# Patient Record
Sex: FEMALE | Race: WHITE | NOT HISPANIC OR LATINO | Employment: OTHER | URBAN - METROPOLITAN AREA
[De-identification: names, ages, dates, MRNs, and addresses within clinical notes are randomized per-mention and may not be internally consistent; named-entity substitution may affect disease eponyms.]

---

## 2018-09-28 ENCOUNTER — HOSPITAL ENCOUNTER (OUTPATIENT)
Dept: MAMMOGRAPHY | Facility: HOSPITAL | Age: 63
Discharge: HOME | End: 2018-09-28
Attending: NURSE PRACTITIONER | Admitting: NURSE PRACTITIONER

## 2019-06-07 ENCOUNTER — ON CAMPUS - OUTPATIENT (AMBULATORY)
Dept: URBAN - METROPOLITAN AREA HOSPITAL 2 | Facility: HOSPITAL | Age: 64
End: 2019-06-07

## 2019-06-07 ENCOUNTER — OFFICE (AMBULATORY)
Dept: URBAN - METROPOLITAN AREA PATHOLOGY 4 | Facility: PATHOLOGY | Age: 64
End: 2019-06-07

## 2019-06-07 VITALS
TEMPERATURE: 98.1 F | HEART RATE: 78 BPM | DIASTOLIC BLOOD PRESSURE: 92 MMHG | DIASTOLIC BLOOD PRESSURE: 66 MMHG | WEIGHT: 169 LBS | DIASTOLIC BLOOD PRESSURE: 89 MMHG | RESPIRATION RATE: 19 BRPM | OXYGEN SATURATION: 95 % | HEART RATE: 75 BPM | HEART RATE: 76 BPM | DIASTOLIC BLOOD PRESSURE: 56 MMHG | SYSTOLIC BLOOD PRESSURE: 115 MMHG | SYSTOLIC BLOOD PRESSURE: 113 MMHG | RESPIRATION RATE: 18 BRPM | HEIGHT: 63 IN | OXYGEN SATURATION: 92 % | SYSTOLIC BLOOD PRESSURE: 155 MMHG | OXYGEN SATURATION: 90 % | DIASTOLIC BLOOD PRESSURE: 85 MMHG | OXYGEN SATURATION: 91 % | OXYGEN SATURATION: 96 % | DIASTOLIC BLOOD PRESSURE: 76 MMHG | SYSTOLIC BLOOD PRESSURE: 126 MMHG | RESPIRATION RATE: 16 BRPM | SYSTOLIC BLOOD PRESSURE: 146 MMHG | OXYGEN SATURATION: 100 % | HEART RATE: 86 BPM | SYSTOLIC BLOOD PRESSURE: 144 MMHG | SYSTOLIC BLOOD PRESSURE: 139 MMHG | DIASTOLIC BLOOD PRESSURE: 69 MMHG

## 2019-06-07 DIAGNOSIS — R13.10 DYSPHAGIA, UNSPECIFIED: ICD-10-CM

## 2019-06-07 DIAGNOSIS — K22.2 ESOPHAGEAL OBSTRUCTION: ICD-10-CM

## 2019-06-07 DIAGNOSIS — K22.70 BARRETT'S ESOPHAGUS WITHOUT DYSPLASIA: ICD-10-CM

## 2019-06-07 DIAGNOSIS — K44.9 DIAPHRAGMATIC HERNIA WITHOUT OBSTRUCTION OR GANGRENE: ICD-10-CM

## 2019-06-07 LAB
GI HISTOLOGY: A. UNSPECIFIED: (no result)
GI HISTOLOGY: PDF REPORT: (no result)

## 2019-06-07 PROCEDURE — 43450 DILATE ESOPHAGUS 1/MULT PASS: CPT | Performed by: INTERNAL MEDICINE

## 2019-06-07 PROCEDURE — 88305 TISSUE EXAM BY PATHOLOGIST: CPT | Performed by: INTERNAL MEDICINE

## 2019-06-07 PROCEDURE — 43239 EGD BIOPSY SINGLE/MULTIPLE: CPT | Performed by: INTERNAL MEDICINE

## 2019-06-07 RX ORDER — RANITIDINE 300 MG/1
300 TABLET ORAL
Qty: 90 | Refills: 3 | Status: COMPLETED
End: 2019-11-18

## 2019-06-07 RX ORDER — PANTOPRAZOLE SODIUM 40 MG/1
40 TABLET, DELAYED RELEASE ORAL
Qty: 90 | Refills: 3 | Status: COMPLETED
End: 2019-11-18

## 2019-06-07 RX ADMIN — ALBUTEROL SULFATE: 2.5 SOLUTION INTRABRONCHIAL at 15:09

## 2019-06-07 RX ADMIN — IPRATROPIUM BROMIDE AND ALBUTEROL SULFATE: .5; 3 SOLUTION RESPIRATORY (INHALATION) at 15:09

## 2019-09-05 ENCOUNTER — HOSPITAL ENCOUNTER (EMERGENCY)
Facility: HOSPITAL | Age: 64
Discharge: HOME | End: 2019-09-05
Attending: EMERGENCY MEDICINE | Admitting: EMERGENCY MEDICINE

## 2019-09-05 ENCOUNTER — HOSPITAL ENCOUNTER (OUTPATIENT)
Dept: CARDIOLOGY | Facility: HOSPITAL | Age: 64
Discharge: HOME | End: 2019-09-05

## 2019-09-05 VITALS
HEIGHT: 63 IN | RESPIRATION RATE: 16 BRPM | TEMPERATURE: 97.7 F | OXYGEN SATURATION: 94 % | WEIGHT: 174.16 LBS | HEART RATE: 74 BPM | BODY MASS INDEX: 30.86 KG/M2 | SYSTOLIC BLOOD PRESSURE: 184 MMHG | DIASTOLIC BLOOD PRESSURE: 95 MMHG

## 2019-09-05 DIAGNOSIS — I82.591 CHRONIC DEEP VEIN THROMBOSIS (DVT) OF OTHER VEIN OF RIGHT LOWER EXTREMITY (HCC): ICD-10-CM

## 2019-09-05 DIAGNOSIS — I82.4Z1 ACUTE DEEP VEIN THROMBOSIS OF CALF, RIGHT (HCC): Primary | ICD-10-CM

## 2019-09-05 DIAGNOSIS — I82.A22 CHRONIC DEEP VEIN THROMBOSIS (DVT) OF AXILLARY VEIN OF LEFT UPPER EXTREMITY (HCC): ICD-10-CM

## 2019-09-05 LAB
ALBUMIN SERPL-MCNC: 3.7 G/DL (ref 3.5–4.8)
ALBUMIN/GLOB SERPL: 1.3 G/DL (ref 1–1.7)
ALP SERPL-CCNC: 66 U/L (ref 32–91)
ALT SERPL W P-5'-P-CCNC: 12 U/L (ref 14–54)
ANION GAP SERPL CALCULATED.3IONS-SCNC: 15.6 MMOL/L (ref 5–15)
AST SERPL-CCNC: 18 U/L (ref 15–41)
BASOPHILS # BLD AUTO: 0 10*3/MM3 (ref 0–0.2)
BASOPHILS NFR BLD AUTO: 0.2 % (ref 0–1.5)
BH CV LOW VAS RIGHT DISTAL FEMORAL SPONT: 1
BH CV LOW VAS RIGHT MID FEMORAL SPONT: 1
BH CV LOW VAS RIGHT PERONEAL VESSEL: 1
BH CV LOW VAS RIGHT POPLITEAL SPONT: 1
BH CV LOW VAS RIGHT PROXIMAL FEMORAL SPONT: 1
BH CV LOWER VASCULAR LEFT COMMON FEMORAL AUGMENT: NORMAL
BH CV LOWER VASCULAR LEFT COMMON FEMORAL COMPETENT: NORMAL
BH CV LOWER VASCULAR LEFT COMMON FEMORAL COMPRESS: NORMAL
BH CV LOWER VASCULAR LEFT COMMON FEMORAL PHASIC: NORMAL
BH CV LOWER VASCULAR LEFT COMMON FEMORAL SPONT: NORMAL
BH CV LOWER VASCULAR LEFT DISTAL FEMORAL COMPRESS: NORMAL
BH CV LOWER VASCULAR LEFT GASTRONEMIUS COMPRESS: NORMAL
BH CV LOWER VASCULAR LEFT GREATER SAPH AK COMPRESS: NORMAL
BH CV LOWER VASCULAR LEFT GREATER SAPH BK COMPRESS: NORMAL
BH CV LOWER VASCULAR LEFT LESSER SAPH COMPRESS: NORMAL
BH CV LOWER VASCULAR LEFT MID FEMORAL AUGMENT: NORMAL
BH CV LOWER VASCULAR LEFT MID FEMORAL COMPETENT: NORMAL
BH CV LOWER VASCULAR LEFT MID FEMORAL COMPRESS: NORMAL
BH CV LOWER VASCULAR LEFT MID FEMORAL PHASIC: NORMAL
BH CV LOWER VASCULAR LEFT MID FEMORAL SPONT: NORMAL
BH CV LOWER VASCULAR LEFT PERONEAL COMPRESS: NORMAL
BH CV LOWER VASCULAR LEFT POPLITEAL AUGMENT: NORMAL
BH CV LOWER VASCULAR LEFT POPLITEAL COMPETENT: NORMAL
BH CV LOWER VASCULAR LEFT POPLITEAL COMPRESS: NORMAL
BH CV LOWER VASCULAR LEFT POPLITEAL PHASIC: NORMAL
BH CV LOWER VASCULAR LEFT POPLITEAL SPONT: NORMAL
BH CV LOWER VASCULAR LEFT POSTERIOR TIBIAL COMPRESS: NORMAL
BH CV LOWER VASCULAR LEFT PROXIMAL FEMORAL COMPRESS: NORMAL
BH CV LOWER VASCULAR LEFT SAPHENOFEMORAL JUNCTION COMPRESS: NORMAL
BH CV LOWER VASCULAR RIGHT COMMON FEMORAL AUGMENT: NORMAL
BH CV LOWER VASCULAR RIGHT COMMON FEMORAL COMPETENT: NORMAL
BH CV LOWER VASCULAR RIGHT COMMON FEMORAL COMPRESS: NORMAL
BH CV LOWER VASCULAR RIGHT COMMON FEMORAL PHASIC: NORMAL
BH CV LOWER VASCULAR RIGHT COMMON FEMORAL SPONT: NORMAL
BH CV LOWER VASCULAR RIGHT DISTAL FEMORAL COMPRESS: NORMAL
BH CV LOWER VASCULAR RIGHT DISTAL FEMORAL THROMBUS: NORMAL
BH CV LOWER VASCULAR RIGHT GASTRONEMIUS COMPRESS: NORMAL
BH CV LOWER VASCULAR RIGHT GREATER SAPH AK COMPRESS: NORMAL
BH CV LOWER VASCULAR RIGHT GREATER SAPH BK COMPRESS: NORMAL
BH CV LOWER VASCULAR RIGHT LESSER SAPH COMPRESS: NORMAL
BH CV LOWER VASCULAR RIGHT MID FEMORAL AUGMENT: NORMAL
BH CV LOWER VASCULAR RIGHT MID FEMORAL COMPETENT: NORMAL
BH CV LOWER VASCULAR RIGHT MID FEMORAL COMPRESS: NORMAL
BH CV LOWER VASCULAR RIGHT MID FEMORAL PHASIC: NORMAL
BH CV LOWER VASCULAR RIGHT MID FEMORAL SPONT: NORMAL
BH CV LOWER VASCULAR RIGHT MID FEMORAL THROMBUS: NORMAL
BH CV LOWER VASCULAR RIGHT PERONEAL COMPRESS: NORMAL
BH CV LOWER VASCULAR RIGHT PERONEAL THROMBUS: NORMAL
BH CV LOWER VASCULAR RIGHT POPLITEAL AUGMENT: NORMAL
BH CV LOWER VASCULAR RIGHT POPLITEAL COMPETENT: NORMAL
BH CV LOWER VASCULAR RIGHT POPLITEAL COMPRESS: NORMAL
BH CV LOWER VASCULAR RIGHT POPLITEAL PHASIC: NORMAL
BH CV LOWER VASCULAR RIGHT POPLITEAL SPONT: NORMAL
BH CV LOWER VASCULAR RIGHT POPLITEAL THROMBUS: NORMAL
BH CV LOWER VASCULAR RIGHT POSTERIOR TIBIAL COMPRESS: NORMAL
BH CV LOWER VASCULAR RIGHT PROXIMAL FEMORAL COMPRESS: NORMAL
BH CV LOWER VASCULAR RIGHT PROXIMAL FEMORAL THROMBUS: NORMAL
BH CV LOWER VASCULAR RIGHT SAPHENOFEMORAL JUNCTION AUGMENT: NORMAL
BH CV LOWER VASCULAR RIGHT SAPHENOFEMORAL JUNCTION COMPETENT: NORMAL
BH CV LOWER VASCULAR RIGHT SAPHENOFEMORAL JUNCTION COMPRESS: NORMAL
BH CV LOWER VASCULAR RIGHT SAPHENOFEMORAL JUNCTION PHASIC: NORMAL
BH CV LOWER VASCULAR RIGHT SAPHENOFEMORAL JUNCTION SPONT: NORMAL
BILIRUB SERPL-MCNC: 0.6 MG/DL (ref 0.3–1.2)
BNP SERPL-MCNC: 24 PG/ML
BUN BLD-MCNC: 8 MG/DL (ref 8–20)
BUN/CREAT SERPL: 8.9 (ref 5.4–26.2)
CALCIUM SPEC-SCNC: 8.9 MG/DL (ref 8.9–10.3)
CHLORIDE SERPL-SCNC: 103 MMOL/L (ref 101–111)
CO2 SERPL-SCNC: 24 MMOL/L (ref 22–32)
CREAT BLD-MCNC: 0.9 MG/DL (ref 0.4–1)
DEPRECATED RDW RBC AUTO: 43.3 FL (ref 37–54)
EOSINOPHIL # BLD AUTO: 0.2 10*3/MM3 (ref 0–0.4)
EOSINOPHIL NFR BLD AUTO: 2.5 % (ref 0.3–6.2)
ERYTHROCYTE [DISTWIDTH] IN BLOOD BY AUTOMATED COUNT: 14.2 % (ref 12.3–15.4)
GFR SERPL CREATININE-BSD FRML MDRD: 63 ML/MIN/1.73
GLOBULIN UR ELPH-MCNC: 2.8 GM/DL (ref 2.5–3.8)
GLUCOSE BLD-MCNC: 96 MG/DL (ref 65–99)
HCT VFR BLD AUTO: 43.3 % (ref 34–46.6)
HGB BLD-MCNC: 14.2 G/DL (ref 12–15.9)
INR PPP: 2.06 (ref 2–3)
LYMPHOCYTES # BLD AUTO: 1.8 10*3/MM3 (ref 0.7–3.1)
LYMPHOCYTES NFR BLD AUTO: 27.9 % (ref 19.6–45.3)
MCH RBC QN AUTO: 28.7 PG (ref 26.6–33)
MCHC RBC AUTO-ENTMCNC: 32.9 G/DL (ref 31.5–35.7)
MCV RBC AUTO: 87.2 FL (ref 79–97)
MONOCYTES # BLD AUTO: 0.5 10*3/MM3 (ref 0.1–0.9)
MONOCYTES NFR BLD AUTO: 7.6 % (ref 5–12)
NEUTROPHILS # BLD AUTO: 4 10*3/MM3 (ref 1.7–7)
NEUTROPHILS NFR BLD AUTO: 61.8 % (ref 42.7–76)
NRBC BLD AUTO-RTO: 0 /100 WBC (ref 0–0.2)
PLATELET # BLD AUTO: 216 10*3/MM3 (ref 140–450)
PMV BLD AUTO: 8.6 FL (ref 6–12)
POTASSIUM BLD-SCNC: 3.6 MMOL/L (ref 3.6–5.1)
PROT SERPL-MCNC: 6.5 G/DL (ref 6.1–7.9)
PROTHROMBIN TIME: 19.9 SECONDS (ref 19.4–28.5)
RBC # BLD AUTO: 4.96 10*6/MM3 (ref 3.77–5.28)
SODIUM BLD-SCNC: 139 MMOL/L (ref 136–144)
WBC NRBC COR # BLD: 6.6 10*3/MM3 (ref 3.4–10.8)

## 2019-09-05 PROCEDURE — 83880 ASSAY OF NATRIURETIC PEPTIDE: CPT | Performed by: EMERGENCY MEDICINE

## 2019-09-05 PROCEDURE — 99283 EMERGENCY DEPT VISIT LOW MDM: CPT

## 2019-09-05 PROCEDURE — 85610 PROTHROMBIN TIME: CPT | Performed by: EMERGENCY MEDICINE

## 2019-09-05 PROCEDURE — 80053 COMPREHEN METABOLIC PANEL: CPT | Performed by: EMERGENCY MEDICINE

## 2019-09-05 PROCEDURE — 93970 EXTREMITY STUDY: CPT

## 2019-09-05 PROCEDURE — 85025 COMPLETE CBC W/AUTO DIFF WBC: CPT | Performed by: EMERGENCY MEDICINE

## 2019-09-05 RX ORDER — OXYCODONE HYDROCHLORIDE 5 MG/1
5 TABLET ORAL ONCE
Status: COMPLETED | OUTPATIENT
Start: 2019-09-05 | End: 2019-09-05

## 2019-09-05 RX ORDER — OXYCODONE HCL 10 MG/1
10 TABLET, FILM COATED, EXTENDED RELEASE ORAL ONCE
Status: DISCONTINUED | OUTPATIENT
Start: 2019-09-05 | End: 2019-09-05

## 2019-09-05 RX ORDER — WARFARIN SODIUM 1 MG/1
1 TABLET ORAL
Qty: 15 TABLET | Refills: 1 | Status: SHIPPED | OUTPATIENT
Start: 2019-09-05 | End: 2020-07-16

## 2019-09-05 RX ADMIN — OXYCODONE HYDROCHLORIDE 5 MG: 5 TABLET ORAL at 13:17

## 2019-09-05 NOTE — ED NOTES
Blood pressure elevated to 184/95. Patient states she take lisinopril at home. MD made aware. MD stated she is okay to discharge. Advised patient to take lisinopril when she gets home. Patient agreeable.      Carmen Lam RN  09/05/19 [id number]

## 2019-09-05 NOTE — ED PROVIDER NOTES
Subjective   63-year-old female reporting a 6-month history of increasing intermittent edema to her legs.  She states been taking the same dose of Lasix throughout.  She states it does not help.  She states that she has had some increased discomfort mostly in the left leg but also in the right.  He states that she has had difficulty achieving a therapeutic Coumadin level.  She states was checked within the week and found to be 2.1.  She reports that she has a history of previous DVT and also a history of previous pulmonary embolus.  She reports no recent fever chills.  She denies history of direct impact trauma to the area.  She reports no overuse risk factors.  She denies shortness of breath night sweats or hemoptysis            Review of Systems   Constitutional: Positive for activity change and fatigue. Negative for chills, diaphoresis and fever.   Respiratory: Negative for choking, chest tightness and shortness of breath.    Hematological: Does not bruise/bleed easily.   All other systems reviewed and are negative.      No past medical history on file.    No Known Allergies    No past surgical history on file.    No family history on file.    Social History     Socioeconomic History   • Marital status:      Spouse name: Not on file   • Number of children: Not on file   • Years of education: Not on file   • Highest education level: Not on file           Objective   Physical Exam  Alert [place] Coma Scale 15   HEENT: Pupils equal and reactive to light. Conjunctivae are not injected. normal tympanic membranes. Oropharynx and nares are normal.   Neck: Supple. Midline trachea. No JVD. No goiter.   Chest: Clear and equal breath sounds bilaterally regular rate and rhythm without murmur or rub.   Abdomen: Positive bowel sounds nontender nondistended. No rebound or peritoneal signs. No CVA tenderness.   Extremities no clubbing cyanosis or edema motor sensory exam is normal the full range of motion is intact.  The  patient has bilateral positive Homans sign there is a puffiness noted to both feet.  The patient also has pain to direct calf compression on the right   skin: Warm and dry, no rashes or petechia.   Lymphatic: No regional lymphadenopathy. NProcedures           ED Course        Labs Reviewed   COMPREHENSIVE METABOLIC PANEL - Abnormal; Notable for the following components:       Result Value    ALT (SGPT) 12 (*)     Anion Gap 15.6 (*)     All other components within normal limits   PROTIME-INR - Normal   BNP (IN-HOUSE) - Normal   CBC WITH AUTO DIFFERENTIAL - Normal   CBC AND DIFFERENTIAL    Narrative:     The following orders were created for panel order CBC & Differential.  Procedure                               Abnormality         Status                     ---------                               -----------         ------                     CBC Auto Differential[[id number]]        Normal              Final result                 Please view results for these tests on the individual orders.     Medications   oxyCODONE (ROXICODONE) immediate release tablet 5 mg (5 mg Oral Given 9/5/19 1317)     No radiology results for the last day            MDM  Number of Diagnoses or Management Options     Amount and/or Complexity of Data Reviewed  Clinical lab tests: reviewed  Tests in the radiology section of CPT®: reviewed  Review and summarize past medical records: yes  Discuss the patient with other providers: yes  Independent visualization of images, tracings, or specimens: yes    Risk of Complications, Morbidity, and/or Mortality  Presenting problems: high  Diagnostic procedures: high  Management options: high  General comments: The patient was advised to continue her hydrocodone for pain.  The patient be given a as needed prescription for maxzide.  The patient will have her Coumadin dose increased by 1 mg every other day.  She will take a baby aspirin daily for the next 2 weeks.  The patient will follow close with her  primary care provider she was stable at discharge and vocalized understanding of discharge instructions and warning    Patient Progress  Patient progress: improved      Final diagnoses:   Acute deep vein thrombosis of calf, right (CMS/HCC)   Chronic deep vein thrombosis (DVT) of other vein of right lower extremity (CMS/HCC)   Chronic deep vein thrombosis (DVT) of axillary vein of left upper extremity (CMS/[facility])              Jun Caraballo MD  09/05/19 1520

## 2019-09-05 NOTE — DISCHARGE INSTRUCTIONS
Recheck PT/INR in 3 days  Take a daily baby aspirin for the next 2 weeks  Continue your pain medicine as directed

## 2019-11-18 ENCOUNTER — OFFICE (AMBULATORY)
Dept: URBAN - METROPOLITAN AREA CLINIC 64 | Facility: CLINIC | Age: 64
End: 2019-11-18

## 2019-11-18 VITALS
SYSTOLIC BLOOD PRESSURE: 163 MMHG | HEART RATE: 71 BPM | HEIGHT: 63 IN | DIASTOLIC BLOOD PRESSURE: 93 MMHG | WEIGHT: 180 LBS

## 2019-11-18 DIAGNOSIS — I10 ESSENTIAL (PRIMARY) HYPERTENSION: ICD-10-CM

## 2019-11-18 DIAGNOSIS — Z79.01 LONG TERM (CURRENT) USE OF ANTICOAGULANTS: ICD-10-CM

## 2019-11-18 DIAGNOSIS — K21.9 GASTRO-ESOPHAGEAL REFLUX DISEASE WITHOUT ESOPHAGITIS: ICD-10-CM

## 2019-11-18 DIAGNOSIS — Z86.010 PERSONAL HISTORY OF COLONIC POLYPS: ICD-10-CM

## 2019-11-18 DIAGNOSIS — F41.9 ANXIETY DISORDER, UNSPECIFIED: ICD-10-CM

## 2019-11-18 DIAGNOSIS — R13.10 DYSPHAGIA, UNSPECIFIED: ICD-10-CM

## 2019-11-18 PROCEDURE — 99214 OFFICE O/P EST MOD 30 MIN: CPT | Performed by: NURSE PRACTITIONER

## 2019-11-18 RX ORDER — RANITIDINE 300 MG/1
300 TABLET ORAL
Qty: 90 | Refills: 3 | Status: COMPLETED
End: 2019-11-18

## 2019-11-18 RX ORDER — DEXLANSOPRAZOLE 60 MG/1
60 CAPSULE, DELAYED RELEASE ORAL
Qty: 90 | Refills: 3 | Status: COMPLETED
Start: 2019-11-18 | End: 2020-01-20

## 2019-11-18 RX ORDER — PANTOPRAZOLE SODIUM 40 MG/1
40 TABLET, DELAYED RELEASE ORAL
Qty: 90 | Refills: 3 | Status: COMPLETED
End: 2019-11-18

## 2020-01-20 ENCOUNTER — OFFICE (AMBULATORY)
Dept: URBAN - METROPOLITAN AREA CLINIC 64 | Facility: CLINIC | Age: 65
End: 2020-01-20

## 2020-01-20 VITALS
WEIGHT: 176 LBS | HEIGHT: 63 IN | SYSTOLIC BLOOD PRESSURE: 158 MMHG | DIASTOLIC BLOOD PRESSURE: 98 MMHG | HEART RATE: 66 BPM

## 2020-01-20 DIAGNOSIS — R10.13 EPIGASTRIC PAIN: ICD-10-CM

## 2020-01-20 DIAGNOSIS — Z79.01 LONG TERM (CURRENT) USE OF ANTICOAGULANTS: ICD-10-CM

## 2020-01-20 DIAGNOSIS — R19.5 OTHER FECAL ABNORMALITIES: ICD-10-CM

## 2020-01-20 DIAGNOSIS — K21.9 GASTRO-ESOPHAGEAL REFLUX DISEASE WITHOUT ESOPHAGITIS: ICD-10-CM

## 2020-01-20 DIAGNOSIS — K22.70 BARRETT'S ESOPHAGUS WITHOUT DYSPLASIA: ICD-10-CM

## 2020-01-20 PROBLEM — R13.10 DYSPHAGIA: Status: ACTIVE | Noted: 2019-06-07

## 2020-01-20 PROCEDURE — 99214 OFFICE O/P EST MOD 30 MIN: CPT | Performed by: NURSE PRACTITIONER

## 2020-02-10 ENCOUNTER — ON CAMPUS - OUTPATIENT (AMBULATORY)
Dept: URBAN - METROPOLITAN AREA HOSPITAL 2 | Facility: HOSPITAL | Age: 65
End: 2020-02-10

## 2020-02-10 VITALS
HEART RATE: 67 BPM | DIASTOLIC BLOOD PRESSURE: 78 MMHG | OXYGEN SATURATION: 90 % | OXYGEN SATURATION: 98 % | HEART RATE: 73 BPM | DIASTOLIC BLOOD PRESSURE: 77 MMHG | DIASTOLIC BLOOD PRESSURE: 91 MMHG | TEMPERATURE: 97.8 F | SYSTOLIC BLOOD PRESSURE: 134 MMHG | OXYGEN SATURATION: 95 % | HEART RATE: 65 BPM | DIASTOLIC BLOOD PRESSURE: 79 MMHG | RESPIRATION RATE: 15 BRPM | SYSTOLIC BLOOD PRESSURE: 130 MMHG | HEIGHT: 63 IN | DIASTOLIC BLOOD PRESSURE: 86 MMHG | HEART RATE: 75 BPM | HEART RATE: 64 BPM | RESPIRATION RATE: 16 BRPM | OXYGEN SATURATION: 92 % | OXYGEN SATURATION: 97 % | WEIGHT: 179 LBS | RESPIRATION RATE: 18 BRPM | SYSTOLIC BLOOD PRESSURE: 151 MMHG | SYSTOLIC BLOOD PRESSURE: 179 MMHG

## 2020-02-10 DIAGNOSIS — K22.2 ESOPHAGEAL OBSTRUCTION: ICD-10-CM

## 2020-02-10 DIAGNOSIS — K44.9 DIAPHRAGMATIC HERNIA WITHOUT OBSTRUCTION OR GANGRENE: ICD-10-CM

## 2020-02-10 DIAGNOSIS — R13.10 DYSPHAGIA, UNSPECIFIED: ICD-10-CM

## 2020-02-10 PROCEDURE — 43450 DILATE ESOPHAGUS 1/MULT PASS: CPT | Performed by: INTERNAL MEDICINE

## 2020-02-10 PROCEDURE — 43235 EGD DIAGNOSTIC BRUSH WASH: CPT | Performed by: INTERNAL MEDICINE

## 2020-02-10 RX ORDER — PANTOPRAZOLE SODIUM 40 MG/1
40 TABLET, DELAYED RELEASE ORAL
Qty: 90 | Refills: 3 | Status: ACTIVE
Start: 2020-02-10

## 2020-07-16 ENCOUNTER — HOSPITAL ENCOUNTER (INPATIENT)
Facility: HOSPITAL | Age: 65
LOS: 6 days | Discharge: HOME HEALTH | End: 2020-07-22
Attending: EMERGENCY MEDICINE | Admitting: HOSPITALIST

## 2020-07-16 ENCOUNTER — APPOINTMENT (OUTPATIENT)
Dept: CT IMAGING | Facility: HOSPITAL | Age: 65
End: 2020-07-16

## 2020-07-16 ENCOUNTER — ANESTHESIA (OUTPATIENT)
Dept: PERIOP | Facility: HOSPITAL | Age: 65
End: 2020-07-16

## 2020-07-16 ENCOUNTER — ANESTHESIA EVENT (OUTPATIENT)
Dept: PERIOP | Facility: HOSPITAL | Age: 65
End: 2020-07-16

## 2020-07-16 ENCOUNTER — APPOINTMENT (OUTPATIENT)
Dept: GENERAL RADIOLOGY | Facility: HOSPITAL | Age: 65
End: 2020-07-16

## 2020-07-16 DIAGNOSIS — K66.8 FREE INTRAPERITONEAL AIR: ICD-10-CM

## 2020-07-16 DIAGNOSIS — S36.503A: Primary | ICD-10-CM

## 2020-07-16 DIAGNOSIS — K57.20 PERFORATION OF SIGMOID COLON DUE TO DIVERTICULITIS: ICD-10-CM

## 2020-07-16 DIAGNOSIS — K57.92 DIVERTICULITIS: ICD-10-CM

## 2020-07-16 PROBLEM — G47.00 INSOMNIA: Chronic | Status: ACTIVE | Noted: 2020-07-16

## 2020-07-16 PROBLEM — E66.9 OBESITY: Chronic | Status: ACTIVE | Noted: 2020-07-16

## 2020-07-16 PROBLEM — I10 HYPERTENSIVE DISORDER: Status: ACTIVE | Noted: 2020-07-16

## 2020-07-16 PROBLEM — K21.9 GASTROESOPHAGEAL REFLUX DISEASE: Status: ACTIVE | Noted: 2020-07-16

## 2020-07-16 PROBLEM — K57.32 DIVERTICULITIS OF COLON: Status: ACTIVE | Noted: 2020-07-16

## 2020-07-16 PROBLEM — G89.29 CHRONIC BACK PAIN: Status: ACTIVE | Noted: 2020-07-16

## 2020-07-16 PROBLEM — M54.9 CHRONIC BACK PAIN: Status: ACTIVE | Noted: 2020-07-16

## 2020-07-16 PROBLEM — I10 ESSENTIAL HYPERTENSION: Chronic | Status: ACTIVE | Noted: 2020-07-16

## 2020-07-16 PROBLEM — R65.20 SEVERE SEPSIS: Status: ACTIVE | Noted: 2020-07-16

## 2020-07-16 PROBLEM — F32.A DEPRESSIVE DISORDER: Status: ACTIVE | Noted: 2020-07-16

## 2020-07-16 PROBLEM — M54.9 CHRONIC BACK PAIN: Chronic | Status: ACTIVE | Noted: 2020-07-16

## 2020-07-16 PROBLEM — E87.1 ACUTE HYPONATREMIA: Status: ACTIVE | Noted: 2020-07-16

## 2020-07-16 PROBLEM — J44.9 CHRONIC OBSTRUCTIVE LUNG DISEASE: Status: ACTIVE | Noted: 2020-07-16

## 2020-07-16 PROBLEM — F32.A DEPRESSIVE DISORDER: Chronic | Status: ACTIVE | Noted: 2020-07-16

## 2020-07-16 PROBLEM — I82.409 DEEP VENOUS THROMBOSIS (HCC): Status: ACTIVE | Noted: 2020-07-16

## 2020-07-16 PROBLEM — E87.6 ACUTE HYPOKALEMIA: Status: ACTIVE | Noted: 2020-07-16

## 2020-07-16 PROBLEM — N17.9 ACUTE KIDNEY INJURY: Status: ACTIVE | Noted: 2020-07-16

## 2020-07-16 PROBLEM — Z79.01 CHRONIC ANTICOAGULATION: Chronic | Status: ACTIVE | Noted: 2020-07-16

## 2020-07-16 PROBLEM — J44.9 CHRONIC OBSTRUCTIVE LUNG DISEASE: Chronic | Status: ACTIVE | Noted: 2020-07-16

## 2020-07-16 PROBLEM — S52.124A CLOSED NONDISPLACED FRACTURE OF HEAD OF RIGHT RADIUS: Status: ACTIVE | Noted: 2017-03-31

## 2020-07-16 PROBLEM — A41.9 SEVERE SEPSIS (HCC): Status: ACTIVE | Noted: 2020-07-16

## 2020-07-16 PROBLEM — K21.9 GASTROESOPHAGEAL REFLUX DISEASE: Chronic | Status: ACTIVE | Noted: 2020-07-16

## 2020-07-16 PROBLEM — G89.29 CHRONIC BACK PAIN: Chronic | Status: ACTIVE | Noted: 2020-07-16

## 2020-07-16 LAB
ABO GROUP BLD: NORMAL
ALBUMIN SERPL-MCNC: 3.5 G/DL (ref 3.5–5.2)
ALBUMIN/GLOB SERPL: 1.1 G/DL
ALP SERPL-CCNC: 108 U/L (ref 39–117)
ALT SERPL W P-5'-P-CCNC: 11 U/L (ref 1–33)
ANION GAP SERPL CALCULATED.3IONS-SCNC: 14 MMOL/L (ref 5–15)
ANION GAP SERPL CALCULATED.3IONS-SCNC: 17 MMOL/L (ref 5–15)
AST SERPL-CCNC: 19 U/L (ref 1–32)
BACTERIA UR QL AUTO: ABNORMAL /HPF
BASE DEFICIT: ABNORMAL
BASE EXCESS BLDA CALC-SCNC: 6 MMOL/L (ref 0–3)
BILIRUB SERPL-MCNC: 0.6 MG/DL (ref 0–1.2)
BILIRUB UR QL STRIP: ABNORMAL
BLD GP AB SCN SERPL QL: NEGATIVE
BUN SERPL-MCNC: 22 MG/DL (ref 8–23)
BUN SERPL-MCNC: ABNORMAL MG/DL
BUN SERPL-MCNC: ABNORMAL MG/DL
BUN/CREAT SERPL: ABNORMAL
BUN/CREAT SERPL: ABNORMAL
CA-I BLDA-SCNC: 1.06 MMOL/L (ref 1.12–1.32)
CALCIUM SPEC-SCNC: 8 MG/DL (ref 8.6–10.5)
CALCIUM SPEC-SCNC: 9 MG/DL (ref 8.6–10.5)
CHLORIDE SERPL-SCNC: 82 MMOL/L (ref 98–107)
CHLORIDE SERPL-SCNC: 95 MMOL/L (ref 98–107)
CLARITY UR: ABNORMAL
CLUMPED PLATELETS: PRESENT
CO2 BLDA-SCNC: 32 MMOL/L (ref 23–27)
CO2 SERPL-SCNC: 24 MMOL/L (ref 22–29)
CO2 SERPL-SCNC: 24 MMOL/L (ref 22–29)
COLOR UR: ABNORMAL
CREAT SERPL-MCNC: 0.92 MG/DL (ref 0.57–1)
CREAT SERPL-MCNC: 1.2 MG/DL (ref 0.57–1)
D-LACTATE SERPL-SCNC: 1.2 MMOL/L (ref 0.5–2)
DEPRECATED RDW RBC AUTO: 41.1 FL (ref 37–54)
ERYTHROCYTE [DISTWIDTH] IN BLOOD BY AUTOMATED COUNT: 14.1 % (ref 12.3–15.4)
GFR SERPL CREATININE-BSD FRML MDRD: 45 ML/MIN/1.73
GFR SERPL CREATININE-BSD FRML MDRD: 62 ML/MIN/1.73
GLOBULIN UR ELPH-MCNC: 3.3 GM/DL
GLUCOSE BLDC GLUCOMTR-MCNC: 131 MG/DL (ref 70–105)
GLUCOSE BLDC GLUCOMTR-MCNC: 173 MG/DL (ref 70–105)
GLUCOSE SERPL-MCNC: 141 MG/DL (ref 65–99)
GLUCOSE SERPL-MCNC: 171 MG/DL (ref 65–99)
GLUCOSE UR STRIP-MCNC: NEGATIVE MG/DL
HCO3 BLDA-SCNC: 30.7 MMOL/L (ref 22–26)
HCT VFR BLD AUTO: 41.1 % (ref 34–46.6)
HCT VFR BLDA CALC: 34 % (ref 38–51)
HGB BLD-MCNC: 13.6 G/DL (ref 12–15.9)
HGB BLDA-MCNC: 11.6 G/DL (ref 12–17)
HGB UR QL STRIP.AUTO: NEGATIVE
HOLD SPECIMEN: NORMAL
HOLD SPECIMEN: NORMAL
HYALINE CASTS UR QL AUTO: ABNORMAL /LPF
INR PPP: 3.19 (ref 0.9–1.1)
KETONES UR QL STRIP: ABNORMAL
LEUKOCYTE ESTERASE UR QL STRIP.AUTO: ABNORMAL
LIPASE SERPL-CCNC: 20 U/L (ref 13–60)
LYMPHOCYTES # BLD MANUAL: 1.09 10*3/MM3 (ref 0.7–3.1)
LYMPHOCYTES NFR BLD MANUAL: 1 % (ref 5–12)
LYMPHOCYTES NFR BLD MANUAL: 4 % (ref 19.6–45.3)
MAGNESIUM SERPL-MCNC: 2.4 MG/DL (ref 1.6–2.4)
MCH RBC QN AUTO: 27.8 PG (ref 26.6–33)
MCHC RBC AUTO-ENTMCNC: 33.2 G/DL (ref 31.5–35.7)
MCV RBC AUTO: 83.8 FL (ref 79–97)
METAMYELOCYTES NFR BLD MANUAL: 1 % (ref 0–0)
MONOCYTES # BLD AUTO: 0.27 10*3/MM3 (ref 0.1–0.9)
NEUTROPHILS # BLD AUTO: 25.57 10*3/MM3 (ref 1.7–7)
NEUTROPHILS NFR BLD MANUAL: 87 % (ref 42.7–76)
NEUTS BAND NFR BLD MANUAL: 7 % (ref 0–5)
NITRITE UR QL STRIP: NEGATIVE
PCO2 BLDA: 48 MM HG (ref 35–45)
PH BLDA: 7.41 PH UNITS (ref 7.35–7.45)
PH UR STRIP.AUTO: 5.5 [PH] (ref 5–8)
PLATELET # BLD AUTO: 209 10*3/MM3 (ref 140–450)
PMV BLD AUTO: 8.4 FL (ref 6–12)
PO2 BLDA: 477 MM HG (ref 80–105)
POTASSIUM BLDA-SCNC: 3.5 MMOL/L (ref 3.5–4.9)
POTASSIUM SERPL-SCNC: 2.9 MMOL/L (ref 3.5–5.2)
POTASSIUM SERPL-SCNC: 3.4 MMOL/L (ref 3.5–5.2)
PROT SERPL-MCNC: 6.8 G/DL (ref 6–8.5)
PROT UR QL STRIP: ABNORMAL
PROTHROMBIN TIME: 30 SECONDS (ref 9.6–11.7)
RBC # BLD AUTO: 4.91 10*6/MM3 (ref 3.77–5.28)
RBC # UR: ABNORMAL /HPF
RBC MORPH BLD: NORMAL
REF LAB TEST METHOD: ABNORMAL
RH BLD: POSITIVE
SAO2 % BLDCOA: 100 % (ref 95–98)
SARS-COV-2 RNA PNL SPEC NAA+PROBE: NOT DETECTED
SCAN SLIDE: NORMAL
SMALL PLATELETS BLD QL SMEAR: ADEQUATE
SODIUM BLD-SCNC: 130 MMOL/L (ref 138–146)
SODIUM SERPL-SCNC: 123 MMOL/L (ref 136–145)
SODIUM SERPL-SCNC: 133 MMOL/L (ref 136–145)
SP GR UR STRIP: 1.02 (ref 1–1.03)
SQUAMOUS #/AREA URNS HPF: ABNORMAL /HPF
T&S EXPIRATION DATE: NORMAL
TROPONIN T SERPL-MCNC: <0.01 NG/ML (ref 0–0.03)
UROBILINOGEN UR QL STRIP: ABNORMAL
WBC # BLD AUTO: 27.2 10*3/MM3 (ref 3.4–10.8)
WBC MORPH BLD: NORMAL
WBC UR QL AUTO: ABNORMAL /HPF
WHOLE BLOOD HOLD SPECIMEN: NORMAL
WHOLE BLOOD HOLD SPECIMEN: NORMAL
YEAST URNS QL MICRO: ABNORMAL /HPF

## 2020-07-16 PROCEDURE — C9132 KCENTRA, PER I.U.: HCPCS | Performed by: EMERGENCY MEDICINE

## 2020-07-16 PROCEDURE — 86900 BLOOD TYPING SEROLOGIC ABO: CPT

## 2020-07-16 PROCEDURE — 99223 1ST HOSP IP/OBS HIGH 75: CPT | Performed by: INTERNAL MEDICINE

## 2020-07-16 PROCEDURE — 25010000002 PIPERACILLIN SOD-TAZOBACTAM PER 1 G: Performed by: SURGERY

## 2020-07-16 PROCEDURE — 63710000001 INSULIN LISPRO (HUMAN) PER 5 UNITS: Performed by: SURGERY

## 2020-07-16 PROCEDURE — 44143 PARTIAL REMOVAL OF COLON: CPT | Performed by: SURGERY

## 2020-07-16 PROCEDURE — 74177 CT ABD & PELVIS W/CONTRAST: CPT

## 2020-07-16 PROCEDURE — 83036 HEMOGLOBIN GLYCOSYLATED A1C: CPT | Performed by: NURSE PRACTITIONER

## 2020-07-16 PROCEDURE — 84295 ASSAY OF SERUM SODIUM: CPT

## 2020-07-16 PROCEDURE — 25010000002 HEPARIN (PORCINE) 1000-0.9 UT/500ML-% SOLUTION: Performed by: ANESTHESIOLOGY

## 2020-07-16 PROCEDURE — 0D1N0Z4 BYPASS SIGMOID COLON TO CUTANEOUS, OPEN APPROACH: ICD-10-PCS | Performed by: SURGERY

## 2020-07-16 PROCEDURE — 71045 X-RAY EXAM CHEST 1 VIEW: CPT

## 2020-07-16 PROCEDURE — 84520 ASSAY OF UREA NITROGEN: CPT | Performed by: SURGERY

## 2020-07-16 PROCEDURE — 0 IOPAMIDOL PER 1 ML: Performed by: EMERGENCY MEDICINE

## 2020-07-16 PROCEDURE — 85025 COMPLETE CBC W/AUTO DIFF WBC: CPT | Performed by: EMERGENCY MEDICINE

## 2020-07-16 PROCEDURE — 93005 ELECTROCARDIOGRAM TRACING: CPT | Performed by: EMERGENCY MEDICINE

## 2020-07-16 PROCEDURE — 25010000003 PHYTONADIONE 10 MG/ML SOLUTION 1 ML AMPULE: Performed by: EMERGENCY MEDICINE

## 2020-07-16 PROCEDURE — 87635 SARS-COV-2 COVID-19 AMP PRB: CPT | Performed by: EMERGENCY MEDICINE

## 2020-07-16 PROCEDURE — 86901 BLOOD TYPING SEROLOGIC RH(D): CPT

## 2020-07-16 PROCEDURE — 0DTN0ZZ RESECTION OF SIGMOID COLON, OPEN APPROACH: ICD-10-PCS | Performed by: SURGERY

## 2020-07-16 PROCEDURE — 88307 TISSUE EXAM BY PATHOLOGIST: CPT | Performed by: SURGERY

## 2020-07-16 PROCEDURE — 0W9G00Z DRAINAGE OF PERITONEAL CAVITY WITH DRAINAGE DEVICE, OPEN APPROACH: ICD-10-PCS | Performed by: SURGERY

## 2020-07-16 PROCEDURE — 83605 ASSAY OF LACTIC ACID: CPT

## 2020-07-16 PROCEDURE — 44143 PARTIAL REMOVAL OF COLON: CPT | Performed by: NURSE PRACTITIONER

## 2020-07-16 PROCEDURE — 86901 BLOOD TYPING SEROLOGIC RH(D): CPT | Performed by: EMERGENCY MEDICINE

## 2020-07-16 PROCEDURE — 81001 URINALYSIS AUTO W/SCOPE: CPT | Performed by: EMERGENCY MEDICINE

## 2020-07-16 PROCEDURE — 85007 BL SMEAR W/DIFF WBC COUNT: CPT | Performed by: EMERGENCY MEDICINE

## 2020-07-16 PROCEDURE — 82803 BLOOD GASES ANY COMBINATION: CPT

## 2020-07-16 PROCEDURE — 87040 BLOOD CULTURE FOR BACTERIA: CPT | Performed by: EMERGENCY MEDICINE

## 2020-07-16 PROCEDURE — 82330 ASSAY OF CALCIUM: CPT

## 2020-07-16 PROCEDURE — 94640 AIRWAY INHALATION TREATMENT: CPT

## 2020-07-16 PROCEDURE — 82962 GLUCOSE BLOOD TEST: CPT

## 2020-07-16 PROCEDURE — 86900 BLOOD TYPING SEROLOGIC ABO: CPT | Performed by: EMERGENCY MEDICINE

## 2020-07-16 PROCEDURE — 25010000002 PIPERACILLIN SOD-TAZOBACTAM PER 1 G: Performed by: EMERGENCY MEDICINE

## 2020-07-16 PROCEDURE — 83690 ASSAY OF LIPASE: CPT | Performed by: EMERGENCY MEDICINE

## 2020-07-16 PROCEDURE — 85014 HEMATOCRIT: CPT

## 2020-07-16 PROCEDURE — 84484 ASSAY OF TROPONIN QUANT: CPT | Performed by: EMERGENCY MEDICINE

## 2020-07-16 PROCEDURE — 83735 ASSAY OF MAGNESIUM: CPT | Performed by: NURSE PRACTITIONER

## 2020-07-16 PROCEDURE — 85610 PROTHROMBIN TIME: CPT | Performed by: EMERGENCY MEDICINE

## 2020-07-16 PROCEDURE — 25010000002 PROTHROMBIN COMPLEX CONC HUMAN 1000 UNITS KIT: Performed by: EMERGENCY MEDICINE

## 2020-07-16 PROCEDURE — 25010000002 FENTANYL CITRATE (PF) 100 MCG/2ML SOLUTION: Performed by: NURSE ANESTHETIST, CERTIFIED REGISTERED

## 2020-07-16 PROCEDURE — 99222 1ST HOSP IP/OBS MODERATE 55: CPT | Performed by: SURGERY

## 2020-07-16 PROCEDURE — 25010000002 DIPHENHYDRAMINE PER 50 MG: Performed by: NURSE ANESTHETIST, CERTIFIED REGISTERED

## 2020-07-16 PROCEDURE — 25010000002 ONDANSETRON PER 1 MG: Performed by: NURSE PRACTITIONER

## 2020-07-16 PROCEDURE — 25010000002 HYDROMORPHONE PER 4 MG: Performed by: NURSE ANESTHETIST, CERTIFIED REGISTERED

## 2020-07-16 PROCEDURE — 84132 ASSAY OF SERUM POTASSIUM: CPT

## 2020-07-16 PROCEDURE — 94799 UNLISTED PULMONARY SVC/PX: CPT

## 2020-07-16 PROCEDURE — 99284 EMERGENCY DEPT VISIT MOD MDM: CPT

## 2020-07-16 PROCEDURE — 25010000002 DEXAMETHASONE PER 1 MG: Performed by: NURSE ANESTHETIST, CERTIFIED REGISTERED

## 2020-07-16 PROCEDURE — 80053 COMPREHEN METABOLIC PANEL: CPT | Performed by: EMERGENCY MEDICINE

## 2020-07-16 PROCEDURE — 25010000003 POTASSIUM CHLORIDE 10 MEQ/100ML SOLUTION: Performed by: NURSE PRACTITIONER

## 2020-07-16 PROCEDURE — 86850 RBC ANTIBODY SCREEN: CPT | Performed by: EMERGENCY MEDICINE

## 2020-07-16 PROCEDURE — C1751 CATH, INF, PER/CENT/MIDLINE: HCPCS | Performed by: ANESTHESIOLOGY

## 2020-07-16 PROCEDURE — 82947 ASSAY GLUCOSE BLOOD QUANT: CPT

## 2020-07-16 PROCEDURE — 25010000002 MIDAZOLAM PER 1 MG: Performed by: NURSE ANESTHETIST, CERTIFIED REGISTERED

## 2020-07-16 DEVICE — PROXIMATE LINEAR CUTTER RELOAD, BLUE, 75MM
Type: IMPLANTABLE DEVICE | Site: SIGMOID COLON | Status: FUNCTIONAL
Brand: PROXIMATE

## 2020-07-16 DEVICE — PROXIMATE RELOADABLE LINEAR CUTTER WITH SAFETY LOCK-OUT, 75MM
Type: IMPLANTABLE DEVICE | Site: SIGMOID COLON | Status: FUNCTIONAL
Brand: PROXIMATE

## 2020-07-16 RX ORDER — FENTANYL CITRATE 50 UG/ML
INJECTION, SOLUTION INTRAMUSCULAR; INTRAVENOUS AS NEEDED
Status: DISCONTINUED | OUTPATIENT
Start: 2020-07-16 | End: 2020-07-16 | Stop reason: SURG

## 2020-07-16 RX ORDER — ROCURONIUM BROMIDE 10 MG/ML
INJECTION, SOLUTION INTRAVENOUS AS NEEDED
Status: DISCONTINUED | OUTPATIENT
Start: 2020-07-16 | End: 2020-07-16 | Stop reason: SURG

## 2020-07-16 RX ORDER — POTASSIUM CHLORIDE 20 MEQ/1
40 TABLET, EXTENDED RELEASE ORAL AS NEEDED
Status: DISCONTINUED | OUTPATIENT
Start: 2020-07-16 | End: 2020-07-22 | Stop reason: HOSPADM

## 2020-07-16 RX ORDER — POTASSIUM CHLORIDE 7.45 MG/ML
10 INJECTION INTRAVENOUS
Status: DISCONTINUED | OUTPATIENT
Start: 2020-07-16 | End: 2020-07-22 | Stop reason: HOSPADM

## 2020-07-16 RX ORDER — SODIUM CHLORIDE 0.9 % (FLUSH) 0.9 %
10 SYRINGE (ML) INJECTION AS NEEDED
Status: DISCONTINUED | OUTPATIENT
Start: 2020-07-16 | End: 2020-07-22 | Stop reason: HOSPADM

## 2020-07-16 RX ORDER — GLYCOPYRROLATE 1 MG/5 ML
SYRINGE (ML) INTRAVENOUS AS NEEDED
Status: DISCONTINUED | OUTPATIENT
Start: 2020-07-16 | End: 2020-07-16 | Stop reason: SURG

## 2020-07-16 RX ORDER — HYDROMORPHONE HCL 110MG/55ML
0.5 PATIENT CONTROLLED ANALGESIA SYRINGE INTRAVENOUS
Status: DISCONTINUED | OUTPATIENT
Start: 2020-07-16 | End: 2020-07-22 | Stop reason: HOSPADM

## 2020-07-16 RX ORDER — FAMOTIDINE 40 MG/1
40 TABLET, FILM COATED ORAL DAILY
COMMUNITY
End: 2020-07-22 | Stop reason: HOSPADM

## 2020-07-16 RX ORDER — ACETAMINOPHEN 325 MG/1
650 TABLET ORAL ONCE AS NEEDED
Status: DISCONTINUED | OUTPATIENT
Start: 2020-07-16 | End: 2020-07-16 | Stop reason: HOSPADM

## 2020-07-16 RX ORDER — LIDOCAINE HYDROCHLORIDE 20 MG/ML
INJECTION, SOLUTION EPIDURAL; INFILTRATION; INTRACAUDAL; PERINEURAL AS NEEDED
Status: DISCONTINUED | OUTPATIENT
Start: 2020-07-16 | End: 2020-07-16 | Stop reason: SURG

## 2020-07-16 RX ORDER — HYDRALAZINE HYDROCHLORIDE 20 MG/ML
5 INJECTION INTRAMUSCULAR; INTRAVENOUS
Status: DISCONTINUED | OUTPATIENT
Start: 2020-07-16 | End: 2020-07-16 | Stop reason: HOSPADM

## 2020-07-16 RX ORDER — HYDROCODONE BITARTRATE AND ACETAMINOPHEN 10; 325 MG/1; MG/1
1 TABLET ORAL EVERY 6 HOURS PRN
COMMUNITY
End: 2020-07-22 | Stop reason: HOSPADM

## 2020-07-16 RX ORDER — ALBUTEROL SULFATE 90 UG/1
2 AEROSOL, METERED RESPIRATORY (INHALATION) EVERY 6 HOURS PRN
COMMUNITY
End: 2022-05-15 | Stop reason: SDDI

## 2020-07-16 RX ORDER — VALSARTAN AND HYDROCHLOROTHIAZIDE 320; 25 MG/1; MG/1
1 TABLET, FILM COATED ORAL DAILY
COMMUNITY
End: 2020-07-22 | Stop reason: HOSPADM

## 2020-07-16 RX ORDER — HYDRALAZINE HYDROCHLORIDE 25 MG/1
25 TABLET, FILM COATED ORAL 2 TIMES DAILY PRN
COMMUNITY
End: 2022-05-15

## 2020-07-16 RX ORDER — PROMETHAZINE HYDROCHLORIDE 25 MG/1
25 TABLET ORAL ONCE AS NEEDED
Status: DISCONTINUED | OUTPATIENT
Start: 2020-07-16 | End: 2020-07-22 | Stop reason: HOSPADM

## 2020-07-16 RX ORDER — OXYCODONE HYDROCHLORIDE 5 MG/1
10 TABLET ORAL EVERY 4 HOURS PRN
Status: DISCONTINUED | OUTPATIENT
Start: 2020-07-16 | End: 2020-07-22 | Stop reason: HOSPADM

## 2020-07-16 RX ORDER — IPRATROPIUM BROMIDE AND ALBUTEROL SULFATE 2.5; .5 MG/3ML; MG/3ML
3 SOLUTION RESPIRATORY (INHALATION) ONCE AS NEEDED
Status: DISCONTINUED | OUTPATIENT
Start: 2020-07-16 | End: 2020-07-16 | Stop reason: HOSPADM

## 2020-07-16 RX ORDER — MAGNESIUM SULFATE 1 G/100ML
1 INJECTION INTRAVENOUS AS NEEDED
Status: DISCONTINUED | OUTPATIENT
Start: 2020-07-16 | End: 2020-07-22 | Stop reason: HOSPADM

## 2020-07-16 RX ORDER — ETOMIDATE 2 MG/ML
INJECTION INTRAVENOUS AS NEEDED
Status: DISCONTINUED | OUTPATIENT
Start: 2020-07-16 | End: 2020-07-16 | Stop reason: SURG

## 2020-07-16 RX ORDER — SODIUM CHLORIDE 0.9 % (FLUSH) 0.9 %
10 SYRINGE (ML) INJECTION EVERY 12 HOURS SCHEDULED
Status: DISCONTINUED | OUTPATIENT
Start: 2020-07-16 | End: 2020-07-22 | Stop reason: HOSPADM

## 2020-07-16 RX ORDER — SODIUM CHLORIDE 9 MG/ML
125 INJECTION, SOLUTION INTRAVENOUS CONTINUOUS
Status: DISCONTINUED | OUTPATIENT
Start: 2020-07-16 | End: 2020-07-22 | Stop reason: HOSPADM

## 2020-07-16 RX ORDER — NITROGLYCERIN 0.4 MG/1
0.4 TABLET SUBLINGUAL
Status: DISCONTINUED | OUTPATIENT
Start: 2020-07-16 | End: 2020-07-22 | Stop reason: HOSPADM

## 2020-07-16 RX ORDER — ACETAMINOPHEN 160 MG/5ML
650 SOLUTION ORAL EVERY 4 HOURS PRN
Status: DISCONTINUED | OUTPATIENT
Start: 2020-07-16 | End: 2020-07-22 | Stop reason: HOSPADM

## 2020-07-16 RX ORDER — NICOTINE POLACRILEX 4 MG
15 LOZENGE BUCCAL
Status: DISCONTINUED | OUTPATIENT
Start: 2020-07-16 | End: 2020-07-19

## 2020-07-16 RX ORDER — HYDRALAZINE HYDROCHLORIDE 20 MG/ML
10 INJECTION INTRAMUSCULAR; INTRAVENOUS EVERY 6 HOURS PRN
Status: DISCONTINUED | OUTPATIENT
Start: 2020-07-16 | End: 2020-07-22 | Stop reason: HOSPADM

## 2020-07-16 RX ORDER — ALBUTEROL SULFATE 90 UG/1
2 AEROSOL, METERED RESPIRATORY (INHALATION) EVERY 4 HOURS PRN
Status: DISCONTINUED | OUTPATIENT
Start: 2020-07-16 | End: 2020-07-18 | Stop reason: ALTCHOICE

## 2020-07-16 RX ORDER — CHOLECALCIFEROL (VITAMIN D3) 125 MCG
5 CAPSULE ORAL NIGHTLY PRN
Status: DISCONTINUED | OUTPATIENT
Start: 2020-07-16 | End: 2020-07-22 | Stop reason: HOSPADM

## 2020-07-16 RX ORDER — MORPHINE SULFATE 4 MG/ML
2 INJECTION, SOLUTION INTRAMUSCULAR; INTRAVENOUS
Status: DISCONTINUED | OUTPATIENT
Start: 2020-07-16 | End: 2020-07-16 | Stop reason: HOSPADM

## 2020-07-16 RX ORDER — OXYCODONE HYDROCHLORIDE 5 MG/1
5 TABLET ORAL EVERY 4 HOURS PRN
Status: DISCONTINUED | OUTPATIENT
Start: 2020-07-16 | End: 2020-07-22 | Stop reason: HOSPADM

## 2020-07-16 RX ORDER — ONDANSETRON 2 MG/ML
4 INJECTION INTRAMUSCULAR; INTRAVENOUS ONCE AS NEEDED
Status: DISCONTINUED | OUTPATIENT
Start: 2020-07-16 | End: 2020-07-16 | Stop reason: HOSPADM

## 2020-07-16 RX ORDER — HEPARIN SODIUM 200 [USP'U]/100ML
INJECTION, SOLUTION INTRAVENOUS
Status: COMPLETED | OUTPATIENT
Start: 2020-07-16 | End: 2020-07-16

## 2020-07-16 RX ORDER — PHYTONADIONE 10 MG/ML
10 INJECTION, EMULSION INTRAMUSCULAR; INTRAVENOUS; SUBCUTANEOUS ONCE
Status: DISCONTINUED | OUTPATIENT
Start: 2020-07-16 | End: 2020-07-16

## 2020-07-16 RX ORDER — NEOSTIGMINE METHYLSULFATE 5 MG/5 ML
SYRINGE (ML) INTRAVENOUS AS NEEDED
Status: DISCONTINUED | OUTPATIENT
Start: 2020-07-16 | End: 2020-07-16 | Stop reason: SURG

## 2020-07-16 RX ORDER — ONDANSETRON 2 MG/ML
4 INJECTION INTRAMUSCULAR; INTRAVENOUS EVERY 6 HOURS PRN
Status: DISCONTINUED | OUTPATIENT
Start: 2020-07-16 | End: 2020-07-22 | Stop reason: HOSPADM

## 2020-07-16 RX ORDER — ALUMINA, MAGNESIA, AND SIMETHICONE 2400; 2400; 240 MG/30ML; MG/30ML; MG/30ML
15 SUSPENSION ORAL EVERY 6 HOURS PRN
Status: DISCONTINUED | OUTPATIENT
Start: 2020-07-16 | End: 2020-07-22 | Stop reason: HOSPADM

## 2020-07-16 RX ORDER — BISACODYL 10 MG
10 SUPPOSITORY, RECTAL RECTAL DAILY PRN
Status: DISCONTINUED | OUTPATIENT
Start: 2020-07-16 | End: 2020-07-22 | Stop reason: HOSPADM

## 2020-07-16 RX ORDER — SODIUM CHLORIDE 0.9 % (FLUSH) 0.9 %
10 SYRINGE (ML) INJECTION AS NEEDED
Status: DISCONTINUED | OUTPATIENT
Start: 2020-07-16 | End: 2020-07-16 | Stop reason: SDUPTHER

## 2020-07-16 RX ORDER — ACETAMINOPHEN 325 MG/1
650 TABLET ORAL EVERY 4 HOURS PRN
Status: DISCONTINUED | OUTPATIENT
Start: 2020-07-16 | End: 2020-07-22 | Stop reason: HOSPADM

## 2020-07-16 RX ORDER — ACETAMINOPHEN 650 MG/1
650 SUPPOSITORY RECTAL ONCE AS NEEDED
Status: DISCONTINUED | OUTPATIENT
Start: 2020-07-16 | End: 2020-07-16 | Stop reason: HOSPADM

## 2020-07-16 RX ORDER — HYDROMORPHONE HCL 110MG/55ML
0.5 PATIENT CONTROLLED ANALGESIA SYRINGE INTRAVENOUS
Status: DISCONTINUED | OUTPATIENT
Start: 2020-07-16 | End: 2020-07-16 | Stop reason: HOSPADM

## 2020-07-16 RX ORDER — ACETAMINOPHEN 650 MG/1
650 SUPPOSITORY RECTAL EVERY 4 HOURS PRN
Status: DISCONTINUED | OUTPATIENT
Start: 2020-07-16 | End: 2020-07-22 | Stop reason: HOSPADM

## 2020-07-16 RX ORDER — ONDANSETRON 4 MG/1
4 TABLET, FILM COATED ORAL EVERY 6 HOURS PRN
Status: DISCONTINUED | OUTPATIENT
Start: 2020-07-16 | End: 2020-07-22 | Stop reason: HOSPADM

## 2020-07-16 RX ORDER — TRAZODONE HYDROCHLORIDE 100 MG/1
100 TABLET ORAL NIGHTLY PRN
COMMUNITY
End: 2022-05-15

## 2020-07-16 RX ORDER — EPHEDRINE SULFATE 50 MG/ML
5 INJECTION, SOLUTION INTRAVENOUS ONCE AS NEEDED
Status: DISCONTINUED | OUTPATIENT
Start: 2020-07-16 | End: 2020-07-16 | Stop reason: HOSPADM

## 2020-07-16 RX ORDER — ALBUTEROL SULFATE 0.63 MG/3ML
1 SOLUTION RESPIRATORY (INHALATION) EVERY 6 HOURS PRN
COMMUNITY
End: 2023-03-22

## 2020-07-16 RX ORDER — PANTOPRAZOLE SODIUM 40 MG/1
40 TABLET, DELAYED RELEASE ORAL DAILY
COMMUNITY
End: 2021-03-15

## 2020-07-16 RX ORDER — PROMETHAZINE HYDROCHLORIDE 25 MG/ML
6.25 INJECTION, SOLUTION INTRAMUSCULAR; INTRAVENOUS ONCE AS NEEDED
Status: DISCONTINUED | OUTPATIENT
Start: 2020-07-16 | End: 2020-07-22 | Stop reason: HOSPADM

## 2020-07-16 RX ORDER — BUDESONIDE AND FORMOTEROL FUMARATE DIHYDRATE 160; 4.5 UG/1; UG/1
2 AEROSOL RESPIRATORY (INHALATION)
Status: DISCONTINUED | OUTPATIENT
Start: 2020-07-16 | End: 2020-07-22 | Stop reason: HOSPADM

## 2020-07-16 RX ORDER — MORPHINE SULFATE 4 MG/ML
4 INJECTION, SOLUTION INTRAMUSCULAR; INTRAVENOUS EVERY 4 HOURS PRN
Status: DISCONTINUED | OUTPATIENT
Start: 2020-07-16 | End: 2020-07-17

## 2020-07-16 RX ORDER — MAGNESIUM SULFATE HEPTAHYDRATE 40 MG/ML
2 INJECTION, SOLUTION INTRAVENOUS AS NEEDED
Status: DISCONTINUED | OUTPATIENT
Start: 2020-07-16 | End: 2020-07-22 | Stop reason: HOSPADM

## 2020-07-16 RX ORDER — PHENYLEPHRINE HCL IN 0.9% NACL 0.5 MG/5ML
.5-3 SYRINGE (ML) INTRAVENOUS
Status: DISCONTINUED | OUTPATIENT
Start: 2020-07-16 | End: 2020-07-17

## 2020-07-16 RX ORDER — FLUOXETINE 10 MG/1
10 CAPSULE ORAL DAILY
COMMUNITY
End: 2021-03-02 | Stop reason: HOSPADM

## 2020-07-16 RX ORDER — DEXTROSE MONOHYDRATE 25 G/50ML
25 INJECTION, SOLUTION INTRAVENOUS
Status: DISCONTINUED | OUTPATIENT
Start: 2020-07-16 | End: 2020-07-19

## 2020-07-16 RX ORDER — MEPERIDINE HYDROCHLORIDE 25 MG/ML
12.5 INJECTION INTRAMUSCULAR; INTRAVENOUS; SUBCUTANEOUS
Status: DISCONTINUED | OUTPATIENT
Start: 2020-07-16 | End: 2020-07-16 | Stop reason: HOSPADM

## 2020-07-16 RX ORDER — PROMETHAZINE HYDROCHLORIDE 25 MG/1
25 SUPPOSITORY RECTAL ONCE AS NEEDED
Status: DISCONTINUED | OUTPATIENT
Start: 2020-07-16 | End: 2020-07-22 | Stop reason: HOSPADM

## 2020-07-16 RX ORDER — DEXAMETHASONE SODIUM PHOSPHATE 4 MG/ML
INJECTION, SOLUTION INTRA-ARTICULAR; INTRALESIONAL; INTRAMUSCULAR; INTRAVENOUS; SOFT TISSUE AS NEEDED
Status: DISCONTINUED | OUTPATIENT
Start: 2020-07-16 | End: 2020-07-16 | Stop reason: SURG

## 2020-07-16 RX ORDER — IPRATROPIUM BROMIDE AND ALBUTEROL SULFATE 2.5; .5 MG/3ML; MG/3ML
3 SOLUTION RESPIRATORY (INHALATION) EVERY 4 HOURS PRN
Status: DISCONTINUED | OUTPATIENT
Start: 2020-07-16 | End: 2020-07-16 | Stop reason: ALTCHOICE

## 2020-07-16 RX ORDER — HYDROMORPHONE HCL 110MG/55ML
PATIENT CONTROLLED ANALGESIA SYRINGE INTRAVENOUS AS NEEDED
Status: DISCONTINUED | OUTPATIENT
Start: 2020-07-16 | End: 2020-07-16 | Stop reason: SURG

## 2020-07-16 RX ORDER — WARFARIN SODIUM 2 MG/1
2 TABLET ORAL NIGHTLY
COMMUNITY
End: 2021-11-02 | Stop reason: HOSPADM

## 2020-07-16 RX ORDER — FAMOTIDINE 10 MG/ML
20 INJECTION, SOLUTION INTRAVENOUS DAILY
Status: DISCONTINUED | OUTPATIENT
Start: 2020-07-16 | End: 2020-07-19

## 2020-07-16 RX ORDER — DEXLANSOPRAZOLE 60 MG/1
60 CAPSULE, DELAYED RELEASE ORAL DAILY
COMMUNITY
End: 2020-07-22 | Stop reason: HOSPADM

## 2020-07-16 RX ORDER — LABETALOL HYDROCHLORIDE 5 MG/ML
5 INJECTION, SOLUTION INTRAVENOUS
Status: DISCONTINUED | OUTPATIENT
Start: 2020-07-16 | End: 2020-07-16 | Stop reason: HOSPADM

## 2020-07-16 RX ORDER — DIPHENHYDRAMINE HYDROCHLORIDE 50 MG/ML
INJECTION INTRAMUSCULAR; INTRAVENOUS AS NEEDED
Status: DISCONTINUED | OUTPATIENT
Start: 2020-07-16 | End: 2020-07-16 | Stop reason: SURG

## 2020-07-16 RX ORDER — MIDAZOLAM HYDROCHLORIDE 1 MG/ML
INJECTION INTRAMUSCULAR; INTRAVENOUS AS NEEDED
Status: DISCONTINUED | OUTPATIENT
Start: 2020-07-16 | End: 2020-07-16 | Stop reason: SURG

## 2020-07-16 RX ORDER — POTASSIUM CHLORIDE 1.5 G/1.77G
40 POWDER, FOR SOLUTION ORAL AS NEEDED
Status: DISCONTINUED | OUTPATIENT
Start: 2020-07-16 | End: 2020-07-22 | Stop reason: HOSPADM

## 2020-07-16 RX ADMIN — PHYTONADIONE 10 MG: 10 INJECTION, EMULSION INTRAMUSCULAR; INTRAVENOUS; SUBCUTANEOUS at 12:02

## 2020-07-16 RX ADMIN — FENTANYL CITRATE 100 MCG: 50 INJECTION, SOLUTION INTRAMUSCULAR; INTRAVENOUS at 12:46

## 2020-07-16 RX ADMIN — Medication 4 MG: at 14:33

## 2020-07-16 RX ADMIN — SODIUM CHLORIDE 125 ML/HR: 900 INJECTION, SOLUTION INTRAVENOUS at 09:22

## 2020-07-16 RX ADMIN — Medication 0.6 MG: at 14:33

## 2020-07-16 RX ADMIN — MIDAZOLAM 2 MG: 1 INJECTION INTRAMUSCULAR; INTRAVENOUS at 12:46

## 2020-07-16 RX ADMIN — ONDANSETRON 4 MG: 2 INJECTION INTRAMUSCULAR; INTRAVENOUS at 14:32

## 2020-07-16 RX ADMIN — Medication 2206 UNITS: at 11:26

## 2020-07-16 RX ADMIN — BUDESONIDE AND FORMOTEROL FUMARATE DIHYDRATE 2 PUFF: 160; 4.5 AEROSOL RESPIRATORY (INHALATION) at 12:20

## 2020-07-16 RX ADMIN — HYDROMORPHONE HYDROCHLORIDE 1 MG: 2 INJECTION INTRAMUSCULAR; INTRAVENOUS; SUBCUTANEOUS at 14:46

## 2020-07-16 RX ADMIN — ROCURONIUM BROMIDE 50 MG: 10 INJECTION, SOLUTION INTRAVENOUS at 12:47

## 2020-07-16 RX ADMIN — SODIUM CHLORIDE 125 ML/HR: 900 INJECTION, SOLUTION INTRAVENOUS at 18:32

## 2020-07-16 RX ADMIN — SODIUM CHLORIDE 572 ML: 0.9 INJECTION, SOLUTION INTRAVENOUS at 10:28

## 2020-07-16 RX ADMIN — POTASSIUM CHLORIDE 10 MEQ: 7.46 INJECTION, SOLUTION INTRAVENOUS at 12:03

## 2020-07-16 RX ADMIN — Medication 10 ML: at 22:31

## 2020-07-16 RX ADMIN — IOPAMIDOL 100 ML: 755 INJECTION, SOLUTION INTRAVENOUS at 10:11

## 2020-07-16 RX ADMIN — DEXAMETHASONE SODIUM PHOSPHATE 4 MG: 4 INJECTION, SOLUTION INTRAMUSCULAR; INTRAVENOUS at 13:18

## 2020-07-16 RX ADMIN — LIDOCAINE HYDROCHLORIDE 100 MG: 20 INJECTION, SOLUTION EPIDURAL; INFILTRATION; INTRACAUDAL; PERINEURAL at 12:47

## 2020-07-16 RX ADMIN — BUDESONIDE AND FORMOTEROL FUMARATE DIHYDRATE 2 PUFF: 160; 4.5 AEROSOL RESPIRATORY (INHALATION) at 19:10

## 2020-07-16 RX ADMIN — DIPHENHYDRAMINE HYDROCHLORIDE 25 MG: 50 INJECTION, SOLUTION INTRAMUSCULAR; INTRAVENOUS at 12:53

## 2020-07-16 RX ADMIN — SODIUM CHLORIDE 1000 ML: 0.9 INJECTION, SOLUTION INTRAVENOUS at 08:28

## 2020-07-16 RX ADMIN — HEPARIN SODIUM IN SODIUM CHLORIDE 1000 UNITS: 200 INJECTION INTRAVENOUS at 13:44

## 2020-07-16 RX ADMIN — SODIUM CHLORIDE: 900 INJECTION, SOLUTION INTRAVENOUS at 13:31

## 2020-07-16 RX ADMIN — INSULIN LISPRO 4 UNITS: 100 INJECTION, SOLUTION INTRAVENOUS; SUBCUTANEOUS at 20:07

## 2020-07-16 RX ADMIN — HYDROMORPHONE HYDROCHLORIDE 1 MG: 2 INJECTION INTRAMUSCULAR; INTRAVENOUS; SUBCUTANEOUS at 13:59

## 2020-07-16 RX ADMIN — PIPERACILLIN AND TAZOBACTAM 3.38 G: 3; .375 INJECTION, POWDER, FOR SOLUTION INTRAVENOUS at 22:31

## 2020-07-16 RX ADMIN — ETOMIDATE 20 MG: 2 INJECTION, SOLUTION INTRAVENOUS at 12:47

## 2020-07-16 RX ADMIN — PIPERACILLIN AND TAZOBACTAM 3.38 G: 3; .375 INJECTION, POWDER, FOR SOLUTION INTRAVENOUS at 09:09

## 2020-07-16 NOTE — ANESTHESIA POSTPROCEDURE EVALUATION
Patient: Renuka Pelayo    Procedure Summary     Date:  07/16/20 Room / Location:  [facility] OR  / [facility] MAIN OR    Anesthesia Start:  1241 Anesthesia Stop:  1449    Procedure:  LAPAROTOMY EXPLORATORY HARTMANS (N/A Abdomen) Diagnosis:      Surgeon:  Chi Farmer MD Provider:  Jun Barnett MD    Anesthesia Type:  general ASA Status:  4 - Emergent          Anesthesia Type: general    Vitals  Vitals Value Taken Time   /73 7/16/2020  3:51 PM   Temp 98.1 °F (36.7 °C) 7/16/2020  2:49 PM   Pulse 85 7/16/2020  3:52 PM   Resp 13 7/16/2020  3:34 PM   SpO2 96 % 7/16/2020  3:52 PM   Vitals shown include unvalidated device data.        Post Anesthesia Care and Evaluation    Patient location during evaluation: PACU  Patient participation: complete - patient participated  Level of consciousness: awake  Pain scale: See nurse's notes for pain score.  Pain management: adequate  Airway patency: patent  Anesthetic complications: No anesthetic complications  PONV Status: none  Cardiovascular status: acceptable  Respiratory status: acceptable  Hydration status: acceptable    Comments: Patient seen and examined postoperatively; vital signs stable; SpO2 greater than or equal to 90%; cardiopulmonary status stable; nausea/vomiting adequately controlled; pain adequately controlled; no apparent anesthesia complications; patient discharged from anesthesia care when discharge criteria were met

## 2020-07-16 NOTE — ANESTHESIA PROCEDURE NOTES
Airway  Urgency: elective    Date/Time: 7/16/2020 12:49 PM  Airway not difficult    General Information and Staff    Patient location during procedure: OR    Indications and Patient Condition  Indications for airway management: airway protection    Preoxygenated: yes  MILS maintained throughout  Mask difficulty assessment: 1 - vent by mask    Final Airway Details  Final airway type: endotracheal airway      Successful airway: ETT    Successful intubation technique: direct laryngoscopy  Endotracheal tube insertion site: oral  Blade: Marija  Blade size: 3  ETT size (mm): 7.5  Cormack-Lehane Classification: grade I - full view of glottis  Placement verified by: chest auscultation and capnometry   Measured from: lips  ETT/EBT  to lips (cm): 21  Number of attempts at approach: 1  Assessment: lips, teeth, and gum same as pre-op and atraumatic intubation

## 2020-07-16 NOTE — ANESTHESIA PROCEDURE NOTES
Arterial Line      Patient reassessed immediately prior to procedure    Patient location during procedure: OR   Line placed for hemodynamic monitoring and ABGs/Labs/ISTAT.  Performed By   Anesthesiologist: Jun Barnett MD  Preanesthetic Checklist  Completed: patient identified, site marked, surgical consent, pre-op evaluation, timeout performed, IV checked, risks and benefits discussed and monitors and equipment checked  Arterial Line Prep   Sterile Tech: cap, gloves and mask  Prep: ChloraPrep  Patient monitoring: blood pressure monitoring, continuous pulse oximetry and EKG  Arterial Line Procedure   Laterality:right  Location:  radial artery  Catheter size: 20 G   Guidance: ultrasound guided, landmark technique and palpation technique  Successful placement: yes  Heparin (Porcine) in NaCl 1000-0.9 UT/500ML-% for arterial line pressure bag, 1,000 Units  Post Assessment   Dressing Type: occlusive dressing applied, secured with tape and wrist guard applied.   Complications no  Circ/Move/Sens Assessment: unchanged.   Patient Tolerance: patient tolerated the procedure well with no apparent complications  Additional Notes  Pre-procedure:  Patient identified; pre-procedure vital signs, all relevant labs/studies, complete medical/surgical/anesthetic history, full medication list, full allergy list, and NPO status obtained/reviewed; physical assessment performed; anesthetic options, side effects, potential complications, risks, and benefits discussed; questions answered; written anesthesia procedure consent obtained; patient cleared for procedure; IV access in situ    Procedure:  Arterial line placed after induction of general anesthesia; ASA monitor placed; patient positioned; hand hygiene performed; sterile technique maintained throughout the procedure; sterile prep and drape applied; insertion site determined by anatomical landmarks, palpation, and ultrasound imaging; live ultrasound guidance throughout the  procedure; target artery identified on live ultrasound; target artery is patent with flow; needle placed into the skin and advanced into the target artery with correct placement confirmed by return of arterial blood; realtime needle entry into the target artery witnessed on live ultrasound; wire slide into the target artery; arterial catheter threaded into the target artery over the needle/wire; needle/wire removed; correct catheter placement confirmed by transducing systemic arterial blood pressure; catheter secured with occlusive dressing and tape; ultrasound image printed and placed in the patient's permanent medical record    Post-procedure:  Arterial catheter placed successfully; no apparent complications; vital signs stable throughout; minimal estimated blood loss

## 2020-07-16 NOTE — OP NOTE
LAPAROTOMY EXPLORATORY  Operative Note    Patient Name:  Renuka Pelayo  YOB: 1955    Date of Surgery:  7/16/2020     Indications: Patient is a 64-year-old lady with history of diverticulitis in the past who presented to the emergency room hypotensive with an elevated leukocytosis and peritonitis.  CT scan findings confirmed perforated diverticulitis with free air.  I discussed with the patient the risk, benefits, and alternatives to surgery and the patient agreed to proceed to the operating room for exploratory laparotomy with likely end colostomy.    Pre-op Diagnosis:   Perforated diverticulitis    Post-op Diagnosis:  Post-Op Diagnosis Codes:  Same    Procedure/CPT® Codes:      Procedure(s):  LAPAROTOMY EXPLORATORY - FROST'S PROCEDURE    Staff:  Surgeon(s):  Chi Farmer MD    Assistant: Helene Hernandez APRN    Anesthesia: General    Estimated Blood Loss: 100 mL    Implants:    Implant Name Type Inv. Item Serial No.  Lot No. LRB No. Used   STPLR LNR CUT PROX 75MM COLLIN [id number] - [id number] Implant STPLR LNR CUT PROX 75MM COLLIN [id number]  [employer]  DIV [employer] J [employer] J [id number] N/A 1   RELOAD STPLR LNR CUT PROX 75MM COLLIN [id number] - [id number] Implant RELOAD STPLR LNR CUT PROX 75MM COLLIN [id number]  [employer]  DIV [employer] J [employer] J [id number] N/A 1       Specimen:          Specimens     ID Source Type Tests Collected By Collected At Frozen?      A Large Intestine, Sigmoid Colon Tissue · TISSUE PATHOLOGY EXAM   Chi Farmer MD 7/16/20 1333 No     Description: SIGMOID COLON     This specimen was not marked as sent.          Findings: Perforated diverticulitis with stool within the pelvis    Complications: None, immediately    Description of Procedure: After obtaining informed consent the preop holding area the patient was brought to the operating room placed in the supine position.  SCDs were applied, and preoperative antibiotics had been administered.  The patient then underwent  uncomplicated induction of general endotracheal anesthesia.  A central line and arterial line were placed as well as a Stevenson catheter.  The patient's abdomen was then prepped and draped in the usual sterile fashion.  I began by making a vertical midline incision and carrying this down to the level of the fascia which was incised along the midline.  I then sharply entered the abdomen opening the peritoneum along the incision.  Initially, did not see much in the way of gross perforation as there was no obvious purulence in the abdomen.  I began by running the small bowel beginning at the ligament of Treitz to the colon.  The small bowel appeared normal.  There were some adhesions in the right upper quadrant from the patient's prior open cholecystectomy which were lysed, however the hepatic flexure was not entirely visualized.  The descending colon appeared normal until we reached the pelvic inlet at which point it had dense adhesions to the adnexa and pelvic sidewall.  These adhesions were lysed using electrocautery and blunt dissection.  In dissecting the colon free from the adnexa, I then encountered a pocket which was full of foul-smelling stool within the patient's pelvis.  This area was irrigated out with copious amounts of warm normal saline until clear.  It was obvious within the sigmoid colon that there was a firm area of indurated colon which had a obvious perforation.  I divided proximal to this using a 75 mm ELYSE stapler and distal to this using a 75 ELYSE stapler as well.  I then used an Enseal device to divide the mesocolon.  Once the specimen was removed, was passed off the field as specimen.  I then tagged the rectal stump using a single 2-0 Prolene stitch so that I could find this on upon return to the operating room.  I then mobilized the descending colon along the white line of Toldt creating enough length so as to be able to create a colostomy.  A 19 Sinhala PRANAY drain was then placed transabdominally  within the pelvis.  This was secured at the level of the skin using 2-0 silk suture.  I then selected a appropriate site for the patient's and colostomy just beneath the level of the umbilicus within the rectus sheath.  I made a cylindrical incision down to the level of the fascia to create my ostomy aperture.  Once at the level of the fascia I incised the anterior fascial leaflet longitudinally and  the rectus sheath muscles before incising the posterior fascial leaflet longitudinally as well.  This created an aperture which could fit 2-1/2 of my fingers easily.  The proximal colon was then brought through the aperture with plenty of length so as to not be under tension.  I secured the colon to the underneath side of the fascia using interrupted 2-0 Vicryl sutures.  We irrigated the abdomen and pelvis with Irrisept and warm normal saline.  We then turned our attention towards our clean closure.  We changed our gowns and gloves and reprepped and draped the abdomen.  The midline fascia was then closed using running #1 looped PDS suture.  The skin was closed using skin staples.  We then draped the incision and turned our attention towards maturation of the colostomy.  I incised the staple line using electrocautery.  I then circumferentially secured the ostomy in a full-thickness fashion to the dermis of the colostomy aperture using interrupted 2-0 Vicryl suture.  The wounds were dressed with sterile island dressings and an ostomy appliance.  The patient tolerated the procedure well was extubated, and taken to PACU in satisfactory condition.    Chi Farmer MD     Date: 7/16/2020  Time: 14:40

## 2020-07-16 NOTE — ATTESTATION SEPSIS FOCUSED EXAM
SEPTIC SHOCK FOCUSED EXAM ATTESTATION    I attest that I have reassessed tissue perfusion after the fluid bolus given.    Clemente Guerrero DO  07/16/20  19:49

## 2020-07-16 NOTE — CONSULTS
Surgery (on-call MD unless specified)  Consult performed by: Chi Farmer MD  Consult ordered by: Clement Griffith DO  Reason for consult: perforated diverticulitis        GENERAL SURGERY CONSULTATION NOTE    Patient Care Team:  Michael Gerardo MD as PCP - General    Subjective     Patient is a 64 y.o. female presents with abdominal pain, diarrhea, and fatigue which is been present since Monday evening patient states that she first developed abdominal pain consistent with her prior bouts of diverticulitis beginning Monday evening.  She reports that at first the pain was mild but gradually worsened until Tuesday evening.  It was a dull, diffuse pain.  The patient did not bring this to the attention of her primary care physician, but rather began drinking large quantities of water in an attempt to alleviate her abdominal pain.  On Wednesday she reports that the abdominal pain subsided slightly, however persisted until today.  The patient presented to the emergency room as her pain was not getting better and she was concerned that she might have diverticulitis.  In the emergency room the patient was found to be hypotensive with a distended firm abdomen which was tender to palpation.  She reports no fevers.  Laboratory values in the emergency room demonstrated an elevated white blood cell count of 27,000.  Patient had an INR of 3.2.  She does take Coumadin for a DVT which was provoked by surgery in the past.  CT scan of the abdomen pelvis demonstrated perforated diverticulitis with free air.  General surgery was consulted.    Review of Systems   Constitutional: Positive for fatigue. Negative for appetite change, chills and fever.   HENT: Negative for congestion and sore throat.    Respiratory: Positive for shortness of breath. Negative for cough.    Cardiovascular: Negative for chest pain and palpitations.   Gastrointestinal: Positive for abdominal pain and diarrhea. Negative for constipation,  nausea, vomiting and GERD.   Genitourinary: Negative for difficulty urinating, dysuria and frequency.   Musculoskeletal: Negative for arthralgias and back pain.   Skin: Negative for rash and skin lesions.   Neurological: Negative for dizziness, seizures and memory problem.   Hematological: Negative for adenopathy. Does not bruise/bleed easily.   Psychiatric/Behavioral: Negative for sleep disturbance and depressed mood.        History  Past Medical History:   Diagnosis Date   • Chronic back pain    • Closed nondisplaced fracture of head of right radius 2017   • Congenital deformity of right hip joint 1956   • COPD (chronic obstructive pulmonary disease) (CMS/HCC)     former smoker   • Deep venous thrombosis (CMS/HCC)     unprovoked   • Depressive disorder    • Diverticulitis of colon    • Essential hypertension    • Gastroesophageal reflux disease    • Insomnia    • Obesity    • Pulmonary embolism (CMS/HCC)     provoked by surgery     Past Surgical History:   Procedure Laterality Date   • CHOLECYSTECTOMY     • HYSTERECTOMY     • TOTAL HIP ARTHROPLASTY Right    • TOTAL HIP ARTHROPLASTY REVISION Right     x3     Family History   Problem Relation Age of Onset   • No Known Problems Mother    • No Known Problems Father      Social History     Tobacco Use   • Smoking status: Former Smoker     Packs/day: 2.00     Years: 40.00     Pack years: 80.00     Types: Cigarettes     Last attempt to quit: 2019     Years since quittin.5   • Smokeless tobacco: Never Used   Substance Use Topics   • Alcohol use: Never     Frequency: Never   • Drug use: Never       (Not in a hospital admission)  Allergies:  Patient has no known allergies.    Objective     Vital Signs  Temp:  [98.1 °F (36.7 °C)] 98.1 °F (36.7 °C)  Heart Rate:  [59-84] 62  Resp:  [16] 16  BP: ()/(53-70) 102/60    Physical Exam   Constitutional: She is oriented to person, place, and time. She appears well-developed and well-nourished.   HENT:   Head:  Normocephalic and atraumatic.   Eyes: Pupils are equal, round, and reactive to light.   Neck: Normal range of motion.   Cardiovascular: Normal rate and regular rhythm.   Pulmonary/Chest: Effort normal and breath sounds normal.   Abdominal: She exhibits distension. There is generalized tenderness. There is rigidity and guarding. There is no rebound. No hernia.   Abdomen is firm. Well-healed right upper quadrant subcostal incision without evidence of hernia.   Musculoskeletal: Normal range of motion. She exhibits no edema.   Lymphadenopathy:     She has no cervical adenopathy.     She has no axillary adenopathy.   Neurological: She is alert and oriented to person, place, and time.   Skin: Skin is warm and dry. No rash noted.   Psychiatric: She has a normal mood and affect.   Vitals reviewed.        Results Review:   Lab Results (last 24 hours)     Procedure Component Value Units Date/Time    COVID-19 [facility] IN-HOUSE, Nasal Swab No Transport Media - Swab, Nasal Cavity [[id number]]  (Normal) Collected:  07/16/20 1102    Specimen:  Swab from Nasal Cavity Updated:  07/16/20 1147     COVID19 Not Detected    Narrative:       Fact sheet for providers: https://www.fda.gov/media/023453/download     Fact sheet for patients: https://www.fda.gov/media/693151/download    Magnesium [[id number]]  (Normal) Collected:  07/16/20 0824    Specimen:  Blood Updated:  07/16/20 1144     Magnesium 2.4 mg/dL     BUN [[id number]]  (Normal) Collected:  07/16/20 0824    Specimen:  Blood Updated:  07/16/20 0953     BUN 22 mg/dL     Comprehensive Metabolic Panel [[id number]]  (Abnormal) Collected:  07/16/20 0824    Specimen:  Blood Updated:  07/16/20 0949     Glucose 141 mg/dL      BUN --     Comment: Testing performed by alternate method        Creatinine 1.20 mg/dL      Sodium 123 mmol/L      Potassium 2.9 mmol/L      Chloride 82 mmol/L      CO2 24.0 mmol/L      Calcium 9.0 mg/dL      Total Protein 6.8 g/dL      Albumin 3.50 g/dL      ALT (SGPT) 11  U/L      AST (SGOT) 19 U/L      Alkaline Phosphatase 108 U/L      Total Bilirubin 0.6 mg/dL      eGFR Non African Amer 45 mL/min/1.73      Globulin 3.3 gm/dL      A/G Ratio 1.1 g/dL      BUN/Creatinine Ratio --     Comment: Testing not performed        Anion Gap 17.0 mmol/L     Narrative:       GFR Normal >60  Chronic Kidney Disease <60  Kidney Failure <15      Lipase [[id number]]  (Normal) Collected:  07/16/20 0824    Specimen:  Blood Updated:  07/16/20 0949     Lipase 20 U/L     Troponin [[id number]]  (Normal) Collected:  07/16/20 0824    Specimen:  Blood Updated:  07/16/20 0949     Troponin T <0.010 ng/mL     Narrative:       Troponin T Reference Range:  <= 0.03 ng/mL-   Negative for AMI  >0.03 ng/mL-     Abnormal for myocardial necrosis.  Clinicians would have to utilize clinical acumen, EKG, Troponin and serial changes to determine if it is an Acute Myocardial Infarction or myocardial injury due to an underlying chronic condition.       Results may be falsely decreased if patient taking Biotin.      Urinalysis, Microscopic Only - Urine, Catheter [[id number]]  (Abnormal) Collected:  07/16/20 0917    Specimen:  Urine, Catheter Updated:  07/16/20 0933     RBC, UA 0-2 /HPF      WBC, UA 0-2 /HPF      Bacteria, UA None Seen /HPF      Squamous Epithelial Cells, UA 3-6 /HPF      Yeast, UA Small/1+ Yeast /HPF      Hyaline Casts, UA 0-2 /LPF      Methodology Manual Light Microscopy    [facility] Draw [[id number]] Collected:  07/16/20 0824    Specimen:  Blood Updated:  07/16/20 0931    Narrative:       The following orders were created for panel order [facility] Draw.  Procedure                               Abnormality         Status                     ---------                               -----------         ------                     Light Blue Top[[id number]]                                   Final result               Green Top (Gel)[[id number]]                                  Final result               Lavender  Top[[id number]]                                     Final result               Gold Top - SST[[id number]]                                   Final result                 Please view results for these tests on the individual orders.    Light Blue Top [[id number]] Collected:  07/16/20 0824    Specimen:  Blood Updated:  07/16/20 0931     Extra Tube hold for add-on     Comment: Auto resulted       Green Top (Gel) [[id number]] Collected:  07/16/20 0824    Specimen:  Blood Updated:  07/16/20 0931     Extra Tube Hold for add-ons.     Comment: Auto resulted.       Gold Top - SST [[id number]] Collected:  07/16/20 0824    Specimen:  Blood Updated:  07/16/20 0931     Extra Tube Hold for add-ons.     Comment: Auto resulted.       Urinalysis With Microscopic If Indicated (No Culture) - Urine, Catheter [[id number]]  (Abnormal) Collected:  07/16/20 0917    Specimen:  Urine, Catheter Updated:  07/16/20 0930     Color, UA Dark Yellow     Appearance, UA Slightly Cloudy     Comment: Result checked         pH, UA 5.5     Specific Gravity, UA 1.016     Glucose, UA Negative     Ketones, UA Trace     Bilirubin, UA Small (1+)     Comment: Confirmation testing is unavailable.  A serum bilirubin is recommended for further assessment.        Blood, UA Negative     Protein, UA Trace     Leuk Esterase, UA Moderate (2+)     Nitrite, UA Negative     Urobilinogen, UA 0.2 E.U./dL    Blood Culture - Blood, Arm, Left [[id number]] Collected:  07/16/20 0857    Specimen:  Blood from Arm, Left Updated:  07/16/20 0907    Blood Culture - Blood, Arm, Right [[id number]] Collected:  07/16/20 0858    Specimen:  Blood from Arm, Right Updated:  07/16/20 0907    Lavender Top [[id number]] Collected:  07/16/20 0824    Specimen:  Blood Updated:  07/16/20 0859     Extra Tube --    CBC & Differential [[id number]] Collected:  07/16/20 0824    Specimen:  Blood Updated:  07/16/20 0859    Narrative:       The following orders were created for panel order CBC &  Differential.  Procedure                               Abnormality         Status                     ---------                               -----------         ------                     CBC Auto Differential[[id number]]        Abnormal            Final result                 Please view results for these tests on the individual orders.    CBC Auto Differential [[id number]]  (Abnormal) Collected:  07/16/20 0824    Specimen:  Blood Updated:  07/16/20 0859     WBC 27.20 10*3/mm3      RBC 4.91 10*6/mm3      Hemoglobin 13.6 g/dL      Hematocrit 41.1 %      MCV 83.8 fL      MCH 27.8 pg      MCHC 33.2 g/dL      RDW 14.1 %      RDW-SD 41.1 fl      MPV 8.4 fL      Platelets 209 10*3/mm3     Scan Slide [[id number]] Collected:  07/16/20 0824    Specimen:  Blood Updated:  07/16/20 0859     Scan Slide --     Comment: See Manual Differential Results       Manual Differential [[id number]]  (Abnormal) Collected:  07/16/20 0824    Specimen:  Blood Updated:  07/16/20 0859     Neutrophil % 87.0 %      Lymphocyte % 4.0 %      Monocyte % 1.0 %      Bands %  7.0 %      Metamyelocyte % 1.0 %      Neutrophils Absolute 25.57 10*3/mm3      Lymphocytes Absolute 1.09 10*3/mm3      Monocytes Absolute 0.27 10*3/mm3      RBC Morphology Normal     WBC Morphology Normal     Platelet Estimate Adequate     Clumped Platelets Present    Narrative:       NO CORRECTION TO PLT, PERFORMED MANUAL AND RESULTS ARE ACCURATE     Protime-INR [[id number]]  (Abnormal) Collected:  07/16/20 0824    Specimen:  Blood Updated:  07/16/20 0846     Protime 30.0 Seconds      INR 3.19    POC Lactate [[id number]]  (Normal) Collected:  07/16/20 0832    Specimen:  Blood Updated:  07/16/20 0833    POC Lactate [[id number]]  (Normal) Collected:  07/16/20 0829    Specimen:  Blood Updated:  07/16/20 0832     Lactate 1.2 mmol/L      Comment: Serial Number: [id number]:  [id number]           Ct Abdomen Pelvis With Contrast    Result Date: 7/16/2020   1. 1. FINDINGS consistent with  perforated acute sigmoid diverticulitis. Free air is seen in the abdomen and pelvis. Small amount of pelvic free fluid is present without well-defined abscess collection. I notified Dr. Griffith in the emergency room at the time of this dictation. 2. Additional chronic findings as described above.    Electronically Signed By-Dr. Daly Rich MD On:7/16/2020 10:18 AM This report was finalized on [id number] by Dr. Daly Rich MD.        I reviewed the patient's new imaging results and agree with the interpretation.  I reviewed the patient's other test results and agree with the interpretation    Assessment/Plan     Principal Problem:    Perforation of sigmoid colon due to diverticulitis  Active Problems:    Free intraperitoneal air    Acute hypokalemia    Severe sepsis (CMS/HCC)    Acute kidney injury (CMS/HCC)    Acute hyponatremia    COPD (chronic obstructive pulmonary disease) (CMS/HCC)    Chronic back pain    Depressive disorder    Gastroesophageal reflux disease    Essential hypertension    Insomnia    Chronic anticoagulation    Obesity    I reviewed the patient's laboratory values and imaging.  I then evaluated the patient's history and physical examination.  Based on my findings, the patient appears to have sepsis secondary to perforated diverticulitis with free air.  Discussed with the patient that given her hypotension, elevated white blood cell count, peritoneal acute abdominal exam, and evidence for diverticulitis on imaging my recommendation would be to proceed to the operating room for exploratory laparotomy with likely a end colostomy.  We discussed that nonoperative management of this would likely result in a high failure rate, and continued morbidity as she would continue to be septic without source control.  The patient was not keen on the idea of having a colostomy, but after I explained that anastomosing her in the setting of gross perforation and infection would likely result in breakdown  of our anastomosis and continued intra-abdominal sepsis, she began to understand.  Risks of surgery were discussed with the patient which include but are not limited to bleeding, infection, damage to surrounding structures including bowel, bladder, ureters.  Continue Zosyn  Patient receiving vitamin K and Kcentra now.  Will recheck INR  Plan for OR today for exploratory laparotomy with likely colostomy    Chi Farmer MD  07/16/20  11:59

## 2020-07-16 NOTE — ANESTHESIA PROCEDURE NOTES
Central Line      Patient reassessed immediately prior to procedure    Patient location during procedure: OR  Indications: vascular access  Staff  Anesthesiologist: Jun Barnett MD  Preanesthetic Checklist  Completed: patient identified, site marked, surgical consent, pre-op evaluation, timeout performed, IV checked, risks and benefits discussed and monitors and equipment checked  Central Line Prep  Sterile Tech:cap, gloves, gown, mask and sterile barriers  Prep: chloraprep  Patient monitoring: blood pressure monitoring, continuous pulse oximetry and EKG  Central Line Procedure  Laterality:right  Location:internal jugular  Catheter Type:Cordis and MAC  Catheter Size:9 Fr  Guidance:ultrasound guided and landmark technique  PROCEDURE NOTE/ULTRASOUND INTERPRETATION.  Using ultrasound guidance the potential vascular sites for insertion of the catheter were visualized to determine the patency of the vessel to be used for vascular access.  After selecting the appropriate site for insertion, the needle was visualized under ultrasound being inserted into the internal jugular vein, followed by ultrasound confirmation of wire and catheter placement. There were no abnormalities seen on ultrasound; an image was taken; and the patient tolerated the procedure with no complications. Images: still images obtained, printed/placed on chart  Assessment  Post procedure:biopatch applied, line sutured and occlusive dressing applied  Assessement:Lavelle Test, chest x-ray ordered, free fluid flow and blood return through all ports  Complications:no  Patient Tolerance:patient tolerated the procedure well with no apparent complications  Additional Notes  Pre-procedure:  Patient identified; pre-procedure vital signs, all relevant labs/studies, complete medical/surgical/anesthetic history, full medication list, full allergy list, and NPO status obtained/reviewed; physical assessment performed; anesthetic options, side effects,  potential complications, risks, and benefits discussed; questions answered; written anesthesia procedure consent obtained; patient cleared for procedure; IV access in situ    Procedure:  Central venous line placed after induction of general anesthesia; ASA monitor placed; patient positioned; hand hygiene performed; patient supine with Trendelenberg; sterile technique maintained throughout the procedure; sterile prep; large sterile drape applied; sterile ultrasound probe cover placed; insertion site determined by anatomical landmarks, palpation, and ultrasound imaging;  live ultrasound guidance throughout the procedure; target vein identified on live ultrasound; target vein is patent with flow; introducer needle with access catheter placed into the skin and advanced into the target vein with correct placement confirmed by aspiration of venous blood; realtime needle entry into the target artery witnessed on live ultrasound; access catheter threaded over the needle and into the target vein; needle removed; central venous pressure transduced with tubing to confirm correct venous placement of the access catheter; wire placed into the target vein via the access catheter; access catheter removed; wire visualized in the target vein on ultrasound; skin nick was made with a scalpel at the insertion site; dilator and flushed central venous catheter inserted together into the target vein over the wire; dilator/wire removed together through the catheter; correct catheter placement confirmed by aspiration of venous blood and free-flowing IV fluids; ports flushed; antimicrobial protective disk placed; catheter secured with sutures and occlusive dressing; ultrasound image printed and placed in the patient's permanent medical record    Post-procedure:  Central venous catheter placed successfully; no arterial puncture; no apparent complications; minimal estimated blood loss; vital signs stable throughout; chest x-ray  pending

## 2020-07-16 NOTE — ED PROVIDER NOTES
Subjective   Chief complaint: Patient is a pleasant 64-year-old female.  Since Monday she has had abdominal cramping and discomfort that is gradually worsened.  Is pretty much constant pain.  It does wax and wane somewhat.  She started out really constipated.  She took multiple laxatives and now has diarrhea.  She is on warfarin.  There is no blood in the diarrhea.  She feels weak all over.  She has not passed out.  There is no chest pain.  There is no shortness of breath.  She has had no fever.  She has had some nausea.    Context: This started at home.  She has never had any discomfort like this before.    Duration: 4 days    Timing: Progressive worsening waxing and waning.    Severity: Severe    Associated Symptoms: Negative except as noted above.  Appropriate PPE was used.        PCP:  LMP:          Review of Systems   Constitutional: Negative.    HENT: Negative.    Respiratory: Negative.    Cardiovascular: Negative.    Gastrointestinal: Positive for abdominal distention, abdominal pain, constipation, diarrhea and nausea. Negative for blood in stool.   Genitourinary: Negative.    Musculoskeletal: Negative.    Skin: Negative.    Neurological: Negative.    Hematological: Bruises/bleeds easily.   Psychiatric/Behavioral: Negative.        History reviewed. No pertinent past medical history.    No Known Allergies    History reviewed. No pertinent surgical history.    No family history on file.    Social History     Socioeconomic History   • Marital status:      Spouse name: Not on file   • Number of children: Not on file   • Years of education: Not on file   • Highest education level: Not on file           Objective   Physical Exam   Constitutional: She is oriented to person, place, and time. She appears well-developed and well-nourished.   HENT:   Head: Normocephalic.   Eyes: Pupils are equal, round, and reactive to light.   Cardiovascular:   Mildly tachycardic   Pulmonary/Chest: Effort normal.   Good breath  sounds however occasional wheezing.  Patient does have COPD.  Does not feel dyspneic or short of breath.   Abdominal: She exhibits distension. Bowel sounds are decreased. There is generalized tenderness. There is guarding. There is no rigidity and no rebound.   Patient has a distended firm abdomen.  However there is no rebound when you let go.  This is diffuse.   Neurological: She is alert and oriented to person, place, and time.   Skin: Skin is warm and dry. Capillary refill takes less than 2 seconds.   Psychiatric: She has a normal mood and affect. Her behavior is normal.   Nursing note and vitals reviewed.      Procedures           ED Course  ED Course as of Jul 16 1100   Thu Jul 16, 2020   1059 I spoke with Dr. Farmer.  He is in the OR currently.  He plans to take her stat to the OR.  We will do emergent reversal of her INR.  I spoke with pharmacy.  We will do vitamin K and Kcentra at this point time.    [LH]      ED Course User Index  [LH] Clement Griffith DO      Results for orders placed or performed during the hospital encounter of 07/16/20   Comprehensive Metabolic Panel   Result Value Ref Range    Glucose 141 (H) 65 - 99 mg/dL    BUN      Creatinine 1.20 (H) 0.57 - 1.00 mg/dL    Sodium 123 (L) 136 - 145 mmol/L    Potassium 2.9 (L) 3.5 - 5.2 mmol/L    Chloride 82 (L) 98 - 107 mmol/L    CO2 24.0 22.0 - 29.0 mmol/L    Calcium 9.0 8.6 - 10.5 mg/dL    Total Protein 6.8 6.0 - 8.5 g/dL    Albumin 3.50 3.50 - 5.20 g/dL    ALT (SGPT) 11 1 - 33 U/L    AST (SGOT) 19 1 - 32 U/L    Alkaline Phosphatase 108 39 - 117 U/L    Total Bilirubin 0.6 0.0 - 1.2 mg/dL    eGFR Non African Amer 45 (L) >60 mL/min/1.73    Globulin 3.3 gm/dL    A/G Ratio 1.1 g/dL    BUN/Creatinine Ratio      Anion Gap 17.0 (H) 5.0 - 15.0 mmol/L   Lipase   Result Value Ref Range    Lipase 20 13 - 60 U/L   Urinalysis With Microscopic If Indicated (No Culture) - Urine, Catheter   Result Value Ref Range    Color, UA Dark Yellow (A) Yellow, Straw     Appearance, UA Slightly Cloudy (A) Clear    pH, UA 5.5 5.0 - 8.0    Specific Gravity, UA 1.016 1.005 - 1.030    Glucose, UA Negative Negative    Ketones, UA Trace (A) Negative    Bilirubin, UA Small (1+) (A) Negative    Blood, UA Negative Negative    Protein, UA Trace (A) Negative    Leuk Esterase, UA Moderate (2+) (A) Negative    Nitrite, UA Negative Negative    Urobilinogen, UA 0.2 E.U./dL 0.2 - 1.0 E.U./dL   Protime-INR   Result Value Ref Range    Protime 30.0 (H) 9.6 - 11.7 Seconds    INR 3.19 (C) 0.90 - 1.10   CBC Auto Differential   Result Value Ref Range    WBC 27.20 (H) 3.40 - 10.80 10*3/mm3    RBC 4.91 3.77 - 5.28 10*6/mm3    Hemoglobin 13.6 12.0 - 15.9 g/dL    Hematocrit 41.1 34.0 - 46.6 %    MCV 83.8 79.0 - 97.0 fL    MCH 27.8 26.6 - 33.0 pg    MCHC 33.2 31.5 - 35.7 g/dL    RDW 14.1 12.3 - 15.4 %    RDW-SD 41.1 37.0 - 54.0 fl    MPV 8.4 6.0 - 12.0 fL    Platelets 209 140 - 450 10*3/mm3   Troponin   Result Value Ref Range    Troponin T <0.010 0.000 - 0.030 ng/mL   Scan Slide   Result Value Ref Range    Scan Slide     Manual Differential   Result Value Ref Range    Neutrophil % 87.0 (H) 42.7 - 76.0 %    Lymphocyte % 4.0 (L) 19.6 - 45.3 %    Monocyte % 1.0 (L) 5.0 - 12.0 %    Bands %  7.0 (H) 0.0 - 5.0 %    Metamyelocyte % 1.0 (H) 0.0 - 0.0 %    Neutrophils Absolute 25.57 (H) 1.70 - 7.00 10*3/mm3    Lymphocytes Absolute 1.09 0.70 - 3.10 10*3/mm3    Monocytes Absolute 0.27 0.10 - 0.90 10*3/mm3    RBC Morphology Normal Normal    WBC Morphology Normal Normal    Platelet Estimate Adequate Normal    Clumped Platelets Present None Seen   BUN   Result Value Ref Range    BUN 22 8 - 23 mg/dL   Urinalysis, Microscopic Only - Urine, Catheter   Result Value Ref Range    RBC, UA 0-2 (A) None Seen /HPF    WBC, UA 0-2 (A) None Seen /HPF    Bacteria, UA None Seen None Seen /HPF    Squamous Epithelial Cells, UA 3-6 (A) None Seen, 0-2 /HPF    Yeast, UA Small/1+ Yeast None Seen /HPF    Hyaline Casts, UA 0-2 None Seen /LPF     Methodology Manual Light Microscopy    POC Lactate   Result Value Ref Range    Lactate 1.2 0.5 - 2.0 mmol/L   Type & Screen   Result Value Ref Range    ABO Type O     RH type Positive     Antibody Screen Negative     T&S Expiration Date 7/19/2020 11:59:59 PM    Light Blue Top   Result Value Ref Range    Extra Tube hold for add-on    Green Top (Gel)   Result Value Ref Range    Extra Tube Hold for add-ons.    Lavender Top   Result Value Ref Range    Extra Tube     Gold Top - SST   Result Value Ref Range    Extra Tube Hold for add-ons.               Ct Abdomen Pelvis With Contrast    Result Date: 7/16/2020   1. 1. FINDINGS consistent with perforated acute sigmoid diverticulitis. Free air is seen in the abdomen and pelvis. Small amount of pelvic free fluid is present without well-defined abscess collection. I notified Dr. Griffith in the emergency room at the time of this dictation. 2. Additional chronic findings as described above.    Electronically Signed By-Dr. Daly Rich MD On:7/16/2020 10:18 AM This report was finalized on [id number] by Dr. Daly Rich MD.    EKG interpreted by myself shows sinus rhythm with nonspecific T wave abnormalities in the anterior lateral leads.                             MDM  Number of Diagnoses or Management Options  Free intraperitoneal air:   Diagnosis management comments: Patient came in had fluid resuscitation initiated.  Blood pressure improved.  Patient had a white count 27,000 with her exam I did go ahead and start Zosyn.  Patient improved with her blood pressure and vital signs here in the emergency department.  CT scan shows free air.  I did put a page out to's surgery once I got this information from radiologist.  So this point plan will be to discuss stat surgery with general surgery.  If so we will work on reversing the patient's warfarin level with vitamin K and Kcentra.       Amount and/or Complexity of Data Reviewed  Clinical lab tests: reviewed  Tests in the  radiology section of CPT®: reviewed  Tests in the medicine section of CPT®: reviewed  Discuss the patient with other providers: yes  Independent visualization of images, tracings, or specimens: yes    Patient Progress  Patient progress: other (comment)      Final diagnoses:   None     Perforated diverticulitis with intraperitoneal free air       Clement Griffith,   07/16/20 1039       Clement Griffith,   07/16/20 1040       Clement Griffith, DO  07/16/20 1100

## 2020-07-16 NOTE — H&P
"      [facility] Medicine Services      Patient Name: Renuka Pelayo  : 1955  MRN: [id number]  Primary Care Physician: Michael Gerardo MD  Date of admission: 2020    Patient Care Team:  Michael Gerardo MD as PCP - General          Subjective   History Present Illness     Chief Complaint:   Chief Complaint   Patient presents with   • Abdominal Pain       Ms. Pelayo is a 64 y.o. female with a history of COPD, HTN, GERD, diverticulitis, chronic back pain, DVT/PE on chronic anticoagulation with warfarin, depression, and insomnia who presents to [facility] ED on 2020 complaining of abdominal pain. The patient states her pain started 3 days ago. She describes it as a diffuse cramping that became gradually worse. She denies nausea, but reports 1 episode of vomiting. She states she was initially constipated so she took a laxative, then developed diarrhea. She states her abdomen is now bloated and firm. She denies fever or chills. She denies any exacerbating or alleviating factors. She states she feels weak. She denies any other complaints.     The patient states she quit smoking 1 year ago. She denies alcohol or illicit drug use. She denies any significant family medical history.     Upon arrival to the ER the patient triggered sepsis protocol with BP 89/53, WBC 27.2, and glucose 141. She was afebrile. She was given 30 cc/kg IBW IV fluid bolus with improvement in her blood pressure to 110s/60s. Blood cultures were obtained. Her lactate was normal. She was given Zosyn. Other labs were significant for Na 123, K 2.9, Cr 1.20, and INR 3.19. Her CT abdomen/pelvis showed \"perforated acute sigmoid diverticulitis. Free air is seen in the abdomen and pelvis. Small amount of pelvic free fluid is present without well-defined abscess collection.\" General surgery was consulted. She was admitted for close monitoring and further treatment.          Review of Systems   Constitution: " Negative for chills and fever.   Gastrointestinal: Positive for bloating, abdominal pain, constipation, diarrhea and vomiting. Negative for nausea.   Neurological: Positive for weakness.   All other systems reviewed and are negative.        Personal History     Past Medical History:   Past Medical History:   Diagnosis Date   • Chronic back pain    • Closed nondisplaced fracture of head of right radius 03/2017   • Congenital deformity of right hip joint 1955   • COPD (chronic obstructive pulmonary disease) (CMS/HCC)     former smoker   • Deep venous thrombosis (CMS/HCC)     unprovoked   • Depressive disorder    • Diverticulitis of colon    • Essential hypertension    • Gastroesophageal reflux disease    • Insomnia    • Obesity    • Pulmonary embolism (CMS/HCC)     provoked by surgery       Surgical History:      Past Surgical History:   Procedure Laterality Date   • CHOLECYSTECTOMY     • HYSTERECTOMY     • TOTAL HIP ARTHROPLASTY Right    • TOTAL HIP ARTHROPLASTY REVISION Right     x3       Family History: family history includes No Known Problems in her father and mother. Otherwise pertinent FHx was reviewed and unremarkable.     Social History:  reports that she quit smoking about 18 months ago. Her smoking use included cigarettes. She has a 80.00 pack-year smoking history. She has never used smokeless tobacco. She reports that she does not drink alcohol or use drugs.      Medications:  Prior to Admission medications    Medication Sig Start Date End Date Taking? Authorizing Provider   albuterol (ACCUNEB) 0.63 MG/3ML nebulizer solution Take 1 ampule by nebulization Every 6 (Six) Hours As Needed for Wheezing.   Yes Grayson Pope MD   albuterol sulfate  (90 Base) MCG/ACT inhaler Inhale 2 puffs Every 6 (Six) Hours As Needed for Wheezing.   Yes Grayson Pope MD   dexlansoprazole (Dexilant) 60 MG capsule Take 60 mg by mouth Daily.   Yes Grayson Pope MD   famotidine (PEPCID) 40 MG  tablet Take 40 mg by mouth Daily.   Yes Grayson Pope MD   FLUoxetine (PROzac) 10 MG capsule Take 10 mg by mouth Daily.   Yes Grayson Pope MD   Fluticasone-Umeclidin-Vilant (Trelegy Ellipta) 100-62.5-25 MCG/INH aerosol powder  Inhale 1 puff 2 (two) times a day.   Yes Grayson Pope MD   hydrALAZINE (APRESOLINE) 25 MG tablet Take 25 mg by mouth 2 (Two) Times a Day As Needed. For SBP >160   Yes Grayson Pope MD   HYDROcodone-acetaminophen (NORCO)  MG per tablet Take 1 tablet by mouth Every 6 (Six) Hours As Needed for Moderate Pain .   Yes Grayson Pope MD   pantoprazole (PROTONIX) 40 MG EC tablet Take 40 mg by mouth Daily.   Yes Grayson Pope MD   traZODone (DESYREL) 100 MG tablet Take 100 mg by mouth At Night As Needed for Sleep.   Yes Grayson Pope MD   valsartan-hydrochlorothiazide (DIOVAN-HCT) 320-25 MG per tablet Take 1 tablet by mouth Daily.   Yes Grayson Pope MD   warfarin (COUMADIN) 2 MG tablet Take 2 mg by mouth Every Night.   Yes Grayson Pope MD   warfarin (COUMADIN) 1 MG tablet Take 1 tablet by mouth Every Other Day. 9/5/19 7/16/20  Jun Caraballo MD       Allergies:  No Known Allergies      Objective   Objective     Vital Signs  Temp:  [98.1 °F (36.7 °C)] 98.1 °F (36.7 °C)  Heart Rate:  [59-84] 67  Resp:  [16] 16  BP: ()/(53-70) 107/65  SpO2:  [91 %-100 %] 91 %  on  Flow (L/min):  [2] 2;   Device (Oxygen Therapy): nasal cannula  Body mass index is 31.01 kg/m².    Physical Exam   Constitutional: She is oriented to person, place, and time. She appears well-developed.   obese   HENT:   Head: Normocephalic and atraumatic.   Mouth/Throat: Oropharynx is clear and moist.   Eyes: Pupils are equal, round, and reactive to light. Conjunctivae and EOM are normal.   Neck: Normal range of motion. Neck supple.   Cardiovascular: Normal rate, regular rhythm, normal heart sounds and intact distal pulses.   Pulmonary/Chest: Effort normal  and breath sounds normal.   Abdominal: She exhibits distension. Bowel sounds are decreased. There is tenderness. There is no rigidity, no rebound and no guarding.   Musculoskeletal: Normal range of motion. She exhibits no edema.   Neurological: She is alert and oriented to person, place, and time.   Skin: Skin is warm and dry. Capillary refill takes less than 2 seconds.   Psychiatric: She has a normal mood and affect. Her behavior is normal.   Vitals reviewed.        Results Review:  I have personally reviewed most recent cardiac tracings, lab results, microbiology results and radiology images and interpretations and agree with findings.    Results from last 7 days   Lab Units 07/16/20 0824   WBC 10*3/mm3 27.20*   HEMOGLOBIN g/dL 13.6   HEMATOCRIT % 41.1   PLATELETS 10*3/mm3 209   INR  3.19*     Results from last 7 days   Lab Units 07/16/20  0829 07/16/20 0824   SODIUM mmol/L  --  123*   POTASSIUM mmol/L  --  2.9*   CHLORIDE mmol/L  --  82*   CO2 mmol/L  --  24.0   BUN   --  22   CREATININE mg/dL  --  1.20*   GLUCOSE mg/dL  --  141*   CALCIUM mg/dL  --  9.0   ALT (SGPT) U/L  --  11   AST (SGOT) U/L  --  19   TROPONIN T ng/mL  --  <0.010   LACTATE mmol/L 1.2  --      Estimated Creatinine Clearance: 47.9 mL/min (A) (by C-G formula based on SCr of 1.2 mg/dL (H)).  Brief Urine Lab Results  (Last result in the past 365 days)      Color   Clarity   Blood   Leuk Est   Nitrite   Protein   CREAT   Urine HCG        07/16/20 0917 Dark Yellow Slightly Cloudy  Comment:  Result checked  Negative Moderate (2+) Negative Trace               Microbiology Results (last 10 days)     ** No results found for the last 240 hours. **          ECG/EMG Results (most recent)     Procedure Component Value Units Date/Time    ECG 12 Lead [[id number]] Collected:  07/16/20 0827     Updated:  07/16/20 0830    Narrative:       HEART RATE= 87  bpm  RR Interval= 692  ms  OH Interval= 169  ms  P Horizontal Axis= -6  deg  P Front Axis= 46  deg  QRSD  Interval= 110  ms  QT Interval= 397  ms  QRS Axis= 61  deg  T Wave Axis= 67  deg  - ABNORMAL ECG -  Sinus rhythm  Nonspecific T abnrm, anterolateral leads  Electronically Signed By:   Date and Time of Study: 2020-07-16 08:27:30          Results for orders placed during the hospital encounter of 09/05/19   Duplex Venous Lower Extremity - Bilateral    Narrative · Chronic right lower extremity deep vein thrombosis noted in the proximal   femoral, mid femoral, distal femoral and popliteal.  · Acute right lower extremity deep vein thrombosis noted in the peroneal.  · All other veins appeared normal bilaterally.               Ct Abdomen Pelvis With Contrast    Result Date: 7/16/2020   1. 1. FINDINGS consistent with perforated acute sigmoid diverticulitis. Free air is seen in the abdomen and pelvis. Small amount of pelvic free fluid is present without well-defined abscess collection. I notified Dr. Griffith in the emergency room at the time of this dictation. 2. Additional chronic findings as described above.    Electronically Signed By-Dr. Daly Rich MD On:7/16/2020 10:18 AM This report was finalized on [id number] by Dr. Daly Rich MD.      Estimated Creatinine Clearance: 47.9 mL/min (A) (by C-G formula based on SCr of 1.2 mg/dL (H)).       Assessment/Plan   Assessment/Plan       Active Hospital Problems    Diagnosis  POA   • **Perforation of sigmoid colon due to diverticulitis [K57.20]  Yes     Priority: High   • Free intraperitoneal air [K66.8]  Yes     Priority: High   • Severe sepsis (CMS/HCC) [A41.9, R65.20]  Yes     Priority: High   • Acute kidney injury (CMS/HCC) [N17.9]  Yes     Priority: Medium   • Acute hypokalemia [E87.6]  Yes     Priority: Low   • Acute hyponatremia [E87.1]  Yes     Priority: Low   • COPD (chronic obstructive pulmonary disease) (CMS/HCC) [J44.9]  Yes   • Chronic back pain [M54.9, G89.29]  Yes   • Depressive disorder [F32.9]  Yes   • Essential hypertension [I10]  Yes   •  Gastroesophageal reflux disease [K21.9]  Yes   • Insomnia [G47.00]  Yes   • Chronic anticoagulation [Z79.01]  Not Applicable   • Obesity [E66.9]  Yes      Resolved Hospital Problems   No resolved problems to display.       Perforation of sigmoid colon due to diverticulitis with Free intraperitoneal air  -h/o diverticulitis  -general surgery consulted and planning ex lap today  -NPO, IVF  -given Zosyn in ER; continue  -INR 3.19 on warfarin----hold warfarin, KCentra and Vitamin K ordered in ER  -PRN analgesia and antiemetics    Severe sepsis, possible peritonitis secondary to perforated diverticulum  -WBC 27.2, lactate 1.2, glucose 141, BP 89/53, afebrile in ER  -given 30 cc/kg IBW IVF bolus in ER with improvement in BP  -blood cultures pending  -on Zosyn  -accu checks q6h with SSI for tight glycemic control  -continuous cardiac monitoring   -monitor and trend WBC    Acute kidney injury   -mild, Cr 1.20  -likely ATN d/t hypotension/sepsis  -IVF  -avoid nephrotoxoic meds and hypotension  -monitor BMP    Acute hypokalemia  -likely secondary to vomiting and diarrhea  -replace per protocol and monitor     Acute hyponatremia  -likely secondary to dehydration d/t V/D  -IVF  -monitor and trend     COPD (chronic obstructive pulmonary disease), former smoker   -stable without exacerbation  -PRN DuoNebs  -Symbicort substituted for home Trelegy Ellipta    Chronic back pain  -hold Norco (INSPECT reviewed) while NPO  -PRN IV morphine    Depressive disorder  -hold Prozac while NPO    Gastroesophageal reflux disease  -?patient on 2 PPIs and Pepcid at home?  -hold PO meds while NPO  -IV Pepcid daily    Essential hypertension, chronic  -BP initially hypotensive in ER; improved with IVF  -hold oral antihypertensives for now  -PRN IV hydralazine for SBP>160  -monitor BP    Insomnia  -hold trazodone while NPO    History of DVT/PE, on Chronic anticoagulation  -hold warfarin for surgery; restart when OK with surgery     Obesity  -BMI  31.01  -encourage lifestyle modifications         VTE Prophylaxis -   Mechanical Order History:      Ordered        07/16/20 1128  Place Sequential Compression Device  Once         07/16/20 1128  Maintain Sequential Compression Device  Continuous                 Pharmalogical Order History:     None          CODE STATUS:    Code Status and Medical Interventions:   Ordered at: 07/16/20 1128     Code Status:    CPR     Medical Interventions (Level of Support Prior to Arrest):    Full         I discussed the patient's findings and my recommendations with patient.        Electronically signed by SHILA Laboy, 07/16/20, 11:40 AM.  [facility] Hospitalist Team

## 2020-07-16 NOTE — ED NOTES
RN called lab to see why CMP was taking so long. Pt needs CT asap. Lab states they are running it now. It has been 1.5 hours.      Fallon Weaver RN  07/16/20 8899

## 2020-07-16 NOTE — ANESTHESIA PREPROCEDURE EVALUATION
Anesthesia Evaluation     Patient summary reviewed and Nursing notes reviewed   no history of anesthetic complications:  NPO Solid Status: > 8 hours  NPO Liquid Status: > 8 hours           Airway   Mallampati: II  TM distance: >3 FB  Neck ROM: full  No difficulty expected  Dental    (+) edentulous    Pulmonary    (+) pulmonary embolism, COPD,   Cardiovascular     ECG reviewed  PT is on anticoagulation therapy    (+) hypertension, DVT, hyperlipidemia,       Neuro/Psych  (+) psychiatric history Depression,     GI/Hepatic/Renal/Endo    (+) obesity,  GERD,  renal disease ARF,     Musculoskeletal     (+) back pain, chronic pain,   Abdominal    Substance History      OB/GYN          Other        ROS/Med Hx Other: Diverticulitis, ruptured diverticulum with free intraperitoneal air, coagulopathy, hyponatremia, hypokalemia, insomnia                Anesthesia Plan    ASA 4 - emergent     general   (Patient identified; pre-operative vital signs, all relevant labs/studies, complete medical/surgical/anesthetic history, full medication list, full allergy list, and NPO status obtained/reviewed; physical assessment performed; anesthetic options, side effects, potential complications, risks, and benefits discussed; questions answered; written anesthesia consent obtained; patient cleared for procedure; anesthesia machine and equipment checked and functioning)  intravenous induction     Anesthetic plan, all risks, benefits, and alternatives have been provided, discussed and informed consent has been obtained with: patient.    Plan discussed with CRNA.

## 2020-07-17 LAB
ANION GAP SERPL CALCULATED.3IONS-SCNC: 16 MMOL/L (ref 5–15)
BASOPHILS # BLD AUTO: 0.1 10*3/MM3 (ref 0–0.2)
BASOPHILS NFR BLD AUTO: 0.2 % (ref 0–1.5)
BUN SERPL-MCNC: 15 MG/DL (ref 8–23)
BUN SERPL-MCNC: 18 MG/DL (ref 8–23)
BUN SERPL-MCNC: ABNORMAL MG/DL
BUN/CREAT SERPL: ABNORMAL
CALCIUM SPEC-SCNC: 8.4 MG/DL (ref 8.6–10.5)
CHLORIDE SERPL-SCNC: 97 MMOL/L (ref 98–107)
CO2 SERPL-SCNC: 23 MMOL/L (ref 22–29)
CREAT SERPL-MCNC: 0.79 MG/DL (ref 0.57–1)
DEPRECATED RDW RBC AUTO: 41.6 FL (ref 37–54)
EOSINOPHIL # BLD AUTO: 0 10*3/MM3 (ref 0–0.4)
EOSINOPHIL NFR BLD AUTO: 0.1 % (ref 0.3–6.2)
ERYTHROCYTE [DISTWIDTH] IN BLOOD BY AUTOMATED COUNT: 14.1 % (ref 12.3–15.4)
GFR SERPL CREATININE-BSD FRML MDRD: 74 ML/MIN/1.73
GLUCOSE BLDC GLUCOMTR-MCNC: 141 MG/DL (ref 70–105)
GLUCOSE BLDC GLUCOMTR-MCNC: 145 MG/DL (ref 70–105)
GLUCOSE BLDC GLUCOMTR-MCNC: 146 MG/DL (ref 70–105)
GLUCOSE BLDC GLUCOMTR-MCNC: 150 MG/DL (ref 70–105)
GLUCOSE BLDC GLUCOMTR-MCNC: 152 MG/DL (ref 70–105)
GLUCOSE SERPL-MCNC: 145 MG/DL (ref 65–99)
HBA1C MFR BLD: 5.7 % (ref 3.5–5.6)
HCT VFR BLD AUTO: 39.1 % (ref 34–46.6)
HGB BLD-MCNC: 13 G/DL (ref 12–15.9)
INR PPP: 0.97 (ref 2–3)
LYMPHOCYTES # BLD AUTO: 0.5 10*3/MM3 (ref 0.7–3.1)
LYMPHOCYTES NFR BLD AUTO: 2 % (ref 19.6–45.3)
MAGNESIUM SERPL-MCNC: 2.2 MG/DL (ref 1.6–2.4)
MCH RBC QN AUTO: 28.2 PG (ref 26.6–33)
MCHC RBC AUTO-ENTMCNC: 33.2 G/DL (ref 31.5–35.7)
MCV RBC AUTO: 84.9 FL (ref 79–97)
MONOCYTES # BLD AUTO: 0.8 10*3/MM3 (ref 0.1–0.9)
MONOCYTES NFR BLD AUTO: 3.5 % (ref 5–12)
NEUTROPHILS NFR BLD AUTO: 21.4 10*3/MM3 (ref 1.7–7)
NEUTROPHILS NFR BLD AUTO: 94.2 % (ref 42.7–76)
NRBC BLD AUTO-RTO: 0 /100 WBC (ref 0–0.2)
PLATELET # BLD AUTO: 287 10*3/MM3 (ref 140–450)
PMV BLD AUTO: 7.8 FL (ref 6–12)
POTASSIUM SERPL-SCNC: 3.3 MMOL/L (ref 3.5–5.2)
PROTHROMBIN TIME: 10.3 SECONDS (ref 19.4–28.5)
RBC # BLD AUTO: 4.61 10*6/MM3 (ref 3.77–5.28)
SODIUM SERPL-SCNC: 136 MMOL/L (ref 136–145)
WBC # BLD AUTO: 22.8 10*3/MM3 (ref 3.4–10.8)

## 2020-07-17 PROCEDURE — 99232 SBSQ HOSP IP/OBS MODERATE 35: CPT | Performed by: INTERNAL MEDICINE

## 2020-07-17 PROCEDURE — 94799 UNLISTED PULMONARY SVC/PX: CPT

## 2020-07-17 PROCEDURE — 97165 OT EVAL LOW COMPLEX 30 MIN: CPT

## 2020-07-17 PROCEDURE — 85025 COMPLETE CBC W/AUTO DIFF WBC: CPT | Performed by: SURGERY

## 2020-07-17 PROCEDURE — 25010000002 HYDROMORPHONE PER 4 MG: Performed by: SURGERY

## 2020-07-17 PROCEDURE — 97530 THERAPEUTIC ACTIVITIES: CPT

## 2020-07-17 PROCEDURE — 85610 PROTHROMBIN TIME: CPT | Performed by: SURGERY

## 2020-07-17 PROCEDURE — 82962 GLUCOSE BLOOD TEST: CPT

## 2020-07-17 PROCEDURE — 99024 POSTOP FOLLOW-UP VISIT: CPT | Performed by: SURGERY

## 2020-07-17 PROCEDURE — 80048 BASIC METABOLIC PNL TOTAL CA: CPT | Performed by: SURGERY

## 2020-07-17 PROCEDURE — 25010000002 ENOXAPARIN PER 10 MG: Performed by: SURGERY

## 2020-07-17 PROCEDURE — 97116 GAIT TRAINING THERAPY: CPT

## 2020-07-17 PROCEDURE — 25010000002 PIPERACILLIN SOD-TAZOBACTAM PER 1 G: Performed by: SURGERY

## 2020-07-17 PROCEDURE — 97162 PT EVAL MOD COMPLEX 30 MIN: CPT

## 2020-07-17 PROCEDURE — 83735 ASSAY OF MAGNESIUM: CPT | Performed by: SURGERY

## 2020-07-17 RX ORDER — FLUOXETINE 10 MG/1
10 CAPSULE ORAL DAILY
Status: DISCONTINUED | OUTPATIENT
Start: 2020-07-18 | End: 2020-07-22 | Stop reason: HOSPADM

## 2020-07-17 RX ORDER — TRAZODONE HYDROCHLORIDE 100 MG/1
100 TABLET ORAL NIGHTLY PRN
Status: DISCONTINUED | OUTPATIENT
Start: 2020-07-17 | End: 2020-07-22 | Stop reason: HOSPADM

## 2020-07-17 RX ADMIN — FAMOTIDINE 20 MG: 10 INJECTION INTRAVENOUS at 09:45

## 2020-07-17 RX ADMIN — PIPERACILLIN AND TAZOBACTAM 3.38 G: 3; .375 INJECTION, POWDER, FOR SOLUTION INTRAVENOUS at 06:11

## 2020-07-17 RX ADMIN — OXYCODONE HYDROCHLORIDE 10 MG: 5 TABLET ORAL at 14:58

## 2020-07-17 RX ADMIN — PIPERACILLIN AND TAZOBACTAM 3.38 G: 3; .375 INJECTION, POWDER, FOR SOLUTION INTRAVENOUS at 23:55

## 2020-07-17 RX ADMIN — OXYCODONE HYDROCHLORIDE 10 MG: 5 TABLET ORAL at 09:44

## 2020-07-17 RX ADMIN — Medication 10 ML: at 22:00

## 2020-07-17 RX ADMIN — ENOXAPARIN SODIUM 40 MG: 40 INJECTION SUBCUTANEOUS at 19:16

## 2020-07-17 RX ADMIN — PIPERACILLIN AND TAZOBACTAM 3.38 G: 3; .375 INJECTION, POWDER, FOR SOLUTION INTRAVENOUS at 14:54

## 2020-07-17 RX ADMIN — SODIUM CHLORIDE 125 ML/HR: 900 INJECTION, SOLUTION INTRAVENOUS at 12:49

## 2020-07-17 RX ADMIN — Medication 10 ML: at 09:45

## 2020-07-17 RX ADMIN — BUDESONIDE AND FORMOTEROL FUMARATE DIHYDRATE 2 PUFF: 160; 4.5 AEROSOL RESPIRATORY (INHALATION) at 20:36

## 2020-07-17 RX ADMIN — OXYCODONE HYDROCHLORIDE 10 MG: 5 TABLET ORAL at 05:17

## 2020-07-17 RX ADMIN — HYDROMORPHONE HYDROCHLORIDE 0.5 MG: 2 INJECTION, SOLUTION INTRAMUSCULAR; INTRAVENOUS; SUBCUTANEOUS at 11:08

## 2020-07-17 RX ADMIN — BUDESONIDE AND FORMOTEROL FUMARATE DIHYDRATE 2 PUFF: 160; 4.5 AEROSOL RESPIRATORY (INHALATION) at 06:34

## 2020-07-17 NOTE — PROGRESS NOTES
Discharge Planning Assessment  [facility]     Patient Name: Renuka Pelayo  MRN: [id number]  Today's Date: 7/17/2020    Admit Date: 7/16/2020    Discharge Needs Assessment     Row Name 07/17/20 1416       Living Environment    Lives With  child(ras), adult;spouse    Current Living Arrangements  home/apartment/condo    Primary Care Provided by  self    Provides Primary Care For  no one    Family Caregiver if Needed  spouse;child(ras), adult    Quality of Family Relationships  helpful;supportive    Able to Return to Prior Arrangements  yes       Resource/Environmental Concerns    Resource/Environmental Concerns  none    Transportation Concerns  car, none       Transition Planning    Patient/Family Anticipates Transition to  home with family    Transportation Anticipated  family or friend will provide       Discharge Needs Assessment    Concerns to be Addressed  discharge planning    Equipment Currently Used at Home  none    Anticipated Changes Related to Illness  none    Equipment Needed After Discharge  none    Discharge Facility/Level of Care Needs  home with home health    Provided Post Acute Provider List?  Yes    Post Acute Provider List  Home Health    Delivered To  Support Person    Support Person  spouse    Method of Delivery  Telephone    Patient's Choice of Community Agency(s)  chose [facility] but they decline patient. Next choice [facility]    Discharge Coordination/Progress  Spoke with patients spouse over the phone. Patient is a new ostomy. Hme health referral above        Discharge Plan     Row Name 07/17/20 1418       Plan    Plan  [facility] health referral pending acceptance    Post Acute Provider List  Home Health    Provided Post Acute Provider Quality & Resource List?  Yes    Post Acute Provider Quality and Resource List  Home Health    Patient/Family in Agreement with Plan  yes    Plan Comments  PT recommends home with family, but patient is a new ostomy. Home health referral made to more               Home Medical Care      Service Provider Request Status Selected Services Address Phone Number Fax Number    [facility] Pending - Request Sent N/A [address] [place] [address] [phone] --       Vianey Domínguez RN 7/17/2020 1407    New ostomy               [facility] Declined  not enough staff to meet patient needs N/A [address] [place] [address] [phone]       Vianey Domínguez RN 7/17/2020 1354    New ostomy                      Expected Discharge Date and Time     Expected Discharge Date Expected Discharge Time    Jul 19, 2020         Demographic Summary     Row Name 07/17/20 1416       General Information    Admission Type  inpatient    Arrived From  operating room    Required Notices Provided  Important Message from Medicare    Referral Source  admission list    Reason for Consult  discharge planning    Preferred Language  English        Functional Status     Row Name 07/17/20 1416       Functional Status    Usual Activity Tolerance  good    Current Activity Tolerance  moderate       Functional Status, IADL    Medications  independent    Meal Preparation  independent    Housekeeping  independent    Laundry  independent    Shopping  independent            Vianey Domínguez RN

## 2020-07-17 NOTE — NURSING NOTE
64-year-old female status post left exploratory laparotomy with formation of left lower quadrant colostomy.  Patient is postop day 1.  Patient is dozing at intervals with eyes closed.  Per patient she has familiar with ostomy care and is taking care of ostomies via her position at an [facility].    Left lower quadrant colostomy stoma is viable negative flatus negative stool in pouch small amount of serosanguineous exudate noted the pouch is intact.  Basically wound healing however patient is dozing at intervals supplies left in room as well as written materials for education.  We will continue to follow and instruct patient on ostomy care and management

## 2020-07-17 NOTE — THERAPY EVALUATION
Acute Care - Occupational Therapy Initial Evaluation  [facility]     Patient Name: Renuka Pelayo  : 1955  MRN: [id number]  Today's Date: 2020             Admit Date: 2020       ICD-10-CM ICD-9-CM   1. Sigmoid colon injury S36.503A 863.44   2. Free intraperitoneal air K66.8 568.89   3. Diverticulitis K57.92 562.11   4. Perforation of sigmoid colon due to diverticulitis K57.20 562.11     Patient Active Problem List   Diagnosis   • Acute hypokalemia   • Severe sepsis (CMS/HCC)   • Acute kidney injury (CMS/HCC)   • Acute hyponatremia   • COPD (chronic obstructive pulmonary disease) (CMS/HCC)   • Chronic back pain   • Depressive disorder   • Gastroesophageal reflux disease   • Essential hypertension   • Insomnia   • Chronic anticoagulation   • Obesity     Past Medical History:   Diagnosis Date   • Chronic back pain    • Closed nondisplaced fracture of head of right radius 2017   • Congenital deformity of right hip joint 1956   • COPD (chronic obstructive pulmonary disease) (CMS/HCC)     former smoker   • Deep venous thrombosis (CMS/HCC)     unprovoked   • Depressive disorder    • Diverticulitis of colon    • Essential hypertension    • Gastroesophageal reflux disease    • Insomnia    • Obesity    • Pulmonary embolism (CMS/HCC)     provoked by surgery     Past Surgical History:   Procedure Laterality Date   • CHOLECYSTECTOMY     • EXPLORATORY LAPAROTOMY N/A 2020    Procedure: LAPAROTOMY EXPLORATORY HARTMANS;  Surgeon: Chi Farmer MD;  Location: [facility];  Service: General;  Laterality: N/A;   • HYSTERECTOMY     • TOTAL HIP ARTHROPLASTY Right    • TOTAL HIP ARTHROPLASTY REVISION Right     x3          OT ASSESSMENT FLOWSHEET (last 12 hours)      Occupational Therapy Evaluation     Row Name 20 1600 20 1500                OT Evaluation Time/Intention    Subjective Information  --  no complaints  -MP       Document Type  --  evaluation  -MP       Mode of Treatment   --  occupational therapy  -MP          General Information    Patient Profile Reviewed?  --  yes  -MP       Patient Observations  --  alert;cooperative;agree to therapy  -MP       Prior Level of Function  --  independent:;ADL's  -MP          Relationship/Environment    Lives With  --  child(ras), adult;spouse  -MP       Family Caregiver if Needed  --  child(ras), adult  -MP          Resource/Environmental Concerns    Current Living Arrangements  --  home/apartment/condo  -MP       Resource/Environmental Concerns  --  none  -MP       Transportation Concerns  --  car, none  -MP          Cognitive Assessment/Intervention- PT/OT    Orientation Status (Cognition)  --  oriented x 3  -MP       Follows Commands (Cognition)  --  WNL  -MP          Bed Mobility Assessment/Treatment    Bed Mobility Assessment/Treatment  --  supine-sit  -MP       Supine-Sit Ewen (Bed Mobility)  --  supervision;1 person assist  -MP          Functional Mobility    Functional Mobility- Ind. Level  --  contact guard assist;1 person  -MP       Functional Mobility- Device  --  rolling walker  -MP          Bed-Chair Transfer    Bed-Chair Ewen (Transfers)  --  supervision;1 person assist  -MP          Sit-Stand Transfer    Sit-Stand Ewen (Transfers)  --  supervision;1 person assist  -MP       Assistive Device (Sit-Stand Transfers)  --  walker, front-wheeled  -MP          ADL Assessment/Intervention    BADL Assessment/Intervention  --  lower body dressing  -MP          Pain Scale: Numbers Pre/Post-Treatment    Pain Scale: Numbers, Pretreatment  5/10  -MP  --       Pain Scale: Numbers, Post-Treatment  5/10  -MP  --          Wound 07/16/20 1327 lower;midline abdomen Incision    Wound - Properties Group Date first assessed: 07/16/20  -TD Time first assessed: 1327  -TD Orientation: lower;midline  -TD Location: abdomen  -TD, INCISION FOR COLOSTOMY  Primary Wound Type: Incision  -TD       Wound 07/16/20 1339 perineum MASD (Moisture  associated skin damage)    Wound - Properties Group Date first assessed: 07/16/20  -TD Time first assessed: 1339  -TD Present on Hospital Admission: Y  -TD Location: perineum  -TD Primary Wound Type: MASD  -TD, WHEN PLACING BOLANOS PATIENT WITH LEAKING OF STOOL. WITH OBSERVATION OF PERINEUM RED, EXSCORIATED AREAS APPARENT ON BOTH SIDES.        Plan of Care Review    Plan of Care Reviewed With  patient  -MP  --       Progress  no change  -MP  --          Clinical Impression (OT)    Criteria for Skilled Therapeutic Interventions Met (OT Eval)  yes;treatment indicated  -MP  --       Rehab Potential (OT Eval)  good, to achieve stated therapy goals  -MP  --       Therapy Frequency (OT Eval)  3 times/wk  -MP  --       Care Plan Review (OT)  evaluation/treatment results reviewed  -MP  --       Anticipated Discharge Disposition (OT)  inpatient rehabilitation facility  -MP  --          OT Goals    Bed Mobility Goal Selection (OT)  bed mobility, OT goal 1  -MP  --       Transfer Goal Selection (OT)  transfer, OT goal 1  -MP  --       Toileting Goal Selection (OT)  toileting, OT goal 1  -MP  --          Bed Mobility Goal 1 (OT)    Activity/Assistive Device (Bed Mobility Goal 1, OT)  sit to supine;supine to sit  -MP  --       Cl[place]ier Level/Cues Needed (Bed Mobility Goal 1, OT)  conditional independence  -MP  --       Time Frame (Bed Mobility Goal 1, OT)  long term goal (LTG);2 weeks  -MP  --          Transfer Goal 1 (OT)    Activity/Assistive Device (Transfer Goal 1, OT)  sit-to-stand/stand-to-sit;toilet  -MP  --       [place] Level/Cues Needed (Transfer Goal 1, OT)  independent  -MP  --       Time Frame (Transfer Goal 1, OT)  long term goal (LTG);2 weeks  -MP  --          Toileting Goal 1 (OT)    Activity/Device (Toileting Goal 1, OT)  toileting skills, all  -MP  --       [place] Level/Cues Needed (Toileting Goal 1, OT)  independent  -MP  --       Time Frame (Toileting Goal 1, OT)  long term goal (LTG);2 weeks   -MP  --         User Key  (r) = Recorded By, (t) = Taken By, (c) = Cosigned By    Initials Name Effective Dates     Bertram Zuluaga OT 03/01/19 -     TD Maryana Lugo RN 08/19/19 -          Occupational Therapy Education                 Title: PT OT SLP Therapies (In Progress)     Topic: Occupational Therapy (In Progress)     Point: ADL training (Not Started)     Description:   Instruct learner(s) on proper safety adaptation and remediation techniques during self care or transfers.   Instruct in proper use of assistive devices.              Learner Progress:   Not documented in this visit.          Point: Home exercise program (Not Started)     Description:   Instruct learner(s) on appropriate technique for monitoring, assisting and/or progressing therapeutic exercises/activities.              Learner Progress:   Not documented in this visit.          Point: Precautions (Not Started)     Description:   Instruct learner(s) on prescribed precautions during self-care and functional transfers.              Learner Progress:   Not documented in this visit.          Point: Body mechanics (Done)     Description:   Instruct learner(s) on proper positioning and spine alignment during self-care, functional mobility activities and/or exercises.              Learning Progress Summary           Patient Acceptance, E,TB, VU by  at 7/17/2020 1629                               User Key     Initials Effective Dates Name Provider Type Discipline     03/01/19 -  Bertram Zuluaga OT Occupational Therapist OT                  OT Recommendation and Plan  Outcome Summary/Treatment Plan (OT)  Anticipated Discharge Disposition (OT): inpatient rehabilitation facility  Therapy Frequency (OT Eval): 3 times/wk  Plan of Care Review  Plan of Care Reviewed With: patient  Plan of Care Reviewed With: patient  Outcome Summary: Pt is 65 YO F who was admitted with peroration of sigmoid colon, exploratory lap and colostomy  performed 7/16. Pt .completes functional transfers this date w/ supervision for safety bathroom distances + rolling walker support. Pt. requires increased ADL support secondary to painful abdominal and now w/ colostomy. Recommend d/c home w/ family assist. Will follow up w/ pt. 1-3x per week at [facility] to assess and monitor pt. progress.        Time Calculation:   Time Calculation- OT     Row Name 07/17/20 1633             Time Calculation- OT    OT Start Time  1125  -MP      OT Stop Time  1145  -MP      OT Time Calculation (min)  20 min  -MP      Total Timed Code Minutes- OT  10 minute(s)  -MP      OT Received On  07/17/20  -      OT - Next Appointment  07/20/20  -      OT Goal Re-Cert Due Date  07/31/20  -        User Key  (r) = Recorded By, (t) = Taken By, (c) = Cosigned By    Initials Name Provider Type    MP Bertram Zuluaga OT Occupational Therapist        Therapy Charges for Today     Code Description Service Date Service Provider Modifiers Qty    [id number]  OT EVAL LOW COMPLEXITY 3 7/17/2020 Bertram Zuluaga OT GO 1    [id number]  OT THERAPEUTIC ACT EA 15 MIN 7/17/2020 Bertram Zuluaga OT GO 1               Bertram Zuluaga OT  7/17/2020

## 2020-07-17 NOTE — PROGRESS NOTES
GENERAL SURGERY PROGRESS NOTE    7/17/2020   LOS: 1 day   Patient Care Team:  Michael Gerardo MD as PCP - General    LAPAROTOMY EXPLORATORY  7/16/2020  Chief Complaint: perforated diverticulitis with free air    Subjective   HPI: Patient is a 64-year-old lady who presented to the hospital on 7/16/2020 with perforated sigmoid diverticulitis with free air and peritonitis.  She was taken emergently to the operating room where she underwent an exploratory laparotomy with Alejo's procedure.    Interval History:   No complaints.  Patient states she is thirsty.  Has not noticed any gas out of her ostomy yet.    Objective     Vital Signs  Temp:  [97 °F (36.1 °C)-98.3 °F (36.8 °C)] 97.6 °F (36.4 °C)  Heart Rate:  [] 83  Resp:  [10-18] 15  BP: ()/(46-95) 154/83  Arterial Line BP: (144-157)/(64-70) 157/70    Physical Exam   Constitutional: She is oriented to person, place, and time. She appears well-developed and well-nourished.   HENT:   Head: Normocephalic and atraumatic.   Cardiovascular: Normal rate and regular rhythm.   Pulmonary/Chest: Effort normal and breath sounds normal.   Abdominal: Soft.   Appropriately tender.  Incision with dressing overlying and minimal strikethrough.  Ostomy within the left lower quadrant is patent, and pink, but not productive of gas or stool.  There is a small amount of bloody bowel sweat with in the ostomy appliance.  Right lower quadrant drain with minimal serous output.   Genitourinary:   Genitourinary Comments: Stevenson catheter noted to be in place   Musculoskeletal: Normal range of motion. She exhibits no edema.   Neurological: She is alert and oriented to person, place, and time.   Skin: Skin is warm.   Psychiatric: She has a normal mood and affect. Her behavior is normal.   Vitals reviewed.       Results Review:    Lab Results (last 24 hours)     Procedure Component Value Units Date/Time    Basic Metabolic Panel [[id number]]  (Abnormal) Collected:  07/17/20 3571     Specimen:  Blood Updated:  07/17/20 0549     Glucose 145 mg/dL      BUN --     Comment: Testing performed by alternate method        Creatinine 0.79 mg/dL      Sodium 136 mmol/L      Potassium 3.3 mmol/L      Chloride 97 mmol/L      CO2 23.0 mmol/L      Calcium 8.4 mg/dL      eGFR Non African Amer 74 mL/min/1.73      BUN/Creatinine Ratio --     Comment: Testing not performed        Anion Gap 16.0 mmol/L     Narrative:       GFR Normal >60  Chronic Kidney Disease <60  Kidney Failure <15      Magnesium [[id number]]  (Normal) Collected:  07/17/20 0523    Specimen:  Blood Updated:  07/17/20 0549     Magnesium 2.2 mg/dL     BUN [[id number]] Collected:  07/17/20 0523    Specimen:  Blood Updated:  07/17/20 0549    Protime-INR [[id number]]  (Abnormal) Collected:  07/17/20 0524    Specimen:  Blood Updated:  07/17/20 0543     Protime 10.3 Seconds      Comment: Result checked         INR 0.97    CBC Auto Differential [[id number]]  (Abnormal) Collected:  07/17/20 0524    Specimen:  Blood Updated:  07/17/20 0527     WBC 22.80 10*3/mm3      RBC 4.61 10*6/mm3      Hemoglobin 13.0 g/dL      Hematocrit 39.1 %      MCV 84.9 fL      MCH 28.2 pg      MCHC 33.2 g/dL      RDW 14.1 %      RDW-SD 41.6 fl      MPV 7.8 fL      Platelets 287 10*3/mm3      Neutrophil % 94.2 %      Lymphocyte % 2.0 %      Monocyte % 3.5 %      Eosinophil % 0.1 %      Basophil % 0.2 %      Neutrophils, Absolute 21.40 10*3/mm3      Lymphocytes, Absolute 0.50 10*3/mm3      Monocytes, Absolute 0.80 10*3/mm3      Eosinophils, Absolute 0.00 10*3/mm3      Basophils, Absolute 0.10 10*3/mm3      nRBC 0.0 /100 WBC     POC Glucose Once [[id number]]  (Abnormal) Collected:  07/17/20 0446    Specimen:  Blood Updated:  07/17/20 0454     Glucose 150 mg/dL      Comment: Serial Number: [id number]:  [id number]       BUN [[id number]]  (Normal) Collected:  07/16/20 1958    Specimen:  Blood Updated:  07/17/20 0125     BUN 18 mg/dL     POC Glucose Once [[id number]]  (Abnormal)  Collected:  07/17/20 0015    Specimen:  Blood Updated:  07/17/20 0016     Glucose 152 mg/dL      Comment: Serial Number: [id number]:  [id number]       Basic Metabolic Panel [[id number]]  (Abnormal) Collected:  07/16/20 1958    Specimen:  Blood Updated:  07/16/20 2045     Glucose 171 mg/dL      BUN --     Comment: Testing performed by alternate method        Creatinine 0.92 mg/dL      Sodium 133 mmol/L      Potassium 3.4 mmol/L      Comment: Specimen hemolyzed.  Results may be affected.        Chloride 95 mmol/L      CO2 24.0 mmol/L      Calcium 8.0 mg/dL      eGFR Non African Amer 62 mL/min/1.73      BUN/Creatinine Ratio --     Comment: Testing not performed        Anion Gap 14.0 mmol/L     Narrative:       GFR Normal >60  Chronic Kidney Disease <60  Kidney Failure <15      POC Glucose Once [[id number]]  (Abnormal) Collected:  07/16/20 1831    Specimen:  Blood Updated:  07/16/20 1832     Glucose 173 mg/dL      Comment: Serial Number: [id number]:  [id number]       Hemoglobin A1c [[id number]] Collected:  07/16/20 0824    Specimen:  Blood Updated:  07/16/20 1616    POC Chem 8, arterial (ISTAT) [[id number]]  (Abnormal) Collected:  07/16/20 1316    Specimen:  Blood Updated:  07/16/20 1500     Ionized Calcium 1.06 mmol/L      pH, Arterial 7.411 pH units      pCO2, Arterial 48.0 mm Hg      pO2, Arterial 477.0 mm Hg      HCO3, Arterial 30.7 mmol/L      Base Excess, Arterial 6.0 mmol/L      Base Deficit --     O2 Saturation, Arterial 100.0 %      Glucose 131 mg/dL      Comment: Serial Number: [id number]:  [id number]        Sodium 130 mmol/L      POC Potassium 3.5 mmol/L      Hematocrit 34.0 %      Hemoglobin 11.6 g/dL      CO2 Content 32 mmol/L     COVID-19 [facility]-HOUSE, Nasal Swab No Transport Media - Swab, Nasal Cavity [[id number]]  (Normal) Collected:  07/16/20 1102    Specimen:  Swab from Nasal Cavity Updated:  07/16/20 1147     COVID19 Not Detected    Narrative:       Fact sheet for providers:  https://www.fda.gov/media/789119/download     Fact sheet for patients: https://www.fda.gov/media/727760/download    Magnesium [[id number]]  (Normal) Collected:  07/16/20 0824    Specimen:  Blood Updated:  07/16/20 1144     Magnesium 2.4 mg/dL     BUN [[id number]]  (Normal) Collected:  07/16/20 0824    Specimen:  Blood Updated:  07/16/20 0953     BUN 22 mg/dL     Comprehensive Metabolic Panel [[id number]]  (Abnormal) Collected:  07/16/20 0824    Specimen:  Blood Updated:  07/16/20 0949     Glucose 141 mg/dL      BUN --     Comment: Testing performed by alternate method        Creatinine 1.20 mg/dL      Sodium 123 mmol/L      Potassium 2.9 mmol/L      Chloride 82 mmol/L      CO2 24.0 mmol/L      Calcium 9.0 mg/dL      Total Protein 6.8 g/dL      Albumin 3.50 g/dL      ALT (SGPT) 11 U/L      AST (SGOT) 19 U/L      Alkaline Phosphatase 108 U/L      Total Bilirubin 0.6 mg/dL      eGFR Non African Amer 45 mL/min/1.73      Globulin 3.3 gm/dL      A/G Ratio 1.1 g/dL      BUN/Creatinine Ratio --     Comment: Testing not performed        Anion Gap 17.0 mmol/L     Narrative:       GFR Normal >60  Chronic Kidney Disease <60  Kidney Failure <15      Lipase [[id number]]  (Normal) Collected:  07/16/20 0824    Specimen:  Blood Updated:  07/16/20 0949     Lipase 20 U/L     Troponin [[id number]]  (Normal) Collected:  07/16/20 0824    Specimen:  Blood Updated:  07/16/20 0949     Troponin T <0.010 ng/mL     Narrative:       Troponin T Reference Range:  <= 0.03 ng/mL-   Negative for AMI  >0.03 ng/mL-     Abnormal for myocardial necrosis.  Clinicians would have to utilize clinical acumen, EKG, Troponin and serial changes to determine if it is an Acute Myocardial Infarction or myocardial injury due to an underlying chronic condition.       Results may be falsely decreased if patient taking Biotin.      Urinalysis, Microscopic Only - Urine, Catheter [[id number]]  (Abnormal) Collected:  07/16/20 0917    Specimen:  Urine, Catheter Updated:   07/16/20 0933     RBC, UA 0-2 /HPF      WBC, UA 0-2 /HPF      Bacteria, UA None Seen /HPF      Squamous Epithelial Cells, UA 3-6 /HPF      Yeast, UA Small/1+ Yeast /HPF      Hyaline Casts, UA 0-2 /LPF      Methodology Manual Light Microscopy    [place] Draw [[id number]] Collected:  07/16/20 0824    Specimen:  Blood Updated:  07/16/20 0931    Narrative:       The following orders were created for panel order [place] Draw.  Procedure                               Abnormality         Status                     ---------                               -----------         ------                     Light Blue Top[[id number]]                                   Final result               Green Top (Gel)[[id number]]                                  Final result               Lavender Top[[id number]]                                     Final result               Gold Top - SST[[id number]]                                   Final result                 Please view results for these tests on the individual orders.    Light Blue Top [[id number]] Collected:  07/16/20 0824    Specimen:  Blood Updated:  07/16/20 0931     Extra Tube hold for add-on     Comment: Auto resulted       Green Top (Gel) [[id number]] Collected:  07/16/20 0824    Specimen:  Blood Updated:  07/16/20 0931     Extra Tube Hold for add-ons.     Comment: Auto resulted.       Gold Top - SST [[id number]] Collected:  07/16/20 0824    Specimen:  Blood Updated:  07/16/20 0931     Extra Tube Hold for add-ons.     Comment: Auto resulted.       Urinalysis With Microscopic If Indicated (No Culture) - Urine, Catheter [[id number]]  (Abnormal) Collected:  07/16/20 0917    Specimen:  Urine, Catheter Updated:  07/16/20 0930     Color, UA Dark Yellow     Appearance, UA Slightly Cloudy     Comment: Result checked         pH, UA 5.5     Specific Gravity, UA 1.016     Glucose, UA Negative     Ketones, UA Trace     Bilirubin, UA Small (1+)     Comment: Confirmation testing is unavailable.  A  serum bilirubin is recommended for further assessment.        Blood, UA Negative     Protein, UA Trace     Leuk Esterase, UA Moderate (2+)     Nitrite, UA Negative     Urobilinogen, UA 0.2 E.U./dL    Blood Culture - Blood, Arm, Left [[id number]] Collected:  07/16/20 0857    Specimen:  Blood from Arm, Left Updated:  07/16/20 0907    Blood Culture - Blood, Arm, Right [[id number]] Collected:  07/16/20 0858    Specimen:  Blood from Arm, Right Updated:  07/16/20 0907    Lavender Top [[id number]] Collected:  07/16/20 0824    Specimen:  Blood Updated:  07/16/20 0859     Extra Tube --    CBC & Differential [[id number]] Collected:  07/16/20 0824    Specimen:  Blood Updated:  07/16/20 0859    Narrative:       The following orders were created for panel order CBC & Differential.  Procedure                               Abnormality         Status                     ---------                               -----------         ------                     CBC Auto Differential[[id number]]        Abnormal            Final result                 Please view results for these tests on the individual orders.    CBC Auto Differential [[id number]]  (Abnormal) Collected:  07/16/20 0824    Specimen:  Blood Updated:  07/16/20 0859     WBC 27.20 10*3/mm3      RBC 4.91 10*6/mm3      Hemoglobin 13.6 g/dL      Hematocrit 41.1 %      MCV 83.8 fL      MCH 27.8 pg      MCHC 33.2 g/dL      RDW 14.1 %      RDW-SD 41.1 fl      MPV 8.4 fL      Platelets 209 10*3/mm3     Scan Slide [[id number]] Collected:  07/16/20 0824    Specimen:  Blood Updated:  07/16/20 0859     Scan Slide --     Comment: See Manual Differential Results       Manual Differential [[id number]]  (Abnormal) Collected:  07/16/20 0824    Specimen:  Blood Updated:  07/16/20 0859     Neutrophil % 87.0 %      Lymphocyte % 4.0 %      Monocyte % 1.0 %      Bands %  7.0 %      Metamyelocyte % 1.0 %      Neutrophils Absolute 25.57 10*3/mm3      Lymphocytes Absolute 1.09 10*3/mm3      Monocytes  Absolute 0.27 10*3/mm3      RBC Morphology Normal     WBC Morphology Normal     Platelet Estimate Adequate     Clumped Platelets Present    Narrative:       NO CORRECTION TO PLT, PERFORMED MANUAL AND RESULTS ARE ACCURATE     Protime-INR [[id number]]  (Abnormal) Collected:  07/16/20 0824    Specimen:  Blood Updated:  07/16/20 0846     Protime 30.0 Seconds      INR 3.19    POC Lactate [[id number]]  (Normal) Collected:  07/16/20 0832    Specimen:  Blood Updated:  07/16/20 0833    POC Lactate [[id number]]  (Normal) Collected:  07/16/20 0829    Specimen:  Blood Updated:  07/16/20 0832     Lactate 1.2 mmol/L      Comment: Serial Number: [id number]:  [id number]           Imaging Results (Last 24 Hours)     Procedure Component Value Units Date/Time    XR Chest 1 View [[id number]] Collected:  07/16/20 1554     Updated:  07/16/20 1557    Narrative:       XR CHEST 1 VW-     Date of Exam: 7/16/2020 3:46 PM     Indication: line placement; K66.8-Other specified disorders of  peritoneum; K57.92-Diverticulitis of intestine, part unspecified,  without perforation or abscess without bleeding; K57.20-Diverticulitis  of large intestine with perforation and abscess without bleeding;  S36.503A-Unspecified injury of sigmoid colon, initial encounter.     Comparison: 4/11/2017     Technique: A single view of the chest was obtained.     FINDINGS:      There is a right internal jugular central venous catheter in place  the tip projecting over the superior vena cava.  Heart size is within  normal limits.  Pulmonary vessels are indistinct suggesting mild  pulmonary vascular congestion.  There are also some prominent  interstitial markings which may be due to interstitial edema.  There is  chronic left basilar airspace disease compatible with parenchymal  scarring.  There is some linear atelectasis in the right lung base.  No  definite pleural effusion identified.  No evidence of pneumothorax.             Impression:             1.  Right  internal jugular central venous catheter in place the tip  taking of the superior vena cava.  No evidence of pneumothorax  2.  Borderline heart size with pulmonary vascular congestion and  prominent interstitial markings suggesting mild interstitial edema.  3.  Chronic left basilar airspace disease compatible scarring.     Electronically Signed By-Renny Trinh On:7/16/2020 3:55 PM  This report was finalized on [id number] by  Renny Trinh, .    CT Abdomen Pelvis With Contrast [[id number]] Collected:  07/16/20 1012     Updated:  07/16/20 1020    Narrative:       CT ABDOMEN PELVIS W CONTRAST-     Date of Exam: 7/16/2020 10:07 AM     Indication: Abdominal pain, cramping, weakness, nausea, diarrhea..     Comparison: CT abdomen pelvis with contrast 08/04/2014.     Technique: Contiguous axial CT images were obtained from the lung bases  to the pubic symphysis following uneventful administration of 100 cc  Isovue-370. intravenous and oral contrast. Sagittal and coronal  reconstructions were performed.  Automated exposure control and  iterative reconstruction methods were used.     FINDINGS:     Images of the pelvis are significantly degraded by beam hardening  artifact related to right hip replacement. There is ill-defined fluid,  soft tissue stranding, and small locules of air within the pelvis  adjacent to thickened sigmoid colon. Extensive sigmoid diverticular  changes are present. FINDINGS are favored to reflect changes of  perforated acute diverticulitis. Small amount of air and fluid tracks  along the left common iliac chain at the mesenteric root, and small  amount of free air is seen within the nondependent upper abdomen, as  well, extending into a moderate-sized esophageal hiatal hernia.     Mild intrahepatic and extra hepatic biliary ductal dilation appears  stable, without obstructing etiology is seen. Pancreatic duct is  nondilated. The pancreas itself is mild to moderately atrophic. No focal  liver lesion  is seen. Benign capsular calcifications are seen near the  hepatic dome. Cholecystectomy. Spleen, adrenals, and kidneys are normal.  Shotty retroperitoneal lymph nodes in the abdomen appears stable since  2014, in keeping with benign findings. Urinary bladder, uterus and  rectum appear grossly within normal limits.     Heart size is normal. Linear subsegmental atelectasis is present in the  lung bases, greatest within the right middle lobe. No acute osseous  abnormalities.          Impression:          1.  1. FINDINGS consistent with perforated acute sigmoid diverticulitis.  Free air is seen in the abdomen and pelvis. Small amount of pelvic free  fluid is present without well-defined abscess collection. I notified Dr. Griffith in the emergency room at the time of this dictation.  2. Additional chronic findings as described above.           Electronically Signed By-Dr. Daly Rich MD On:7/16/2020 10:18 AM  This report was finalized on [id number] by Dr. Daly Rich MD.           I have reviewed the above results and noted them below    Medication Review:    Current Facility-Administered Medications:   •  acetaminophen (TYLENOL) tablet 650 mg, 650 mg, Oral, Q4H PRN **OR** acetaminophen (TYLENOL) 160 MG/5ML solution 650 mg, 650 mg, Oral, Q4H PRN **OR** acetaminophen (TYLENOL) suppository 650 mg, 650 mg, Rectal, Q4H PRN, Chi Farmer MD  •  albuterol sulfate HFA (PROVENTIL HFA;VENTOLIN HFA;PROAIR HFA) inhaler 2 puff, 2 puff, Inhalation, Q4H PRN, Chi Farmer MD  •  aluminum-magnesium hydroxide-simethicone (MAALOX MAX) 400-400-40 MG/5ML suspension 15 mL, 15 mL, Oral, Q6H PRN, Chi Farmer MD  •  bisacodyl (DULCOLAX) suppository 10 mg, 10 mg, Rectal, Daily PRN, Chi Farmer MD  •  budesonide-formoterol (SYMBICORT) 160-4.5 MCG/ACT inhaler 2 puff, 2 puff, Inhalation, BID - RT, Chi Farmer MD, 2 puff at 07/17/20 0634  •  dextrose (D50W) 25 g/ 50mL  Intravenous Solution 25 g, 25 g, Intravenous, Q15 Min PRN, Chi Farmer MD  •  dextrose (GLUTOSE) oral gel 15 g, 15 g, Oral, Q15 Min PRN, Chi Farmer MD  •  enoxaparin (LOVENOX) syringe 40 mg, 40 mg, Subcutaneous, Q24H, Chi Farmer MD  •  famotidine (PEPCID) injection 20 mg, 20 mg, Intravenous, Daily, Chi Farmer MD  •  glucagon (human recombinant) (GLUCAGEN DIAGNOSTIC) injection 1 mg, 1 mg, Subcutaneous, PRN, Chi Farmer MD  •  hydrALAZINE (APRESOLINE) injection 10 mg, 10 mg, Intravenous, Q6H PRN, Chi Farmer MD  •  HYDROmorphone (DILAUDID) injection 0.5 mg, 0.5 mg, Intravenous, Q2H PRN, Chi Farmer MD  •  insulin lispro (humaLOG) injection 0-7 Units, 0-7 Units, Subcutaneous, Q6H, 4 Units at 07/16/20 2007 **AND** insulin lispro (humaLOG) injection 0-7 Units, 0-7 Units, Subcutaneous, PRN, Chi Farmer MD  •  magnesium hydroxide (MILK OF MAGNESIA) suspension 2400 mg/10mL 10 mL, 10 mL, Oral, Daily PRN, Chi Farmer MD  •  Magnesium Sulfate 2 gram infusion - Mg less than or equal to 1.5 mg/dL, 2 g, Intravenous, PRN **OR** Magnesium Sulfate 1 gram infusion - Mg 1.6-1.9 mg/dL, 1 g, Intravenous, PRN, Chi Farmer MD  •  melatonin tablet 5 mg, 5 mg, Oral, Nightly PRN, Chi Farmer MD  •  nitroglycerin (NITROSTAT) SL tablet 0.4 mg, 0.4 mg, Sublingual, Q5 Min PRN, Chi Farmer MD  •  ondansetron (ZOFRAN) tablet 4 mg, 4 mg, Oral, Q6H PRN **OR** ondansetron (ZOFRAN) injection 4 mg, 4 mg, Intravenous, Q6H PRN, Chi Farmer MD, 4 mg at 07/16/20 1432  •  oxyCODONE (ROXICODONE) immediate release tablet 5 mg, 5 mg, Oral, Q4H PRN **OR** oxyCODONE (ROXICODONE) immediate release tablet 10 mg, 10 mg, Oral, Q4H PRN, Chi Farmer MD, 10 mg at 07/17/20 0517  •  phenylephrine (MAGDALENA-SYNEPHRINE) 50 mg/250 mL (0.2 mg/mL) in 0.9% NS  infusion, 0.5-3 mcg/kg/min, Intravenous,  Titrated, Chi Farmer MD  •  piperacillin-tazobactam (ZOSYN) IVPB 3.375 g in 100 mL NS (CD), 3.375 g, Intravenous, Q8H, Chi Farmer MD, Last Rate: 25 mL/hr at 07/17/20 0611, 3.375 g at 07/17/20 0611  •  potassium chloride (K-DUR,KLOR-CON) CR tablet 40 mEq, 40 mEq, Oral, PRN **OR** potassium chloride (KLOR-CON) packet 40 mEq, 40 mEq, Oral, PRN **OR** potassium chloride 10 mEq in 100 mL IVPB, 10 mEq, Intravenous, Q1H PRN, Chi Farmer MD, Last Rate: 100 mL/hr at 07/16/20 1203, 10 mEq at 07/16/20 1203  •  promethazine (PHENERGAN) IVPB 6.25 mg, 6.25 mg, Intravenous, Once PRN **OR** promethazine (PHENERGAN) injection 6.25 mg, 6.25 mg, Intramuscular, Once PRN **OR** promethazine (PHENERGAN) suppository 25 mg, 25 mg, Rectal, Once PRN **OR** promethazine (PHENERGAN) tablet 25 mg, 25 mg, Oral, Once PRN, Chi Farmer MD  •  sodium chloride 0.9 % flush 10 mL, 10 mL, Intravenous, Q12H, Chi Farmer MD, 10 mL at 07/16/20 2231  •  sodium chloride 0.9 % flush 10 mL, 10 mL, Intravenous, PRN, Chi Farmer MD  •  sodium chloride 0.9 % infusion, 125 mL/hr, Intravenous, Continuous, Chi Farmer MD, Last Rate: 125 mL/hr at 07/16/20 1832, 125 mL/hr at 07/16/20 1832    Assessment/Plan     Active Problems:    Acute hypokalemia    Severe sepsis (CMS/HCC)    Acute kidney injury (CMS/HCC)    Acute hyponatremia    COPD (chronic obstructive pulmonary disease) (CMS/HCC)    Chronic back pain    Depressive disorder    Gastroesophageal reflux disease    Essential hypertension    Insomnia    Chronic anticoagulation    Obesity    Tylenol, Oxycodon for pain, Dilaudid for breakthrough.  Clear liquid diet today. Await return of bowel function.   Wound/Ostomy RN consult for ostomy teaching.   Continue Zosyn  Up out of bed to chair, IS, pulmonary toilet. Nebs for COPD  Lovenox for DVT ppx. Patient with h/o DVT on Coumadin prior to admission. May begin to transition back  to full anticoagulation in a day or so if Hgb remains stable  D/C rizzo catheter  Continue PRANAY drain, monitor output. Will continue until fully anticoagulated  PT/OT evaluations  Daily CBCs - monitor WBC and Hgb  Appreciate Hospitalist assistance   Dr. Howard to see over the weekend while I am absent    Chi Farmer MD  07/17/20  08:11

## 2020-07-17 NOTE — DISCHARGE PLACEMENT REQUEST
"Renuka Healy (64 y.o. Female)     Date of Birth Social Security Number Address Home Phone MRN    1955  [address] [place] [address] [phone] [id number]    Anglican Marital Status          None        Admission Date Admission Type Admitting Provider Attending Provider Department, Room/Bed    7/16/20 Emergency Clemente Guerrero DO [place], [place], DO [facility]ICAL INPATIENT, 4130/1    Discharge Date Discharge Disposition Discharge Destination                       Attending Provider:  Clemente Guerrero DO    Allergies:  No Known Allergies    Isolation:  None   Infection:  None   Code Status:  CPR    Ht:  160 cm (63\")   Wt:  80.2 kg (176 lb 12.9 oz)    Admission Cmt:  None   Principal Problem:  Perforation of sigmoid colon due to diverticulitis [K57.20]                 Active Insurance as of 7/16/2020     Primary Coverage     Payor Plan Insurance Group Employer/Plan Group    ANTHEM MEDICARE REPLACEMENT ANTHEM MEDICARE ADVANTAGE [id number]     Payor Plan Address Payor Plan Phone Number Payor Plan Fax Number Effective Dates    [address] [phone]  1/1/2019 - None Entered    [facility] [address]       Subscriber Name Subscriber Birth Date Member ID       RENUKA HEALY 1955 [id number]                 Emergency Contacts      (Rel.) Home Phone Work Phone Mobile Phone    PierceAdrian (Spouse) -- -- [phone]              "

## 2020-07-17 NOTE — THERAPY EVALUATION
Patient Name: Renuka Pelayo  : 1955    MRN: [id number]                              Today's Date: 2020       Admit Date: 2020    Visit Dx:     ICD-10-CM ICD-9-CM   1. Free intraperitoneal air K66.8 568.89   2. Diverticulitis K57.92 562.11   3. Perforation of sigmoid colon due to diverticulitis K57.20 562.11   4. Sigmoid colon injury S36.503A 863.44     Patient Active Problem List   Diagnosis   • Acute hypokalemia   • Severe sepsis (CMS/HCC)   • Acute kidney injury (CMS/HCC)   • Acute hyponatremia   • COPD (chronic obstructive pulmonary disease) (CMS/HCC)   • Chronic back pain   • Depressive disorder   • Gastroesophageal reflux disease   • Essential hypertension   • Insomnia   • Chronic anticoagulation   • Obesity     Past Medical History:   Diagnosis Date   • Chronic back pain    • Closed nondisplaced fracture of head of right radius 2017   • Congenital deformity of right hip joint 1956   • COPD (chronic obstructive pulmonary disease) (CMS/HCC)     former smoker   • Deep venous thrombosis (CMS/HCC)     unprovoked   • Depressive disorder    • Diverticulitis of colon    • Essential hypertension    • Gastroesophageal reflux disease    • Insomnia    • Obesity    • Pulmonary embolism (CMS/HCC)     provoked by surgery     Past Surgical History:   Procedure Laterality Date   • CHOLECYSTECTOMY     • HYSTERECTOMY     • TOTAL HIP ARTHROPLASTY Right    • TOTAL HIP ARTHROPLASTY REVISION Right     x3     General Information     Row Name 20 1317          PT Evaluation Time/Intention    Document Type  evaluation  -EL     Mode of Treatment  physical therapy  -EL     Row Name 20 1317          General Information    Patient Profile Reviewed?  yes  -EL     Prior Level of Function  independent:;ADL's;driving  -EL     Row Name 20 1317          Relationship/Environment    Lives With  child(ras), adult;spouse  works, adult son is home with pt all day typically  -EL     Row Name 20  1317          Resource/Environmental Concerns    Current Living Arrangements  home/apartment/condo  -EL     Row Name 07/17/20 1317          Home Main Entrance    Number of Stairs, Main Entrance  three  -EL     Row Name 07/17/20 1317          Stairs Within Home, Primary    Number of Stairs, Within Home, Primary  none  -EL     Row Name 07/17/20 1317          Cognitive Assessment/Intervention- PT/OT    Orientation Status (Cognition)  oriented x 4  -EL     Row Name 07/17/20 1317          Safety Issues, Functional Mobility    Impairments Affecting Function (Mobility)  endurance/activity tolerance;pain  -EL       User Key  (r) = Recorded By, (t) = Taken By, (c) = Cosigned By    Initials Name Provider Type    Antwan Madera PT Physical Therapist        Mobility     Row Name 07/17/20 1319          Bed Mobility Assessment/Treatment    Bed Mobility Assessment/Treatment  bed mobility (all) activities  -EL     [place] Level (Bed Mobility)  supervision  -EL     Row Name 07/17/20 1319          Bed-Chair Transfer    Bed-Chair [place] (Transfers)  supervision  -EL     Row Name 07/17/20 1319          Sit-Stand Transfer    Sit-Stand [place] (Transfers)  supervision  -EL     Assistive Device (Sit-Stand Transfers)  walker, front-wheeled  -EL     Row Name 07/17/20 1319          Gait/Stairs Assessment/Training    [place] Level (Gait)  supervision;verbal cues  -EL     Assistive Device (Gait)  walker, front-wheeled  -EL     Distance in Feet (Gait)  60  -EL     Pattern (Gait)  step-to VC for progression to step through  -EL     Bilateral Gait Deviations  heel strike decreased;forward flexed posture  -EL     [place] Level (Stairs)  not tested pt requested to not perform stairs today  -EL       User Key  (r) = Recorded By, (t) = Taken By, (c) = Cosigned By    Initials Name Provider Type    Antwan Madera, PT Physical Therapist        Obj/Interventions     Row Name 07/17/20 1321          General ROM    GENERAL ROM  COMMENTS  BLE WFL  -EL     Row Name 07/17/20 1321          MMT (Manual Muscle Testing)    General MMT Comments  BLE 4/5 gross  -EL     Row Name 07/17/20 1321          Static Sitting Balance    Level of [place] (Unsupported Sitting, Static Balance)  independent Propped back on BUE due to discomfort  -EL     Sitting Position (Unsupported Sitting, Static Balance)  sitting on edge of bed  -     Row Name 07/17/20 1321          Static Standing Balance    Level of [place] (Supported Standing, Static Balance)  supervision  -[facility] Name 07/17/20 1321          Dynamic Standing Balance    Level of [place], Reaches Outside Midline (Standing, Dynamic Balance)  supervision  -[facility] Name 07/17/20 1321          Sensory Assessment/Intervention    Sensory General Assessment  no sensation deficits identified  -EL       User Key  (r) = Recorded By, (t) = Taken By, (c) = Cosigned By    Initials Name Provider Type    Antwan Madera, PT Physical Therapist        Goals/Plan     [facility] Name 07/17/20 1327          Transfer Goal 1 (PT)    Activity/Assistive Device (Transfer Goal 1, PT)  transfers, all  -EL     [place] Level/Cues Needed (Transfer Goal 1, PT)  independent  -EL     Time Frame (Transfer Goal 1, PT)  long term goal (LTG);2 weeks  -[facility] Name 07/17/20 1327          Gait Training Goal 1 (PT)    Activity/Assistive Device (Gait Training Goal 1, PT)  gait (walking locomotion);walker, rolling  -EL     [place] Level (Gait Training Goal 1, PT)  conditional independence  -EL     Distance (Gait Goal 1, PT)  150  -EL     Time Frame (Gait Training Goal 1, PT)  long term goal (LTG);2 weeks  -[facility] Name 07/17/20 1327          Stairs Goal 1 (PT)    Activity/Assistive Device (Stairs Goal 1, PT)  stairs, all skills  -EL     [place] Level/Cues Needed (Stairs Goal 1, PT)  conditional independence  -EL     Number of Stairs (Stairs Goal 1, PT)  3  -EL     Time Frame (Stairs Goal 1, PT)  long term goal  (LTG);2 weeks  -EL       User Key  (r) = Recorded By, (t) = Taken By, (c) = Cosigned By    Initials Name Provider Type    Antwan Madera, PT Physical Therapist        Clinical Impression     Row Name 07/17/20 1323          Pain Assessment    Additional Documentation  Pain Scale: Numbers Pre/Post-Treatment (Group)  -     Row Name 07/17/20 1323          Pain Scale: Numbers Pre/Post-Treatment    Pain Scale: Numbers, Pretreatment  5/10  -EL     Pain Scale: Numbers, Post-Treatment  5/10  -EL     Row Name 07/17/20 1323          Plan of Care Review    Plan of Care Reviewed With  patient  -EL     Outcome Summary  Pt is 65 YO F who was admitted with peroration of sigmoid colon, exploratory lap and colostomy performed 7/16. Pt demonstrated good functional mobility today requiring no physical assistance with bed mobilty and gait training. Pt ambulated 60 feet using RWx. Pt typically ambulates with cane or no AD but was steady with RWx. Pt lives at home with  and son, and reports son is home with her all the time and able to help. Pt has 3 stairs to enter, plan to assess next treatment if appropriate. Pt is appropriate to return home with assist from family follwoing d/c. PPE worn includes gloves and mask with shield.  -     Row Name 07/17/20 1323          Physical Therapy Clinical Impression    Criteria for Skilled Interventions Met (PT Clinical Impression)  yes  -EL     Rehab Potential (PT Clinical Summary)  good, to achieve stated therapy goals  -EL     Predicted Duration of Therapy (PT)  until d/c  -EL     Row Name 07/17/20 1323          Vital Signs    O2 Delivery Pre Treatment  supplemental O2  -EL     O2 Delivery Intra Treatment  supplemental O2  -EL     O2 Delivery Post Treatment  supplemental O2  -EL     Pre Patient Position  Supine  -EL     Intra Patient Position  Standing  -EL     Post Patient Position  Sitting  -EL     Row Name 07/17/20 1323          Positioning and Restraints    Pre-Treatment Position  in  bed  -EL     Post Treatment Position  chair  -EL     In Chair  notified nsg;sitting;call light within reach;encouraged to call for assist;exit alarm on  -EL       User Key  (r) = Recorded By, (t) = Taken By, (c) = Cosigned By    Initials Name Provider Type    Antwan Madera PT Physical Therapist        Outcome Measures    No documentation.       Physical Therapy Education                 Title: PT OT SLP Therapies (In Progress)     Topic: Physical Therapy (In Progress)     Point: Mobility training (Done)     Description:   Instruct learner(s) on safety and technique for assisting patient out of bed, chair or wheelchair.  Instruct in the proper use of assistive devices, such as walker, crutches, cane or brace.              Patient Friendly Description:   It's important to get you on your feet again, but we need to do so in a way that is safe for you. Falling has serious consequences, and your personal safety is the most important thing of all.        When it's time to get out of bed, one of us or a family member will sit next to you on the bed to give you support.     If your doctor or nurse tells you to use a walker, crutches, a cane, or a brace, be sure you use it every time you get out of bed, even if you think you don't need it.    Learning Progress Summary           Patient Acceptance, E,TB, VU by LEIGHTON at 7/17/2020 1329                   Point: Home exercise program (Not Started)     Description:   Instruct learner(s) on appropriate technique for monitoring, assisting and/or progressing patient with therapeutic exercises and activities.              Learner Progress:   Not documented in this visit.          Point: Body mechanics (Done)     Description:   Instruct learner(s) on proper positioning and spine alignment for patient and/or caregiver during mobility tasks and/or exercises.              Learning Progress Summary           Patient Acceptance, E,TB, VU by LEIGHTON at 7/17/2020 1329                   Point:  Precautions (Done)     Description:   Instruct learner(s) on prescribed precautions during mobility and gait tasks              Learning Progress Summary           Patient Acceptance, E,TB, VU by  at 7/17/2020 1329                               User Key     Initials Effective Dates Name Provider Type Discipline     06/23/20 -  Antwan Villalba, PT Physical Therapist PT              PT Recommendation and Plan  Planned Therapy Interventions (PT Eval): gait training, stair training, transfer training, patient/family education  Outcome Summary/Treatment Plan (PT)  Anticipated Equipment Needs at Discharge (PT): front wheeled walker  Anticipated Discharge Disposition (PT): home with assist  Plan of Care Reviewed With: patient  Outcome Summary: Pt is 65 YO F who was admitted with peroration of sigmoid colon, exploratory lap and colostomy performed 7/16. Pt demonstrated good functional mobility today requiring no physical assistance with bed mobilty and gait training. Pt ambulated 60 feet using RWx. Pt typically ambulates with cane or no AD but was steady with RWx. Pt lives at home with  and son, and reports son is home with her all the time and able to help. Pt has 3 stairs to enter, plan to assess next treatment if appropriate. Pt is appropriate to return home with assist from family follwoing d/c. PPE worn includes gloves and mask with shield.     Time Calculation:   PT Charges     Row Name 07/17/20 1330             Time Calculation    Start Time  1108  -EL      Stop Time  1138  -      Time Calculation (min)  30 min  -EL      PT Received On  07/17/20  -EL      PT - Next Appointment  07/19/20  -      PT Goal Re-Cert Due Date  07/31/20  -         Time Calculation- PT    Total Timed Code Minutes- PT  10 minute(s)  -        User Key  (r) = Recorded By, (t) = Taken By, (c) = Cosigned By    Initials Name Provider Type    Antwan Madera, PT Physical Therapist        Therapy Charges for Today     Code Description  Service Date Service Provider Modifiers Qty    72482714998 HC GAIT TRAINING EA 15 MIN 7/17/2020 Antwan Villalba, PT GP 1    65444792134 HC PT EVAL MOD COMPLEXITY 3 7/17/2020 Antwan Villalba, PT GP 1               Antwan Villalba, PT  7/17/2020

## 2020-07-18 LAB
ANION GAP SERPL CALCULATED.3IONS-SCNC: 15 MMOL/L (ref 5–15)
BUN SERPL-MCNC: 13 MG/DL (ref 8–23)
BUN SERPL-MCNC: ABNORMAL MG/DL
BUN/CREAT SERPL: ABNORMAL
CA-I SERPL ISE-MCNC: 1.19 MMOL/L (ref 1.2–1.3)
CALCIUM SPEC-SCNC: 8.8 MG/DL (ref 8.6–10.5)
CHLORIDE SERPL-SCNC: 97 MMOL/L (ref 98–107)
CO2 SERPL-SCNC: 25 MMOL/L (ref 22–29)
CREAT SERPL-MCNC: 0.84 MG/DL (ref 0.57–1)
DEPRECATED RDW RBC AUTO: 42.9 FL (ref 37–54)
ERYTHROCYTE [DISTWIDTH] IN BLOOD BY AUTOMATED COUNT: 14.5 % (ref 12.3–15.4)
GFR SERPL CREATININE-BSD FRML MDRD: 68 ML/MIN/1.73
GLUCOSE BLDC GLUCOMTR-MCNC: 121 MG/DL (ref 70–105)
GLUCOSE BLDC GLUCOMTR-MCNC: 124 MG/DL (ref 70–105)
GLUCOSE BLDC GLUCOMTR-MCNC: 126 MG/DL (ref 70–105)
GLUCOSE BLDC GLUCOMTR-MCNC: 127 MG/DL (ref 70–105)
GLUCOSE BLDC GLUCOMTR-MCNC: 137 MG/DL (ref 70–105)
GLUCOSE SERPL-MCNC: 132 MG/DL (ref 65–99)
HCT VFR BLD AUTO: 38.6 % (ref 34–46.6)
HGB BLD-MCNC: 12.7 G/DL (ref 12–15.9)
INR PPP: 0.88 (ref 2–3)
LYMPHOCYTES # BLD MANUAL: 0.98 10*3/MM3 (ref 0.7–3.1)
LYMPHOCYTES NFR BLD MANUAL: 5 % (ref 19.6–45.3)
LYMPHOCYTES NFR BLD MANUAL: 7 % (ref 5–12)
MAGNESIUM SERPL-MCNC: 2.3 MG/DL (ref 1.6–2.4)
MCH RBC QN AUTO: 27.9 PG (ref 26.6–33)
MCHC RBC AUTO-ENTMCNC: 32.9 G/DL (ref 31.5–35.7)
MCV RBC AUTO: 84.6 FL (ref 79–97)
METAMYELOCYTES NFR BLD MANUAL: 1 % (ref 0–0)
MONOCYTES # BLD AUTO: 1.37 10*3/MM3 (ref 0.1–0.9)
NEUTROPHILS # BLD AUTO: 17.05 10*3/MM3 (ref 1.7–7)
NEUTROPHILS NFR BLD MANUAL: 71 % (ref 42.7–76)
NEUTS BAND NFR BLD MANUAL: 16 % (ref 0–5)
PLATELET # BLD AUTO: 360 10*3/MM3 (ref 140–450)
PMV BLD AUTO: 7.9 FL (ref 6–12)
POTASSIUM SERPL-SCNC: 3.2 MMOL/L (ref 3.5–5.2)
PROTHROMBIN TIME: 9.5 SECONDS (ref 19.4–28.5)
RBC # BLD AUTO: 4.56 10*6/MM3 (ref 3.77–5.28)
RBC MORPH BLD: NORMAL
SCAN SLIDE: NORMAL
SMALL PLATELETS BLD QL SMEAR: ADEQUATE
SODIUM SERPL-SCNC: 137 MMOL/L (ref 136–145)
WBC # BLD AUTO: 19.6 10*3/MM3 (ref 3.4–10.8)
WBC MORPH BLD: NORMAL

## 2020-07-18 PROCEDURE — 85025 COMPLETE CBC W/AUTO DIFF WBC: CPT | Performed by: SURGERY

## 2020-07-18 PROCEDURE — 25010000002 PIPERACILLIN SOD-TAZOBACTAM PER 1 G: Performed by: SURGERY

## 2020-07-18 PROCEDURE — 94640 AIRWAY INHALATION TREATMENT: CPT

## 2020-07-18 PROCEDURE — 80048 BASIC METABOLIC PNL TOTAL CA: CPT | Performed by: SURGERY

## 2020-07-18 PROCEDURE — 82330 ASSAY OF CALCIUM: CPT | Performed by: INTERNAL MEDICINE

## 2020-07-18 PROCEDURE — 94799 UNLISTED PULMONARY SVC/PX: CPT

## 2020-07-18 PROCEDURE — 85610 PROTHROMBIN TIME: CPT | Performed by: SURGERY

## 2020-07-18 PROCEDURE — 25010000002 ENOXAPARIN PER 10 MG: Performed by: SURGERY

## 2020-07-18 PROCEDURE — 82962 GLUCOSE BLOOD TEST: CPT

## 2020-07-18 PROCEDURE — 97116 GAIT TRAINING THERAPY: CPT

## 2020-07-18 PROCEDURE — 97112 NEUROMUSCULAR REEDUCATION: CPT

## 2020-07-18 PROCEDURE — 25010000002 HYDROMORPHONE PER 4 MG: Performed by: SURGERY

## 2020-07-18 PROCEDURE — 85007 BL SMEAR W/DIFF WBC COUNT: CPT | Performed by: SURGERY

## 2020-07-18 PROCEDURE — 97530 THERAPEUTIC ACTIVITIES: CPT

## 2020-07-18 PROCEDURE — 99232 SBSQ HOSP IP/OBS MODERATE 35: CPT | Performed by: INTERNAL MEDICINE

## 2020-07-18 PROCEDURE — 83735 ASSAY OF MAGNESIUM: CPT | Performed by: SURGERY

## 2020-07-18 RX ORDER — GUAIFENESIN 600 MG/1
600 TABLET, EXTENDED RELEASE ORAL EVERY 12 HOURS SCHEDULED
Status: DISCONTINUED | OUTPATIENT
Start: 2020-07-18 | End: 2020-07-22 | Stop reason: HOSPADM

## 2020-07-18 RX ORDER — ALBUTEROL SULFATE 2.5 MG/3ML
SOLUTION RESPIRATORY (INHALATION)
Status: DISCONTINUED
Start: 2020-07-18 | End: 2020-07-22 | Stop reason: HOSPADM

## 2020-07-18 RX ORDER — ALBUTEROL SULFATE 2.5 MG/3ML
2.5 SOLUTION RESPIRATORY (INHALATION) EVERY 4 HOURS PRN
Status: DISCONTINUED | OUTPATIENT
Start: 2020-07-18 | End: 2020-07-22 | Stop reason: HOSPADM

## 2020-07-18 RX ORDER — IPRATROPIUM BROMIDE AND ALBUTEROL SULFATE 2.5; .5 MG/3ML; MG/3ML
3 SOLUTION RESPIRATORY (INHALATION)
Status: DISCONTINUED | OUTPATIENT
Start: 2020-07-18 | End: 2020-07-22 | Stop reason: HOSPADM

## 2020-07-18 RX ADMIN — PIPERACILLIN AND TAZOBACTAM 3.38 G: 3; .375 INJECTION, POWDER, FOR SOLUTION INTRAVENOUS at 10:31

## 2020-07-18 RX ADMIN — ALUMINUM HYDROXIDE, MAGNESIUM HYDROXIDE, AND DIMETHICONE 15 ML: 400; 400; 40 SUSPENSION ORAL at 01:29

## 2020-07-18 RX ADMIN — IPRATROPIUM BROMIDE AND ALBUTEROL SULFATE 3 ML: 2.5; .5 SOLUTION RESPIRATORY (INHALATION) at 19:03

## 2020-07-18 RX ADMIN — BUDESONIDE AND FORMOTEROL FUMARATE DIHYDRATE 2 PUFF: 160; 4.5 AEROSOL RESPIRATORY (INHALATION) at 08:20

## 2020-07-18 RX ADMIN — GUAIFENESIN 600 MG: 600 TABLET, EXTENDED RELEASE ORAL at 21:01

## 2020-07-18 RX ADMIN — ALBUTEROL SULFATE 2.5 MG: 2.5 SOLUTION RESPIRATORY (INHALATION) at 01:51

## 2020-07-18 RX ADMIN — BUDESONIDE AND FORMOTEROL FUMARATE DIHYDRATE 2 PUFF: 160; 4.5 AEROSOL RESPIRATORY (INHALATION) at 19:03

## 2020-07-18 RX ADMIN — POTASSIUM CHLORIDE 40 MEQ: 1500 TABLET, EXTENDED RELEASE ORAL at 18:07

## 2020-07-18 RX ADMIN — OXYCODONE HYDROCHLORIDE 10 MG: 5 TABLET ORAL at 18:07

## 2020-07-18 RX ADMIN — OXYCODONE HYDROCHLORIDE 10 MG: 5 TABLET ORAL at 05:50

## 2020-07-18 RX ADMIN — POTASSIUM CHLORIDE 40 MEQ: 1500 TABLET, EXTENDED RELEASE ORAL at 18:13

## 2020-07-18 RX ADMIN — FLUOXETINE 10 MG: 10 CAPSULE ORAL at 10:31

## 2020-07-18 RX ADMIN — PIPERACILLIN AND TAZOBACTAM 3.38 G: 3; .375 INJECTION, POWDER, FOR SOLUTION INTRAVENOUS at 18:10

## 2020-07-18 RX ADMIN — HYDROMORPHONE HYDROCHLORIDE 0.5 MG: 2 INJECTION, SOLUTION INTRAMUSCULAR; INTRAVENOUS; SUBCUTANEOUS at 21:08

## 2020-07-18 RX ADMIN — Medication 10 ML: at 21:10

## 2020-07-18 RX ADMIN — Medication 10 ML: at 10:31

## 2020-07-18 RX ADMIN — ENOXAPARIN SODIUM 40 MG: 40 INJECTION SUBCUTANEOUS at 18:07

## 2020-07-18 RX ADMIN — POTASSIUM CHLORIDE 40 MEQ: 1.5 POWDER, FOR SOLUTION ORAL at 21:08

## 2020-07-18 RX ADMIN — FAMOTIDINE 20 MG: 10 INJECTION INTRAVENOUS at 10:31

## 2020-07-18 NOTE — THERAPY TREATMENT NOTE
Patient Name: Renuka Pelayo  : 1955    MRN: [id number]                              Today's Date: 2020       Admit Date: 2020    Visit Dx:     ICD-10-CM ICD-9-CM   1. Sigmoid colon injury S36.503A 863.44   2. Free intraperitoneal air K66.8 568.89   3. Diverticulitis K57.92 562.11   4. Perforation of sigmoid colon due to diverticulitis K57.20 562.11     Patient Active Problem List   Diagnosis   • Acute hypokalemia   • Severe sepsis (CMS/HCC)   • Acute kidney injury (CMS/HCC)   • Acute hyponatremia   • COPD (chronic obstructive pulmonary disease) (CMS/HCC)   • Chronic back pain   • Depressive disorder   • Gastroesophageal reflux disease   • Essential hypertension   • Insomnia   • Chronic anticoagulation   • Obesity     Past Medical History:   Diagnosis Date   • Chronic back pain    • Closed nondisplaced fracture of head of right radius 2017   • Congenital deformity of right hip joint 1956   • COPD (chronic obstructive pulmonary disease) (CMS/HCC)     former smoker   • Deep venous thrombosis (CMS/HCC)     unprovoked   • Depressive disorder    • Diverticulitis of colon    • Essential hypertension    • Gastroesophageal reflux disease    • Insomnia    • Obesity    • Pulmonary embolism (CMS/HCC)     provoked by surgery     Past Surgical History:   Procedure Laterality Date   • CHOLECYSTECTOMY     • EXPLORATORY LAPAROTOMY N/A 2020    Procedure: LAPAROTOMY EXPLORATORY HARTMANS;  Surgeon: Chi Farmer MD;  Location: [facility] MAIN OR;  Service: General;  Laterality: N/A;   • HYSTERECTOMY     • TOTAL HIP ARTHROPLASTY Right    • TOTAL HIP ARTHROPLASTY REVISION Right     x3     General Information     Row Name 20 1323          PT Evaluation Time/Intention    Document Type  therapy note (daily note)  -     Mode of Treatment  physical therapy  -     Row Name 20 1323          General Information    Patient Profile Reviewed?  yes  -     Existing Precautions/Restrictions   oxygen therapy device and L/min colostomy  -     Row Name 07/18/20 1323          Safety Issues, Functional Mobility    Impairments Affecting Function (Mobility)  endurance/activity tolerance;pain  -       User Key  (r) = Recorded By, (t) = Taken By, (c) = Cosigned By    Initials Name Provider Type     Orin Nunez PTA Physical Therapy Assistant        Mobility     Row Name 07/18/20 1324          Bed Mobility Assessment/Treatment    Bed Mobility Assessment/Treatment  bed mobility (all) activities  -     [place] Level (Bed Mobility)  supervision  -     Assistive Device (Bed Mobility)  bed rails  -     Comment (Bed Mobility)  extra time, guarded  -     Row Name 07/18/20 1324          Sit-Stand Transfer    Sit-Stand [place] (Transfers)  supervision  -     Assistive Device (Sit-Stand Transfers)  walker, front-wheeled  -     Row Name 07/18/20 1324          Gait/Stairs Assessment/Training    [place] Level (Gait)  supervision  -     Assistive Device (Gait)  walker, front-wheeled  -     Distance in Feet (Gait)  70  -     Comment (Gait/Stairs)  slowed l[place] fel[place], guarded, felt confident doing stairs at home and didn't want to practice, had a rough night and wasn't up to as much activity  -       User Key  (r) = Recorded By, (t) = Taken By, (c) = Cosigned By    Initials Name Provider Type     Orin Nunez PTA Physical Therapy Assistant        Obj/Interventions     Row Name 07/18/20 1326          Static Sitting Balance    Level of [place] (Unsupported Sitting, Static Balance)  supervision  -     Sitting Position (Unsupported Sitting, Static Balance)  sitting on edge of bed  -     Row Name 07/18/20 1326          Static Standing Balance    Level of [place] (Supported Standing, Static Balance)  supervision  -     Assistive Device Utilized (Supported Standing, Static Balance)  walker, rolling  -       User Key  (r) = Recorded By, (t) = Taken By, (c) = Cosigned By     Initials Name Provider Type     Orin Nunez PTA Physical Therapy Assistant        Goals/Plan     Row Name 07/18/20 1329          Bed Mobility Goal 1 (PT)    Progress/Outcomes (Bed Mobility Goal 1, PT)  goal ongoing  -     Row Name 07/18/20 1329          Transfer Goal 1 (PT)    Progress/Outcome (Transfer Goal 1, PT)  goal ongoing  -     Row Name 07/18/20 1329          ROM Goal 1 (PT)    Progress/Outcome (ROM Goal 1, PT)  goal ongoing  -       User Key  (r) = Recorded By, (t) = Taken By, (c) = Cosigned By    Initials Name Provider Type     Orin Nunez PTA Physical Therapy Assistant        Clinical Impression     Row Name 07/18/20 1326          Pain Scale: Numbers Pre/Post-Treatment    Pain Scale: Numbers, Pretreatment  10/10  -     Pain Location  abdomen  -     Pre/Post Treatment Pain Comment  c/o incr pain corona when she coughs.   -     Row Name 07/18/20 1326          Plan of Care Review    Plan of Care Reviewed With  patient  -     Progress  improving  -     Outcome Summary  pt increased distance to 70'using rwx with supervision. Monitored sats and incr to 6 for gait , sats still dropped and will need 6MWT from RT, Gloves, mask/shield worn for tx  -     Row Name 07/18/20 1326          Physical Therapy Clinical Impression    Criteria for Skilled Interventions Met (PT Clinical Impression)  treatment indicated  -     Rehab Potential (PT Clinical Summary)  good, to achieve stated therapy goals  -     Row Name 07/18/20 1326          Vital Signs    Pretreatment Heart Rate (beats/min)  97  -     Intratreatment Heart Rate (beats/min)  128  -MC     Posttreatment Heart Rate (beats/min)  111  -     Pre SpO2 (%)  94  -MC     O2 Delivery Pre Treatment  -- 5Lnc  -MC     Intra SpO2 (%)  86  -MC     O2 Delivery Intra Treatment  -- 6L nc  -MC     Post SpO2 (%)  88  -MC     O2 Delivery Post Treatment  -- 5L nc  -MC     Row Name 07/18/20 1326          Positioning and Restraints     Pre-Treatment Position  in bed  -MC     Post Treatment Position  bed  -MC     In Bed  supine;call light within reach  -MC       User Key  (r) = Recorded By, (t) = Taken By, (c) = Cosigned By    Initials Name Provider Type    Orin Deal PTA Physical Therapy Assistant        Outcome Measures    No documentation.       Physical Therapy Education                 Title: PT OT SLP Therapies (Done)     Topic: Physical Therapy (Done)     Point: Mobility training (Done)     Description:   Instruct learner(s) on safety and technique for assisting patient out of bed, chair or wheelchair.  Instruct in the proper use of assistive devices, such as walker, crutches, cane or brace.              Patient Friendly Description:   It's important to get you on your feet again, but we need to do so in a way that is safe for you. Falling has serious consequences, and your personal safety is the most important thing of all.        When it's time to get out of bed, one of us or a family member will sit next to you on the bed to give you support.     If your doctor or nurse tells you to use a walker, crutches, a cane, or a brace, be sure you use it every time you get out of bed, even if you think you don't need it.    Learning Progress Summary           Patient Acceptance, E,TB, VU by  at 7/18/2020 1329    Acceptance, E,TB, VU by  at 7/17/2020 1329                   Point: Home exercise program (Done)     Description:   Instruct learner(s) on appropriate technique for monitoring, assisting and/or progressing patient with therapeutic exercises and activities.              Learning Progress Summary           Patient Acceptance, E,TB, VU by  at 7/18/2020 1329                   Point: Body mechanics (Done)     Description:   Instruct learner(s) on proper positioning and spine alignment for patient and/or caregiver during mobility tasks and/or exercises.              Learning Progress Summary           Patient Acceptance, E,TB, VU  by  at 7/18/2020 1329    Acceptance, E,TB, VU by  at 7/17/2020 1329                   Point: Precautions (Done)     Description:   Instruct learner(s) on prescribed precautions during mobility and gait tasks              Learning Progress Summary           Patient Acceptance, E,TB, VU by  at 7/18/2020 1329    Acceptance, E,TB, VU by  at 7/17/2020 1329                               User Key     Initials Effective Dates Name Provider Type Discipline     03/01/19 -  Orin Nunez PTA Physical Therapy Assistant PT    EL 06/23/20 -  Antwan Villalba, PT Physical Therapist PT              PT Recommendation and Plan  Planned Therapy Interventions (PT Eval): bed mobility training, gait training, transfer training, strengthening  Plan of Care Reviewed With: patient  Progress: improving  Outcome Summary: pt increased distance to 70'using rwx with supervision. Monitored sats and incr to 6 for gait , sats still dropped and will need 6MWT from RT, Gloves, mask/shield worn for tx     Time Calculation:   PT Charges     Row Name 07/18/20 1330             Time Calculation    Start Time  0745  -      Stop Time  0809  -      Time Calculation (min)  24 min  -      PT Received On  07/18/20  -      PT - Next Appointment  07/20/20  -         Time Calculation- PT    Total Timed Code Minutes- PT  24 minute(s)  -        User Key  (r) = Recorded By, (t) = Taken By, (c) = Cosigned By    Initials Name Provider Type     Orin Nunez PTA Physical Therapy Assistant        Therapy Charges for Today     Code Description Service Date Service Provider Modifiers Qty    05070681253 HC GAIT TRAINING EA 15 MIN 7/18/2020 Orin Nunez PTA GP 1    51878909122 HC PT NEUROMUSC RE EDUCATION EA 15 MIN 7/18/2020 Orin Nunez PTA GP 1    29153493804 HC PT THERAPEUTIC ACT EA 15 MIN 7/18/2020 Orin Nunez, PRINCESS GP 1               Orin Nunez PTA  7/18/2020

## 2020-07-18 NOTE — PROGRESS NOTES
LOS: 2 days   Patient Care Team:  Michael Gerardo MD as PCP - General    Reason for follow-up: Postop    Subjective   Patient seen and examined.  Main complaint is is a lot of gunk in her lungs.  Is getting up ambulating with help    Objective   Incisions clean dry and intact without infection.  Ostomy is viable.  No output yet.  Drain tube with appropriate color and output    Vital Signs  Vitals:    07/18/20 0430 07/18/20 0433 07/18/20 0545 07/18/20 0820   BP: 136/78      BP Location:       Patient Position:       Pulse: 75   94   Resp:  18  18   Temp:  98.2 °F (36.8 °C)     TempSrc:  Oral     SpO2:  97%  92%   Weight:   83.2 kg (183 lb 6.8 oz)    Height:             Results Review:       Lab Results (last 24 hours)     Procedure Component Value Units Date/Time    Blood Culture - Blood, Arm, Left [[id number]] Collected:  07/16/20 0857    Specimen:  Blood from Arm, Left Updated:  07/18/20 0916     Blood Culture No growth at 2 days    Blood Culture - Blood, Arm, Right [[id number]] Collected:  07/16/20 0858    Specimen:  Blood from Arm, Right Updated:  07/18/20 0916     Blood Culture No growth at 2 days    POC Glucose Once [[id number]]  (Abnormal) Collected:  07/18/20 0810    Specimen:  Blood Updated:  07/18/20 0811     Glucose 137 mg/dL      Comment: Serial Number: [id number]:  [id number]       Magnesium [[id number]]  (Normal) Collected:  07/18/20 0547    Specimen:  Blood Updated:  07/18/20 0706     Magnesium 2.3 mg/dL     Basic Metabolic Panel [[id number]]  (Abnormal) Collected:  07/18/20 0547    Specimen:  Blood Updated:  07/18/20 0706     Glucose 132 mg/dL      BUN --     Comment: Testing performed by alternate method        Creatinine 0.84 mg/dL      Sodium 137 mmol/L      Potassium 3.2 mmol/L      Chloride 97 mmol/L      CO2 25.0 mmol/L      Calcium 8.8 mg/dL      eGFR Non African Amer 68 mL/min/1.73      BUN/Creatinine Ratio --     Comment: Testing not performed        Anion Gap 15.0 mmol/L      Narrative:       GFR Normal >60  Chronic Kidney Disease <60  Kidney Failure <15      CBC Auto Differential [[id number]]  (Abnormal) Collected:  07/18/20 0547    Specimen:  Blood Updated:  07/18/20 0704     WBC 19.60 10*3/mm3      RBC 4.56 10*6/mm3      Hemoglobin 12.7 g/dL      Hematocrit 38.6 %      MCV 84.6 fL      MCH 27.9 pg      MCHC 32.9 g/dL      RDW 14.5 %      RDW-SD 42.9 fl      MPV 7.9 fL      Platelets 360 10*3/mm3     Scan Slide [[id number]] Collected:  07/18/20 0547    Specimen:  Blood Updated:  07/18/20 0704     Scan Slide --     Comment: See Manual Differential Results       Manual Differential [[id number]]  (Abnormal) Collected:  07/18/20 0547    Specimen:  Blood Updated:  07/18/20 0704     Neutrophil % 71.0 %      Lymphocyte % 5.0 %      Monocyte % 7.0 %      Bands %  16.0 %      Metamyelocyte % 1.0 %      Neutrophils Absolute 17.05 10*3/mm3      Lymphocytes Absolute 0.98 10*3/mm3      Monocytes Absolute 1.37 10*3/mm3      RBC Morphology Normal     WBC Morphology Normal     Platelet Estimate Adequate    Narrative:       NO PLT CLUMPS SEEN ON SLIDE       Calcium, Ionized [[id number]]  (Abnormal) Collected:  07/18/20 0547    Specimen:  Blood Updated:  07/18/20 0635     Ionized Calcium 1.19 mmol/L     Protime-INR [[id number]]  (Abnormal) Collected:  07/18/20 0547    Specimen:  Blood Updated:  07/18/20 0631     Protime 9.5 Seconds      INR 0.88    BUN [[id number]] Collected:  07/18/20 0547    Specimen:  Blood Updated:  07/18/20 0627    POC Glucose Once [[id number]]  (Abnormal) Collected:  07/18/20 0105    Specimen:  Blood Updated:  07/18/20 0106     Glucose 124 mg/dL      Comment: Serial Number: [id number]:  [id number]       POC Glucose Once [[id number]]  (Abnormal) Collected:  07/17/20 2116    Specimen:  Blood Updated:  07/17/20 2117     Glucose 141 mg/dL      Comment: Serial Number: [id number]:  [id number]       POC Glucose Once [[id number]]  (Abnormal) Collected:  07/17/20 1735    Specimen:   Blood Updated:  07/17/20 1736     Glucose 146 mg/dL      Comment: Serial Number: [id number]:  [id number]       POC Glucose Once [[id number]]  (Abnormal) Collected:  07/17/20 1159    Specimen:  Blood Updated:  07/17/20 1208     Glucose 145 mg/dL      Comment: Serial Number: [id number]:  [id number]       Hemoglobin A1c [[id number]]  (Abnormal) Collected:  07/16/20 0824    Specimen:  Blood Updated:  07/17/20 1050     Hemoglobin A1C 5.7 %     Narrative:       Hemoglobin A1C Reference Range:    <5.7 %        Normal  5.7-6.4 %     Increased risk for diabetes  > 6.4 %        Diabetes       These guidelines have been recommended by the American Diabetic Association for Hgb A1c.      The following 2010 guidelines have been recommended by the American Diabetes Association for Hemoglobin A1c.    HBA1c 5.7-6.4% Increased risk for future diabetes (pre-diabetes)  HBA1c     >6.4% Diabetes      BUN [[id number]]  (Normal) Collected:  07/17/20 0523    Specimen:  Blood Updated:  07/17/20 1048     BUN 15 mg/dL            Imaging Results (Last 24 Hours)     ** No results found for the last 24 hours. **          Medication Review:   Current Facility-Administered Medications:   •  acetaminophen (TYLENOL) tablet 650 mg, 650 mg, Oral, Q4H PRN **OR** acetaminophen (TYLENOL) 160 MG/5ML solution 650 mg, 650 mg, Oral, Q4H PRN **OR** acetaminophen (TYLENOL) suppository 650 mg, 650 mg, Rectal, Q4H PRN, Chi Farmer MD  •  albuterol (PROVENTIL) (2.5 MG/3ML) 0.083% nebulizer solution  - ADS Override Pull, , , ,   •  albuterol (PROVENTIL) nebulizer solution 0.083% 2.5 mg/3mL, 2.5 mg, Nebulization, Q4H PRN, Chi Farmer MD, 2.5 mg at 07/18/20 0151  •  aluminum-magnesium hydroxide-simethicone (MAALOX MAX) 400-400-40 MG/5ML suspension 15 mL, 15 mL, Oral, Q6H PRN, Chi Farmer MD, 15 mL at 07/18/20 0129  •  bisacodyl (DULCOLAX) suppository 10 mg, 10 mg, Rectal, Daily PRN, Chi Farmer MD  •   budesonide-formoterol (SYMBICORT) 160-4.5 MCG/ACT inhaler 2 puff, 2 puff, Inhalation, BID - RT, Chi Farmer MD, 2 puff at 07/18/20 0820  •  dextrose (D50W) 25 g/ 50mL Intravenous Solution 25 g, 25 g, Intravenous, Q15 Min PRN, Chi Farmer MD  •  dextrose (GLUTOSE) oral gel 15 g, 15 g, Oral, Q15 Min PRN, Chi Farmer MD  •  enoxaparin (LOVENOX) syringe 40 mg, 40 mg, Subcutaneous, Q24H, Chi Farmer MD, 40 mg at 07/17/20 1916  •  famotidine (PEPCID) injection 20 mg, 20 mg, Intravenous, Daily, Chi Farmer MD, 20 mg at 07/18/20 1031  •  FLUoxetine (PROzac) capsule 10 mg, 10 mg, Oral, Daily, Clemente Guerrero DO, 10 mg at 07/18/20 1031  •  glucagon (human recombinant) (GLUCAGEN DIAGNOSTIC) injection 1 mg, 1 mg, Subcutaneous, PRN, Chi Farmer MD  •  hydrALAZINE (APRESOLINE) injection 10 mg, 10 mg, Intravenous, Q6H PRN, hCi Farmer MD  •  HYDROmorphone (DILAUDID) injection 0.5 mg, 0.5 mg, Intravenous, Q2H PRN, Chi Farmer MD, 0.5 mg at 07/17/20 1108  •  insulin lispro (humaLOG) injection 0-7 Units, 0-7 Units, Subcutaneous, TID With Meals **AND** insulin lispro (humaLOG) injection 0-7 Units, 0-7 Units, Subcutaneous, PRN, Clemente Guerrero DO  •  magnesium hydroxide (MILK OF MAGNESIA) suspension 2400 mg/10mL 10 mL, 10 mL, Oral, Daily PRN, Chi Farmer MD  •  Magnesium Sulfate 2 gram infusion - Mg less than or equal to 1.5 mg/dL, 2 g, Intravenous, PRN **OR** Magnesium Sulfate 1 gram infusion - Mg 1.6-1.9 mg/dL, 1 g, Intravenous, PRN, Chi Farmer MD  •  melatonin tablet 5 mg, 5 mg, Oral, Nightly PRN, Chi Farmer MD  •  nitroglycerin (NITROSTAT) SL tablet 0.4 mg, 0.4 mg, Sublingual, Q5 Min PRN, Chi Farmer MD  •  ondansetron (ZOFRAN) tablet 4 mg, 4 mg, Oral, Q6H PRN **OR** ondansetron (ZOFRAN) injection 4 mg, 4 mg, Intravenous, Q6H PRN, Chi Farmer MD, 4 mg at  07/16/20 1432  •  oxyCODONE (ROXICODONE) immediate release tablet 5 mg, 5 mg, Oral, Q4H PRN **OR** oxyCODONE (ROXICODONE) immediate release tablet 10 mg, 10 mg, Oral, Q4H PRN, Chi Farmer MD, 10 mg at 07/18/20 0550  •  piperacillin-tazobactam (ZOSYN) IVPB 3.375 g in 100 mL NS (CD), 3.375 g, Intravenous, Q8H, Chi Farmer MD, Last Rate: 25 mL/hr at 07/18/20 1031, 3.375 g at 07/18/20 1031  •  potassium chloride (K-DUR,KLOR-CON) CR tablet 40 mEq, 40 mEq, Oral, PRN **OR** potassium chloride (KLOR-CON) packet 40 mEq, 40 mEq, Oral, PRN **OR** potassium chloride 10 mEq in 100 mL IVPB, 10 mEq, Intravenous, Q1H PRN, Chi Faremr MD, Last Rate: 100 mL/hr at 07/16/20 1203, 10 mEq at 07/16/20 1203  •  promethazine (PHENERGAN) IVPB 6.25 mg, 6.25 mg, Intravenous, Once PRN **OR** promethazine (PHENERGAN) injection 6.25 mg, 6.25 mg, Intramuscular, Once PRN **OR** promethazine (PHENERGAN) suppository 25 mg, 25 mg, Rectal, Once PRN **OR** promethazine (PHENERGAN) tablet 25 mg, 25 mg, Oral, Once PRN, Chi Farmer MD  •  sodium chloride 0.9 % flush 10 mL, 10 mL, Intravenous, Q12H, Chi Farmer MD, 10 mL at 07/18/20 1031  •  sodium chloride 0.9 % flush 10 mL, 10 mL, Intravenous, PRN, Chi Farmer MD  •  sodium chloride 0.9 % infusion, 125 mL/hr, Intravenous, Continuous, Chi Farmer MD, Last Rate: 125 mL/hr at 07/17/20 1249, 125 mL/hr at 07/17/20 1249  •  traZODone (DESYREL) tablet 100 mg, 100 mg, Oral, Nightly PRN, Clemente Guerrero,     Assessment/Plan         Acute hypokalemia    Severe sepsis (CMS/HCC)    Acute kidney injury (CMS/HCC)    Acute hyponatremia    COPD (chronic obstructive pulmonary disease) (CMS/HCC)    Chronic back pain    Depressive disorder    Gastroesophageal reflux disease    Essential hypertension    Insomnia    Chronic anticoagulation    Obesity    Impression: Acute diverticulitis with perforation requiring sigmoid resection  with end colostomy and Alejo's pouch    Plan: Encourage ambulation, deep breathing and coughing.  Stay on clear liquids for now.  Continue Lovenox.  Should be able to restart antiplatelet therapy tomorrow          Chi Howard DO  07/18/20  10:43

## 2020-07-18 NOTE — PROGRESS NOTES
"      [facility] Medicine Services Daily Progress Note      Hospitalist Team  LOS 1 days      Patient Care Team:  Michael Gerardo MD as PCP - General    Patient Location: [facility]0/1      Subjective   Subjective     Chief Complaint / Subjective  Chief Complaint   Patient presents with   • Abdominal Pain         Brief Synopsis of Hospital Course/HPI  Ms. Pelayo is a 64 y.o. 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 The patient states her pain started 3 days ago. She describes it as a diffuse cramping that became gradually worse. She denies nausea, but reports 1 episode of vomiting. She states she was initially constipated so she took a laxative, then developed diarrhea. She states her abdomen is now bloated and firm. She denies fever or chills. She denies any exacerbating or alleviating factors. She states she feels weak. She denies any other complaints.      The patient states she quit smoking 1 year ago. She denies alcohol or illicit drug use. She denies any significant family medical history.      Upon arrival to the ER the patient triggered sepsis protocol with BP 89/53, WBC 27.2, and glucose 141. She was afebrile. She was given 30 cc/kg IBW IV fluid bolus with improvement in her blood pressure to 110s/60s. Blood cultures were obtained. Her lactate was normal. She was given Zosyn. Other labs were significant for Na 123, K 2.9, Cr 1.20, and INR 3.19. Her CT abdomen/pelvis showed \"perforated acute sigmoid diverticulitis. Free air is seen in the abdomen and pelvis. Small amount of pelvic free fluid is present without well-defined abscess collection.\" General surgery was consulted. She was admitted for close monitoring and further treatment.          7/17/2020- Patient reports she is still having significant " "abdominal pain. Having some nausea.        Review of Systems   Constitution: Negative for chills and fever.   Cardiovascular: Negative for chest pain and palpitations.   Respiratory: Negative for cough and shortness of breath.    Gastrointestinal: Positive for abdominal pain and nausea. Negative for vomiting.         Objective   Objective      Vital Signs  Temp:  [97.4 °F (36.3 °C)-98.3 °F (36.8 °C)] 97.4 °F (36.3 °C)  Heart Rate:  [73-95] 82  Resp:  [10-16] 12  BP: (102-163)/(71-95) 102/71  Oxygen Therapy  SpO2: 96 %  Pulse Oximetry Type: Continuous  Device (Oxygen Therapy): room air  Device (Oxygen Therapy): nasal cannula  Flow (L/min): 3  Flowsheet Rows      First Filed Value   Admission Height  160 cm (63\") Documented at 07/16/2020 0739   Admission Weight  79.4 kg (175 lb 0.7 oz) Documented at 07/16/2020 0739        Intake & Output (last 3 days)       07/15 0701 - 07/16 0700 07/16 0701 - 07/17 0700 07/17 0701 - 07/18 0700    P.O.  460 240    I.V. (mL/kg)  1700 (21.2)     IV Piggyback  1672     Total Intake(mL/kg)  3832 (47.8) 240 (3)    Urine (mL/kg/hr)  800 950 (0.9)    Drains  45 35    Stool  0 0    Blood  100     Total Output  945 985    Net  +2887 -745               Lines, Drains & Airways    Active LDAs     Name:   Placement date:   Placement time:   Site:   Days:    CVC Double Lumen 07/16/20 Right Internal jugular   07/16/20    1346 created via procedure documentation    Internal jugular   1    Peripheral IV 07/16/20 0825 Left Hand   07/16/20    0825    Hand   1    Peripheral IV 07/16/20 0918 Right Arm   07/16/20    0918    Arm   1    Closed/Suction Drain RLQ Bulb 19 Fr.   07/16/20    1428    RLQ   1    Colostomy LUQ   07/16/20    1423    LUQ   1                  Physical Exam:    Physical Exam   Constitutional: She is oriented to person, place, and time. She appears well-developed. No distress.   Obese    HENT:   Head: Normocephalic and atraumatic.   Mouth/Throat: Oropharynx is clear and moist.   Eyes: " Pupils are equal, round, and reactive to light. No scleral icterus.   Cardiovascular: Normal rate and regular rhythm.   No murmur heard.  Pulmonary/Chest: Effort normal.   Diminished aeration throughout    Abdominal: Soft. Bowel sounds are normal. There is tenderness.   Ostomy in place with some bloody drainage    Neurological: She is alert and oriented to person, place, and time. No cranial nerve deficit.   Skin: Skin is warm and dry.   Dressing to abdomen is c/d/i    Psychiatric: She has a normal mood and affect. Her behavior is normal.   Vitals reviewed.           Wounds (last 24 hours)      LDA Wound     Row Name 07/17/20 0945             Wound 07/16/20 1327 lower;midline abdomen Incision    Wound - Properties Group Date first assessed: 07/16/20  -TD Time first assessed: 1327  -TD Orientation: lower;midline  -TD Location: abdomen  -TD, INCISION FOR COLOSTOMY  Primary Wound Type: Incision  -TD    Dressing Appearance  dry;intact  -HN      Closure  TERI  -HN      Base  dressing in place, unable to visualize  -HN      Drainage Amount  none  -HN      Dressing Care, Wound  border dressing  -HN         Wound 07/16/20 1339 perineum MASD (Moisture associated skin damage)    Wound - Properties Group Date first assessed: 07/16/20  -TD Time first assessed: 1339  -TD Present on Hospital Admission: Y  -TD Location: perineum  -TD Primary Wound Type: MASD  -TD, WHEN PLACING BOLANOS PATIENT WITH LEAKING OF STOOL. WITH OBSERVATION OF PERINEUM RED, EXSCORIATED AREAS APPARENT ON BOTH SIDES.     Dressing Appearance  dry;intact  -HN        User Key  (r) = Recorded By, (t) = Taken By, (c) = Cosigned By    Initials Name Provider Type    TD Maryana Lugo RN Registered Nurse    HN Christine Alcantara RN Registered Nurse          Procedures:    Procedure(s):  LAPAROTOMY EXPLORATORY HARTMANS          Results Review:     I reviewed the patient's new clinical results.      Lab Results (last 24 hours)     Procedure Component Value Units  Date/Time    POC Glucose Once [[id number]]  (Abnormal) Collected:  07/17/20 1735    Specimen:  Blood Updated:  07/17/20 1736     Glucose 146 mg/dL      Comment: Serial Number: [id number]:  [id number]       POC Glucose Once [[id number]]  (Abnormal) Collected:  07/17/20 1159    Specimen:  Blood Updated:  07/17/20 1208     Glucose 145 mg/dL      Comment: Serial Number: [id number]:  [id number]       Hemoglobin A1c [[id number]]  (Abnormal) Collected:  07/16/20 0824    Specimen:  Blood Updated:  07/17/20 1050     Hemoglobin A1C 5.7 %     Narrative:       Hemoglobin A1C Reference Range:    <5.7 %        Normal  5.7-6.4 %     Increased risk for diabetes  > 6.4 %        Diabetes       These guidelines have been recommended by the American Diabetic Association for Hgb A1c.      The following 2010 guidelines have been recommended by the American Diabetes Association for Hemoglobin A1c.    HBA1c 5.7-6.4% Increased risk for future diabetes (pre-diabetes)  HBA1c     >6.4% Diabetes      BUN [[id number]]  (Normal) Collected:  07/17/20 0523    Specimen:  Blood Updated:  07/17/20 1048     BUN 15 mg/dL     Blood Culture - Blood, Arm, Left [[id number]] Collected:  07/16/20 0857    Specimen:  Blood from Arm, Left Updated:  07/17/20 0916     Blood Culture No growth at 24 hours    Blood Culture - Blood, Arm, Right [[id number]] Collected:  07/16/20 0858    Specimen:  Blood from Arm, Right Updated:  07/17/20 0916     Blood Culture No growth at 24 hours    Tissue Pathology Exam [[id number]] Collected:  07/16/20 1333    Specimen:  Tissue from Large Intestine, Sigmoid Colon Updated:  07/17/20 0904    Basic Metabolic Panel [[id number]]  (Abnormal) Collected:  07/17/20 0523    Specimen:  Blood Updated:  07/17/20 0549     Glucose 145 mg/dL      BUN --     Comment: Testing performed by alternate method        Creatinine 0.79 mg/dL      Sodium 136 mmol/L      Potassium 3.3 mmol/L      Chloride 97 mmol/L      CO2 23.0 mmol/L      Calcium 8.4  mg/dL      eGFR Non African Amer 74 mL/min/1.73      BUN/Creatinine Ratio --     Comment: Testing not performed        Anion Gap 16.0 mmol/L     Narrative:       GFR Normal >60  Chronic Kidney Disease <60  Kidney Failure <15      Magnesium [[id number]]  (Normal) Collected:  07/17/20 0523    Specimen:  Blood Updated:  07/17/20 0549     Magnesium 2.2 mg/dL     Protime-INR [[id number]]  (Abnormal) Collected:  07/17/20 0524    Specimen:  Blood Updated:  07/17/20 0543     Protime 10.3 Seconds      Comment: Result checked         INR 0.97    CBC Auto Differential [[id number]]  (Abnormal) Collected:  07/17/20 0524    Specimen:  Blood Updated:  07/17/20 0527     WBC 22.80 10*3/mm3      RBC 4.61 10*6/mm3      Hemoglobin 13.0 g/dL      Hematocrit 39.1 %      MCV 84.9 fL      MCH 28.2 pg      MCHC 33.2 g/dL      RDW 14.1 %      RDW-SD 41.6 fl      MPV 7.8 fL      Platelets 287 10*3/mm3      Neutrophil % 94.2 %      Lymphocyte % 2.0 %      Monocyte % 3.5 %      Eosinophil % 0.1 %      Basophil % 0.2 %      Neutrophils, Absolute 21.40 10*3/mm3      Lymphocytes, Absolute 0.50 10*3/mm3      Monocytes, Absolute 0.80 10*3/mm3      Eosinophils, Absolute 0.00 10*3/mm3      Basophils, Absolute 0.10 10*3/mm3      nRBC 0.0 /100 WBC     POC Glucose Once [[id number]]  (Abnormal) Collected:  07/17/20 0446    Specimen:  Blood Updated:  07/17/20 0454     Glucose 150 mg/dL      Comment: Serial Number: [id number]:  [id number]       BUN [[id number]]  (Normal) Collected:  07/16/20 1958    Specimen:  Blood Updated:  07/17/20 0125     BUN 18 mg/dL     POC Glucose Once [[id number]]  (Abnormal) Collected:  07/17/20 0015    Specimen:  Blood Updated:  07/17/20 0016     Glucose 152 mg/dL      Comment: Serial Number: [id number]:  [id number]       Basic Metabolic Panel [[id number]]  (Abnormal) Collected:  07/16/20 1958    Specimen:  Blood Updated:  07/16/20 2045     Glucose 171 mg/dL      BUN --     Comment: Testing performed by alternate method         Creatinine 0.92 mg/dL      Sodium 133 mmol/L      Potassium 3.4 mmol/L      Comment: Specimen hemolyzed.  Results may be affected.        Chloride 95 mmol/L      CO2 24.0 mmol/L      Calcium 8.0 mg/dL      eGFR Non African Amer 62 mL/min/1.73      BUN/Creatinine Ratio --     Comment: Testing not performed        Anion Gap 14.0 mmol/L     Narrative:       GFR Normal >60  Chronic Kidney Disease <60  Kidney Failure <15          Hemoglobin A1C   Date Value Ref Range Status   07/16/2020 5.7 (H) 3.5 - 5.6 % Final     Results from last 7 days   Lab Units 07/17/20  0524 07/16/20  0824   INR  0.97* 3.19*       Results from last 7 days   Lab Units 07/16/20  1316   PH, ARTERIAL pH units 7.411   PO2 ART mm Hg 477.0*   PCO2, ARTERIAL mm Hg 48.0*   HCO3 ART mmol/L 30.7*     Lab Results   Component Value Date    LIPASE 20 07/16/2020     No results found for: CHOL, CHLPL, TRIG, HDL, LDL, LDLDIRECT    No results found for: INTRAOP, PREDX, FINALDX, COMDX    Microbiology Results (last 10 days)     Procedure Component Value - Date/Time    COVID-19 [facility] [employer] IN-HOUSE, Nasal Swab No Transport Media - Swab, Nasal Cavity [[id number]]  (Normal) Collected:  07/16/20 1102    Lab Status:  Final result Specimen:  Swab from Nasal Cavity Updated:  07/16/20 1147     COVID19 Not Detected    Narrative:       Fact sheet for providers: https://www.fda.gov/media/322846/download     Fact sheet for patients: https://www.fda.gov/media/899350/download    Blood Culture - Blood, Arm, Right [[id number]] Collected:  07/16/20 0858    Lab Status:  Preliminary result Specimen:  Blood from Arm, Right Updated:  07/17/20 0916     Blood Culture No growth at 24 hours    Blood Culture - Blood, Arm, Left [[id number]] Collected:  07/16/20 0857    Lab Status:  Preliminary result Specimen:  Blood from Arm, Left Updated:  07/17/20 0916     Blood Culture No growth at 24 hours          ECG/EMG Results (most recent)     Procedure Component Value Units Date/Time    ECG 12 Lead  [[id number]] Collected:  07/16/20 0827     Updated:  07/16/20 0830    Narrative:       HEART RATE= 87  bpm  RR Interval= 692  ms  CA Interval= 169  ms  P Horizontal Axis= -6  deg  P Front Axis= 46  deg  QRSD Interval= 110  ms  QT Interval= 397  ms  QRS Axis= 61  deg  T Wave Axis= 67  deg  - ABNORMAL ECG -  Sinus rhythm  Nonspecific T abnrm, anterolateral leads  Electronically Signed By:   Date and Time of Study: 2020-07-16 08:27:30          Results for orders placed during the hospital encounter of 09/05/19   Duplex Venous Lower Extremity - Bilateral    Narrative · Chronic right lower extremity deep vein thrombosis noted in the proximal   femoral, mid femoral, distal femoral and popliteal.  · Acute right lower extremity deep vein thrombosis noted in the peroneal.  · All other veins appeared normal bilaterally.               Ct Abdomen Pelvis With Contrast    Result Date: 7/16/2020   1. 1. FINDINGS consistent with perforated acute sigmoid diverticulitis. Free air is seen in the abdomen and pelvis. Small amount of pelvic free fluid is present without well-defined abscess collection. I notified Dr. Griffith in the emergency room at the time of this dictation. 2. Additional chronic findings as described above.    Electronically Signed By-Dr. Daly Rich MD On:7/16/2020 10:18 AM This report was finalized on [id number] by Dr. Daly Rich MD.    Xr Chest 1 View    Result Date: 7/16/2020    1.  Right internal jugular central venous catheter in place the tip taking of the superior vena cava.  No evidence of pneumothorax 2.  Borderline heart size with pulmonary vascular congestion and prominent interstitial markings suggesting mild interstitial edema. 3.  Chronic left basilar airspace disease compatible scarring.  Electronically Signed By-Renny Trinh On:7/16/2020 3:55 PM This report was finalized on [id number] by  Renny Trinh, .          Xrays, labs reviewed personally by physician.    Medication Review:   I  have reviewed the patient's current medication list      Scheduled Meds    budesonide-formoterol 2 puff Inhalation BID - RT   enoxaparin 40 mg Subcutaneous Q24H   famotidine 20 mg Intravenous Daily   insulin lispro 0-7 Units Subcutaneous Q6H   piperacillin-tazobactam 3.375 g Intravenous Q8H   sodium chloride 10 mL Intravenous Q12H       Meds Infusions    sodium chloride 125 mL/hr Last Rate: 125 mL/hr (07/17/20 1249)       Meds PRN  •  acetaminophen **OR** acetaminophen **OR** acetaminophen  •  albuterol sulfate HFA  •  aluminum-magnesium hydroxide-simethicone  •  bisacodyl  •  dextrose  •  dextrose  •  glucagon (human recombinant)  •  hydrALAZINE  •  HYDROmorphone  •  insulin lispro **AND** insulin lispro  •  magnesium hydroxide  •  magnesium sulfate **OR** magnesium sulfate in D5W 1g/100mL (PREMIX)  •  melatonin  •  nitroglycerin  •  ondansetron **OR** ondansetron  •  oxyCODONE **OR** oxyCODONE  •  potassium chloride **OR** potassium chloride **OR** potassium chloride  •  promethazine **OR** promethazine **OR** promethazine **OR** promethazine  •  sodium chloride    I personally reviewed patient's x-ray films and my findings are    I personally reviewed patient's EKG strips and my findings are     Assessment/Plan   Assessment/Plan     Active Hospital Problems    Diagnosis  POA   • Acute hypokalemia [E87.6]  Yes   • Severe sepsis (CMS/HCC) [A41.9, R65.20]  Yes   • Acute kidney injury (CMS/HCC) [N17.9]  Yes   • Acute hyponatremia [E87.1]  Yes   • COPD (chronic obstructive pulmonary disease) (CMS/HCC) [J44.9]  Yes   • Chronic back pain [M54.9, G89.29]  Yes   • Depressive disorder [F32.9]  Yes   • Essential hypertension [I10]  Yes   • Gastroesophageal reflux disease [K21.9]  Yes   • Insomnia [G47.00]  Yes   • Chronic anticoagulation [Z79.01]  Not Applicable   • Obesity [E66.9]  Yes      Resolved Hospital Problems    Diagnosis Date Resolved POA   • **Perforation of sigmoid colon due to diverticulitis [K57.20] 07/16/2020  Yes   • Free intraperitoneal air [K66.8] 07/16/2020 Yes       MEDICAL DECISION MAKING COMPLEXITY BY PROBLEM:     Sepsis d/t perforation of sigmoid colon d/t diverticulitis s/p exploratory laparotomy hartmans procedure   - NPO  - blood cx- NGTD  - IV fluids  - continue IV zosyn  - hold warfarin and was given Kcentra and Vit K in the ED  - general surgery following      Acute hypokalemia  - replacement protocol   - magnesium 2.4     Acute hyponatremia- resolved   - likely d/t volume depletion     COPD (chronic obstructive pulmonary disease), former smoker   - stable without exacerbation  - PRN DuoNebs  - Symbicort substituted for home Trelegy Ellipta     Chronic back pain  - hold Norco (INSPECT reviewed) while NPO  - PRN IV morphine     Depressive disorder  - resume Prozac      Gastroesophageal reflux disease  - ?patient on 2 PPIs and Pepcid at home?  - hold PO meds while NPO  - IV Pepcid daily     Essential hypertension, chronic  - initially hypotensive  - improved with IV fluids in the ED   - hold oral antihypertensives for now  - PRN IV hydralazine for SBP>160  - monitor BP with routine vital signs      Insomnia  - resume trazodone      History of DVT/PE, on Chronic anticoagulation  - hold warfarin for now     Obesity  - BMI 31.01  - encourage lifestyle modifications      DVT Propylaxis  - SCDs  - holding warfarin  for now, resume when ok with surgery          Dispo- defer        VTE Prophylaxis -   Mechanical Order History:      Ordered        07/16/20 1128  Place Sequential Compression Device  Once         07/16/20 1128  Maintain Sequential Compression Device  Continuous                 Pharmalogical Order History:     Ordered     Dose Route Frequency Stop    07/17/20 0810  enoxaparin (LOVENOX) syringe 40 mg      40 mg SC Every 24 Hours --            Code Status -   Code Status and Medical Interventions:   Ordered at: 07/16/20 1128     Code Status:    CPR     Medical Interventions (Level of Support Prior to  Arrest):    Full             Discharge Planning      SLP OP Goals     Row Name 07/17/20 1330          Time Calculation    PT Goal Re-Cert Due Date  07/31/20  -LEIGHTON       User Key  (r) = Recorded By, (t) = Taken By, (c) = Cosigned By    Initials Name Provider Type    Antawn Madera, PT Physical Therapist          Destination      Coordination has not been started for this encounter.      Durable Medical Equipment      Coordination has not been started for this encounter.      Dialysis/Infusion      Coordination has not been started for this encounter.      Home Medical Care      Service Provider Request Status Selected Services Address Phone Number Fax Number    [facility] Pending - Request Sent N/A [address] [place] [address] [phone] --       Vianey Domínguez RN 7/17/2020 1407    New ostomy               [facility] [place] Declined  not enough staff to meet patient needs N/A [address] [place] [address] [phone]       Vianey Domínguez RN 7/17/2020 1354    New ostomy                 Therapy      Coordination has not been started for this encounter.      Community Resources      Coordination has not been started for this encounter.            Electronically signed by Clemente Guerrero DO, 07/17/20, 20:05.  [facility] Hospitalist Team

## 2020-07-18 NOTE — PROGRESS NOTES
"      [facility] Emergency Room Medicine Services Daily Progress Note      Hospitalist Team  LOS 2 days      Patient Care Team:  Michael Gerardo MD as PCP - General    Patient Location: [facility]0/      Subjective   Subjective     Chief Complaint / Subjective  Chief Complaint   Patient presents with   • Abdominal Pain         Brief Synopsis of Hospital Course/HPI  Ms. Pelayo is a 64 y.o. 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 The patient states her pain started 3 days ago. She describes it as a diffuse cramping that became gradually worse. She denies nausea, but reports 1 episode of vomiting. She states she was initially constipated so she took a laxative, then developed diarrhea. She states her abdomen is now bloated and firm. She denies fever or chills. She denies any exacerbating or alleviating factors. She states she feels weak. She denies any other complaints.      The patient states she quit smoking 1 year ago. She denies alcohol or illicit drug use. She denies any significant family medical history.      Upon arrival to the ER the patient triggered sepsis protocol with BP 89/53, WBC 27.2, and glucose 141. She was afebrile. She was given 30 cc/kg IBW IV fluid bolus with improvement in her blood pressure to 110s/60s. Blood cultures were obtained. Her lactate was normal. She was given Zosyn. Other labs were significant for Na 123, K 2.9, Cr 1.20, and INR 3.19. Her CT abdomen/pelvis showed \"perforated acute sigmoid diverticulitis. Free air is seen in the abdomen and pelvis. Small amount of pelvic free fluid is present without well-defined abscess collection.\" General surgery was consulted. She was admitted for close monitoring and further treatment.          7/17/2020- Patient reports she is still having significant " "abdominal pain. Having some nausea.    7/18/2020- patient still requiring 5L of supplemental oxygen. She is also still having abdominal pain. No ostomy output yet.     Review of Systems   Constitution: Positive for malaise/fatigue. Negative for chills and fever.   Cardiovascular: Negative for chest pain and palpitations.   Respiratory: Positive for shortness of breath. Negative for cough.    Gastrointestinal: Positive for abdominal pain and nausea. Negative for vomiting.         Objective   Objective      Vital Signs  Temp:  [97.4 °F (36.3 °C)-98.3 °F (36.8 °C)] 98.1 °F (36.7 °C)  Heart Rate:  [] 101  Resp:  [12-18] 15  BP: (102-136)/(58-78) 110/58  Oxygen Therapy  SpO2: 93 %  Pulse Oximetry Type: Intermittent  Device (Oxygen Therapy): room air  Device (Oxygen Therapy): nasal cannula  Flow (L/min): 5  Flowsheet Rows      First Filed Value   Admission Height  160 cm (63\") Documented at 07/16/2020 0739   Admission Weight  79.4 kg (175 lb 0.7 oz) Documented at 07/16/2020 0739        Intake & Output (last 3 days)       07/15 0701 - 07/16 0700 07/16 0701 - 07/17 0700 07/17 0701 - 07/18 0700 07/18 0701 - 07/19 0700    P.O.  460 240     I.V. (mL/kg)  1700 (21.2)      IV Piggyback  1672 100     Total Intake(mL/kg)  3832 (47.8) 340 (4.1)     Urine (mL/kg/hr)  800 950 (0.5) 0 (0)    Drains  45 35 60    Stool  0 0 0    Blood  100      Total Output  945 985 60    Net  +2887 -645 -60            Urine Unmeasured Occurrence   1 x 2 x        Lines, Drains & Airways    Active LDAs     Name:   Placement date:   Placement time:   Site:   Days:    CVC Double Lumen 07/16/20 Right Internal jugular   07/16/20    1346 created via procedure documentation    Internal jugular   1    Peripheral IV 07/16/20 0825 Left Hand   07/16/20    0825    Hand   1    Peripheral IV 07/16/20 0918 Right Arm   07/16/20    0918    Arm   1    Closed/Suction Drain RLQ Bulb 19 Fr.   07/16/20    1428    RLQ   1    Colostomy LUQ   07/16/20    1423    LUQ   1 "                  Physical Exam:    Physical Exam   Constitutional: She is oriented to person, place, and time. She appears well-developed. No distress.   Obese    HENT:   Head: Normocephalic and atraumatic.   Mouth/Throat: Oropharynx is clear and moist.   Eyes: Pupils are equal, round, and reactive to light. No scleral icterus.   Cardiovascular: Normal rate and regular rhythm.   No murmur heard.  Pulmonary/Chest: Effort normal.   Diminished aeration throughout    Abdominal: Soft. Bowel sounds are normal. There is tenderness.   Ostomy in place with some bloody drainage    Neurological: She is alert and oriented to person, place, and time. No cranial nerve deficit.   Skin: Skin is warm and dry.   Dressing to abdomen is c/d/i    Psychiatric: She has a normal mood and affect. Her behavior is normal.   Vitals reviewed.    Unchanged from 7/17/2020         Wounds (last 24 hours)      LDA Wound     Row Name 07/18/20 0900 07/17/20 2310 07/17/20 1915       Wound 07/16/20 1327 lower;midline abdomen Incision    Wound - Properties Group Date first assessed: 07/16/20  -TD Time first assessed: 1327  -TD Orientation: lower;midline  -TD Location: abdomen  -TD, INCISION FOR COLOSTOMY  Primary Wound Type: Incision  -TD    Dressing Appearance  dry;intact;no drainage  -JE  --  dry;intact;no drainage  -JR    Closure  TERI  -JE  --  TERI  -JR    Base  dressing in place, unable to visualize  -JE  --  dressing in place, unable to visualize  -JR    Drainage Amount  none  -JE  --  none  -JR       Wound 07/16/20 1339 perineum MASD (Moisture associated skin damage)    Wound - Properties Group Date first assessed: 07/16/20  -TD Time first assessed: 1339  -TD Present on Hospital Admission: Y  -TD Location: perineum  -TD Primary Wound Type: MASD  -TD, WHEN PLACING BOLANOS PATIENT WITH LEAKING OF STOOL. WITH OBSERVATION OF PERINEUM RED, EXSCORIATED AREAS APPARENT ON BOTH SIDES.     Dressing Appearance  dry;intact;open to air  -JE  dry;intact;open to  air  -JR  --      User Key  (r) = Recorded By, (t) = Taken By, (c) = Cosigned By    Initials Name Provider Type    Huang Foster, RN Registered Nurse    Maryana Maloney RN Registered Nurse    Esperanza Acosta LPN Licensed Nurse          Procedures:    Procedure(s):  LAPAROTOMY EXPLORATORY HARTMANS          Results Review:     I reviewed the patient's new clinical results.      Lab Results (last 24 hours)     Procedure Component Value Units Date/Time    BUN [[id number]]  (Normal) Collected:  07/18/20 0547    Specimen:  Blood Updated:  07/18/20 1204     BUN 13 mg/dL     POC Glucose Once [[id number]]  (Abnormal) Collected:  07/18/20 1140    Specimen:  Blood Updated:  07/18/20 1145     Glucose 126 mg/dL      Comment: Serial Number: [id number]:  [id number]       Blood Culture - Blood, Arm, Left [[id number]] Collected:  07/16/20 0857    Specimen:  Blood from Arm, Left Updated:  07/18/20 0916     Blood Culture No growth at 2 days    Blood Culture - Blood, Arm, Right [[id number]] Collected:  07/16/20 0858    Specimen:  Blood from Arm, Right Updated:  07/18/20 0916     Blood Culture No growth at 2 days    POC Glucose Once [[id number]]  (Abnormal) Collected:  07/18/20 0810    Specimen:  Blood Updated:  07/18/20 0811     Glucose 137 mg/dL      Comment: Serial Number: [id number]:  [id number]       Magnesium [[id number]]  (Normal) Collected:  07/18/20 0547    Specimen:  Blood Updated:  07/18/20 0706     Magnesium 2.3 mg/dL     Basic Metabolic Panel [[id number]]  (Abnormal) Collected:  07/18/20 0547    Specimen:  Blood Updated:  07/18/20 0706     Glucose 132 mg/dL      BUN --     Comment: Testing performed by alternate method        Creatinine 0.84 mg/dL      Sodium 137 mmol/L      Potassium 3.2 mmol/L      Chloride 97 mmol/L      CO2 25.0 mmol/L      Calcium 8.8 mg/dL      eGFR Non African Amer 68 mL/min/1.73      BUN/Creatinine Ratio --     Comment: Testing not performed        Anion Gap 15.0 mmol/L      Narrative:       GFR Normal >60  Chronic Kidney Disease <60  Kidney Failure <15      CBC Auto Differential [[id number]]  (Abnormal) Collected:  07/18/20 0547    Specimen:  Blood Updated:  07/18/20 0704     WBC 19.60 10*3/mm3      RBC 4.56 10*6/mm3      Hemoglobin 12.7 g/dL      Hematocrit 38.6 %      MCV 84.6 fL      MCH 27.9 pg      MCHC 32.9 g/dL      RDW 14.5 %      RDW-SD 42.9 fl      MPV 7.9 fL      Platelets 360 10*3/mm3     Scan Slide [[id number]] Collected:  07/18/20 0547    Specimen:  Blood Updated:  07/18/20 0704     Scan Slide --     Comment: See Manual Differential Results       Manual Differential [[id number]]  (Abnormal) Collected:  07/18/20 0547    Specimen:  Blood Updated:  07/18/20 0704     Neutrophil % 71.0 %      Lymphocyte % 5.0 %      Monocyte % 7.0 %      Bands %  16.0 %      Metamyelocyte % 1.0 %      Neutrophils Absolute 17.05 10*3/mm3      Lymphocytes Absolute 0.98 10*3/mm3      Monocytes Absolute 1.37 10*3/mm3      RBC Morphology Normal     WBC Morphology Normal     Platelet Estimate Adequate    Narrative:       NO PLT CLUMPS SEEN ON SLIDE       Calcium, Ionized [[id number]]  (Abnormal) Collected:  07/18/20 0547    Specimen:  Blood Updated:  07/18/20 0635     Ionized Calcium 1.19 mmol/L     Protime-INR [[id number]]  (Abnormal) Collected:  07/18/20 0547    Specimen:  Blood Updated:  07/18/20 0631     Protime 9.5 Seconds      INR 0.88    POC Glucose Once [[id number]]  (Abnormal) Collected:  07/18/20 0105    Specimen:  Blood Updated:  07/18/20 0106     Glucose 124 mg/dL      Comment: Serial Number: [id number]:  [id number]       POC Glucose Once [[id number]]  (Abnormal) Collected:  07/17/20 2116    Specimen:  Blood Updated:  07/17/20 2117     Glucose 141 mg/dL      Comment: Serial Number: [id number]:  [id number]       POC Glucose Once [[id number]]  (Abnormal) Collected:  07/17/20 1735    Specimen:  Blood Updated:  07/17/20 1736     Glucose 146 mg/dL      Comment: Serial Number:  [id number]:  [id number]           Hemoglobin A1C   Date Value Ref Range Status   07/16/2020 5.7 (H) 3.5 - 5.6 % Final     Results from last 7 days   Lab Units 07/18/20  0547 07/17/20  0524 07/16/20  0824   INR  0.88* 0.97* 3.19*       Results from last 7 days   Lab Units 07/16/20  1316   PH, ARTERIAL pH units 7.411   PO2 ART mm Hg 477.0*   PCO2, ARTERIAL mm Hg 48.0*   HCO3 ART mmol/L 30.7*     Lab Results   Component Value Date    LIPASE 20 07/16/2020     No results found for: CHOL, CHLPL, TRIG, HDL, LDL, LDLDIRECT    No results found for: INTRAOP, PREDX, FINALDX, COMDX    Microbiology Results (last 10 days)     Procedure Component Value - Date/Time    COVID-19 [facility] [employer] IN-HOUSE, Nasal Swab No Transport Media - Swab, Nasal Cavity [[id number]]  (Normal) Collected:  07/16/20 1102    Lab Status:  Final result Specimen:  Swab from Nasal Cavity Updated:  07/16/20 1147     COVID19 Not Detected    Narrative:       Fact sheet for providers: https://www.fda.gov/media/410001/download     Fact sheet for patients: https://www.fda.gov/media/332567/download    Blood Culture - Blood, Arm, Right [[id number]] Collected:  07/16/20 0858    Lab Status:  Preliminary result Specimen:  Blood from Arm, Right Updated:  07/18/20 0916     Blood Culture No growth at 2 days    Blood Culture - Blood, Arm, Left [[id number]] Collected:  07/16/20 0857    Lab Status:  Preliminary result Specimen:  Blood from Arm, Left Updated:  07/18/20 0916     Blood Culture No growth at 2 days          ECG/EMG Results (most recent)     Procedure Component Value Units Date/Time    ECG 12 Lead [[id number]] Collected:  07/16/20 0827     Updated:  07/18/20 0902    Narrative:       HEART RATE= 87  bpm  RR Interval= 692  ms  ID Interval= 169  ms  P Horizontal Axis= -6  deg  P Front Axis= 46  deg  QRSD Interval= 110  ms  QT Interval= 397  ms  QRS Axis= 61  deg  T Wave Axis= 67  deg  - ABNORMAL ECG -  Sinus rhythm  Nonspecific T abnrm, anterolateral leads  When compared  with ECG of 11-Apr-2017 3:34:13,  Significant repolarization change  Electronically Signed By: Clement Griffith (PIPO) 18-Jul-2020 09:01:43  Date and Time of Study: 2020-07-16 08:27:30          Results for orders placed during the hospital encounter of 09/05/19   Duplex Venous Lower Extremity - Bilateral    Narrative · Chronic right lower extremity deep vein thrombosis noted in the proximal   femoral, mid femoral, distal femoral and popliteal.  · Acute right lower extremity deep vein thrombosis noted in the peroneal.  · All other veins appeared normal bilaterally.               Ct Abdomen Pelvis With Contrast    Result Date: 7/16/2020   1. 1. FINDINGS consistent with perforated acute sigmoid diverticulitis. Free air is seen in the abdomen and pelvis. Small amount of pelvic free fluid is present without well-defined abscess collection. I notified Dr. Griffith in the emergency room at the time of this dictation. 2. Additional chronic findings as described above.    Electronically Signed By-Dr. Daly Rich MD On:7/16/2020 10:18 AM This report was finalized on [id number] by Dr. Daly Rich MD.    Xr Chest 1 View    Result Date: 7/16/2020    1.  Right internal jugular central venous catheter in place the tip taking of the superior vena cava.  No evidence of pneumothorax 2.  Borderline heart size with pulmonary vascular congestion and prominent interstitial markings suggesting mild interstitial edema. 3.  Chronic left basilar airspace disease compatible scarring.  Electronically Signed By-Renny Trinh On:7/16/2020 3:55 PM This report was finalized on [id number] by  Renny Trinh, .          Xrays, labs reviewed personally by physician.    Medication Review:   I have reviewed the patient's current medication list      Scheduled Meds    albuterol      budesonide-formoterol 2 puff Inhalation BID - RT   enoxaparin 40 mg Subcutaneous Q24H   famotidine 20 mg Intravenous Daily   FLUoxetine 10 mg Oral Daily   insulin  lispro 0-7 Units Subcutaneous TID With Meals   piperacillin-tazobactam 3.375 g Intravenous Q8H   sodium chloride 10 mL Intravenous Q12H       Meds Infusions    sodium chloride 125 mL/hr Last Rate: 125 mL/hr (07/17/20 1249)       Meds PRN  •  acetaminophen **OR** acetaminophen **OR** acetaminophen  •  albuterol  •  aluminum-magnesium hydroxide-simethicone  •  bisacodyl  •  dextrose  •  dextrose  •  glucagon (human recombinant)  •  hydrALAZINE  •  HYDROmorphone  •  insulin lispro **AND** insulin lispro  •  magnesium hydroxide  •  magnesium sulfate **OR** magnesium sulfate in D5W 1g/100mL (PREMIX)  •  melatonin  •  nitroglycerin  •  ondansetron **OR** ondansetron  •  oxyCODONE **OR** oxyCODONE  •  potassium chloride **OR** potassium chloride **OR** potassium chloride  •  promethazine **OR** promethazine **OR** promethazine **OR** promethazine  •  sodium chloride  •  traZODone    I personally reviewed patient's x-ray films and my findings are    I personally reviewed patient's EKG strips and my findings are     Assessment/Plan   Assessment/Plan     Active Hospital Problems    Diagnosis  POA   • Acute hypokalemia [E87.6]  Yes   • Severe sepsis (CMS/HCC) [A41.9, R65.20]  Yes   • Acute kidney injury (CMS/HCC) [N17.9]  Yes   • Acute hyponatremia [E87.1]  Yes   • COPD (chronic obstructive pulmonary disease) (CMS/HCC) [J44.9]  Yes   • Chronic back pain [M54.9, G89.29]  Yes   • Depressive disorder [F32.9]  Yes   • Essential hypertension [I10]  Yes   • Gastroesophageal reflux disease [K21.9]  Yes   • Insomnia [G47.00]  Yes   • Chronic anticoagulation [Z79.01]  Not Applicable   • Obesity [E66.9]  Yes      Resolved Hospital Problems    Diagnosis Date Resolved POA   • **Perforation of sigmoid colon due to diverticulitis [K57.20] 07/16/2020 Yes   • Free intraperitoneal air [K66.8] 07/16/2020 Yes       MEDICAL DECISION MAKING COMPLEXITY BY PROBLEM:     Sepsis d/t perforation of sigmoid colon d/t diverticulitis s/p exploratory  laparotomy hartmans procedure   - NPO  - blood cx- NGTD  - IV fluids  - continue IV zosyn  - hold warfarin and was given Kcentra and Vit K in the ED  - general surgery following   - encourage incentive spirometer   - pain control as per surgery      Acute hypokalemia  - replacement protocol   - magnesium 2.4     Acute hyponatremia- resolved   - likely d/t volume depletion     COPD (chronic obstructive pulmonary disease), former smoker   - add scheduled DuoNebs  - Symbicort substituted for home Trelegy Ellipta  - wean supplemental oxygen as tolerated      Chronic back pain  - hold Norco (INSPECT reviewed) while NPO  - PRN IV morphine     Depressive disorder  - resume Prozac      Gastroesophageal reflux disease  - ?patient on 2 PPIs and Pepcid at home?  - hold PO meds while NPO  - IV Pepcid daily     Essential hypertension, chronic  - initially hypotensive  - improved with IV fluids in the ED   - hold oral antihypertensives for now  - PRN IV hydralazine for SBP>160  - monitor BP with routine vital signs      Insomnia  - resume trazodone      History of DVT/PE, on Chronic anticoagulation  - hold warfarin for now     Obesity  - BMI 31.01  - encourage lifestyle modifications      DVT Propylaxis  - SCDs  - holding warfarin  for now, resume when ok with surgery possibly tomorrow          Dispo- defer        VTE Prophylaxis -   Mechanical Order History:      Ordered        07/16/20 1128  Place Sequential Compression Device  Once         07/16/20 1128  Maintain Sequential Compression Device  Continuous                 Pharmalogical Order History:     Ordered     Dose Route Frequency Stop    07/17/20 0810  enoxaparin (LOVENOX) syringe 40 mg      40 mg SC Every 24 Hours --            Code Status -   Code Status and Medical Interventions:   Ordered at: 07/16/20 1128     Code Status:    CPR     Medical Interventions (Level of Support Prior to Arrest):    Full             Discharge Planning          Destination      Coordination  has not been started for this encounter.      Durable Medical Equipment      Coordination has not been started for this encounter.      Dialysis/Infusion      Coordination has not been started for this encounter.      Home Medical Care      Service Provider Request Status Selected Services Address Phone Number Fax Number    [facility] Pending - Request Sent N/A [address] [place] [address] [phone] --       Vianey Domínguez RN 7/17/2020 1407    New ostomy               [facility] [place] Declined  not enough staff to meet patient needs N/A [address] [place] [id number] [phone]       Vianey Domínguez RN 7/17/2020 1354    New ostomy                 Therapy      Coordination has not been started for this encounter.      Community Resources      Coordination has not been started for this encounter.            Electronically signed by Clemente Guerrero DO, 07/18/20, 15:54.  [facility] Hospitalist Team

## 2020-07-19 LAB
ANION GAP SERPL CALCULATED.3IONS-SCNC: 9 MMOL/L (ref 5–15)
BUN SERPL-MCNC: 10 MG/DL (ref 8–23)
BUN SERPL-MCNC: ABNORMAL MG/DL
BUN/CREAT SERPL: ABNORMAL
CALCIUM SPEC-SCNC: 8.2 MG/DL (ref 8.6–10.5)
CHLORIDE SERPL-SCNC: 99 MMOL/L (ref 98–107)
CO2 SERPL-SCNC: 30 MMOL/L (ref 22–29)
CREAT SERPL-MCNC: 0.71 MG/DL (ref 0.57–1)
DEPRECATED RDW RBC AUTO: 42 FL (ref 37–54)
EOSINOPHIL # BLD MANUAL: 0.26 10*3/MM3 (ref 0–0.4)
EOSINOPHIL NFR BLD MANUAL: 2 % (ref 0.3–6.2)
ERYTHROCYTE [DISTWIDTH] IN BLOOD BY AUTOMATED COUNT: 14.1 % (ref 12.3–15.4)
GFR SERPL CREATININE-BSD FRML MDRD: 83 ML/MIN/1.73
GLUCOSE BLDC GLUCOMTR-MCNC: 130 MG/DL (ref 70–105)
GLUCOSE BLDC GLUCOMTR-MCNC: 141 MG/DL (ref 70–105)
GLUCOSE BLDC GLUCOMTR-MCNC: 154 MG/DL (ref 70–105)
GLUCOSE SERPL-MCNC: 122 MG/DL (ref 65–99)
HCT VFR BLD AUTO: 35.5 % (ref 34–46.6)
HGB BLD-MCNC: 11.7 G/DL (ref 12–15.9)
INR PPP: 0.97 (ref 2–3)
LYMPHOCYTES # BLD MANUAL: 1.05 10*3/MM3 (ref 0.7–3.1)
LYMPHOCYTES NFR BLD MANUAL: 2 % (ref 5–12)
LYMPHOCYTES NFR BLD MANUAL: 8 % (ref 19.6–45.3)
MCH RBC QN AUTO: 28.1 PG (ref 26.6–33)
MCHC RBC AUTO-ENTMCNC: 33.1 G/DL (ref 31.5–35.7)
MCV RBC AUTO: 84.8 FL (ref 79–97)
METAMYELOCYTES NFR BLD MANUAL: 1 % (ref 0–0)
MONOCYTES # BLD AUTO: 0.26 10*3/MM3 (ref 0.1–0.9)
MYELOCYTES NFR BLD MANUAL: 3 % (ref 0–0)
NEUTROPHILS # BLD AUTO: 11 10*3/MM3 (ref 1.7–7)
NEUTROPHILS NFR BLD MANUAL: 71 % (ref 42.7–76)
NEUTS BAND NFR BLD MANUAL: 13 % (ref 0–5)
PLAT MORPH BLD: NORMAL
PLATELET # BLD AUTO: 298 10*3/MM3 (ref 140–450)
PMV BLD AUTO: 7.6 FL (ref 6–12)
POLYCHROMASIA BLD QL SMEAR: ABNORMAL
POTASSIUM SERPL-SCNC: 3.4 MMOL/L (ref 3.5–5.2)
PROTHROMBIN TIME: 10.2 SECONDS (ref 19.4–28.5)
RBC # BLD AUTO: 4.18 10*6/MM3 (ref 3.77–5.28)
SCAN SLIDE: NORMAL
SODIUM SERPL-SCNC: 138 MMOL/L (ref 136–145)
WBC # BLD AUTO: 13.1 10*3/MM3 (ref 3.4–10.8)
WBC MORPH BLD: NORMAL

## 2020-07-19 PROCEDURE — 99232 SBSQ HOSP IP/OBS MODERATE 35: CPT | Performed by: INTERNAL MEDICINE

## 2020-07-19 PROCEDURE — 85025 COMPLETE CBC W/AUTO DIFF WBC: CPT | Performed by: SURGERY

## 2020-07-19 PROCEDURE — 85610 PROTHROMBIN TIME: CPT | Performed by: INTERNAL MEDICINE

## 2020-07-19 PROCEDURE — 80048 BASIC METABOLIC PNL TOTAL CA: CPT | Performed by: SURGERY

## 2020-07-19 PROCEDURE — 85007 BL SMEAR W/DIFF WBC COUNT: CPT | Performed by: SURGERY

## 2020-07-19 PROCEDURE — 94799 UNLISTED PULMONARY SVC/PX: CPT

## 2020-07-19 PROCEDURE — 25010000002 PIPERACILLIN SOD-TAZOBACTAM PER 1 G: Performed by: SURGERY

## 2020-07-19 PROCEDURE — 82962 GLUCOSE BLOOD TEST: CPT

## 2020-07-19 RX ORDER — WARFARIN SODIUM 3 MG/1
1.5 TABLET ORAL
Status: DISCONTINUED | OUTPATIENT
Start: 2020-07-20 | End: 2020-07-20

## 2020-07-19 RX ORDER — WARFARIN SODIUM 3 MG/1
3 TABLET ORAL
Status: COMPLETED | OUTPATIENT
Start: 2020-07-19 | End: 2020-07-19

## 2020-07-19 RX ORDER — PANTOPRAZOLE SODIUM 40 MG/1
40 TABLET, DELAYED RELEASE ORAL
Status: DISCONTINUED | OUTPATIENT
Start: 2020-07-20 | End: 2020-07-22 | Stop reason: HOSPADM

## 2020-07-19 RX ADMIN — Medication 10 ML: at 21:11

## 2020-07-19 RX ADMIN — Medication 10 ML: at 08:59

## 2020-07-19 RX ADMIN — BUDESONIDE AND FORMOTEROL FUMARATE DIHYDRATE 2 PUFF: 160; 4.5 AEROSOL RESPIRATORY (INHALATION) at 08:36

## 2020-07-19 RX ADMIN — IPRATROPIUM BROMIDE AND ALBUTEROL SULFATE 3 ML: 2.5; .5 SOLUTION RESPIRATORY (INHALATION) at 20:10

## 2020-07-19 RX ADMIN — OXYCODONE HYDROCHLORIDE 10 MG: 5 TABLET ORAL at 05:43

## 2020-07-19 RX ADMIN — PIPERACILLIN AND TAZOBACTAM 3.38 G: 3; .375 INJECTION, POWDER, FOR SOLUTION INTRAVENOUS at 16:35

## 2020-07-19 RX ADMIN — GUAIFENESIN 600 MG: 600 TABLET, EXTENDED RELEASE ORAL at 08:59

## 2020-07-19 RX ADMIN — FLUOXETINE 10 MG: 10 CAPSULE ORAL at 08:59

## 2020-07-19 RX ADMIN — IPRATROPIUM BROMIDE AND ALBUTEROL SULFATE 3 ML: 2.5; .5 SOLUTION RESPIRATORY (INHALATION) at 08:36

## 2020-07-19 RX ADMIN — BUDESONIDE AND FORMOTEROL FUMARATE DIHYDRATE 2 PUFF: 160; 4.5 AEROSOL RESPIRATORY (INHALATION) at 20:10

## 2020-07-19 RX ADMIN — FAMOTIDINE 20 MG: 10 INJECTION INTRAVENOUS at 08:58

## 2020-07-19 RX ADMIN — ALUMINUM HYDROXIDE, MAGNESIUM HYDROXIDE, AND DIMETHICONE 15 ML: 400; 400; 40 SUSPENSION ORAL at 05:46

## 2020-07-19 RX ADMIN — PIPERACILLIN AND TAZOBACTAM 3.38 G: 3; .375 INJECTION, POWDER, FOR SOLUTION INTRAVENOUS at 08:58

## 2020-07-19 RX ADMIN — IPRATROPIUM BROMIDE AND ALBUTEROL SULFATE 3 ML: 2.5; .5 SOLUTION RESPIRATORY (INHALATION) at 16:22

## 2020-07-19 RX ADMIN — GUAIFENESIN 600 MG: 600 TABLET, EXTENDED RELEASE ORAL at 21:11

## 2020-07-19 RX ADMIN — TRAZODONE HYDROCHLORIDE 100 MG: 100 TABLET ORAL at 21:11

## 2020-07-19 RX ADMIN — PIPERACILLIN AND TAZOBACTAM 3.38 G: 3; .375 INJECTION, POWDER, FOR SOLUTION INTRAVENOUS at 00:26

## 2020-07-19 RX ADMIN — IPRATROPIUM BROMIDE AND ALBUTEROL SULFATE 3 ML: 2.5; .5 SOLUTION RESPIRATORY (INHALATION) at 12:00

## 2020-07-19 RX ADMIN — OXYCODONE HYDROCHLORIDE 10 MG: 5 TABLET ORAL at 16:35

## 2020-07-19 RX ADMIN — MELATONIN TAB 5 MG 5 MG: 5 TAB at 21:11

## 2020-07-19 RX ADMIN — WARFARIN SODIUM 3 MG: 3 TABLET ORAL at 18:10

## 2020-07-19 NOTE — CONSULTS
Nutrition Services    Patient Name:  Renuka Pelayo  YOB: 1955  MRN: [id number]  Admit Date:  7/16/2020    Comments: NPO/Clear Liquids x3 days with total of 6 days poor nutritional intake. 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    Sepsis d/t perforation of sigmoid colon d/t diverticulitis s/p exploratory laparotomy requiring sigmoid resection with end colostomy and Alejo's pouch on 7/16    Acute hypokalemia    Acute hyponatremia    COPD  -supplemental O2 as tolerate           Nutrition/Diet History                Nutrition/Diet History    Narrative     S/w pt via phone, she reports she is not feeling well. She reports the last time she had solid food was last Monday (7/13) & her appetite and meal intake was good up until that time. She reports she is currently drinking a boost breeze and while she doesn't like it, she can tolerate it. Encouraged pt to drink as able to help increase kcal & protein on clear liquid diet. General surgery had not visited yet per pt & per their notes, awaiting colostomy o/p for further diet advancement. RD gave brief explanation of typical progression back to solids & that would check back in the next day or 2 regarding progress. She denies recent wt loss & reports  lb. Pt did not have questions/concerns regarding nutrition at this time & RDN will instruct as appropriate when upgraded to solid foods.     Functional Status Needs some assistance with ambulation & ADLs per PT/OT       Food Allergies NKFA     Factors Affecting Nutritional Intake S/p ex lap r/t colon perforation      "    Anthropometrics             Anthropometrics    Height 160 cm (63\")    Weight 83.9 kg (185 lb)    7/19/20       Admit Weight    Admit Weight 79.4 kg (175 lb)    7/16/20       Ideal Body Weight (IBW)    Ideal Body Weight (IBW) 115 lb    % Ideal Body Weight 161%       Usual Body Weight (UBW)    Usual Body Weight 180 lb    % Usual Body Weight 103%    Weight Hx  174 lb (9/5/19)       Body Mass Index (BMI)    BMI (kg/m2) 32.8           Labs/Medications                Labs    Pertinent Lab Results  Glucose 130 H/127 H, K+ 3.4 L, Ca 8.2 L, Hgb 11.7 L    Hgb a1c 5.7%        Medications    Pertinent Medications  Pepcid, prozac, mucinex, humalog, zosyn, maalox max, roxicodone         Physical Findings                Physical Findings    Overall Physical Appearance Unable to observe r/t limiting face-to-face contact in the context of the COVID19 pandemic     Edema  None documented     Gastrointestinal Last BM documented on 7/16, no colostomy o/p documented post-op     Tubes N/A     Oral/Mouth Cavity No chew/swallow issues reported     Skin Surgical incision on abdomen  MASD on perineum         Estimated/Assessed Needs              Calorie Requirements     Height Used for Calorie Calculations       Weight Used for Calorie Calculations      Formula Chosen for Calorie Calculations       Estimated Calorie Requirements (kcals/day)              Protein Requirements    Weight Used For Protein Calculations       Est Protein Requirement Amount (gms/kg)       Estimated Protein Requirements (gms/day)          Fluid Requirements     Estimated Fluid Requirements (mL/day)            Fluid Deficit       Current Sodium Level (mEq/L)      Desired Sodium Level (mEq/L)      Estimated Fluid Deficit Needs (L)           Nutrition Prescription Ordered                Nutrition Prescription PO    Current PO Diet  Clear Liquids     Supplement Boost Breeze TID        Nutrition Prescription EN    Enteral Route -     TF Modular -     TF Delivery Method " -     Current Ordered TF  -     Current Ordered Water flush  -     TF Observation  -        Nutrition Prescription TPN    TPN Route -     Current Ordered TPN Volume  -     Dextrose (gm/kcals)  -     Amino Acid (gm/kcals) -     Lipids (mL/Concentration/Frequency)  -     TPN Observation  -          Evaluation of Received Nutrient/Fluid Intake                PO Evaluation    % PO Intake Pt has been NPO or clear liquids only        EN Evaluation    TF Changes -     TF Residual -     TF Tolerance -     Average EN Delivered  -        TPN Evaluation    Total Number of Days on TPN  -           Clinical Course      Clinical Nutrition Course Details  NPO 7/16  Clear Liquids 7/17 to present (7/19)  Total of 3 days NPO or clear liquids only, pt reported last eating solids on 7/13.         Problem/Interventions:                     Nutrition Diagnoses Problem 1      Problem 1   Inadequate oral intake r/t decreased ability to consume sufficient energy AEB NPO/Clear Liquids x3 days this admission with reports of poor intakes x6 days total     Nutrition Diagnoses Problem 2      Problem 2            Intervention Goal                Intervention Goal    General Tolerate PO diet  Advance diet         Nutrition Intervention                Nutrition Intervention    RD/Tech Action Await diet advancement    Encouraged Boost Breeze TID while on clears         Nutrition Prescription                Nutrition Prescription PO      Diet  Clear Liquids     Supplement Boost Breeze TID        Nutrition Prescription EN    Enteral Prescription -        Nutrition Prescription TPN    TPN Prescription -         Monitor/Evaluation                Monitor/Evaluation    Monitor PO tolerance & diet advancement, ostomy o/p, N/V, abd pain/distention, labs (electrolytes, BUN/Crt, glucose), wt for changes, skin integrity, at next encounter           Electronically signed by:  Carey Yao RD  07/19/20 11:17

## 2020-07-19 NOTE — PROGRESS NOTES
LOS: 3 days   Patient Care Team:  Michael Gerardo MD as PCP - General    Reason for follow-up: Postop    Subjective   Patient seen and examined.  No nausea or vomiting.  No flatus in the bag yet    Objective   Vision is clean dry and intact.  Drain tube with appropriate color and output    Vital Signs  Vitals:    07/19/20 0840 07/19/20 1158 07/19/20 1200 07/19/20 1203   BP:  128/71     BP Location:  Left arm     Patient Position:  Lying     Pulse:  100 92    Resp: 18 14 17 17   Temp:  98.6 °F (37 °C)     TempSrc:  Oral     SpO2:  93% 93% 93%   Weight:       Height:             Results Review:       Lab Results (last 24 hours)     Procedure Component Value Units Date/Time    POC Glucose Once [[id number]]  (Abnormal) Collected:  07/19/20 1201    Specimen:  Blood Updated:  07/19/20 1206     Glucose 141 mg/dL      Comment: Serial Number: [id number]:  [id number]       Blood Culture - Blood, Arm, Left [[id number]] Collected:  07/16/20 0857    Specimen:  Blood from Arm, Left Updated:  07/19/20 0915     Blood Culture No growth at 3 days    Blood Culture - Blood, Arm, Right [[id number]] Collected:  07/16/20 0858    Specimen:  Blood from Arm, Right Updated:  07/19/20 0915     Blood Culture No growth at 3 days    POC Glucose Once [[id number]]  (Abnormal) Collected:  07/19/20 0748    Specimen:  Blood Updated:  07/19/20 0749     Glucose 130 mg/dL      Comment: Serial Number: [id number]:  [id number]       BUN [[id number]]  (Normal) Collected:  07/19/20 0507    Specimen:  Blood Updated:  07/19/20 0747     BUN 10 mg/dL     CBC Auto Differential [[id number]]  (Abnormal) Collected:  07/19/20 0507    Specimen:  Blood Updated:  07/19/20 0659     WBC 13.10 10*3/mm3      RBC 4.18 10*6/mm3      Hemoglobin 11.7 g/dL      Hematocrit 35.5 %      MCV 84.8 fL      MCH 28.1 pg      MCHC 33.1 g/dL      RDW 14.1 %      RDW-SD 42.0 fl      MPV 7.6 fL      Platelets 298 10*3/mm3     Scan Slide [[id number]] Collected:  07/19/20 0505     Specimen:  Blood Updated:  07/19/20 0659     Scan Slide --     Comment: See Manual Differential Results       Manual Differential [[id number]]  (Abnormal) Collected:  07/19/20 0507    Specimen:  Blood Updated:  07/19/20 0659     Neutrophil % 71.0 %      Lymphocyte % 8.0 %      Monocyte % 2.0 %      Eosinophil % 2.0 %      Bands %  13.0 %      Metamyelocyte % 1.0 %      Myelocyte % 3.0 %      Neutrophils Absolute 11.00 10*3/mm3      Lymphocytes Absolute 1.05 10*3/mm3      Monocytes Absolute 0.26 10*3/mm3      Eosinophils Absolute 0.26 10*3/mm3      Polychromasia Slight/1+     WBC Morphology Normal     Platelet Morphology Normal    Basic Metabolic Panel [[id number]]  (Abnormal) Collected:  07/19/20 0507    Specimen:  Blood Updated:  07/19/20 0545     Glucose 122 mg/dL      BUN --     Comment: Testing performed by alternate method        Creatinine 0.71 mg/dL      Sodium 138 mmol/L      Potassium 3.4 mmol/L      Comment: Specimen hemolyzed.  Results may be affected.        Chloride 99 mmol/L      CO2 30.0 mmol/L      Calcium 8.2 mg/dL      eGFR Non African Amer 83 mL/min/1.73      BUN/Creatinine Ratio --     Comment: Testing not performed        Anion Gap 9.0 mmol/L     Narrative:       GFR Normal >60  Chronic Kidney Disease <60  Kidney Failure <15      POC Glucose Once [[id number]]  (Abnormal) Collected:  07/18/20 2257    Specimen:  Blood Updated:  07/18/20 2258     Glucose 127 mg/dL      Comment: Serial Number: [id number]:  [id number]       POC Glucose Once [[id number]]  (Abnormal) Collected:  07/18/20 1622    Specimen:  Blood Updated:  07/18/20 1710     Glucose 121 mg/dL      Comment: Serial Number: [id number]:  [id number]              Imaging Results (Last 24 Hours)     ** No results found for the last 24 hours. **          Medication Review:   Current Facility-Administered Medications:   •  acetaminophen (TYLENOL) tablet 650 mg, 650 mg, Oral, Q4H PRN **OR** acetaminophen (TYLENOL) 160 MG/5ML solution 650  mg, 650 mg, Oral, Q4H PRN **OR** acetaminophen (TYLENOL) suppository 650 mg, 650 mg, Rectal, Q4H PRN, Chi Farmer MD  •  albuterol (PROVENTIL) (2.5 MG/3ML) 0.083% nebulizer solution  - [facility] Over[facility] Pull, , , ,   •  albuterol (PROVENTIL) nebulizer solution 0.083% 2.5 mg/3mL, 2.5 mg, Nebulization, Q4H PRN, Chi Farmer MD, 2.5 mg at 07/18/20 0151  •  aluminum-magnesium hydroxide-simethicone (MAALOX MAX) 400-400-40 MG/5ML suspension 15 mL, 15 mL, Oral, Q6H PRN, Chi Farmer MD, 15 mL at 07/19/20 0546  •  bisacodyl (DULCOLAX) suppository 10 mg, 10 mg, Rectal, Daily PRN, Chi Farmer MD  •  budesonide-formoterol (SYMBICORT) 160-4.5 MCG/ACT inhaler 2 puff, 2 puff, Inhalation, BID - RT, Chi Farmer MD, 2 puff at 07/19/20 0836  •  dextrose (D50W) 25 g/ 50mL Intravenous Solution 25 g, 25 g, Intravenous, Q15 Min PRN, Chi Farmer MD  •  dextrose (GLUTOSE) oral gel 15 g, 15 g, Oral, Q15 Min PRN, Chi Farmer MD  •  enoxaparin (LOVENOX) syringe 40 mg, 40 mg, Subcutaneous, Q24H, Chi Farmer MD, 40 mg at 07/18/20 1807  •  famotidine (PEPCID) injection 20 mg, 20 mg, Intravenous, Daily, Chi Farmer MD, 20 mg at 07/19/20 0858  •  FLUoxetine (PROzac) capsule 10 mg, 10 mg, Oral, Daily, Guerrero, Simarililianaa, DO, 10 mg at 07/19/20 0859  •  glucagon (human recombinant) (GLUCAGEN DIAGNOSTIC) injection 1 mg, 1 mg, Subcutaneous, PRN, Chi Farmer MD  •  guaiFENesin (MUCINEX) 12 hr tablet 600 mg, 600 mg, Oral, Q12H, Clemente Guerrero DO, 600 mg at 07/19/20 0859  •  hydrALAZINE (APRESOLINE) injection 10 mg, 10 mg, Intravenous, Q6H PRN, Chi Farmer MD  •  HYDROmorphone (DILAUDID) injection 0.5 mg, 0.5 mg, Intravenous, Q2H PRN, Chi Farmer MD, 0.5 mg at 07/18/20 2108  •  insulin lispro (humaLOG) injection 0-7 Units, 0-7 Units, Subcutaneous, TID With Meals **AND** insulin lispro (humaLOG) injection 0-7  Units, 0-7 Units, Subcutaneous, PRN, Clemente Guerrero DO  •  ipratropium-albuterol (DUO-NEB) nebulizer solution 3 mL, 3 mL, Nebulization, 4x Daily - RT, Clemente Guerrero DO, 3 mL at 07/19/20 1200  •  magnesium hydroxide (MILK OF MAGNESIA) suspension 2400 mg/10mL 10 mL, 10 mL, Oral, Daily PRN, Chi Farmer MD  •  Magnesium Sulfate 2 gram infusion - Mg less than or equal to 1.5 mg/dL, 2 g, Intravenous, PRN **OR** Magnesium Sulfate 1 gram infusion - Mg 1.6-1.9 mg/dL, 1 g, Intravenous, PRN, Chi Farmer MD  •  melatonin tablet 5 mg, 5 mg, Oral, Nightly PRN, Chi Farmer MD  •  nitroglycerin (NITROSTAT) SL tablet 0.4 mg, 0.4 mg, Sublingual, Q5 Min PRN, Chi Farmer MD  •  ondansetron (ZOFRAN) tablet 4 mg, 4 mg, Oral, Q6H PRN **OR** ondansetron (ZOFRAN) injection 4 mg, 4 mg, Intravenous, Q6H PRN, Chi Farmer MD, 4 mg at 07/16/20 1432  •  oxyCODONE (ROXICODONE) immediate release tablet 5 mg, 5 mg, Oral, Q4H PRN **OR** oxyCODONE (ROXICODONE) immediate release tablet 10 mg, 10 mg, Oral, Q4H PRN, Chi Farmer MD, 10 mg at 07/19/20 0543  •  piperacillin-tazobactam (ZOSYN) IVPB 3.375 g in 100 mL NS (CD), 3.375 g, Intravenous, Q8H, Chi Farmer MD, Last Rate: 25 mL/hr at 07/19/20 0858, 3.375 g at 07/19/20 0858  •  potassium chloride (K-DUR,KLOR-CON) CR tablet 40 mEq, 40 mEq, Oral, PRN, 40 mEq at 07/18/20 1813 **OR** potassium chloride (KLOR-CON) packet 40 mEq, 40 mEq, Oral, PRN, 40 mEq at 07/18/20 2108 **OR** potassium chloride 10 mEq in 100 mL IVPB, 10 mEq, Intravenous, Q1H PRN, Chi Farmer MD, Last Rate: 100 mL/hr at 07/16/20 1203, 10 mEq at 07/16/20 1203  •  promethazine (PHENERGAN) IVPB 6.25 mg, 6.25 mg, Intravenous, Once PRN **OR** promethazine (PHENERGAN) injection 6.25 mg, 6.25 mg, Intramuscular, Once PRN **OR** promethazine (PHENERGAN) suppository 25 mg, 25 mg, Rectal, Once PRN **OR** promethazine (PHENERGAN) tablet 25 mg,  25 mg, Oral, Once PRN, Chi Farmer MD  •  sodium chloride 0.9 % flush 10 mL, 10 mL, Intravenous, Q12H, Chi Farmer MD, 10 mL at 07/19/20 0859  •  sodium chloride 0.9 % flush 10 mL, 10 mL, Intravenous, PRN, Chi Farmer MD  •  sodium chloride 0.9 % infusion, 125 mL/hr, Intravenous, Continuous, Chi Farmer MD, Last Rate: 125 mL/hr at 07/17/20 1249, 125 mL/hr at 07/17/20 1249  •  traZODone (DESYREL) tablet 100 mg, 100 mg, Oral, Nightly PRN, Clemente Guerrero DO    Assessment/Plan         Acute hypokalemia    Severe sepsis (CMS/HCC)    Acute kidney injury (CMS/HCC)    Acute hyponatremia    COPD (chronic obstructive pulmonary disease) (CMS/HCC)    Chronic back pain    Depressive disorder    Gastroesophageal reflux disease    Essential hypertension    Insomnia    Chronic anticoagulation    Obesity    Impression: Postop day #3 Alejo's, no flatus or stool out the bag yet, ostomy viable    Plan: Continue clear liquids until stoma with output          Chi Howard DO  07/19/20  12:54

## 2020-07-19 NOTE — PROGRESS NOTES
"      [facility] Medicine Services Daily Progress Note      Hospitalist Team  LOS 3 days      Patient Care Team:  Michael Gerardo MD as PCP - General    Patient Location: [facility]0/      Subjective   Subjective     Chief Complaint / Subjective  Chief Complaint   Patient presents with   • Abdominal Pain         Brief Synopsis of Hospital Course/HPI  Ms. Pelayo is a 64 y.o. 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 The patient states her pain started 3 days ago. She describes it as a diffuse cramping that became gradually worse. She denies nausea, but reports 1 episode of vomiting. She states she was initially constipated so she took a laxative, then developed diarrhea. She states her abdomen is now bloated and firm. She denies fever or chills. She denies any exacerbating or alleviating factors. She states she feels weak. She denies any other complaints.      The patient states she quit smoking 1 year ago. She denies alcohol or illicit drug use. She denies any significant family medical history.      Upon arrival to the ER the patient triggered sepsis protocol with BP 89/53, WBC 27.2, and glucose 141. She was afebrile. She was given 30 cc/kg IBW IV fluid bolus with improvement in her blood pressure to 110s/60s. Blood cultures were obtained. Her lactate was normal. She was given Zosyn. Other labs were significant for Na 123, K 2.9, Cr 1.20, and INR 3.19. Her CT abdomen/pelvis showed \"perforated acute sigmoid diverticulitis. Free air is seen in the abdomen and pelvis. Small amount of pelvic free fluid is present without well-defined abscess collection.\" General surgery was consulted. She was admitted for close monitoring and further treatment.          7/17/2020- Patient reports she is still having significant " "abdominal pain. Having some nausea.    7/18/2020- patient still requiring 5L of supplemental oxygen. She is also still having abdominal pain. No ostomy output yet.     7/19/2020- Patient still not having output from ostomy.     Review of Systems   Constitution: Negative for chills and fever.   Cardiovascular: Negative for chest pain and palpitations.   Respiratory: Positive for wheezing. Negative for cough.    Gastrointestinal: Positive for abdominal pain and nausea. Negative for vomiting.         Objective   Objective      Vital Signs  Temp:  [97.9 °F (36.6 °C)-98.6 °F (37 °C)] 98.6 °F (37 °C)  Heart Rate:  [] 92  Resp:  [13-18] 17  BP: (111-149)/(66-91) 128/71  Oxygen Therapy  SpO2: 93 %  Pulse Oximetry Type: Continuous  Device (Oxygen Therapy): room air  Device (Oxygen Therapy): nasal cannula  Flow (L/min): 3  Flowsheet Rows      First Filed Value   Admission Height  160 cm (63\") Documented at 07/16/2020 0739   Admission Weight  79.4 kg (175 lb 0.7 oz) Documented at 07/16/2020 0739        Intake & Output (last 3 days)       07/16 0701 - 07/17 0700 07/17 0701 - 07/18 0700 07/18 0701 - 07/19 0700 07/19 0701 - 07/20 0700    P.O. 460 240 360 120    I.V. (mL/kg) 1700 (21.2)       IV Piggyback 1672 100 100     Total Intake(mL/kg) 3832 (47.8) 340 (4.1) 460 (5.5) 120 (1.4)    Urine (mL/kg/hr) 800 950 (0.5) 300 (0.1) 300 (0.4)    Drains 45 35 100     Stool 0 0 0     Blood 100       Total Output 945 985 400 300    Net +2887 -645 +60 -180            Urine Unmeasured Occurrence  1 x 2 x         Lines, Drains & Airways    Active LDAs     Name:   Placement date:   Placement time:   Site:   Days:    CVC Double Lumen 07/16/20 Right Internal jugular   07/16/20    1346 created via procedure documentation    Internal jugular   1    Peripheral IV 07/16/20 0825 Left Hand   07/16/20    0825    Hand   1    Peripheral IV 07/16/20 0918 Right Arm   07/16/20    0918    Arm   1    Closed/Suction Drain RLQ Bulb 19 Fr.   07/16/20    " 1428    RLQ   1    Colostomy LUQ   07/16/20    1423    LUQ   1                  Physical Exam:    Physical Exam   Constitutional: She is oriented to person, place, and time. She appears well-developed. No distress.   Obese    HENT:   Head: Normocephalic and atraumatic.   Mouth/Throat: Oropharynx is clear and moist.   Eyes: Pupils are equal, round, and reactive to light. No scleral icterus.   Cardiovascular: Normal rate and regular rhythm.   No murmur heard.  Pulmonary/Chest: Effort normal. No respiratory distress.   Diminished aeration throughout    Abdominal: Soft. Bowel sounds are normal. She exhibits no distension. There is tenderness.   Ostomy in place with some bloody drainage    Neurological: She is alert and oriented to person, place, and time. No cranial nerve deficit.   Skin: Skin is warm and dry.   Dressing to abdomen is c/d/i    Psychiatric: She has a normal mood and affect. Her behavior is normal.   Vitals reviewed.           Wounds (last 24 hours)      LDA Wound     Row Name 07/19/20 1127 07/19/20 0757 07/18/20 1915       Wound 07/16/20 1327 lower;midline abdomen Incision    Wound - Properties Group Date first assessed: 07/16/20  -TD Time first assessed: 1327  -TD Orientation: lower;midline  -TD Location: abdomen  -TD, INCISION FOR COLOSTOMY  Primary Wound Type: Incision  -TD    Dressing Appearance  dry;intact;no drainage  -AH  dry;intact;no drainage  -AH  dry;intact;no drainage  -JR    Closure  TERI  -AH  TERI  -AH  TERI  -JR    Base  dressing in place, unable to visualize  -AH  dressing in place, unable to visualize  -AH  dressing in place, unable to visualize  -JR    Drainage Amount  none  -AH  none  -AH  none  -JR    Dressing Care, Wound  border dressing  -AH  border dressing  -AH  --       Wound 07/16/20 1339 perineum MASD (Moisture associated skin damage)    Wound - Properties Group Date first assessed: 07/16/20  -TD Time first assessed: 1339  -TD Present on Hospital Admission: Y  -TD Location:  perineum  -TD Primary Wound Type: MASD  -TD, WHEN PLACING BOLANOS PATIENT WITH LEAKING OF STOOL. WITH OBSERVATION OF PERINEUM RED, EXSCORIATED AREAS APPARENT ON BOTH SIDES.     Dressing Appearance  dry;intact;open to air  -AH  dry;intact;open to air  -AH  dry;intact;open to air  -JR      User Key  (r) = Recorded By, (t) = Taken By, (c) = Cosigned By    Initials Name Provider Type    Samantha Kelley, RN Registered Nurse    Maryana Maloney RN Registered Nurse    Esperanza Acosta LPN Licensed Nurse          Procedures:    Procedure(s):  LAPAROTOMY EXPLORATORY HARTMANS          Results Review:     I reviewed the patient's new clinical results.      Lab Results (last 24 hours)     Procedure Component Value Units Date/Time    POC Glucose Once [[id number]]  (Abnormal) Collected:  07/19/20 1201    Specimen:  Blood Updated:  07/19/20 1206     Glucose 141 mg/dL      Comment: Serial Number: [id number]:  [id number]       Blood Culture - Blood, Arm, Left [[id number]] Collected:  07/16/20 0857    Specimen:  Blood from Arm, Left Updated:  07/19/20 0915     Blood Culture No growth at 3 days    Blood Culture - Blood, Arm, Right [[id number]] Collected:  07/16/20 0858    Specimen:  Blood from Arm, Right Updated:  07/19/20 0915     Blood Culture No growth at 3 days    POC Glucose Once [[id number]]  (Abnormal) Collected:  07/19/20 0748    Specimen:  Blood Updated:  07/19/20 0749     Glucose 130 mg/dL      Comment: Serial Number: [id number]:  [id number]       BUN [[id number]]  (Normal) Collected:  07/19/20 0507    Specimen:  Blood Updated:  07/19/20 0747     BUN 10 mg/dL     CBC Auto Differential [[id number]]  (Abnormal) Collected:  07/19/20 0507    Specimen:  Blood Updated:  07/19/20 0659     WBC 13.10 10*3/mm3      RBC 4.18 10*6/mm3      Hemoglobin 11.7 g/dL      Hematocrit 35.5 %      MCV 84.8 fL      MCH 28.1 pg      MCHC 33.1 g/dL      RDW 14.1 %      RDW-SD 42.0 fl      MPV 7.6 fL      Platelets 298 10*3/mm3      Scan Slide [[id number]] Collected:  07/19/20 0507    Specimen:  Blood Updated:  07/19/20 0659     Scan Slide --     Comment: See Manual Differential Results       Manual Differential [[id number]]  (Abnormal) Collected:  07/19/20 0507    Specimen:  Blood Updated:  07/19/20 0659     Neutrophil % 71.0 %      Lymphocyte % 8.0 %      Monocyte % 2.0 %      Eosinophil % 2.0 %      Bands %  13.0 %      Metamyelocyte % 1.0 %      Myelocyte % 3.0 %      Neutrophils Absolute 11.00 10*3/mm3      Lymphocytes Absolute 1.05 10*3/mm3      Monocytes Absolute 0.26 10*3/mm3      Eosinophils Absolute 0.26 10*3/mm3      Polychromasia Slight/1+     WBC Morphology Normal     Platelet Morphology Normal    Basic Metabolic Panel [[id number]]  (Abnormal) Collected:  07/19/20 0507    Specimen:  Blood Updated:  07/19/20 0545     Glucose 122 mg/dL      BUN --     Comment: Testing performed by alternate method        Creatinine 0.71 mg/dL      Sodium 138 mmol/L      Potassium 3.4 mmol/L      Comment: Specimen hemolyzed.  Results may be affected.        Chloride 99 mmol/L      CO2 30.0 mmol/L      Calcium 8.2 mg/dL      eGFR Non African Amer 83 mL/min/1.73      BUN/Creatinine Ratio --     Comment: Testing not performed        Anion Gap 9.0 mmol/L     Narrative:       GFR Normal >60  Chronic Kidney Disease <60  Kidney Failure <15      POC Glucose Once [[id number]]  (Abnormal) Collected:  07/18/20 2257    Specimen:  Blood Updated:  07/18/20 2258     Glucose 127 mg/dL      Comment: Serial Number: [id number]:  [id number]       POC Glucose Once [[id number]]  (Abnormal) Collected:  07/18/20 1622    Specimen:  Blood Updated:  07/18/20 1710     Glucose 121 mg/dL      Comment: Serial Number: [id number]:  [id number]           No results found for: HGBA1C  Results from last 7 days   Lab Units 07/18/20  0547 07/17/20  0524 07/16/20  0824   INR  0.88* 0.97* 3.19*       Results from last 7 days   Lab Units 07/16/20  1316   PH, ARTERIAL pH units 7.411    PO2 ART mm Hg 477.0*   PCO2, ARTERIAL mm Hg 48.0*   HCO3 ART mmol/L 30.7*     Lab Results   Component Value Date    LIPASE 20 07/16/2020     No results found for: CHOL, CHLPL, TRIG, HDL, LDL, LDLDIRECT    No results found for: INTRAOP, PREDX, FINALDX, COMDX    Microbiology Results (last 10 days)     Procedure Component Value - Date/Time    COVID-19 [facility] [employer] IN-HOUSE, Nasal Swab No Transport Media - Swab, Nasal Cavity [[id number]]  (Normal) Collected:  07/16/20 1102    Lab Status:  Final result Specimen:  Swab from Nasal Cavity Updated:  07/16/20 1147     COVID19 Not Detected    Narrative:       Fact sheet for providers: https://www.fda.gov/media/311832/download     Fact sheet for patients: https://www.fda.gov/media/077803/download    Blood Culture - Blood, Arm, Right [[id number]] Collected:  07/16/20 0858    Lab Status:  Preliminary result Specimen:  Blood from Arm, Right Updated:  07/19/20 0915     Blood Culture No growth at 3 days    Blood Culture - Blood, Arm, Left [[id number]] Collected:  07/16/20 0857    Lab Status:  Preliminary result Specimen:  Blood from Arm, Left Updated:  07/19/20 0915     Blood Culture No growth at 3 days          ECG/EMG Results (most recent)     Procedure Component Value Units Date/Time    ECG 12 Lead [[id number]] Collected:  07/16/20 0827     Updated:  07/18/20 0902    Narrative:       HEART RATE= 87  bpm  RR Interval= 692  ms  KS Interval= 169  ms  P Horizontal Axis= -6  deg  P Front Axis= 46  deg  QRSD Interval= 110  ms  QT Interval= 397  ms  QRS Axis= 61  deg  T Wave Axis= 67  deg  - ABNORMAL ECG -  Sinus rhythm  Nonspecific T abnrm, anterolateral leads  When compared with ECG of 11-Apr-2017 3:34:13,  Significant repolarization change  Electronically Signed By: Clement Griffith (PIPO) 18-Jul-2020 09:01:43  Date and Time of Study: 2020-07-16 08:27:30          Results for orders placed during the hospital encounter of 09/05/19   Duplex Venous Lower Extremity - Bilateral    Narrative ·  Chronic right lower extremity deep vein thrombosis noted in the proximal   femoral, mid femoral, distal femoral and popliteal.  · Acute right lower extremity deep vein thrombosis noted in the peroneal.  · All other veins appeared normal bilaterally.               Ct Abdomen Pelvis With Contrast    Result Date: 7/16/2020   1. 1. FINDINGS consistent with perforated acute sigmoid diverticulitis. Free air is seen in the abdomen and pelvis. Small amount of pelvic free fluid is present without well-defined abscess collection. I notified Dr. Griffith in the emergency room at the time of this dictation. 2. Additional chronic findings as described above.    Electronically Signed By-Dr. Daly Rich MD On:7/16/2020 10:18 AM This report was finalized on [id number] by Dr. Daly Rich MD.    Xr Chest 1 View    Result Date: 7/16/2020    1.  Right internal jugular central venous catheter in place the tip taking of the superior vena cava.  No evidence of pneumothorax 2.  Borderline heart size with pulmonary vascular congestion and prominent interstitial markings suggesting mild interstitial edema. 3.  Chronic left basilar airspace disease compatible scarring.  Electronically Signed By-Renny Trinh On:7/16/2020 3:55 PM This report was finalized on [id number] by  Renny Trinh, .          Xrays, labs reviewed personally by physician.    Medication Review:   I have reviewed the patient's current medication list      Scheduled Meds    albuterol      budesonide-formoterol 2 puff Inhalation BID - RT   enoxaparin 40 mg Subcutaneous Q24H   famotidine 20 mg Intravenous Daily   FLUoxetine 10 mg Oral Daily   guaiFENesin 600 mg Oral Q12H   insulin lispro 0-7 Units Subcutaneous TID With Meals   ipratropium-albuterol 3 mL Nebulization 4x Daily - RT   piperacillin-tazobactam 3.375 g Intravenous Q8H   sodium chloride 10 mL Intravenous Q12H       Meds Infusions    sodium chloride 125 mL/hr Last Rate: 125 mL/hr (07/17/20 3989)        Meds PRN  •  acetaminophen **OR** acetaminophen **OR** acetaminophen  •  albuterol  •  aluminum-magnesium hydroxide-simethicone  •  bisacodyl  •  dextrose  •  dextrose  •  glucagon (human recombinant)  •  hydrALAZINE  •  HYDROmorphone  •  insulin lispro **AND** insulin lispro  •  magnesium hydroxide  •  magnesium sulfate **OR** magnesium sulfate in D5W 1g/100mL (PREMIX)  •  melatonin  •  nitroglycerin  •  ondansetron **OR** ondansetron  •  oxyCODONE **OR** oxyCODONE  •  potassium chloride **OR** potassium chloride **OR** potassium chloride  •  promethazine **OR** promethazine **OR** promethazine **OR** promethazine  •  sodium chloride  •  traZODone    I personally reviewed patient's x-ray films and my findings are    I personally reviewed patient's EKG strips and my findings are     Assessment/Plan   Assessment/Plan     Active Hospital Problems    Diagnosis  POA   • Acute hypokalemia [E87.6]  Yes   • Severe sepsis (CMS/HCC) [A41.9, R65.20]  Yes   • Acute kidney injury (CMS/HCC) [N17.9]  Yes   • Acute hyponatremia [E87.1]  Yes   • COPD (chronic obstructive pulmonary disease) (CMS/HCC) [J44.9]  Yes   • Chronic back pain [M54.9, G89.29]  Yes   • Depressive disorder [F32.9]  Yes   • Essential hypertension [I10]  Yes   • Gastroesophageal reflux disease [K21.9]  Yes   • Insomnia [G47.00]  Yes   • Chronic anticoagulation [Z79.01]  Not Applicable   • Obesity [E66.9]  Yes      Resolved Hospital Problems    Diagnosis Date Resolved POA   • **Perforation of sigmoid colon due to diverticulitis [K57.20] 07/16/2020 Yes   • Free intraperitoneal air [K66.8] 07/16/2020 Yes       MEDICAL DECISION MAKING COMPLEXITY BY PROBLEM:     Sepsis d/t perforation of sigmoid colon d/t diverticulitis s/p exploratory laparotomy hartmans procedure   - blood cx- NGTD  - IV fluids  - continue IV zosyn  - on warfarin and was given Kcentra and Vit K in the ED  - general surgery following   - encourage incentive spirometer   - pain control as per  surgery   - diet as per surgery     Acute hypokalemia  - replacement protocol   - magnesium 2.4     Acute hyponatremia- resolved   - likely d/t volume depletion     COPD (chronic obstructive pulmonary disease), former smoker   - add scheduled DuoNebs  - Symbicort substituted for home Trelegy Ellipta  - wean supplemental oxygen as tolerated       Chronic back pain  - hold Norco (INSPECT reviewed) while be treated for acute pain   - PRN IV morphine     Depressive disorder  - resume Prozac      Gastroesophageal reflux disease  - ?patient on 2 PPIs and Pepcid at home?  - hold PO meds while NPO  - IV Pepcid daily     Essential hypertension, chronic  - initially hypotensive  - improved with IV fluids in the ED   - hold oral antihypertensives for now  - PRN IV hydralazine for SBP>160  - monitor BP with routine vital signs      Insomnia  - resume trazodone      History of DVT/PE, on Chronic anticoagulation  - resume warfarin      Obesity  - BMI 31.01  - encourage lifestyle modifications      DVT Propylaxis  - resume warfarin       Dispo- home once having ostomy output and tolerating diet         VTE Prophylaxis -   Mechanical Order History:      Ordered        07/16/20 1128  Place Sequential Compression Device  Once         07/16/20 1128  Maintain Sequential Compression Device  Continuous                 Pharmalogical Order History:     Ordered     Dose Route Frequency Stop    07/17/20 0810  enoxaparin (LOVENOX) syringe 40 mg      40 mg SC Every 24 Hours --            Code Status -   Code Status and Medical Interventions:   Ordered at: 07/16/20 1128     Code Status:    CPR     Medical Interventions (Level of Support Prior to Arrest):    Full             Discharge Planning          Destination      Coordination has not been started for this encounter.      Durable Medical Equipment      Coordination has not been started for this encounter.      Dialysis/Infusion      Coordination has not been started for this encounter.       Home Medical Care      Service Provider Request Status Selected Services Address Phone Number Fax Number    [facility] Pending - Request Sent N/A [address] [place] [address] [phone] --       Vianey Domínguez RN 7/17/2020 1407    New ostomy               [facility] Declined  not enough staff to meet patient needs N/A [address] [place] [id number] [phone]       Vianey Domínguez RN 7/17/2020 1354    New ostomy                 Therapy      Coordination has not been started for this encounter.      Community Resources      Coordination has not been started for this encounter.            Electronically signed by Clemente Guerrero DO, 07/19/20, 15:02.  [facility] Hospitalist Team

## 2020-07-20 LAB
ANION GAP SERPL CALCULATED.3IONS-SCNC: 11 MMOL/L (ref 5–15)
BUN SERPL-MCNC: 7 MG/DL (ref 8–23)
BUN SERPL-MCNC: ABNORMAL MG/DL
BUN/CREAT SERPL: ABNORMAL
CALCIUM SPEC-SCNC: 8.4 MG/DL (ref 8.6–10.5)
CHLORIDE SERPL-SCNC: 101 MMOL/L (ref 98–107)
CO2 SERPL-SCNC: 28 MMOL/L (ref 22–29)
CREAT SERPL-MCNC: 0.62 MG/DL (ref 0.57–1)
DEPRECATED RDW RBC AUTO: 42.9 FL (ref 37–54)
EOSINOPHIL # BLD MANUAL: 0.11 10*3/MM3 (ref 0–0.4)
EOSINOPHIL NFR BLD MANUAL: 1 % (ref 0.3–6.2)
ERYTHROCYTE [DISTWIDTH] IN BLOOD BY AUTOMATED COUNT: 14.4 % (ref 12.3–15.4)
GFR SERPL CREATININE-BSD FRML MDRD: 97 ML/MIN/1.73
GLUCOSE SERPL-MCNC: 150 MG/DL (ref 65–99)
HCT VFR BLD AUTO: 35.7 % (ref 34–46.6)
HGB BLD-MCNC: 11.9 G/DL (ref 12–15.9)
INR PPP: 1.04 (ref 2–3)
LAB AP CASE REPORT: NORMAL
LYMPHOCYTES # BLD MANUAL: 1.85 10*3/MM3 (ref 0.7–3.1)
LYMPHOCYTES NFR BLD MANUAL: 17 % (ref 19.6–45.3)
LYMPHOCYTES NFR BLD MANUAL: 6 % (ref 5–12)
MCH RBC QN AUTO: 28.3 PG (ref 26.6–33)
MCHC RBC AUTO-ENTMCNC: 33.3 G/DL (ref 31.5–35.7)
MCV RBC AUTO: 85 FL (ref 79–97)
MONOCYTES # BLD AUTO: 0.65 10*3/MM3 (ref 0.1–0.9)
MYELOCYTES NFR BLD MANUAL: 2 % (ref 0–0)
NEUTROPHILS # BLD AUTO: 7.96 10*3/MM3 (ref 1.7–7)
NEUTROPHILS NFR BLD MANUAL: 64 % (ref 42.7–76)
NEUTS BAND NFR BLD MANUAL: 9 % (ref 0–5)
PATH REPORT.FINAL DX SPEC: NORMAL
PATH REPORT.GROSS SPEC: NORMAL
PLATELET # BLD AUTO: 317 10*3/MM3 (ref 140–450)
PMV BLD AUTO: 7.3 FL (ref 6–12)
POTASSIUM SERPL-SCNC: 3.3 MMOL/L (ref 3.5–5.2)
PROTHROMBIN TIME: 10.8 SECONDS (ref 19.4–28.5)
RBC # BLD AUTO: 4.2 10*6/MM3 (ref 3.77–5.28)
RBC MORPH BLD: NORMAL
SCAN SLIDE: NORMAL
SMALL PLATELETS BLD QL SMEAR: ADEQUATE
SODIUM SERPL-SCNC: 140 MMOL/L (ref 136–145)
VARIANT LYMPHS NFR BLD MANUAL: 1 % (ref 0–5)
WBC # BLD AUTO: 10.9 10*3/MM3 (ref 3.4–10.8)
WBC MORPH BLD: NORMAL

## 2020-07-20 PROCEDURE — 25010000002 PIPERACILLIN SOD-TAZOBACTAM PER 1 G: Performed by: SURGERY

## 2020-07-20 PROCEDURE — 85007 BL SMEAR W/DIFF WBC COUNT: CPT | Performed by: SURGERY

## 2020-07-20 PROCEDURE — 97116 GAIT TRAINING THERAPY: CPT

## 2020-07-20 PROCEDURE — 80048 BASIC METABOLIC PNL TOTAL CA: CPT | Performed by: SURGERY

## 2020-07-20 PROCEDURE — 99232 SBSQ HOSP IP/OBS MODERATE 35: CPT | Performed by: HOSPITALIST

## 2020-07-20 PROCEDURE — 94799 UNLISTED PULMONARY SVC/PX: CPT

## 2020-07-20 PROCEDURE — 85610 PROTHROMBIN TIME: CPT | Performed by: INTERNAL MEDICINE

## 2020-07-20 PROCEDURE — 85025 COMPLETE CBC W/AUTO DIFF WBC: CPT | Performed by: SURGERY

## 2020-07-20 PROCEDURE — 99024 POSTOP FOLLOW-UP VISIT: CPT | Performed by: SURGERY

## 2020-07-20 RX ORDER — WARFARIN SODIUM 3 MG/1
1.5 TABLET ORAL
Status: DISCONTINUED | OUTPATIENT
Start: 2020-07-21 | End: 2020-07-21

## 2020-07-20 RX ORDER — WARFARIN SODIUM 3 MG/1
3 TABLET ORAL
Status: COMPLETED | OUTPATIENT
Start: 2020-07-20 | End: 2020-07-20

## 2020-07-20 RX ADMIN — OXYCODONE HYDROCHLORIDE 10 MG: 5 TABLET ORAL at 22:26

## 2020-07-20 RX ADMIN — GUAIFENESIN 600 MG: 600 TABLET, EXTENDED RELEASE ORAL at 10:41

## 2020-07-20 RX ADMIN — POTASSIUM CHLORIDE 40 MEQ: 1500 TABLET, EXTENDED RELEASE ORAL at 04:36

## 2020-07-20 RX ADMIN — IPRATROPIUM BROMIDE AND ALBUTEROL SULFATE 3 ML: 2.5; .5 SOLUTION RESPIRATORY (INHALATION) at 14:31

## 2020-07-20 RX ADMIN — OXYCODONE HYDROCHLORIDE 10 MG: 5 TABLET ORAL at 04:36

## 2020-07-20 RX ADMIN — IPRATROPIUM BROMIDE AND ALBUTEROL SULFATE 3 ML: 2.5; .5 SOLUTION RESPIRATORY (INHALATION) at 21:15

## 2020-07-20 RX ADMIN — FLUOXETINE 10 MG: 10 CAPSULE ORAL at 10:41

## 2020-07-20 RX ADMIN — PIPERACILLIN AND TAZOBACTAM 3.38 G: 3; .375 INJECTION, POWDER, FOR SOLUTION INTRAVENOUS at 17:57

## 2020-07-20 RX ADMIN — IPRATROPIUM BROMIDE AND ALBUTEROL SULFATE 3 ML: 2.5; .5 SOLUTION RESPIRATORY (INHALATION) at 10:53

## 2020-07-20 RX ADMIN — BUDESONIDE AND FORMOTEROL FUMARATE DIHYDRATE 2 PUFF: 160; 4.5 AEROSOL RESPIRATORY (INHALATION) at 07:19

## 2020-07-20 RX ADMIN — PIPERACILLIN AND TAZOBACTAM 3.38 G: 3; .375 INJECTION, POWDER, FOR SOLUTION INTRAVENOUS at 06:04

## 2020-07-20 RX ADMIN — GUAIFENESIN 600 MG: 600 TABLET, EXTENDED RELEASE ORAL at 22:26

## 2020-07-20 RX ADMIN — ALUMINUM HYDROXIDE, MAGNESIUM HYDROXIDE, AND DIMETHICONE 15 ML: 400; 400; 40 SUSPENSION ORAL at 22:26

## 2020-07-20 RX ADMIN — WARFARIN SODIUM 3 MG: 3 TABLET ORAL at 17:58

## 2020-07-20 RX ADMIN — MELATONIN TAB 5 MG 5 MG: 5 TAB at 22:26

## 2020-07-20 RX ADMIN — TRAZODONE HYDROCHLORIDE 100 MG: 100 TABLET ORAL at 22:26

## 2020-07-20 RX ADMIN — BUDESONIDE AND FORMOTEROL FUMARATE DIHYDRATE 2 PUFF: 160; 4.5 AEROSOL RESPIRATORY (INHALATION) at 21:16

## 2020-07-20 RX ADMIN — IPRATROPIUM BROMIDE AND ALBUTEROL SULFATE 3 ML: 2.5; .5 SOLUTION RESPIRATORY (INHALATION) at 07:19

## 2020-07-20 RX ADMIN — PIPERACILLIN AND TAZOBACTAM 3.38 G: 3; .375 INJECTION, POWDER, FOR SOLUTION INTRAVENOUS at 00:01

## 2020-07-20 RX ADMIN — OXYCODONE HYDROCHLORIDE 10 MG: 5 TABLET ORAL at 11:56

## 2020-07-20 RX ADMIN — OXYCODONE HYDROCHLORIDE 10 MG: 5 TABLET ORAL at 17:58

## 2020-07-20 RX ADMIN — PANTOPRAZOLE SODIUM 40 MG: 40 TABLET, DELAYED RELEASE ORAL at 10:41

## 2020-07-20 NOTE — PROGRESS NOTES
"      [facility] Medicine Services Daily Progress Note      Hospitalist Team  LOS 4 days      Patient Care Team:  Michael Gerardo MD as PCP - General    Patient Location: 4130/1      Subjective   Subjective     Chief Complaint / Subjective  Chief Complaint   Patient presents with   • Abdominal Pain         Brief Synopsis of Hospital Course/HPI    The patient is a  64 y.o. female with history of COPD, HTN, GERD, diverticulitis, chronic back pain with opiate dependence, DVT/PE on warfarin, depression, and insomnia.  The patient presented to [facility] ED on 07/16/2020 complaining of 3 days of abdominal pain.  The patient claimed that she was initially constipated and took laxatives which caused her to have multiple loose bowel movements followed by abdominal bloating and firmness thus came to the ED.      In the ED, WBC was 27.2, glucose 141, BP 89/53, and was given Zosyn for sepsis.  CT of the abdomen and pelvis showed perforated acute sigmoid diverticulitis with free air seen in abdomen and pelvis.     Date::    7/20/20: Pain controlled.  Afebrile.  Diet advanced to full liquids.  PRANAY drain present.      Review of Systems   All other systems reviewed and are negative.        Objective   Objective      Vital Signs  Temp:  [98.1 °F (36.7 °C)-98.5 °F (36.9 °C)] 98.1 °F (36.7 °C)  Heart Rate:  [] 101  Resp:  [13-20] 20  BP: (134-152)/(74-83) 134/83  Oxygen Therapy  SpO2: 97 %  Pulse Oximetry Type: Intermittent  Device (Oxygen Therapy): room air  Device (Oxygen Therapy): nasal cannula  Flow (L/min): 3  Flowsheet Rows      First Filed Value   Admission Height  160 cm (63\") Documented at 07/16/2020 0739   Admission Weight  79.4 kg (175 lb 0.7 oz) Documented at 07/16/2020 0739        Intake & Output (last 3 days)       07/17 0701 - 07/18 0700 07/18 0701 - 07/19 0700 07/19 0701 - 07/20 0700 07/20 0701 - 07/21 0700    P.O.  360    I.V. (mL/kg)        IV Piggyback 100 100      " Total Intake(mL/kg) 340 (4.1) 460 (5.5) 1080 (12.8) 360 (4.3)    Urine (mL/kg/hr) 950 (0.5) 300 (0.1) 1000 (0.5) 400 (0.5)    Drains 35 100 105     Stool 0 0 20 60    Blood        Total Output  460    Net -645 +60 -45 -100            Urine Unmeasured Occurrence 1 x 2 x          Lines, Drains & Airways    Active LDAs     Name:   Placement date:   Placement time:   Site:   Days:    Peripheral IV 07/20/20 1145 Posterior;Right Hand   07/20/20    1145    Hand   less than 1    Closed/Suction Drain RLQ Bulb 19 Fr.   07/16/20    1428    RLQ   4    Colostomy LUQ   07/16/20    1423    LUQ   4                  Physical Exam:    Physical Exam   Constitutional: She is oriented to person, place, and time.   HENT:   Head: Normocephalic and atraumatic.   Eyes: Pupils are equal, round, and reactive to light. EOM are normal.   Neck: Normal range of motion. Neck supple.   Cardiovascular: Normal rate.   Pulmonary/Chest: Effort normal and breath sounds normal.   Abdominal: Soft. Bowel sounds are normal.       Musculoskeletal: Normal range of motion.   Neurological: She is alert and oriented to person, place, and time.   Skin: Skin is warm.   Psychiatric: She has a normal mood and affect.            Wounds (last 24 hours)      LDA Wound     Row Name 07/20/20 0900 07/20/20 0400 07/20/20 0000       Wound 07/16/20 1327 lower;midline abdomen Incision    Wound - Properties Group Date first assessed: 07/16/20  -TD Time first assessed: 1327  -TD Orientation: lower;midline  -TD Location: abdomen  -TD, INCISION FOR COLOSTOMY  Primary Wound Type: Incision  -TD    Dressing Appearance  dry;intact;no drainage  -BD  dry;intact;no drainage  -SM  dry;intact;no drainage  -SM    Closure  TERI  -BD  TERI  -SM  TERI  -SM    Base  dressing in place, unable to visualize  -BD  dressing in place, unable to visualize  -SM  dressing in place, unable to visualize  -SM    Drainage Amount  none  -BD  none  -SM  none  -SM    Dressing Care, Wound  border  dressing  -BD  --  --       Wound 07/16/20 1339 perineum MASD (Moisture associated skin damage)    Wound - Properties Group Date first assessed: 07/16/20  -TD Time first assessed: 1339  -TD Present on Hospital Admission: Y  -TD Location: perineum  -TD Primary Wound Type: MASD  -TD, WHEN PLACING BOLANOS PATIENT WITH LEAKING OF STOOL. WITH OBSERVATION OF PERINEUM RED, EXSCORIATED AREAS APPARENT ON BOTH SIDES.     Dressing Appearance  dry;intact;open to air  -BD  dry;intact;open to air  -SM  dry;intact;open to air  -SM    Row Name 07/19/20 2000             Wound 07/16/20 1327 lower;midline abdomen Incision    Wound - Properties Group Date first assessed: 07/16/20  -TD Time first assessed: 1327  -TD Orientation: lower;midline  -TD Location: abdomen  -TD, INCISION FOR COLOSTOMY  Primary Wound Type: Incision  -TD    Dressing Appearance  dry;intact;no drainage  -SM      Closure  TERI  -SM      Base  dressing in place, unable to visualize  -SM      Drainage Amount  none  -SM         Wound 07/16/20 1339 perineum MASD (Moisture associated skin damage)    Wound - Properties Group Date first assessed: 07/16/20  -TD Time first assessed: 1339  -TD Present on Hospital Admission: Y  -TD Location: perineum  -TD Primary Wound Type: MASD  -TD, WHEN PLACING BOLANOS PATIENT WITH LEAKING OF STOOL. WITH OBSERVATION OF PERINEUM RED, EXSCORIATED AREAS APPARENT ON BOTH SIDES.     Dressing Appearance  dry;intact;open to air  -SM        User Key  (r) = Recorded By, (t) = Taken By, (c) = Cosigned By    Initials Name Provider Type    TD Maryana Lugo RN Registered Nurse    Aurora Gordon RN Registered Nurse    Sunshine Acuña LPN Licensed Nurse          Procedures:    Procedure(s):  LAPAROTOMY EXPLORATORY HARTMANS          Results Review:     I reviewed the patient's new clinical results.      Lab Results (last 24 hours)     Procedure Component Value Units Date/Time    Tissue Pathology Exam [[id number]] Collected:  07/16/20 1338     "Specimen:  Tissue from Large Intestine, Sigmoid Colon Updated:  07/20/20 1008     Case Report --     Surgical Pathology Report                         Case: [id number]                                  Authorizing Provider:  Chi Farmer,   Collected:           07/16/2020 01:33 PM                                 MD                                                                           Ordering Location:     [facility] MAIN  Received:            07/17/2020 09:04 AM                                 OR                                                                           Pathologist:           Cristofer Paige MD                                                            Specimen:    Large Intestine, Sigmoid Colon, sigmoid colon                                               Final Diagnosis --     Sigmoid colon, resection:    Colonic tissue with diverticulosis, acute diverticulitis and acute serositis consistent with clinical history     Resection margins viable    No dysplasia or malignancy identified    [facility]        Gross Description --     Received in formalin designated \"Sigmoid\" is an unoriented portion of large bowel measuring 13 cm in length and averages 5 cm in diameter. The serosa is pink and glistening. There is a moderate amount of yellow-brown pericolonic fatty tissue. There is also a whitish yellow purulent exudate over the mid and one oriented end portion. The bowel is opened revealing tan mucosa with usual folds, no ulcers, polyps or masses are seen, however, numerous outpouchings grossly consistent with diverticula are identified. Margins are submitted in A and B. Representative sections of presumed diverticula are submitted in C through E.      [facility]        Blood Culture - Blood, Arm, Left [[id number]] Collected:  07/16/20 0857    Specimen:  Blood from Arm, Left Updated:  07/20/20 0915     Blood Culture No growth at 4 days    Blood Culture - Blood, Arm, Right [[id number]] " Collected:  07/16/20 0858    Specimen:  Blood from Arm, Right Updated:  07/20/20 0915     Blood Culture No growth at 4 days    BUN [[id number]]  (Abnormal) Collected:  07/20/20 0329    Specimen:  Blood Updated:  07/20/20 0707     BUN 7 mg/dL     CBC Auto Differential [[id number]]  (Abnormal) Collected:  07/20/20 0329    Specimen:  Blood Updated:  07/20/20 0513     WBC 10.90 10*3/mm3      RBC 4.20 10*6/mm3      Hemoglobin 11.9 g/dL      Hematocrit 35.7 %      MCV 85.0 fL      MCH 28.3 pg      MCHC 33.3 g/dL      RDW 14.4 %      RDW-SD 42.9 fl      MPV 7.3 fL      Platelets 317 10*3/mm3     Scan Slide [[id number]] Collected:  07/20/20 0329    Specimen:  Blood Updated:  07/20/20 0513     Scan Slide --     Comment: See Manual Differential Results       Manual Differential [[id number]]  (Abnormal) Collected:  07/20/20 0329    Specimen:  Blood Updated:  07/20/20 0513     Neutrophil % 64.0 %      Lymphocyte % 17.0 %      Monocyte % 6.0 %      Eosinophil % 1.0 %      Bands %  9.0 %      Myelocyte % 2.0 %      Atypical Lymphocyte % 1.0 %      Neutrophils Absolute 7.96 10*3/mm3      Lymphocytes Absolute 1.85 10*3/mm3      Monocytes Absolute 0.65 10*3/mm3      Eosinophils Absolute 0.11 10*3/mm3      RBC Morphology Normal     WBC Morphology Normal     Platelet Estimate Adequate    Basic Metabolic Panel [[id number]]  (Abnormal) Collected:  07/20/20 0329    Specimen:  Blood Updated:  07/20/20 0420     Glucose 150 mg/dL      BUN --     Comment: Testing performed by alternate method        Creatinine 0.62 mg/dL      Sodium 140 mmol/L      Potassium 3.3 mmol/L      Chloride 101 mmol/L      CO2 28.0 mmol/L      Calcium 8.4 mg/dL      eGFR Non African Amer 97 mL/min/1.73      BUN/Creatinine Ratio --     Comment: Testing not performed        Anion Gap 11.0 mmol/L     Narrative:       GFR Normal >60  Chronic Kidney Disease <60  Kidney Failure <15      Protime-INR [[id number]]  (Abnormal) Collected:  07/20/20 0329    Specimen:  Blood Updated:   07/20/20 0359     Protime 10.8 Seconds      INR 1.04        No results found for: HGBA1C  Results from last 7 days   Lab Units 07/20/20  0329 07/19/20  1636 07/18/20  0547   INR  1.04* 0.97* 0.88*       Results from last 7 days   Lab Units 07/16/20  1316   PH, ARTERIAL pH units 7.411   PO2 ART mm Hg 477.0*   PCO2, ARTERIAL mm Hg 48.0*   HCO3 ART mmol/L 30.7*     Lab Results   Component Value Date    LIPASE 20 07/16/2020     No results found for: CHOL, CHLPL, TRIG, HDL, LDL, LDLDIRECT    Lab Results   Lab Value Date/Time    FINALDX  07/16/2020 1333     Sigmoid colon, resection:    Colonic tissue with diverticulosis, acute diverticulitis and acute serositis consistent with clinical history     Resection margins viable    No dysplasia or malignancy identified    [facility]          Microbiology Results (last 10 days)     Procedure Component Value - Date/Time    COVID-19 [employer] IN-HOUSE, Nasal Swab No Transport Media - Swab, Nasal Cavity [[id number]]  (Normal) Collected:  07/16/20 1102    Lab Status:  Final result Specimen:  Swab from Nasal Cavity Updated:  07/16/20 1147     COVID19 Not Detected    Narrative:       Fact sheet for providers: https://www.fda.gov/media/068884/download     Fact sheet for patients: https://www.fda.gov/media/563825/download    Blood Culture - Blood, Arm, Right [[id number]] Collected:  07/16/20 0858    Lab Status:  Preliminary result Specimen:  Blood from Arm, Right Updated:  07/20/20 0915     Blood Culture No growth at 4 days    Blood Culture - Blood, Arm, Left [[id number]] Collected:  07/16/20 0857    Lab Status:  Preliminary result Specimen:  Blood from Arm, Left Updated:  07/20/20 0915     Blood Culture No growth at 4 days          ECG/EMG Results (most recent)     Procedure Component Value Units Date/Time    ECG 12 Lead [[id number]] Collected:  07/16/20 0827     Updated:  07/18/20 0902    Narrative:       HEART RATE= 87  bpm  RR Interval= 692  ms  GA Interval= 169  ms  P Horizontal Axis= -6   deg  P Front Axis= 46  deg  QRSD Interval= 110  ms  QT Interval= 397  ms  QRS Axis= 61  deg  T Wave Axis= 67  deg  - ABNORMAL ECG -  Sinus rhythm  Nonspecific T abnrm, anterolateral leads  When compared with ECG of 11-Apr-2017 3:34:13,  Significant repolarization change  Electronically Signed By: Clement Griffith (PIPO) 18-Jul-2020 09:01:43  Date and Time of Study: 2020-07-16 08:27:30          Results for orders placed during the hospital encounter of 09/05/19   Duplex Venous Lower Extremity - Bilateral    Narrative · Chronic right lower extremity deep vein thrombosis noted in the proximal   femoral, mid femoral, distal femoral and popliteal.  · Acute right lower extremity deep vein thrombosis noted in the peroneal.  · All other veins appeared normal bilaterally.               Ct Abdomen Pelvis With Contrast    Result Date: 7/16/2020   1. 1. FINDINGS consistent with perforated acute sigmoid diverticulitis. Free air is seen in the abdomen and pelvis. Small amount of pelvic free fluid is present without well-defined abscess collection. I notified Dr. Griffith in the emergency room at the time of this dictation. 2. Additional chronic findings as described above.    Electronically Signed By-Dr. Daly Rich MD On:7/16/2020 10:18 AM This report was finalized on [id number] by Dr. Daly Rich MD.    Xr Chest 1 View    Result Date: 7/16/2020    1.  Right internal jugular central venous catheter in place the tip taking of the superior vena cava.  No evidence of pneumothorax 2.  Borderline heart size with pulmonary vascular congestion and prominent interstitial markings suggesting mild interstitial edema. 3.  Chronic left basilar airspace disease compatible scarring.  Electronically Signed By-Renny Trinh On:7/16/2020 3:55 PM This report was finalized on [id number] by  Renny Trinh, .          Xrays, labs reviewed personally by physician.    Medication Review:   I have reviewed the patient's current medication  list      Scheduled Meds    budesonide-formoterol 2 puff Inhalation BID - RT   FLUoxetine 10 mg Oral Daily   guaiFENesin 600 mg Oral Q12H   ipratropium-albuterol 3 mL Nebulization 4x Daily - RT   pantoprazole 40 mg Oral QAM AC   piperacillin-tazobactam 3.375 g Intravenous Q8H   sodium chloride 10 mL Intravenous Q12H   [START ON 7/21/2020] warfarin 1.5 mg Oral Daily   warfarin 3 mg Oral Once       Meds Infusions    Pharmacy to dose warfarin     sodium chloride 125 mL/hr Last Rate: 125 mL/hr (07/17/20 1249)       Meds PRN  •  acetaminophen **OR** acetaminophen **OR** acetaminophen  •  albuterol  •  aluminum-magnesium hydroxide-simethicone  •  bisacodyl  •  hydrALAZINE  •  HYDROmorphone  •  magnesium hydroxide  •  magnesium sulfate **OR** magnesium sulfate in D5W 1g/100mL (PREMIX)  •  melatonin  •  nitroglycerin  •  ondansetron **OR** ondansetron  •  oxyCODONE **OR** oxyCODONE  •  Pharmacy to dose warfarin  •  potassium chloride **OR** potassium chloride **OR** potassium chloride  •  promethazine **OR** promethazine **OR** promethazine **OR** promethazine  •  sodium chloride  •  traZODone        Assessment/Plan   Assessment/Plan     Active Hospital Problems    Diagnosis  POA   • Acute hypokalemia [E87.6]  Yes   • Severe sepsis (CMS/HCC) [A41.9, R65.20]  Yes   • Acute kidney injury (CMS/HCC) [N17.9]  Yes   • Acute hyponatremia [E87.1]  Yes   • COPD (chronic obstructive pulmonary disease) (CMS/[facility]) [J44.9]  Yes   • Chronic back pain [M54.9, G89.29]  Yes   • Depressive disorder [F32.9]  Yes   • Essential hypertension [I10]  Yes   • Gastroesophageal reflux disease [K21.9]  Yes   • Insomnia [G47.00]  Yes   • Chronic anticoagulation [Z79.01]  Not Applicable   • Obesity [E66.9]  Yes      Resolved Hospital Problems    Diagnosis Date Resolved POA   • **Perforation of sigmoid colon due to diverticulitis [K57.20] 07/16/2020 Yes   • Free intraperitoneal air [K66.8] 07/16/2020 Yes       MEDICAL DECISION MAKING COMPLEXITY BY  PROBLEM:     Sepsis due to perforation of sigmoid colon from diverticulitis  - s/p exploratory laparotomy hartmans procedure 7/16/20  - continue IV zosyn  - INR reversed before surgery  - pain control as per surgery      Acute hypokalemia:  -Resolved  - replacement protocol     Acute hyponatremia:  - resolved     COPD:  -Not in exacerbation   - continue bronchodilators      Chronic back pain with opiate dependence:  -On Norco at home  - PRN IV morphine     Depression:  - resume Prozac      Gastroesophageal reflux disease  - IV Pepcid daily     Essential hypertension:  -Medications held while n.p.o.  -Monitor for hypotension     Insomnia  - resume trazodone      History of DVT/PE:  -Chronic warfarin      Obesity  - BMI 31.01  - encourage lifestyle modifications        VTE Prophylaxis -   Mechanical Order History:      Ordered        07/16/20 1128  Place Sequential Compression Device  Once         07/16/20 1128  Maintain Sequential Compression Device  Continuous                 Pharmalogical Order History:     Ordered     Dose Route Frequency Stop    07/20/20 1453  warfarin (COUMADIN) tablet 1.5 mg     Question:  Target INR  Answer:  2 - 3    1.5 mg PO Daily Warfarin --    07/19/20 1559  warfarin (COUMADIN) tablet 1.5 mg  Status:  Discontinued     Question:  Target INR  Answer:  2 - 3    1.5 mg PO Daily Warfarin 07/20/20 1453    07/20/20 1453  warfarin (COUMADIN) tablet 3 mg     Question:  Target INR  Answer:  2 - 3    3 mg PO Once (Warfarin) --    07/19/20 1559  warfarin (COUMADIN) tablet 3 mg     Question:  Target INR  Answer:  2 - 3    3 mg PO Once (Warfarin) 07/19/20 1810    07/19/20 1505  Pharmacy to dose warfarin     Question:  Target INR  Answer:  2 - 3    -- XX Continuous PRN --    07/17/20 0810  enoxaparin (LOVENOX) syringe 40 mg  Status:  Discontinued      40 mg SC Every 24 Hours 07/19/20 1505            Code Status -   Code Status and Medical Interventions:   Ordered at: 07/16/20 1128     Code Status:     CPR     Medical Interventions (Level of Support Prior to Arrest):    Full       This patient has been examined wearing appropriate Personal Protective Equipment . 07/20/20        Discharge Planning        Destination      Coordination has not been started for this encounter.      Durable Medical Equipment      Coordination has not been started for this encounter.      Dialysis/Infusion      Coordination has not been started for this encounter.      Home Medical Care      Service Provider Request Status Selected Services Address Phone Number Fax Number    [facility] Accepted N/A [address] [place] [address] [phone] --       Vianey Domínguez RN 7/17/2020 1407    New ostomy               [facility] [place] Declined  not enough staff to meet patient needs N/A [address] [place] [phone] [phone]       Vianey Domínguez RN 7/17/2020 1357    New ostomy                 Therapy      Coordination has not been started for this encounter.      Community Resources      Coordination has not been started for this encounter.            Electronically signed by Farhan Lott DO, 07/20/20, 17:17.  [facility], [place] Hospitalist Team

## 2020-07-20 NOTE — PROGRESS NOTES
Continued Stay Note  ALFREDO Wang     Patient Name: Renuka Pelayo  MRN: [id number]  Today's Date: 7/20/2020    Admit Date: 7/16/2020    Discharge Plan     Row Name 07/20/20 1253       Plan    Plan  DC Plan: Declining inpt rehab at this time. [facility] following.     Patient/Family in Agreement with Plan  yes    Plan Comments  DC Barriers: Full liquid diet placed. Dr. Howard following and monitoring output from ostomy. [facility] following. Discussed dc planning with patient while maintaining distance greater than 6 feet and wearing appropriate PPE. Patient reports she does not want to go to rehab at this time. Spoke with Alexandra from [facility] who reports she discussed with patient that the RN with  comes once per week and patient agreeable to discuss dc plans with her spouse to talk about rehab. Will continue to follow.         Chelsea Esparza, NELSON      [phone]

## 2020-07-20 NOTE — THERAPY TREATMENT NOTE
Patient Name: Renuka Pelayo  : 1955    MRN: [id number]                              Today's Date: 2020       Admit Date: 2020    Visit Dx:     ICD-10-CM ICD-9-CM   1. Sigmoid colon injury S36.503A 863.44   2. Free intraperitoneal air K66.8 568.89   3. Diverticulitis K57.92 562.11   4. Perforation of sigmoid colon due to diverticulitis K57.20 562.11     Patient Active Problem List   Diagnosis   • Acute hypokalemia   • Severe sepsis (CMS/HCC)   • Acute kidney injury (CMS/HCC)   • Acute hyponatremia   • COPD (chronic obstructive pulmonary disease) (CMS/HCC)   • Chronic back pain   • Depressive disorder   • Gastroesophageal reflux disease   • Essential hypertension   • Insomnia   • Chronic anticoagulation   • Obesity     Past Medical History:   Diagnosis Date   • Chronic back pain    • Closed nondisplaced fracture of head of right radius 2017   • Congenital deformity of right hip joint 1955   • COPD (chronic obstructive pulmonary disease) (CMS/HCC)     former smoker   • Deep venous thrombosis (CMS/HCC)     unprovoked   • Depressive disorder    • Diverticulitis of colon    • Essential hypertension    • Gastroesophageal reflux disease    • Insomnia    • Obesity    • Pulmonary embolism (CMS/HCC)     provoked by surgery     Past Surgical History:   Procedure Laterality Date   • CHOLECYSTECTOMY     • EXPLORATORY LAPAROTOMY N/A 2020    Procedure: LAPAROTOMY EXPLORATORY HARTMANS;  Surgeon: Chi Farmer MD;  Location: [facility] MAIN OR;  Service: General;  Laterality: N/A;   • HYSTERECTOMY     • TOTAL HIP ARTHROPLASTY Right    • TOTAL HIP ARTHROPLASTY REVISION Right     x3     General Information     Row Name 20 1654          PT Evaluation Time/Intention    Document Type  therapy note (daily note)  -CM     Mode of Treatment  physical therapy  -CM     Row Name 20 1654          General Information    Patient Profile Reviewed?  yes pt reports she does not need O2 at home and  wants to amb w/o it. O2 level 93% on room air in room.  -CM     Existing Precautions/Restrictions  oxygen therapy device and L/min;other (see comments) colostomy  -CM     Row Name 07/20/20 1654          Cognitive Assessment/Intervention- PT/OT    Orientation Status (Cognition)  oriented x 4  -CM     Row Name 07/20/20 1654          Safety Issues, Functional Mobility    Impairments Affecting Function (Mobility)  endurance/activity tolerance;pain;shortness of breath;strength  -CM       User Key  (r) = Recorded By, (t) = Taken By, (c) = Cosigned By    Initials Name Provider Type    CM Katina Llamas, PT Physical Therapist        Mobility     Row Name 07/20/20 1655          Bed Mobility Assessment/Treatment    Bed Mobility Assessment/Treatment  bed mobility (all) activities  -CM     [place] Level (Bed Mobility)  not tested  -CM     Comment (Bed Mobility)  sitting at eob when PT arrived; RN reports pt has refused to get out of bed all day; pt has been eating all meals in sitting at eob. PT reviewed w/ pt that this position aggravates her chronic back pain, plus increases risk of bedrest side effects. PT instructed pt to eat all meals in chair moving forward and to stay out of bed for remainder of day.  -CM     Row Name 07/20/20 1655          Bed-Chair Transfer    Bed-Chair [place] (Transfers)  not tested  -CM     Row Name 07/20/20 1655          Sit-Stand Transfer    Assistive Device (Sit-Stand Transfers)  walker, front-wheeled  -CM     Row Name 07/20/20 1655          Gait/Stairs Assessment/Training    Gait/Stairs Assessment/Training  gait/ambulation independence;gait/ambulation assistive device  -CM     [place] Level (Gait)  supervision  -CM     Assistive Device (Gait)  walker, front-wheeled  -CM     Distance in Feet (Gait)  200 ft x 2; needed verbal cues to step up into rw more to provide more erect posture. Pt became severely tachycardic and hypoxic while amb; required seated rest x 4 min prior to amb  back to room.   -CM     Pattern (Gait)  swing-through  -CM     Bilateral Gait Deviations  forward flexed posture  -CM       User Key  (r) = Recorded By, (t) = Taken By, (c) = Cosigned By    Initials Name Provider Type    Katina Maher, PT Physical Therapist        Obj/Interventions     Row Name 07/20/20 1658          Static Sitting Balance    Level of [place] (Unsupported Sitting, Static Balance)  independent  -CM     Sitting Position (Unsupported Sitting, Static Balance)  sitting on edge of bed  -CM     Row Name 07/20/20 1658          Dynamic Sitting Balance    Level of [place], Reaches Outside Midline (Sitting, Dynamic Balance)  independent  -CM     Sitting Position, Reaches Outside Midline (Sitting, Dynamic Balance)  sitting on edge of bed  -CM     Row Name 07/20/20 1658          Static Standing Balance    Level of [place] (Supported Standing, Static Balance)  supervision  -CM     Assistive Device Utilized (Supported Standing, Static Balance)  walker, rolling  -CM     Row Name 07/20/20 1658          Dynamic Standing Balance    Level of [place], Reaches Outside Midline (Standing, Dynamic Balance)  supervision  -CM     Assistive Device Utilized (Supported Standing, Dynamic Balance)  walker, rolling  -CM       User Key  (r) = Recorded By, (t) = Taken By, (c) = Cosigned By    Initials Name Provider Type    Katina Maher, PT Physical Therapist        Goals/Plan     Row Name 07/20/20 1703          Bed Mobility Goal 1 (PT)    Activity/Assistive Device (Bed Mobility Goal 1, PT)  bed mobility activities, all  -CM     [place] Level/Cues Needed (Bed Mobility Goal 1, PT)  conditional independence  -CM     Progress/Outcomes (Bed Mobility Goal 1, PT)  goal met  -CM     Row Name 07/20/20 1703          Transfer Goal 1 (PT)    Activity/Assistive Device (Transfer Goal 1, PT)  transfers, all  -CM     [place] Level/Cues Needed (Transfer Goal 1, PT)  independent  -CM     Time Frame  (Transfer Goal 1, PT)  long term goal (LTG);2 weeks  -CM     Progress/Outcome (Transfer Goal 1, PT)  good progress toward goal  -CM     Row Name 07/20/20 1703          Gait Training Goal 1 (PT)    [place] Level (Gait Training Goal 1, PT)  conditional independence  -CM     Distance (Gait Goal 1, PT)  150  -CM     Time Frame (Gait Training Goal 1, PT)  long term goal (LTG);2 weeks  -CM     Progress/Outcome (Gait Training Goal 1, PT)  good progress toward goal  -CM     Row Name 07/20/20 1703          Stairs Goal 1 (PT)    Activity/Assistive Device (Stairs Goal 1, PT)  stairs, all skills  -CM     [place] Level/Cues Needed (Stairs Goal 1, PT)  conditional independence  -CM     Number of Stairs (Stairs Goal 1, PT)  3  -CM     Time Frame (Stairs Goal 1, PT)  long term goal (LTG);2 weeks  -CM     Progress/Outcome (Stairs Goal 1, PT)  goal ongoing  -CM       User Key  (r) = Recorded By, (t) = Taken By, (c) = Cosigned By    Initials Name Provider Type    Katina Maher, PT Physical Therapist        Clinical Impression     Row Name 07/20/20 1587          Pain Assessment    Additional Documentation  Pain Scale: Numbers Pre/Post-Treatment (Group)  -CM     Row Name 07/20/20 5432          Pain Scale: Numbers Pre/Post-Treatment    Pain Scale: Numbers, Pretreatment  0/10 - no pain  -CM     Pain Scale: Numbers, Post-Treatment  0/10 - no pain  -CM     Pre/Post Treatment Pain Comment  denies pain this date  -CM     Pain Intervention(s)  Emotional support;Repositioned  -CM     Row Name 07/20/20 7979          Plan of Care Review    Plan of Care Reviewed With  patient;spouse  -CM     Progress  improving  -CM     Outcome Summary  65 yo female seen s/p exploratory lap w/ colostomy on 7/16/2020. Pt is not getting out of bed enough, as she has refused when RN has encouraged this. PT discussed complications which pt is putting herself at risk of developing by decreased mobility. Pt ambulated w/ PT x 200 ft w/ rw today. However,  pt became soa. Check of vital signs revealed HR of 155 bpm, O2 sats level 70%, Required 4 min rest in sitting prior to recovery to HR of <130 and O2 > 90%. Pt will need 6 min walk prior to discharge, as she is likely to need home O2. Also will need rw for home use. Recommend home health at d/c.   -CM     Row Name 07/20/20 1658          Physical Therapy Clinical Impression    Criteria for Skilled Interventions Met (PT Clinical Impression)  yes;treatment indicated  -CM     Rehab Potential (PT Clinical Summary)  good, to achieve stated therapy goals  -CM     Row Name 07/20/20 1659          Positioning and Restraints    Pre-Treatment Position  in bed sitting eob, leaning on tray table  -CM     Post Treatment Position  chair  -CM     In Chair  sitting;call light within reach;encouraged to call for assist;exit alarm on;with family/caregiver;notified nsg  -       User Key  (r) = Recorded By, (t) = Taken By, (c) = Cosigned By    Initials Name Provider Type    Katina Maher, PT Physical Therapist        Outcome Measures    No documentation.       Physical Therapy Education                 Title: PT OT SLP Therapies (In Progress)     Topic: Physical Therapy (In Progress)     Point: Mobility training (Done)     Description:   Instruct learner(s) on safety and technique for assisting patient out of bed, chair or wheelchair.  Instruct in the proper use of assistive devices, such as walker, crutches, cane or brace.              Patient Friendly Description:   It's important to get you on your feet again, but we need to do so in a way that is safe for you. Falling has serious consequences, and your personal safety is the most important thing of all.        When it's time to get out of bed, one of us or a family member will sit next to you on the bed to give you support.     If your doctor or nurse tells you to use a walker, crutches, a cane, or a brace, be sure you use it every time you get out of bed, even if you think  you don't need it.    Learning Progress Summary           Patient Acceptance, E,TB, VU,NR by  at 7/20/2020 1704    Acceptance, E, NR by  at 7/20/2020 0323    Acceptance, E,TB, VU by  at 7/18/2020 1329    Acceptance, E,TB, VU by  at 7/17/2020 1329   Significant Other Acceptance, E,TB, VU,NR by  at 7/20/2020 1704                   Point: Home exercise program (In Progress)     Description:   Instruct learner(s) on appropriate technique for monitoring, assisting and/or progressing patient with therapeutic exercises and activities.              Learning Progress Summary           Patient Acceptance, E, NR by  at 7/20/2020 0323    Acceptance, E,TB, VU by  at 7/18/2020 1329                   Point: Body mechanics (In Progress)     Description:   Instruct learner(s) on proper positioning and spine alignment for patient and/or caregiver during mobility tasks and/or exercises.              Learning Progress Summary           Patient Acceptance, E, NR by  at 7/20/2020 0323    Acceptance, E,TB, VU by  at 7/18/2020 1329    Acceptance, E,TB, VU by  at 7/17/2020 1329                   Point: Precautions (In Progress)     Description:   Instruct learner(s) on prescribed precautions during mobility and gait tasks              Learning Progress Summary           Patient Acceptance, E, NR by  at 7/20/2020 0323    Acceptance, E,TB, VU by  at 7/18/2020 1329    Acceptance, E,TB, VU by  at 7/17/2020 1329                               User Key     Initials Effective Dates Name Provider Type Discipline     03/01/19 -  Katina Llamas, PT Physical Therapist PT     03/01/19 -  Orin Nunez, PTA Physical Therapy Assistant PT     06/23/20 -  Antwan Villalba, PT Physical Therapist PT     11/18/19 -  Aurora Chacon, RN Registered Nurse Nurse              PT Recommendation and Plan     Outcome Summary/Treatment Plan (PT)  Anticipated Equipment Needs at Discharge (PT): front wheeled walker, other (see  comments)(possible home O2)  Anticipated Discharge Disposition (PT): home with assist, home with home health  Plan of Care Reviewed With: patient, spouse  Progress: improving  Outcome Summary: 65 yo female seen s/p exploratory lap w/ colostomy on 7/16/2020. Pt is not getting out of bed enough, as she has refused when RN has encouraged this. PT discussed complications which pt is putting herself at risk of developing by decreased mobility. Pt ambulated w/ PT x 200 ft w/ rw today. However, pt became soa. Check of vital signs revealed HR of 155 bpm, O2 sats level 70%, Required 4 min rest in sitting prior to recovery to HR of <130 and O2 > 90%. Pt will need 6 min walk prior to discharge, as she is likely to need home O2. Also will need rw for home use. Recommend home health at d/c.      Time Calculation:   PT Charges     Row Name 07/20/20 1705             Time Calculation    Start Time  1535  -CM      Stop Time  1600  -CM      Time Calculation (min)  25 min  -CM      PT Received On  07/20/20  -CM      PT - Next Appointment  07/21/20  -CM         Time Calculation- PT    Total Timed Code Minutes- PT  25 minute(s)  -CM        User Key  (r) = Recorded By, (t) = Taken By, (c) = Cosigned By    Initials Name Provider Type    Katina Maher PT Physical Therapist        Therapy Charges for Today     Code Description Service Date Service Provider Modifiers Qty    [id number] HC GAIT TRAINING EA 15 MIN 7/20/2020 Katina Llamas, PT GP 2               Katina Llamas PT  7/20/2020

## 2020-07-20 NOTE — PROGRESS NOTES
"Pharmacy dosing service  Anticoagulant  Warfarin     Subjective:    Renuka Pelayo is a 64 y.o.female being continued on warfarin for hx of DVT .    INR Goal: 2 - 3  Home medication?: Yes, warfarin 2 mg PO every day at 1800  Bridge Therapy Present?:  No  Interacting Medications Evaluation (New/Present/Discontinued): NA  Additional Contributing Factors: NA      Assessment/Plan:    Pt slightly supratherapeutic on admission. Received Kcentra/Vitamin K 7/16 prior to emergent surgery for perforated diverticulitis.     Order received for pharmacy to dose warfarin.  Will give 3mg today d/t subtherapeutic INR and recent receipt Kcentra/Vitamin K.  Plan to return to home regimen when therapeutic.     Continue to monitor and adjust based on INR.         Date 7/16 7/17 7/18 7/19 7/20       INR 3.19 0.97 0.88 0.97 1.04       Dose Kcentra/ Vit K --- --- 3 mg            Objective:  [Ht: 160 cm (63\"); Wt: 84.1 kg (185 lb 6.5 oz); BMI: Body mass index is 32.84 kg/m².]    Lab Results   Component Value Date    ALBUMIN 3.50 07/16/2020     Lab Results   Component Value Date    INR 1.04 (L) 07/20/2020    INR 0.97 (L) 07/19/2020    INR 0.88 (L) 07/18/2020    PROTIME 10.8 (L) 07/20/2020    PROTIME 10.2 (L) 07/19/2020    PROTIME 9.5 (L) 07/18/2020     Lab Results   Component Value Date    HGB 11.9 (L) 07/20/2020    HGB 11.7 (L) 07/19/2020    HGB 12.7 07/18/2020     Lab Results   Component Value Date    HCT 35.7 07/20/2020    HCT 35.5 07/19/2020    HCT 38.6 07/18/2020       Eli Bateman  07/20/20 14:11       "

## 2020-07-20 NOTE — NURSING NOTE
Pt seen today for new ostomy teaching/evaluation. Pt is alert and oriented/ very engaged in teaching.  Demonstrated pouch empty and pouch change. Dicussed basic pathophy. / basic care/ and supply ordering.  Pt verbalized understanding. See LDA for stoma assessment.

## 2020-07-21 LAB
ANION GAP SERPL CALCULATED.3IONS-SCNC: 9 MMOL/L (ref 5–15)
BACTERIA SPEC AEROBE CULT: NORMAL
BACTERIA SPEC AEROBE CULT: NORMAL
BASOPHILS # BLD AUTO: 0.1 10*3/MM3 (ref 0–0.2)
BASOPHILS NFR BLD AUTO: 0.9 % (ref 0–1.5)
BUN SERPL-MCNC: 5 MG/DL (ref 8–23)
BUN SERPL-MCNC: ABNORMAL MG/DL
BUN/CREAT SERPL: ABNORMAL
CALCIUM SPEC-SCNC: 8.4 MG/DL (ref 8.6–10.5)
CHLORIDE SERPL-SCNC: 101 MMOL/L (ref 98–107)
CO2 SERPL-SCNC: 29 MMOL/L (ref 22–29)
CREAT SERPL-MCNC: 0.6 MG/DL (ref 0.57–1)
CRP SERPL-MCNC: 5.18 MG/DL (ref 0–0.5)
DEPRECATED RDW RBC AUTO: 42.9 FL (ref 37–54)
EOSINOPHIL # BLD AUTO: 0.3 10*3/MM3 (ref 0–0.4)
EOSINOPHIL NFR BLD AUTO: 2.7 % (ref 0.3–6.2)
ERYTHROCYTE [DISTWIDTH] IN BLOOD BY AUTOMATED COUNT: 14.3 % (ref 12.3–15.4)
GFR SERPL CREATININE-BSD FRML MDRD: 101 ML/MIN/1.73
GLUCOSE SERPL-MCNC: 130 MG/DL (ref 65–99)
HCT VFR BLD AUTO: 35.5 % (ref 34–46.6)
HGB BLD-MCNC: 11.9 G/DL (ref 12–15.9)
INR PPP: 1.02 (ref 2–3)
LYMPHOCYTES # BLD AUTO: 1.7 10*3/MM3 (ref 0.7–3.1)
LYMPHOCYTES NFR BLD AUTO: 13.6 % (ref 19.6–45.3)
MCH RBC QN AUTO: 28.5 PG (ref 26.6–33)
MCHC RBC AUTO-ENTMCNC: 33.5 G/DL (ref 31.5–35.7)
MCV RBC AUTO: 85 FL (ref 79–97)
MONOCYTES # BLD AUTO: 0.9 10*3/MM3 (ref 0.1–0.9)
MONOCYTES NFR BLD AUTO: 7.1 % (ref 5–12)
NEUTROPHILS NFR BLD AUTO: 75.7 % (ref 42.7–76)
NEUTROPHILS NFR BLD AUTO: 9.4 10*3/MM3 (ref 1.7–7)
NRBC BLD AUTO-RTO: 0 /100 WBC (ref 0–0.2)
PLATELET # BLD AUTO: 338 10*3/MM3 (ref 140–450)
PMV BLD AUTO: 7.3 FL (ref 6–12)
POTASSIUM SERPL-SCNC: 3.5 MMOL/L (ref 3.5–5.2)
POTASSIUM SERPL-SCNC: 4 MMOL/L (ref 3.5–5.2)
PROTHROMBIN TIME: 10.7 SECONDS (ref 19.4–28.5)
RBC # BLD AUTO: 4.18 10*6/MM3 (ref 3.77–5.28)
SODIUM SERPL-SCNC: 139 MMOL/L (ref 136–145)
WBC # BLD AUTO: 12.4 10*3/MM3 (ref 3.4–10.8)

## 2020-07-21 PROCEDURE — 86140 C-REACTIVE PROTEIN: CPT | Performed by: HOSPITALIST

## 2020-07-21 PROCEDURE — 99232 SBSQ HOSP IP/OBS MODERATE 35: CPT | Performed by: HOSPITALIST

## 2020-07-21 PROCEDURE — 97116 GAIT TRAINING THERAPY: CPT

## 2020-07-21 PROCEDURE — 99024 POSTOP FOLLOW-UP VISIT: CPT | Performed by: SURGERY

## 2020-07-21 PROCEDURE — 85025 COMPLETE CBC W/AUTO DIFF WBC: CPT | Performed by: SURGERY

## 2020-07-21 PROCEDURE — 80048 BASIC METABOLIC PNL TOTAL CA: CPT | Performed by: SURGERY

## 2020-07-21 PROCEDURE — 85610 PROTHROMBIN TIME: CPT | Performed by: INTERNAL MEDICINE

## 2020-07-21 PROCEDURE — 25010000002 PIPERACILLIN SOD-TAZOBACTAM PER 1 G: Performed by: SURGERY

## 2020-07-21 PROCEDURE — 94799 UNLISTED PULMONARY SVC/PX: CPT

## 2020-07-21 PROCEDURE — 84132 ASSAY OF SERUM POTASSIUM: CPT | Performed by: HOSPITALIST

## 2020-07-21 RX ORDER — WARFARIN SODIUM 2 MG/1
2 TABLET ORAL
Status: DISCONTINUED | OUTPATIENT
Start: 2020-07-22 | End: 2020-07-22 | Stop reason: HOSPADM

## 2020-07-21 RX ORDER — WARFARIN SODIUM 3 MG/1
3 TABLET ORAL
Status: COMPLETED | OUTPATIENT
Start: 2020-07-21 | End: 2020-07-21

## 2020-07-21 RX ADMIN — OXYCODONE HYDROCHLORIDE 10 MG: 5 TABLET ORAL at 19:48

## 2020-07-21 RX ADMIN — IPRATROPIUM BROMIDE AND ALBUTEROL SULFATE 3 ML: 2.5; .5 SOLUTION RESPIRATORY (INHALATION) at 20:15

## 2020-07-21 RX ADMIN — TRAZODONE HYDROCHLORIDE 100 MG: 100 TABLET ORAL at 21:09

## 2020-07-21 RX ADMIN — GUAIFENESIN 600 MG: 600 TABLET, EXTENDED RELEASE ORAL at 08:52

## 2020-07-21 RX ADMIN — PANTOPRAZOLE SODIUM 40 MG: 40 TABLET, DELAYED RELEASE ORAL at 08:52

## 2020-07-21 RX ADMIN — POTASSIUM CHLORIDE 40 MEQ: 1500 TABLET, EXTENDED RELEASE ORAL at 16:57

## 2020-07-21 RX ADMIN — BUDESONIDE AND FORMOTEROL FUMARATE DIHYDRATE 2 PUFF: 160; 4.5 AEROSOL RESPIRATORY (INHALATION) at 08:29

## 2020-07-21 RX ADMIN — Medication 10 ML: at 21:09

## 2020-07-21 RX ADMIN — GUAIFENESIN 600 MG: 600 TABLET, EXTENDED RELEASE ORAL at 19:47

## 2020-07-21 RX ADMIN — IPRATROPIUM BROMIDE AND ALBUTEROL SULFATE 3 ML: 2.5; .5 SOLUTION RESPIRATORY (INHALATION) at 11:19

## 2020-07-21 RX ADMIN — BUDESONIDE AND FORMOTEROL FUMARATE DIHYDRATE 2 PUFF: 160; 4.5 AEROSOL RESPIRATORY (INHALATION) at 20:15

## 2020-07-21 RX ADMIN — OXYCODONE HYDROCHLORIDE 10 MG: 5 TABLET ORAL at 07:02

## 2020-07-21 RX ADMIN — IPRATROPIUM BROMIDE AND ALBUTEROL SULFATE 3 ML: 2.5; .5 SOLUTION RESPIRATORY (INHALATION) at 15:36

## 2020-07-21 RX ADMIN — PIPERACILLIN AND TAZOBACTAM 3.38 G: 3; .375 INJECTION, POWDER, FOR SOLUTION INTRAVENOUS at 00:31

## 2020-07-21 RX ADMIN — FLUOXETINE 10 MG: 10 CAPSULE ORAL at 08:52

## 2020-07-21 RX ADMIN — PIPERACILLIN AND TAZOBACTAM 3.38 G: 3; .375 INJECTION, POWDER, FOR SOLUTION INTRAVENOUS at 07:03

## 2020-07-21 RX ADMIN — MELATONIN TAB 5 MG 5 MG: 5 TAB at 21:09

## 2020-07-21 RX ADMIN — WARFARIN SODIUM 3 MG: 3 TABLET ORAL at 16:57

## 2020-07-21 RX ADMIN — IPRATROPIUM BROMIDE AND ALBUTEROL SULFATE 3 ML: 2.5; .5 SOLUTION RESPIRATORY (INHALATION) at 08:29

## 2020-07-21 RX ADMIN — PIPERACILLIN AND TAZOBACTAM 3.38 G: 3; .375 INJECTION, POWDER, FOR SOLUTION INTRAVENOUS at 16:58

## 2020-07-21 RX ADMIN — POTASSIUM CHLORIDE 40 MEQ: 1500 TABLET, EXTENDED RELEASE ORAL at 08:52

## 2020-07-21 NOTE — PROGRESS NOTES
Nutrition Services    Patient Name:  Renkua Pelayo  YOB: 1955  MRN: [id number]  Admit Date:  7/16/2020    Comments:     Pt does not like Boost Plus, Will DC. Adding ice cream at L/D to promote calorie/protein intakes.     *This assessment completed remotely with assistance from nursing communication and EMR documentation    Reason for Assessment                Reason for Assessment    Reason For Assessment Follow up protocol     MST score =2, NPO/Clear Liquids x3 days (7/19/2020)       Diagnosis H&P: COPD, chr back pain, HTN, GERD, diverticulitis, DVT/PE on chronic anticoagulation with warfarin, depression, and insomnia     Current Problems/Procedures:    Admitted with abd cramping/pain.    Sepsis d/t perforation of sigmoid colon d/t diverticulitis s/p exploratory laparotomy requiring sigmoid resection with end colostomy and Alejo's pouch on 7/16    Acute hypokalemia    Acute hyponatremia    COPD  - supplemental O2 as tolerate           Nutrition/Diet History                Nutrition/Diet History    Narrative     7/21: Called pt this afternoon who reports starting solid foods today and is tolerating. Reports eating ~50% of lunch today. She does not like the Boost Plus or Boost breeze. After review of supplements, agreed to regular ice cream to promote calorie/protein intakes.     7/19: S/w pt via phone, she reports she is not feeling well. She reports the last time she had solid food was last Monday (7/13) & her appetite and meal intake was good up until that time. She reports she is currently drinking a boost breeze and while she doesn't like it, she can tolerate it. Encouraged pt to drink as able to help increase kcal & protein on clear liquid diet. General surgery had not visited yet per pt & per their notes, awaiting colostomy o/p for further diet advancement. RD gave brief explanation of typical progression back to solids & that would check back in the next day or 2 regarding progress. She  "denies recent wt loss & reports  lb. Pt did not have questions/concerns regarding nutrition at this time & RDN will instruct as appropriate when upgraded to solid foods.       Functional Status Needs some assistance with ambulation & ADLs per PT/OT       Food Allergies NKFA       Factors Affecting Nutritional Intake  S/p ex lap r/t colon perf         Anthropometrics             Anthropometrics    Height 160 cm (63\")    Weight 84.5 kg (186 lb)    7/21/20       Admit Weight    Admit Weight 79.4 kg (175 lb)    7/16/20       Ideal Body Weight (IBW)    Ideal Body Weight (IBW) 115 lb    % Ideal Body Weight 161%       Usual Body Weight (UBW)    Usual Body Weight 180 lb    % Usual Body Weight 103%    Weight Hx  174 lb (9/5/19)       Body Mass Index (BMI)    BMI (kg/m2) 33.0            Labs/Medications                Labs    Pertinent Lab Results  Gluc 130 H, Na 139, K+ 3.5, Crt 0.60, BUN 5 L, Ca 8.3 L, CRP 5.18 H, Hgb 11.9 L, Hct 35.5         Medications    Pertinent Medications  Prozac, Protonix, Zosyn, Coumadin, oxycodone prn, K-DUR prn          Physical Findings                Physical Findings    Overall Physical Appearance Unable to observe r/t limiting face-to-face contact in the context of the COVID19 pandemic       Edema  None documented       Gastrointestinal + 110 ml colostomy output 7/20        Tubes N/A       Oral/Mouth Cavity No chew/swallow issues reported       Skin Surgical incision on abdomen    MASD on perineum         Estimated/Assessed Needs              Calorie Requirements     Height Used for Calorie Calculations       Weight Used for Calorie Calculations      Formula Chosen for Calorie Calculations       Estimated Calorie Requirements (kcals/day)              Protein Requirements    Weight Used For Protein Calculations       Est Protein Requirement Amount (gms/kg)       Estimated Protein Requirements (gms/day)          Fluid Requirements     Estimated Fluid Requirements (mL/day)            " Fluid Deficit       Current Sodium Level (mEq/L)      Desired Sodium Level (mEq/L)      Estimated Fluid Deficit Needs (L)           Nutrition Prescription Ordered                Nutrition Prescription PO    Current PO Diet Low residue      Supplement Boost Plus TID         Nutrition Prescription EN    Enteral Route -     TF Modular -     TF Delivery Method -     Current Ordered TF  -     Current Ordered Water flush  -     TF Observation  -        Nutrition Prescription TPN    TPN Route -     Current Ordered TPN Volume  -     Dextrose (gm/kcals)  -     Amino Acid (gm/kcals) -     Lipids (mL/Concentration/Frequency)  -     TPN Observation  -          Evaluation of Received Nutrient/Fluid Intake                PO Evaluation    % PO Intake 7/19: Pt has been NPO or clear liquids only    7/21: 100% x two full liquid meals         EN Evaluation    TF Changes -     TF Residual -     TF Tolerance -     Average EN Delivered  -        TPN Evaluation    Total Number of Days on TPN  -           Clinical Course      Clinical Nutrition Course Details  7/16 NPO    7/17-7/19 CLD    7/20 FLD    7/21 Low Residue     Previously NPO/liquid diet x 4 days prior being on solid foods          Problem/Interventions:                     Nutrition Diagnoses Problem 1      Problem 1   Inadequate oral intake r/t decreased ability to consume sufficient energy AEB previously NPO/liquids x 4 days.      Nutrition Diagnoses Problem 2      Problem 2            Intervention Goal                Intervention Goal    General PO intakes will be at least 50% of meals    Pt will be accepting of ONS          Nutrition Intervention                Nutrition Intervention    RD/Tech Action DC boost Plus TID and add ice cream L/D          Nutrition Prescription                Nutrition Prescription PO      Diet  Low Residue      Supplement Ice cream L/D (strawberry)         Nutrition Prescription EN    Enteral Prescription -        Nutrition Prescription TPN     TPN Prescription -         Monitor/Evaluation                Monitor/Evaluation    Monitor PO tolerance, ostomy o/p, N/V, abd pain/distention, labs (electrolytes, BUN/Crt, glucose), wt for changes, skin integrity, at next encounter           Electronically signed by:  Maile Morales RD  07/21/20 14:19

## 2020-07-21 NOTE — PROGRESS NOTES
"      [facility] Emergency Room Medicine Services Daily Progress Note      Hospitalist Team  LOS 5 days      Patient Care Team:  Michael Gerardo MD as PCP - General    Patient Location: [facility]0/1      Subjective   Subjective     Chief Complaint / Subjective  Chief Complaint   Patient presents with   • Abdominal Pain         Brief Synopsis of Hospital Course/HPI    The patient is a  64 y.o. female with history of COPD, HTN, GERD, diverticulitis, chronic back pain with opiate dependence, DVT/PE on warfarin, depression, and insomnia.  The patient presented to [facility] ED on 07/16/2020 complaining of 3 days of abdominal pain.  The patient claimed that she was initially constipated and took laxatives which caused her to have multiple loose bowel movements followed by abdominal bloating and firmness thus came to the ED.      In the ED, WBC was 27.2, glucose 141, BP 89/53, and was given Zosyn for sepsis.  CT of the abdomen and pelvis showed perforated acute sigmoid diverticulitis with free air seen in abdomen and pelvis.     Date::    7/20/20: Pain controlled.  Afebrile.  Diet advanced to full liquids.  PRANAY drain present.  7/21/20: Abdominal pain better.  Diet advanced.  History of COPD quit smoking about a year ago.    Review of Systems   All other systems reviewed and are negative.        Objective   Objective      Vital Signs  Temp:  [98 °F (36.7 °C)-98.2 °F (36.8 °C)] 98 °F (36.7 °C)  Heart Rate:  [78-97] 92  Resp:  [12-20] 20  BP: (114-145)/(62-86) 114/62  Oxygen Therapy  SpO2: 99 %  Pulse Oximetry Type: Continuous  Device (Oxygen Therapy): room air  Device (Oxygen Therapy): room air  Flow (L/min): 2  Flowsheet Rows      First Filed Value   Admission Height  160 cm (63\") Documented at 07/16/2020 0739   Admission Weight  79.4 kg (175 lb 0.7 oz) Documented at 07/16/2020 0739        Intake & Output (last 3 days)       07/18 0701 - 07/19 0700 07/19 0701 - 07/20 0700 07/20 0701 - 07/21 0700 07/21 0701 - " 07/22 0700    P.O. 360 1080 1080     IV Piggyback 100       Total Intake(mL/kg) 460 (5.5) 1080 (12.8) 1080 (12.8)     Urine (mL/kg/hr) 300 (0.1) 1000 (0.5) 1300 (0.6) 400 (0.5)    Drains 100 105 125     Stool 0 20 110     Total Output 400 1125 1535 400    Net +60 -45 -455 -400            Urine Unmeasured Occurrence 2 x           Lines, Drains & Airways    Active LDAs     Name:   Placement date:   Placement time:   Site:   Days:    Peripheral IV 07/20/20 1145 Posterior;Right Hand   07/20/20    1145    Hand   less than 1    Closed/Suction Drain RLQ Bulb 19 Fr.   07/16/20    1428    RLQ   4    Colostomy LUQ   07/16/20    1423    LUQ   4                  Physical Exam:    Physical Exam   Constitutional: She is oriented to person, place, and time.   HENT:   Head: Normocephalic and atraumatic.   Eyes: Pupils are equal, round, and reactive to light. EOM are normal.   Neck: Normal range of motion. Neck supple.   Cardiovascular: Normal rate.   Pulmonary/Chest: Effort normal and breath sounds normal.   Abdominal: Soft. Bowel sounds are normal.       Musculoskeletal: Normal range of motion.   Neurological: She is alert and oriented to person, place, and time.   Skin: Skin is warm.   Psychiatric: She has a normal mood and affect.            Wounds (last 24 hours)      LDA Wound     Row Name 07/21/20 0732 07/20/20 2226 07/20/20 2020       Wound 07/16/20 1327 lower;midline abdomen Incision    Wound - Properties Group Date first assessed: 07/16/20  -TD Time first assessed: 1327  -TD Orientation: lower;midline  -TD Location: abdomen  -TD, INCISION FOR COLOSTOMY  Primary Wound Type: Incision  -TD    Dressing Appearance  dry;intact;no drainage  -AH  dry;intact;no drainage  -SM  dry;intact;no drainage  -JR    Closure  TERI  -AH  TERI  -SM  TERI  -JR    Base  dressing in place, unable to visualize  -AH  dressing in place, unable to visualize  -SM  dressing in place, unable to visualize  -JR    Drainage Amount  none  -AH  none  -SM  none   -JR    Dressing Care, Wound  --  border dressing  -SM  --       Wound 07/16/20 1339 perineum MASD (Moisture associated skin damage)    Wound - Properties Group Date first assessed: 07/16/20  -TD Time first assessed: 1339  -TD Present on Hospital Admission: Y  -TD Location: perineum  -TD Primary Wound Type: MASD  -TD, WHEN PLACING BOLANOS PATIENT WITH LEAKING OF STOOL. WITH OBSERVATION OF PERINEUM RED, EXSCORIATED AREAS APPARENT ON BOTH SIDES.     Dressing Appearance  dry;intact;open to air  -AH  --  dry;intact;open to air  -JR    Closure  Open to air  -AH  --  --    Base  blanchable;pink  -AH  blanchable;pink  -SM  --      User Key  (r) = Recorded By, (t) = Taken By, (c) = Cosigned By    Initials Name Provider Type    Huang Barakat, RN Registered Nurse    Noa Sánchez RN Registered Nurse    Jennifer Lofton LPN Licensed Nurse    Maryana Maloney RN Registered Nurse          Procedures:    Procedure(s):  LAPAROTOMY EXPLORATORY HARTMANS          Results Review:     I reviewed the patient's new clinical results.      Lab Results (last 24 hours)     Procedure Component Value Units Date/Time    Blood Culture - Blood, Arm, Left [[id number]] Collected:  07/16/20 0857    Specimen:  Blood from Arm, Left Updated:  07/21/20 0915     Blood Culture No growth at 5 days    Blood Culture - Blood, Arm, Right [[id number]] Collected:  07/16/20 0858    Specimen:  Blood from Arm, Right Updated:  07/21/20 0915     Blood Culture No growth at 5 days    BUN [[id number]]  (Abnormal) Collected:  07/21/20 0237    Specimen:  Blood Updated:  07/21/20 0739     BUN 5 mg/dL     Basic Metabolic Panel [[id number]]  (Abnormal) Collected:  07/21/20 0237    Specimen:  Blood Updated:  07/21/20 0329     Glucose 130 mg/dL      BUN --     Comment: Testing performed by alternate method        Creatinine 0.60 mg/dL      Sodium 139 mmol/L      Potassium 3.5 mmol/L      Chloride 101 mmol/L      CO2 29.0 mmol/L      Calcium 8.4 mg/dL      eGFR  Non  Amer 101 mL/min/1.73      BUN/Creatinine Ratio --     Comment: Testing not performed        Anion Gap 9.0 mmol/L     Narrative:       GFR Normal >60  Chronic Kidney Disease <60  Kidney Failure <15      C-reactive Protein [[id number]]  (Abnormal) Collected:  07/21/20 0237    Specimen:  Blood Updated:  07/21/20 0329     C-Reactive Protein 5.18 mg/dL     Protime-INR [[id number]]  (Abnormal) Collected:  07/21/20 0237    Specimen:  Blood Updated:  07/21/20 0319     Protime 10.7 Seconds      INR 1.02    CBC Auto Differential [[id number]]  (Abnormal) Collected:  07/21/20 0237    Specimen:  Blood Updated:  07/21/20 0312     WBC 12.40 10*3/mm3      RBC 4.18 10*6/mm3      Hemoglobin 11.9 g/dL      Hematocrit 35.5 %      MCV 85.0 fL      MCH 28.5 pg      MCHC 33.5 g/dL      RDW 14.3 %      RDW-SD 42.9 fl      MPV 7.3 fL      Platelets 338 10*3/mm3      Neutrophil % 75.7 %      Lymphocyte % 13.6 %      Monocyte % 7.1 %      Eosinophil % 2.7 %      Basophil % 0.9 %      Neutrophils, Absolute 9.40 10*3/mm3      Lymphocytes, Absolute 1.70 10*3/mm3      Monocytes, Absolute 0.90 10*3/mm3      Eosinophils, Absolute 0.30 10*3/mm3      Basophils, Absolute 0.10 10*3/mm3      nRBC 0.0 /100 WBC         No results found for: HGBA1C  Results from last 7 days   Lab Units 07/21/20  0237 07/20/20  0329 07/19/20  1636   INR  1.02* 1.04* 0.97*       Results from last 7 days   Lab Units 07/16/20  1316   PH, ARTERIAL pH units 7.411   PO2 ART mm Hg 477.0*   PCO2, ARTERIAL mm Hg 48.0*   HCO3 ART mmol/L 30.7*     Lab Results   Component Value Date    LIPASE 20 07/16/2020     No results found for: CHOL, CHLPL, TRIG, HDL, LDL, LDLDIRECT    Lab Results   Lab Value Date/Time    FINALDX  07/16/2020 1333     Sigmoid colon, resection:    Colonic tissue with diverticulosis, acute diverticulitis and acute serositis consistent with clinical history     Resection margins viable    No dysplasia or malignancy identified    BUNNY/sms          Microbiology  Results (last 10 days)     Procedure Component Value - Date/Time    COVID-19 [employer] IN-HOUSE, Nasal Swab No Transport Media - Swab, Nasal Cavity [[id number]]  (Normal) Collected:  07/16/20 1102    Lab Status:  Final result Specimen:  Swab from Nasal Cavity Updated:  07/16/20 1147     COVID19 Not Detected    Narrative:       Fact sheet for providers: https://www.fda.gov/media/989518/download     Fact sheet for patients: https://www.fda.gov/media/637968/download    Blood Culture - Blood, Arm, Right [[id number]] Collected:  07/16/20 0858    Lab Status:  Final result Specimen:  Blood from Arm, Right Updated:  07/21/20 0915     Blood Culture No growth at 5 days    Blood Culture - Blood, Arm, Left [[id number]] Collected:  07/16/20 0857    Lab Status:  Final result Specimen:  Blood from Arm, Left Updated:  07/21/20 0915     Blood Culture No growth at 5 days          ECG/EMG Results (most recent)     Procedure Component Value Units Date/Time    ECG 12 Lead [[id number]] Collected:  07/16/20 0827     Updated:  07/18/20 0902    Narrative:       HEART RATE= 87  bpm  RR Interval= 692  ms  HI Interval= 169  ms  P Horizontal Axis= -6  deg  P Front Axis= 46  deg  QRSD Interval= 110  ms  QT Interval= 397  ms  QRS Axis= 61  deg  T Wave Axis= 67  deg  - ABNORMAL ECG -  Sinus rhythm  Nonspecific T abnrm, anterolateral leads  When compared with ECG of 11-Apr-2017 3:34:13,  Significant repolarization change  Electronically Signed By: Clement Griffith (PIPO) 18-Jul-2020 09:01:43  Date and Time of Study: 2020-07-16 08:27:30          Results for orders placed during the hospital encounter of 09/05/19   Duplex Venous Lower Extremity - Bilateral    Narrative · Chronic right lower extremity deep vein thrombosis noted in the proximal   femoral, mid femoral, distal femoral and popliteal.  · Acute right lower extremity deep vein thrombosis noted in the peroneal.  · All other veins appeared normal bilaterally.               Ct Abdomen Pelvis With  Contrast    Result Date: 7/16/2020   1. 1. FINDINGS consistent with perforated acute sigmoid diverticulitis. Free air is seen in the abdomen and pelvis. Small amount of pelvic free fluid is present without well-defined abscess collection. I notified Dr. Griffith in the emergency room at the time of this dictation. 2. Additional chronic findings as described above.    Electronically Signed By-Dr. Daly Rich MD On:7/16/2020 10:18 AM This report was finalized on [id number] by Dr. Daly Rich MD.    Xr Chest 1 View    Result Date: 7/16/2020    1.  Right internal jugular central venous catheter in place the tip taking of the superior vena cava.  No evidence of pneumothorax 2.  Borderline heart size with pulmonary vascular congestion and prominent interstitial markings suggesting mild interstitial edema. 3.  Chronic left basilar airspace disease compatible scarring.  Electronically Signed By-Renny Trinh On:7/16/2020 3:55 PM This report was finalized on [id number] by  Renny Trinh, .          Xrays, labs reviewed personally by physician.    Medication Review:   I have reviewed the patient's current medication list      Scheduled Meds    budesonide-formoterol 2 puff Inhalation BID - RT   FLUoxetine 10 mg Oral Daily   guaiFENesin 600 mg Oral Q12H   ipratropium-albuterol 3 mL Nebulization 4x Daily - RT   pantoprazole 40 mg Oral QAM AC   piperacillin-tazobactam 3.375 g Intravenous Q8H   sodium chloride 10 mL Intravenous Q12H   [START ON 7/22/2020] warfarin 2 mg Oral Daily   warfarin 3 mg Oral Once       Meds Infusions    Pharmacy to dose warfarin     sodium chloride 125 mL/hr Last Rate: 125 mL/hr (07/17/20 1249)       Meds PRN  •  acetaminophen **OR** acetaminophen **OR** acetaminophen  •  albuterol  •  aluminum-magnesium hydroxide-simethicone  •  bisacodyl  •  hydrALAZINE  •  HYDROmorphone  •  magnesium hydroxide  •  magnesium sulfate **OR** magnesium sulfate in D5W 1g/100mL (PREMIX)  •  melatonin  •   nitroglycerin  •  ondansetron **OR** ondansetron  •  oxyCODONE **OR** oxyCODONE  •  Pharmacy to dose warfarin  •  potassium chloride **OR** potassium chloride **OR** potassium chloride  •  promethazine **OR** promethazine **OR** promethazine **OR** promethazine  •  sodium chloride  •  traZODone        Assessment/Plan   Assessment/Plan     Active Hospital Problems    Diagnosis  POA   • Acute hypokalemia [E87.6]  Yes   • Severe sepsis (CMS/[facility]) [A41.9, R65.20]  Yes   • Acute kidney injury (CMS/[facility]) [N17.9]  Yes   • Acute hyponatremia [E87.1]  Yes   • COPD (chronic obstructive pulmonary disease) (CMS/[facility]) [J44.9]  Yes   • Chronic back pain [M54.9, G89.29]  Yes   • Depressive disorder [F32.9]  Yes   • Essential hypertension [I10]  Yes   • Gastroesophageal reflux disease [K21.9]  Yes   • Insomnia [G47.00]  Yes   • Chronic anticoagulation [Z79.01]  Not Applicable   • Obesity [E66.9]  Yes      Resolved Hospital Problems    Diagnosis Date Resolved POA   • **Perforation of sigmoid colon due to diverticulitis [K57.20] 07/16/2020 Yes   • Free intraperitoneal air [K66.8] 07/16/2020 Yes       MEDICAL DECISION MAKING COMPLEXITY BY PROBLEM:     Sepsis due to perforation of sigmoid colon from diverticulitis  - s/p exploratory laparotomy hartmans procedure 7/16/20  - continue IV zosyn  - INR reversed before surgery  - pain control as per surgery      Acute hypokalemia:  -Resolved  - replacement protocol     Acute hyponatremia:  - resolved     COPD:  -Not in exacerbation   - continue bronchodilators      Chronic back pain with opiate dependence:  -On Norco at home  - PRN IV morphine     Depression:  - resume Prozac      Gastroesophageal reflux disease  - IV Pepcid daily     Essential hypertension:  -Medications held while n.p.o.  -Monitor for hypotension     Insomnia  - resume trazodone      History of DVT/PE:  -Chronic warfarin      Obesity  - BMI 31.01  - encourage lifestyle modifications        VTE Prophylaxis -   Mechanical Order  History:      Ordered        07/16/20 1128  Place Sequential Compression Device  Once         07/16/20 1128  Maintain Sequential Compression Device  Continuous                 Pharmalogical Order History:     Ordered     Dose Route Frequency Stop    07/20/20 1453  warfarin (COUMADIN) tablet 1.5 mg     Question:  Target INR  Answer:  2 - 3    1.5 mg PO Daily Warfarin --    07/19/20 1559  warfarin (COUMADIN) tablet 1.5 mg  Status:  Discontinued     Question:  Target INR  Answer:  2 - 3    1.5 mg PO Daily Warfarin 07/20/20 1453    07/20/20 1453  warfarin (COUMADIN) tablet 3 mg     Question:  Target INR  Answer:  2 - 3    3 mg PO Once (Warfarin) --    07/19/20 1559  warfarin (COUMADIN) tablet 3 mg     Question:  Target INR  Answer:  2 - 3    3 mg PO Once (Warfarin) 07/19/20 1810    07/19/20 1505  Pharmacy to dose warfarin     Question:  Target INR  Answer:  2 - 3    -- XX Continuous PRN --    07/17/20 0810  enoxaparin (LOVENOX) syringe 40 mg  Status:  Discontinued      40 mg SC Every 24 Hours 07/19/20 1505            Code Status -   Code Status and Medical Interventions:   Ordered at: 07/16/20 1128     Code Status:    CPR     Medical Interventions (Level of Support Prior to Arrest):    Full       This patient has been examined wearing appropriate Personal Protective Equipment . 07/21/20        Discharge Planning        Destination      Coordination has not been started for this encounter.      Durable Medical Equipment      Coordination has not been started for this encounter.      Dialysis/Infusion      Coordination has not been started for this encounter.      Home Medical Care      Service Provider Request Status Selected Services Address Phone Number Fax Number    [facility] Accepted N/A [address] [facility] [place] [address] [phone] --       Vianey Domínguez RN 7/17/2020 1406    [facility] CARE MAXX Declined  not enough staff to meet patient needs N/A [phone]  [facility] [place] [phone]       Vianey Domínguez, RN 7/17/2020 0027    New ostomy                 Therapy      Coordination has not been started for this encounter.      Community Resources      Coordination has not been started for this encounter.            Electronically signed by Farhan Lott DO, 07/21/20, 16:44.  [facility] Hospitalist Team

## 2020-07-21 NOTE — PROGRESS NOTES
"Pharmacy dosing service  Anticoagulant  Warfarin     Subjective:    Renuka Pelayo is a 64 y.o.female being continued on warfarin for hx of DVT .    INR Goal: 2 - 3  Home medication?: Yes, warfarin 2 mg PO every day at 1800  Bridge Therapy Present?:  No  Interacting Medications Evaluation (New/Present/Discontinued): NA  Additional Contributing Factors: NA      Assessment/Plan:    Pt slightly supratherapeutic on admission. Received Kcentra/Vitamin K 7/16 prior to emergent surgery for perforated diverticulitis.     Will give 3mg again today d/t subtherapeutic INR and recent receipt Kcentra/Vitamin K.      Continue to monitor and adjust based on INR.         Date 7/16 7/17 7/18 7/19 7/20 7/21      INR 3.19 0.97 0.88 0.97 1.04 1.02      Dose Kcentra/ Vit K --- --- 3 mg 3mg           Objective:  [Ht: 160 cm (63\"); Wt: 84.5 kg (186 lb 4.6 oz); BMI: Body mass index is 33 kg/m².]    Lab Results   Component Value Date    ALBUMIN 3.50 07/16/2020     Lab Results   Component Value Date    INR 1.02 (L) 07/21/2020    INR 1.04 (L) 07/20/2020    INR 0.97 (L) 07/19/2020    PROTIME 10.7 (L) 07/21/2020    PROTIME 10.8 (L) 07/20/2020    PROTIME 10.2 (L) 07/19/2020     Lab Results   Component Value Date    HGB 11.9 (L) 07/21/2020    HGB 11.9 (L) 07/20/2020    HGB 11.7 (L) 07/19/2020     Lab Results   Component Value Date    HCT 35.5 07/21/2020    HCT 35.7 07/20/2020    HCT 35.5 07/19/2020       Linda De La Torre, PharmD, BCPS  07/21/20 13:28   "

## 2020-07-21 NOTE — PROGRESS NOTES
LOS: 5 days   Patient Care Team:  Michael Gerardo MD as PCP - General    Reason for follow-up: Postop    Subjective   Having colostomy output. No pain    Objective   Incision is clean without any surrounding erythema, induration  Or drainage.  Drain tube with appropriate color and output - no pus    Vital Signs  Vitals:    07/21/20 0452 07/21/20 0829 07/21/20 0838 07/21/20 1119   BP: 123/72      BP Location: Left arm      Patient Position: Lying      Pulse: 84 84 82 79   Resp: 16 20 16   Temp: 98.2 °F (36.8 °C)      TempSrc: Oral      SpO2: 96% 96% 100% 95%   Weight: 84.5 kg (186 lb 4.6 oz)      Height:             Results Review:       Lab Results (last 24 hours)     Procedure Component Value Units Date/Time    Blood Culture - Blood, Arm, Left [[id number]] Collected:  07/16/20 0857    Specimen:  Blood from Arm, Left Updated:  07/21/20 0915     Blood Culture No growth at 5 days    Blood Culture - Blood, Arm, Right [[id number]] Collected:  07/16/20 0858    Specimen:  Blood from Arm, Right Updated:  07/21/20 0915     Blood Culture No growth at 5 days    BUN [[id number]]  (Abnormal) Collected:  07/21/20 0237    Specimen:  Blood Updated:  07/21/20 0739     BUN 5 mg/dL     Basic Metabolic Panel [[id number]]  (Abnormal) Collected:  07/21/20 0237    Specimen:  Blood Updated:  07/21/20 0329     Glucose 130 mg/dL      BUN --     Comment: Testing performed by alternate method        Creatinine 0.60 mg/dL      Sodium 139 mmol/L      Potassium 3.5 mmol/L      Chloride 101 mmol/L      CO2 29.0 mmol/L      Calcium 8.4 mg/dL      eGFR Non African Amer 101 mL/min/1.73      BUN/Creatinine Ratio --     Comment: Testing not performed        Anion Gap 9.0 mmol/L     Narrative:       GFR Normal >60  Chronic Kidney Disease <60  Kidney Failure <15      C-reactive Protein [[id number]]  (Abnormal) Collected:  07/21/20 0237    Specimen:  Blood Updated:  07/21/20 0329     C-Reactive Protein 5.18 mg/dL     Protime-INR [[id number]]   (Abnormal) Collected:  07/21/20 0237    Specimen:  Blood Updated:  07/21/20 0319     Protime 10.7 Seconds      INR 1.02    CBC Auto Differential [[id number]]  (Abnormal) Collected:  07/21/20 0237    Specimen:  Blood Updated:  07/21/20 0312     WBC 12.40 10*3/mm3      RBC 4.18 10*6/mm3      Hemoglobin 11.9 g/dL      Hematocrit 35.5 %      MCV 85.0 fL      MCH 28.5 pg      MCHC 33.5 g/dL      RDW 14.3 %      RDW-SD 42.9 fl      MPV 7.3 fL      Platelets 338 10*3/mm3      Neutrophil % 75.7 %      Lymphocyte % 13.6 %      Monocyte % 7.1 %      Eosinophil % 2.7 %      Basophil % 0.9 %      Neutrophils, Absolute 9.40 10*3/mm3      Lymphocytes, Absolute 1.70 10*3/mm3      Monocytes, Absolute 0.90 10*3/mm3      Eosinophils, Absolute 0.30 10*3/mm3      Basophils, Absolute 0.10 10*3/mm3      nRBC 0.0 /100 WBC            Imaging Results (Last 24 Hours)     ** No results found for the last 24 hours. **          Medication Review:   Current Facility-Administered Medications:   •  acetaminophen (TYLENOL) tablet 650 mg, 650 mg, Oral, Q4H PRN **OR** acetaminophen (TYLENOL) 160 MG/5ML solution 650 mg, 650 mg, Oral, Q4H PRN **OR** acetaminophen (TYLENOL) suppository 650 mg, 650 mg, Rectal, Q4H PRN, Chi Farmer MD  •  albuterol (PROVENTIL) nebulizer solution 0.083% 2.5 mg/3mL, 2.5 mg, Nebulization, Q4H PRN, Chi Farmer MD, 2.5 mg at 07/18/20 0151  •  aluminum-magnesium hydroxide-simethicone (MAALOX MAX) 400-400-40 MG/5ML suspension 15 mL, 15 mL, Oral, Q6H PRN, Chi Farmer MD, 15 mL at 07/20/20 2226  •  bisacodyl (DULCOLAX) suppository 10 mg, 10 mg, Rectal, Daily PRN, Chi Farmer MD  •  budesonide-formoterol (SYMBICORT) 160-4.5 MCG/ACT inhaler 2 puff, 2 puff, Inhalation, BID - RT, Chi Farmer MD, 2 puff at 07/21/20 0829  •  FLUoxetine (PROzac) capsule 10 mg, 10 mg, Oral, Daily, Clemente Guerrero DO, 10 mg at 07/21/20 0852  •  guaiFENesin (MUCINEX) 12 hr tablet 600 mg, 600  mg, Oral, Q12H, Sima Guerrerorifranco DO, 600 mg at 07/21/20 0852  •  hydrALAZINE (APRESOLINE) injection 10 mg, 10 mg, Intravenous, Q6H PRN, Chi Farmer MD  •  HYDROmorphone (DILAUDID) injection 0.5 mg, 0.5 mg, Intravenous, Q2H PRN, Chi Farmer MD, 0.5 mg at 07/18/20 2108  •  ipratropium-albuterol (DUO-NEB) nebulizer solution 3 mL, 3 mL, Nebulization, 4x Daily - RT, GuerreroVashtia DO, 3 mL at 07/21/20 1119  •  magnesium hydroxide (MILK OF MAGNESIA) suspension 2400 mg/10mL 10 mL, 10 mL, Oral, Daily PRN, Chi Farmer MD  •  Magnesium Sulfate 2 gram infusion - Mg less than or equal to 1.5 mg/dL, 2 g, Intravenous, PRN **OR** Magnesium Sulfate 1 gram infusion - Mg 1.6-1.9 mg/dL, 1 g, Intravenous, PRN, Chi Farmer MD  •  melatonin tablet 5 mg, 5 mg, Oral, Nightly PRN, Chi Farmer MD, 5 mg at 07/20/20 2226  •  nitroglycerin (NITROSTAT) SL tablet 0.4 mg, 0.4 mg, Sublingual, Q5 Min PRN, Chi Farmer MD  •  ondansetron (ZOFRAN) tablet 4 mg, 4 mg, Oral, Q6H PRN **OR** ondansetron (ZOFRAN) injection 4 mg, 4 mg, Intravenous, Q6H PRN, Chi Farmer MD, 4 mg at 07/16/20 1432  •  oxyCODONE (ROXICODONE) immediate release tablet 5 mg, 5 mg, Oral, Q4H PRN **OR** oxyCODONE (ROXICODONE) immediate release tablet 10 mg, 10 mg, Oral, Q4H PRN, Chi Farmer MD, 10 mg at 07/21/20 0702  •  pantoprazole (PROTONIX) EC tablet 40 mg, 40 mg, Oral, QAM AC, Guerrero, Simarilla, DO, 40 mg at 07/21/20 0852  •  Pharmacy to dose warfarin, , Does not apply, Continuous PRN, Guerrero, Siamrilla, DO  •  piperacillin-tazobactam (ZOSYN) IVPB 3.375 g in 100 mL NS (CD), 3.375 g, Intravenous, Q8H, Chi Farmer MD, Last Rate: 25 mL/hr at 07/21/20 0703, 3.375 g at 07/21/20 0703  •  potassium chloride (K-DUR,KLOR-CON) CR tablet 40 mEq, 40 mEq, Oral, PRN, 40 mEq at 07/21/20 0852 **OR** potassium chloride (KLOR-CON) packet 40 mEq, 40 mEq, Oral, PRN, 40 mEq at  07/18/20 2108 **OR** potassium chloride 10 mEq in 100 mL IVPB, 10 mEq, Intravenous, Q1H PRN, Chi Farmer MD, Last Rate: 100 mL/hr at 07/16/20 1203, 10 mEq at 07/16/20 1203  •  promethazine (PHENERGAN) IVPB 6.25 mg, 6.25 mg, Intravenous, Once PRN **OR** promethazine (PHENERGAN) injection 6.25 mg, 6.25 mg, Intramuscular, Once PRN **OR** promethazine (PHENERGAN) suppository 25 mg, 25 mg, Rectal, Once PRN **OR** promethazine (PHENERGAN) tablet 25 mg, 25 mg, Oral, Once PRN, Chi Farmer MD  •  sodium chloride 0.9 % flush 10 mL, 10 mL, Intravenous, Q12H, Chi Farmer MD, 10 mL at 07/19/20 2111  •  sodium chloride 0.9 % flush 10 mL, 10 mL, Intravenous, PRN, Chi Farmer MD  •  sodium chloride 0.9 % infusion, 125 mL/hr, Intravenous, Continuous, Chi Farmer MD, Last Rate: 125 mL/hr at 07/17/20 1249, 125 mL/hr at 07/17/20 1249  •  traZODone (DESYREL) tablet 100 mg, 100 mg, Oral, Nightly PRN, Clemente Guerrero, DO, 100 mg at 07/20/20 2226  •  warfarin (COUMADIN) tablet 1.5 mg, 1.5 mg, Oral, Daily, Oren, Farhan I, DO    Assessment/Plan         Acute hypokalemia    Severe sepsis (CMS/HCC)    Acute kidney injury (CMS/HCC)    Acute hyponatremia    COPD (chronic obstructive pulmonary disease) (CMS/HCC)    Chronic back pain    Depressive disorder    Gastroesophageal reflux disease    Essential hypertension    Insomnia    Chronic anticoagulation    Obesity    Impression: Postop day #5 Alejo's, no flatus or stool out the bag yet, ostomy viable    Plan:  Advance to low residue diet  Continue abx  PRANAY drain out upon D/C - continue til D/C  Continue mobilization  Likely home tomorrow if tolerates regular diet      Chi Farmer MD  07/21/20  11:29

## 2020-07-21 NOTE — NURSING NOTE
64-year-old female with recent left lower quadrant colostomy.  Patient tolerating diet no complaints of nausea.  The left lower quadrant colostomy pouch is intact the stoma is viable and draining a large amount of thin brown stool.  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  Education included clothing, odor control, diet showering, activity and odor control patient has supplies to bedside as well as written information to the bedside will help upon discharge all questions answered we will continue to follow.

## 2020-07-21 NOTE — THERAPY TREATMENT NOTE
Patient Name: Renuka Pelayo  : 1955    MRN: [id number]                              Today's Date: 2020       Admit Date: 2020    Visit Dx:     ICD-10-CM ICD-9-CM   1. Sigmoid colon injury S36.503A 863.44   2. Free intraperitoneal air K66.8 568.89   3. Diverticulitis K57.92 562.11   4. Perforation of sigmoid colon due to diverticulitis K57.20 562.11     Patient Active Problem List   Diagnosis   • Acute hypokalemia   • Severe sepsis (CMS/HCC)   • Acute kidney injury (CMS/HCC)   • Acute hyponatremia   • COPD (chronic obstructive pulmonary disease) (CMS/HCC)   • Chronic back pain   • Depressive disorder   • Gastroesophageal reflux disease   • Essential hypertension   • Insomnia   • Chronic anticoagulation   • Obesity     Past Medical History:   Diagnosis Date   • Chronic back pain    • Closed nondisplaced fracture of head of right radius 2017   • Congenital deformity of right hip joint 1956   • COPD (chronic obstructive pulmonary disease) (CMS/HCC)     former smoker   • Deep venous thrombosis (CMS/HCC)     unprovoked   • Depressive disorder    • Diverticulitis of colon    • Essential hypertension    • Gastroesophageal reflux disease    • Insomnia    • Obesity    • Pulmonary embolism (CMS/HCC)     provoked by surgery     Past Surgical History:   Procedure Laterality Date   • CHOLECYSTECTOMY     • EXPLORATORY LAPAROTOMY N/A 2020    Procedure: LAPAROTOMY EXPLORATORY HARTMANS;  Surgeon: Chi Farmer MD;  Location: [facility] MAIN OR;  Service: General;  Laterality: N/A;   • HYSTERECTOMY     • TOTAL HIP ARTHROPLASTY Right    • TOTAL HIP ARTHROPLASTY REVISION Right     x3     General Information     Row Name 20 1550          PT Evaluation Time/Intention    Document Type  therapy note (daily note)  -HC     Mode of Treatment  physical therapy  -     Row Name 20 1550          General Information    Patient Profile Reviewed?  yes  -HC     Existing Precautions/Restrictions   fall  -HC     Row Name 07/21/20 1550          Cognitive Assessment/Intervention- PT/OT    Orientation Status (Cognition)  oriented x 4  -HC     Row Name 07/21/20 1550          Safety Issues, Functional Mobility    Impairments Affecting Function (Mobility)  endurance/activity tolerance;shortness of breath;strength;balance  -HC       User Key  (r) = Recorded By, (t) = Taken By, (c) = Cosigned By    Initials Name Provider Type    HC Katherine Messer, PT Physical Therapist        Mobility     Row Name 07/21/20 1551          Bed Mobility Assessment/Treatment    Bed Mobility Assessment/Treatment  supine-sit;sit-supine  -HC     Supine-Sit [place] (Bed Mobility)  supervision  -HC     Sit-Supine [place] (Bed Mobility)  supervision  -HC     Row Name 07/21/20 1551          Sit-Stand Transfer    Sit-Stand [place] (Transfers)  supervision  -     Assistive Device (Sit-Stand Transfers)  walker, front-wheeled  -HC     Row Name 07/21/20 1551          Gait/Stairs Assessment/Training    Gait/Stairs Assessment/Training  gait/ambulation independence  -HC     [place] Level (Gait)  supervision;stand by assist;contact guard  -HC     Distance in Feet (Gait)  150 ft with RW, 100 ft without AD   -HC     Pattern (Gait)  step-through  -HC     Deviations/Abnormal Patterns (Gait)  gait speed decreased  -HC     Bilateral Gait Deviations  forward flexed posture  -HC     Comment (Gait/Stairs)  without RW pt exhibiting asymmetrical gait pattern with impaired balance   -HC       User Key  (r) = Recorded By, (t) = Taken By, (c) = Cosigned By    Initials Name Provider Type    HC Katherine Messer, PT Physical Therapist        Obj/Interventions     Row Name 07/21/20 1603          Static Sitting Balance    Level of [place] (Unsupported Sitting, Static Balance)  independent  -HC     Sitting Position (Unsupported Sitting, Static Balance)  sitting on edge of bed  -HC     Time Able to Maintain Position (Unsupported Sitting, Static  Balance)  4 to 5 minutes  -HC     [facility] Name 07/21/20 1603          Dynamic Sitting Balance    Level of [place], Reaches Outside Midline (Sitting, Dynamic Balance)  independent  -HC     Sitting Position, Reaches Outside Midline (Sitting, Dynamic Balance)  sitting on edge of bed  -HC     [facility] Name 07/21/20 1603          Static Standing Balance    Level of [place] (Supported Standing, Static Balance)  supervision  -HC     Time Able to Maintain Position (Supported Standing, Static Balance)  1 to 2 minutes  -HC     Assistive Device Utilized (Supported Standing, Static Balance)  walker, rolling  -HC     Row Name 07/21/20 1603          Dynamic Standing Balance    Level of [place], Reaches Outside Midline (Standing, Dynamic Balance)  standby assist;supervision  -HC     Time Able to Maintain Position, Reaches Outside Midline (Standing, Dynamic Balance)  3 to 4 minutes  -     Assistive Device Utilized (Supported Standing, Dynamic Balance)  walker, rolling  -       User Key  (r) = Recorded By, (t) = Taken By, (c) = Cosigned By    Initials Name Provider Type     Katherine Messer, PT Physical Therapist        Goals/Plan     Row Name 07/21/20 1604          Transfer Goal 1 (PT)    Progress/Outcome (Transfer Goal 1, PT)  continuing progress toward goal  -HC     Row Name 07/21/20 1604          Gait Training Goal 1 (PT)    Progress/Outcome (Gait Training Goal 1, PT)  continuing progress toward goal  -       User Key  (r) = Recorded By, (t) = Taken By, (c) = Cosigned By    Initials Name Provider Type     Katherine Messer, PT Physical Therapist        Clinical Impression     Row Name 07/21/20 1603          Pain Assessment    Additional Documentation  Pain Scale: FACES Pre/Post-Treatment (Group)  -HC     Row Name 07/21/20 1603          Pain Scale: FACES Pre/Post-Treatment    Pain: FACES Scale, Pretreatment  2-->hurts little bit  -     Pain: FACES Scale, Post-Treatment  2-->hurts little bit  -     Row Name  07/21/20 1603          Physical Therapy Clinical Impression    Rehab Potential (PT Clinical Summary)  good, to achieve stated therapy goals  -     Row Name 07/21/20 1603          Vital Signs    Intratreatment Heart Rate (beats/min)  134  -HC     Posttreatment Heart Rate (beats/min)  116  -HC     Pre SpO2 (%)  96  -HC     O2 Delivery Pre Treatment  room air  -HC     Intra SpO2 (%)  87  -HC     O2 Delivery Intra Treatment  room air  -HC     Post SpO2 (%)  93  -HC     O2 Delivery Post Treatment  room air  -HC     Row Name 07/21/20 1603          Positioning and Restraints    Pre-Treatment Position  in bed  -HC     Post Treatment Position  bed  -HC     In Bed  notified nsg;call light within reach;encouraged to call for assist  -HC       User Key  (r) = Recorded By, (t) = Taken By, (c) = Cosigned By    Initials Name Provider Type    Katherine Anderson, PT Physical Therapist        Outcome Measures    No documentation.       Physical Therapy Education                 Title: PT OT SLP Therapies (In Progress)     Topic: Physical Therapy (In Progress)     Point: Mobility training (Done)     Description:   Instruct learner(s) on safety and technique for assisting patient out of bed, chair or wheelchair.  Instruct in the proper use of assistive devices, such as walker, crutches, cane or brace.              Patient Friendly Description:   It's important to get you on your feet again, but we need to do so in a way that is safe for you. Falling has serious consequences, and your personal safety is the most important thing of all.        When it's time to get out of bed, one of us or a family member will sit next to you on the bed to give you support.     If your doctor or nurse tells you to use a walker, crutches, a cane, or a brace, be sure you use it every time you get out of bed, even if you think you don't need it.    Learning Progress Summary           Patient Acceptance, E, VU by  at 7/21/2020 1605    Acceptance, E,TB,  VU,NR by  at 7/20/2020 1704    Acceptance, E, NR by  at 7/20/2020 0323    Acceptance, E,TB, VU by  at 7/18/2020 1329    Acceptance, E,TB, VU by  at 7/17/2020 1329   Significant Other Acceptance, E,TB, VU,NR by  at 7/20/2020 1704                   Point: Home exercise program (In Progress)     Description:   Instruct learner(s) on appropriate technique for monitoring, assisting and/or progressing patient with therapeutic exercises and activities.              Learning Progress Summary           Patient Acceptance, E, NR by  at 7/20/2020 0323    Acceptance, E,TB, VU by  at 7/18/2020 1329                   Point: Body mechanics (In Progress)     Description:   Instruct learner(s) on proper positioning and spine alignment for patient and/or caregiver during mobility tasks and/or exercises.              Learning Progress Summary           Patient Acceptance, E, NR by  at 7/20/2020 0323    Acceptance, E,TB, VU by  at 7/18/2020 1329    Acceptance, E,TB, VU by  at 7/17/2020 1329                   Point: Precautions (Done)     Description:   Instruct learner(s) on prescribed precautions during mobility and gait tasks              Learning Progress Summary           Patient Acceptance, E, VU by  at 7/21/2020 1605    Acceptance, E, NR by  at 7/20/2020 0323    Acceptance, E,TB, VU by  at 7/18/2020 1329    Acceptance, E,TB, VU by  at 7/17/2020 1329                               User Key     Initials Effective Dates Name Provider Type Discipline     04/17/20 -  Katherine Messer, PT Physical Therapist PT     03/01/19 -  Katina Llamas, PT Physical Therapist PT     03/01/19 -  Orin Nunez, PTA Physical Therapy Assistant PT    EL 06/23/20 -  Antwan Villalba, PT Physical Therapist PT     11/18/19 -  Aurora Chacon, RN Registered Nurse Nurse              PT Recommendation and Plan     Outcome Summary/Treatment Plan (PT)  Anticipated Equipment Needs at Discharge (PT): front wheeled  walker  Anticipated Discharge Disposition (PT): home with assist, home with home health  Plan of Care Reviewed With: patient  Outcome Summary: Pt preesents with fatigue, however willing to complete ambulation.  Gait assessed with and without AD with improved gait and balance noted with RW.  Without AD, pt exhibiting asymmetrical gait pattern with left hip hiking and impaired balance.  Pt exhibiting improved gait speed, balance, safety, and confidence using RW for mobility.  PT recommending home with assist of family, HHPT, and use of RW for pt safety.  Pt will need RW at d/c.  PPE donned: mask with faceshield, gloves.     Time Calculation:   PT Charges     Row Name 07/21/20 1606             Time Calculation    Start Time  1426  -HC      Stop Time  1442  -HC      Time Calculation (min)  16 min  -HC      PT Received On  07/21/20  -HC      PT - Next Appointment  07/22/20  -HC         Time Calculation- PT    Total Timed Code Minutes- PT  16 minute(s)  -HC        User Key  (r) = Recorded By, (t) = Taken By, (c) = Cosigned By    Initials Name Provider Type    HC Katherine Messer, PT Physical Therapist        Therapy Charges for Today     Code Description Service Date Service Provider Modifiers Qty    [id number]  GAIT TRAINING EA 15 MIN 7/21/2020 Katherine Messer, PT GP 1               Katherine Messer PT  7/21/2020

## 2020-07-22 VITALS
RESPIRATION RATE: 16 BRPM | SYSTOLIC BLOOD PRESSURE: 145 MMHG | DIASTOLIC BLOOD PRESSURE: 77 MMHG | OXYGEN SATURATION: 91 % | TEMPERATURE: 98.1 F | HEIGHT: 63 IN | HEART RATE: 98 BPM | BODY MASS INDEX: 33.01 KG/M2 | WEIGHT: 186.29 LBS

## 2020-07-22 PROBLEM — E87.1 ACUTE HYPONATREMIA: Status: RESOLVED | Noted: 2020-07-16 | Resolved: 2020-07-22

## 2020-07-22 PROBLEM — Z93.3 STATUS POST HARTMANN PROCEDURE (HCC): Status: ACTIVE | Noted: 2020-07-22

## 2020-07-22 PROBLEM — K63.1 PERFORATED SIGMOID COLON: Status: RESOLVED | Noted: 2020-07-22 | Resolved: 2020-07-22

## 2020-07-22 PROBLEM — E87.6 ACUTE HYPOKALEMIA: Status: RESOLVED | Noted: 2020-07-16 | Resolved: 2020-07-22

## 2020-07-22 PROBLEM — N17.9 ACUTE KIDNEY INJURY: Status: RESOLVED | Noted: 2020-07-16 | Resolved: 2020-07-22

## 2020-07-22 PROBLEM — K63.1 PERFORATED SIGMOID COLON: Status: ACTIVE | Noted: 2020-07-22

## 2020-07-22 LAB
ANION GAP SERPL CALCULATED.3IONS-SCNC: 11 MMOL/L (ref 5–15)
BASOPHILS # BLD AUTO: 0.1 10*3/MM3 (ref 0–0.2)
BASOPHILS NFR BLD AUTO: 0.6 % (ref 0–1.5)
BUN SERPL-MCNC: 5 MG/DL (ref 8–23)
BUN SERPL-MCNC: ABNORMAL MG/DL
BUN/CREAT SERPL: ABNORMAL
CALCIUM SPEC-SCNC: 8.6 MG/DL (ref 8.6–10.5)
CHLORIDE SERPL-SCNC: 105 MMOL/L (ref 98–107)
CO2 SERPL-SCNC: 25 MMOL/L (ref 22–29)
CREAT SERPL-MCNC: 0.69 MG/DL (ref 0.57–1)
CRP SERPL-MCNC: 3.94 MG/DL (ref 0–0.5)
DEPRECATED RDW RBC AUTO: 44.2 FL (ref 37–54)
EOSINOPHIL # BLD AUTO: 0.3 10*3/MM3 (ref 0–0.4)
EOSINOPHIL NFR BLD AUTO: 2.9 % (ref 0.3–6.2)
ERYTHROCYTE [DISTWIDTH] IN BLOOD BY AUTOMATED COUNT: 14.7 % (ref 12.3–15.4)
GFR SERPL CREATININE-BSD FRML MDRD: 86 ML/MIN/1.73
GLUCOSE SERPL-MCNC: 127 MG/DL (ref 65–99)
HCT VFR BLD AUTO: 33.1 % (ref 34–46.6)
HGB BLD-MCNC: 10.8 G/DL (ref 12–15.9)
INR PPP: 1.24 (ref 2–3)
LYMPHOCYTES # BLD AUTO: 1.4 10*3/MM3 (ref 0.7–3.1)
LYMPHOCYTES NFR BLD AUTO: 12.4 % (ref 19.6–45.3)
MAGNESIUM SERPL-MCNC: 1.7 MG/DL (ref 1.6–2.4)
MCH RBC QN AUTO: 27.8 PG (ref 26.6–33)
MCHC RBC AUTO-ENTMCNC: 32.6 G/DL (ref 31.5–35.7)
MCV RBC AUTO: 85.2 FL (ref 79–97)
MONOCYTES # BLD AUTO: 0.9 10*3/MM3 (ref 0.1–0.9)
MONOCYTES NFR BLD AUTO: 7.8 % (ref 5–12)
NEUTROPHILS NFR BLD AUTO: 76.3 % (ref 42.7–76)
NEUTROPHILS NFR BLD AUTO: 8.5 10*3/MM3 (ref 1.7–7)
NRBC BLD AUTO-RTO: 0.1 /100 WBC (ref 0–0.2)
PLATELET # BLD AUTO: 332 10*3/MM3 (ref 140–450)
PMV BLD AUTO: 7.5 FL (ref 6–12)
POTASSIUM SERPL-SCNC: 4.1 MMOL/L (ref 3.5–5.2)
PROTHROMBIN TIME: 12.6 SECONDS (ref 19.4–28.5)
RBC # BLD AUTO: 3.88 10*6/MM3 (ref 3.77–5.28)
SODIUM SERPL-SCNC: 141 MMOL/L (ref 136–145)
WBC # BLD AUTO: 11.1 10*3/MM3 (ref 3.4–10.8)

## 2020-07-22 PROCEDURE — 86140 C-REACTIVE PROTEIN: CPT | Performed by: HOSPITALIST

## 2020-07-22 PROCEDURE — 94799 UNLISTED PULMONARY SVC/PX: CPT

## 2020-07-22 PROCEDURE — 99238 HOSP IP/OBS DSCHRG MGMT 30/<: CPT | Performed by: HOSPITALIST

## 2020-07-22 PROCEDURE — 80048 BASIC METABOLIC PNL TOTAL CA: CPT | Performed by: SURGERY

## 2020-07-22 PROCEDURE — 83735 ASSAY OF MAGNESIUM: CPT | Performed by: HOSPITALIST

## 2020-07-22 PROCEDURE — 99024 POSTOP FOLLOW-UP VISIT: CPT | Performed by: SURGERY

## 2020-07-22 PROCEDURE — 25010000002 PIPERACILLIN SOD-TAZOBACTAM PER 1 G: Performed by: SURGERY

## 2020-07-22 PROCEDURE — 85025 COMPLETE CBC W/AUTO DIFF WBC: CPT | Performed by: HOSPITALIST

## 2020-07-22 PROCEDURE — 25010000002 MAGNESIUM SULFATE IN D5W 1G/100ML (PREMIX) 1-5 GM/100ML-% SOLUTION: Performed by: SURGERY

## 2020-07-22 PROCEDURE — 85610 PROTHROMBIN TIME: CPT | Performed by: INTERNAL MEDICINE

## 2020-07-22 RX ORDER — OXYCODONE HYDROCHLORIDE 5 MG/1
5 TABLET ORAL EVERY 4 HOURS PRN
Qty: 30 TABLET | Refills: 0 | Status: SHIPPED | OUTPATIENT
Start: 2020-07-22 | End: 2020-07-26

## 2020-07-22 RX ORDER — AMOXICILLIN AND CLAVULANATE POTASSIUM 875; 125 MG/1; MG/1
1 TABLET, FILM COATED ORAL 2 TIMES DAILY
Qty: 14 TABLET | Refills: 0 | Status: SHIPPED | OUTPATIENT
Start: 2020-07-22 | End: 2020-07-29

## 2020-07-22 RX ADMIN — GUAIFENESIN 600 MG: 600 TABLET, EXTENDED RELEASE ORAL at 09:14

## 2020-07-22 RX ADMIN — MAGNESIUM SULFATE HEPTAHYDRATE 1 G: 1 INJECTION, SOLUTION INTRAVENOUS at 05:28

## 2020-07-22 RX ADMIN — BUDESONIDE AND FORMOTEROL FUMARATE DIHYDRATE 2 PUFF: 160; 4.5 AEROSOL RESPIRATORY (INHALATION) at 07:06

## 2020-07-22 RX ADMIN — Medication 10 ML: at 09:16

## 2020-07-22 RX ADMIN — OXYCODONE HYDROCHLORIDE 10 MG: 5 TABLET ORAL at 04:48

## 2020-07-22 RX ADMIN — IPRATROPIUM BROMIDE AND ALBUTEROL SULFATE 3 ML: 2.5; .5 SOLUTION RESPIRATORY (INHALATION) at 07:06

## 2020-07-22 RX ADMIN — PIPERACILLIN AND TAZOBACTAM 3.38 G: 3; .375 INJECTION, POWDER, FOR SOLUTION INTRAVENOUS at 00:02

## 2020-07-22 RX ADMIN — PIPERACILLIN AND TAZOBACTAM 3.38 G: 3; .375 INJECTION, POWDER, FOR SOLUTION INTRAVENOUS at 09:15

## 2020-07-22 RX ADMIN — FLUOXETINE 10 MG: 10 CAPSULE ORAL at 09:14

## 2020-07-22 RX ADMIN — IPRATROPIUM BROMIDE AND ALBUTEROL SULFATE 3 ML: 2.5; .5 SOLUTION RESPIRATORY (INHALATION) at 14:49

## 2020-07-22 RX ADMIN — PANTOPRAZOLE SODIUM 40 MG: 40 TABLET, DELAYED RELEASE ORAL at 09:14

## 2020-07-22 RX ADMIN — IPRATROPIUM BROMIDE AND ALBUTEROL SULFATE 3 ML: 2.5; .5 SOLUTION RESPIRATORY (INHALATION) at 11:05

## 2020-07-22 NOTE — SIGNIFICANT NOTE
07/22/20 1458   Discharge of Care   Discharge Mode wheel chair   Discharge Destination home   Discharged Accompanied by family member/friend   Discharge Teaching Done  Yes   Learning Method Explanation;Written Materials

## 2020-07-22 NOTE — PROGRESS NOTES
"Pharmacy dosing service  Anticoagulant  Warfarin     Subjective:    Renuka Pelayo is a 64 y.o.female being continued on warfarin for hx of DVT .    INR Goal: 2 - 3  Home medication?: Yes, warfarin 2 mg PO every day at 1800  Bridge Therapy Present?:  No  Interacting Medications Evaluation (New/Present/Discontinued): NA  Additional Contributing Factors: NA      Assessment/Plan:    Pt's INR is still subtherapeutic, but will return to home dose of 2 mg in light of pt receiving 3 mg x3 days and the Vitamin K/Kcentra from 7/16 likely wearing off.    Continue to monitor and adjust based on INR.         Date 7/16 7/17 7/18 7/19 7/20 7/21 7/22     INR 3.19 0.97 0.88 0.97 1.04 1.02 1.24     Dose Kcentra/ Vit K --- --- 3 mg 3mg 3mg 2mg         Objective:  [Ht: 160 cm (63\"); Wt: 84.5 kg (186 lb 4.6 oz); BMI: Body mass index is 33 kg/m².]    Lab Results   Component Value Date    ALBUMIN 3.50 07/16/2020     Lab Results   Component Value Date    INR 1.24 (L) 07/22/2020    INR 1.02 (L) 07/21/2020    INR 1.04 (L) 07/20/2020    PROTIME 12.6 (L) 07/22/2020    PROTIME 10.7 (L) 07/21/2020    PROTIME 10.8 (L) 07/20/2020     Lab Results   Component Value Date    HGB 10.8 (L) 07/22/2020    HGB 11.9 (L) 07/21/2020    HGB 11.9 (L) 07/20/2020     Lab Results   Component Value Date    HCT 33.1 (L) 07/22/2020    HCT 35.5 07/21/2020    HCT 35.7 07/20/2020       Tara Ty, PharmD  07/22/20 14:22     "

## 2020-07-22 NOTE — DISCHARGE SUMMARY
[facility] Medicine Services  DISCHARGE SUMMARY        Prepared For PCP:  Michael Gerardo MD    Patient Name: Renuka Pelayo  : 1955  MRN: [id number]      Date of Admission:   2020    Date of Discharge:  2020    Length of stay:  LOS: 6 days     Hospital Course     Presenting Problem:   Diverticulitis [K57.92]  Free intraperitoneal air [K66.8]  Perforation of sigmoid colon due to diverticulitis [K57.20]      Active Hospital Problems    Diagnosis  POA   • **Severe sepsis (CMS/HCC) [A41.9, R65.20]  Yes     Priority: High   • Status post Mariya procedure (CMS/HCC) [Z93.3]  Not Applicable     Priority: High   • Chronic anticoagulation [Z79.01]  Not Applicable     Priority: Medium   • COPD (chronic obstructive pulmonary disease) (CMS/HCC) [J44.9]  Yes     Priority: Low   • Chronic back pain [M54.9, G89.29]  Yes     Priority: Low   • Depressive disorder [F32.9]  Yes     Priority: Low   • Essential hypertension [I10]  Yes     Priority: Low   • Gastroesophageal reflux disease [K21.9]  Yes     Priority: Low   • Insomnia [G47.00]  Yes     Priority: Low   • Obesity [E66.9]  Yes     Priority: Low      Resolved Hospital Problems    Diagnosis Date Resolved POA   • Free intraperitoneal air [K66.8] 2020 Yes     Priority: High   • Perforation of sigmoid colon due to diverticulitis [K57.20] 2020 Yes     Priority: High   • Acute kidney injury (CMS/HCC) [N17.9] 2020 Yes     Priority: High   • Perforated sigmoid colon (CMS/HCC) [K63.1] 2020 Yes     Priority: Medium   • Acute hypokalemia [E87.6] 2020 Yes     Priority: Medium   • Acute hyponatremia [E87.1] 2020 Yes     Priority: Low           Hospital Course:    INR was reversed before the patient was taken to the operating room to undergo exploratory laparotomy and Alejo procedure  for perforated sigmoid colon.  The patient was continued on Zosyn postoperatively for sepsis secondary to intra-abdominal  infection.  Electrolyte imbalance with hypokalemia and hyponatremia resolved with repletion and IVF.  The patient was able to tolerate diet on 7/21/2020.  She will be discharged home in the afternoon of 7/22/2020 and will complete a course of oral Augmentin.  PRANAY drain was removed before discharge home.  The patient should not lift anything greater than 20 pounds for 6 weeks after surgery and should follow-up in the office with Dr. Farmer in 2 weeks for postoperative checkup.  She should return to the ED if she has fever 101.5F or worsening of symptoms.          List of problems during hospitalization:    Sepsis due to perforation of sigmoid colon from diverticulitis  - s/p exploratory laparotomy hartmans procedure 7/16/20  - s/p IV zosyn.  Discharged home on Augmentin  - INR reversed before surgery  -Follow-up with general surgery in 2 weeks.     Acute hypokalemia:  -Resolved  - replacement protocol      Acute hyponatremia:  - resolved      COPD:  -Not in exacerbation   - continue bronchodilators      Chronic back pain with opiate dependence:  -On Norco at home  - PRN IV morphine     Depression:  - resume Prozac      Gastroesophageal reflux disease  - IV Pepcid daily     Essential hypertension:  -Medications held while n.p.o.  -Monitor for hypotension     Insomnia  - resume trazodone      History of DVT/PE:  -Chronic warfarin      Obesity  - BMI 31.01  - encourage lifestyle modifications             Recommendation for Outpatient Providers:         Reasons For Change In Medications and Indications for New Medications:        Day of Discharge     HPI:     The patient is a  64 y.o. female with history of COPD, HTN, GERD, diverticulitis, chronic back pain with opiate dependence, DVT/PE on warfarin, depression, and insomnia.  The patient presented to [facility] ED on 07/16/2020 complaining of 3 days of abdominal pain.  The patient claimed that she was initially constipated and took laxatives which caused her  to have multiple loose bowel movements followed by abdominal bloating and firmness thus came to the ED.       In the ED, WBC was 27.2, glucose 141, BP 89/53, and was given Zosyn for sepsis.  CT of the abdomen and pelvis showed perforated acute sigmoid diverticulitis with free air seen in abdomen and pelvis.       Vital Signs:   Temp:  [98.1 °F (36.7 °C)-98.4 °F (36.9 °C)] 98.1 °F (36.7 °C)  Heart Rate:  [] 99  Resp:  [16-20] 16  BP: (112-145)/(65-85) 145/77     Physical Exam:  Physical Exam   Constitutional: She is oriented to person, place, and time. She appears well-developed and well-nourished.   HENT:   Head: Normocephalic and atraumatic.   Eyes: Pupils are equal, round, and reactive to light. EOM are normal.   Neck: Normal range of motion. Neck supple.   Cardiovascular: Normal rate and regular rhythm.   Pulmonary/Chest: Effort normal and breath sounds normal.   Abdominal: Bowel sounds are normal. There is tenderness in the right lower quadrant.       Musculoskeletal: Normal range of motion.   Neurological: She is alert and oriented to person, place, and time.   Skin: Skin is warm.   Psychiatric: She has a normal mood and affect.       Pertinent  and/or Most Recent Results     Results from last 7 days   Lab Units 07/22/20  0244 07/21/20  2134 07/21/20  0237 07/20/20  0329 07/19/20  0507 07/18/20  0547 07/17/20  0524 07/17/20  0523 07/16/20  1958 07/16/20  1316 07/16/20  0824   WBC 10*3/mm3 11.10*  --  12.40* 10.90* 13.10* 19.60* 22.80*  --   --   --  27.20*   HEMOGLOBIN g/dL 10.8*  --  11.9* 11.9* 11.7* 12.7 13.0  --   --   --  13.6   HEMOGLOBIN, POC g/dL  --   --   --   --   --   --   --   --   --  11.6*  --    HEMATOCRIT % 33.1*  --  35.5 35.7 35.5 38.6 39.1  --   --   --  41.1   HEMATOCRIT POC %  --   --   --   --   --   --   --   --   --  34.0*  --    PLATELETS 10*3/mm3 332  --  338 317 298 360 287  --   --   --  209   SODIUM mmol/L 141  --  139 140 138 137  --  136 133*  --  123*   POTASSIUM mmol/L  4.1 4.0 3.5 3.3* 3.4* 3.2*  --  3.3* 3.4*  --  2.9*   CHLORIDE mmol/L 105  --  101 101 99 97*  --  97* 95*  --  82*   CO2 mmol/L 25.0  --  29.0 28.0 30.0* 25.0  --  23.0 24.0  --  24.0   BUN  5*  --  5* 7* 10 13  --  15 18  --  22   CREATININE mg/dL 0.69  --  0.60 0.62 0.71 0.84  --  0.79 0.92  --  1.20*   GLUCOSE mg/dL 127*  --  130* 150* 122* 132*  --  145* 171*  --  141*   CALCIUM mg/dL 8.6  --  8.4* 8.4* 8.2* 8.8  --  8.4* 8.0*  --  9.0     Results from last 7 days   Lab Units 07/22/20  0244 07/21/20  0237 07/20/20  0329 07/19/20  1636 07/18/20  0547 07/17/20  0524 07/16/20  0824   BILIRUBIN mg/dL  --   --   --   --   --   --  0.6   ALK PHOS U/L  --   --   --   --   --   --  108   ALT (SGPT) U/L  --   --   --   --   --   --  11   AST (SGOT) U/L  --   --   --   --   --   --  19   PROTIME Seconds 12.6* 10.7* 10.8* 10.2* 9.5* 10.3* 30.0*   INR  1.24* 1.02* 1.04* 0.97* 0.88* 0.97* 3.19*           Invalid input(s): TG, LDLCALC, LDLREALC  Results from last 7 days   Lab Units 07/16/20  0829 07/16/20  0824   HEMOGLOBIN A1C %  --  5.7*   TROPONIN T ng/mL  --  <0.010   LACTATE mmol/L 1.2  --        Brief Urine Lab Results  (Last result in the past 365 days)      Color   Clarity   Blood   Leuk Est   Nitrite   Protein   CREAT   Urine HCG        07/16/20 0917 Dark Yellow Slightly Cloudy  Comment:  Result checked  Negative Moderate (2+) Negative Trace               Microbiology Results Abnormal     Procedure Component Value - Date/Time    Blood Culture - Blood, Arm, Left [[id number]] Collected:  07/16/20 0857    Lab Status:  Final result Specimen:  Blood from Arm, Left Updated:  07/21/20 0915     Blood Culture No growth at 5 days    Blood Culture - Blood, Arm, Right [[id number]] Collected:  07/16/20 0858    Lab Status:  Final result Specimen:  Blood from Arm, Right Updated:  07/21/20 0915     Blood Culture No growth at 5 days    COVID-19 [facility] IN-HOUSE, Nasal Swab No Transport Media - Swab, Nasal Cavity [[id number]]  (Normal)  Collected:  07/16/20 1102    Lab Status:  Final result Specimen:  Swab from Nasal Cavity Updated:  07/16/20 1147     COVID19 Not Detected    Narrative:       Fact sheet for providers: https://www.fda.gov/media/591551/download     Fact sheet for patients: https://www.fda.gov/media/414113/download          Ct Abdomen Pelvis With Contrast    Result Date: 7/16/2020  Impression:  1. 1. FINDINGS consistent with perforated acute sigmoid diverticulitis. Free air is seen in the abdomen and pelvis. Small amount of pelvic free fluid is present without well-defined abscess collection. I notified Dr. Griffith in the emergency room at the time of this dictation. 2. Additional chronic findings as described above.    Electronically Signed By-Dr. Daly Rich MD On:7/16/2020 10:18 AM This report was finalized on [id number] by Dr. Daly Rich MD.    Xr Chest 1 View    Result Date: 7/16/2020  Impression:   1.  Right internal jugular central venous catheter in place the tip taking of the superior vena cava.  No evidence of pneumothorax 2.  Borderline heart size with pulmonary vascular congestion and prominent interstitial markings suggesting mild interstitial edema. 3.  Chronic left basilar airspace disease compatible scarring.  Electronically Signed By-Renny Trinh On:7/16/2020 3:55 PM This report was finalized on [id number] by  Renny Trinh, .      Results for orders placed during the hospital encounter of 09/05/19   Duplex Venous Lower Extremity - Bilateral    Narrative · Chronic right lower extremity deep vein thrombosis noted in the proximal   femoral, mid femoral, distal femoral and popliteal.  · Acute right lower extremity deep vein thrombosis noted in the peroneal.  · All other veins appeared normal bilaterally.          Results for orders placed during the hospital encounter of 09/05/19   Duplex Venous Lower Extremity - Bilateral    Narrative · Chronic right lower extremity deep vein thrombosis noted in the  proximal   femoral, mid femoral, distal femoral and popliteal.  · Acute right lower extremity deep vein thrombosis noted in the peroneal.  · All other veins appeared normal bilaterally.                       Test Results Pending at Discharge        Procedures Performed  Procedure(s):  LAPAROTOMY EXPLORATORY HARTMANS         Consults:   Consults     Date and Time Order Name Status Description    7/16/2020 1025 Hospitalist (on-call MD unless specified) Completed     7/16/2020 1018 Surgery (on-call MD unless specified) Completed             Discharge Details        Discharge Medications      New Medications      Instructions Start Date   amoxicillin-clavulanate 875-125 MG per tablet  Commonly known as:  Augmentin   1 tablet, Oral, 2 Times Daily      oxyCODONE 5 MG immediate release tablet  Commonly known as:  ROXICODONE   5 mg, Oral, Every 4 Hours PRN         Continue These Medications      Instructions Start Date   albuterol sulfate  (90 Base) MCG/ACT inhaler  Commonly known as:  PROVENTIL HFA;VENTOLIN HFA;PROAIR HFA   2 puffs, Inhalation, Every 6 Hours PRN      albuterol 0.63 MG/3ML nebulizer solution  Commonly known as:  ACCUNEB   1 ampule, Nebulization, Every 6 Hours PRN      FLUoxetine 10 MG capsule  Commonly known as:  PROzac   10 mg, Oral, Daily      hydrALAZINE 25 MG tablet  Commonly known as:  APRESOLINE   25 mg, Oral, 2 Times Daily PRN, For SBP >160       pantoprazole 40 MG EC tablet  Commonly known as:  PROTONIX   40 mg, Oral, Daily      traZODone 100 MG tablet  Commonly known as:  DESYREL   100 mg, Oral, Nightly PRN      Trelegy Ellipta 100-62.5-25 MCG/INH aerosol powder   Generic drug:  Fluticasone-Umeclidin-Vilant   1 puff, Inhalation, 2 times daily      warfarin 2 MG tablet  Commonly known as:  COUMADIN   2 mg, Oral, Nightly         Stop These Medications    Dexilant 60 MG capsule  Generic drug:  dexlansoprazole     famotidine 40 MG tablet  Commonly known as:  PEPCID     HYDROcodone-acetaminophen   MG per tablet  Commonly known as:  NORCO     valsartan-hydrochlorothiazide 320-25 MG per tablet  Commonly known as:  DIOVAN-HCT            No Known Allergies      Discharge Disposition:  Home or Self Care    Diet:  Hospital:  Diet Order   Procedures   • Diet Gastrointestinal; Low Residue         Discharge Activity:         CODE STATUS:    Code Status and Medical Interventions:   Ordered at: 07/16/20 1128     Code Status:    CPR     Medical Interventions (Level of Support Prior to Arrest):    Full         Follow-up Appointments  No future appointments.    Additional Instructions for the Follow-ups that You Need to Schedule     Ambulatory Referral to Home Health   As directed      Face to Face Visit Date:  7/17/2020    Follow-up provider for Plan of Care?:  I treated the patient in an acute care facility and will not continue treatment after discharge.    Follow-up provider:  MICHAEL GERARDO [[id number]]    Reason/Clinical Findings:  new ostomy    Describe mobility limitations that make leaving home difficult:  tires easily    Nursing/Therapeutic Services Requested:  Other (treat and eval)    Frequency:  1 Week 1         Ambulatory Referral to Home Health   As directed      Face to Face Visit Date:  7/17/2020    Follow-up provider for Plan of Care?:  I treated the patient in an acute care facility and will not continue treatment after discharge.    Follow-up provider:  MICHAEL GERARDO [[id number]]    Reason/Clinical Findings:  new ostomy    Describe mobility limitations that make leaving home difficult:  tires easily    Nursing/Therapeutic Services Requested:  Other (treat and eval)    Frequency:  1 Week 1         Discharge Follow-up with PCP   As directed       Currently Documented PCP:    Michael Gerardo MD    PCP Phone Number:    [phone]     Follow Up Details:  2 weeks                 Condition on Discharge:      Stable      This patient has been examined wearing appropriate Personal Protective  Equipment . 07/22/20      Electronically signed by Farhan Lott DO, 07/22/20, 2:52 PM.      Time: I spent  15 minutes on this discharge activity which included face-to-face encounter with the patient/reviewing the data in the system/coordination of the care with the nursing staff as well as consultants/documentation/entering orders.

## 2020-07-22 NOTE — PLAN OF CARE
Problem: Patient Care Overview  Goal: Plan of Care Review  Flowsheets (Taken 7/20/2020 1068)  Plan of Care Reviewed With: patient;spouse  Outcome Summary: 65 yo female seen s/p exploratory lap w/ colostomy on 7/16/2020. Pt is not getting out of bed enough, as she has refused when RN has encouraged this. PT discussed complications which pt is putting herself at risk of developing by decreased mobility. Pt ambulated w/ PT x 200 ft w/ rw today. However, pt became soa. Check of vital signs revealed HR of 155 bpm, O2 sats level 70%, Required 4 min rest in sitting prior to recovery to HR of <130 and O2 > 90%. Pt will need 6 min walk prior to discharge, as she is likely to need home O2. Also will need rw for home use. Recommend home health at d/c.    PPE: gloves, mask, goggles.   
  Problem: Patient Care Overview  Goal: Plan of Care Review  Outcome: Ongoing (interventions implemented as appropriate)  Flowsheets  Taken 7/17/2020 1600  Progress: no change  Plan of Care Reviewed With: patient  Taken 7/17/2020 1630  Outcome Summary: Pt is 63 YO F who was admitted with peroration of sigmoid colon, exploratory lap and colostomy performed 7/16. Pt .completes functional transfers this date w/ supervision for safety bathroom distances + rolling walker support. Pt. requires increased ADL support secondary to painful abdominal and now w/ colostomy. Recommend d/c home w/ family assist. Will follow up w/ pt. 1-3x per week at [facility] to assess and monitor pt. progress.  Note:   PPE: gloves, mask, faceshield      
  Problem: Patient Care Overview  Goal: Plan of Care Review  Outcome: Ongoing (interventions implemented as appropriate)  Flowsheets (Taken 7/17/2020 1323)  Outcome Summary: Pt is 63 YO F who was admitted with peroration of sigmoid colon, exploratory lap and colostomy performed 7/16. Pt demonstrated good functional mobility today requiring no physical assistance with bed mobilty and gait training. Pt ambulated 60 feet using RWx. Pt typically ambulates with cane or no AD but was steady with RWx. Pt lives at home with  and son, and reports son is home with her all the time and able to help. Pt has 3 stairs to enter, plan to assess next treatment if appropriate. Pt is appropriate to return home with assist from family follwoing d/c. PPE worn includes gloves and mask with shield.     
  Problem: Patient Care Overview  Goal: Plan of Care Review  Outcome: Ongoing (interventions implemented as appropriate)  Flowsheets (Taken 7/18/2020 1326)  Progress: improving  Plan of Care Reviewed With: patient  Outcome Summary: pt increased distance to 70'using rwx with supervision. Will need rwx at dc. Monitored sats and incr to 6 for gait , sats still dropped and will need 6MWT from RT, Gloves, mask/shield worn for tx     
  Problem: Patient Care Overview  Goal: Plan of Care Review  Outcome: Ongoing (interventions implemented as appropriate)  Flowsheets (Taken 7/21/2020 0163)  Plan of Care Reviewed With: patient  Outcome Summary: Pt preesents with fatigue, however willing to complete ambulation.  Gait assessed with and without AD with improved gait and balance noted with RW.  Without AD, pt exhibiting asymmetrical gait pattern with left hip hiking and impaired balance.  Pt exhibiting improved gait speed, balance, safety, and confidence using RW for mobility.  PT recommending home with assist of family, HHPT, and use of RW for pt safety.  Pt will need RW at d/c.  PPE donned: mask with faceshield, gloves.     
Patient having moderate amount of loose BM coming out of ostomy. Patient getting up to BSC x1 assist. Patient has pain but given PRN pain medicine when asked for. Patient tolerating full liquid diet. Will continue to monitor.   
Patient is doing well. Patient has ambulated several times out in the hallway. Patient has emptied her own ostomy. Pain is controlled with oral pain medication. Patient is eager to discharge home.  
Patient pain controlled using oral pain medicine. Patient reports no nausea or vomiting. Patient eager to go home today. Waiting to see if WBC improved on this morning labs. Will continue to monitor.   
Patient still using 5L 02. Good effort on IS. Will continue to work on deep breathing this shift.   
Pt desatted after taking off 02 in sleep, oxygen increased to 5L, audible wheezing noted, respiratory called and gave prn minineb tx, sats in 90s after administration, resident did urinate in toilet this shift, no complaints of pain voiced  
Pt did ambulate with PT after much encouragement. Pt noted to have decreased 02, increased heart rate after walk taking 3 minutes to recover. Pt will need 6 minute walk prior to DC. Pt also evaluated by wound care for Colostomy care. Ostomy has began to have minimal output. VSS. Continue to monitor.   
Pt doing well. Resting comfortably. No complaints at this time. Vitals stale. Will continue to monitor.   
Pt had  a good night  slep well  
Pt started on Mucinex for increased congestion, encouraged to continue working on deep breathing and incentive spirometer, pt remains on 5L 02, no complaints of SOA  
Pt with anticipated hospital d/c per RN.  PT did not enter room.    Katherine Messer PT, DPT, GCS  
Still on clear liquids today, await bowel function return before advancing diet. Coumadin restarted, pharmacy consulted to dose  
Up from surgery still very sedated. Difficult to rouse.   
Han Thomas)

## 2020-07-22 NOTE — NURSING NOTE
Pt seen today for new ostomy evaluation and teaching. Pt sitting in bed alert and oriented planning for dc today. Discussed how to order supplies for home today. No other questions/ concerns at this time.  See lda for assessment of stoma

## 2020-07-22 NOTE — DISCHARGE INSTRUCTIONS
Ok for discharge  Follow-up with me (Dr. Farmer) in 2 weeks  Remove drain prior to discharge  Regular diet as tolerated  Ok to shower starting tomorrow. No tub baths, pools, lakes, or streams for 1 week.  No heavy lifting (greater than 20 lbs) for 6 weeks after surgery  Call my office or present to the ED with: fevers greater than 101.5, redness around the incisions, drainage from the incisions, intractable nausea/vomiting, or pain that is getting worse instead of better      Colostomy Home Guide, Adult    Colostomy surgery is done to create an opening in the front of the abdomen for stool (feces) to leave the body through an ostomy (stoma). Part of the large intestine is attached to the stoma. A bag, also called a pouch, is fitted over the stoma. Stool and gas will collect in the bag.  After surgery, you will need to empty and change your colostomy bag as needed. You will also need to care for your stoma.  How to care for the stoma  Your stoma should look pink, red, and moist, like the inside of your cheek. Soon after surgery, the stoma may be swollen, but this swelling will go away within 6 weeks. To care for the stoma:  · Keep the skin around the stoma clean and dry.  · Use a clean, soft washcloth to gently wash the stoma and the skin around it. Clean using a circular motion, and wipe away from the stoma opening, not toward it.  ? Use warm water and only use cleansers recommended by your health care provider.  ? Rinse the stoma area with plain water.  ? Dry the area around the stoma well.  · Use stoma powder or ointment on your skin only as told by your health care provider. Do not use any other powders, gels, wipes, or creams on the skin around the stoma.  · Check the stoma area every day for signs of infection. Check for:  ? New or worsening redness, swelling, or pain.  ? New or increased fluid or blood.  ? Pus or warmth.  · Measure the stoma opening regularly and record the size. Watch for changes. (It is  normal for the stoma to get smaller as swelling goes away.) Share this information with your health care provider.  How to empty the colostomy bag    Empty your bag at bedtime and whenever it is one-third to one-half full. Do not let the bag get more than half-full with stool or gas. The bag could leak if it gets too full. Some colostomy bags have a built-in gas release valve that releases gas often throughout the day.  Follow these basic steps:  1. Wash your hands with soap and water.  2. Sit far back on the toilet seat.  3. Put several pieces of toilet paper into the toilet water. This will prevent splashing as you empty stool into the toilet.  4. Remove the clip or the hook-and-loop fastener from the tail end of the bag.  5. Unroll the tail, then empty the stool into the toilet.  6. Clean the tail with toilet paper or a moist towelette.  7. Reroll the tail, and close it with the clip or the hook-and-loop fastener.  8. Wash your hands again.  How to change the colostomy bag  Change your bag every 3-4 days or as often as told by your health care provider. Also change the bag if it is leaking or  from the skin, or if your skin around the stoma looks or feels irritated. Irritated skin may be a sign that the bag is leaking.  Always have colostomy supplies with you, and follow these basic steps:  1. Wash your hands with soap and water. Have paper towels or tissues nearby to clean any discharge.  2. Remove the old bag and skin barrier. Use your fingers or a warm cloth to gently push the skin away from the barrier.  3. Clean the stoma area with water or with mild soap and water, as directed. Use water to rinse away any soap.  4. Dry the skin. You may use the cool setting on a hair dryer to do this.  5. Use a tracing pattern (template) to cut the skin barrier to the size needed.  6. If you are using a two-piece bag, attach the bag and the skin barrier to each other. Add the barrier ring, if you use one.  7. If  directed, apply stoma powder or skin barrier gel to the skin.  8. Warm the skin barrier with your hands, or blow with a hair dryer for 5-10 seconds.  9. Remove the paper from the adhesive strip of the skin barrier.  10. Press the adhesive strip onto the skin around the stoma.  11. Gently rub the skin barrier onto the skin. This creates heat that helps the barrier to stick.  12. Apply stoma tape to the edges of the skin barrier, if desired.  13. Wash your hands again.  General recommendations  · Avoid wearing tight clothes or having anything press directly on your stoma or bag. Change your clothing whenever it is soiled or damp.  · You may shower or bathe with the bag on or off. Do not use harsh or oily soaps or lotions. Dry the skin and bag after bathing.  · Store all supplies in a cool, dry place. Do not leave supplies in extreme heat because some parts can melt or not stick as well.  · Whenever you leave home, take extra clothing and an extra skin barrier and bag with you.  · If your bag gets wet, you can dry it with a hair dryer on the cool setting.  · To prevent odor, you may put drops of ostomy deodorizer in the bag.  · If recommended by your health care provider, put ostomy lubricant inside the bag. This helps stool to slide out of the bag more easily and completely.  Contact a health care provider if:  · You have new or worsening redness, swelling, or pain around your stoma.  · You have new or increased fluid or blood coming from your stoma.  · Your stoma feels warm to the touch.  · You have pus coming from your stoma.  · Your stoma extends in or out farther than normal.  · You need to change your bag every day.  · You have a fever.  Get help right away if:  · Your stool is bloody.  · You have nausea or you vomit.  · You have trouble breathing.  Summary  · Measure your stoma opening regularly and record the size. Watch for changes.  · Empty your bag at bedtime and whenever it is one-third to one-half full. Do  not let the bag get more than half-full with stool or gas.  · Change your bag every 3-4 days or as often as told by your health care provider.  · Whenever you leave home, take extra clothing and an extra skin barrier and bag with you.  This information is not intended to replace advice given to you by your health care provider. Make sure you discuss any questions you have with your health care provider.  Document Released: 12/20/2004 Document Revised: 04/08/2020 Document Reviewed: 06/13/2018  Elsevier Patient Education © 2020 [employer]    Wound Infection  A wound infection happens when tiny organisms (microorganisms) start to grow in a wound. A wound infection is most often caused by bacteria. Infection can cause the wound to break open or worsen. Wound infection needs treatment. If a wound infection is left untreated, complications can occur. Untreated wound infections may lead to an infection in the bloodstream (septicemia) or a bone infection (osteomyelitis).  What are the causes?  This condition is most often caused by bacteria growing in a wound. Other microorganisms, like yeast and fungi, can also cause wound infections.  What increases the risk?  The following factors may make you more likely to develop this condition:  · Having a weak body defense system (immune system).  · Having diabetes.  · Taking steroid medicines for a long time (chronic use).  · Smoking.  · Being an older person.  · Being overweight.  · Taking chemotherapy medicines.  What are the signs or symptoms?  Symptoms of this condition include:  · Having more redness, swelling, or pain at the wound site.  · Having more blood or fluid at the wound site.  · A bad smell coming from a wound or bandage (dressing).  · Having a fever.  · Feeling tired or fatigued.  · Having warmth at or around the wound.  · Having pus at the wound site.  How is this diagnosed?  This condition is diagnosed with a medical history and physical exam. You may also have  a wound culture or blood tests or both.  How is this treated?  This condition is usually treated with an antibiotic medicine.  · The infection should improve 24-48 hours after you start antibiotics.  · After 24-48 hours, redness around the wound should stop spreading, and the wound should be less painful.  Follow these instructions at home:  Medicines  · Take or apply over-the-counter and prescription medicines only as told by your health care provider.  · If you were prescribed an antibiotic medicine, take or apply it as told by your health care provider. Do not stop using the antibiotic even if you start to feel better.  Wound care    · Clean the wound each day, or as told by your health care provider.  ? Wash the wound with mild soap and water.  ? Rinse the wound with water to remove all soap.  ? Pat the wound dry with a clean towel. Do not rub it.  · Follow instructions from your health care provider about how to take care of your wound. Make sure you:  ? Wash your hands with soap and water before and after you change your dressing. If soap and water are not available, use hand .  ? Change your dressing as told by your health care provider.  ? Leave stitches (sutures), skin glue, or adhesive strips in place if your wound has been closed. These skin closures may need to stay in place for 2 weeks or longer. If adhesive strip edges start to loosen and curl up, you may trim the loose edges. Do not remove adhesive strips completely unless your health care provider tells you to do that. Some wounds are left open to heal on their own.  · Check your wound every day for signs of infection. Watch for:  ? More redness, swelling, or pain.  ? More fluid or blood.  ? Warmth.  ? Pus or a bad smell.  General instructions  · Keep the dressing dry until your health care provider says it can be removed.  · Do not take baths, swim, or use a hot tub until your health care provider approves. Ask your health care provider if  you may take showers. You may only be allowed to take sponge baths.  · Raise (elevate) the injured area above the level of your heart while you are sitting or lying down.  · Do not scratch or pick at the wound.  · Keep all follow-up visits as told by your health care provider. This is important.  Contact a health care provider if:  · Your pain is not controlled with medicine.  · You have more redness, swelling, or pain around your wound.  · You have more fluid or blood coming from your wound.  · Your wound feels warm to the touch.  · You have pus coming from your wound.  · You continue to notice a bad smell coming from your wound or your dressing.  · Your wound that was closed breaks open.  Get help right away if:  · You have a red streak going away from your wound.  · You have a fever.  Summary  · A wound infection happens when tiny organisms (microorganisms) start to grow in a wound.  · This condition is usually treated with an antibiotic medicine.  · Follow instructions from your health care provider about how to take care of your wound.  · Contact a health care provider if your wound infection does not begin to improve in 24-48 hours, or your symptoms worsen.  · Keep all follow-up visits as told by your health care provider. This is important.  This information is not intended to replace advice given to you by your health care provider. Make sure you discuss any questions you have with your health care provider.  Document Released: 09/15/2004 Document Revised: 07/30/2019 Document Reviewed: 07/30/2019  [employer] Patient Education © 2020 Elsevier Inc[employer]

## 2020-07-22 NOTE — PROGRESS NOTES
Continued Stay Note   [facility]     Patient Name: Renuka Pelayo  MRN: [id number]  Today's Date: 7/22/2020    Admit Date: 7/16/2020    Discharge Plan     Row Name 07/22/20 1449       Plan    Plan  Rolling walker from [facility] and home with [facility]    Plan Comments  Spoke with patient over the phone. she confirms she wanats [facility] and rolling walker to be delivered to room from [facility]. Alexandra from [facility] notified that patient confirms she wants home health          Expected Discharge Date and Time     Expected Discharge Date Expected Discharge Time    Jul 22, 2020             Vianey Domínguez RN

## 2020-07-22 NOTE — DISCHARGE PLACEMENT REQUEST
"Renuka Healy (64 y.o. Female)     Date of Birth Social Security Number Address Home Phone MRN    1955  [address] [place] [address] [phone] [id number]    Anabaptism Marital Status          None        Admission Date Admission Type Admitting Provider Attending Provider Department, Room/Bed    7/16/20 Emergency Farhan Lott, Farhan Fuentes DO [facility] SURGICAL INPATIENT, 4130/1    Discharge Date Discharge Disposition Discharge Destination                       Attending Provider:  Farhan Lott DO    Allergies:  No Known Allergies    Isolation:  None   Infection:  None   Code Status:  CPR    Ht:  160 cm (63\")   Wt:  84.5 kg (186 lb 4.6 oz)    Admission Cmt:  None   Principal Problem:  Severe sepsis (CMS/[facility]) [A41.9,R65.20]                 Active Insurance as of 7/16/2020     Primary Coverage     Payor Plan Insurance Group Employer/Plan Group    ANTH MEDICARE REPLACEMENT ANTH MEDICARE ADVANTAGE [id number]     Payor Plan Address Payor Plan Phone Number Payor Plan Fax Number Effective Dates    [address] [phone]  1/1/2019 - None Entered    [facility] [address]       Subscriber Name Subscriber Birth Date Member ID       HEALYRENUKA VANDANA 1955 [id number]                 Emergency Contacts      (Rel.) Home Phone Work Phone Mobile Phone    PierceAdrian (Spouse) -- -- [phone]              "

## 2020-07-22 NOTE — PROGRESS NOTES
LOS: 6 days   Patient Care Team:  Michael Gerardo MD as PCP - General    Reason for follow-up: Postop    Subjective   Ready to go home. No questions    Objective   Incision is clean without any surrounding erythema, induration  Or drainage.  Drain tube with appropriate color and output - no pus    Vital Signs  Vitals:    07/22/20 0711 07/22/20 1105 07/22/20 1111 07/22/20 1254   BP:    145/77   BP Location:    Right arm   Patient Position:    Lying   Pulse: 87 91 85 87   Resp: 18 20 20 17   Temp:    98.1 °F (36.7 °C)   TempSrc:    Oral   SpO2: 91% 91% 91% 93%   Weight:       Height:             Results Review:       Lab Results (last 24 hours)     Procedure Component Value Units Date/Time    BUN [[id number]]  (Abnormal) Collected:  07/22/20 0244    Specimen:  Blood Updated:  07/22/20 0729     BUN 5 mg/dL     Magnesium [[id number]]  (Normal) Collected:  07/22/20 0244    Specimen:  Blood Updated:  07/22/20 0432     Magnesium 1.7 mg/dL     Basic Metabolic Panel [[id number]]  (Abnormal) Collected:  07/22/20 0244    Specimen:  Blood Updated:  07/22/20 0431     Glucose 127 mg/dL      BUN --     Comment: Testing performed by alternate method        Creatinine 0.69 mg/dL      Sodium 141 mmol/L      Potassium 4.1 mmol/L      Chloride 105 mmol/L      CO2 25.0 mmol/L      Calcium 8.6 mg/dL      eGFR Non African Amer 86 mL/min/1.73      BUN/Creatinine Ratio --     Comment: Testing not performed        Anion Gap 11.0 mmol/L     Narrative:       GFR Normal >60  Chronic Kidney Disease <60  Kidney Failure <15      C-reactive Protein [[id number]]  (Abnormal) Collected:  07/22/20 0244    Specimen:  Blood Updated:  07/22/20 0431     C-Reactive Protein 3.94 mg/dL     Protime-INR [[id number]]  (Abnormal) Collected:  07/22/20 0244    Specimen:  Blood Updated:  07/22/20 0404     Protime 12.6 Seconds      INR 1.24    CBC & Differential [[id number]] Collected:  07/22/20 0244    Specimen:  Blood Updated:  07/22/20 0400    Narrative:        The following orders were created for panel order CBC & Differential.  Procedure                               Abnormality         Status                     ---------                               -----------         ------                     CBC Auto Differential[[id number]]        Abnormal            Final result                 Please view results for these tests on the individual orders.    CBC Auto Differential [[id number]]  (Abnormal) Collected:  07/22/20 0244    Specimen:  Blood Updated:  07/22/20 0400     WBC 11.10 10*3/mm3      RBC 3.88 10*6/mm3      Hemoglobin 10.8 g/dL      Hematocrit 33.1 %      MCV 85.2 fL      MCH 27.8 pg      MCHC 32.6 g/dL      RDW 14.7 %      RDW-SD 44.2 fl      MPV 7.5 fL      Platelets 332 10*3/mm3      Neutrophil % 76.3 %      Lymphocyte % 12.4 %      Monocyte % 7.8 %      Eosinophil % 2.9 %      Basophil % 0.6 %      Neutrophils, Absolute 8.50 10*3/mm3      Lymphocytes, Absolute 1.40 10*3/mm3      Monocytes, Absolute 0.90 10*3/mm3      Eosinophils, Absolute 0.30 10*3/mm3      Basophils, Absolute 0.10 10*3/mm3      nRBC 0.1 /100 WBC     Potassium [[id number]]  (Normal) Collected:  07/21/20 2134    Specimen:  Blood Updated:  07/21/20 2159     Potassium 4.0 mmol/L            Imaging Results (Last 24 Hours)     ** No results found for the last 24 hours. **          Medication Review:   Current Facility-Administered Medications:   •  acetaminophen (TYLENOL) tablet 650 mg, 650 mg, Oral, Q4H PRN **OR** acetaminophen (TYLENOL) 160 MG/5ML solution 650 mg, 650 mg, Oral, Q4H PRN **OR** acetaminophen (TYLENOL) suppository 650 mg, 650 mg, Rectal, Q4H PRN, Chi Farmer MD  •  albuterol (PROVENTIL) nebulizer solution 0.083% 2.5 mg/3mL, 2.5 mg, Nebulization, Q4H PRN, Chi Farmer MD, 2.5 mg at 07/18/20 0151  •  aluminum-magnesium hydroxide-simethicone (MAALOX MAX) 400-400-40 MG/5ML suspension 15 mL, 15 mL, Oral, Q6H PRN, Cih Farmer MD, 15 mL at 07/20/20  2226  •  bisacodyl (DULCOLAX) suppository 10 mg, 10 mg, Rectal, Daily PRN, Chi Farmer MD  •  budesonide-formoterol (SYMBICORT) 160-4.5 MCG/ACT inhaler 2 puff, 2 puff, Inhalation, BID - RT, Chi Farmer MD, 2 puff at 07/22/20 0706  •  FLUoxetine (PROzac) capsule 10 mg, 10 mg, Oral, Daily, GuerreroClemente DO, 10 mg at 07/22/20 0914  •  guaiFENesin (MUCINEX) 12 hr tablet 600 mg, 600 mg, Oral, Q12H, Clemente Guerrero DO, 600 mg at 07/22/20 0914  •  hydrALAZINE (APRESOLINE) injection 10 mg, 10 mg, Intravenous, Q6H PRN, Chi Farmer MD  •  HYDROmorphone (DILAUDID) injection 0.5 mg, 0.5 mg, Intravenous, Q2H PRN, Chi Farmer MD, 0.5 mg at 07/18/20 2108  •  ipratropium-albuterol (DUO-NEB) nebulizer solution 3 mL, 3 mL, Nebulization, 4x Daily - RT, Clemente Guerrero DO, 3 mL at 07/22/20 1105  •  magnesium hydroxide (MILK OF MAGNESIA) suspension 2400 mg/10mL 10 mL, 10 mL, Oral, Daily PRN, Chi Farmer MD  •  Magnesium Sulfate 2 gram infusion - Mg less than or equal to 1.5 mg/dL, 2 g, Intravenous, PRN **OR** Magnesium Sulfate 1 gram infusion - Mg 1.6-1.9 mg/dL, 1 g, Intravenous, PRN, Chi Farmer MD, Last Rate: 100 mL/hr at 07/22/20 0528, 1 g at 07/22/20 0528  •  melatonin tablet 5 mg, 5 mg, Oral, Nightly PRN, Chi Farmer MD, 5 mg at 07/21/20 2109  •  nitroglycerin (NITROSTAT) SL tablet 0.4 mg, 0.4 mg, Sublingual, Q5 Min PRN, Chi Farmer MD  •  ondansetron (ZOFRAN) tablet 4 mg, 4 mg, Oral, Q6H PRN **OR** ondansetron (ZOFRAN) injection 4 mg, 4 mg, Intravenous, Q6H PRN, Chi Farmer MD, 4 mg at 07/16/20 1432  •  oxyCODONE (ROXICODONE) immediate release tablet 5 mg, 5 mg, Oral, Q4H PRN **OR** oxyCODONE (ROXICODONE) immediate release tablet 10 mg, 10 mg, Oral, Q4H PRN, Chi Farmer MD, 10 mg at 07/22/20 0448  •  pantoprazole (PROTONIX) EC tablet 40 mg, 40 mg, Oral, Cesar WHALEY Birrilla, DO, 40 mg at  07/22/20 0914  •  Pharmacy to dose warfarin, , Does not apply, Continuous PRN, Clemente Guerrero DO  •  piperacillin-tazobactam (ZOSYN) IVPB 3.375 g in 100 mL NS (CD), 3.375 g, Intravenous, Q8H, Chi Farmer MD, Last Rate: 25 mL/hr at 07/22/20 0915, 3.375 g at 07/22/20 0915  •  potassium chloride (K-DUR,KLOR-CON) CR tablet 40 mEq, 40 mEq, Oral, PRN, 40 mEq at 07/21/20 1657 **OR** potassium chloride (KLOR-CON) packet 40 mEq, 40 mEq, Oral, PRN, 40 mEq at 07/18/20 2108 **OR** potassium chloride 10 mEq in 100 mL IVPB, 10 mEq, Intravenous, Q1H PRN, Chi Farmer MD, Last Rate: 100 mL/hr at 07/16/20 1203, 10 mEq at 07/16/20 1203  •  promethazine (PHENERGAN) IVPB 6.25 mg, 6.25 mg, Intravenous, Once PRN **OR** promethazine (PHENERGAN) injection 6.25 mg, 6.25 mg, Intramuscular, Once PRN **OR** promethazine (PHENERGAN) suppository 25 mg, 25 mg, Rectal, Once PRN **OR** promethazine (PHENERGAN) tablet 25 mg, 25 mg, Oral, Once PRN, Chi Farmer MD  •  sodium chloride 0.9 % flush 10 mL, 10 mL, Intravenous, Q12H, Chi Farmer MD, 10 mL at 07/22/20 0916  •  sodium chloride 0.9 % flush 10 mL, 10 mL, Intravenous, PRN, Chi Farmer MD  •  sodium chloride 0.9 % infusion, 125 mL/hr, Intravenous, Continuous, Chi Farmer MD, Last Rate: 125 mL/hr at 07/17/20 1249, 125 mL/hr at 07/17/20 1249  •  traZODone (DESYREL) tablet 100 mg, 100 mg, Oral, Nightly PRN, Guerrero, Birrilla, DO, 100 mg at 07/21/20 2109  •  warfarin (COUMADIN) tablet 2 mg, 2 mg, Oral, Daily, Farhan Lott DO    Assessment/Plan         Acute hypokalemia    Severe sepsis (CMS/HCC)    Acute kidney injury (CMS/HCC)    Acute hyponatremia    COPD (chronic obstructive pulmonary disease) (CMS/HCC)    Chronic back pain    Depressive disorder    Gastroesophageal reflux disease    Essential hypertension    Insomnia    Chronic anticoagulation    Obesity    Impression: Postop day #6 Sapna's    Plan:  Ok  for discharge  Follow-up with me (Dr. Farmer) in 2 weeks  D/C PRANAY drain prior to D/C  Regular diet as tolerated  Ok to shower starting tomorrow. No tub baths, pools, lakes, or streams for 1 week.  No heavy lifting (greater than 20 lbs) for 6 weeks after surgery  Call my office or present to the ED with: fevers greater than 101.5, redness around the incisions, drainage from the incisions, intractable nausea/vomiting, or pain that is getting worse instead of better    Chi Farmer MD  07/22/20  13:55

## 2020-07-23 ENCOUNTER — READMISSION MANAGEMENT (OUTPATIENT)
Dept: CALL CENTER | Facility: HOSPITAL | Age: 65
End: 2020-07-23

## 2020-07-23 NOTE — OUTREACH NOTE
Prep Survey      Responses   [facility] facility patient discharged from?  [facility]   Is LACE score < 7 ?  No   Eligibility  Readm Mgmt   Discharge diagnosis  Severe sepsis, s/p Mariya procedure, COPD,   COVID-19 Test Status  Negative   Does the patient have one of the following disease processes/diagnoses(primary or secondary)?  Sepsis [and Gen. Surgery]   Does the patient have Home health ordered?  Yes   What is the Home health agency?   [facility]    Is there a DME ordered?  Yes   What DME was ordered?  [facility]   Comments regarding appointments  Pt to schedule F/U appointments   Medication alerts for this patient  see AVS   Prep survey completed?  Yes          Dariana Brunson RN

## 2020-07-23 NOTE — PROGRESS NOTES
Case Management Discharge Note      Final Note: home with [facility]    Provided Post Acute Provider List?: Yes  Post Acute Provider List: Home Health  Provided Post Acute Provider Quality & Resource List?: Yes  Post Acute Provider Quality and Resource List: Home Health  Delivered To: Support Person  Support Person: spouse  Method of Delivery: Telephone      Final Discharge Disposition Code: 06 - home with home health care

## 2020-07-28 ENCOUNTER — READMISSION MANAGEMENT (OUTPATIENT)
Dept: CALL CENTER | Facility: HOSPITAL | Age: 65
End: 2020-07-28

## 2020-07-28 NOTE — OUTREACH NOTE
Sepsis Week 1 Survey      Responses   [facility] [place] [facility] from?  [facility]   COVID-19 Test Status  Negative   Does the patient have one of the following disease processes/diagnoses(primary or secondary)?  Sepsis   Is there a successful TCM telephone encounter documented?  No   Week 1 attempt successful?  Yes   Call start time  0938   Call end time  0941   Discharge diagnosis  Severe sepsis, s/p Mariya procedure, COPD,   Meds reviewed with patient/caregiver?  Yes   Is the patient having any side effects they believe may be caused by any medication additions or changes?  No   Does the patient have all medications related to this admission filled (includes all antibiotics, inhalers, nebulizers,steroids,etc.)  Yes   Is the patient taking all medications as directed (includes completed medication regime)?  Yes   Comments regarding appointments  Pt will call and schedule an appt with surgeon   Does the patient have a primary care provider?   Yes   Comments regarding PCP  8/7/20   Does the patient have an appointment with their PCP within 7 days of discharge?  No   What is preventing the patient from scheduling follow up appointments within 7 days of discharge?  Earlier appointment not available   Nursing Interventions  Verified appointment date/time/provider   Has the patient kept scheduled appointments due by today?  N/A   What is the Home health agency?   [facility]    Has home health visited the patient within 72 hours of discharge?  Unsure   What DME was ordered?  Shonda gaines RW   Has all DME been delivered?  Yes   Pulse Ox monitoring  Intermittent   Pulse Ox device source  Patient   Psychosocial issues?  No   Did the patient receive a copy of their discharge instructions?  Yes   Nursing interventions  Reviewed instructions with patient   What is the patient's perception of their health status since discharge?  Improving [Pt reports she has some drainage around the incision. Drainage has not increased.  ]   Nursing interventions  Nurse provided patient education   Is the patient/caregiver able to teach back Sepsis?  S - Short of breath, S - Sleepy, difficult to arouse,confused, S - Shivering,fever or very cold   Nursing interventions  Nurse provided patient education   Is patient/caregiver able to teach back steps to recovery at home?  Set small, achievable goals for return to baseline health, Rest and regain strength   Is the patient/caregiver able to teach back signs and symptoms of worsening condition:  Fever, Hyperthermia, Shortness of breath/rapid respiratory rate, Altered mental status(confusion/coma)   If the patient is a current smoker, are they able to teach back resources for cessation?  -- [Nonsmoker]   Is the patient/caregiver able to teach back the hierarchy of who to call/visit for symptoms/problems? PCP, Specialist, Home health nurse, Urgent Care, ED, 911  Yes   Week 1 call completed?  Yes          Birgit Scott RN

## 2020-08-03 ENCOUNTER — OFFICE VISIT (OUTPATIENT)
Dept: SURGERY | Facility: CLINIC | Age: 65
End: 2020-08-03

## 2020-08-03 VITALS
HEART RATE: 101 BPM | WEIGHT: 168 LBS | TEMPERATURE: 97.5 F | BODY MASS INDEX: 29.77 KG/M2 | OXYGEN SATURATION: 87 % | HEIGHT: 63 IN | SYSTOLIC BLOOD PRESSURE: 132 MMHG | DIASTOLIC BLOOD PRESSURE: 78 MMHG

## 2020-08-03 DIAGNOSIS — K57.92 DIVERTICULITIS: Primary | ICD-10-CM

## 2020-08-03 PROCEDURE — 99024 POSTOP FOLLOW-UP VISIT: CPT | Performed by: SURGERY

## 2020-08-03 RX ORDER — CLINDAMYCIN HYDROCHLORIDE 150 MG/1
150 CAPSULE ORAL 4 TIMES DAILY
Qty: 40 CAPSULE | Refills: 0 | Status: SHIPPED | OUTPATIENT
Start: 2020-08-03 | End: 2020-08-13

## 2020-08-04 ENCOUNTER — READMISSION MANAGEMENT (OUTPATIENT)
Dept: CALL CENTER | Facility: HOSPITAL | Age: 65
End: 2020-08-04

## 2020-08-04 NOTE — OUTREACH NOTE
Sepsis Week 2 Survey      Responses   [facility] patient discharged from?  [facility]   COVID-19 Test Status  Negative   Does the patient have one of the following disease processes/diagnoses(primary or secondary)?  Sepsis   Week 2 attempt successful?  Yes   Call start time  1302   Call end time  1309   Discharge diagnosis  Severe sepsis, s/p Mariya procedure, COPD,   Is patient permission given to speak with other caregiver?  Yes   List who call center can speak with     Meds reviewed with patient/caregiver?  Yes   Is the patient taking all medications as directed (includes completed medication regime)?  Yes   Has the patient kept scheduled appointments due by today?  Yes   What is the Home health agency?   [facility]   Has home health visited the patient within 72 hours of discharge?  Yes   Home health comments  she is worried that HH won't come bc her  is covid + but does not have any symptoms.    Pulse Ox monitoring  Intermittent   Pulse Ox device source  Patient   Psychosocial issues?  No   Comments  Pt reports she is worried bc her , who works in nursing home is tested regularly for covid and came up +for covid yesterday. He is aysmptomatic and she has no symptoms either but still she is worried. They don't sleep in same bed, they are isolating from each other in home and using precautions. She is monitoring temps and remains fever free.    What is the patient's perception of their health status since discharge?  Improving   Is the patient/caregiver able to teach back Sepsis?  S - Shivering,fever or very cold, E - Extreme pain or generalized discomfort (worst ever,especially abdomen), S - Sleepy, difficult to arouse,confused   Is patient/caregiver able to teach back steps to recovery at home?  Eat a balanced diet, Talk about feelings with family/friends, Set small, achievable goals for return to baseline health, Rest and regain strength   Is the patient/caregiver able to teach back  signs and symptoms of worsening condition:  Fever, Rapid heart rate (>90)   Is the patient/caregiver able to teach back the hierarchy of who to call/visit for symptoms/problems? PCP, Specialist, Home health nurse, Urgent Care, ED, 911  Yes   Week 2 call completed?  Yes          Jenise Royal, RN

## 2020-08-11 ENCOUNTER — READMISSION MANAGEMENT (OUTPATIENT)
Dept: CALL CENTER | Facility: HOSPITAL | Age: 65
End: 2020-08-11

## 2020-08-11 NOTE — OUTREACH NOTE
Sepsis Week 3 Survey      Responses   [facility] patient discharged from?  [facility]   COVID-19 Test Status  Negative   Does the patient have one of the following disease processes/diagnoses(primary or secondary)?  Sepsis   Week 3 attempt successful?  Yes   Call start time  1556   Call end time  1558   Discharge diagnosis  Severe sepsis, s/p Mariya procedure, COPD,   Meds reviewed with patient/caregiver?  Yes   Is the patient taking all medications as directed (includes completed medication regime)?  Yes   Has the patient kept scheduled appointments due by today?  Yes   What is the patient's perception of their health status since discharge?  Improving   Nursing interventions  Nurse provided patient education   Is the patient/caregiver able to teach back Sepsis?  S - Shivering,fever or very cold   Is the patient/caregiver able to teach back signs and symptoms of worsening condition:  Fever, Shortness of breath/rapid respiratory rate, Altered mental status(confusion/coma)   Is the patient/caregiver able to teach back the hierarchy of who to call/visit for symptoms/problems? PCP, Specialist, Home health nurse, Urgent Care, ED, 911  Yes   Additional teach back comments  Pt doing well and having nio issues. States she is doing just fine.   Week 3 call completed?  Yes   Revoked  No further contact(revokes)-requires comment   Graduated/Revoked comments  Pt doing fine and is back to baseline.          Eri Rodriguez RN

## 2020-08-19 ENCOUNTER — OFFICE VISIT (OUTPATIENT)
Dept: SURGERY | Facility: CLINIC | Age: 65
End: 2020-08-19

## 2020-08-19 VITALS
DIASTOLIC BLOOD PRESSURE: 80 MMHG | BODY MASS INDEX: 29.77 KG/M2 | WEIGHT: 168 LBS | SYSTOLIC BLOOD PRESSURE: 140 MMHG | TEMPERATURE: 97.3 F | HEART RATE: 85 BPM | OXYGEN SATURATION: 97 % | HEIGHT: 63 IN

## 2020-08-19 DIAGNOSIS — K57.92 DIVERTICULITIS: Primary | ICD-10-CM

## 2020-08-19 PROCEDURE — 99024 POSTOP FOLLOW-UP VISIT: CPT | Performed by: SURGERY

## 2020-08-31 ENCOUNTER — TELEPHONE (OUTPATIENT)
Dept: SURGERY | Facility: CLINIC | Age: 65
End: 2020-08-31

## 2020-09-14 ENCOUNTER — OFFICE VISIT (OUTPATIENT)
Dept: SURGERY | Facility: CLINIC | Age: 65
End: 2020-09-14

## 2020-09-14 VITALS
WEIGHT: 168 LBS | HEIGHT: 63 IN | BODY MASS INDEX: 29.77 KG/M2 | SYSTOLIC BLOOD PRESSURE: 144 MMHG | OXYGEN SATURATION: 89 % | HEART RATE: 82 BPM | DIASTOLIC BLOOD PRESSURE: 81 MMHG | TEMPERATURE: 97.1 F

## 2020-09-14 DIAGNOSIS — K57.92 DIVERTICULITIS: Primary | ICD-10-CM

## 2020-09-14 PROCEDURE — 99024 POSTOP FOLLOW-UP VISIT: CPT | Performed by: SURGERY

## 2020-09-14 RX ORDER — FUROSEMIDE 20 MG/1
20 TABLET ORAL AS NEEDED
COMMUNITY
Start: 2020-08-26 | End: 2022-05-15

## 2020-09-14 RX ORDER — AMOXICILLIN AND CLAVULANATE POTASSIUM 875; 125 MG/1; MG/1
TABLET, FILM COATED ORAL
COMMUNITY
End: 2021-03-02 | Stop reason: HOSPADM

## 2020-09-14 RX ORDER — VALSARTAN AND HYDROCHLOROTHIAZIDE 320; 25 MG/1; MG/1
1 TABLET, FILM COATED ORAL NIGHTLY
COMMUNITY
Start: 2020-08-27 | End: 2021-04-12

## 2020-09-14 RX ORDER — HYDROCODONE BITARTRATE AND ACETAMINOPHEN 10; 325 MG/1; MG/1
1 TABLET ORAL EVERY 8 HOURS PRN
COMMUNITY
Start: 2020-09-04 | End: 2021-11-02 | Stop reason: HOSPADM

## 2020-09-14 RX ORDER — FAMOTIDINE 40 MG/1
TABLET, FILM COATED ORAL
COMMUNITY
Start: 2020-08-24 | End: 2022-05-15

## 2020-09-14 RX ORDER — WARFARIN SODIUM 5 MG/1
3 TABLET ORAL
COMMUNITY
Start: 2020-08-27 | End: 2021-11-02 | Stop reason: HOSPADM

## 2020-09-14 RX ORDER — WARFARIN SODIUM 1 MG/1
3 TABLET ORAL
COMMUNITY
Start: 2020-08-27 | End: 2021-11-02 | Stop reason: HOSPADM

## 2020-09-14 RX ORDER — CARVEDILOL 3.12 MG/1
TABLET ORAL
COMMUNITY
Start: 2020-09-11 | End: 2022-05-15

## 2020-09-14 NOTE — PROGRESS NOTES
"Post-op Note    Subjective   Renuka Pelayo is a 64 y.o. female returns status post Alejo's procedure performed on 7/16/2020 for perforated diverticulitis.  She returns the office today after she reports that she has continued to have some scant drainage from her midline wound.  She called the office seeking antibiotics, I informed the patient that I do not prescribe antibiotics without evaluating patients as often times drainage can be normal.  She denies having any fevers.  She is tolerating a regular diet without nausea or vomiting.  She does report some distention and discomfort around her stoma, and states that her home health care company has had difficulty finding a stoma that fits her stoma based on the size of it.      Objective   /81 (BP Location: Left arm, Patient Position: Sitting, Cuff Size: Adult)   Pulse 82   Temp 97.1 °F (36.2 °C) (Oral)   Ht 160 cm (63\")   Wt 76.2 kg (168 lb)   SpO2 (!) 89%   BMI 29.76 kg/m²   Midline wound has a pinpoint area of heaping granulation tissue which is putting off a small amount of serosanguineous drainage.  This was treated with application of silver nitrate.  Left lower quadrant colostomy is in place with no evidence of obstruction.  There is soft brown stool within the ostomy site.  The area of firmness surrounding it seems to be resolved at this point.    Assessment/Plan   64-year-old lady status post Mariya procedure performed on 7/16/2020 for perforated diverticulitis.    I treated the wound with silver nitrate.  Hopefully this will limit some of the fibrinous drainage coming from this granulation tissue.  We did discuss that she may need further applications in the future  Continue regular diet  Patient will need a colonoscopy in a few months, and then we can discuss potentially reversing her colostomy at that time.  I will see the patient back in my office at the beginning of December to further discuss getting a colonoscopy and the specifics of " colostomy reversal    Chi Farmer MD  9/14/2020  15:43 EDT

## 2020-10-06 ENCOUNTER — HOSPITAL ENCOUNTER (EMERGENCY)
Facility: HOSPITAL | Age: 65
Discharge: HOME | End: 2020-10-06
Attending: EMERGENCY MEDICINE | Admitting: EMERGENCY MEDICINE

## 2020-10-06 VITALS
HEART RATE: 87 BPM | OXYGEN SATURATION: 97 % | BODY MASS INDEX: 29.41 KG/M2 | WEIGHT: 166.01 LBS | HEIGHT: 63 IN | RESPIRATION RATE: 16 BRPM | TEMPERATURE: 97.9 F | DIASTOLIC BLOOD PRESSURE: 77 MMHG | SYSTOLIC BLOOD PRESSURE: 118 MMHG

## 2020-10-06 DIAGNOSIS — K94.00 COLOSTOMY COMPLICATION (HCC): Primary | ICD-10-CM

## 2020-10-06 PROCEDURE — 99282 EMERGENCY DEPT VISIT SF MDM: CPT

## 2020-10-06 NOTE — ED PROVIDER NOTES
Subjective   History of Present Illness  Colostomy check  64-year-old female states she has had ongoing problems with her colostomy since discharge in the hospital couple of months ago.  States she change the colostomy bag last night and felt like it was more firm above the stoma than usual.  She reports no vomiting.  States since the surgery 3 months ago she has had occasional bleeding at the site.  She has been seen by her surgeon and has an upcoming appointment next week to see them again.  She reports normal stool passage.  Review of Systems   Constitutional: Negative.    HENT: Negative.    Respiratory: Negative.    Gastrointestinal: Negative.        Past Medical History:   Diagnosis Date   • Chronic back pain    • Closed nondisplaced fracture of head of right radius 03/2017   • Congenital deformity of right hip joint 1956   • COPD (chronic obstructive pulmonary disease) (CMS/HCC)     former smoker   • Deep venous thrombosis (CMS/HCC)     unprovoked   • Depressive disorder    • Diverticulitis of colon    • Essential hypertension    • Gastroesophageal reflux disease    • Insomnia    • Obesity    • Pulmonary embolism (CMS/HCC)     provoked by surgery       No Known Allergies    Past Surgical History:   Procedure Laterality Date   • CHOLECYSTECTOMY     • EXPLORATORY LAPAROTOMY N/A 7/16/2020    Procedure: LAPAROTOMY EXPLORATORY HARTMANS;  Surgeon: Chi Farmer MD;  Location: [facility] MAIN OR;  Service: General;  Laterality: N/A;   • HYSTERECTOMY     • TOTAL HIP ARTHROPLASTY Right    • TOTAL HIP ARTHROPLASTY REVISION Right     x3       Family History   Problem Relation Age of Onset   • No Known Problems Mother    • No Known Problems Father        Social History     Socioeconomic History   • Marital status:      Spouse name: Not on file   • Number of children: Not on file   • Years of education: Not on file   • Highest education level: Not on file   Tobacco Use   • Smoking status: Former Smoker      Packs/day: 2.00     Years: 40.00     Pack years: 80.00     Types: Cigarettes     Quit date: 2019     Years since quittin.7   • Smokeless tobacco: Never Used   • Tobacco comment: ELECTRONIC   Substance and Sexual Activity   • Alcohol use: Never     Frequency: Never   • Drug use: Never   • Sexual activity: Defer       Prior to Admission medications    Medication Sig Start Date End Date Taking? Authorizing Provider   albuterol (ACCUNEB) 0.63 MG/3ML nebulizer solution Take 1 ampule by nebulization Every 6 (Six) Hours As Needed for Wheezing.    Grayson Pope MD   albuterol sulfate  (90 Base) MCG/ACT inhaler Inhale 2 puffs Every 6 (Six) Hours As Needed for Wheezing.    Grayson Pope MD   amoxicillin-clavulanate (AUGMENTIN) 875-125 MG per tablet amoxicillin 875 mg-potassium clavulanate 125 mg tablet    Grayson Pope MD   carvedilol (COREG) 3.125 MG tablet  20   Grayson Pope MD   famotidine (PEPCID) 40 MG tablet  20   Grayson Pope MD   FLUoxetine (PROzac) 10 MG capsule Take 10 mg by mouth Daily.    Grayson Pope MD   Fluticasone-Umeclidin-Vilant (Trelegy Ellipta) 100-62.5-25 MCG/INH aerosol powder  Inhale 1 puff 2 (two) times a day.    Grayson Pope MD   furosemide (LASIX) 20 MG tablet  20   Grayson Pope MD   hydrALAZINE (APRESOLINE) 25 MG tablet Take 25 mg by mouth 2 (Two) Times a Day As Needed. For SBP >160    Grayson Pope MD   HYDROcodone-acetaminophen (NORCO)  MG per tablet  20   Grayson Pope MD   ipratropium (ATROVENT) 0.02 % nebulizer solution  20   Grayson Pope MD   pantoprazole (PROTONIX) 40 MG EC tablet Take 40 mg by mouth Daily.    Grayson Pope MD   traZODone (DESYREL) 100 MG tablet Take 100 mg by mouth At Night As Needed for Sleep.    Grayson Pope MD   valsartan-hydrochlorothiazide (DIOVAN-HCT) 320-25 MG per tablet  20   Grayson Pope MD  "  warfarin (COUMADIN) 1 MG tablet  8/27/20   Grayson Pope MD   warfarin (COUMADIN) 2 MG tablet Take 2 mg by mouth Every Night.    Grayson Pope MD   warfarin (COUMADIN) 5 MG tablet  8/27/20   Grayson Pope MD     /94   Pulse 92   Temp 97.9 °F (36.6 °C)   Resp 16   Ht 160 cm (63\")   Wt 75.3 kg (166 lb 0.1 oz)   SpO2 94%   BMI 29.41 kg/m²   I examined the patient using the appropriate personal protective equipment.        Objective   Physical Exam  General: Well-appearing, no acute distress  Psych: Oriented, pleasant affect  Respirations: Clear, nonlabored respirations  Abdomen is soft nontender nondistended, the stoma appears normal pink mucosa no surrounding erythema or drainage or skin breakdown, the area around the stoma is appropriately soft and nontender there is no palpable hernia or mass, normal bowel sounds, normal stool output, no bleeding  Procedures           ED Course                                           MDM  Patient stoma currently is normal, I suspect the firm area that she had felt last night was likely some colonic stool near the stoma that has now voided.  She has a benign abdominal examination she is discharged good condition she has a follow-up with the surgeon at her appointment next week.  She was given warning signs for return  Final diagnoses:   Colostomy complication (CMS/[facility])            Reginaldo Soto MD  10/06/20 1025    "

## 2020-10-06 NOTE — DISCHARGE INSTRUCTIONS
Follow-up with your surgeon as scheduled next week.  Return for increased pain, fever, increased bleeding or any other concerns

## 2020-10-13 ENCOUNTER — OFFICE VISIT (OUTPATIENT)
Dept: SURGERY | Facility: CLINIC | Age: 65
End: 2020-10-13

## 2020-10-13 VITALS
OXYGEN SATURATION: 98 % | HEART RATE: 78 BPM | TEMPERATURE: 98.2 F | WEIGHT: 170 LBS | HEIGHT: 63 IN | BODY MASS INDEX: 30.12 KG/M2 | DIASTOLIC BLOOD PRESSURE: 82 MMHG | SYSTOLIC BLOOD PRESSURE: 127 MMHG

## 2020-10-13 DIAGNOSIS — Z93.3 STATUS POST HARTMANN PROCEDURE (HCC): Primary | ICD-10-CM

## 2020-10-13 PROCEDURE — 99024 POSTOP FOLLOW-UP VISIT: CPT | Performed by: SURGERY

## 2020-10-13 NOTE — PROGRESS NOTES
Subjective   Renuka Pelayo is a 64 y.o. female.   64-year-old lady who is status post Alejo's procedure for diverticulitis by Dr. Farmer in July.  She was last seen September 14.  She says that she has some firmness along the superior aspect of her colostomy and was recently seen in the emergency department for evaluation.  She has not had any major issues including pain nausea vomiting or change in her stoma output.  She does have some minor bleeding at the granulation edge of the stoma site occasionally.    Objective   There were no vitals taken for this visit.  Physical Exam  On exam her abdomen is soft and nondistended there is some fullness in the superior aspect around the stoma which seems to reduce with some gentle manipulation this likely represents a small parastomal hernia.  The stoma is pink and viable  Assessment/Plan   Diagnoses and all orders for this visit:    1. Status post Mariya procedure (CMS/[facility]) (Primary)    Status post Alejo's procedure in July for perforated diverticulitis.  Likely has a small parastomal hernia.  This is reducible.  We will have her follow-up with Dr. Farmer as scheduled for ongoing discussion about reversal which I think he is hoping to do in the next month or 2    Jefferson Kern MD  10/13/2020  11:17 AM EDT    This note was created using Dragon Voice Recognition software.

## 2020-11-10 ENCOUNTER — HOSPITAL ENCOUNTER (EMERGENCY)
Facility: HOSPITAL | Age: 65
Discharge: HOME | End: 2020-11-10
Admitting: EMERGENCY MEDICINE

## 2020-11-10 VITALS
TEMPERATURE: 97.9 F | SYSTOLIC BLOOD PRESSURE: 129 MMHG | RESPIRATION RATE: 18 BRPM | DIASTOLIC BLOOD PRESSURE: 74 MMHG | WEIGHT: 175.27 LBS | OXYGEN SATURATION: 95 % | HEIGHT: 63 IN | BODY MASS INDEX: 31.05 KG/M2 | HEART RATE: 71 BPM

## 2020-11-10 DIAGNOSIS — R79.1 ELEVATED INR: Primary | ICD-10-CM

## 2020-11-10 LAB
ALBUMIN SERPL-MCNC: 3.6 G/DL (ref 3.5–5.2)
ALBUMIN/GLOB SERPL: 1.3 G/DL
ALP SERPL-CCNC: 106 U/L (ref 39–117)
ALT SERPL W P-5'-P-CCNC: 12 U/L (ref 1–33)
ANION GAP SERPL CALCULATED.3IONS-SCNC: 13 MMOL/L (ref 5–15)
AST SERPL-CCNC: 19 U/L (ref 1–32)
BASOPHILS # BLD AUTO: 0.1 10*3/MM3 (ref 0–0.2)
BASOPHILS NFR BLD AUTO: 0.7 % (ref 0–1.5)
BILIRUB SERPL-MCNC: <0.2 MG/DL (ref 0–1.2)
BUN SERPL-MCNC: 11 MG/DL (ref 8–23)
BUN/CREAT SERPL: 8.7 (ref 7–25)
CALCIUM SPEC-SCNC: 9 MG/DL (ref 8.6–10.5)
CHLORIDE SERPL-SCNC: 98 MMOL/L (ref 98–107)
CO2 SERPL-SCNC: 23 MMOL/L (ref 22–29)
CREAT SERPL-MCNC: 1.26 MG/DL (ref 0.57–1)
DEPRECATED RDW RBC AUTO: 49 FL (ref 37–54)
EOSINOPHIL # BLD AUTO: 0.2 10*3/MM3 (ref 0–0.4)
EOSINOPHIL NFR BLD AUTO: 2.4 % (ref 0.3–6.2)
ERYTHROCYTE [DISTWIDTH] IN BLOOD BY AUTOMATED COUNT: 16.3 % (ref 12.3–15.4)
GFR SERPL CREATININE-BSD FRML MDRD: 43 ML/MIN/1.73
GLOBULIN UR ELPH-MCNC: 2.8 GM/DL
GLUCOSE SERPL-MCNC: 99 MG/DL (ref 65–99)
HCT VFR BLD AUTO: 37.9 % (ref 34–46.6)
HGB BLD-MCNC: 12.2 G/DL (ref 12–15.9)
INR PPP: >=8 (ref 2–3)
LYMPHOCYTES # BLD AUTO: 1.6 10*3/MM3 (ref 0.7–3.1)
LYMPHOCYTES NFR BLD AUTO: 19 % (ref 19.6–45.3)
MCH RBC QN AUTO: 27.7 PG (ref 26.6–33)
MCHC RBC AUTO-ENTMCNC: 32.3 G/DL (ref 31.5–35.7)
MCV RBC AUTO: 86 FL (ref 79–97)
MONOCYTES # BLD AUTO: 0.7 10*3/MM3 (ref 0.1–0.9)
MONOCYTES NFR BLD AUTO: 8.2 % (ref 5–12)
NEUTROPHILS NFR BLD AUTO: 5.8 10*3/MM3 (ref 1.7–7)
NEUTROPHILS NFR BLD AUTO: 69.7 % (ref 42.7–76)
NRBC BLD AUTO-RTO: 0.1 /100 WBC (ref 0–0.2)
PLATELET # BLD AUTO: 227 10*3/MM3 (ref 140–450)
PMV BLD AUTO: 7.8 FL (ref 6–12)
POTASSIUM SERPL-SCNC: 4.4 MMOL/L (ref 3.5–5.2)
PROT SERPL-MCNC: 6.4 G/DL (ref 6–8.5)
PROTHROMBIN TIME: 79.8 SECONDS (ref 19.4–28.5)
RBC # BLD AUTO: 4.41 10*6/MM3 (ref 3.77–5.28)
SODIUM SERPL-SCNC: 134 MMOL/L (ref 136–145)
WBC # BLD AUTO: 8.3 10*3/MM3 (ref 3.4–10.8)

## 2020-11-10 PROCEDURE — 80053 COMPREHEN METABOLIC PANEL: CPT | Performed by: NURSE PRACTITIONER

## 2020-11-10 PROCEDURE — 85025 COMPLETE CBC W/AUTO DIFF WBC: CPT | Performed by: NURSE PRACTITIONER

## 2020-11-10 PROCEDURE — 99283 EMERGENCY DEPT VISIT LOW MDM: CPT

## 2020-11-10 PROCEDURE — 85610 PROTHROMBIN TIME: CPT | Performed by: NURSE PRACTITIONER

## 2020-11-10 NOTE — ED PROVIDER NOTES
Subjective   Patient is a 64-year-old female who presents the emergency department due to an increased INR, patient is currently on Coumadin, she reports that she was recently placed on Bactrim for a skin rash.  She denies any abnormal bleeding.          Review of Systems   Constitutional: Negative for chills and fever.   HENT: Negative for nosebleeds.    Gastrointestinal: Negative for anal bleeding and blood in stool.   Genitourinary: Negative for vaginal bleeding.   Neurological: Negative for dizziness and headaches.       Past Medical History:   Diagnosis Date   • Chronic back pain    • Closed nondisplaced fracture of head of right radius 2017   • Congenital deformity of right hip joint 1956   • COPD (chronic obstructive pulmonary disease) (CMS/HCC)     former smoker   • Deep venous thrombosis (CMS/HCC)     unprovoked   • Depressive disorder    • Diverticulitis of colon    • Essential hypertension    • Gastroesophageal reflux disease    • Insomnia    • Obesity    • Pulmonary embolism (CMS/HCC)     provoked by surgery       No Known Allergies    Past Surgical History:   Procedure Laterality Date   • CHOLECYSTECTOMY     • EXPLORATORY LAPAROTOMY N/A 2020    Procedure: LAPAROTOMY EXPLORATORY HARTMANS;  Surgeon: Chi Farmer MD;  Location: [facility] OR;  Service: General;  Laterality: N/A;   • HYSTERECTOMY     • TOTAL HIP ARTHROPLASTY Right    • TOTAL HIP ARTHROPLASTY REVISION Right     x3       Family History   Problem Relation Age of Onset   • No Known Problems Mother    • No Known Problems Father        Social History     Socioeconomic History   • Marital status:      Spouse name: Not on file   • Number of children: Not on file   • Years of education: Not on file   • Highest education level: Not on file   Tobacco Use   • Smoking status: Former Smoker     Packs/day: 2.00     Years: 40.00     Pack years: 80.00     Types: Cigarettes     Quit date:      Years since quittin.8  "  • Smokeless tobacco: Never Used   • Tobacco comment: ELECTRONIC   Substance and Sexual Activity   • Alcohol use: Never     Frequency: Never   • Drug use: Never   • Sexual activity: Defer           Objective   Physical Exam  Vitals signs and nursing note reviewed.   Constitutional:       General: She is not in acute distress.     Appearance: Normal appearance. She is not ill-appearing, toxic-appearing or diaphoretic.   HENT:      Nose: Nose normal.      Mouth/Throat:      Mouth: Mucous membranes are moist.      Pharynx: Oropharynx is clear.   Eyes:      Extraocular Movements: Extraocular movements intact.      Conjunctiva/sclera: Conjunctivae normal.      Pupils: Pupils are equal, round, and reactive to light.   Neck:      Musculoskeletal: Normal range of motion and neck supple.   Cardiovascular:      Rate and Rhythm: Normal rate and regular rhythm.      Pulses: Normal pulses.      Heart sounds: No murmur. No friction rub. No gallop.    Pulmonary:      Effort: Pulmonary effort is normal.      Breath sounds: Normal breath sounds.   Abdominal:      General: Bowel sounds are normal.      Palpations: Abdomen is soft.   Musculoskeletal: Normal range of motion.      Right lower leg: No edema.      Left lower leg: No edema.   Skin:     General: Skin is warm and dry.      Capillary Refill: Capillary refill takes less than 2 seconds.      Findings: No bruising, erythema or rash.   Neurological:      Mental Status: She is alert and oriented to person, place, and time.   Psychiatric:         Mood and Affect: Mood normal.         Behavior: Behavior normal.         Procedures           ED Course  /74   Pulse 71   Temp 97.9 °F (36.6 °C) (Oral)   Resp 18   Ht 160 cm (63\")   Wt 79.5 kg (175 lb 4.3 oz)   SpO2 95%   BMI 31.05 kg/m²   Labs Reviewed   COMPREHENSIVE METABOLIC PANEL - Abnormal; Notable for the following components:       Result Value    Creatinine 1.26 (*)     Sodium 134 (*)     eGFR Non  Amer 43 (*) "     All other components within normal limits    Narrative:     GFR Normal >60  Chronic Kidney Disease <60  Kidney Failure <15     PROTIME-INR - Abnormal; Notable for the following components:    Protime 79.8 (*)     INR >=8.00 (*)     All other components within normal limits   CBC WITH AUTO DIFFERENTIAL - Abnormal; Notable for the following components:    RDW 16.3 (*)     Lymphocyte % 19.0 (*)     All other components within normal limits   CBC AND DIFFERENTIAL    Narrative:     The following orders were created for panel order CBC & Differential.  Procedure                               Abnormality         Status                     ---------                               -----------         ------                     CBC Auto Differential[[id number]]        Abnormal            Final result                 Please view results for these tests on the individual orders.     Medications - No data to display  No radiology results for the last day    ED Course as of Nov 10 2347   Tue Nov 10, 2020   1942 Spoke with Dr. Madrid. Discussed case, will have patient hold her coumadin for 2 days, she has no complaints here, no reported bleeding, no signs of bleeding. Patient is going to call her provider tomorrow to have INR rechecked.     [LB]      ED Course User Index  [LB] Eli Bazan, SHILA      Appropriate PPE was worn during the duration of the care for this patient while in the emergency department per [facility] guidelines                                     MDM  Number of Diagnoses or Management Options  Elevated INR:   Diagnosis management comments: Patient is a 64-year-old female with history of long-term Coumadin use, presents emergency department after an increase INR at home.  Patient was recently placed on Bactrim, and her Coumadin had not been held.  Patient has no other complaints, she reports that she feels fine, she is not had any episodes of bleeding.  No dark stools.  Her INR here in the ED is  greater than 8, again she has no signs of bleeding, I have instructed her to hold her Coumadin for 2 days, and follow-up with her primary care provider.    Discussed patient with Dr. Madrid, agrees with work-up, treatment plan and disposition.       Amount and/or Complexity of Data Reviewed  Clinical lab tests: reviewed        Final diagnoses:   Elevated INR            Eli Bazan, APRN  11/10/20 5459

## 2020-11-10 NOTE — ED NOTES
Pt was seen by a [facility] tenders nurse and had INR and PTT checked. Labs were elevated. Pt just recently started taking bactrim, and nurse is worried that's why she has abnormal labs. Pt hasnt held warfarin for 2 days and bactrim today.      Susan Marquez, RN  11/10/20 5964

## 2020-11-11 NOTE — DISCHARGE INSTRUCTIONS
Please hold your Coumadin for 2 days, and have your INR rechecked, please call your provider who regulates her INR tomorrow to schedule this  Return to ED for new worsening symptoms: Bleeding, fall, headache, or any further concerns

## 2020-11-24 ENCOUNTER — ANESTHESIA EVENT (OUTPATIENT)
Dept: GASTROENTEROLOGY | Facility: HOSPITAL | Age: 65
End: 2020-11-24

## 2020-11-24 ENCOUNTER — OFFICE (AMBULATORY)
Dept: URBAN - METROPOLITAN AREA PATHOLOGY 4 | Facility: PATHOLOGY | Age: 65
End: 2020-11-24
Payer: COMMERCIAL

## 2020-11-24 ENCOUNTER — OFFICE (AMBULATORY)
Dept: URBAN - METROPOLITAN AREA PATHOLOGY 4 | Facility: PATHOLOGY | Age: 65
End: 2020-11-24

## 2020-11-24 ENCOUNTER — ON CAMPUS - OUTPATIENT (AMBULATORY)
Dept: URBAN - METROPOLITAN AREA HOSPITAL 2 | Facility: HOSPITAL | Age: 65
End: 2020-11-24

## 2020-11-24 ENCOUNTER — LAB (OUTPATIENT)
Dept: LAB | Facility: HOSPITAL | Age: 65
End: 2020-11-24

## 2020-11-24 VITALS
OXYGEN SATURATION: 92 % | HEART RATE: 95 BPM | OXYGEN SATURATION: 98 % | DIASTOLIC BLOOD PRESSURE: 54 MMHG | OXYGEN SATURATION: 100 % | SYSTOLIC BLOOD PRESSURE: 105 MMHG | HEART RATE: 81 BPM | TEMPERATURE: 96.8 F | HEART RATE: 79 BPM | DIASTOLIC BLOOD PRESSURE: 59 MMHG | DIASTOLIC BLOOD PRESSURE: 67 MMHG | RESPIRATION RATE: 12 BRPM | SYSTOLIC BLOOD PRESSURE: 101 MMHG | DIASTOLIC BLOOD PRESSURE: 61 MMHG | HEART RATE: 77 BPM | HEIGHT: 63 IN | RESPIRATION RATE: 18 BRPM | DIASTOLIC BLOOD PRESSURE: 70 MMHG | OXYGEN SATURATION: 99 % | RESPIRATION RATE: 16 BRPM | SYSTOLIC BLOOD PRESSURE: 106 MMHG | HEART RATE: 82 BPM | DIASTOLIC BLOOD PRESSURE: 69 MMHG | SYSTOLIC BLOOD PRESSURE: 124 MMHG | OXYGEN SATURATION: 96 % | SYSTOLIC BLOOD PRESSURE: 126 MMHG | OXYGEN SATURATION: 95 % | HEART RATE: 84 BPM | HEART RATE: 109 BPM | SYSTOLIC BLOOD PRESSURE: 111 MMHG | SYSTOLIC BLOOD PRESSURE: 95 MMHG | WEIGHT: 169 LBS | SYSTOLIC BLOOD PRESSURE: 117 MMHG | DIASTOLIC BLOOD PRESSURE: 84 MMHG | HEART RATE: 80 BPM | DIASTOLIC BLOOD PRESSURE: 77 MMHG

## 2020-11-24 DIAGNOSIS — D12.0 BENIGN NEOPLASM OF CECUM: ICD-10-CM

## 2020-11-24 DIAGNOSIS — Z86.010 PERSONAL HISTORY OF COLONIC POLYPS: ICD-10-CM

## 2020-11-24 DIAGNOSIS — D12.3 BENIGN NEOPLASM OF TRANSVERSE COLON: ICD-10-CM

## 2020-11-24 PROBLEM — K63.5 POLYP OF COLON: Status: ACTIVE | Noted: 2020-11-24

## 2020-11-24 LAB
GI HISTOLOGY: A. UNSPECIFIED: (no result)
GI HISTOLOGY: B. UNSPECIFIED: (no result)
GI HISTOLOGY: PDF REPORT: (no result)
SARS-COV-2 RNA PNL SPEC NAA+PROBE: NOT DETECTED

## 2020-11-24 PROCEDURE — 88305 TISSUE EXAM BY PATHOLOGIST: CPT | Mod: 26 | Performed by: INTERNAL MEDICINE

## 2020-11-24 PROCEDURE — 44394 COLONOSCOPY W/SNARE: CPT | Mod: PT | Performed by: INTERNAL MEDICINE

## 2020-11-24 PROCEDURE — 87635 SARS-COV-2 COVID-19 AMP PRB: CPT

## 2020-11-24 PROCEDURE — C9803 HOPD COVID-19 SPEC COLLECT: HCPCS

## 2020-11-24 RX ADMIN — IPRATROPIUM BROMIDE: 42 SPRAY, METERED NASAL at 14:30

## 2020-11-25 ENCOUNTER — HOSPITAL ENCOUNTER (OUTPATIENT)
Facility: HOSPITAL | Age: 65
Setting detail: HOSPITAL OUTPATIENT SURGERY
Discharge: HOME | End: 2020-11-25
Attending: INTERNAL MEDICINE | Admitting: INTERNAL MEDICINE

## 2020-11-25 ENCOUNTER — ANESTHESIA (OUTPATIENT)
Dept: GASTROENTEROLOGY | Facility: HOSPITAL | Age: 65
End: 2020-11-25

## 2020-11-25 VITALS
RESPIRATION RATE: 12 BRPM | HEIGHT: 63 IN | HEART RATE: 78 BPM | SYSTOLIC BLOOD PRESSURE: 128 MMHG | TEMPERATURE: 97.8 F | BODY MASS INDEX: 30.16 KG/M2 | DIASTOLIC BLOOD PRESSURE: 54 MMHG | WEIGHT: 170.19 LBS | OXYGEN SATURATION: 97 %

## 2020-11-25 DIAGNOSIS — J18.9 PNEUMONIA: ICD-10-CM

## 2020-11-25 LAB

## 2020-11-25 PROCEDURE — 0100U HC BIOFIRE FILMARRAY RESP PANEL 2: CPT | Performed by: INTERNAL MEDICINE

## 2020-11-25 PROCEDURE — 87102 FUNGUS ISOLATION CULTURE: CPT | Performed by: INTERNAL MEDICINE

## 2020-11-25 PROCEDURE — 87206 SMEAR FLUORESCENT/ACID STAI: CPT | Performed by: INTERNAL MEDICINE

## 2020-11-25 PROCEDURE — 88108 CYTOPATH CONCENTRATE TECH: CPT | Performed by: INTERNAL MEDICINE

## 2020-11-25 PROCEDURE — 87205 SMEAR GRAM STAIN: CPT | Performed by: INTERNAL MEDICINE

## 2020-11-25 PROCEDURE — 25010000002 PROPOFOL 10 MG/ML EMULSION: Performed by: ANESTHESIOLOGY

## 2020-11-25 PROCEDURE — 87077 CULTURE AEROBIC IDENTIFY: CPT | Performed by: INTERNAL MEDICINE

## 2020-11-25 PROCEDURE — 25010000002 EPINEPHRINE 1 MG/10ML SOLUTION PREFILLED SYRINGE: Performed by: INTERNAL MEDICINE

## 2020-11-25 PROCEDURE — 87116 MYCOBACTERIA CULTURE: CPT | Performed by: INTERNAL MEDICINE

## 2020-11-25 PROCEDURE — 87070 CULTURE OTHR SPECIMN AEROBIC: CPT | Performed by: INTERNAL MEDICINE

## 2020-11-25 PROCEDURE — 87186 SC STD MICRODIL/AGAR DIL: CPT | Performed by: INTERNAL MEDICINE

## 2020-11-25 RX ORDER — LIDOCAINE HYDROCHLORIDE 10 MG/ML
INJECTION, SOLUTION EPIDURAL; INFILTRATION; INTRACAUDAL; PERINEURAL AS NEEDED
Status: DISCONTINUED | OUTPATIENT
Start: 2020-11-25 | End: 2020-11-25 | Stop reason: SURG

## 2020-11-25 RX ORDER — PROPOFOL 10 MG/ML
VIAL (ML) INTRAVENOUS AS NEEDED
Status: DISCONTINUED | OUTPATIENT
Start: 2020-11-25 | End: 2020-11-25 | Stop reason: SURG

## 2020-11-25 RX ORDER — LIDOCAINE HYDROCHLORIDE 10 MG/ML
INJECTION, SOLUTION EPIDURAL; INFILTRATION; INTRACAUDAL; PERINEURAL AS NEEDED
Status: DISCONTINUED | OUTPATIENT
Start: 2020-11-25 | End: 2020-11-25 | Stop reason: HOSPADM

## 2020-11-25 RX ORDER — LIDOCAINE HYDROCHLORIDE 20 MG/ML
INJECTION, SOLUTION INTRAVENOUS
Status: DISCONTINUED
Start: 2020-11-25 | End: 2020-11-25 | Stop reason: HOSPADM

## 2020-11-25 RX ORDER — DOXYCYCLINE HYCLATE 100 MG/1
100 TABLET, DELAYED RELEASE ORAL 2 TIMES DAILY
Qty: 20 TABLET | Refills: 0 | Status: SHIPPED | OUTPATIENT
Start: 2020-11-25 | End: 2021-03-15

## 2020-11-25 RX ORDER — SODIUM CHLORIDE 9 MG/ML
INJECTION, SOLUTION INTRAVENOUS CONTINUOUS PRN
Status: DISCONTINUED | OUTPATIENT
Start: 2020-11-25 | End: 2020-11-25 | Stop reason: SURG

## 2020-11-25 RX ORDER — EPINEPHRINE 0.1 MG/ML
SYRINGE (ML) INJECTION
Status: DISCONTINUED
Start: 2020-11-25 | End: 2020-11-25 | Stop reason: HOSPADM

## 2020-11-25 RX ORDER — LIDOCAINE HYDROCHLORIDE 10 MG/ML
INJECTION, SOLUTION EPIDURAL; INFILTRATION; INTRACAUDAL; PERINEURAL
Status: DISCONTINUED
Start: 2020-11-25 | End: 2020-11-25 | Stop reason: HOSPADM

## 2020-11-25 RX ORDER — LIDOCAINE HYDROCHLORIDE 20 MG/ML
JELLY TOPICAL AS NEEDED
Status: DISCONTINUED | OUTPATIENT
Start: 2020-11-25 | End: 2020-11-25 | Stop reason: HOSPADM

## 2020-11-25 RX ORDER — LIDOCAINE HYDROCHLORIDE 20 MG/ML
JELLY TOPICAL
Status: DISCONTINUED
Start: 2020-11-25 | End: 2020-11-25 | Stop reason: HOSPADM

## 2020-11-25 RX ORDER — PREDNISONE 10 MG/1
TABLET ORAL
Qty: 30 TABLET | Refills: 0 | Status: SHIPPED | OUTPATIENT
Start: 2020-11-25 | End: 2021-03-15

## 2020-11-25 RX ORDER — EPINEPHRINE 0.1 MG/ML
SYRINGE (ML) INJECTION AS NEEDED
Status: DISCONTINUED | OUTPATIENT
Start: 2020-11-25 | End: 2020-11-25 | Stop reason: HOSPADM

## 2020-11-25 RX ORDER — LIDOCAINE HYDROCHLORIDE 20 MG/ML
INJECTION, SOLUTION INFILTRATION; PERINEURAL AS NEEDED
Status: DISCONTINUED | OUTPATIENT
Start: 2020-11-25 | End: 2020-11-25 | Stop reason: HOSPADM

## 2020-11-25 RX ADMIN — PROPOFOL 130 MG: 10 INJECTION, EMULSION INTRAVENOUS at 07:53

## 2020-11-25 RX ADMIN — LIDOCAINE HYDROCHLORIDE 50 MG: 10 INJECTION, SOLUTION EPIDURAL; INFILTRATION; INTRACAUDAL; PERINEURAL at 07:53

## 2020-11-25 RX ADMIN — SODIUM CHLORIDE: 0.9 INJECTION, SOLUTION INTRAVENOUS at 07:50

## 2020-11-25 NOTE — ANESTHESIA PREPROCEDURE EVALUATION
Anesthesia Evaluation     Patient summary reviewed and Nursing notes reviewed   no history of anesthetic complications:  NPO Solid Status: > 8 hours  NPO Liquid Status: > 8 hours           Airway   Dental      Pulmonary    (+) pneumonia , pulmonary embolism, a smoker Former, COPD, home oxygen,   Cardiovascular     ECG reviewed  PT is on anticoagulation therapy    (+) hypertension, DVT,       Neuro/Psych  (+) psychiatric history Depression,     GI/Hepatic/Renal/Endo    (+) obesity,  GERD,      Musculoskeletal     (+) back pain, chronic pain,   Abdominal    Substance History      OB/GYN          Other        ROS/Med Hx Other: Diverticulitis, ruptured diverticulum with free intraperitoneal air, coagulopathy, hyponatremia, hypokalemia, insomnia, severe sepsis, hypoxemia    PSH  HYSTERECTOMY CHOLECYSTECTOMY  TOTAL HIP ARTHROPLASTY TOTAL HIP ARTHROPLASTY REVISION  EXPLORATORY LAPAROTOMY COLONOSCOPY  ENDOSCOPY                     Anesthesia Plan    ASA 4     MAC   (Patient identified; pre-operative vital signs, all relevant labs/studies, complete medical/surgical/anesthetic history, full medication list, full allergy list, and NPO status obtained/reviewed; physical assessment performed; anesthetic options, side effects, potential complications, risks, and benefits discussed; questions answered; written anesthesia consent obtained; patient cleared for procedure; anesthesia machine and equipment checked and functioning)    Anesthetic plan, all risks, benefits, and alternatives have been provided, discussed and informed consent has been obtained with: patient.

## 2020-11-25 NOTE — ANESTHESIA POSTPROCEDURE EVALUATION
Patient: Renuka Pelayo    Procedure Summary     Date: 11/25/20 Room / Location: [facility] ENDOSCOPY 3 / [facility] ENDOSCOPY    Anesthesia Start: 0750 Anesthesia Stop: 0807    Procedure: BRONCHOSCOPY with bronchial washing (N/A Bronchus) Diagnosis:       Pneumonia      (Pneumonia [J18.9])    Surgeon: Win Johnston MD Provider: Jun Barnett MD    Anesthesia Type: MAC ASA Status: 4          Anesthesia Type: MAC    Vitals  Vitals Value Taken Time   /54 11/25/20 0822   Temp     Pulse 78 11/25/20 0823   Resp 12 11/25/20 0822   SpO2 97 % 11/25/20 0823   Vitals shown include unvalidated device data.        Post Anesthesia Care and Evaluation    Patient location during evaluation: PACU  Patient participation: complete - patient participated  Level of consciousness: awake  Pain scale: See nurse's notes for pain score.  Pain management: adequate  Airway patency: patent  Anesthetic complications: No anesthetic complications  PONV Status: none  Cardiovascular status: acceptable  Respiratory status: acceptable  Hydration status: acceptable    Comments: Patient seen and examined postoperatively; vital signs stable; SpO2 greater than or equal to 90%; cardiopulmonary status stable; nausea/vomiting adequately controlled; pain adequately controlled; no apparent anesthesia complications; patient discharged from anesthesia care when discharge criteria were met

## 2020-11-27 LAB
LAB AP CASE REPORT: NORMAL
PATH REPORT.FINAL DX SPEC: NORMAL
PATH REPORT.GROSS SPEC: NORMAL

## 2020-11-28 LAB
BACTERIA SPEC RESP CULT: ABNORMAL
BACTERIA SPEC RESP CULT: ABNORMAL
GRAM STN SPEC: ABNORMAL
GRAM STN SPEC: ABNORMAL

## 2020-12-10 LAB — FUNGUS WND CULT: ABNORMAL

## 2020-12-14 ENCOUNTER — OFFICE VISIT (OUTPATIENT)
Dept: SURGERY | Facility: CLINIC | Age: 65
End: 2020-12-14

## 2020-12-14 VITALS
BODY MASS INDEX: 31.57 KG/M2 | HEART RATE: 56 BPM | TEMPERATURE: 96.6 F | DIASTOLIC BLOOD PRESSURE: 66 MMHG | HEIGHT: 63 IN | SYSTOLIC BLOOD PRESSURE: 102 MMHG | OXYGEN SATURATION: 97 % | RESPIRATION RATE: 18 BRPM | WEIGHT: 178.2 LBS

## 2020-12-14 DIAGNOSIS — Z93.3 STATUS POST HARTMANN PROCEDURE (HCC): Primary | ICD-10-CM

## 2020-12-14 DIAGNOSIS — K94.09 OTHER COMPLICATIONS OF COLOSTOMY (HCC): ICD-10-CM

## 2020-12-14 PROCEDURE — 99214 OFFICE O/P EST MOD 30 MIN: CPT | Performed by: SURGERY

## 2020-12-14 RX ORDER — FLUCONAZOLE 200 MG/1
TABLET ORAL
COMMUNITY
End: 2021-03-15

## 2020-12-14 RX ORDER — SULFAMETHOXAZOLE AND TRIMETHOPRIM 800; 160 MG/1; MG/1
TABLET ORAL
COMMUNITY
End: 2021-03-15

## 2020-12-14 RX ORDER — KETOCONAZOLE 20 MG/G
CREAM TOPICAL
COMMUNITY
End: 2021-03-15

## 2020-12-16 NOTE — PROGRESS NOTES
GENERAL SURGERY ESTABLISHED PATIENT NOTE NOTE    Consult requested by: patient     Patient Care Team:  Michael Gerardo MD as PCP - General    Reason for consult: colostomy reversal    Subjective     Patient is a 64 y.o. female presents s/p Alejo's procedure for perforated diverticulitis in July 2020. Her post-operative course has been uncomplicated, and the patient now wishes to undergo colostomy reversal. She does have a small parastomal hernia which causes her some discomfort especially when she is straining to have a BM through her ostomy. She has undergone a colonoscopy with GI on 11/24/2020 which had only a few non-neoplastic polyps in the cecum and transverse colon.     Review of Systems   Constitutional: Negative for appetite change, chills and fever.   HENT: Negative for congestion and sore throat.    Respiratory: Positive for cough, shortness of breath and wheezing.    Cardiovascular: Positive for chest pain. Negative for palpitations.   Gastrointestinal: Positive for abdominal pain and constipation. Negative for diarrhea, nausea, vomiting and GERD.   Genitourinary: Negative for difficulty urinating, dysuria and frequency.   Musculoskeletal: Positive for arthralgias and back pain.   Skin: Negative for rash and skin lesions.   Neurological: Negative for dizziness, seizures and memory problem.   Hematological: Negative for adenopathy. Bruises/bleeds easily.   Psychiatric/Behavioral: Positive for sleep disturbance and depressed mood.        History  Past Medical History:   Diagnosis Date   • Chronic back pain    • Closed nondisplaced fracture of head of right radius 03/2017   • Congenital deformity of right hip joint 1956   • COPD (chronic obstructive pulmonary disease) (CMS/HCC)     former smoker   • Deep venous thrombosis (CMS/HCC)     unprovoked   • Depressive disorder    • Diverticulitis of colon    • Essential hypertension    • Gastroesophageal reflux disease    • Insomnia    • Obesity    •  Pulmonary embolism (CMS/HCC)     provoked by surgery     Past Surgical History:   Procedure Laterality Date   • BRONCHOSCOPY N/A 2020    Procedure: BRONCHOSCOPY with bronchial washing;  Surgeon: Win Johnston MD;  Location: [facility] ENDOSCOPY;  Service: Pulmonary;  Laterality: N/A;  Post: mucous plugs   • CHOLECYSTECTOMY     • COLONOSCOPY     • ENDOSCOPY     • EXPLORATORY LAPAROTOMY N/A 2020    Procedure: LAPAROTOMY EXPLORATORY HARTMANS;  Surgeon: Chi Farmer MD;  Location: [facility] MAIN OR;  Service: General;  Laterality: N/A;   • HYSTERECTOMY     • TOTAL HIP ARTHROPLASTY Right    • TOTAL HIP ARTHROPLASTY REVISION Right     x3     Family History   Problem Relation Age of Onset   • No Known Problems Mother    • No Known Problems Father      Social History     Tobacco Use   • Smoking status: Former Smoker     Packs/day: 2.00     Years: 40.00     Pack years: 80.00     Types: Cigarettes     Quit date:      Years since quittin.9   • Smokeless tobacco: Never Used   • Tobacco comment: ELECTRONIC   Substance Use Topics   • Alcohol use: Never     Frequency: Never   • Drug use: Never     (Not in a hospital admission)    Allergies:  Patient has no known allergies.    Objective     Vital Signs       Physical Exam  Vitals signs reviewed.   Constitutional:       General: She is not in acute distress.     Appearance: She is well-developed.      Comments: Frail appearing woman   HENT:      Head: Normocephalic and atraumatic.   Eyes:      Pupils: Pupils are equal, round, and reactive to light.   Neck:      Musculoskeletal: Normal range of motion.   Cardiovascular:      Rate and Rhythm: Normal rate and regular rhythm.   Pulmonary:      Effort: Tachypnea present.      Breath sounds: Normal breath sounds.      Comments: On supplemental oxygen  Abdominal:      General: There is no distension.      Palpations: Abdomen is soft.      Tenderness: There is no abdominal tenderness.      Hernia: No hernia is  present.      Comments: Well healed vertical midline incision, left lower quadrant colostomy with puffiness around the stoma under the skin likely representing parastomal hernia. Patent and productive of stool and gas.   Musculoskeletal: Normal range of motion.   Lymphadenopathy:      Cervical: No cervical adenopathy.   Skin:     General: Skin is warm and dry.      Findings: No rash.   Neurological:      Mental Status: She is alert and oriented to person, place, and time.         Results Review:   Lab Results (last 24 hours)     ** No results found for the last 24 hours. **        No radiology results for the last day      I reviewed the patient's new imaging results and agree with the interpretation.  I reviewed the patient's other test results and agree with the interpretation    Assessment/Plan     Active Problems:  Attention to colostomy    Patient certainly is far enough removed from her episode of perforated diverticulitis to consider takedown of her colostomy.  The patient is high risk given her multiple medical co-morbidities of thrombophilia with anticoagulation, COPD on home O2, hypertension, and frailty.  She will need medical clearance from her PCP with a plan to transition off of her Coumadin for surgery. Lovenox bridge would be acceptable.  She will also need pulmonary clearance prior to proceeding to the OR.  I discussed the risks of surgery which include: bleeding, infection, anastomotic leak, respiratory failure, DVT/PE, prolonged hospitalization, possible discharge to rehab/SNF post-operatively, and/or even death.  Once the patient has clearance, will schedule for surgery.    I discussed the patients findings and my recommendations with the patient.     Chi Farmer MD  12/15/20  21:07 EST

## 2021-01-06 LAB
MYCOBACTERIUM SPEC CULT: NORMAL
NIGHT BLUE STAIN TISS: NORMAL

## 2021-02-03 DIAGNOSIS — Z93.3 STATUS POST HARTMANN PROCEDURE (HCC): Primary | ICD-10-CM

## 2021-02-03 DIAGNOSIS — K94.09 OTHER COMPLICATIONS OF COLOSTOMY (HCC): ICD-10-CM

## 2021-02-03 PROBLEM — Z43.3 ATTENTION TO COLOSTOMY: Status: ACTIVE | Noted: 2021-02-03

## 2021-02-03 RX ORDER — ERYTHROMYCIN 500 MG/1
TABLET, COATED ORAL
Qty: 12 TABLET | Refills: 0 | Status: SHIPPED | OUTPATIENT
Start: 2021-02-03 | End: 2021-03-15

## 2021-02-03 RX ORDER — CHLORHEXIDINE GLUCONATE 4 G/100ML
SOLUTION TOPICAL 2 TIMES DAILY
Qty: 236 ML | Refills: 0 | Status: SHIPPED | OUTPATIENT
Start: 2021-02-03 | End: 2021-03-15

## 2021-02-03 RX ORDER — NEOMYCIN SULFATE 500 MG/1
TABLET ORAL
Qty: 3 TABLET | Refills: 0 | Status: SHIPPED | OUTPATIENT
Start: 2021-02-03 | End: 2021-03-15

## 2021-02-03 RX ORDER — NEOMYCIN SULFATE 500 MG/1
1000 TABLET ORAL 3 TIMES DAILY
Qty: 6 TABLET | Refills: 0 | Status: SHIPPED | OUTPATIENT
Start: 2021-02-03 | End: 2021-02-04

## 2021-02-03 RX ORDER — GABAPENTIN 100 MG/1
600 CAPSULE ORAL 3 TIMES DAILY
Status: CANCELLED | OUTPATIENT
Start: 2021-02-03

## 2021-02-03 RX ORDER — ALVIMOPAN 12 MG/1
12 CAPSULE ORAL ONCE
Status: CANCELLED | OUTPATIENT
Start: 2021-02-03 | End: 2021-02-03

## 2021-02-03 RX ORDER — CELECOXIB 100 MG/1
200 CAPSULE ORAL EVERY 12 HOURS SCHEDULED
Status: CANCELLED | OUTPATIENT
Start: 2021-02-03

## 2021-02-03 RX ORDER — ACETAMINOPHEN 500 MG
1000 TABLET ORAL EVERY 6 HOURS
Status: CANCELLED | OUTPATIENT
Start: 2021-02-03

## 2021-02-12 ENCOUNTER — APPOINTMENT (OUTPATIENT)
Dept: LAB | Facility: HOSPITAL | Age: 66
End: 2021-02-12

## 2021-02-12 RX ORDER — WARFARIN SODIUM 3 MG/1
3 TABLET ORAL NIGHTLY
COMMUNITY
End: 2021-11-02 | Stop reason: HOSPADM

## 2021-02-12 RX ORDER — LEVOTHYROXINE SODIUM 0.05 MG/1
50 TABLET ORAL DAILY
COMMUNITY
End: 2022-05-15 | Stop reason: SDDI

## 2021-02-15 ENCOUNTER — LAB (OUTPATIENT)
Dept: LAB | Facility: HOSPITAL | Age: 66
End: 2021-02-15

## 2021-02-15 ENCOUNTER — HOSPITAL ENCOUNTER (OUTPATIENT)
Dept: CARDIOLOGY | Facility: HOSPITAL | Age: 66
Discharge: HOME | End: 2021-02-15

## 2021-02-15 ENCOUNTER — HOSPITAL ENCOUNTER (OUTPATIENT)
Dept: GENERAL RADIOLOGY | Facility: HOSPITAL | Age: 66
Discharge: HOME | End: 2021-02-15

## 2021-02-15 DIAGNOSIS — K94.09 OTHER COMPLICATIONS OF COLOSTOMY (HCC): ICD-10-CM

## 2021-02-15 DIAGNOSIS — Z93.3 STATUS POST HARTMANN PROCEDURE (HCC): ICD-10-CM

## 2021-02-15 DIAGNOSIS — R91.1 LUNG NODULE: Primary | ICD-10-CM

## 2021-02-15 LAB
ABO GROUP BLD: NORMAL
ALBUMIN SERPL-MCNC: 4.6 G/DL (ref 3.5–5.2)
ALBUMIN/GLOB SERPL: 1.7 G/DL
ALP SERPL-CCNC: 71 U/L (ref 39–117)
ALT SERPL W P-5'-P-CCNC: 10 U/L (ref 1–33)
ANION GAP SERPL CALCULATED.3IONS-SCNC: 10 MMOL/L (ref 5–15)
AST SERPL-CCNC: 21 U/L (ref 1–32)
BILIRUB SERPL-MCNC: 0.4 MG/DL (ref 0–1.2)
BLD GP AB SCN SERPL QL: NEGATIVE
BUN SERPL-MCNC: 22 MG/DL (ref 8–23)
BUN/CREAT SERPL: 18.5 (ref 7–25)
CALCIUM SPEC-SCNC: 10.2 MG/DL (ref 8.6–10.5)
CHLORIDE SERPL-SCNC: 99 MMOL/L (ref 98–107)
CO2 SERPL-SCNC: 28 MMOL/L (ref 22–29)
CREAT SERPL-MCNC: 1.19 MG/DL (ref 0.57–1)
DEPRECATED RDW RBC AUTO: 42.8 FL (ref 37–54)
ERYTHROCYTE [DISTWIDTH] IN BLOOD BY AUTOMATED COUNT: 12.9 % (ref 12.3–15.4)
GFR SERPL CREATININE-BSD FRML MDRD: 46 ML/MIN/1.73
GLOBULIN UR ELPH-MCNC: 2.7 GM/DL
GLUCOSE SERPL-MCNC: 104 MG/DL (ref 65–99)
HCT VFR BLD AUTO: 40 % (ref 34–46.6)
HGB BLD-MCNC: 12.8 G/DL (ref 12–15.9)
MCH RBC QN AUTO: 28.6 PG (ref 26.6–33)
MCHC RBC AUTO-ENTMCNC: 32 G/DL (ref 31.5–35.7)
MCV RBC AUTO: 89.5 FL (ref 79–97)
PLATELET # BLD AUTO: 282 10*3/MM3 (ref 140–450)
PMV BLD AUTO: 10.2 FL (ref 6–12)
POTASSIUM SERPL-SCNC: 4.3 MMOL/L (ref 3.5–5.2)
PROT SERPL-MCNC: 7.3 G/DL (ref 6–8.5)
RBC # BLD AUTO: 4.47 10*6/MM3 (ref 3.77–5.28)
RH BLD: POSITIVE
SODIUM SERPL-SCNC: 137 MMOL/L (ref 136–145)
T&S EXPIRATION DATE: NORMAL
WBC # BLD AUTO: 7.61 10*3/MM3 (ref 3.4–10.8)

## 2021-02-15 PROCEDURE — 85027 COMPLETE CBC AUTOMATED: CPT

## 2021-02-15 PROCEDURE — 71046 X-RAY EXAM CHEST 2 VIEWS: CPT

## 2021-02-15 PROCEDURE — 93010 ELECTROCARDIOGRAM REPORT: CPT | Performed by: INTERNAL MEDICINE

## 2021-02-15 PROCEDURE — 86850 RBC ANTIBODY SCREEN: CPT

## 2021-02-15 PROCEDURE — 93005 ELECTROCARDIOGRAM TRACING: CPT | Performed by: SURGERY

## 2021-02-15 PROCEDURE — 80053 COMPREHEN METABOLIC PANEL: CPT

## 2021-02-15 PROCEDURE — 86901 BLOOD TYPING SEROLOGIC RH(D): CPT

## 2021-02-15 PROCEDURE — 86900 BLOOD TYPING SEROLOGIC ABO: CPT

## 2021-02-15 PROCEDURE — 36415 COLL VENOUS BLD VENIPUNCTURE: CPT

## 2021-02-18 ENCOUNTER — HOSPITAL ENCOUNTER (OUTPATIENT)
Dept: CT IMAGING | Facility: HOSPITAL | Age: 66
Discharge: HOME | End: 2021-02-18
Admitting: SURGERY

## 2021-02-18 DIAGNOSIS — R91.1 LUNG NODULE: ICD-10-CM

## 2021-02-18 PROCEDURE — 71250 CT THORAX DX C-: CPT

## 2021-02-19 ENCOUNTER — TELEPHONE (OUTPATIENT)
Dept: SURGERY | Facility: CLINIC | Age: 66
End: 2021-02-19

## 2021-02-19 NOTE — TELEPHONE ENCOUNTER
Patient Renuka called about her CT chest resutls. she is the one who had an abnormal CXR so we ordered CT chest. results are in and she wants to know if she is still on for surgery monday becuase she needs to start her lovenox bridge today. Please let me know if i need to contact the patient with any information.  Secure chat sent to Dr. briones.

## 2021-02-20 ENCOUNTER — LAB (OUTPATIENT)
Dept: LAB | Facility: HOSPITAL | Age: 66
End: 2021-02-20

## 2021-02-20 ENCOUNTER — TRANSCRIBE ORDERS (OUTPATIENT)
Dept: ADMINISTRATIVE | Facility: HOSPITAL | Age: 66
End: 2021-02-20

## 2021-02-20 DIAGNOSIS — I82.409 DEEP PHLEBOTHROMBOSIS, ANTEPARTUM, WITH DELIVERY (HCC): ICD-10-CM

## 2021-02-20 DIAGNOSIS — O22.30 DEEP PHLEBOTHROMBOSIS, ANTEPARTUM, WITH DELIVERY (HCC): Primary | ICD-10-CM

## 2021-02-20 DIAGNOSIS — O22.30 DEEP PHLEBOTHROMBOSIS, ANTEPARTUM, WITH DELIVERY (HCC): ICD-10-CM

## 2021-02-20 DIAGNOSIS — I82.409 DEEP PHLEBOTHROMBOSIS, ANTEPARTUM, WITH DELIVERY (HCC): Primary | ICD-10-CM

## 2021-02-20 LAB
INR PPP: 1.59 (ref 2–3)
PROTHROMBIN TIME: 17.1 SECONDS (ref 19.4–28.5)

## 2021-02-20 PROCEDURE — 85610 PROTHROMBIN TIME: CPT

## 2021-02-20 PROCEDURE — 36415 COLL VENOUS BLD VENIPUNCTURE: CPT

## 2021-02-23 ENCOUNTER — LAB (OUTPATIENT)
Dept: LAB | Facility: HOSPITAL | Age: 66
End: 2021-02-23

## 2021-02-23 DIAGNOSIS — Z93.3 STATUS POST HARTMANN PROCEDURE (HCC): ICD-10-CM

## 2021-02-23 LAB — SARS-COV-2 ORF1AB RESP QL NAA+PROBE: NOT DETECTED

## 2021-02-23 PROCEDURE — C9803 HOPD COVID-19 SPEC COLLECT: HCPCS

## 2021-02-23 PROCEDURE — U0004 COV-19 TEST NON-CDC HGH THRU: HCPCS

## 2021-02-25 ENCOUNTER — ANESTHESIA (OUTPATIENT)
Dept: PERIOP | Facility: HOSPITAL | Age: 66
End: 2021-02-25

## 2021-02-25 ENCOUNTER — ANESTHESIA EVENT (OUTPATIENT)
Dept: PERIOP | Facility: HOSPITAL | Age: 66
End: 2021-02-25

## 2021-02-25 ENCOUNTER — HOSPITAL ENCOUNTER (INPATIENT)
Facility: HOSPITAL | Age: 66
LOS: 5 days | Discharge: HOME HEALTH | End: 2021-03-02
Attending: SURGERY | Admitting: SURGERY

## 2021-02-25 DIAGNOSIS — Z93.3 STATUS POST HARTMANN PROCEDURE (HCC): ICD-10-CM

## 2021-02-25 DIAGNOSIS — Z98.890 S/P BLADDER TUMOR EXCISION WITH FULGURATION: Primary | ICD-10-CM

## 2021-02-25 DIAGNOSIS — D49.4 BLADDER TUMOR: ICD-10-CM

## 2021-02-25 PROCEDURE — 44626 REPAIR BOWEL OPENING: CPT | Performed by: REGISTERED NURSE

## 2021-02-25 PROCEDURE — 94799 UNLISTED PULMONARY SVC/PX: CPT

## 2021-02-25 PROCEDURE — 0T5B8ZZ DESTRUCTION OF BLADDER, VIA NATURAL OR ARTIFICIAL OPENING ENDOSCOPIC: ICD-10-PCS | Performed by: UROLOGY

## 2021-02-25 PROCEDURE — 99222 1ST HOSP IP/OBS MODERATE 55: CPT | Performed by: NURSE PRACTITIONER

## 2021-02-25 PROCEDURE — 88307 TISSUE EXAM BY PATHOLOGIST: CPT | Performed by: UROLOGY

## 2021-02-25 PROCEDURE — 87086 URINE CULTURE/COLONY COUNT: CPT | Performed by: SURGERY

## 2021-02-25 PROCEDURE — 25010000002 CEFOXITIN PER 1 G: Performed by: SURGERY

## 2021-02-25 PROCEDURE — 25010000002 ONDANSETRON PER 1 MG: Performed by: NURSE ANESTHETIST, CERTIFIED REGISTERED

## 2021-02-25 PROCEDURE — 25010000002 HYDROMORPHONE PER 4 MG: Performed by: NURSE ANESTHETIST, CERTIFIED REGISTERED

## 2021-02-25 PROCEDURE — 0DBM0ZZ EXCISION OF DESCENDING COLON, OPEN APPROACH: ICD-10-PCS | Performed by: SURGERY

## 2021-02-25 PROCEDURE — 25010000002 FENTANYL CITRATE (PF) 100 MCG/2ML SOLUTION: Performed by: NURSE ANESTHETIST, CERTIFIED REGISTERED

## 2021-02-25 PROCEDURE — 25010000002 ROPIVACAINE PER 1 MG: Performed by: ANESTHESIOLOGY

## 2021-02-25 PROCEDURE — 0DNP0ZZ RELEASE RECTUM, OPEN APPROACH: ICD-10-PCS | Performed by: SURGERY

## 2021-02-25 PROCEDURE — 0DN80ZZ RELEASE SMALL INTESTINE, OPEN APPROACH: ICD-10-PCS | Performed by: SURGERY

## 2021-02-25 PROCEDURE — 44626 REPAIR BOWEL OPENING: CPT | Performed by: SURGERY

## 2021-02-25 PROCEDURE — 25010000002 PROPOFOL 10 MG/ML EMULSION: Performed by: NURSE ANESTHETIST, CERTIFIED REGISTERED

## 2021-02-25 PROCEDURE — S0260 H&P FOR SURGERY: HCPCS | Performed by: SURGERY

## 2021-02-25 PROCEDURE — 25010000002 MIDAZOLAM PER 1 MG: Performed by: NURSE ANESTHETIST, CERTIFIED REGISTERED

## 2021-02-25 PROCEDURE — 25010000002 DEXAMETHASONE PER 1 MG: Performed by: NURSE ANESTHETIST, CERTIFIED REGISTERED

## 2021-02-25 PROCEDURE — C1758 CATHETER, URETERAL: HCPCS | Performed by: SURGERY

## 2021-02-25 PROCEDURE — 88305 TISSUE EXAM BY PATHOLOGIST: CPT | Performed by: SURGERY

## 2021-02-25 PROCEDURE — 25010000002 DEXAMETHASONE PER 1 MG: Performed by: ANESTHESIOLOGY

## 2021-02-25 DEVICE — CURVED INTRALUMINAL STAPLER (ILS) 20 TITANIUM ADJUSTABLE HEIGHT STAPLES: Type: IMPLANTABLE DEVICE | Site: SIGMOID COLON | Status: FUNCTIONAL

## 2021-02-25 DEVICE — PROXIMATE RELOADABLE LINEAR CUTTER WITH SAFETY LOCK-OUT, 75MM
Type: IMPLANTABLE DEVICE | Site: SIGMOID COLON | Status: FUNCTIONAL
Brand: PROXIMATE

## 2021-02-25 RX ORDER — LABETALOL HYDROCHLORIDE 5 MG/ML
INJECTION, SOLUTION INTRAVENOUS AS NEEDED
Status: DISCONTINUED | OUTPATIENT
Start: 2021-02-25 | End: 2021-02-25

## 2021-02-25 RX ORDER — ROPIVACAINE HYDROCHLORIDE 5 MG/ML
INJECTION, SOLUTION EPIDURAL; INFILTRATION; PERINEURAL
Status: COMPLETED | OUTPATIENT
Start: 2021-02-25 | End: 2021-02-25

## 2021-02-25 RX ORDER — LABETALOL HYDROCHLORIDE 5 MG/ML
INJECTION, SOLUTION INTRAVENOUS AS NEEDED
Status: DISCONTINUED | OUTPATIENT
Start: 2021-02-25 | End: 2021-02-25 | Stop reason: SURG

## 2021-02-25 RX ORDER — ONDANSETRON 2 MG/ML
4 INJECTION INTRAMUSCULAR; INTRAVENOUS EVERY 6 HOURS PRN
Status: DISCONTINUED | OUTPATIENT
Start: 2021-02-25 | End: 2021-03-02 | Stop reason: HOSPADM

## 2021-02-25 RX ORDER — FENTANYL CITRATE 50 UG/ML
25 INJECTION, SOLUTION INTRAMUSCULAR; INTRAVENOUS
Status: DISCONTINUED | OUTPATIENT
Start: 2021-02-25 | End: 2021-02-25 | Stop reason: HOSPADM

## 2021-02-25 RX ORDER — SODIUM CHLORIDE 9 MG/ML
50 INJECTION, SOLUTION INTRAVENOUS CONTINUOUS
Status: DISCONTINUED | OUTPATIENT
Start: 2021-02-25 | End: 2021-02-28

## 2021-02-25 RX ORDER — FENTANYL CITRATE 50 UG/ML
INJECTION, SOLUTION INTRAMUSCULAR; INTRAVENOUS AS NEEDED
Status: DISCONTINUED | OUTPATIENT
Start: 2021-02-25 | End: 2021-02-25 | Stop reason: SURG

## 2021-02-25 RX ORDER — LEVOTHYROXINE SODIUM 0.05 MG/1
50 TABLET ORAL
Status: DISCONTINUED | OUTPATIENT
Start: 2021-02-26 | End: 2021-03-02 | Stop reason: HOSPADM

## 2021-02-25 RX ORDER — TRAZODONE HYDROCHLORIDE 100 MG/1
100 TABLET ORAL NIGHTLY PRN
Status: DISCONTINUED | OUTPATIENT
Start: 2021-02-25 | End: 2021-03-02 | Stop reason: HOSPADM

## 2021-02-25 RX ORDER — ACETAMINOPHEN 500 MG
1000 TABLET ORAL EVERY 8 HOURS
Status: DISCONTINUED | OUTPATIENT
Start: 2021-02-25 | End: 2021-03-02 | Stop reason: HOSPADM

## 2021-02-25 RX ORDER — OXYCODONE HYDROCHLORIDE 5 MG/1
10 TABLET ORAL EVERY 4 HOURS PRN
Status: DISCONTINUED | OUTPATIENT
Start: 2021-02-25 | End: 2021-03-02 | Stop reason: HOSPADM

## 2021-02-25 RX ORDER — NEOSTIGMINE METHYLSULFATE 5 MG/5 ML
SYRINGE (ML) INTRAVENOUS AS NEEDED
Status: DISCONTINUED | OUTPATIENT
Start: 2021-02-25 | End: 2021-02-25 | Stop reason: SURG

## 2021-02-25 RX ORDER — HYDROMORPHONE HCL 110MG/55ML
0.5 PATIENT CONTROLLED ANALGESIA SYRINGE INTRAVENOUS
Status: DISCONTINUED | OUTPATIENT
Start: 2021-02-25 | End: 2021-02-25 | Stop reason: HOSPADM

## 2021-02-25 RX ORDER — ALBUTEROL SULFATE 90 UG/1
2 AEROSOL, METERED RESPIRATORY (INHALATION) EVERY 6 HOURS PRN
Status: DISCONTINUED | OUTPATIENT
Start: 2021-02-25 | End: 2021-02-25 | Stop reason: SDUPTHER

## 2021-02-25 RX ORDER — SODIUM CHLORIDE 0.9 % (FLUSH) 0.9 %
10 SYRINGE (ML) INJECTION AS NEEDED
Status: DISCONTINUED | OUTPATIENT
Start: 2021-02-25 | End: 2021-03-02 | Stop reason: HOSPADM

## 2021-02-25 RX ORDER — GLYCOPYRROLATE 1 MG/5 ML
SYRINGE (ML) INTRAVENOUS AS NEEDED
Status: DISCONTINUED | OUTPATIENT
Start: 2021-02-25 | End: 2021-02-25 | Stop reason: SURG

## 2021-02-25 RX ORDER — SODIUM CHLORIDE 9 MG/ML
INJECTION, SOLUTION INTRAVENOUS CONTINUOUS PRN
Status: DISCONTINUED | OUTPATIENT
Start: 2021-02-25 | End: 2021-02-25 | Stop reason: SURG

## 2021-02-25 RX ORDER — HYDROMORPHONE HCL 110MG/55ML
PATIENT CONTROLLED ANALGESIA SYRINGE INTRAVENOUS AS NEEDED
Status: DISCONTINUED | OUTPATIENT
Start: 2021-02-25 | End: 2021-02-25 | Stop reason: SURG

## 2021-02-25 RX ORDER — PROPOFOL 10 MG/ML
VIAL (ML) INTRAVENOUS AS NEEDED
Status: DISCONTINUED | OUTPATIENT
Start: 2021-02-25 | End: 2021-02-25 | Stop reason: SURG

## 2021-02-25 RX ORDER — MIDAZOLAM HYDROCHLORIDE 1 MG/ML
INJECTION INTRAMUSCULAR; INTRAVENOUS AS NEEDED
Status: DISCONTINUED | OUTPATIENT
Start: 2021-02-25 | End: 2021-02-25 | Stop reason: SURG

## 2021-02-25 RX ORDER — ONDANSETRON 4 MG/1
4 TABLET, FILM COATED ORAL EVERY 6 HOURS PRN
Status: DISCONTINUED | OUTPATIENT
Start: 2021-02-25 | End: 2021-03-02 | Stop reason: HOSPADM

## 2021-02-25 RX ORDER — LIDOCAINE HYDROCHLORIDE 20 MG/ML
INJECTION, SOLUTION EPIDURAL; INFILTRATION; INTRACAUDAL; PERINEURAL AS NEEDED
Status: DISCONTINUED | OUTPATIENT
Start: 2021-02-25 | End: 2021-02-25 | Stop reason: SURG

## 2021-02-25 RX ORDER — HYDROMORPHONE HCL 110MG/55ML
0.5 PATIENT CONTROLLED ANALGESIA SYRINGE INTRAVENOUS
Status: DISCONTINUED | OUTPATIENT
Start: 2021-02-25 | End: 2021-03-02 | Stop reason: HOSPADM

## 2021-02-25 RX ORDER — OXYCODONE HYDROCHLORIDE 5 MG/1
5 TABLET ORAL EVERY 4 HOURS PRN
Status: DISCONTINUED | OUTPATIENT
Start: 2021-02-25 | End: 2021-03-02 | Stop reason: HOSPADM

## 2021-02-25 RX ORDER — ALBUTEROL SULFATE 2.5 MG/3ML
2.5 SOLUTION RESPIRATORY (INHALATION) EVERY 6 HOURS PRN
Status: DISCONTINUED | OUTPATIENT
Start: 2021-02-25 | End: 2021-03-02 | Stop reason: HOSPADM

## 2021-02-25 RX ORDER — ONDANSETRON 2 MG/ML
4 INJECTION INTRAMUSCULAR; INTRAVENOUS ONCE AS NEEDED
Status: COMPLETED | OUTPATIENT
Start: 2021-02-25 | End: 2021-02-25

## 2021-02-25 RX ORDER — PANTOPRAZOLE SODIUM 40 MG/1
40 TABLET, DELAYED RELEASE ORAL
Status: DISCONTINUED | OUTPATIENT
Start: 2021-02-26 | End: 2021-03-02 | Stop reason: HOSPADM

## 2021-02-25 RX ORDER — CELECOXIB 200 MG/1
200 CAPSULE ORAL EVERY 12 HOURS SCHEDULED
Status: DISCONTINUED | OUTPATIENT
Start: 2021-02-25 | End: 2021-02-25 | Stop reason: HOSPADM

## 2021-02-25 RX ORDER — ALVIMOPAN 12 MG/1
12 CAPSULE ORAL ONCE
Status: COMPLETED | OUTPATIENT
Start: 2021-02-25 | End: 2021-02-25

## 2021-02-25 RX ORDER — ACETAMINOPHEN 500 MG
1000 TABLET ORAL EVERY 6 HOURS
Status: DISCONTINUED | OUTPATIENT
Start: 2021-02-25 | End: 2021-02-25 | Stop reason: HOSPADM

## 2021-02-25 RX ORDER — DEXAMETHASONE SODIUM PHOSPHATE 4 MG/ML
INJECTION, SOLUTION INTRA-ARTICULAR; INTRALESIONAL; INTRAMUSCULAR; INTRAVENOUS; SOFT TISSUE AS NEEDED
Status: DISCONTINUED | OUTPATIENT
Start: 2021-02-25 | End: 2021-02-25 | Stop reason: SURG

## 2021-02-25 RX ORDER — DEXAMETHASONE SODIUM PHOSPHATE 4 MG/ML
INJECTION, SOLUTION INTRA-ARTICULAR; INTRALESIONAL; INTRAMUSCULAR; INTRAVENOUS; SOFT TISSUE
Status: COMPLETED | OUTPATIENT
Start: 2021-02-25 | End: 2021-02-25

## 2021-02-25 RX ORDER — ROCURONIUM BROMIDE 10 MG/ML
INJECTION, SOLUTION INTRAVENOUS AS NEEDED
Status: DISCONTINUED | OUTPATIENT
Start: 2021-02-25 | End: 2021-02-25 | Stop reason: SURG

## 2021-02-25 RX ORDER — SODIUM CHLORIDE 0.9 % (FLUSH) 0.9 %
3 SYRINGE (ML) INJECTION EVERY 12 HOURS SCHEDULED
Status: DISCONTINUED | OUTPATIENT
Start: 2021-02-25 | End: 2021-03-02

## 2021-02-25 RX ORDER — GABAPENTIN 300 MG/1
600 CAPSULE ORAL 3 TIMES DAILY
Status: DISCONTINUED | OUTPATIENT
Start: 2021-02-25 | End: 2021-02-25 | Stop reason: HOSPADM

## 2021-02-25 RX ADMIN — FENTANYL CITRATE 50 MCG: 50 INJECTION, SOLUTION INTRAMUSCULAR; INTRAVENOUS at 11:52

## 2021-02-25 RX ADMIN — LABETALOL 20 MG/4 ML (5 MG/ML) INTRAVENOUS SYRINGE 2.5 MG: at 13:51

## 2021-02-25 RX ADMIN — HYDROMORPHONE HYDROCHLORIDE 0.2 MG: 2 INJECTION INTRAMUSCULAR; INTRAVENOUS; SUBCUTANEOUS at 13:40

## 2021-02-25 RX ADMIN — ROCURONIUM BROMIDE 40 MG: 10 INJECTION INTRAVENOUS at 11:53

## 2021-02-25 RX ADMIN — FENTANYL CITRATE 50 MCG: 50 INJECTION, SOLUTION INTRAMUSCULAR; INTRAVENOUS at 11:58

## 2021-02-25 RX ADMIN — PROPOFOL 100 MCG/KG/MIN: 10 INJECTION, EMULSION INTRAVENOUS at 12:10

## 2021-02-25 RX ADMIN — SODIUM CHLORIDE 100 ML/HR: 900 INJECTION INTRAVENOUS at 19:45

## 2021-02-25 RX ADMIN — ROCURONIUM BROMIDE 5 MG: 10 INJECTION INTRAVENOUS at 13:27

## 2021-02-25 RX ADMIN — LIDOCAINE HYDROCHLORIDE 50 MG: 20 INJECTION, SOLUTION EPIDURAL; INFILTRATION; INTRACAUDAL; PERINEURAL at 11:52

## 2021-02-25 RX ADMIN — HYDROMORPHONE HYDROCHLORIDE 0.2 MG: 2 INJECTION INTRAMUSCULAR; INTRAVENOUS; SUBCUTANEOUS at 12:51

## 2021-02-25 RX ADMIN — HYDROMORPHONE HYDROCHLORIDE 0.2 MG: 2 INJECTION INTRAMUSCULAR; INTRAVENOUS; SUBCUTANEOUS at 13:33

## 2021-02-25 RX ADMIN — Medication 3 ML: at 20:34

## 2021-02-25 RX ADMIN — OXYCODONE 10 MG: 5 TABLET ORAL at 19:46

## 2021-02-25 RX ADMIN — MIDAZOLAM 2 MG: 1 INJECTION INTRAMUSCULAR; INTRAVENOUS at 11:46

## 2021-02-25 RX ADMIN — ROCURONIUM BROMIDE 10 MG: 10 INJECTION INTRAVENOUS at 12:10

## 2021-02-25 RX ADMIN — Medication 3 ML: at 19:46

## 2021-02-25 RX ADMIN — DEXAMETHASONE SODIUM PHOSPHATE 4 MG: 4 INJECTION, SOLUTION INTRAMUSCULAR; INTRAVENOUS at 12:05

## 2021-02-25 RX ADMIN — ROCURONIUM BROMIDE 10 MG: 10 INJECTION INTRAVENOUS at 12:56

## 2021-02-25 RX ADMIN — PROPOFOL 120 MG: 10 INJECTION, EMULSION INTRAVENOUS at 11:52

## 2021-02-25 RX ADMIN — ROCURONIUM BROMIDE 10 MG: 10 INJECTION INTRAVENOUS at 14:17

## 2021-02-25 RX ADMIN — SODIUM CHLORIDE: 0.9 INJECTION, SOLUTION INTRAVENOUS at 14:18

## 2021-02-25 RX ADMIN — HYDROMORPHONE HYDROCHLORIDE 0.5 MG: 2 INJECTION INTRAMUSCULAR; INTRAVENOUS; SUBCUTANEOUS at 14:35

## 2021-02-25 RX ADMIN — ALVIMOPAN 12 MG: 12 CAPSULE ORAL at 10:35

## 2021-02-25 RX ADMIN — HYDROMORPHONE HYDROCHLORIDE 0.5 MG: 2 INJECTION INTRAMUSCULAR; INTRAVENOUS; SUBCUTANEOUS at 14:31

## 2021-02-25 RX ADMIN — CELECOXIB 200 MG: 200 CAPSULE ORAL at 10:35

## 2021-02-25 RX ADMIN — ONDANSETRON 4 MG: 2 INJECTION INTRAMUSCULAR; INTRAVENOUS at 16:12

## 2021-02-25 RX ADMIN — HYDROCHLOROTHIAZIDE: 25 TABLET ORAL at 20:34

## 2021-02-25 RX ADMIN — HYDROMORPHONE HYDROCHLORIDE 0.2 MG: 2 INJECTION INTRAMUSCULAR; INTRAVENOUS; SUBCUTANEOUS at 13:06

## 2021-02-25 RX ADMIN — HYDROMORPHONE HYDROCHLORIDE 0.2 MG: 2 INJECTION INTRAMUSCULAR; INTRAVENOUS; SUBCUTANEOUS at 13:21

## 2021-02-25 RX ADMIN — Medication 3 MG: at 16:11

## 2021-02-25 RX ADMIN — Medication 0.6 MG: at 16:11

## 2021-02-25 RX ADMIN — GABAPENTIN 600 MG: 300 CAPSULE ORAL at 10:34

## 2021-02-25 RX ADMIN — ACETAMINOPHEN 1000 MG: 500 TABLET, FILM COATED ORAL at 19:46

## 2021-02-25 RX ADMIN — ROPIVACAINE HYDROCHLORIDE 30 ML: 5 INJECTION, SOLUTION EPIDURAL; INFILTRATION; PERINEURAL at 12:05

## 2021-02-25 RX ADMIN — CEFOXITIN SODIUM 2 G: 2 POWDER, FOR SOLUTION INTRAVENOUS at 12:03

## 2021-02-25 RX ADMIN — ROCURONIUM BROMIDE 5 MG: 10 INJECTION INTRAVENOUS at 13:41

## 2021-02-25 RX ADMIN — DEXAMETHASONE SODIUM PHOSPHATE 8 MG: 4 INJECTION, SOLUTION INTRAMUSCULAR; INTRAVENOUS at 12:21

## 2021-02-25 RX ADMIN — ROCURONIUM BROMIDE 10 MG: 10 INJECTION INTRAVENOUS at 15:07

## 2021-02-25 RX ADMIN — PROPOFOL 25 MG: 10 INJECTION, EMULSION INTRAVENOUS at 12:51

## 2021-02-25 RX ADMIN — SODIUM CHLORIDE: 0.9 INJECTION, SOLUTION INTRAVENOUS at 11:45

## 2021-02-25 RX ADMIN — ACETAMINOPHEN 1000 MG: 500 TABLET ORAL at 10:35

## 2021-02-25 RX ADMIN — CEFOXITIN SODIUM 2 G: 2 POWDER, FOR SOLUTION INTRAVENOUS at 14:03

## 2021-02-25 RX ADMIN — CEFOXITIN SODIUM 2 G: 2 POWDER, FOR SOLUTION INTRAVENOUS at 19:45

## 2021-02-25 NOTE — ANESTHESIA PROCEDURE NOTES
Peripheral Block      Patient location during procedure: OR  Start time: 2/25/2021 12:00 PM  Stop time: 2/25/2021 12:05 PM  Reason for block: at surgeon's request and post-op pain management  Performed by  Anesthesiologist: Renny Travis MD  Preanesthetic Checklist  Completed: patient identified, site marked, surgical consent, pre-op evaluation, timeout performed, IV checked, risks and benefits discussed and monitors and equipment checked  Prep:  Pt Position: supine  Sterile barriers:cap, gloves, sterile barriers and mask  Prep: ChloraPrep  Patient monitoring: blood pressure monitoring, continuous pulse oximetry and EKG  Procedure  Performed under: general  Guidance:ultrasound guided  ULTRASOUND INTERPRETATION. Using ultrasound guidance a 20 G gauge needle was placed in close proximity to the nerve, at which point, under ultrasound guidance anesthetic was injected in the area of the nerve and spread of the anesthesia was seen on ultrasound in close proximity thereto.  There were no abnormalities seen on ultrasound; a digital image was taken; and the patient tolerated the procedure with no complications. Images:still images obtained, printed/placed on chart    Laterality:Bilateral  Block Type:TAP  Injection Technique:single-shot  Needle Type:echogenic  Needle Gauge:20 G  Resistance on Injection: none  Catheter Size:20 G    Medications Used: dexamethasone (DECADRON) injection, 4 mg  ropivacaine (NAROPIN) 0.5 % injection, 30 mL  Med admintered at 2/25/2021 12:05 PM      Medications  Comment:30 CC 0.25% ROPIVACAINE WITH DECADRON 4MG EACH SIDE    Post Assessment  Injection Assessment: negative aspiration for heme, no paresthesia on injection and incremental injection  Patient Tolerance:comfortable throughout block  Complications:no  Additional Notes  X1 WITH EASE US GUIDANCE.

## 2021-02-25 NOTE — ANESTHESIA POSTPROCEDURE EVALUATION
Patient: Renuka Pelayo    Procedure Summary     Date: 02/25/21 Room / Location: [facility] OR 09 / [facility] MAIN OR    Anesthesia Start: 1145 Anesthesia Stop:     Procedures:       COLOSTOMY TAKEDOWN OR CLOSURE, extenisve lysis of adhesions (N/A Abdomen)      CYSTOSCOPY with bladder tumor removal and fulgeration, insertion of 3-way rizzo catheter (N/A Urethra) Diagnosis:       Status post Mariya procedure (CMS/HCC)      (Status post Mariya procedure (CMS/HCC) [Z93.3])    Surgeon: Chi Farmer MD; Alfonzo Hayward MD Provider: Renny Travis MD    Anesthesia Type: general ASA Status: 3          Anesthesia Type: general    Vitals  No vitals data found for the desired time range.          Post Anesthesia Care and Evaluation    Patient location during evaluation: PACU  Patient participation: complete - patient participated  Level of consciousness: awake  Pain scale: See nurse's notes for pain score.  Pain management: adequate  Airway patency: patent  Anesthetic complications: No anesthetic complications  PONV Status: none  Cardiovascular status: acceptable  Respiratory status: acceptable  Hydration status: acceptable    Comments: Patient seen and examined postoperatively; vital signs stable; SpO2 greater than or equal to 90%; cardiopulmonary status stable; nausea/vomiting adequately controlled; pain adequately controlled; no apparent anesthesia complications; patient discharged from anesthesia care when discharge criteria were met

## 2021-02-25 NOTE — ANESTHESIA PROCEDURE NOTES
Airway  Date/Time: 2/25/2021 11:54 AM  End Time:2/25/2021 11:54 AM    General Information and Staff    Anesthesiologist: Renny Travis MD  CRNA: Lesvia Stafford CRNA    Indications and Patient Condition  Indications for airway management: airway protection    Preoxygenated: yes  Mask difficulty assessment: 2 - vent by mask + OA or adjuvant +/- NMBA    Final Airway Details  Final airway type: endotracheal airway      Successful airway: ETT  Cuffed: yes   Successful intubation technique: direct laryngoscopy  Facilitating devices/methods: intubating stylet  Endotracheal tube insertion site: oral  Blade: Marija  Blade size: 4  ETT size (mm): 7.0  Cormack-Lehane Classification: grade I - full view of glottis  Placement verified by: chest auscultation and capnometry   Cuff volume (mL): 6  Measured from: lips  ETT/EBT  to lips (cm): 20  Number of attempts at approach: 1  Assessment: atraumatic intubation

## 2021-02-25 NOTE — ANESTHESIA PREPROCEDURE EVALUATION
Anesthesia Evaluation     Nursing notes reviewed   NPO Solid Status: > 8 hours  NPO Liquid Status: > 8 hours           Airway   Dental      Pulmonary    (+) pulmonary embolism, COPD,     ROS comment: On coumadin, recently Lovenox  Cardiovascular     (+) hypertension 2 medications or greater, DVT,       Neuro/Psych  GI/Hepatic/Renal/Endo    (+) obesity,  GERD,      Musculoskeletal     Abdominal    Substance History      OB/GYN          Other          Other Comment: Chronic LBP                  Anesthesia Plan    ASA 3     general   (ERAS protocol)  intravenous induction

## 2021-02-26 PROBLEM — E03.9 HYPOTHYROID: Chronic | Status: ACTIVE | Noted: 2021-02-26

## 2021-02-26 PROBLEM — Z98.890 S/P BLADDER TUMOR EXCISION WITH FULGURATION: Status: ACTIVE | Noted: 2021-02-26

## 2021-02-26 LAB
ANION GAP SERPL CALCULATED.3IONS-SCNC: 12 MMOL/L (ref 5–15)
BACTERIA SPEC AEROBE CULT: NO GROWTH
BASOPHILS # BLD AUTO: 0.1 10*3/MM3 (ref 0–0.2)
BASOPHILS NFR BLD AUTO: 0.4 % (ref 0–1.5)
BUN SERPL-MCNC: 34 MG/DL (ref 8–23)
BUN/CREAT SERPL: 20.4 (ref 7–25)
CALCIUM SPEC-SCNC: 8.2 MG/DL (ref 8.6–10.5)
CHLORIDE SERPL-SCNC: 103 MMOL/L (ref 98–107)
CO2 SERPL-SCNC: 18 MMOL/L (ref 22–29)
CREAT SERPL-MCNC: 1.67 MG/DL (ref 0.57–1)
DEPRECATED RDW RBC AUTO: 41.1 FL (ref 37–54)
EOSINOPHIL # BLD AUTO: 0 10*3/MM3 (ref 0–0.4)
EOSINOPHIL NFR BLD AUTO: 0.1 % (ref 0.3–6.2)
ERYTHROCYTE [DISTWIDTH] IN BLOOD BY AUTOMATED COUNT: 13.6 % (ref 12.3–15.4)
GFR SERPL CREATININE-BSD FRML MDRD: 31 ML/MIN/1.73
GLUCOSE SERPL-MCNC: 159 MG/DL (ref 65–99)
HCT VFR BLD AUTO: 36.7 % (ref 34–46.6)
HGB BLD-MCNC: 12.3 G/DL (ref 12–15.9)
LYMPHOCYTES # BLD AUTO: 0.4 10*3/MM3 (ref 0.7–3.1)
LYMPHOCYTES NFR BLD AUTO: 2.5 % (ref 19.6–45.3)
MCH RBC QN AUTO: 28.5 PG (ref 26.6–33)
MCHC RBC AUTO-ENTMCNC: 33.6 G/DL (ref 31.5–35.7)
MCV RBC AUTO: 84.9 FL (ref 79–97)
MONOCYTES # BLD AUTO: 0.6 10*3/MM3 (ref 0.1–0.9)
MONOCYTES NFR BLD AUTO: 3.7 % (ref 5–12)
NEUTROPHILS NFR BLD AUTO: 14.4 10*3/MM3 (ref 1.7–7)
NEUTROPHILS NFR BLD AUTO: 93.3 % (ref 42.7–76)
NRBC BLD AUTO-RTO: 0 /100 WBC (ref 0–0.2)
PLATELET # BLD AUTO: 256 10*3/MM3 (ref 140–450)
PMV BLD AUTO: 7.7 FL (ref 6–12)
POTASSIUM SERPL-SCNC: 4.4 MMOL/L (ref 3.5–5.2)
RBC # BLD AUTO: 4.32 10*6/MM3 (ref 3.77–5.28)
SODIUM SERPL-SCNC: 133 MMOL/L (ref 136–145)
WBC # BLD AUTO: 15.5 10*3/MM3 (ref 3.4–10.8)

## 2021-02-26 PROCEDURE — 94640 AIRWAY INHALATION TREATMENT: CPT

## 2021-02-26 PROCEDURE — 97116 GAIT TRAINING THERAPY: CPT

## 2021-02-26 PROCEDURE — 94799 UNLISTED PULMONARY SVC/PX: CPT

## 2021-02-26 PROCEDURE — 97162 PT EVAL MOD COMPLEX 30 MIN: CPT

## 2021-02-26 PROCEDURE — 97535 SELF CARE MNGMENT TRAINING: CPT

## 2021-02-26 PROCEDURE — 97166 OT EVAL MOD COMPLEX 45 MIN: CPT

## 2021-02-26 PROCEDURE — 25010000002 CEFOXITIN PER 1 G: Performed by: SURGERY

## 2021-02-26 PROCEDURE — 85025 COMPLETE CBC W/AUTO DIFF WBC: CPT | Performed by: SURGERY

## 2021-02-26 PROCEDURE — 80048 BASIC METABOLIC PNL TOTAL CA: CPT | Performed by: SURGERY

## 2021-02-26 PROCEDURE — 99232 SBSQ HOSP IP/OBS MODERATE 35: CPT | Performed by: HOSPITALIST

## 2021-02-26 PROCEDURE — 99024 POSTOP FOLLOW-UP VISIT: CPT | Performed by: SURGERY

## 2021-02-26 RX ORDER — FLUOXETINE 10 MG/1
10 CAPSULE ORAL DAILY
Status: DISCONTINUED | OUTPATIENT
Start: 2021-02-26 | End: 2021-03-02 | Stop reason: HOSPADM

## 2021-02-26 RX ADMIN — ACETAMINOPHEN 1000 MG: 500 TABLET, FILM COATED ORAL at 03:41

## 2021-02-26 RX ADMIN — IPRATROPIUM BROMIDE 0.5 MG: 0.5 SOLUTION RESPIRATORY (INHALATION) at 08:35

## 2021-02-26 RX ADMIN — ACETAMINOPHEN 1000 MG: 500 TABLET, FILM COATED ORAL at 20:13

## 2021-02-26 RX ADMIN — CEFOXITIN SODIUM 2 G: 2 POWDER, FOR SOLUTION INTRAVENOUS at 09:20

## 2021-02-26 RX ADMIN — OXYCODONE 10 MG: 5 TABLET ORAL at 13:03

## 2021-02-26 RX ADMIN — SODIUM CHLORIDE 100 ML/HR: 900 INJECTION INTRAVENOUS at 05:07

## 2021-02-26 RX ADMIN — HYDROCHLOROTHIAZIDE: 25 TABLET ORAL at 20:14

## 2021-02-26 RX ADMIN — Medication 3 ML: at 20:14

## 2021-02-26 RX ADMIN — IPRATROPIUM BROMIDE 0.5 MG: 0.5 SOLUTION RESPIRATORY (INHALATION) at 19:21

## 2021-02-26 RX ADMIN — PANTOPRAZOLE SODIUM 40 MG: 40 TABLET, DELAYED RELEASE ORAL at 05:07

## 2021-02-26 RX ADMIN — OXYCODONE 10 MG: 5 TABLET ORAL at 01:14

## 2021-02-26 RX ADMIN — LEVOTHYROXINE SODIUM 50 MCG: 0.05 TABLET ORAL at 05:07

## 2021-02-26 RX ADMIN — ACETAMINOPHEN 1000 MG: 500 TABLET, FILM COATED ORAL at 12:57

## 2021-02-26 RX ADMIN — FLUOXETINE 10 MG: 10 CAPSULE ORAL at 09:21

## 2021-02-26 RX ADMIN — CEFOXITIN SODIUM 2 G: 2 POWDER, FOR SOLUTION INTRAVENOUS at 01:14

## 2021-02-26 RX ADMIN — OXYCODONE 10 MG: 5 TABLET ORAL at 20:14

## 2021-02-26 RX ADMIN — OXYCODONE 10 MG: 5 TABLET ORAL at 05:07

## 2021-02-26 RX ADMIN — IPRATROPIUM BROMIDE 0.5 MG: 0.5 SOLUTION RESPIRATORY (INHALATION) at 11:16

## 2021-02-26 NOTE — ANESTHESIA POSTPROCEDURE EVALUATION
Patient: Renuka Pelayo    Procedure Summary     Date: 02/25/21 Room / Location: [facility] OR 09 / [facility] MAIN OR    Anesthesia Start: 1145 Anesthesia Stop: 1645    Procedures:       COLOSTOMY TAKEDOWN OR CLOSURE, extenisve lysis of adhesions (N/A Abdomen)      CYSTOSCOPY with bladder tumor removal and fulgeration, insertion of 3-way rizzo catheter (N/A Urethra) Diagnosis:       Status post Mariya procedure (CMS/HCC)      (Status post Mariya procedure (CMS/HCC) [Z93.3])    Surgeon: Chi Farmer MD; Alfonzo Hayward MD Provider: Renny Travis MD    Anesthesia Type: general ASA Status: 3          Anesthesia Type: general    Vitals  Vitals Value Taken Time   /75 02/25/21 1747   Temp 97.1 °F (36.2 °C) 02/25/21 1744   Pulse 64 02/25/21 1750   Resp 11 02/25/21 1744   SpO2 97 % 02/25/21 1750   Vitals shown include unvalidated device data.        Post Anesthesia Care and Evaluation    Patient location during evaluation: PACU  Patient participation: complete - patient participated  Level of consciousness: awake  Pain scale: See nurse's notes for pain score.  Pain management: adequate  Airway patency: patent  Anesthetic complications: No anesthetic complications  PONV Status: none  Cardiovascular status: acceptable  Respiratory status: acceptable  Hydration status: acceptable    Comments: Patient seen and examined postoperatively; vital signs stable; SpO2 greater than or equal to 90%; cardiopulmonary status stable; nausea/vomiting adequately controlled; pain adequately controlled; no apparent anesthesia complications; patient discharged from anesthesia care when discharge criteria were met

## 2021-02-27 LAB
ANION GAP SERPL CALCULATED.3IONS-SCNC: 7 MMOL/L (ref 5–15)
BASOPHILS # BLD AUTO: 0 10*3/MM3 (ref 0–0.2)
BASOPHILS NFR BLD AUTO: 0.2 % (ref 0–1.5)
BUN SERPL-MCNC: 34 MG/DL (ref 8–23)
BUN/CREAT SERPL: 24.5 (ref 7–25)
CALCIUM SPEC-SCNC: 7.9 MG/DL (ref 8.6–10.5)
CHLORIDE SERPL-SCNC: 108 MMOL/L (ref 98–107)
CO2 SERPL-SCNC: 21 MMOL/L (ref 22–29)
CREAT SERPL-MCNC: 1.39 MG/DL (ref 0.57–1)
DEPRECATED RDW RBC AUTO: 43.8 FL (ref 37–54)
EOSINOPHIL # BLD AUTO: 0 10*3/MM3 (ref 0–0.4)
EOSINOPHIL NFR BLD AUTO: 0 % (ref 0.3–6.2)
ERYTHROCYTE [DISTWIDTH] IN BLOOD BY AUTOMATED COUNT: 14.2 % (ref 12.3–15.4)
GFR SERPL CREATININE-BSD FRML MDRD: 38 ML/MIN/1.73
GLUCOSE SERPL-MCNC: 133 MG/DL (ref 65–99)
HCT VFR BLD AUTO: 31 % (ref 34–46.6)
HGB BLD-MCNC: 10.1 G/DL (ref 12–15.9)
LYMPHOCYTES # BLD AUTO: 0.7 10*3/MM3 (ref 0.7–3.1)
LYMPHOCYTES NFR BLD AUTO: 4.9 % (ref 19.6–45.3)
MCH RBC QN AUTO: 28.4 PG (ref 26.6–33)
MCHC RBC AUTO-ENTMCNC: 32.6 G/DL (ref 31.5–35.7)
MCV RBC AUTO: 87.2 FL (ref 79–97)
MONOCYTES # BLD AUTO: 0.8 10*3/MM3 (ref 0.1–0.9)
MONOCYTES NFR BLD AUTO: 5.6 % (ref 5–12)
NEUTROPHILS NFR BLD AUTO: 13.2 10*3/MM3 (ref 1.7–7)
NEUTROPHILS NFR BLD AUTO: 89.3 % (ref 42.7–76)
NRBC BLD AUTO-RTO: 0 /100 WBC (ref 0–0.2)
PLATELET # BLD AUTO: 246 10*3/MM3 (ref 140–450)
PMV BLD AUTO: 8.7 FL (ref 6–12)
POTASSIUM SERPL-SCNC: 4.3 MMOL/L (ref 3.5–5.2)
RBC # BLD AUTO: 3.56 10*6/MM3 (ref 3.77–5.28)
SODIUM SERPL-SCNC: 136 MMOL/L (ref 136–145)
WBC # BLD AUTO: 14.8 10*3/MM3 (ref 3.4–10.8)

## 2021-02-27 PROCEDURE — 99232 SBSQ HOSP IP/OBS MODERATE 35: CPT | Performed by: HOSPITALIST

## 2021-02-27 PROCEDURE — 94799 UNLISTED PULMONARY SVC/PX: CPT

## 2021-02-27 PROCEDURE — 85025 COMPLETE CBC W/AUTO DIFF WBC: CPT | Performed by: SURGERY

## 2021-02-27 PROCEDURE — 99024 POSTOP FOLLOW-UP VISIT: CPT | Performed by: SURGERY

## 2021-02-27 PROCEDURE — 80048 BASIC METABOLIC PNL TOTAL CA: CPT | Performed by: SURGERY

## 2021-02-27 PROCEDURE — 97116 GAIT TRAINING THERAPY: CPT

## 2021-02-27 RX ADMIN — HYDROCHLOROTHIAZIDE: 25 TABLET ORAL at 20:16

## 2021-02-27 RX ADMIN — IPRATROPIUM BROMIDE 0.5 MG: 0.5 SOLUTION RESPIRATORY (INHALATION) at 12:23

## 2021-02-27 RX ADMIN — Medication 3 ML: at 20:17

## 2021-02-27 RX ADMIN — OXYCODONE 10 MG: 5 TABLET ORAL at 07:36

## 2021-02-27 RX ADMIN — IPRATROPIUM BROMIDE 0.5 MG: 0.5 SOLUTION RESPIRATORY (INHALATION) at 19:40

## 2021-02-27 RX ADMIN — OXYCODONE 10 MG: 5 TABLET ORAL at 20:16

## 2021-02-27 RX ADMIN — IPRATROPIUM BROMIDE 0.5 MG: 0.5 SOLUTION RESPIRATORY (INHALATION) at 07:42

## 2021-02-27 RX ADMIN — SODIUM CHLORIDE 50 ML/HR: 900 INJECTION INTRAVENOUS at 12:21

## 2021-02-27 RX ADMIN — FLUOXETINE 10 MG: 10 CAPSULE ORAL at 09:04

## 2021-02-27 RX ADMIN — OXYCODONE 10 MG: 5 TABLET ORAL at 03:08

## 2021-02-27 RX ADMIN — ACETAMINOPHEN 1000 MG: 500 TABLET, FILM COATED ORAL at 20:16

## 2021-02-27 RX ADMIN — ACETAMINOPHEN 1000 MG: 500 TABLET, FILM COATED ORAL at 12:59

## 2021-02-27 RX ADMIN — LEVOTHYROXINE SODIUM 50 MCG: 0.05 TABLET ORAL at 06:03

## 2021-02-27 RX ADMIN — PANTOPRAZOLE SODIUM 40 MG: 40 TABLET, DELAYED RELEASE ORAL at 06:03

## 2021-02-27 RX ADMIN — ACETAMINOPHEN 1000 MG: 500 TABLET, FILM COATED ORAL at 03:08

## 2021-02-27 RX ADMIN — METHYLNALTREXONE BROMIDE 150 MG: 150 TABLET ORAL at 14:58

## 2021-02-28 LAB
BASOPHILS # BLD AUTO: 0 10*3/MM3 (ref 0–0.2)
BASOPHILS NFR BLD AUTO: 0.3 % (ref 0–1.5)
DEPRECATED RDW RBC AUTO: 43.3 FL (ref 37–54)
EOSINOPHIL # BLD AUTO: 0 10*3/MM3 (ref 0–0.4)
EOSINOPHIL NFR BLD AUTO: 0.3 % (ref 0.3–6.2)
ERYTHROCYTE [DISTWIDTH] IN BLOOD BY AUTOMATED COUNT: 14.2 % (ref 12.3–15.4)
HCT VFR BLD AUTO: 29.2 % (ref 34–46.6)
HGB BLD-MCNC: 9.6 G/DL (ref 12–15.9)
LYMPHOCYTES # BLD AUTO: 1.7 10*3/MM3 (ref 0.7–3.1)
LYMPHOCYTES NFR BLD AUTO: 14.2 % (ref 19.6–45.3)
MCH RBC QN AUTO: 28.5 PG (ref 26.6–33)
MCHC RBC AUTO-ENTMCNC: 32.9 G/DL (ref 31.5–35.7)
MCV RBC AUTO: 86.6 FL (ref 79–97)
MONOCYTES # BLD AUTO: 0.7 10*3/MM3 (ref 0.1–0.9)
MONOCYTES NFR BLD AUTO: 6.3 % (ref 5–12)
NEUTROPHILS NFR BLD AUTO: 78.9 % (ref 42.7–76)
NEUTROPHILS NFR BLD AUTO: 9.2 10*3/MM3 (ref 1.7–7)
NRBC BLD AUTO-RTO: 0 /100 WBC (ref 0–0.2)
PLATELET # BLD AUTO: 245 10*3/MM3 (ref 140–450)
PMV BLD AUTO: 7.9 FL (ref 6–12)
RBC # BLD AUTO: 3.37 10*6/MM3 (ref 3.77–5.28)
WBC # BLD AUTO: 11.7 10*3/MM3 (ref 3.4–10.8)

## 2021-02-28 PROCEDURE — 85025 COMPLETE CBC W/AUTO DIFF WBC: CPT | Performed by: SURGERY

## 2021-02-28 PROCEDURE — 99232 SBSQ HOSP IP/OBS MODERATE 35: CPT | Performed by: HOSPITALIST

## 2021-02-28 PROCEDURE — 99024 POSTOP FOLLOW-UP VISIT: CPT | Performed by: SURGERY

## 2021-02-28 PROCEDURE — 94799 UNLISTED PULMONARY SVC/PX: CPT

## 2021-02-28 RX ADMIN — LEVOTHYROXINE SODIUM 50 MCG: 0.05 TABLET ORAL at 06:08

## 2021-02-28 RX ADMIN — ACETAMINOPHEN 1000 MG: 500 TABLET, FILM COATED ORAL at 12:02

## 2021-02-28 RX ADMIN — IPRATROPIUM BROMIDE 0.5 MG: 0.5 SOLUTION RESPIRATORY (INHALATION) at 12:35

## 2021-02-28 RX ADMIN — OXYCODONE 10 MG: 5 TABLET ORAL at 12:02

## 2021-02-28 RX ADMIN — PANTOPRAZOLE SODIUM 40 MG: 40 TABLET, DELAYED RELEASE ORAL at 06:08

## 2021-02-28 RX ADMIN — OXYCODONE 10 MG: 5 TABLET ORAL at 03:19

## 2021-02-28 RX ADMIN — OXYCODONE 10 MG: 5 TABLET ORAL at 20:35

## 2021-02-28 RX ADMIN — METHYLNALTREXONE BROMIDE 150 MG: 150 TABLET ORAL at 09:42

## 2021-02-28 RX ADMIN — FLUOXETINE 10 MG: 10 CAPSULE ORAL at 09:43

## 2021-02-28 RX ADMIN — ALBUTEROL SULFATE 2.5 MG: 2.5 SOLUTION RESPIRATORY (INHALATION) at 15:47

## 2021-02-28 RX ADMIN — ACETAMINOPHEN 1000 MG: 500 TABLET, FILM COATED ORAL at 21:31

## 2021-02-28 RX ADMIN — ACETAMINOPHEN 1000 MG: 500 TABLET, FILM COATED ORAL at 03:19

## 2021-02-28 RX ADMIN — IPRATROPIUM BROMIDE 0.5 MG: 0.5 SOLUTION RESPIRATORY (INHALATION) at 07:20

## 2021-02-28 RX ADMIN — SODIUM CHLORIDE 50 ML/HR: 900 INJECTION INTRAVENOUS at 07:29

## 2021-02-28 RX ADMIN — IPRATROPIUM BROMIDE 0.5 MG: 0.5 SOLUTION RESPIRATORY (INHALATION) at 18:44

## 2021-02-28 RX ADMIN — HYDROCHLOROTHIAZIDE: 25 TABLET ORAL at 21:32

## 2021-03-01 LAB
DEPRECATED RDW RBC AUTO: 43.8 FL (ref 37–54)
ERYTHROCYTE [DISTWIDTH] IN BLOOD BY AUTOMATED COUNT: 14.4 % (ref 12.3–15.4)
HCT VFR BLD AUTO: 31.3 % (ref 34–46.6)
HGB BLD-MCNC: 10.8 G/DL (ref 12–15.9)
INR PPP: 0.93 (ref 2–3)
LAB AP CASE REPORT: NORMAL
LAB AP CASE REPORT: NORMAL
LAB AP SYNOPTIC CHECKLIST: NORMAL
LAB AP SYNOPTIC CHECKLIST: NORMAL
MCH RBC QN AUTO: 29.6 PG (ref 26.6–33)
MCHC RBC AUTO-ENTMCNC: 34.3 G/DL (ref 31.5–35.7)
MCV RBC AUTO: 86.2 FL (ref 79–97)
PATH REPORT.FINAL DX SPEC: NORMAL
PATH REPORT.FINAL DX SPEC: NORMAL
PATH REPORT.GROSS SPEC: NORMAL
PATH REPORT.GROSS SPEC: NORMAL
PLATELET # BLD AUTO: 298 10*3/MM3 (ref 140–450)
PMV BLD AUTO: 7.7 FL (ref 6–12)
PROTHROMBIN TIME: 10.3 SECONDS (ref 19.4–28.5)
RBC # BLD AUTO: 3.63 10*6/MM3 (ref 3.77–5.28)
WBC # BLD AUTO: 10.9 10*3/MM3 (ref 3.4–10.8)

## 2021-03-01 PROCEDURE — 85610 PROTHROMBIN TIME: CPT | Performed by: PHYSICIAN ASSISTANT

## 2021-03-01 PROCEDURE — 94799 UNLISTED PULMONARY SVC/PX: CPT

## 2021-03-01 PROCEDURE — 99024 POSTOP FOLLOW-UP VISIT: CPT | Performed by: SURGERY

## 2021-03-01 PROCEDURE — 25010000002 ENOXAPARIN PER 10 MG: Performed by: SURGERY

## 2021-03-01 PROCEDURE — 99232 SBSQ HOSP IP/OBS MODERATE 35: CPT | Performed by: INTERNAL MEDICINE

## 2021-03-01 PROCEDURE — 85027 COMPLETE CBC AUTOMATED: CPT | Performed by: SURGERY

## 2021-03-01 RX ADMIN — Medication 3 ML: at 20:44

## 2021-03-01 RX ADMIN — METHYLNALTREXONE BROMIDE 150 MG: 150 TABLET ORAL at 08:25

## 2021-03-01 RX ADMIN — IPRATROPIUM BROMIDE 0.5 MG: 0.5 SOLUTION RESPIRATORY (INHALATION) at 11:51

## 2021-03-01 RX ADMIN — ACETAMINOPHEN 1000 MG: 500 TABLET, FILM COATED ORAL at 11:22

## 2021-03-01 RX ADMIN — PANTOPRAZOLE SODIUM 40 MG: 40 TABLET, DELAYED RELEASE ORAL at 05:00

## 2021-03-01 RX ADMIN — IPRATROPIUM BROMIDE 0.5 MG: 0.5 SOLUTION RESPIRATORY (INHALATION) at 08:36

## 2021-03-01 RX ADMIN — FLUOXETINE 10 MG: 10 CAPSULE ORAL at 08:25

## 2021-03-01 RX ADMIN — OXYCODONE 10 MG: 5 TABLET ORAL at 20:44

## 2021-03-01 RX ADMIN — LEVOTHYROXINE SODIUM 50 MCG: 0.05 TABLET ORAL at 05:00

## 2021-03-01 RX ADMIN — ENOXAPARIN SODIUM 80 MG: 80 INJECTION SUBCUTANEOUS at 14:58

## 2021-03-01 RX ADMIN — ACETAMINOPHEN 1000 MG: 500 TABLET, FILM COATED ORAL at 04:59

## 2021-03-01 RX ADMIN — OXYCODONE 10 MG: 5 TABLET ORAL at 15:51

## 2021-03-01 RX ADMIN — HYDROCHLOROTHIAZIDE: 25 TABLET ORAL at 21:30

## 2021-03-01 RX ADMIN — ACETAMINOPHEN 1000 MG: 500 TABLET, FILM COATED ORAL at 20:43

## 2021-03-01 NOTE — PROGRESS NOTES
[facility] Medicine Services  INPATIENT PROGRESS NOTE  4129/1   Hospitalist Team  LOS 4 days      Patient Care Team:  Michael Gerardo MD as PCP - General      Patient was examined with relevant and adequate PPE keeping in mind the current coronavirus pandemic. Minimum of 10 minutes to don and doff PPE.    Chief Complaint / Subjective  Abdomen pain    HPI (4 hpi elements or status of 3 chronic)  Patient is a 65-year-old female who underwent a Mariya procedure for colostomy reversal today, during the surgery Stevenson catheter placement revealed a large amount of hematuria; urology performed bladder tumor removal and fulguration and sent specimen to pathology for evaluation.  Patient currently has a three-way Stevenson with continuous irrigation and is groggy but alert from anesthesia.  Patient reports acute mild dull pressure in abdomen and pelvis, pain was improved by medication given by the nurse postop.  Patient states her blood pressure has been stable on Coreg and valsartan.  Patient states her depressive disorder has been stable on fluoxetine.  Patient states her hypothyroidism is currently stable on levothyroxine    Having bowel movements and eating.        History taken from: patient chart    ROS  Constitution: Negative. Negative for chills and fever.   HENT: Negative.    Eyes: Negative.    Cardiovascular: Negative.  Negative for chest pain, dyspnea on exertion and irregular heartbeat.   Respiratory: Negative.  Negative for cough and shortness of breath.    Endocrine: Negative.    Hematologic/Lymphatic: Negative.    Skin: Negative.    Musculoskeletal: Positive for back pain.   Gastrointestinal: Positive for abdominal pain. Negative for jaundice and melena.   Genitourinary: Positive for hematuria and pelvic pain.   Neurological: Negative.  Negative for light-headedness.   Psychiatric/Behavioral: Negative.    All other systems reviewed and are negative.  Noted        Family History   Problem  Relation Age of Onset   • No Known Problems Mother    • No Known Problems Father        Past Medical History:   Diagnosis Date   • Chronic back pain    • Closed nondisplaced fracture of head of right radius 2017   • Congenital deformity of right hip joint 1956   • COPD (chronic obstructive pulmonary disease) (CMS/HCC)     former smoker   • Deep venous thrombosis (CMS/HCC)     unprovoked   • Depressive disorder    • Disease of thyroid gland    • Diverticulitis of colon    • Essential hypertension    • Gastroesophageal reflux disease    • Insomnia    • Obesity    • Pulmonary embolism (CMS/HCC)     provoked by surgery       Social History     Socioeconomic History   • Marital status:      Spouse name: Not on file   • Number of children: Not on file   • Years of education: Not on file   • Highest education level: Not on file   Tobacco Use   • Smoking status: Former Smoker     Packs/day: 2.00     Years: 40.00     Pack years: 80.00     Types: Cigarettes     Quit date:      Years since quittin.1   • Smokeless tobacco: Never Used   • Tobacco comment: ELECTRONIC   Substance and Sexual Activity   • Alcohol use: Never     Frequency: Never   • Drug use: Never   • Sexual activity: Defer       Prior to Admission medications    Medication Sig Start Date End Date Taking? Authorizing Provider   albuterol (ACCUNEB) 0.63 MG/3ML nebulizer solution Take 1 ampule by nebulization Every 6 (Six) Hours As Needed for Wheezing. Use am of surgery   Yes Grayson Pope MD   albuterol sulfate  (90 Base) MCG/ACT inhaler Inhale 2 puffs Every 6 (Six) Hours As Needed for Wheezing. Use and bring   Yes Grayson Pope MD   chlorhexidine (HIBICLENS) 4 % external liquid Apply  topically to the appropriate area as directed 2 (Two) Times a Day. Shower With Hibiclens Solution Twice The Day Before Surgery 2/3/21  Yes Chi Farmer MD   enoxaparin (LOVENOX) 80 MG/0.8ML solution syringe To start on     Yes Grayson Pope MD   Fluticasone-Umeclidin-Vilant (Trelegy Ellipta) 100-62.5-25 MCG/INH aerosol powder  Inhale 1 puff 2 (two) times a day.   Yes Grayson Pope MD   HYDROcodone-acetaminophen (NORCO)  MG per tablet Take 1 tablet by mouth Every 8 (Eight) Hours As Needed. 9/4/20  Yes Grayson Pope MD   ipratropium (ATROVENT) 0.02 % nebulizer solution Take 500 mcg by nebulization 3 (Three) Times a Day. 7/24/20  Yes Grayson Pope MD   neomycin (MYCIFRADIN) 500 MG tablet Take 1 tablet my mouth at 1 pm, 2 pm, and 10 pm the day before surgery 2/3/21  Yes Chi Farmer MD   traZODone (DESYREL) 100 MG tablet Take 100 mg by mouth At Night As Needed for Sleep.   Yes Grayson Pope MD   valsartan-hydrochlorothiazide (DIOVAN-HCT) 320-25 MG per tablet Take 1 tablet by mouth Every Night. LD 2/23 8/27/20  Yes Grayson Pope MD   warfarin (COUMADIN) 3 MG tablet Take 3 mg by mouth Every Night. 3mg every other  day alternating with 3.5 every other day   LD 2/20 and will go on Lovenox bridge   Yes Grayson Pope MD   amoxicillin-clavulanate (AUGMENTIN) 875-125 MG per tablet amoxicillin 875 mg-potassium clavulanate 125 mg tablet    Grayson Pope MD   carvedilol (COREG) 3.125 MG tablet  9/11/20   Grayson Pope MD   doxycycline (DORYX) 100 MG enteric coated tablet Take 1 tablet by mouth 2 (Two) Times a Day. 11/25/20   Win Johnston MD   erythromycin base (E-MYCIN) 500 MG tablet Take 2 tablets at 1 pm, 2 pm, and 10 pm the day before surgery 2/3/21   Chi Farmer MD   famotidine (PEPCID) 40 MG tablet  8/24/20   Grayson Pope MD   fluconazole (DIFLUCAN) 200 MG tablet fluconazole 200 mg tablet    Grayson Pope MD   FLUoxetine (PROzac) 10 MG capsule Take 10 mg by mouth Daily.    Grayson Pope MD   furosemide (LASIX) 20 MG tablet Take 20 mg by mouth As Needed. 8/26/20   Provider, MD Grayson   hydrALAZINE (APRESOLINE)  "25 MG tablet Take 25 mg by mouth 2 (Two) Times a Day As Needed. For SBP >160    Grayson Pope MD   ketoconazole (NIZORAL) 2 % cream ketoconazole 2 % topical cream    Grayson Pope MD   levothyroxine (SYNTHROID, LEVOTHROID) 50 MCG tablet Take 50 mcg by mouth Daily. Take DOS    Grayson Pope MD   pantoprazole (PROTONIX) 40 MG EC tablet Take 40 mg by mouth Daily.    Grayson Pope MD   predniSONE (DELTASONE) 10 MG (48) dose pack 4 tablets daily for 3 days  3 tablets daily for 3 days  2 tablets daily for 3 days  1 tablet daily for 3 days 11/25/20   Win Johnston MD   sulfamethoxazole-trimethoprim (BACTRIM DS,SEPTRA DS) 800-160 MG per tablet sulfamethoxazole 800 mg-trimethoprim 160 mg tablet    Grayson Pope MD   warfarin (COUMADIN) 1 MG tablet 3 mg. 8/27/20   Grayson Pope MD   warfarin (COUMADIN) 2 MG tablet Take 2 mg by mouth Every Night.    Grayson Pope MD   warfarin (COUMADIN) 5 MG tablet 3 mg. 8/27/20   Grayson Pope MD        Objective    Physical Exam     Vital Signs  Temp:  [97.7 °F (36.5 °C)-98.7 °F (37.1 °C)] 97.7 °F (36.5 °C)  Heart Rate:  [80-98] 80  Resp:  [14-18] 16  BP: (124-155)/(72-91) 155/91    Oxygen Therapy  SpO2: 94 %  Pulse Oximetry Type: Intermittent  Device (Oxygen Therapy): room air  Flow (L/min): 2.5  Flowsheet Rows      First Filed Value   Admission Height  160 cm (63\") Documented at 02/12/2021 0954   Admission Weight  77.1 kg (170 lb) Documented at 02/12/2021 0954        Weight change:    Intake & Output (last 3 days)       02/26 0701 - 02/27 0700 02/27 0701 - 02/28 0700 02/28 0701 - 03/01 0700 03/01 0701 - 03/02 0700    P.O. 840 360 420 240    I.V. (mL/kg) 900 (11.3)       Other        Total Intake(mL/kg) 1740 (21.9) 360 (4.5) 420 (5.3) 240 (3)    Urine (mL/kg/hr) 800 (0.4) 2300 (1.2)      Stool  0      Blood        Total Output 800 2300      Net +940 -1940 +420 +240            Urine Unmeasured Occurrence 2 x  1 x     Stool " Unmeasured Occurrence  1 x 2 x         Lines, Drains & Airways    Active LDAs     None                Physical Exam:    Physical Exam  Vitals signs and nursing note reviewed.   Constitutional:       General: She is not in acute distress.     Appearance: Normal appearance. She is well-developed. She is not ill-appearing, toxic-appearing or diaphoretic.   HENT:      Head: Normocephalic and atraumatic.      Right Ear: Ear canal and external ear normal.      Left Ear: Ear canal and external ear normal.      Nose: Nose normal. No congestion or rhinorrhea.      Mouth/Throat:      Mouth: Mucous membranes are moist.      Pharynx: No oropharyngeal exudate.   Eyes:      General: No scleral icterus.        Right eye: No discharge.         Left eye: No discharge.      Extraocular Movements: Extraocular movements intact.      Conjunctiva/sclera: Conjunctivae normal.      Pupils: Pupils are equal, round, and reactive to light.   Neck:      Musculoskeletal: Normal range of motion and neck supple. No neck rigidity or muscular tenderness.      Thyroid: No thyromegaly.      Vascular: No carotid bruit or JVD.      Trachea: No tracheal deviation.   Cardiovascular:      Rate and Rhythm: Normal rate and regular rhythm.      Pulses: Normal pulses.      Heart sounds: Normal heart sounds. No murmur. No friction rub. No gallop.    Pulmonary:      Effort: Pulmonary effort is normal. No respiratory distress.      Breath sounds: No stridor. Rhonchi (Left upper lobe) present. No wheezing or rales.   Chest:      Chest wall: No tenderness.   Abdominal:      General: Bowel sounds are normal. There is no distension.      Palpations: Abdomen is soft. There is no mass.      Tenderness: There is no abdominal tenderness. There is no guarding or rebound.      Hernia: No hernia is present.      Comments: No drains present   Musculoskeletal: Normal range of motion.         General: No swelling, tenderness, deformity or signs of injury.      Right lower  leg: No edema.      Left lower leg: No edema.   Lymphadenopathy:      Cervical: No cervical adenopathy.   Skin:     General: Skin is warm and dry.      Coloration: Skin is not jaundiced or pale.      Findings: No bruising, erythema or rash.   Neurological:      General: No focal deficit present.      Mental Status: She is alert and oriented to person, place, and time. Mental status is at baseline.      Cranial Nerves: No cranial nerve deficit.      Sensory: No sensory deficit.      Motor: No weakness or abnormal muscle tone.      Coordination: Coordination normal.   Psychiatric:         Mood and Affect: Mood normal.         Behavior: Behavior normal.         Thought Content: Thought content normal.         Judgment: Judgment normal.              Wounds (last 24 hours)      LDA Wound     Row Name 03/01/21 1122 03/01/21 0800 03/01/21 0246       Wound 02/25/21 1630 midline abdomen    Wound - Properties Group Placement Date: 02/25/21  - Placement Time: 1630  -AH Orientation: midline  - Location: abdomen  -AH Additional Comments: [place], [place]derm  -AH    Dressing Appearance  dry;intact;dried drainage  -HN  dry;intact;dried drainage  -HN  dry;intact;no drainage  -AR    Closure  TERI  -HN  TERI  -HN  TERI  -AR    Base  dressing in place, unable to visualize  -HN  dressing in place, unable to visualize  -HN  dressing in place, unable to visualize  -AR    Drainage Amount  small  -HN  small  -HN  none  -AR    Dressing Care  border dressing  -HN  border dressing  -HN  --    Retired Wound - Properties Group Date first assessed: 02/25/21  - Time first assessed: 1630  -AH Location: abdomen  -AH Additional Comments: [place], [place]derm  -AH       Wound 02/25/21 1630 Left upper abdomen    Wound - Properties Group Placement Date: 02/25/21  - Placement Time: 1630  -AH Side: Left  -AH Orientation: upper  -AH Location: abdomen  -AH Additional Comments: Betadine Soaked Kerlix, Coverderm. Colostomy Removal Site  -AH    Dressing  Appearance  dry;intact  -HN  dry;intact;no drainage  -HN  dry;intact;no drainage  -AR    Closure  TERI  -HN  TERI  -HN  TERI  -AR    Base  dressing in place, unable to visualize  -HN  dressing in place, unable to visualize  -HN  dressing in place, unable to visualize  -AR    Drainage Amount  none  -HN  none  -HN  none  -AR    Dressing Care  border dressing  -HN  border dressing  -HN  --    Retired Wound - Properties Group Date first assessed: 02/25/21  -AH Time first assessed: 1630  -AH Side: Left  -AH Location: abdomen  -AH Additional Comments: Betadine Soaked Kerlix, Coverderm. Colostomy Removal Site  -AH       Wound 02/26/21 0058 Bilateral anterior pubis MASD (Moisture associated skin damage)    Wound - Properties Group Placement Date: 02/26/21  -AB Placement Time: 0058  -AB Present on Hospital Admission: Y  -AB Side: Bilateral  -AB Orientation: anterior  -AB Location: pubis  -AB Primary Wound Type: MASD  -AB Stage, Pressure Injury : Stage 1  -AB    Dressing Appearance  open to air  -HN  open to air  -HN  open to air  -AR    Closure  Open to air  -HN  Open to air  -HN  Open to air  -AR    Base  pink;dry  -HN  pink;dry  -HN  pink;dry  -AR    Care, Wound  --  barrier applied  -HN  --    Retired Wound - Properties Group Date first assessed: 02/26/21  -AB Time first assessed: 0058  -AB Present on Hospital Admission: Y  -AB Side: Bilateral  -AB Location: pubis  -AB Primary Wound Type: MASD  -AB       Wound 02/26/21 0104 Right posterior gluteal Other (comment)    Wound - Properties Group Placement Date: 02/26/21  -AB Placement Time: 0104  -AB Present on Hospital Admission: Y  -AB Side: Right  -AB Orientation: posterior  -AB Location: gluteal  -AB Primary Wound Type: Other  -AB, Bruise      Dressing Appearance  dry;intact  -HN  dry;intact  -HN  dry;intact  -AR    Closure  TERI  -HN  TERI  -HN  TERI  -AR    Base  dressing in place, unable to visualize  -HN  dressing in place, unable to visualize  -HN  dressing in place, unable  to visualize  -AR    Dressing Care  silicone  -HN  silicone  -HN  --    Retired Wound - Properties Group Date first assessed: 02/26/21  -AB Time first assessed: 0104  -AB Present on Hospital Admission: Y  -AB Side: Right  -AB Location: gluteal  -AB Primary Wound Type: Other  -AB, Bruise         Wound 02/26/21 0315 posterior anus MASD (Moisture associated skin damage)    Wound - Properties Group Placement Date: 02/26/21  -AB Placement Time: 0315  -AB Present on Hospital Admission: Y  -AB Orientation: posterior  -AB Location: anus  -AB Primary Wound Type: MASD  -AB    Dressing Appearance  open to air  -HN  open to air  -HN  open to air  -AR    Closure  Open to air  -HN  Open to air  -HN  Open to air  -AR    Base  pink;dry  -HN  pink;dry  -HN  pink;dry  -AR    Care, Wound  --  barrier applied  -HN  --    Retired Wound - Properties Group Date first assessed: 02/26/21  -AB Time first assessed: 0315  -AB Present on Hospital Admission: Y  -AB Location: anus  -AB Primary Wound Type: MASD  -AB    Row Name 02/28/21 2315 02/28/21 2040 02/28/21 1432       Wound 02/25/21 1630 midline abdomen    Wound - Properties Group Placement Date: 02/25/21  -AH Placement Time: 1630 -AH Orientation: midline  -AH Location: abdomen  -AH Additional Comments: [place], [place]  -AH    Dressing Appearance  dry;intact;no drainage  -AR  dry;intact;no drainage  -AR  dry;intact  -XC    Closure  TERI  -AR  TERI  -AR  TERI  -XC    Base  dressing in place, unable to visualize  -AR  dressing in place, unable to visualize  -AR  dressing in place, unable to visualize  -XC    Drainage Amount  none  -AR  none  -AR  --    Retired Wound - Properties Group Date first assessed: 02/25/21  -AH Time first assessed: 1630  -AH Location: abdomen  -AH Additional Comments: [place], [place]  -AH       Wound 02/25/21 1630 Left upper abdomen    Wound - Properties Group Placement Date: 02/25/21  -AH Placement Time: 1630  -AH Side: Left  -AH Orientation: upper  -AH Location:  abdomen  -AH Additional Comments: Betadine Soaked Kerlix, [place]. Colostomy Removal Site  -AH    Dressing Appearance  dry;intact;no drainage  -AR  dry;intact;no drainage  -AR  dry;intact  -XC    Closure  TERI  -AR  TERI  -AR  TERI  -XC    Base  dressing in place, unable to visualize  -AR  dressing in place, unable to visualize  -AR  dressing in place, unable to visualize  -XC    Drainage Amount  none  -AR  none  -AR  --    Retired Wound - Properties Group Date first assessed: 02/25/21  -AH Time first assessed: 1630  -AH Side: Left  -AH Location: abdomen  -AH Additional Comments: Betadine Soaked Kerlix, [place]. Colostomy Removal Site  -AH       Wound 02/26/21 0058 Bilateral anterior pubis MASD (Moisture associated skin damage)    Wound - Properties Group Placement Date: 02/26/21  -AB Placement Time: 0058 -AB Present on Hospital Admission: Y  -AB Side: Bilateral  -AB Orientation: anterior  -AB Location: pubis  -AB Primary Wound Type: MASD  -AB Stage, Pressure Injury : Stage 1  -AB    Dressing Appearance  open to air  -AR  open to air  -AR  open to air  -XC    Closure  Open to air  -AR  Open to air  -AR  Open to air  -XC    Base  pink;moist  -AR  pink;moist  -AR  pink;moist  -XC    Care, Wound  --  barrier applied  -AR  --    Retired Wound - Properties Group Date first assessed: 02/26/21  -AB Time first assessed: 0058  -AB Present on Hospital Admission: Y  -AB Side: Bilateral  -AB Location: pubis  -AB Primary Wound Type: MASD  -AB       Wound 02/26/21 0104 Right posterior gluteal Other (comment)    Wound - Properties Group Placement Date: 02/26/21  -AB Placement Time: 0104 -AB Present on Hospital Admission: Y  -AB Side: Right  -AB Orientation: posterior  -AB Location: gluteal  -AB Primary Wound Type: Other  -AB, Bruise      Dressing Appearance  dry;intact  -AR  dry;intact  -AR  dry;intact  -XC    Closure  TERI  -AR  TERI  -AR  TERI  -XC    Base  dressing in place, unable to visualize  -AR  dressing in place, unable  to visualize  -AR  dressing in place, unable to visualize  -XC    Care, Wound  --  barrier applied  -AR  --    Dressing Care  --  silicone  -AR  --    Retired Wound - Properties Group Date first assessed: 02/26/21 -AB Time first assessed: 0104 -AB Present on Hospital Admission: Y  -AB Side: Right  -AB Location: gluteal  -AB Primary Wound Type: Other  -AB, Bruise         Wound 02/26/21 0315 posterior anus MASD (Moisture associated skin damage)    Wound - Properties Group Placement Date: 02/26/21 -AB Placement Time: 0315 -AB Present on Hospital Admission: Y  -AB Orientation: posterior  -AB Location: anus  -AB Primary Wound Type: MASD  -AB    Dressing Appearance  open to air  -AR  open to air  -AR  open to air  -XC    Closure  Open to air  -AR  Open to air  -AR  Open to air  -XC    Base  moist;pink  -AR  moist;pink  -AR  moist;pink  -XC    Care, Wound  --  barrier applied  -AR  --    Retired Wound - Properties Group Date first assessed: 02/26/21 -AB Time first assessed: 0315 -AB Present on Hospital Admission: Y  -AB Location: anus  -AB Primary Wound Type: MASD  -AB      User Key  (r) = Recorded By, (t) = Taken By, (c) = Cosigned By    Initials Name Provider Type    Alexandra Tavares RN Registered Nurse    Samantha Gonzales LPN Licensed Nurse    Judy Restrepo RN Registered Nurse    Christine Esteves RN Registered Nurse    Jennifer Villalpando RN Registered Nurse          PT Recommendation and Plan             Procedures:    Procedure(s):  COLOSTOMY TAKEDOWN OR CLOSURE, extenisve lysis of adhesions (N/A)  CYSTOSCOPY with bladder tumor removal and fulgeration, insertion of 3-way rizzo catheter (N/A)     Assessment/Plan with Problem wise       Active Hospital Problems    Diagnosis  POA   • **Status post Mariya procedure (CMS/[facility]) [Z93.3]  Not Applicable   • S/P bladder tumor excision with fulguration [Z98.890]  Not Applicable   • Hypothyroid [E03.9]  Yes   • Attention to colostomy (CMS/[facility]) [Z43.3]   Not Applicable   • COPD (chronic obstructive pulmonary disease) (CMS/[facility]) [J44.9]  Yes   • Essential hypertension [I10]  Yes   • Obesity [E66.9]  Yes   • Chronic anticoagulation [Z79.01]  Not Applicable      Resolved Hospital Problems   No resolved problems to display.        Estimated Creatinine Clearance: 40.8 mL/min (A) (by C-G formula based on SCr of 1.39 mg/dL (H)).    Code Status and Medical Interventions:   Ordered at: 02/25/21 1821     Code Status:    CPR     Medical Interventions (Level of Support Prior to Arrest):    Full       MEDICAL DECISION MAKING COMPLEXITY BY PROBLEM:       Hypertension:  Continue home medications   Options include-  beta-blockers  Calcium channel blockers  ACE inhibitor  Vasodilators  Low-dose diuretics  PRN medications have not been shown to affect outcomes-to be avoided        COPD:  Bronchodilators-beta agonist, muscarinic agents  Steroids, inhaled +/- oral  Antibiotics demonstrated to help if CRP increased, otherwise avoid antibiotics for stewardship.  Mucolytic's if needed  Counseled to quit smoking  Oxygen support, not to exceed oxygen saturation of 92%, higher oxygen saturation has worse outcomes      Status post Mariya and TURBT    Medically stable  Discussed with patient.  Patient stable for discharge anytime surgery feels like.  03/01/21 2:07 PM EST.    Plan for disposition:  anticipate pt will need 30 days or less of rehab            Continued Care and Services - Admitted Since 2/25/2021     Home Medical Care Coordination complete    Service Provider Request Status Selected Services Address Phone Fax Patient Preferred    [facility]   Selected Home Health Services [address] [place] [address] [phone] -- --               Historical & Objective Data     Results Review:    I reviewed the patient's new lab and radiology results. 03/01/21     Results from last 7 days   Lab Units 03/01/21  1242 02/28/21  0213 02/27/21  0329   WBC 10*3/mm3 10.90*  "11.70* 14.80*   HEMOGLOBIN g/dL 10.8* 9.6* 10.1*   HEMATOCRIT % 31.3* 29.2* 31.0*   MCV fL 86.2 86.6 87.2   MCH pg 29.6 28.5 28.4   MPV fL 7.7 7.9 8.7   RDW % 14.4 14.2 14.2   PLATELETS 10*3/mm3 298 245 246     Results from last 7 days   Lab Units 02/27/21  0154 02/26/21  0342   SODIUM mmol/L 136 133*   POTASSIUM mmol/L 4.3 4.4   CHLORIDE mmol/L 108* 103   CO2 mmol/L 21.0* 18.0*   BUN mg/dL 34* 34*   CREATININE mg/dL 1.39* 1.67*   CALCIUM mg/dL 7.9* 8.2*   GLUCOSE mg/dL 133* 159*     Inflammatory Biomarkers        Invalid input(s): ESR, D-DIMER QUANTITATIVE,  PROCALCITONIN,    No results found for: PHOS  No results found for: HGBA1C  No results found for: CHOL, CHLPL, TRIG, HDL, LDL, LDLDIRECT  Lab Results   Component Value Date    LIPASE 20 07/16/2020     Results from last 7 days   Lab Units 03/01/21  0633   INR  0.93*           Pathology  Lab Results   Lab Value Date/Time    FINALDX  02/25/2021 1538     Specimen #1 (Bladder tumor, transurethral resection):    Noninvasive low-grade papillary urothelial carcinoma    No muscularis propria identified    See synoptic template for additional details    Specimen #2 (\"Sigmoid anastomotic rings,\" excision):    Colonic tissue with no significant pathologic changes     BUNNY/tkd       FINALDX  02/25/2021 1324     Specimen #1 (Bladder tumor, transurethral resection):    Noninvasive low-grade papillary urothelial carcinoma    No muscularis propria identified    See synoptic template for additional details    Specimen #2 (\"Sigmoid anastomotic rings,\" excision):    Colonic tissue with no significant pathologic changes     BUNNY/tkd        COVID19   Date Value Ref Range Status   02/23/2021 Not Detected Not Detected - Ref. Range Final        Microbiology Results (last 10 days)     Procedure Component Value - Date/Time    Urine Culture - Urine, Urine, Catheter [[id number]]  (Normal) Collected: 02/25/21 1233    Lab Status: Final result Specimen: Urine, Catheter Updated: 02/26/21 1450     " Urine Culture No growth    COVID PRE-OP / PRE-PROCEDURE SCREENING ORDER (NO ISOLATION) - Swab, Nasopharynx [[id number]]  (Normal) Collected: 02/23/21 0856    Lab Status: Final result Specimen: Swab from Nasopharynx Updated: 02/23/21 2025    Narrative:      The following orders were created for panel order COVID PRE-OP / PRE-PROCEDURE SCREENING ORDER (NO ISOLATION) - Swab, Nasopharynx.  Procedure                               Abnormality         Status                     ---------                               -----------         ------                     COVID-19,APTIMA PANTHER,...[[id number]]  Normal              Final result                 Please view results for these tests on the individual orders.    COVID-19,APTIMA PANTHER,ONOFRE IN-HOUSE, NP/OP SWAB IN UTM/VTM/SALINE TRANSPORT MEDIA,24 HR TAT - Swab, Nasopharynx [[id number]]  (Normal) Collected: 02/23/21 0856    Lab Status: Final result Specimen: Swab from Nasopharynx Updated: 02/23/21 2025     COVID19 Not Detected    Narrative:      Fact sheet for providers: https://www.fda.gov/media/148176/download     Fact sheet for patients: https://www.fda.gov/media/524310/download    Test performed by RT PCR.          ECG/EMG Results (most recent)     None          Results for orders placed during the hospital encounter of 09/05/19   Duplex Venous Lower Extremity - Bilateral    Narrative · Chronic right lower extremity deep vein thrombosis noted in the proximal   femoral, mid femoral, distal femoral and popliteal.  · Acute right lower extremity deep vein thrombosis noted in the peroneal.  · All other veins appeared normal bilaterally.               No radiology results for the last 7 days    I personally reviewed patient's x-ray films and my findings are: 0    I personally reviewed patient's EKG strip and my findings are: 0    Medication Review:   I have reviewed the patient's current medication list 03/01/21     Scheduled Meds  acetaminophen, 1,000 mg, Oral,  Q8H  enoxaparin, 80 mg, Subcutaneous, Q12H  FLUoxetine, 10 mg, Oral, Daily  ipratropium, 500 mcg, Nebulization, TID - RT  levothyroxine, 50 mcg, Oral, Q AM  Methylnaltrexone Bromide, 150 mg, Oral, Daily  pantoprazole, 40 mg, Oral, Q AM  sodium chloride, 3 mL, Intravenous, Q12H  valsartan-HCTZ 320-25 combo dose, , Oral, Nightly        Meds Infusions       DVT prophylaxis  Mechanical Order History:      Ordered        02/25/21 1821  Place Sequential Compression Device  Once         02/25/21 1821  Place Sequential Compression Device  Once         02/25/21 1821  Maintain Sequential Compression Device  Continuous         02/25/21 0934  Place Sequential Compression Device on Patient in Pre-Op  Once,   Status:  Canceled                 Pharmalogical Order History:      Ordered     Dose Route Frequency Stop    03/01/21 1356  enoxaparin (LOVENOX) syringe 80 mg      80 mg SC Every 12 Hours Scheduled --                Meds PRN  albuterol  •  HYDROmorphone  •  ondansetron **OR** ondansetron  •  oxyCODONE **OR** oxyCODONE  •  sodium chloride  •  traZODone      Wei Arredondo MD  03/01/21  14:07 EST

## 2021-03-01 NOTE — PLAN OF CARE
Goal Outcome Evaluation:    Pt reported she has been up ad bala in the room several time this morning and was too tired to get up with PT at this time.  Pt plans for d/c home possibly tomorrow with her

## 2021-03-01 NOTE — PLAN OF CARE
Goal Outcome Evaluation:  Plan of Care Reviewed With: patient  Progress: improving  Outcome Summary: Patient has ambulated with walker and standby assist. Patient has had some bowel incontinence. She has tolerated a low residue diet. Pain continues to be controlled with oral pain medication. VSS. Will continue to monitor.

## 2021-03-01 NOTE — PROGRESS NOTES
Continued Stay Note   Felipe     Patient Name: Renuka Pelayo  MRN: [id number]  Today's Date: 3/1/2021    Admit Date: 2/25/2021    Discharge Plan     Row Name 03/01/21 1313       Plan    Plan  D/C Plan: Home with family and [facility] (accepted, order placed). Watch for anticoag needs at d/c.    Plan Comments  Barrier to D/C: monitoring hgb, pending start of Lovenox once hgb stable.          Expected Discharge Date and Time     Expected Discharge Date Expected Discharge Time    Mar 2, 2021             Marily Lobato

## 2021-03-01 NOTE — PROGRESS NOTES
GENERAL SURGERY PROGRESS NOTE    3/1/2021   LOS: 4 days   Patient Care Team:  Michael Gerardo MD as PCP - General    COLOSTOMY TAKEDOWN OR CLOSURE  2/25/2021  Chief Complaint: Postop    Subjective   HPI: Patient is a 65-year-old lady with a history of perforated diverticulitis and subsequent Alejo's procedure.  She presented to the hospital on 2/25/2021 for takedown of her colostomy.  She underwent ex lap with extensive lysis of adhesions, colorectal anastomosis, cystoscopy with bladder tumor removal and fulguration with insertion of three-way Stevenson catheter.    Interval History:   On low residue diet.  Tolerating without nausea or vomiting.  Hemoglobin stable.    Objective     Vital Signs  Temp:  [97.7 °F (36.5 °C)-98.7 °F (37.1 °C)] 97.7 °F (36.5 °C)  Heart Rate:  [80-98] 80  Resp:  [14-18] 16  BP: (124-155)/(72-91) 155/91    Physical Exam  Vitals signs reviewed.   Constitutional:       General: She is not in acute distress.  Cardiovascular:      Rate and Rhythm: Normal rate and regular rhythm.   Pulmonary:      Effort: Pulmonary effort is normal. No respiratory distress.      Breath sounds: Stridor present.      Comments: On room air  Abdominal:      Comments: Abdomen is soft, appropriately tender, incision is well-approximated with skin staples minimal amount of serosanguineous drainage coming from the midline incision.  There is still some serous drainage coming from the ostomy site which was packed with clean dry gauze today.   Skin:     General: Skin is warm and dry.   Neurological:      General: No focal deficit present.      Mental Status: She is alert and oriented to person, place, and time.          Results Review:    Lab Results (last 24 hours)     Procedure Component Value Units Date/Time    Tissue Pathology Exam [[id number]] Collected: 02/25/21 1324    Specimen: Tissue from Urinary Bladder; Large Intestine, Sigmoid Colon Updated: 03/01/21 1300     Case Report --     Surgical Pathology Report       "                   Case: [id number]                                  Authorizing Provider:  Alfonzo Hayward MD      Collected:           02/25/2021 01:24 PM          Ordering Location:     [facility] MAIN  Received:            02/26/2021 09:24 AM                                 OR                                                                           Pathologist:           Cristofer Paige MD                                                            Specimens:   1) - Urinary Bladder, bladder tumor                                                                 2) - Large Intestine, Sigmoid Colon, ANASTOMIC RINGS                                        Final Diagnosis --     Specimen #1 (Bladder tumor, transurethral resection):    Noninvasive low-grade papillary urothelial carcinoma    No muscularis propria identified    See synoptic template for additional details    Specimen #2 (\"Sigmoid anastomotic rings,\" excision):    Colonic tissue with no significant pathologic changes     BUNNY/tkd        Synoptic Checklist --     URINARY BLADDER: Biopsy and Transurethral Resection of Bladder Tumor (TURBT)  (URINARY BLADDER: BIOPSY AND TURBT - 1)    8th Edition - Protocol posted: 2/26/2020    SPECIMEN     Procedure:    Transurethral resection of bladder (TURBT)     TUMOR     Tumor Site:    Not specified      Histologic Type:    Papillary urothelial carcinoma, noninvasive      Histologic Grade:    Low-grade      Tumor Extension:    Noninvasive papillary carcinoma      Muscularis Propria Presence:    No muscularis propria (detrusor muscle) identified      Lymphovascular Invasion:    Not identified     ADDITIONAL FINDINGS     Associated Epithelial Lesions:    None identified        Gross Description --     Part 1: Received designated \"Bladder tumor\" are multiple fragments of somewhat friable tan tissue measuring 3 x 2.5 x 0.2 cm in aggregate, submitted in one cassette.     Part 2: Received designated \"Sigmoid " "anastomotic rings\" are two fragments of somewhat glistening tan tissue measuring 2.8 x 1.5 x 1 cm in aggregate. No abnormalities are noted. A representative section of each fragment is submitted in one cassette.    [facility]       Tissue Pathology Exam [[id number]] Collected: 02/25/21 1538    Specimen: Tissue from Large Intestine, Sigmoid Colon; Urinary Bladder Updated: 03/01/21 1300     Case Report --     Surgical Pathology Report                         Case: [id number]                                  Authorizing Provider:  Alfonzo Hayward MD      Collected:           02/25/2021 01:24 PM          Ordering Location:     [facility] MAIN  Received:            02/26/2021 09:24 AM                                 OR                                                                           Pathologist:           Cristofer Paige MD                                                            Specimens:   1) - Urinary Bladder, bladder tumor                                                                 2) - Large Intestine, Sigmoid Colon, ANASTOMIC RINGS                                        Final Diagnosis --     Specimen #1 (Bladder tumor, transurethral resection):    Noninvasive low-grade papillary urothelial carcinoma    No muscularis propria identified    See synoptic template for additional details    Specimen #2 (\"Sigmoid anastomotic rings,\" excision):    Colonic tissue with no significant pathologic changes     BUNNY/[facility]        Synoptic Checklist --     URINARY BLADDER: Biopsy and Transurethral Resection of Bladder Tumor (TURBT)  (URINARY BLADDER: BIOPSY AND TURBT - 1)    8th Edition - Protocol posted: 2/26/2020    SPECIMEN     Procedure:    Transurethral resection of bladder (TURBT)     TUMOR     Tumor Site:    Not specified      Histologic Type:    Papillary urothelial carcinoma, noninvasive      Histologic Grade:    Low-grade      Tumor Extension:    Noninvasive papillary carcinoma      Muscularis " "Propria Presence:    No muscularis propria (detrusor muscle) identified      Lymphovascular Invasion:    Not identified     ADDITIONAL FINDINGS     Associated Epithelial Lesions:    None identified        Gross Description --     Part 1: Received designated \"Bladder tumor\" are multiple fragments of somewhat friable tan tissue measuring 3 x 2.5 x 0.2 cm in aggregate, submitted in one cassette.     Part 2: Received designated \"Sigmoid anastomotic rings\" are two fragments of somewhat glistening tan tissue measuring 2.8 x 1.5 x 1 cm in aggregate. No abnormalities are noted. A representative section of each fragment is submitted in one cassette.    BUNYN/tkd       CBC (No Diff) [[id number]]  (Abnormal) Collected: 03/01/21 1242    Specimen: Blood Updated: 03/01/21 1252     WBC 10.90 10*3/mm3      RBC 3.63 10*6/mm3      Hemoglobin 10.8 g/dL      Hematocrit 31.3 %      MCV 86.2 fL      MCH 29.6 pg      MCHC 34.3 g/dL      RDW 14.4 %      RDW-SD 43.8 fl      MPV 7.7 fL      Platelets 298 10*3/mm3     Protime-INR [[id number]]  (Abnormal) Collected: 03/01/21 0633    Specimen: Blood Updated: 03/01/21 0706     Protime 10.3 Seconds      INR 0.93        Imaging Results (Last 24 Hours)     ** No results found for the last 24 hours. **           I have reviewed the above results and noted them below    Medication Review:    Current Facility-Administered Medications:   •  acetaminophen (TYLENOL) tablet 1,000 mg, 1,000 mg, Oral, Q8H, Chi Farmer MD, 1,000 mg at 03/01/21 1122  •  albuterol (PROVENTIL) nebulizer solution 0.083% 2.5 mg/3mL, 2.5 mg, Nebulization, Q6H PRN, Chi Farmer MD, 2.5 mg at 02/28/21 1547  •  enoxaparin (LOVENOX) syringe 80 mg, 80 mg, Subcutaneous, Q12H, Chi Farmer MD, 80 mg at 03/01/21 1458  •  FLUoxetine (PROzac) capsule 10 mg, 10 mg, Oral, Daily, Katherine Ferrari APRN, 10 mg at 03/01/21 0825  •  HYDROmorphone (DILAUDID) injection 0.5 mg, 0.5 mg, Intravenous, Q2H PRN, Marleny, " Chi Ernandez MD  •  ipratropium (ATROVENT) nebulizer solution 0.5 mg, 500 mcg, Nebulization, TID - RT, Chi Farmer MD, 0.5 mg at 03/01/21 1151  •  levothyroxine (SYNTHROID, LEVOTHROID) tablet 50 mcg, 50 mcg, Oral, Q AM, Chi Farmer MD, 50 mcg at 03/01/21 0500  •  Methylnaltrexone Bromide tablet 150 mg, 150 mg, Oral, Daily, Chi Farmer MD, 150 mg at 03/01/21 0825  •  ondansetron (ZOFRAN) tablet 4 mg, 4 mg, Oral, Q6H PRN **OR** ondansetron (ZOFRAN) injection 4 mg, 4 mg, Intravenous, Q6H PRN, Chi Farmer MD  •  oxyCODONE (ROXICODONE) immediate release tablet 5 mg, 5 mg, Oral, Q4H PRN **OR** oxyCODONE (ROXICODONE) immediate release tablet 10 mg, 10 mg, Oral, Q4H PRN, Chi Farmer MD, 10 mg at 03/01/21 1551  •  pantoprazole (PROTONIX) EC tablet 40 mg, 40 mg, Oral, Q AM, Chi Farmer MD, 40 mg at 03/01/21 0500  •  sodium chloride 0.9 % flush 10 mL, 10 mL, Intravenous, PRN, Banez, Lesvia Q, CRNA  •  sodium chloride 0.9 % flush 3 mL, 3 mL, Intravenous, Q12H, Banez, Lesvia Q, CRNA, 3 mL at 02/27/21 2017  •  traZODone (DESYREL) tablet 100 mg, 100 mg, Oral, Nightly PRN, Chi Farmer MD  •  valsartan (DIOVAN) 320 mg, hydroCHLOROthiazide (HYDRODIURIL) 25 mg, , Oral, Nightly, Chi Farmer MD, Given at 02/28/21 2132    Assessment/Plan     Principal Problem:    Status post Mariya procedure (CMS/[facility])  Active Problems:    COPD (chronic obstructive pulmonary disease) (CMS/[facility])    Essential hypertension    Chronic anticoagulation    Obesity    Attention to colostomy (CMS/HCC)    S/P bladder tumor excision with fulguration    Hypothyroid    Continue low residue diet  Begin Lovenox  No need for antibiotics going forward.  Daily packing of the ostomy site with dry gauze  Up out of bed as much as possible and ambulate as able    Plan for disposition: If hemoglobin remains stable on Lovenox, discharge tomorrow    Chi Ernandez  MD Marleny  03/01/21  17:40 EST

## 2021-03-01 NOTE — PLAN OF CARE
Goal Outcome Evaluation:         Pt is doing well. Tolerating diet and doing well with pain control. Pt has had several bowel movements and should discharge soon.

## 2021-03-01 NOTE — PROGRESS NOTES
Urology Progress Note    Patient Identification:  Name:  Renuka Pelayo  Age:  65 y.o.  Sex:  female  :  1955  MRN:  [id number]    Subjective:  Feeling better and wanting to go home    Problem List:  Patient Active Problem List   Diagnosis   • Severe sepsis (CMS/HCC)   • COPD (chronic obstructive pulmonary disease) (CMS/HCC)   • Chronic back pain   • Depressive disorder   • Gastroesophageal reflux disease   • Essential hypertension   • Insomnia   • Chronic anticoagulation   • Obesity   • Status post Mariya procedure (CMS/HCC)   • Attention to colostomy (CMS/HCC)   • S/P bladder tumor excision with fulguration   • Hypothyroid     Past Medical History:  Past Medical History:   Diagnosis Date   • Chronic back pain    • Closed nondisplaced fracture of head of right radius 2017   • Congenital deformity of right hip joint 1956   • COPD (chronic obstructive pulmonary disease) (CMS/HCC)     former smoker   • Deep venous thrombosis (CMS/HCC)     unprovoked   • Depressive disorder    • Disease of thyroid gland    • Diverticulitis of colon    • Essential hypertension    • Gastroesophageal reflux disease    • Insomnia    • Obesity    • Pulmonary embolism (CMS/HCC)     provoked by surgery     Past Surgical History:  Past Surgical History:   Procedure Laterality Date   • ABDOMINAL SURGERY     • BRONCHOSCOPY N/A 2020    Procedure: BRONCHOSCOPY with bronchial washing;  Surgeon: Win Johnston MD;  Location: [facility] ENDOSCOPY;  Service: Pulmonary;  Laterality: N/A;  Post: mucous plugs   • CHOLECYSTECTOMY     • COLON SURGERY     • COLONOSCOPY     • COLOSTOMY CLOSURE N/A 2021    Procedure: COLOSTOMY TAKEDOWN OR CLOSURE, extenisve lysis of adhesions;  Surgeon: Chi Farmer MD;  Location: [facility] MAIN OR;  Service: General;  Laterality: N/A;   • CYSTOSCOPY N/A 2021    Procedure: CYSTOSCOPY with bladder tumor removal and fulgeration, insertion of 3-way rizzo catheter;  Surgeon: Alfonzo Hayward  MD TOMASZ;  Location: [facility] MAIN OR;  Service: Urology;  Laterality: N/A;   • ENDOSCOPY     • EXPLORATORY LAPAROTOMY N/A 7/16/2020    Procedure: LAPAROTOMY EXPLORATORY HARTMANS;  Surgeon: Chi Farmer MD;  Location: [facility] MAIN OR;  Service: General;  Laterality: N/A;   • HYSTERECTOMY     • TOTAL HIP ARTHROPLASTY Right    • TOTAL HIP ARTHROPLASTY REVISION Right     x3     Home Meds:  Medications Prior to Admission   Medication Sig Dispense Refill Last Dose   • albuterol (ACCUNEB) 0.63 MG/3ML nebulizer solution Take 1 ampule by nebulization Every 6 (Six) Hours As Needed for Wheezing. Use am of surgery   2/24/2021 at Unknown time   • albuterol sulfate  (90 Base) MCG/ACT inhaler Inhale 2 puffs Every 6 (Six) Hours As Needed for Wheezing. Use and bring   2/24/2021 at Unknown time   • chlorhexidine (HIBICLENS) 4 % external liquid Apply  topically to the appropriate area as directed 2 (Two) Times a Day. Shower With Hibiclens Solution Twice The Day Before Surgery 236 mL 0 2/24/2021 at Unknown time   • enoxaparin (LOVENOX) 80 MG/0.8ML solution syringe To start on 2/20      • Fluticasone-Umeclidin-Vilant (Trelegy Ellipta) 100-62.5-25 MCG/INH aerosol powder  Inhale 1 puff 2 (two) times a day.   2/24/2021 at 0700   • HYDROcodone-acetaminophen (NORCO)  MG per tablet Take 1 tablet by mouth Every 8 (Eight) Hours As Needed.   Past Week at Unknown time   • ipratropium (ATROVENT) 0.02 % nebulizer solution Take 500 mcg by nebulization 3 (Three) Times a Day.   2/23/2021 at Unknown time   • neomycin (MYCIFRADIN) 500 MG tablet Take 1 tablet my mouth at 1 pm, 2 pm, and 10 pm the day before surgery 3 tablet 0 2/24/2021 at Unknown time   • traZODone (DESYREL) 100 MG tablet Take 100 mg by mouth At Night As Needed for Sleep.   2/22/2021   • valsartan-hydrochlorothiazide (DIOVAN-HCT) 320-25 MG per tablet Take 1 tablet by mouth Every Night. LD 2/23 2/23/2021   • warfarin (COUMADIN) 3 MG tablet Take 3 mg by mouth Every  Night. 3mg every other  day alternating with 3.5 every other day   LD 2/20 and will go on Lovenox bridge   2/20/2021   • amoxicillin-clavulanate (AUGMENTIN) 875-125 MG per tablet amoxicillin 875 mg-potassium clavulanate 125 mg tablet      • carvedilol (COREG) 3.125 MG tablet       • doxycycline (DORYX) 100 MG enteric coated tablet Take 1 tablet by mouth 2 (Two) Times a Day. 20 tablet 0 2/23/2021   • erythromycin base (E-MYCIN) 500 MG tablet Take 2 tablets at 1 pm, 2 pm, and 10 pm the day before surgery 12 tablet 0    • famotidine (PEPCID) 40 MG tablet       • fluconazole (DIFLUCAN) 200 MG tablet fluconazole 200 mg tablet      • FLUoxetine (PROzac) 10 MG capsule Take 10 mg by mouth Daily.      • furosemide (LASIX) 20 MG tablet Take 20 mg by mouth As Needed.   More than a month at Unknown time   • hydrALAZINE (APRESOLINE) 25 MG tablet Take 25 mg by mouth 2 (Two) Times a Day As Needed. For SBP >160   2/22/2021   • ketoconazole (NIZORAL) 2 % cream ketoconazole 2 % topical cream      • levothyroxine (SYNTHROID, LEVOTHROID) 50 MCG tablet Take 50 mcg by mouth Daily. Take DOS   More than a month at Unknown time   • pantoprazole (PROTONIX) 40 MG EC tablet Take 40 mg by mouth Daily.   2/23/2021   • predniSONE (DELTASONE) 10 MG (48) dose pack 4 tablets daily for 3 days  3 tablets daily for 3 days  2 tablets daily for 3 days  1 tablet daily for 3 days 30 tablet 0 2/23/2021   • sulfamethoxazole-trimethoprim (BACTRIM DS,SEPTRA DS) 800-160 MG per tablet sulfamethoxazole 800 mg-trimethoprim 160 mg tablet      • warfarin (COUMADIN) 1 MG tablet 3 mg.      • warfarin (COUMADIN) 2 MG tablet Take 2 mg by mouth Every Night.      • warfarin (COUMADIN) 5 MG tablet 3 mg.        Current Meds:    Current Facility-Administered Medications:   •  acetaminophen (TYLENOL) tablet 1,000 mg, 1,000 mg, Oral, Q8H, Chi Farmer MD, 1,000 mg at 03/01/21 0459  •  albuterol (PROVENTIL) nebulizer solution 0.083% 2.5 mg/3mL, 2.5 mg, Nebulization,  Q6H PRN, Chi Farmer MD, 2.5 mg at 21 1547  •  FLUoxetine (PROzac) capsule 10 mg, 10 mg, Oral, Daily, Katherine Ferrari, APRN, 10 mg at 21 0825  •  HYDROmorphone (DILAUDID) injection 0.5 mg, 0.5 mg, Intravenous, Q2H PRN, Chi Farmer MD  •  ipratropium (ATROVENT) nebulizer solution 0.5 mg, 500 mcg, Nebulization, TID - RT, Chi Farmer MD, 0.5 mg at 21 0836  •  levothyroxine (SYNTHROID, LEVOTHROID) tablet 50 mcg, 50 mcg, Oral, Q AM, Chi Farmer MD, 50 mcg at 21 0500  •  Methylnaltrexone Bromide tablet 150 mg, 150 mg, Oral, Daily, Chi Farmer MD, 150 mg at 21 0825  •  ondansetron (ZOFRAN) tablet 4 mg, 4 mg, Oral, Q6H PRN **OR** ondansetron (ZOFRAN) injection 4 mg, 4 mg, Intravenous, Q6H PRN, Chi Farmer MD  •  oxyCODONE (ROXICODONE) immediate release tablet 5 mg, 5 mg, Oral, Q4H PRN **OR** oxyCODONE (ROXICODONE) immediate release tablet 10 mg, 10 mg, Oral, Q4H PRN, Chi Farmer MD, 10 mg at 21  •  pantoprazole (PROTONIX) EC tablet 40 mg, 40 mg, Oral, Q AM, Chi Farmer MD, 40 mg at 21 0500  •  sodium chloride 0.9 % flush 10 mL, 10 mL, Intravenous, PRN, Banez, Lesvia Q, CRNA  •  sodium chloride 0.9 % flush 3 mL, 3 mL, Intravenous, Q12H, Banez, Lesvia Q, CRNA, 3 mL at 21  •  traZODone (DESYREL) tablet 100 mg, 100 mg, Oral, Nightly PRN, Chi Farmer MD  •  valsartan (DIOVAN) 320 mg, hydroCHLOROthiazide (HYDRODIURIL) 25 mg, , Oral, Nightly, Chi Farmer MD, Given at 21  Allergies:  Patient has no known allergies.    Review of Systems:  Negative 12 point system review except for what is in HPI    Objective:  tMax 24 hours:  Temp (24hrs), Av.4 °F (36.9 °C), Min:98.2 °F (36.8 °C), Max:98.7 °F (37.1 °C)    Vital Sign Ranges:  Temp:  [98.2 °F (36.8 °C)-98.7 °F (37.1 °C)] 98.3 °F (36.8 °C)  Heart Rate:  [81-98] 85  Resp:  [14-18]  "18  BP: (124-152)/(72-82) 131/76  Intake and Output Last 3 Shifts:  I/O last 3 completed shifts:  In: 420 [P.O.:420]  Out: 1800 [Urine:1800]    Exam:  /76   Pulse 85   Temp 98.3 °F (36.8 °C)   Resp 18   Ht 160 cm (63\")   Wt 79.3 kg (174 lb 13.2 oz)   SpO2 92%   BMI 30.97 kg/m²    General Appearance:    Alert, cooperative, no acute distress, general       appearance is normal   Head:    Normocephalic, without obvious abnormality, atraumatic   Eyes:            Pupils/irises normal. Exterior inspection conjunctivae       and lids normal.   Ears:    Normal external inspection   Nose:   Exterior inspection of nose is normal   Throat:   Lips, mucosa, and tongue normal   Neck:   No adenopathy, supple, symmetrical, trachea midline   Back:     No CVA tenderness   Lungs:     Respirations unlabored; normal effort, no audible     abnormality   CV:   Regular rhythm and normal rate, no edema   Abdomen:     No hernia, examination of the abdomen is normal with     no masses, tenderness, or distension    Female :  Normal   Musculoskeletal:   Inspection of the nails and digits reveal no abnormalities   Skin:   Inspection normal, palpation normal   Neurologic/Psych:   Orientation normal; Mood/Affect normal     Data Review:  All labs (24hrs):    Lab Results (last 24 hours)     Procedure Component Value Units Date/Time    Protime-INR [[id number]]  (Abnormal) Collected: 03/01/21 0633    Specimen: Blood Updated: 03/01/21 0706     Protime 10.3 Seconds      INR 0.93        Radiology:   Imaging Results (Last 72 Hours)     ** No results found for the last 72 hours. 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    Patient voiding well now.    Plan cystoscopy in 3 months.  Discussed with patient all risk benefits and options and she is willing to proceed      Alfonzo Hayward MD  3/1/2021  11:16 EST    "

## 2021-03-02 VITALS
HEIGHT: 63 IN | SYSTOLIC BLOOD PRESSURE: 156 MMHG | RESPIRATION RATE: 15 BRPM | WEIGHT: 174.82 LBS | OXYGEN SATURATION: 95 % | TEMPERATURE: 98 F | BODY MASS INDEX: 30.98 KG/M2 | DIASTOLIC BLOOD PRESSURE: 82 MMHG | HEART RATE: 72 BPM

## 2021-03-02 PROBLEM — Z43.3 ATTENTION TO COLOSTOMY (HCC): Status: RESOLVED | Noted: 2021-02-03 | Resolved: 2021-03-02

## 2021-03-02 LAB
BASOPHILS # BLD AUTO: 0.1 10*3/MM3 (ref 0–0.2)
BASOPHILS NFR BLD AUTO: 0.8 % (ref 0–1.5)
DEPRECATED RDW RBC AUTO: 42.9 FL (ref 37–54)
EOSINOPHIL # BLD AUTO: 0.4 10*3/MM3 (ref 0–0.4)
EOSINOPHIL NFR BLD AUTO: 4 % (ref 0.3–6.2)
ERYTHROCYTE [DISTWIDTH] IN BLOOD BY AUTOMATED COUNT: 14.2 % (ref 12.3–15.4)
HCT VFR BLD AUTO: 31.5 % (ref 34–46.6)
HGB BLD-MCNC: 10.4 G/DL (ref 12–15.9)
LYMPHOCYTES # BLD AUTO: 2.1 10*3/MM3 (ref 0.7–3.1)
LYMPHOCYTES NFR BLD AUTO: 21.7 % (ref 19.6–45.3)
MCH RBC QN AUTO: 28.3 PG (ref 26.6–33)
MCHC RBC AUTO-ENTMCNC: 32.9 G/DL (ref 31.5–35.7)
MCV RBC AUTO: 86.2 FL (ref 79–97)
MONOCYTES # BLD AUTO: 0.7 10*3/MM3 (ref 0.1–0.9)
MONOCYTES NFR BLD AUTO: 7.1 % (ref 5–12)
NEUTROPHILS NFR BLD AUTO: 6.6 10*3/MM3 (ref 1.7–7)
NEUTROPHILS NFR BLD AUTO: 66.4 % (ref 42.7–76)
NRBC BLD AUTO-RTO: 0.1 /100 WBC (ref 0–0.2)
PLATELET # BLD AUTO: 297 10*3/MM3 (ref 140–450)
PMV BLD AUTO: 8 FL (ref 6–12)
QT INTERVAL: 406 MS
RBC # BLD AUTO: 3.66 10*6/MM3 (ref 3.77–5.28)
WBC # BLD AUTO: 9.9 10*3/MM3 (ref 3.4–10.8)

## 2021-03-02 PROCEDURE — 85025 COMPLETE CBC W/AUTO DIFF WBC: CPT | Performed by: SURGERY

## 2021-03-02 PROCEDURE — 99024 POSTOP FOLLOW-UP VISIT: CPT | Performed by: SURGERY

## 2021-03-02 PROCEDURE — 25010000002 ENOXAPARIN PER 10 MG: Performed by: SURGERY

## 2021-03-02 RX ORDER — ONDANSETRON 4 MG/1
4 TABLET, FILM COATED ORAL EVERY 6 HOURS PRN
Qty: 15 TABLET | Refills: 1 | Status: SHIPPED | OUTPATIENT
Start: 2021-03-02 | End: 2021-03-15

## 2021-03-02 RX ORDER — OXYCODONE HYDROCHLORIDE 5 MG/1
5 TABLET ORAL EVERY 4 HOURS PRN
Qty: 45 TABLET | Refills: 0 | Status: SHIPPED | OUTPATIENT
Start: 2021-03-02 | End: 2021-03-10

## 2021-03-02 RX ADMIN — ACETAMINOPHEN 1000 MG: 500 TABLET, FILM COATED ORAL at 02:50

## 2021-03-02 RX ADMIN — FLUOXETINE 10 MG: 10 CAPSULE ORAL at 08:50

## 2021-03-02 RX ADMIN — METHYLNALTREXONE BROMIDE 150 MG: 150 TABLET ORAL at 08:50

## 2021-03-02 RX ADMIN — PANTOPRAZOLE SODIUM 40 MG: 40 TABLET, DELAYED RELEASE ORAL at 04:44

## 2021-03-02 RX ADMIN — OXYCODONE 10 MG: 5 TABLET ORAL at 02:50

## 2021-03-02 RX ADMIN — OXYCODONE 10 MG: 5 TABLET ORAL at 08:53

## 2021-03-02 RX ADMIN — LEVOTHYROXINE SODIUM 50 MCG: 0.05 TABLET ORAL at 04:44

## 2021-03-02 RX ADMIN — ENOXAPARIN SODIUM 80 MG: 80 INJECTION SUBCUTANEOUS at 02:51

## 2021-03-02 NOTE — PROGRESS NOTES
Discharge Planning Assessment  [facility]     Patient Name: Renuka Pelayo  MRN: [id number]  Today's Date: 3/2/2021    Admit Date: 2/25/2021      Discharge Plan     Row Name 03/02/21 0941       Plan    Plan  D/C Plan: Home with family and [facility] (accepted, order placed). Lovenox cost $3.70/month.    Patient/Family in Agreement with Plan  yes    Plan Comments  CM called and spoke to [facility]-Lovenox 80mg BID cost is $3.70/month.  spoke to patient at bedside wearing mask and goggles and keeping distance greater than 6 feet and spent less than 15 minutes in room. Patient agreeable to Lovenox cost. [facility] letter reviewed with patient, verbal consent obtained and copy left at bedside. Patient agreeable to d/c plan of home with [facility].        Continued Care and Services - Admitted Since 2/25/2021     Home Medical Care Coordination complete    Service Provider Request Status Selected Services Address Phone Fax Patient Preferred    [facility]   Selected Home Health Services [address] [place] [address] [phone] -- --              Expected Discharge Date and Time     Expected Discharge Date Expected Discharge Time    Mar 2, 2021           Patient Forms     Row Name 03/02/21 0940       Patient Forms    Important Message from Medicare (IMM)  Delivered 3/2/21    Delivered to  Patient    Method of delivery  In person            Marily Lobato

## 2021-03-02 NOTE — PLAN OF CARE
Goal Outcome Evaluation:     Progress: improving  Outcome Summary: Patient ambulates in room independently. Compaints of pain noted during shift. PRN medication administered as requested, controlled pain. Tolerating diet well. Vital signs stable. Anticipates discharged home today. Call light within reach. Will continue to monitor.

## 2021-03-02 NOTE — PROGRESS NOTES
Case Management Discharge Note      Final Note: Home with [facility]         Selected Continued Care - Discharged on 3/2/2021 Admission date: 2/25/2021 - Discharge disposition: Home or Self Care        Home Medical Care Coordination complete    Service Provider Selected Services Address Phone Fax Patient Preferred    [facility]  Home Health Services [address] [place] [address] [phone] -- --                    Final Discharge Disposition Code: 06 - home with home health care

## 2021-03-02 NOTE — PLAN OF CARE
Goal Outcome Evaluation:  Plan of Care Reviewed With: patient  Progress: improving  Outcome Summary: Patient ambulating by herself. C/o pain, controlled with p.o. pain rx. Tolerating low residue diet. VSS. Hopeful to DC 3/2. Plan of care ongoing.        Problem: Adult Inpatient Plan of Care  Goal: Absence of Hospital-Acquired Illness or Injury  Outcome: Ongoing, Progressing  Intervention: Identify and Manage Fall Risk  Recent Flowsheet Documentation  Taken 3/1/2021 2310 by Dasia España RN  Safety Promotion/Fall Prevention: safety round/check completed  Taken 3/1/2021 2044 by Dasia España RN  Safety Promotion/Fall Prevention: safety round/check completed  Intervention: Prevent Skin Injury  Recent Flowsheet Documentation  Taken 3/1/2021 2310 by Dasia España RN  Body Position: position changed independently  Taken 3/1/2021 2044 by Dasia España RN  Body Position: position changed independently  Intervention: Prevent and Manage VTE (venous thromboembolism) Risk  Recent Flowsheet Documentation  Taken 3/1/2021 2044 by Dasia España RN  VTE Prevention/Management:   bilateral   sequential compression devices off   patient refused intervention  Intervention: Prevent Infection  Recent Flowsheet Documentation  Taken 3/1/2021 2310 by Dasia España RN  Infection Prevention:   personal protective equipment utilized   rest/sleep promoted   single patient room provided  Taken 3/1/2021 2044 by Dasia España RN  Infection Prevention:   rest/sleep promoted   personal protective equipment utilized   hand hygiene promoted     Problem: Adult Inpatient Plan of Care  Goal: Absence of Hospital-Acquired Illness or Injury  Intervention: Prevent Skin Injury  Recent Flowsheet Documentation  Taken 3/1/2021 2310 by Dasia España RN  Body Position: position changed independently  Taken 3/1/2021 2044 by Dasia España RN  Body Position: position changed independently     Problem: Adult Inpatient Plan of  Care  Goal: Readiness for Transition of Care  Outcome: Ongoing, Progressing     Problem: Skin Injury Risk Increased  Goal: Skin Health and Integrity  Outcome: Ongoing, Progressing  Intervention: Optimize Skin Protection  Recent Flowsheet Documentation  Taken 3/1/2021 2310 by Dasia España RN  Head of Bed (HOB): HOB at 20-30 degrees  Taken 3/1/2021 2044 by Dasia España RN  Pressure Reduction Techniques: frequent weight shift encouraged  Head of Bed (HOB): HOB elevated  Pressure Reduction Devices: pressure-redistributing mattress utilized     Problem: Fall Injury Risk  Goal: Absence of Fall and Fall-Related Injury  Intervention: Promote Injury-Free Environment  Recent Flowsheet Documentation  Taken 3/1/2021 2310 by Dasia España RN  Safety Promotion/Fall Prevention: safety round/check completed  Taken 3/1/2021 2044 by Dasia España RN  Safety Promotion/Fall Prevention: safety round/check completed

## 2021-03-02 NOTE — DISCHARGE INSTRUCTIONS
Ok for discharge  Follow-up with me (Dr. Farmer) in 2 weeks  Low fiber diet as tolerated  Ok to shower starting tomorrow. No tub baths, pools, lakes, or streams for 1 week.  No heavy lifting (greater than 20 lbs) for 6 weeks after surgery  Call my office or present to the ED with: fevers greater than 101.5, redness around the incisions, drainage from the incisions, intractable nausea/vomiting, or pain that is getting worse instead of better      Colostomy Reversal Surgery, Care After  This sheet gives you information about how to care for yourself after your procedure. Your health care provider may also give you more specific instructions. If you have problems or questions, contact your health care provider.  What can I expect after the procedure?  After the procedure, it is common to have:  · Pain and discomfort in your abdomen, especially near your incision.  · Loose stools (diarrhea).  · Decreased appetite.  · Constipation.  Follow these instructions at home:  Activity    · Do not lift anything that is heavier than 10 lb (4.5 kg), or the limit that you are told, until your health care provider says that it is safe.  · Return to your normal activities as told by your health care provider. Ask your health care provider what activities are safe for you.  · Avoid sitting for a long time without moving. Get up to take short walks every 1-2 hours. This is important to improve blood flow and breathing. Ask for help if you feel weak or unsteady.  · Avoid strenuous activity, contact sports, and abdominal exercises for 4 weeks or as long as told by your health care provider.  Incision care    · Follow instructions from your health care provider about how to take care of your incision. Make sure you:  ? Wash your hands with soap and water before and after you change your bandage (dressing). If soap and water are not available, use hand .  ? Change your dressing as told by your health care provider.  ? Leave  stitches (sutures), skin glue, or adhesive strips in place. These skin closures may need to stay in place for 2 weeks or longer. If adhesive strip edges start to loosen and curl up, you may trim the loose edges. Do not remove adhesive strips completely unless your health care provider tells you to do that.  · Keep the incision area clean and dry.  · Check your incision area every day for signs of infection. Check for:  ? More redness, swelling, or pain.  ? More fluid or blood.  ? Warmth.  ? Pus or a bad smell.  Bathing  · Do not take baths, swim, or use a hot tub until your health care provider approves. Ask your health care provider if you may take showers. You may only be allowed to take sponge baths.  · If your health care provider approves bathing and showering, cover the dressing with a watertight covering to protect it from water. Do not let the dressing get wet.  · Keep the dressing dry until your health care provider says it can be removed.  Driving  · Do not drive for 24 hours if you were given a sedative during your procedure. Follow other driving restrictions as told by your health care provider.  · Do not drive or use heavy machinery while taking prescription pain medicine.  Eating and drinking  · Follow instructions from your health care provider about eating or drinking restrictions. This may include:  ? What to eat and drink. You may be told to start eating a bland diet. Over time, you may slowly resume a more normal, healthy diet.  ? How much to eat and drink. You should eat small meals often and stop eating when you feel full.  · Take nutrition supplements as told by your health care provider or dietitian.  General instructions  · Take over-the-counter and prescription medicines only as told by your health care provider.  · Take steps to treat diarrhea or constipation as told by your health care provider. Your health care provider may recommend that you:  ? Drink enough fluid to keep your urine pale  yellow. Avoid fluids that contain a lot of sugar or caffeine, such as energy drinks, sports drinks, and soda.  ? Eat bland, easy-to-digest foods in small amounts as you are able. These foods include bananas, applesauce, rice, lean meats, toast, and crackers.  ? Take over-the-counter or prescription medicines.  ? Limit foods that are high in fat and processed sugars, such as fried or sweet foods.  · Do not use any products that contain nicotine or tobacco, such as cigarettes, e-cigarettes, and chewing tobacco. These can delay incision healing after surgery. If you need help quitting, ask your health care provider.  · Keep all follow-up visits as told by your health care provider. This is important.  Contact a health care provider if:  · You have more redness, swelling, or pain at the site of your incision.  · You have more fluid or blood coming from your incision.  · Your incision feels warm to the touch.  · You have pus or a bad smell coming from your incision.  · You have a fever.  · Your incision breaks open.  · You feel nauseous.  · You are not able to have a bowel movement (are constipated).  · Your diarrhea gets worse.  · You have pain that is not controlled with medicine.  Get help right away if you have:  · Abdominal pain that does not go away or becomes severe.  · Frequent vomiting and you are not able to eat or drink.  · Difficulty breathing.  Summary  · After colostomy reversal surgery, it is common to have abdominal pain, decreased appetite, diarrhea, or constipation.  · Follow instructions from your health care provider about how to take care of your incision. Do not let the dressing get wet.  · Take over-the-counter and prescription medicines only as told by your health care provider.  · Contact your health care provider if you are not able to have a bowel movement (are constipated).  · Keep all follow-up visits as told by your health care provider. This is important.  This information is not intended  to replace advice given to you by your health care provider. Make sure you discuss any questions you have with your health care provider.  Document Revised: 06/05/2019 Document Reviewed: 06/05/2019  [employer] Patient Education © 2020 [employer]    Low-Fiber Eating Plan  Fiber is found in fruits, vegetables, whole grains, and beans. Eating a diet low in fiber helps to reduce how often you have bowel movements and how much you produce during a bowel movement. A low-fiber eating plan may help your digestive system heal if:  · You have certain conditions, such as Crohn's disease or diverticulitis.  · You recently had radiation therapy on your pelvis or bowel.  · You recently had intestinal surgery.  · You have a new surgical opening in your abdomen (colostomy or ileostomy).  · Your intestine is narrowed (stricture).  Your health care provider will determine how long you need to stay on this diet. Your health care provider may recommend that you work with a diet and nutrition specialist (dietitian).  What are tips for following this plan?  General guidelines  · Follow recommendations from your dietitian about how much fiber you should have each day.  · Most people on this eating plan should try to eat less than 10 grams (g) of fiber each day. Your daily fiber goal is _________________ g.  · Take vitamin and mineral supplements as told by your health care provider or dietitian. Chewable or liquid forms are best when on this eating plan.  Reading food labels  · Check food labels for the amount of dietary fiber.  · Choose foods that have less than 2 grams of fiber in one serving.  Cooking  · Use white flour and other allowed grains for baking and cooking.  · Cook meat using methods that keep it tender, such as braising or poaching.  · Cook eggs until the yolk is completely solid.  · Cook with healthy oils, such as olive oil or canola oil.  Meal planning    · Eat 5-6 small meals throughout the day instead of 3 large  meals.  · If you are lactose intolerant:  ? Choose low-lactose dairy foods.  ? Do not eat dairy foods, if told by your dietitian.  · Limit fat and oils to less than 8 teaspoons a day.  · Eat small portions of desserts.  What foods are allowed?  The items listed below may not be a complete list. Talk with your dietitian about what dietary choices are best for you.  Grains  All bread and crackers made with white flour. Waffles, pancakes, and [place] toast. Bagels. Pretzels. [place] toast, zwieback, and matzoh. Cooked and dried cereals that do not contain whole grains, added fiber, seeds, or dried fruit. Cornmeal. [employer]. Hot and cold cereals made with refined corn, wheat, rice, or oats. Plain pasta and noodles. White rice.  Vegetables  Well-cooked or canned vegetables without skin, seeds, or stems. Cooked potatoes without skins. Vegetable juice.  Fruits  Soft-cooked or canned fruits without skin and seeds. Peeled ripe banana. Applesauce. Fruit juice without pulp.  Meats and other protein foods  Ground meat. Tender cuts of meat or poultry. Eggs. Fish, seafood, and shellfish. Smooth nut butters. Tofu.  Dairy  All milk products and drinks. Lactose-free milks, including rice, soy, and almond milks. Yogurt without fruit, nuts, chocolate, or granola mix-ins. Sour cream. Cottage cheese. Cheese.  Beverages  Decaf coffee. Fruit and vegetable juices or smoothies (in small amounts, with no pulp or skins, and with fruits from allowed list). Sports drinks. Herbal tea.  Fats and oils  Olive oil, canola oil, sunflower oil, flaxseed oil, and grapeseed oil. Mayonnaise. Cream cheese. Margarine. Butter.  Sweets and desserts  Plain cakes and cookies. Cream pies and pies made with allowed fruits. Pudding. Custard. Fruit gelatin. Sherbet. Popsicles. Ice cream without nuts. Plain hard candy. Honey. Jelly. Molasses. Syrups, including chocolate syrup. Chocolate. Marshmallows. Gumdrops.  Seasoning and other foods  Bouillon. Broth. Cream soups  made from allowed foods. Strained soup. Casseroles made with allowed foods. Ketchup. Mild mustard. Mild salad dressings. Plain gravies. Vinegar. Spices in moderation. Salt. Sugar.  What foods are not allowed?  The items listed below may not be a complete list. Talk with your dietitian about what dietary choices are best for you.  Grains  Whole wheat and whole grain breads and crackers. Multigrain breads and crackers. Rye bread. Whole grain or multigrain cereals. Cereals with nuts, raisins, or coconut. Bran. Coarse wheat cereals. Granola. High-fiber cereals. Cornmeal or corn bread. Whole grain pasta. Wild or brown rice. Quinoa. Popcorn. Buckwheat. Wheat germ.  Vegetables  Potato skins. Raw or undercooked vegetables. All beans and bean sprouts. Cooked greens. Corn. Peas. Cabbage. Beets. Broccoli. [place] sprouts. Cauliflower. Mushrooms. Onions. Peppers. Parsnips. Okra. Sauerkraut.  Fruit  Raw or dried fruit. Berries. Fruit juice with pulp. Prune juice.  Meats and other protein foods  Tough, fibrous meats with gristle. Fatty meat. Poultry with skin. Fried meat, poultry, or fish. Deli or lunch meats. Sausage, cotter, and hot dogs. Nuts and chunky nut butter. Dried peas, beans, and lentils.  Dairy  Yogurt with fruit, nuts, chocolate, or granola mix-ins.  Beverages  Caffeinated coffee and teas.  Fats and oils  Avocado. Coconut.  Sweets and desserts  Desserts, cookies, or candies that contain nuts or coconut. Dried fruit. Jams and preserves with seeds. Marmalade. Any dessert made with fruits or grains that are not allowed.  Seasoning and other foods  Corn tortilla chips. Soups made with vegetables or grains that are not allowed. Relish. Horseradish. Pickles. Olives.  Summary  · Most people on a low-fiber eating plan should eat less than 10 grams of fiber a day. Follow recommendations from your dietitian about how much fiber you should have each day.  · Always check food labels to see the dietary fiber content of packaged  foods. In general, a low-fiber food will have fewer than 2 grams of fiber per serving.  · In general, try to avoid whole grains, raw fruits and vegetables, dried fruit, tough cuts of meat, nuts, and seeds.  · Take a vitamin and mineral supplement as told by your health care provider or dietitian.  This information is not intended to replace advice given to you by your health care provider. Make sure you discuss any questions you have with your health care provider.  Document Revised: 04/10/2020 Document Reviewed: 02/20/2018  [employer] Patient Education © 2020 [employer]

## 2021-03-02 NOTE — DISCHARGE SUMMARY
GENERAL SURGERY DISCHARGE SUMMARY    Date of Discharge:  3/2/2021    Discharge Diagnosis: Attention to colostomy    Presenting Problem/History of Present Illness  Active Hospital Problems    Diagnosis  POA   • **Status post Mariya procedure (CMS/[facility]) [Z93.3]  Not Applicable   • S/P bladder tumor excision with fulguration [Z98.890]  Not Applicable   • Hypothyroid [E03.9]  Yes   • COPD (chronic obstructive pulmonary disease) (CMS/[facility]) [J44.9]  Yes   • Essential hypertension [I10]  Yes   • Obesity [E66.9]  Yes   • Chronic anticoagulation [Z79.01]  Not Applicable      Resolved Hospital Problems    Diagnosis Date Resolved POA   • Attention to colostomy (CMS/[facility]) [Z43.3] 03/02/2021 Not Applicable      Hospital Course  Patient is a 65 y.o. female presented for colostomy reversal on 2/25/2021.  Discussed with the patient the risk, benefits, and alternatives preoperatively.  She underwent an uncomplicated procedure.  In the operating room however upon placement of the Stevenson catheter, the patient was noted to have gross hematuria and a urology consult was placed intraoperatively.  Intraoperative cystoscopy demonstrated a bladder tumor which was excised with fulguration.  Postoperatively, the patient was admitted to the hospital floor.  The following day her three-way Stevenson catheter was removed, and she was continued on a clear liquid diet.  Over the coming days, we awaited return of bowel function.  Her hemoglobin continued to drift downward following her surgery, until the day prior to admission when it finally stabilized.  We then restarted her on her home dose of Lovenox, and the patient tolerated this well with continued stabilization of her hemoglobin.  On the day of discharge, the patient was tolerating a regular diet, having bowel function, her pain was controlled with oral pain medications, she was ambulatory, and her hemoglobin was stable.  She was felt to be surgically stable for discharge home.    Procedures  Performed    Procedure(s):  COLOSTOMY TAKEDOWN OR CLOSURE, extenisve lysis of adhesions  CYSTOSCOPY with bladder tumor removal and fulgeration, insertion of 3-way rizzo catheter  -------------------       Consults:   Consults     Date and Time Order Name Status Description    2/25/2021 1821 Inpatient Hospitalist Consult Completed           Pertinent Test Results: None    Condition on Discharge: Improved    Vital Signs  Temp:  [97.7 °F (36.5 °C)-98.2 °F (36.8 °C)] 98 °F (36.7 °C)  Heart Rate:  [70-88] 72  Resp:  [14-18] 15  BP: (144-170)/(75-98) 156/82    Physical Exam:  Physical Exam  Vitals signs reviewed.   Constitutional:       General: She is not in acute distress.     Appearance: She is obese. She is not ill-appearing.   Cardiovascular:      Rate and Rhythm: Normal rate and regular rhythm.   Pulmonary:      Effort: Pulmonary effort is normal. No respiratory distress.      Comments: On room air  Abdominal:      Palpations: Abdomen is soft.      Comments: Appropriately tender, incision is well approximated with skin staples and a minor amount of serous drainage coming from the midline wound.  The colostomy site is packed with clean dry gauze, and only has a moderate amount of serous drainage coming from the wound.   Musculoskeletal: Normal range of motion.         General: No swelling.   Skin:     General: Skin is warm and dry.      Findings: Bruising present. No erythema.   Neurological:      General: No focal deficit present.      Mental Status: She is alert and oriented to person, place, and time.   Psychiatric:         Mood and Affect: Mood normal.         Behavior: Behavior normal.         Thought Content: Thought content normal.         Judgment: Judgment normal.         Discharge Disposition  Home or Self Care    Discharge Medications     Discharge Medications      New Medications      Instructions Start Date   ondansetron 4 MG tablet  Commonly known as: ZOFRAN   4 mg, Oral, Every 6 Hours PRN       oxyCODONE 5 MG immediate release tablet  Commonly known as: ROXICODONE   5 mg, Oral, Every 4 Hours PRN         Continue These Medications      Instructions Start Date   albuterol sulfate  (90 Base) MCG/ACT inhaler  Commonly known as: PROVENTIL HFA;VENTOLIN HFA;PROAIR HFA   2 puffs, Inhalation, Every 6 Hours PRN, Use and bring      albuterol 0.63 MG/3ML nebulizer solution  Commonly known as: ACCUNEB   1 ampule, Nebulization, Every 6 Hours PRN, Use am of surgery      carvedilol 3.125 MG tablet  Commonly known as: COREG   No dose, route, or frequency recorded.      chlorhexidine 4 % external liquid  Commonly known as: HIBICLENS   Topical, 2 Times Daily, Shower With Hibiclens Solution Twice The Day Before Surgery      doxycycline 100 MG enteric coated tablet  Commonly known as: DORYX   100 mg, Oral, 2 Times Daily      enoxaparin 80 MG/0.8ML solution syringe  Commonly known as: LOVENOX   To start on 2/20      erythromycin base 500 MG tablet  Commonly known as: E-MYCIN   Take 2 tablets at 1 pm, 2 pm, and 10 pm the day before surgery      famotidine 40 MG tablet  Commonly known as: PEPCID   No dose, route, or frequency recorded.      fluconazole 200 MG tablet  Commonly known as: DIFLUCAN   fluconazole 200 mg tablet      furosemide 20 MG tablet  Commonly known as: LASIX   20 mg, Oral, As Needed      hydrALAZINE 25 MG tablet  Commonly known as: APRESOLINE   25 mg, Oral, 2 Times Daily PRN, For SBP >160       HYDROcodone-acetaminophen  MG per tablet  Commonly known as: NORCO   1 tablet, Oral, Every 8 Hours PRN      ipratropium 0.02 % nebulizer solution  Commonly known as: ATROVENT   500 mcg, Nebulization, 3 Times Daily - RT      ketoconazole 2 % cream  Commonly known as: NIZORAL   ketoconazole 2 % topical cream      levothyroxine 50 MCG tablet  Commonly known as: SYNTHROID, LEVOTHROID   50 mcg, Oral, Daily, Take DOS       neomycin 500 MG tablet  Commonly known as: MYCIFRADIN   Take 1 tablet my mouth at 1 pm, 2  pm, and 10 pm the day before surgery      pantoprazole 40 MG EC tablet  Commonly known as: PROTONIX   40 mg, Oral, Daily      predniSONE 10 MG (48) dose pack  Commonly known as: DELTASONE   4 tablets daily for 3 days 3 tablets daily for 3 days 2 tablets daily for 3 days 1 tablet daily for 3 days      sulfamethoxazole-trimethoprim 800-160 MG per tablet  Commonly known as: BACTRIM DS,SEPTRA DS   sulfamethoxazole 800 mg-trimethoprim 160 mg tablet      traZODone 100 MG tablet  Commonly known as: DESYREL   100 mg, Oral, Nightly PRN      Trelegy Ellipta 100-62.5-25 MCG/INH aerosol powder   Generic drug: Fluticasone-Umeclidin-Vilant   1 puff, Inhalation, 2 times daily      valsartan-hydrochlorothiazide 320-25 MG per tablet  Commonly known as: DIOVAN-HCT   1 tablet, Oral, Nightly, LD 2/23      warfarin 2 MG tablet  Commonly known as: COUMADIN   2 mg, Oral, Nightly      warfarin 3 MG tablet  Commonly known as: COUMADIN   3 mg, Oral, Nightly, 3mg every other  day alternating with 3.5 every other day   LD 2/20 and will go on Lovenox bridge       warfarin 5 MG tablet  Commonly known as: COUMADIN   3 mg      warfarin 1 MG tablet  Commonly known as: COUMADIN   3 mg         Stop These Medications    amoxicillin-clavulanate 875-125 MG per tablet  Commonly known as: AUGMENTIN     FLUoxetine 10 MG capsule  Commonly known as: PROzac            Discharge Diet:   Diet Instructions     Diet:      Diet Texture / Consistency: Regular    Common Modifiers: Low Fiber / Low Residue          Activity at Discharge:   Activity Instructions     Discharge Activity      1) No driving while taking narcotics.   2) Return to school / work in light duty after follow-up.  3) May shower now.  4) Do not lift / push / pull more then 20 lbs.          Follow-up Appointments  No future appointments.  Additional Instructions for the Follow-ups that You Need to Schedule     Ambulatory Referral to Home Health   As directed      [facility] to eval and treat  patient    Order Comments: [facility] to eval and treat patient     Face to Face Visit Date: 2/26/2021    Follow-up provider for Plan of Care?: I treated the patient in an acute care facility and will not continue treatment after discharge.    Follow-up provider: JÚNIOR QUEZADA [[id number]]    Reason/Clinical Findings: colostomy reversal    Describe mobility limitations that make leaving home difficult: colostomy reversal    Nursing/Therapeutic Services Requested: Other    Frequency: 1 Week 1         Discharge Follow-up with Specified Provider: Marleny; 2 Weeks   As directed      To: Marleny    Follow Up: 2 Weeks         Dressing Change Instructions   As directed      Dressing change: Daily and as needed for soilage.    Order Comments: Dressing change: Daily and as needed for soilage.          Notify Physician or Go To The ED For the Following Conditions   As directed      For redness around the incisions, drainage from the incisions, or fevers greater than 101.5    Order Comments: For redness around the incisions, drainage from the incisions, or fevers greater than 101.5                Test Results Pending at Discharge       Chi Farmer MD  03/02/21  10:56 EST

## 2021-03-03 ENCOUNTER — READMISSION MANAGEMENT (OUTPATIENT)
Dept: CALL CENTER | Facility: HOSPITAL | Age: 66
End: 2021-03-03

## 2021-03-03 NOTE — OUTREACH NOTE
Prep Survey      Responses   [facility] discharged from?  [facility]   Is LACE score < 7 ?  No   Emergency Room discharge w/ pulse ox?  No   Eligibility  Readm Mgmt   Discharge diagnosis  Attention to colostomy [s/p COLOSTOMY TAKEDOWN OR CLOSURE, extensive lysis of adhesions]   Does the patient have one of the following disease processes/diagnoses(primary or secondary)?  General Surgery   Does the patient have Home health ordered?  Yes   What is the Home health agency?   [facility]   Is there a DME ordered?  No   Comments regarding appointments  Per AVS   Medication alerts for this patient  Continue warfarin and lovenox.  Medications per AVS.   Prep survey completed?  Yes          Nargis Ordonez RN

## 2021-03-05 ENCOUNTER — READMISSION MANAGEMENT (OUTPATIENT)
Dept: CALL CENTER | Facility: HOSPITAL | Age: 66
End: 2021-03-05

## 2021-03-05 NOTE — OUTREACH NOTE
General Surgery Week 1 Survey      Responses   [facility] patient discharged from?  [facility]   Does the patient have one of the following disease processes/diagnoses(primary or secondary)?  General Surgery   Week 1 attempt successful?  Yes   Call start time  1517   Call end time  1519   Discharge diagnosis  Attention to colostomy   Meds reviewed with patient/caregiver?  Yes   Is the patient having any side effects they believe may be caused by any medication additions or changes?  No   Does the patient have all medications related to this admission filled (includes all antibiotics, pain medications, etc.)  Yes   Is the patient taking all medications as directed (includes completed medication regime)?  Yes   Does the patient have a follow up appointment scheduled with their surgeon?  Yes   Has the patient kept scheduled appointments due by today?  Yes   Psychosocial issues?  No   Did the patient receive a copy of their discharge instructions?  Yes   Nursing interventions  Reviewed instructions with patient   What is the patient's perception of their health status since discharge?  Improving   Nursing interventions  Nurse provided patient education   Is the patient /caregiver able to teach back basic post-op care?  Lifting as instructed by MD in discharge instructions   Is the patient/caregiver able to teach back signs and symptoms of incisional infection?  Increased redness, swelling or pain at the incisonal site, Pus or odor from incision, Fever, Incisional warmth, Increased drainage or bleeding   Is the patient/caregiver able to teach back steps to recovery at home?  Set small, achievable goals for return to baseline health, Rest and rebuild strength, gradually increase activity   Is the patient/caregiver able to teach back the hierarchy of who to call/visit for symptoms/problems? PCP, Specialist, Home health nurse, Urgent Care, ED, 911  Yes   Week 1 call completed?  Yes   Wrap up additional comments  Very brief  call. States there are no issues to address.          Cristofer Ji RN

## 2021-03-12 ENCOUNTER — OFFICE VISIT (OUTPATIENT)
Dept: SURGERY | Facility: CLINIC | Age: 66
End: 2021-03-12

## 2021-03-12 VITALS
RESPIRATION RATE: 18 BRPM | BODY MASS INDEX: 31.36 KG/M2 | OXYGEN SATURATION: 94 % | SYSTOLIC BLOOD PRESSURE: 135 MMHG | HEIGHT: 63 IN | HEART RATE: 81 BPM | TEMPERATURE: 97.7 F | DIASTOLIC BLOOD PRESSURE: 84 MMHG | WEIGHT: 177 LBS

## 2021-03-12 DIAGNOSIS — Z93.3 STATUS POST HARTMANN PROCEDURE (HCC): Primary | ICD-10-CM

## 2021-03-12 PROCEDURE — 99024 POSTOP FOLLOW-UP VISIT: CPT | Performed by: SURGERY

## 2021-03-12 NOTE — PATIENT INSTRUCTIONS
Low-Fiber Eating Plan  Fiber is found in fruits, vegetables, whole grains, and beans. Eating a diet low in fiber helps to reduce how often you have bowel movements and how much you produce during a bowel movement. A low-fiber eating plan may help your digestive system heal if:  · You have certain conditions, such as Crohn's disease or diverticulitis.  · You recently had radiation therapy on your pelvis or bowel.  · You recently had intestinal surgery.  · You have a new surgical opening in your abdomen (colostomy or ileostomy).  · Your intestine is narrowed (stricture).  Your health care provider will determine how long you need to stay on this diet. Your health care provider may recommend that you work with a diet and nutrition specialist (dietitian).  What are tips for following this plan?  General guidelines  · Follow recommendations from your dietitian about how much fiber you should have each day.  · Most people on this eating plan should try to eat less than 10 grams (g) of fiber each day. Your daily fiber goal is _________________ g.  · Take vitamin and mineral supplements as told by your health care provider or dietitian. Chewable or liquid forms are best when on this eating plan.  Reading food labels  · Check food labels for the amount of dietary fiber.  · Choose foods that have less than 2 grams of fiber in one serving.  Cooking  · Use white flour and other allowed grains for baking and cooking.  · Cook meat using methods that keep it tender, such as braising or poaching.  · Cook eggs until the yolk is completely solid.  · Cook with healthy oils, such as olive oil or canola oil.  Meal planning    · Eat 5-6 small meals throughout the day instead of 3 large meals.  · If you are lactose intolerant:  ? Choose low-lactose dairy foods.  ? Do not eat dairy foods, if told by your dietitian.  · Limit fat and oils to less than 8 teaspoons a day.  · Eat small portions of desserts.  What foods are allowed?  The items  listed below may not be a complete list. Talk with your dietitian about what dietary choices are best for you.  Grains  All bread and crackers made with white flour. Waffles, pancakes, and Bulgarian toast. Bagels. Pretzels. Eure toast, zwieback, and matzoh. Cooked and dried cereals that do not contain whole grains, added fiber, seeds, or dried fruit. Cornmeal. [place]. Hot and cold cereals made with refined corn, wheat, rice, or oats. Plain pasta and noodles. White rice.  Vegetables  Well-cooked or canned vegetables without skin, seeds, or stems. Cooked potatoes without skins. Vegetable juice.  Fruits  Soft-cooked or canned fruits without skin and seeds. Peeled ripe banana. Applesauce. Fruit juice without pulp.  Meats and other protein foods  Ground meat. Tender cuts of meat or poultry. Eggs. Fish, seafood, and shellfish. Smooth nut butters. Tofu.  Dairy  All milk products and drinks. Lactose-free milks, including rice, soy, and almond milks. Yogurt without fruit, nuts, chocolate, or granola mix-ins. Sour cream. Cottage cheese. Cheese.  Beverages  Decaf coffee. Fruit and vegetable juices or smoothies (in small amounts, with no pulp or skins, and with fruits from allowed list). Sports drinks. Herbal tea.  Fats and oils  Olive oil, canola oil, sunflower oil, flaxseed oil, and grapeseed oil. Mayonnaise. Cream cheese. Margarine. Butter.  Sweets and desserts  Plain cakes and cookies. Cream pies and pies made with allowed fruits. Pudding. Custard. Fruit gelatin. Sherbet. Popsicles. Ice cream without nuts. Plain hard candy. Honey. Jelly. Molasses. Syrups, including chocolate syrup. Chocolate. Marshmallows. Gumdrops.  Seasoning and other foods  Bouillon. Broth. Cream soups made from allowed foods. Strained soup. Casseroles made with allowed foods. Ketchup. Mild mustard. Mild salad dressings. Plain gravies. Vinegar. Spices in moderation. Salt. Sugar.  What foods are not allowed?  The items listed below may not be a complete  list. Talk with your dietitian about what dietary choices are best for you.  Grains  Whole wheat and whole grain breads and crackers. Multigrain breads and crackers. Rye bread. Whole grain or multigrain cereals. Cereals with nuts, raisins, or coconut. Bran. Coarse wheat cereals. Granola. High-fiber cereals. Cornmeal or corn bread. Whole grain pasta. Wild or brown rice. Quinoa. Popcorn. Buckwheat. Wheat germ.  Vegetables  Potato skins. Raw or undercooked vegetables. All beans and bean sprouts. Cooked greens. Corn. Peas. Cabbage. Beets. Broccoli. [place] sprouts. Cauliflower. Mushrooms. Onions. Peppers. Parsnips. Okra. Sauerkraut.  Fruit  Raw or dried fruit. Berries. Fruit juice with pulp. Prune juice.  Meats and other protein foods  Tough, fibrous meats with gristle. Fatty meat. Poultry with skin. Fried meat, poultry, or fish. Deli or lunch meats. Sausage, cotter, and hot dogs. Nuts and chunky nut butter. Dried peas, beans, and lentils.  Dairy  Yogurt with fruit, nuts, chocolate, or granola mix-ins.  Beverages  Caffeinated coffee and teas.  Fats and oils  Avocado. Coconut.  Sweets and desserts  Desserts, cookies, or candies that contain nuts or coconut. Dried fruit. Jams and preserves with seeds. Marmalade. Any dessert made with fruits or grains that are not allowed.  Seasoning and other foods  Corn tortilla chips. Soups made with vegetables or grains that are not allowed. Relish. Horseradish. Pickles. Olives.  Summary  · Most people on a low-fiber eating plan should eat less than 10 grams of fiber a day. Follow recommendations from your dietitian about how much fiber you should have each day.  · Always check food labels to see the dietary fiber content of packaged foods. In general, a low-fiber food will have fewer than 2 grams of fiber per serving.  · In general, try to avoid whole grains, raw fruits and vegetables, dried fruit, tough cuts of meat, nuts, and seeds.  · Take a vitamin and mineral supplement as told  by your health care provider or dietitian.  This information is not intended to replace advice given to you by your health care provider. Make sure you discuss any questions you have with your health care provider.  Document Revised: 04/10/2020 Document Reviewed: 02/20/2018  Elsevier Patient Education © 2020 [employer]

## 2021-03-13 ENCOUNTER — READMISSION MANAGEMENT (OUTPATIENT)
Dept: CALL CENTER | Facility: HOSPITAL | Age: 66
End: 2021-03-13

## 2021-03-13 NOTE — OUTREACH NOTE
General Surgery Week 2 Survey      Responses   [facility] patient discharged from?  [facility]   Does the patient have one of the following disease processes/diagnoses(primary or secondary)?  General Surgery   Week 2 attempt successful?  Yes   Call start time  1727   Call end time  1728   Discharge diagnosis  Attention to colostomy   Meds reviewed with patient/caregiver?  Yes   Is the patient having any side effects they believe may be caused by any medication additions or changes?  No   Is the patient taking all medications as directed (includes completed medication regime)?  Yes   Does the patient have a follow up appointment scheduled with their surgeon?  Yes   Has the patient kept scheduled appointments due by today?  Yes   Comments  PCP on 03/30/2021   What is the Home health agency?   [facility]   Has home health visited the patient within 72 hours of discharge?  Yes   Psychosocial issues?  No   Did the patient receive a copy of their discharge instructions?  Yes   Nursing interventions  Reviewed instructions with patient   What is the patient's perception of their health status since discharge?  Improving   Week 2 call completed?  Yes   Wrap up additional comments  Very brief call. States there are no issues to address.          Nehal Hoffmann RN

## 2021-03-15 NOTE — PROGRESS NOTES
"Post-op Note    Subjective   Renuka Pelayo is a 65 y.o. female status post colostomy takedown with colorectal anastomosis on 2/25/2021.  Overall, patient is doing well.  She is not having any fevers.  She is not having any significant abdominal pain, she is not having any difficulties passing bowel movements.  She is still somewhat fatigued, and her appetite is not 100%, but it is improving.      Objective   /84 (BP Location: Right arm, Patient Position: Sitting, Cuff Size: Large Adult)   Pulse 81   Temp 97.7 °F (36.5 °C) (Temporal)   Resp 18   Ht 160 cm (63\")   Wt 80.3 kg (177 lb)   SpO2 94%   BMI 31.35 kg/m²   Surgical incision is well approximated with skin staples in place.  There is a minimal amount of serous drainage coming from the incision.  No evidence of surrounding erythema, induration, or drainage to suggest infection at this time.  The abdomen is soft, and nontender.  The colostomy site is packed with clean dry gauze, and was repacked by me today.      Assessment/Plan   Patient is a 65-year-old lady status post colostomy takedown with colorectal anastomosis on 2/5/2021.      Continue no heavy lifting for 6 weeks after surgery  Continue low residue diet  Follow-up with me in 1 month  Continue local wound care with packing of the colostomy site with clean dry gauze daily.    Chi Farmer MD  3/15/2021  14:33 EDT  "

## 2021-03-19 ENCOUNTER — READMISSION MANAGEMENT (OUTPATIENT)
Dept: CALL CENTER | Facility: HOSPITAL | Age: 66
End: 2021-03-19

## 2021-03-19 NOTE — OUTREACH NOTE
General Surgery Week 3 Survey      Responses   [facility] patient discharged from?  [facility]   Does the patient have one of the following disease processes/diagnoses(primary or secondary)?  General Surgery   Week 3 attempt successful?  Yes   Call start time  1804   Call end time  1806   Discharge diagnosis  Attention to colostomy   Meds reviewed with patient/caregiver?  Yes   Is the patient having any side effects they believe may be caused by any medication additions or changes?  No   Does the patient have all medications related to this admission filled (includes all antibiotics, pain medications, etc.)  Yes   Is the patient taking all medications as directed (includes completed medication regime)?  Yes   Does the patient have a follow up appointment scheduled with their surgeon?  Yes   Has the patient kept scheduled appointments due by today?  Yes   What is the Home health agency?   [facility]   Has home health visited the patient within 72 hours of discharge?  Yes   Psychosocial issues?  No   Did the patient receive a copy of their discharge instructions?  Yes   Nursing interventions  Reviewed instructions with patient   What is the patient's perception of their health status since discharge?  Improving   Nursing interventions  Nurse provided patient education   Is the patient /caregiver able to teach back basic post-op care?  Lifting as instructed by MD in discharge instructions   Is the patient/caregiver able to teach back signs and symptoms of incisional infection?  Increased redness, swelling or pain at the incisonal site, Pus or odor from incision, Fever, Incisional warmth, Increased drainage or bleeding   Is the patient/caregiver able to teach back steps to recovery at home?  Set small, achievable goals for return to baseline health, Rest and rebuild strength, gradually increase activity   If the patient is a current smoker, are they able to teach back resources for cessation?  Not a smoker    Is the patient/caregiver able to teach back the hierarchy of who to call/visit for symptoms/problems? PCP, Specialist, Home health nurse, Urgent Care, ED, 911  Yes   Week 3 call completed?  Yes   Wrap up additional comments  very brief call, stated they are packing her wound but did not offer any info.          Alexia Abbasi, RN

## 2021-04-12 ENCOUNTER — OFFICE VISIT (OUTPATIENT)
Dept: SURGERY | Facility: CLINIC | Age: 66
End: 2021-04-12

## 2021-04-12 VITALS
OXYGEN SATURATION: 97 % | TEMPERATURE: 97.5 F | HEART RATE: 76 BPM | HEIGHT: 63 IN | DIASTOLIC BLOOD PRESSURE: 73 MMHG | SYSTOLIC BLOOD PRESSURE: 113 MMHG | RESPIRATION RATE: 18 BRPM | BODY MASS INDEX: 30.87 KG/M2 | WEIGHT: 174.2 LBS

## 2021-04-12 DIAGNOSIS — Z93.3 STATUS POST HARTMANN PROCEDURE (HCC): Primary | ICD-10-CM

## 2021-04-12 PROCEDURE — 99024 POSTOP FOLLOW-UP VISIT: CPT | Performed by: SURGERY

## 2021-04-12 NOTE — PROGRESS NOTES
"Post-op Note    Subjective   Renuka Pelayo is a 65 y.o. female status post colostomy takedown with colorectal anastomosis on 2/25/2021.  She reports feeling much better.  She is very pleased with her outcome.  No problems with nausea, vomiting, bowel movements, abdominal pain, or fevers      Objective   /73 (BP Location: Left arm, Patient Position: Sitting, Cuff Size: Large Adult)   Pulse 76   Temp 97.5 °F (36.4 °C) (Temporal)   Resp 18   Ht 160 cm (63\")   Wt 79 kg (174 lb 3.2 oz)   SpO2 97%   BMI 30.86 kg/m²   Surgical incision is well-healed without any signs of hernia, or infection  Ostomy site is well-healed as well    Assessment/Plan   Patient is a 65-year-old lady status post colostomy takedown with colorectal anastomosis on 2/5/2021.      Overall doing well  Activity as tolerated  Follow-up with me as needed  Okay to resume regular diet.  Discussed with patient that she did have a smaller anastomosis than usual, and may be prone to constipation going forward.    Chi Farmer MD  4/12/2021  10:11 EDT  "

## 2021-05-04 ENCOUNTER — HOSPITAL ENCOUNTER (EMERGENCY)
Facility: HOSPITAL | Age: 66
Discharge: HOME | End: 2021-05-04
Attending: EMERGENCY MEDICINE | Admitting: EMERGENCY MEDICINE

## 2021-05-04 ENCOUNTER — APPOINTMENT (OUTPATIENT)
Dept: GENERAL RADIOLOGY | Facility: HOSPITAL | Age: 66
End: 2021-05-04

## 2021-05-04 VITALS
SYSTOLIC BLOOD PRESSURE: 132 MMHG | HEART RATE: 63 BPM | TEMPERATURE: 98 F | BODY MASS INDEX: 30.7 KG/M2 | HEIGHT: 63 IN | OXYGEN SATURATION: 98 % | RESPIRATION RATE: 18 BRPM | DIASTOLIC BLOOD PRESSURE: 69 MMHG | WEIGHT: 173.28 LBS

## 2021-05-04 DIAGNOSIS — R06.02 SHORTNESS OF BREATH: ICD-10-CM

## 2021-05-04 DIAGNOSIS — J44.1 COPD EXACERBATION (HCC): Primary | ICD-10-CM

## 2021-05-04 LAB
ANION GAP SERPL CALCULATED.3IONS-SCNC: 11 MMOL/L (ref 5–15)
ARTERIAL PATENCY WRIST A: POSITIVE
ATMOSPHERIC PRESS: ABNORMAL MM[HG]
B PARAPERT DNA SPEC QL NAA+PROBE: NOT DETECTED
B PERT DNA SPEC QL NAA+PROBE: NOT DETECTED
BASE EXCESS BLDA CALC-SCNC: 3.2 MMOL/L (ref 0–3)
BDY SITE: ABNORMAL
BUN SERPL-MCNC: 23 MG/DL (ref 8–23)
BUN/CREAT SERPL: 19.7 (ref 7–25)
C PNEUM DNA NPH QL NAA+NON-PROBE: NOT DETECTED
CALCIUM SPEC-SCNC: 9.6 MG/DL (ref 8.6–10.5)
CHLORIDE SERPL-SCNC: 98 MMOL/L (ref 98–107)
CO2 BLDA-SCNC: 30.6 MMOL/L (ref 22–29)
CO2 SERPL-SCNC: 30 MMOL/L (ref 22–29)
CREAT SERPL-MCNC: 1.17 MG/DL (ref 0.57–1)
DEPRECATED RDW RBC AUTO: 43.8 FL (ref 37–54)
ERYTHROCYTE [DISTWIDTH] IN BLOOD BY AUTOMATED COUNT: 15.1 % (ref 12.3–15.4)
FLUAV SUBTYP SPEC NAA+PROBE: NOT DETECTED
FLUBV RNA ISLT QL NAA+PROBE: NOT DETECTED
GFR SERPL CREATININE-BSD FRML MDRD: 47 ML/MIN/1.73
GLUCOSE SERPL-MCNC: 97 MG/DL (ref 65–99)
HADV DNA SPEC NAA+PROBE: NOT DETECTED
HCO3 BLDA-SCNC: 29.1 MMOL/L (ref 21–28)
HCOV 229E RNA SPEC QL NAA+PROBE: NOT DETECTED
HCOV HKU1 RNA SPEC QL NAA+PROBE: NOT DETECTED
HCOV NL63 RNA SPEC QL NAA+PROBE: NOT DETECTED
HCOV OC43 RNA SPEC QL NAA+PROBE: NOT DETECTED
HCT VFR BLD AUTO: 37.6 % (ref 34–46.6)
HEMODILUTION: NO
HGB BLD-MCNC: 12.3 G/DL (ref 12–15.9)
HMPV RNA NPH QL NAA+NON-PROBE: NOT DETECTED
HPIV1 RNA SPEC QL NAA+PROBE: NOT DETECTED
HPIV2 RNA SPEC QL NAA+PROBE: NOT DETECTED
HPIV3 RNA NPH QL NAA+PROBE: NOT DETECTED
HPIV4 P GENE NPH QL NAA+PROBE: NOT DETECTED
INHALED O2 CONCENTRATION: <21 %
INR PPP: 3 (ref 2–3)
LYMPHOCYTES # BLD MANUAL: 1.04 10*3/MM3 (ref 0.7–3.1)
LYMPHOCYTES NFR BLD MANUAL: 10 % (ref 19.6–45.3)
LYMPHOCYTES NFR BLD MANUAL: 3 % (ref 5–12)
M PNEUMO IGG SER IA-ACNC: NOT DETECTED
MCH RBC QN AUTO: 27.3 PG (ref 26.6–33)
MCHC RBC AUTO-ENTMCNC: 32.6 G/DL (ref 31.5–35.7)
MCV RBC AUTO: 83.7 FL (ref 79–97)
MODALITY: ABNORMAL
MONOCYTES # BLD AUTO: 0.31 10*3/MM3 (ref 0.1–0.9)
NEUTROPHILS # BLD AUTO: 9.05 10*3/MM3 (ref 1.7–7)
NEUTROPHILS NFR BLD MANUAL: 86 % (ref 42.7–76)
NEUTS BAND NFR BLD MANUAL: 1 % (ref 0–5)
PCO2 BLDA: 48.5 MM HG (ref 35–48)
PH BLDA: 7.39 PH UNITS (ref 7.35–7.45)
PLAT MORPH BLD: NORMAL
PLATELET # BLD AUTO: 466 10*3/MM3 (ref 140–450)
PMV BLD AUTO: 7.5 FL (ref 6–12)
PO2 BLDA: 63.4 MM HG (ref 83–108)
POTASSIUM SERPL-SCNC: 3.1 MMOL/L (ref 3.5–5.2)
PROCALCITONIN SERPL-MCNC: 0.06 NG/ML (ref 0–0.25)
PROTHROMBIN TIME: 31.3 SECONDS (ref 19.4–28.5)
RBC # BLD AUTO: 4.5 10*6/MM3 (ref 3.77–5.28)
RBC MORPH BLD: NORMAL
RHINOVIRUS RNA SPEC NAA+PROBE: NOT DETECTED
RSV RNA NPH QL NAA+NON-PROBE: NOT DETECTED
SAO2 % BLDCOA: 91.2 % (ref 94–98)
SARS-COV-2 RNA NPH QL NAA+NON-PROBE: NOT DETECTED
SCAN SLIDE: NORMAL
SODIUM SERPL-SCNC: 139 MMOL/L (ref 136–145)
TROPONIN T SERPL-MCNC: <0.01 NG/ML (ref 0–0.03)
WBC # BLD AUTO: 10.4 10*3/MM3 (ref 3.4–10.8)
WBC MORPH BLD: NORMAL

## 2021-05-04 PROCEDURE — 93005 ELECTROCARDIOGRAM TRACING: CPT | Performed by: EMERGENCY MEDICINE

## 2021-05-04 PROCEDURE — 85610 PROTHROMBIN TIME: CPT | Performed by: EMERGENCY MEDICINE

## 2021-05-04 PROCEDURE — 36600 WITHDRAWAL OF ARTERIAL BLOOD: CPT

## 2021-05-04 PROCEDURE — 84484 ASSAY OF TROPONIN QUANT: CPT | Performed by: EMERGENCY MEDICINE

## 2021-05-04 PROCEDURE — 85007 BL SMEAR W/DIFF WBC COUNT: CPT | Performed by: EMERGENCY MEDICINE

## 2021-05-04 PROCEDURE — 99284 EMERGENCY DEPT VISIT MOD MDM: CPT

## 2021-05-04 PROCEDURE — 93005 ELECTROCARDIOGRAM TRACING: CPT

## 2021-05-04 PROCEDURE — 85025 COMPLETE CBC W/AUTO DIFF WBC: CPT | Performed by: EMERGENCY MEDICINE

## 2021-05-04 PROCEDURE — 0202U NFCT DS 22 TRGT SARS-COV-2: CPT | Performed by: EMERGENCY MEDICINE

## 2021-05-04 PROCEDURE — 82803 BLOOD GASES ANY COMBINATION: CPT

## 2021-05-04 PROCEDURE — 96374 THER/PROPH/DIAG INJ IV PUSH: CPT

## 2021-05-04 PROCEDURE — 25010000002 METHYLPREDNISOLONE PER 125 MG: Performed by: EMERGENCY MEDICINE

## 2021-05-04 PROCEDURE — 84145 PROCALCITONIN (PCT): CPT | Performed by: EMERGENCY MEDICINE

## 2021-05-04 PROCEDURE — 71045 X-RAY EXAM CHEST 1 VIEW: CPT

## 2021-05-04 PROCEDURE — 80048 BASIC METABOLIC PNL TOTAL CA: CPT | Performed by: EMERGENCY MEDICINE

## 2021-05-04 PROCEDURE — 94640 AIRWAY INHALATION TREATMENT: CPT

## 2021-05-04 PROCEDURE — 94799 UNLISTED PULMONARY SVC/PX: CPT

## 2021-05-04 RX ORDER — IPRATROPIUM BROMIDE AND ALBUTEROL SULFATE 2.5; .5 MG/3ML; MG/3ML
3 SOLUTION RESPIRATORY (INHALATION) ONCE
Status: COMPLETED | OUTPATIENT
Start: 2021-05-04 | End: 2021-05-04

## 2021-05-04 RX ORDER — METHYLPREDNISOLONE SODIUM SUCCINATE 125 MG/2ML
60 INJECTION, POWDER, LYOPHILIZED, FOR SOLUTION INTRAMUSCULAR; INTRAVENOUS ONCE
Status: COMPLETED | OUTPATIENT
Start: 2021-05-04 | End: 2021-05-04

## 2021-05-04 RX ORDER — IPRATROPIUM BROMIDE AND ALBUTEROL SULFATE 2.5; .5 MG/3ML; MG/3ML
3 SOLUTION RESPIRATORY (INHALATION) EVERY 4 HOURS PRN
Qty: 3 ML | Refills: 0 | Status: SHIPPED | OUTPATIENT
Start: 2021-05-04 | End: 2023-03-22

## 2021-05-04 RX ADMIN — IPRATROPIUM BROMIDE AND ALBUTEROL SULFATE 3 ML: 2.5; .5 SOLUTION RESPIRATORY (INHALATION) at 14:19

## 2021-05-04 RX ADMIN — METHYLPREDNISOLONE SODIUM SUCCINATE 60 MG: 125 INJECTION, POWDER, FOR SOLUTION INTRAMUSCULAR; INTRAVENOUS at 11:08

## 2021-05-04 RX ADMIN — IPRATROPIUM BROMIDE AND ALBUTEROL SULFATE 3 ML: 2.5; .5 SOLUTION RESPIRATORY (INHALATION) at 11:41

## 2021-05-05 LAB — QT INTERVAL: 405 MS

## 2021-07-27 NOTE — PROGRESS NOTES
"   LOS: 4 days   Patient Care Team:  Michael Gerardo MD as PCP - General    Reason for follow-up: Postop    Subjective   Small amount of dark stool in bag, minimal gas. Denies abdominal pain.    Objective   Incision is covered with dressings which are clean, dry, and intact.  Drain tube with appropriate color and output - no pus    Vital Signs  Vitals:    07/20/20 0719 07/20/20 0722 07/20/20 1053 07/20/20 1055   BP:       BP Location:       Patient Position:       Pulse: 86 87 88 92   Resp: 16 16 18 18   Temp:       TempSrc:       SpO2: 97%      Weight:       Height:             Results Review:       Lab Results (last 24 hours)     Procedure Component Value Units Date/Time    Tissue Pathology Exam [[id number]] Collected:  07/16/20 1333    Specimen:  Tissue from Large Intestine, Sigmoid Colon Updated:  07/20/20 1008     Case Report --     Surgical Pathology Report                         Case: [id number]                                  Authorizing Provider:  Chi Farmer,   Collected:           07/16/2020 01:33 PM                                 MD                                                                           Ordering Location:     [facility] MAIN  Received:            07/17/2020 09:04 AM                                 OR                                                                           Pathologist:           Cristofer Paige MD                                                            Specimen:    Large Intestine, Sigmoid Colon, sigmoid colon                                               Final Diagnosis --     Sigmoid colon, resection:    Colonic tissue with diverticulosis, acute diverticulitis and acute serositis consistent with clinical history     Resection margins viable    No dysplasia or malignancy identified    BUNNY/regla        Gross Description --     Received in formalin designated \"Sigmoid\" is an unoriented portion of large bowel measuring 13 cm in length and " averages 5 cm in diameter. The serosa is pink and glistening. There is a moderate amount of yellow-brown pericolonic fatty tissue. There is also a whitish yellow purulent exudate over the mid and one oriented end portion. The bowel is opened revealing tan mucosa with usual folds, no ulcers, polyps or masses are seen, however, numerous outpouchings grossly consistent with diverticula are identified. Margins are submitted in A and B. Representative sections of presumed diverticula are submitted in C through E.      [facility]        Blood Culture - Blood, Arm, Left [[id number]] Collected:  07/16/20 0857    Specimen:  Blood from Arm, Left Updated:  07/20/20 0915     Blood Culture No growth at 4 days    Blood Culture - Blood, Arm, Right [[id number]] Collected:  07/16/20 0858    Specimen:  Blood from Arm, Right Updated:  07/20/20 0915     Blood Culture No growth at 4 days    BUN [[id number]]  (Abnormal) Collected:  07/20/20 0329    Specimen:  Blood Updated:  07/20/20 0707     BUN 7 mg/dL     CBC Auto Differential [[id number]]  (Abnormal) Collected:  07/20/20 0329    Specimen:  Blood Updated:  07/20/20 0513     WBC 10.90 10*3/mm3      RBC 4.20 10*6/mm3      Hemoglobin 11.9 g/dL      Hematocrit 35.7 %      MCV 85.0 fL      MCH 28.3 pg      MCHC 33.3 g/dL      RDW 14.4 %      RDW-SD 42.9 fl      MPV 7.3 fL      Platelets 317 10*3/mm3     Scan Slide [[id number]] Collected:  07/20/20 0329    Specimen:  Blood Updated:  07/20/20 0513     Scan Slide --     Comment: See Manual Differential Results       Manual Differential [[id number]]  (Abnormal) Collected:  07/20/20 0329    Specimen:  Blood Updated:  07/20/20 0513     Neutrophil % 64.0 %      Lymphocyte % 17.0 %      Monocyte % 6.0 %      Eosinophil % 1.0 %      Bands %  9.0 %      Myelocyte % 2.0 %      Atypical Lymphocyte % 1.0 %      Neutrophils Absolute 7.96 10*3/mm3      Lymphocytes Absolute 1.85 10*3/mm3      Monocytes Absolute 0.65 10*3/mm3      Eosinophils Absolute 0.11 10*3/mm3       RBC Morphology Normal     WBC Morphology Normal     Platelet Estimate Adequate    Basic Metabolic Panel [[id number]]  (Abnormal) Collected:  07/20/20 0329    Specimen:  Blood Updated:  07/20/20 0420     Glucose 150 mg/dL      BUN --     Comment: Testing performed by alternate method        Creatinine 0.62 mg/dL      Sodium 140 mmol/L      Potassium 3.3 mmol/L      Chloride 101 mmol/L      CO2 28.0 mmol/L      Calcium 8.4 mg/dL      eGFR Non African Amer 97 mL/min/1.73      BUN/Creatinine Ratio --     Comment: Testing not performed        Anion Gap 11.0 mmol/L     Narrative:       GFR Normal >60  Chronic Kidney Disease <60  Kidney Failure <15      Protime-INR [[id number]]  (Abnormal) Collected:  07/20/20 0329    Specimen:  Blood Updated:  07/20/20 0359     Protime 10.8 Seconds      INR 1.04    Protime-INR [[id number]]  (Abnormal) Collected:  07/19/20 1636    Specimen:  Blood Updated:  07/19/20 1659     Protime 10.2 Seconds      INR 0.97    POC Glucose Once [[id number]]  (Abnormal) Collected:  07/19/20 1645    Specimen:  Blood Updated:  07/19/20 1646     Glucose 154 mg/dL      Comment: Serial Number: [id number]:  [id number]              Imaging Results (Last 24 Hours)     ** No results found for the last 24 hours. **          Medication Review:   Current Facility-Administered Medications:   •  acetaminophen (TYLENOL) tablet 650 mg, 650 mg, Oral, Q4H PRN **OR** acetaminophen (TYLENOL) 160 MG/5ML solution 650 mg, 650 mg, Oral, Q4H PRN **OR** acetaminophen (TYLENOL) suppository 650 mg, 650 mg, Rectal, Q4H PRN, Chi Farmer MD  •  albuterol (PROVENTIL) nebulizer solution 0.083% 2.5 mg/3mL, 2.5 mg, Nebulization, Q4H PRN, Chi Farmer MD, 2.5 mg at 07/18/20 0151  •  aluminum-magnesium hydroxide-simethicone (MAALOX MAX) 400-400-40 MG/5ML suspension 15 mL, 15 mL, Oral, Q6H PRN, Chi Farmer MD, 15 mL at 07/19/20 0546  •  bisacodyl (DULCOLAX) suppository 10 mg, 10 mg, Rectal, Daily  PRN, Chi Farmer MD  •  budesonide-formoterol (SYMBICORT) 160-4.5 MCG/ACT inhaler 2 puff, 2 puff, Inhalation, BID - RT, hCi Farmer MD, 2 puff at 07/20/20 0719  •  FLUoxetine (PROzac) capsule 10 mg, 10 mg, Oral, Daily, Guerrero, Birrilla, DO, 10 mg at 07/20/20 1041  •  guaiFENesin (MUCINEX) 12 hr tablet 600 mg, 600 mg, Oral, Q12H, Guerrero, Birrilla, DO, 600 mg at 07/20/20 1041  •  hydrALAZINE (APRESOLINE) injection 10 mg, 10 mg, Intravenous, Q6H PRN, Chi Farmer MD  •  HYDROmorphone (DILAUDID) injection 0.5 mg, 0.5 mg, Intravenous, Q2H PRN, Chi Farmer MD, 0.5 mg at 07/18/20 2108  •  ipratropium-albuterol (DUO-NEB) nebulizer solution 3 mL, 3 mL, Nebulization, 4x Daily - RT, Guerrero Birrilla, DO, 3 mL at 07/20/20 1053  •  magnesium hydroxide (MILK OF MAGNESIA) suspension 2400 mg/10mL 10 mL, 10 mL, Oral, Daily PRN, Chi Farmer MD  •  Magnesium Sulfate 2 gram infusion - Mg less than or equal to 1.5 mg/dL, 2 g, Intravenous, PRN **OR** Magnesium Sulfate 1 gram infusion - Mg 1.6-1.9 mg/dL, 1 g, Intravenous, PRN, Chi Farmer MD  •  melatonin tablet 5 mg, 5 mg, Oral, Nightly PRN, Chi Farmer MD, 5 mg at 07/19/20 2111  •  nitroglycerin (NITROSTAT) SL tablet 0.4 mg, 0.4 mg, Sublingual, Q5 Min PRN, Chi Farmer MD  •  ondansetron (ZOFRAN) tablet 4 mg, 4 mg, Oral, Q6H PRN **OR** ondansetron (ZOFRAN) injection 4 mg, 4 mg, Intravenous, Q6H PRN, Chi Farmer MD, 4 mg at 07/16/20 1432  •  oxyCODONE (ROXICODONE) immediate release tablet 5 mg, 5 mg, Oral, Q4H PRN **OR** oxyCODONE (ROXICODONE) immediate release tablet 10 mg, 10 mg, Oral, Q4H PRN, Chi Farmer MD, 10 mg at 07/20/20 1156  •  pantoprazole (PROTONIX) EC tablet 40 mg, 40 mg, Oral, QAM AC, Clemente Guerrero DO, 40 mg at 07/20/20 1041  •  Pharmacy to dose warfarin, , Does not apply, Continuous PRN, Clemente Guerrero DO  •  piperacillin-tazobactam  (ZOSYN) IVPB 3.375 g in 100 mL NS (CD), 3.375 g, Intravenous, Q8H, Chi Farmer MD, Last Rate: 25 mL/hr at 07/20/20 0604, 3.375 g at 07/20/20 0604  •  potassium chloride (K-DUR,KLOR-CON) CR tablet 40 mEq, 40 mEq, Oral, PRN, 40 mEq at 07/20/20 0436 **OR** potassium chloride (KLOR-CON) packet 40 mEq, 40 mEq, Oral, PRN, 40 mEq at 07/18/20 2108 **OR** potassium chloride 10 mEq in 100 mL IVPB, 10 mEq, Intravenous, Q1H PRN, Chi Farmer MD, Last Rate: 100 mL/hr at 07/16/20 1203, 10 mEq at 07/16/20 1203  •  promethazine (PHENERGAN) IVPB 6.25 mg, 6.25 mg, Intravenous, Once PRN **OR** promethazine (PHENERGAN) injection 6.25 mg, 6.25 mg, Intramuscular, Once PRN **OR** promethazine (PHENERGAN) suppository 25 mg, 25 mg, Rectal, Once PRN **OR** promethazine (PHENERGAN) tablet 25 mg, 25 mg, Oral, Once PRN, Chi Famrer MD  •  sodium chloride 0.9 % flush 10 mL, 10 mL, Intravenous, Q12H, Chi Farmer MD, 10 mL at 07/19/20 2111  •  sodium chloride 0.9 % flush 10 mL, 10 mL, Intravenous, PRN, Chi Farmer MD  •  sodium chloride 0.9 % infusion, 125 mL/hr, Intravenous, Continuous, Chi Farmer MD, Last Rate: 125 mL/hr at 07/17/20 1249, 125 mL/hr at 07/17/20 1249  •  traZODone (DESYREL) tablet 100 mg, 100 mg, Oral, Nightly PRN, Guerrero, Birrilla, DO, 100 mg at 07/19/20 2111  •  warfarin (COUMADIN) tablet 1.5 mg, 1.5 mg, Oral, Daily, Guerrero, Clemente, DO    Assessment/Plan         Acute hypokalemia    Severe sepsis (CMS/HCC)    Acute kidney injury (CMS/HCC)    Acute hyponatremia    COPD (chronic obstructive pulmonary disease) (CMS/HCC)    Chronic back pain    Depressive disorder    Gastroesophageal reflux disease    Essential hypertension    Insomnia    Chronic anticoagulation    Obesity    Impression: Postop day #4 Alejo's, no flatus or stool out the bag yet, ostomy viable    Plan:  Advance to FLD  Continue abx  PRANAY drain out upon D/C - continue til D/C  Continue  mobilization  Likely home in 48-72 hours pending advancement of diet and return of bowel function      Chi Farmer MD  07/20/20  12:54           How Many Skin Cancers Have You Had?: one What Is The Reason For Today's Visit?: Follow Up Non-Melanoma Skin Cancer When Was Your Last Cancer Diagnosed?: 2020

## 2021-09-22 ENCOUNTER — TRANSCRIBE ORDERS (OUTPATIENT)
Dept: ADMINISTRATIVE | Facility: HOSPITAL | Age: 66
End: 2021-09-22

## 2021-09-22 ENCOUNTER — HOSPITAL ENCOUNTER (OUTPATIENT)
Dept: GENERAL RADIOLOGY | Facility: HOSPITAL | Age: 66
Discharge: HOME | End: 2021-09-22
Admitting: NURSE PRACTITIONER

## 2021-09-22 DIAGNOSIS — R06.00 DYSPNEA, UNSPECIFIED TYPE: ICD-10-CM

## 2021-09-22 DIAGNOSIS — R06.00 DYSPNEA, UNSPECIFIED TYPE: Primary | ICD-10-CM

## 2021-09-22 PROCEDURE — 71046 X-RAY EXAM CHEST 2 VIEWS: CPT

## 2021-10-12 NOTE — PROGRESS NOTES
"Post-op Note    Subjective   Renuka Pelayo is a 64 y.o. female returns status post Alejo's procedure performed on 7/16/2020 for perforated diverticulitis.  The patient denies having any fevers but does report that she has had some drainage coming from her midline incision.  There is also some erythema around her midline incision.  Her colostomy is functioning well.  She is off antibiotics.      Objective   /80   Pulse 85   Temp 97.3 °F (36.3 °C) (Temporal)   Ht 160 cm (63\")   Wt 76.2 kg (168 lb)   SpO2 97%   BMI 29.76 kg/m²   No significant erythema surrounding midline wound.  Some induration, but no fluctuance.  There is a pinpoint opening in the midportion of the incision with some heaping pink granulation tissue which is draining a small amount of fluid.      Assessment/Plan   64-year-old lady status post Mariya procedure performed on 7/16/2020 for perforated diverticulitis.    I treated the wound with silver nitrate.  Hopefully this will limit some of the fibrinous drainage coming from this granulation tissue.  Continue regular diet  No heavy lifting for another week  Patient will need a colonoscopy in a few months, and then we can discuss potentially reversing her colostomy at that time.    Chi Farmer MD  8/21/2020  12:28  " yes

## 2021-11-01 ENCOUNTER — HOSPITAL ENCOUNTER (OUTPATIENT)
Facility: HOSPITAL | Age: 66
Setting detail: OBSERVATION
Discharge: HOME | End: 2021-11-02
Attending: EMERGENCY MEDICINE | Admitting: EMERGENCY MEDICINE

## 2021-11-01 ENCOUNTER — APPOINTMENT (OUTPATIENT)
Dept: CT IMAGING | Facility: HOSPITAL | Age: 66
End: 2021-11-01

## 2021-11-01 DIAGNOSIS — R10.13 EPIGASTRIC PAIN: ICD-10-CM

## 2021-11-01 DIAGNOSIS — R10.13 EPIGASTRIC ABDOMINAL PAIN: Primary | ICD-10-CM

## 2021-11-01 PROBLEM — R10.9 ABDOMINAL PAIN: Status: ACTIVE | Noted: 2021-11-01

## 2021-11-01 LAB
ALBUMIN SERPL-MCNC: 4 G/DL (ref 3.5–5.2)
ALBUMIN/GLOB SERPL: 1.5 G/DL
ALP SERPL-CCNC: 102 U/L (ref 39–117)
ALT SERPL W P-5'-P-CCNC: 8 U/L (ref 1–33)
ANION GAP SERPL CALCULATED.3IONS-SCNC: 14 MMOL/L (ref 5–15)
APTT PPP: 39.6 SECONDS (ref 24–31)
AST SERPL-CCNC: 19 U/L (ref 1–32)
BACTERIA UR QL AUTO: ABNORMAL /HPF
BASOPHILS # BLD AUTO: 0.1 10*3/MM3 (ref 0–0.2)
BASOPHILS NFR BLD AUTO: 1 % (ref 0–1.5)
BILIRUB SERPL-MCNC: 0.3 MG/DL (ref 0–1.2)
BILIRUB UR QL STRIP: NEGATIVE
BUN SERPL-MCNC: 7 MG/DL (ref 8–23)
BUN/CREAT SERPL: 7.4 (ref 7–25)
CALCIUM SPEC-SCNC: 9.6 MG/DL (ref 8.6–10.5)
CHLORIDE SERPL-SCNC: 102 MMOL/L (ref 98–107)
CLARITY UR: ABNORMAL
CO2 SERPL-SCNC: 25 MMOL/L (ref 22–29)
COLOR UR: YELLOW
CREAT SERPL-MCNC: 0.94 MG/DL (ref 0.57–1)
D-LACTATE SERPL-SCNC: 1.8 MMOL/L (ref 0.5–2)
DEPRECATED RDW RBC AUTO: 44.6 FL (ref 37–54)
EOSINOPHIL # BLD AUTO: 0.1 10*3/MM3 (ref 0–0.4)
EOSINOPHIL NFR BLD AUTO: 1.5 % (ref 0.3–6.2)
ERYTHROCYTE [DISTWIDTH] IN BLOOD BY AUTOMATED COUNT: 15.5 % (ref 12.3–15.4)
GFR SERPL CREATININE-BSD FRML MDRD: 60 ML/MIN/1.73
GLOBULIN UR ELPH-MCNC: 2.7 GM/DL
GLUCOSE SERPL-MCNC: 102 MG/DL (ref 65–99)
GLUCOSE UR STRIP-MCNC: NEGATIVE MG/DL
HCT VFR BLD AUTO: 36.5 % (ref 34–46.6)
HGB BLD-MCNC: 12.3 G/DL (ref 12–15.9)
HGB UR QL STRIP.AUTO: NEGATIVE
HYALINE CASTS UR QL AUTO: ABNORMAL /LPF
INR PPP: 2.43 (ref 2–3)
INR PPP: 2.63 (ref 2–3)
KETONES UR QL STRIP: NEGATIVE
LEUKOCYTE ESTERASE UR QL STRIP.AUTO: ABNORMAL
LIPASE SERPL-CCNC: 58 U/L (ref 13–60)
LYMPHOCYTES # BLD AUTO: 1.5 10*3/MM3 (ref 0.7–3.1)
LYMPHOCYTES NFR BLD AUTO: 19.7 % (ref 19.6–45.3)
MCH RBC QN AUTO: 28 PG (ref 26.6–33)
MCHC RBC AUTO-ENTMCNC: 33.7 G/DL (ref 31.5–35.7)
MCV RBC AUTO: 82.9 FL (ref 79–97)
MONOCYTES # BLD AUTO: 0.6 10*3/MM3 (ref 0.1–0.9)
MONOCYTES NFR BLD AUTO: 7.4 % (ref 5–12)
NEUTROPHILS NFR BLD AUTO: 5.5 10*3/MM3 (ref 1.7–7)
NEUTROPHILS NFR BLD AUTO: 70.4 % (ref 42.7–76)
NITRITE UR QL STRIP: NEGATIVE
NRBC BLD AUTO-RTO: 0 /100 WBC (ref 0–0.2)
PH UR STRIP.AUTO: 6.5 [PH] (ref 5–8)
PLATELET # BLD AUTO: 303 10*3/MM3 (ref 140–450)
PMV BLD AUTO: 8.2 FL (ref 6–12)
POTASSIUM SERPL-SCNC: 3.6 MMOL/L (ref 3.5–5.2)
PROT SERPL-MCNC: 6.7 G/DL (ref 6–8.5)
PROT UR QL STRIP: NEGATIVE
PROTHROMBIN TIME: 25.3 SECONDS (ref 19.4–28.5)
PROTHROMBIN TIME: 27.2 SECONDS (ref 19.4–28.5)
RBC # BLD AUTO: 4.4 10*6/MM3 (ref 3.77–5.28)
RBC # UR: ABNORMAL /HPF
REF LAB TEST METHOD: ABNORMAL
SARS-COV-2 RNA PNL SPEC NAA+PROBE: NOT DETECTED
SODIUM SERPL-SCNC: 141 MMOL/L (ref 136–145)
SP GR UR STRIP: 1.02 (ref 1–1.03)
SQUAMOUS #/AREA URNS HPF: ABNORMAL /HPF
TRANS CELLS #/AREA URNS HPF: ABNORMAL /HPF
TROPONIN T SERPL-MCNC: <0.01 NG/ML (ref 0–0.03)
UROBILINOGEN UR QL STRIP: ABNORMAL
WBC # BLD AUTO: 7.8 10*3/MM3 (ref 3.4–10.8)
WBC UR QL AUTO: ABNORMAL /HPF

## 2021-11-01 PROCEDURE — 80053 COMPREHEN METABOLIC PANEL: CPT | Performed by: EMERGENCY MEDICINE

## 2021-11-01 PROCEDURE — C9803 HOPD COVID-19 SPEC COLLECT: HCPCS

## 2021-11-01 PROCEDURE — G0378 HOSPITAL OBSERVATION PER HR: HCPCS

## 2021-11-01 PROCEDURE — 85730 THROMBOPLASTIN TIME PARTIAL: CPT | Performed by: EMERGENCY MEDICINE

## 2021-11-01 PROCEDURE — 84484 ASSAY OF TROPONIN QUANT: CPT | Performed by: EMERGENCY MEDICINE

## 2021-11-01 PROCEDURE — 74177 CT ABD & PELVIS W/CONTRAST: CPT

## 2021-11-01 PROCEDURE — 99284 EMERGENCY DEPT VISIT MOD MDM: CPT

## 2021-11-01 PROCEDURE — 96374 THER/PROPH/DIAG INJ IV PUSH: CPT

## 2021-11-01 PROCEDURE — 83690 ASSAY OF LIPASE: CPT | Performed by: EMERGENCY MEDICINE

## 2021-11-01 PROCEDURE — 25010000002 ONDANSETRON PER 1 MG: Performed by: EMERGENCY MEDICINE

## 2021-11-01 PROCEDURE — 0 MORPHINE SULFATE 4 MG/ML SOLUTION: Performed by: EMERGENCY MEDICINE

## 2021-11-01 PROCEDURE — 96375 TX/PRO/DX INJ NEW DRUG ADDON: CPT

## 2021-11-01 PROCEDURE — 85025 COMPLETE CBC W/AUTO DIFF WBC: CPT | Performed by: EMERGENCY MEDICINE

## 2021-11-01 PROCEDURE — 81001 URINALYSIS AUTO W/SCOPE: CPT | Performed by: EMERGENCY MEDICINE

## 2021-11-01 PROCEDURE — 85610 PROTHROMBIN TIME: CPT | Performed by: NURSE PRACTITIONER

## 2021-11-01 PROCEDURE — 93005 ELECTROCARDIOGRAM TRACING: CPT | Performed by: EMERGENCY MEDICINE

## 2021-11-01 PROCEDURE — 85610 PROTHROMBIN TIME: CPT | Performed by: EMERGENCY MEDICINE

## 2021-11-01 PROCEDURE — 83605 ASSAY OF LACTIC ACID: CPT

## 2021-11-01 PROCEDURE — 96361 HYDRATE IV INFUSION ADD-ON: CPT

## 2021-11-01 PROCEDURE — 87635 SARS-COV-2 COVID-19 AMP PRB: CPT | Performed by: EMERGENCY MEDICINE

## 2021-11-01 PROCEDURE — 0 IOPAMIDOL PER 1 ML: Performed by: EMERGENCY MEDICINE

## 2021-11-01 RX ORDER — TRAZODONE HYDROCHLORIDE 100 MG/1
100 TABLET ORAL NIGHTLY PRN
Status: DISCONTINUED | OUTPATIENT
Start: 2021-11-01 | End: 2021-11-02 | Stop reason: HOSPADM

## 2021-11-01 RX ORDER — WARFARIN SODIUM 3 MG/1
3 TABLET ORAL
Status: DISCONTINUED | OUTPATIENT
Start: 2021-11-01 | End: 2021-11-02 | Stop reason: HOSPADM

## 2021-11-01 RX ORDER — ONDANSETRON 2 MG/ML
4 INJECTION INTRAMUSCULAR; INTRAVENOUS ONCE
Status: COMPLETED | OUTPATIENT
Start: 2021-11-01 | End: 2021-11-01

## 2021-11-01 RX ORDER — MORPHINE SULFATE 4 MG/ML
4 INJECTION, SOLUTION INTRAMUSCULAR; INTRAVENOUS ONCE
Status: COMPLETED | OUTPATIENT
Start: 2021-11-01 | End: 2021-11-01

## 2021-11-01 RX ORDER — HYDROCODONE BITARTRATE AND ACETAMINOPHEN 7.5; 325 MG/1; MG/1
1 TABLET ORAL EVERY 4 HOURS PRN
Status: DISCONTINUED | OUTPATIENT
Start: 2021-11-01 | End: 2021-11-02 | Stop reason: HOSPADM

## 2021-11-01 RX ORDER — WARFARIN SODIUM 3 MG/1
3 TABLET ORAL NIGHTLY
COMMUNITY

## 2021-11-01 RX ORDER — ACETAMINOPHEN 160 MG/5ML
650 SOLUTION ORAL EVERY 4 HOURS PRN
Status: DISCONTINUED | OUTPATIENT
Start: 2021-11-01 | End: 2021-11-02 | Stop reason: HOSPADM

## 2021-11-01 RX ORDER — ROFLUMILAST 500 UG/1
500 TABLET ORAL NIGHTLY
Status: CANCELLED | OUTPATIENT
Start: 2021-11-01

## 2021-11-01 RX ORDER — ACETAMINOPHEN 650 MG/1
650 SUPPOSITORY RECTAL EVERY 4 HOURS PRN
Status: DISCONTINUED | OUTPATIENT
Start: 2021-11-01 | End: 2021-11-02 | Stop reason: HOSPADM

## 2021-11-01 RX ORDER — ZOLPIDEM TARTRATE 5 MG/1
5 TABLET ORAL NIGHTLY
COMMUNITY

## 2021-11-01 RX ORDER — ZOLPIDEM TARTRATE 5 MG/1
5 TABLET ORAL NIGHTLY
Status: CANCELLED | OUTPATIENT
Start: 2021-11-01

## 2021-11-01 RX ORDER — SODIUM CHLORIDE 0.9 % (FLUSH) 0.9 %
10 SYRINGE (ML) INJECTION EVERY 12 HOURS SCHEDULED
Status: DISCONTINUED | OUTPATIENT
Start: 2021-11-01 | End: 2021-11-02 | Stop reason: HOSPADM

## 2021-11-01 RX ORDER — ACETAMINOPHEN 325 MG/1
650 TABLET ORAL EVERY 4 HOURS PRN
Status: DISCONTINUED | OUTPATIENT
Start: 2021-11-01 | End: 2021-11-02 | Stop reason: HOSPADM

## 2021-11-01 RX ORDER — ONDANSETRON 4 MG/1
4 TABLET, FILM COATED ORAL EVERY 6 HOURS PRN
Status: DISCONTINUED | OUTPATIENT
Start: 2021-11-01 | End: 2021-11-02 | Stop reason: HOSPADM

## 2021-11-01 RX ORDER — ROFLUMILAST 500 UG/1
500 TABLET ORAL NIGHTLY
COMMUNITY

## 2021-11-01 RX ORDER — ONDANSETRON 2 MG/ML
4 INJECTION INTRAMUSCULAR; INTRAVENOUS EVERY 6 HOURS PRN
Status: DISCONTINUED | OUTPATIENT
Start: 2021-11-01 | End: 2021-11-02 | Stop reason: HOSPADM

## 2021-11-01 RX ORDER — SODIUM CHLORIDE 9 MG/ML
100 INJECTION, SOLUTION INTRAVENOUS CONTINUOUS
Status: DISCONTINUED | OUTPATIENT
Start: 2021-11-01 | End: 2021-11-02 | Stop reason: HOSPADM

## 2021-11-01 RX ORDER — CARVEDILOL 3.12 MG/1
3.12 TABLET ORAL 2 TIMES DAILY WITH MEALS
Status: DISCONTINUED | OUTPATIENT
Start: 2021-11-01 | End: 2021-11-02 | Stop reason: HOSPADM

## 2021-11-01 RX ORDER — LEVOTHYROXINE SODIUM 0.05 MG/1
50 TABLET ORAL DAILY
Status: DISCONTINUED | OUTPATIENT
Start: 2021-11-01 | End: 2021-11-02 | Stop reason: HOSPADM

## 2021-11-01 RX ORDER — SODIUM CHLORIDE 0.9 % (FLUSH) 0.9 %
10 SYRINGE (ML) INJECTION AS NEEDED
Status: DISCONTINUED | OUTPATIENT
Start: 2021-11-01 | End: 2021-11-02 | Stop reason: HOSPADM

## 2021-11-01 RX ORDER — ALBUTEROL SULFATE 0.63 MG/3ML
1 SOLUTION RESPIRATORY (INHALATION) EVERY 6 HOURS PRN
Status: DISCONTINUED | OUTPATIENT
Start: 2021-11-01 | End: 2021-11-02

## 2021-11-01 RX ORDER — CALCIUM CARBONATE 200(500)MG
1 TABLET,CHEWABLE ORAL 3 TIMES DAILY PRN
Status: DISCONTINUED | OUTPATIENT
Start: 2021-11-01 | End: 2021-11-02 | Stop reason: HOSPADM

## 2021-11-01 RX ORDER — BUDESONIDE, GLYCOPYRROLATE, AND FORMOTEROL FUMARATE 160; 9; 4.8 UG/1; UG/1; UG/1
1 AEROSOL, METERED RESPIRATORY (INHALATION) 2 TIMES DAILY
COMMUNITY

## 2021-11-01 RX ADMIN — SODIUM CHLORIDE 500 ML: 9 INJECTION, SOLUTION INTRAVENOUS at 11:46

## 2021-11-01 RX ADMIN — LEVOTHYROXINE SODIUM 50 MCG: 0.05 TABLET ORAL at 17:29

## 2021-11-01 RX ADMIN — ONDANSETRON 4 MG: 2 INJECTION INTRAMUSCULAR; INTRAVENOUS at 11:47

## 2021-11-01 RX ADMIN — IOPAMIDOL 100 ML: 755 INJECTION, SOLUTION INTRAVENOUS at 13:12

## 2021-11-01 RX ADMIN — CALCIUM CARBONATE 1 TABLET: 500 TABLET, CHEWABLE ORAL at 20:59

## 2021-11-01 RX ADMIN — Medication 10 ML: at 20:59

## 2021-11-01 RX ADMIN — SODIUM CHLORIDE 100 ML/HR: 9 INJECTION, SOLUTION INTRAVENOUS at 17:29

## 2021-11-01 RX ADMIN — CARVEDILOL 3.12 MG: 3.12 TABLET, FILM COATED ORAL at 18:24

## 2021-11-01 RX ADMIN — MORPHINE SULFATE 4 MG: 4 INJECTION INTRAVENOUS at 11:47

## 2021-11-01 RX ADMIN — HYDROCODONE BITARTRATE AND ACETAMINOPHEN 1 TABLET: 7.5; 325 TABLET ORAL at 18:30

## 2021-11-01 RX ADMIN — WARFARIN 3 MG: 3 TABLET ORAL at 18:24

## 2021-11-01 NOTE — ED NOTES
Mid upper abdominal pain x 2 days. Nausea but no vomiting. Denies diarrhea. Has history of diverticulitis     Amelie Woodruff, RN  11/01/21 3716

## 2021-11-01 NOTE — ED PROVIDER NOTES
Subjective   Chief complaint severe abdominal pain nausea    History present is a 65-year-old female with 2-day history of severe sharp stabbing upper abdominal pain nonradiating.  Nausea but no vomiting no diarrhea no black or bloody stool no chest pain neck arm or jaw pain no injury no one at home with similar illness no foreign travels antibiotic use or recent hospitalization no urinary symptoms.  Moderate severe nothing makes it better or worse she has had decreased appetite nonradiating nothing makes it better or worse and associated the above symptoms.  Complains of some heartburn          Review of Systems   Constitutional: Negative for chills and fever.   HENT: Negative for congestion and sinus pressure.    Eyes: Negative for photophobia and visual disturbance.   Respiratory: Negative for chest tightness and shortness of breath.    Cardiovascular: Negative for chest pain and palpitations.   Gastrointestinal: Positive for abdominal pain and nausea. Negative for blood in stool.   Endocrine: Negative for cold intolerance and heat intolerance.   Genitourinary: Negative for difficulty urinating and dysuria.   Musculoskeletal: Positive for arthralgias. Negative for back pain.   Skin: Negative for color change and rash.   Neurological: Negative for dizziness and light-headedness.   Psychiatric/Behavioral: Negative for agitation and behavioral problems.       Past Medical History:   Diagnosis Date   • Chronic back pain    • Closed nondisplaced fracture of head of right radius 03/2017   • Congenital deformity of right hip joint 1956   • COPD (chronic obstructive pulmonary disease) (HCC)     former smoker   • Deep venous thrombosis (HCC)     unprovoked   • Depressive disorder    • Disease of thyroid gland    • Diverticulitis of colon    • Essential hypertension    • Gastroesophageal reflux disease    • Insomnia    • Obesity    • Pulmonary embolism (HCC)     provoked by surgery       No Known Allergies    Past  Surgical History:   Procedure Laterality Date   • ABDOMINAL SURGERY     • BRONCHOSCOPY N/A 2020    Procedure: BRONCHOSCOPY with bronchial washing;  Surgeon: Win Johnston MD;  Location: [facility] ENDOSCOPY;  Service: Pulmonary;  Laterality: N/A;  Post: mucous plugs   • CHOLECYSTECTOMY     • COLON SURGERY     • COLONOSCOPY     • COLOSTOMY CLOSURE N/A 2021    Procedure: COLOSTOMY TAKEDOWN OR CLOSURE, extenisve lysis of adhesions;  Surgeon: Chi Farmer MD;  Location: [facility] MAIN OR;  Service: General;  Laterality: N/A;   • CYSTOSCOPY N/A 2021    Procedure: CYSTOSCOPY with bladder tumor removal and fulgeration, insertion of 3-way rizzo catheter;  Surgeon: Alfonzo Hayward MD;  Location: [facility] MAIN OR;  Service: Urology;  Laterality: N/A;   • ENDOSCOPY     • EXPLORATORY LAPAROTOMY N/A 2020    Procedure: LAPAROTOMY EXPLORATORY HARTMANS;  Surgeon: Chi Farmer MD;  Location: [facility] MAIN OR;  Service: General;  Laterality: N/A;   • HYSTERECTOMY     • TOTAL HIP ARTHROPLASTY Right    • TOTAL HIP ARTHROPLASTY REVISION Right     x3       Family History   Problem Relation Age of Onset   • No Known Problems Mother    • No Known Problems Father        Social History     Socioeconomic History   • Marital status:    Tobacco Use   • Smoking status: Former Smoker     Packs/day: 2.00     Years: 40.00     Pack years: 80.00     Types: Cigarettes     Quit date:      Years since quittin.8   • Smokeless tobacco: Never Used   • Tobacco comment: ELECTRONIC   Vaping Use   • Vaping Use: Every day   • Substances: Nicotine, Flavoring   Substance and Sexual Activity   • Alcohol use: Never   • Drug use: Never   • Sexual activity: Defer     Prior to Admission medications    Medication Sig Start Date End Date Taking? Authorizing Provider   albuterol (ACCUNEB) 0.63 MG/3ML nebulizer solution Take 1 ampule by nebulization Every 6 (Six) Hours As Needed for Wheezing. Use am of surgery     Grayson Ppoe MD   albuterol sulfate  (90 Base) MCG/ACT inhaler Inhale 2 puffs Every 6 (Six) Hours As Needed for Wheezing. Use and bring    Grayson Pope MD   carvedilol (COREG) 3.125 MG tablet  9/11/20   Grayson Pope MD   famotidine (PEPCID) 40 MG tablet  8/24/20   Grayson Pope MD   Fluticasone-Umeclidin-Vilant (Trelegy Ellipta) 100-62.5-25 MCG/INH aerosol powder  Inhale 1 puff 2 (two) times a day.    Grayson Pope MD   furosemide (LASIX) 20 MG tablet Take 20 mg by mouth As Needed. 8/26/20   Grayson Pope MD   hydrALAZINE (APRESOLINE) 25 MG tablet Take 25 mg by mouth 2 (Two) Times a Day As Needed. For SBP >160    Grayson Pope MD   HYDROcodone-acetaminophen (NORCO)  MG per tablet Take 1 tablet by mouth Every 8 (Eight) Hours As Needed. 9/4/20   Grayson Pope MD   ipratropium (ATROVENT) 0.02 % nebulizer solution Take 500 mcg by nebulization 3 (Three) Times a Day. 7/24/20   Grayson Pope MD   ipratropium-albuterol (DUO-NEB) 0.5-2.5 mg/3 ml nebulizer Take 3 mL by nebulization Every 4 (Four) Hours As Needed for Wheezing. Dispense 1box 5/4/21   Anish Lane MD   levothyroxine (SYNTHROID, LEVOTHROID) 50 MCG tablet Take 50 mcg by mouth Daily. Take DOS    Grayson Pope MD   traZODone (DESYREL) 100 MG tablet Take 100 mg by mouth At Night As Needed for Sleep.    Grayson Pope MD   warfarin (COUMADIN) 1 MG tablet 3 mg. 8/27/20   Grayson Pope MD   warfarin (COUMADIN) 2 MG tablet Take 2 mg by mouth Every Night.    Grayson Pope MD   warfarin (COUMADIN) 3 MG tablet Take 3 mg by mouth Every Night. 3mg every other  day alternating with 3.5 every other day   LD 2/20 and will go on Lovenox bridge    Grayson Pope MD   warfarin (COUMADIN) 5 MG tablet 3 mg. 8/27/20   Provider, Historical, MD             Objective   Physical Exam  65-year-old awake alert no acute distress at this time.  HEENT extraocular  muscles are intact pupils equal and reactive sclera clear neck supple no adenopathy no meningeal signs no JVD no bruits lungs clear no retraction heart regular without murmur abdomen soft she has some epigastric tenderness moderate degree no rebound no guarding good bowel sounds no peritoneal findings or pulsatile mass or bruits extremities pulses are equal throughout upper and lower extremities no edema cords or Homans' sign or evidence of DVT skin warm dry without rashes or cellulitic changes neurologic awake alert follows commands motor strength normal no facial asymmetry without focal weakness  Procedures           ED Course      Results for orders placed or performed during the hospital encounter of 11/01/21   COVID-19,CEPHEID/NAHUM/BDMAX,COR/PIPO/PAD/ANTONIA IN-HOUSE(OR EMERGENT/ADD-ON),NP SWAB IN TRANSPORT MEDIA 3-4 HR TAT, RT-PCR - Swab, Nasopharynx    Specimen: Nasopharynx; Swab   Result Value Ref Range    COVID19 Not Detected Not Detected - Ref. Range   Protime-INR    Specimen: Blood   Result Value Ref Range    Protime 25.3 19.4 - 28.5 Seconds    INR 2.43 2.00 - 3.00   Comprehensive Metabolic Panel    Specimen: Blood   Result Value Ref Range    Glucose 102 (H) 65 - 99 mg/dL    BUN 7 (L) 8 - 23 mg/dL    Creatinine 0.94 0.57 - 1.00 mg/dL    Sodium 141 136 - 145 mmol/L    Potassium 3.6 3.5 - 5.2 mmol/L    Chloride 102 98 - 107 mmol/L    CO2 25.0 22.0 - 29.0 mmol/L    Calcium 9.6 8.6 - 10.5 mg/dL    Total Protein 6.7 6.0 - 8.5 g/dL    Albumin 4.00 3.50 - 5.20 g/dL    ALT (SGPT) 8 1 - 33 U/L    AST (SGOT) 19 1 - 32 U/L    Alkaline Phosphatase 102 39 - 117 U/L    Total Bilirubin 0.3 0.0 - 1.2 mg/dL    eGFR Non African Amer 60 (L) >60 mL/min/1.73    Globulin 2.7 gm/dL    A/G Ratio 1.5 g/dL    BUN/Creatinine Ratio 7.4 7.0 - 25.0    Anion Gap 14.0 5.0 - 15.0 mmol/L   aPTT    Specimen: Blood   Result Value Ref Range    PTT 39.6 (H) 24.0 - 31.0 seconds   Urinalysis With Microscopic If Indicated (No Culture) - Urine, Clean  Catch    Specimen: Urine, Clean Catch   Result Value Ref Range    Color, UA Yellow Yellow, Straw    Appearance, UA Hazy (A) Clear    pH, UA 6.5 5.0 - 8.0    Specific Gravity, UA 1.019 1.005 - 1.030    Glucose, UA Negative Negative    Ketones, UA Negative Negative    Bilirubin, UA Negative Negative    Blood, UA Negative Negative    Protein, UA Negative Negative    Leuk Esterase, UA Moderate (2+) (A) Negative    Nitrite, UA Negative Negative    Urobilinogen, UA 0.2 E.U./dL 0.2 - 1.0 E.U./dL   Troponin    Specimen: Blood   Result Value Ref Range    Troponin T <0.010 0.000 - 0.030 ng/mL   Lipase    Specimen: Blood   Result Value Ref Range    Lipase 58 13 - 60 U/L   CBC Auto Differential    Specimen: Blood   Result Value Ref Range    WBC 7.80 3.40 - 10.80 10*3/mm3    RBC 4.40 3.77 - 5.28 10*6/mm3    Hemoglobin 12.3 12.0 - 15.9 g/dL    Hematocrit 36.5 34.0 - 46.6 %    MCV 82.9 79.0 - 97.0 fL    MCH 28.0 26.6 - 33.0 pg    MCHC 33.7 31.5 - 35.7 g/dL    RDW 15.5 (H) 12.3 - 15.4 %    RDW-SD 44.6 37.0 - 54.0 fl    MPV 8.2 6.0 - 12.0 fL    Platelets 303 140 - 450 10*3/mm3    Neutrophil % 70.4 42.7 - 76.0 %    Lymphocyte % 19.7 19.6 - 45.3 %    Monocyte % 7.4 5.0 - 12.0 %    Eosinophil % 1.5 0.3 - 6.2 %    Basophil % 1.0 0.0 - 1.5 %    Neutrophils, Absolute 5.50 1.70 - 7.00 10*3/mm3    Lymphocytes, Absolute 1.50 0.70 - 3.10 10*3/mm3    Monocytes, Absolute 0.60 0.10 - 0.90 10*3/mm3    Eosinophils, Absolute 0.10 0.00 - 0.40 10*3/mm3    Basophils, Absolute 0.10 0.00 - 0.20 10*3/mm3    nRBC 0.0 0.0 - 0.2 /100 WBC   Urinalysis, Microscopic Only - Urine, Clean Catch    Specimen: Urine, Clean Catch   Result Value Ref Range    RBC, UA 0-2 (A) None Seen /HPF    WBC, UA 13-20 (A) None Seen /HPF    Bacteria, UA Trace (A) None Seen /HPF    Squamous Epithelial Cells, UA 3-6 (A) None Seen, 0-2 /HPF    Transitional Epithelial Cells, UA 0-2 0 - 2 /HPF    Hyaline Casts, UA 0-2 None Seen /LPF    Methodology Manual Light Microscopy    POC Lactate     Specimen: Blood   Result Value Ref Range    Lactate 1.8 0.5 - 2.0 mmol/L   ECG 12 Lead   Result Value Ref Range    QT Interval 395 ms     CT Abdomen Pelvis With Contrast    Result Date: 11/1/2021  1.There is some minimal dilatation mild wall thickening of loops of jejunum that could be seen with enteritis. No obstruction is seen. 2.There is diverticulosis without diverticulitis. 3.There is mild pelvic dilatation in patient status post cholecystectomy. This is similar to previous exam. 4.There are some chronic atelectasis in the right middle lobe and lingula. 5.Probable left renal cysts. 6.Small hiatal hernia. 7.Limited evaluation pelvis due to pelvic hardware. 8.Findings of anastomosis in the large intestine. 9.Diverticulosis without diverticulitis.    Electronically Signed By-Brittani Blanco MD On:11/1/2021 1:24 PM This report was finalized on [id number] by  Brittani Blanco MD.    Medications   sodium chloride 0.9 % flush 10 mL (has no administration in time range)   Morphine sulfate (PF) injection 4 mg (4 mg Intravenous Given 11/1/21 1147)   ondansetron (ZOFRAN) injection 4 mg (4 mg Intravenous Given 11/1/21 1147)   sodium chloride 0.9 % bolus 500 mL (0 mL Intravenous Stopped 11/1/21 1334)   iopamidol (ISOVUE-370) 76 % injection 100 mL (100 mL Intravenous Given 11/1/21 1312)          EKG my interpretation normal sinus rhythm rate of 64 normal axis hypertrophy inverted T waves noted in the leads V1 and V2 which is changed from previous EKG from May this is an abnormal EKG QTC of 409                                  MDM  Number of Diagnoses or Management Options  Epigastric abdominal pain: new and requires workup  Diagnosis management comments: Medical decision making.  Patient IV established was given saline 500 cc bolus given morphine 4 IV Zofran 4 IV and had the above evaluation.  EKG was a sinus rhythm but did note to have inverted T waves in V1 and V2 which is new unchanged from previous EKG over 19 was negative  INR was 2.4 chemistries and liver enzymes unremarkable troponin negative lipase normal urine was moderate leukocyte 10-20 white cells she has no urinary symptoms lactate 1.8.  CT scan there is some minimal dilatation mild wall thickening loops of jejunum which could be an enteritis.  No obstruction was seen diverticulosis without diverticulitis cholecystectomy is noted this is similar to previous exam chronic atelectasis noted renal cyst hiatal hernia as well.  Patient repeat exam was feeling better had some mild epigastric tenderness but no rebound or guarding no peritoneal findings or pulsatile mass bruits patient's troponin is negative EKG is change in abnormal with some nonspecific inverted T waves noted anteriorly.  Urine could have some infection we did culture that but this is pending and she has no urinary symptoms.  We talked about the findings.  She is still uncomfortable and etiologies not quite known at this point.  Should be placed in observation.  I see no S of an aneurysm dissection.  No evidence of acute myocardial infarction.  Provider in observation notified stable unremarkable ER course.       Amount and/or Complexity of Data Reviewed  Clinical lab tests: reviewed  Tests in the radiology section of CPT®: reviewed  Discuss the patient with other providers: yes    Risk of Complications, Morbidity, and/or Mortality  Presenting problems: high  Diagnostic procedures: high  Management options: high    Patient Progress  Patient progress: stable      Final diagnoses:   Epigastric abdominal pain       ED Disposition  ED Disposition     ED Disposition Condition Comment    Decision to Admit            No follow-up provider specified.       Medication List      No changes were made to your prescriptions during this visit.          Chi Stevens MD  11/01/21 7014

## 2021-11-01 NOTE — H&P
[facility] Observation Unit H&P    Patient Name: Renuka Pelayo  : 1955  MRN: [id number]  Primary Care Physician: Michael Gerardo MD  Date of admission: 2021     Patient Care Team:  Michael Gerardo MD as PCP - General  Kristina Davies APRN as Nurse Practitioner (Nurse Practitioner)          Subjective   History Present Illness     Chief Complaint:   Chief Complaint   Patient presents with   • Abdominal Pain         Ms. Pelayo is a 65 y.o.  presents to [facility] complaining of epigastric abdominal pain.      65-year-old female presents to the ER with a chief complaint of epigastric abdominal pain which is worsened over the last several days.  The patient reports she has chronic discomfort in that area but is been significantly worse over the last several days.  The patient reports initially having diarrhea but taking Pepto-Bismol for that with improvement of those symptoms.  The patient denies nausea or loss of appetite and states that she is very hungry.    Review of records: The patient had colon resection 2021 for diverticulitis.  At that time it was noted that the patient had a bladder tumor which turned out to be malignant and was resected at that time.  The patient reports that she has been told that she is cancer free at this time.      Review of Systems   Constitutional: Negative for chills and fever.   Cardiovascular: Positive for chest pain.   Gastrointestinal: Positive for abdominal pain. Negative for nausea and vomiting.   All other systems reviewed and are negative.          Personal History     Past Medical History:   Past Medical History:   Diagnosis Date   • Bladder cancer (HCC)    • Chronic back pain    • Closed nondisplaced fracture of head of right radius 2017   • Congenital deformity of right hip joint 1955   • COPD (chronic obstructive pulmonary disease) (HCC)     former smoker   • Deep venous thrombosis (HCC)     unprovoked   • Depressive disorder     • Disease of thyroid gland    • Diverticulitis of colon    • Essential hypertension    • Gastroesophageal reflux disease    • Insomnia    • Obesity    • Pulmonary embolism (HCC)     provoked by surgery       Surgical History:      Past Surgical History:   Procedure Laterality Date   • ABDOMINAL SURGERY     • BRONCHOSCOPY N/A 11/25/2020    Procedure: BRONCHOSCOPY with bronchial washing;  Surgeon: Win Johnston MD;  Location: [facility] ENDOSCOPY;  Service: Pulmonary;  Laterality: N/A;  Post: mucous plugs   • CHOLECYSTECTOMY     • COLON SURGERY     • COLONOSCOPY     • COLOSTOMY CLOSURE N/A 2/25/2021    Procedure: COLOSTOMY TAKEDOWN OR CLOSURE, extenisve lysis of adhesions;  Surgeon: Chi Farmre MD;  Location: [facility] MAIN OR;  Service: General;  Laterality: N/A;   • CYSTOSCOPY N/A 2/25/2021    Procedure: CYSTOSCOPY with bladder tumor removal and fulgeration, insertion of 3-way rizzo catheter;  Surgeon: Alfonzo Hayward MD;  Location: [facility] MAIN OR;  Service: Urology;  Laterality: N/A;   • ENDOSCOPY     • EXPLORATORY LAPAROTOMY N/A 7/16/2020    Procedure: LAPAROTOMY EXPLORATORY HARTMANS;  Surgeon: Chi Farmer MD;  Location: [facility] MAIN OR;  Service: General;  Laterality: N/A;   • HYSTERECTOMY     • TOTAL HIP ARTHROPLASTY Right    • TOTAL HIP ARTHROPLASTY REVISION Right     x3           Family History: family history includes No Known Problems in her father and mother. Otherwise pertinent FHx was reviewed and unremarkable.     Social History:  reports that she quit smoking about 2 years ago. Her smoking use included cigarettes. She has a 80.00 pack-year smoking history. She has never used smokeless tobacco. She reports that she does not drink alcohol and does not use drugs.      Medications:  Prior to Admission medications    Medication Sig Start Date End Date Taking? Authorizing Provider   albuterol (ACCUNEB) 0.63 MG/3ML nebulizer solution Take 1 ampule by nebulization Every 6 (Six) Hours  As Needed for Wheezing. Use am of surgery   Yes Grayson Pope MD   albuterol sulfate  (90 Base) MCG/ACT inhaler Inhale 2 puffs Every 6 (Six) Hours As Needed for Wheezing. Use and bring   Yes Grayson Pope MD   carvedilol (COREG) 3.125 MG tablet  9/11/20  Yes Grayson Pope MD   famotidine (PEPCID) 40 MG tablet  8/24/20  Yes Grayson Pope MD   furosemide (LASIX) 20 MG tablet Take 20 mg by mouth As Needed. 8/26/20  Yes Grayson Pope MD   hydrALAZINE (APRESOLINE) 25 MG tablet Take 25 mg by mouth 2 (Two) Times a Day As Needed. For SBP >160   Yes Grayson Pope MD   HYDROcodone-acetaminophen (NORCO)  MG per tablet Take 1 tablet by mouth Every 8 (Eight) Hours As Needed. 9/4/20  Yes Grayson Pope MD   levothyroxine (SYNTHROID, LEVOTHROID) 50 MCG tablet Take 50 mcg by mouth Daily. Take DOS   Yes Grayson Pope MD   traZODone (DESYREL) 100 MG tablet Take 100 mg by mouth At Night As Needed for Sleep.   Yes Grayson Pope MD   warfarin (COUMADIN) 3 MG tablet Take 3 mg by mouth Every Night.   Yes Grayson Pope MD   Fluticasone-Umeclidin-Vilant (Trelegy Ellipta) 100-62.5-25 MCG/INH aerosol powder  Inhale 1 puff 2 (two) times a day.    Grayson Pope MD   ipratropium (ATROVENT) 0.02 % nebulizer solution Take 500 mcg by nebulization 3 (Three) Times a Day. 7/24/20   Grayson Pope MD   ipratropium-albuterol (DUO-NEB) 0.5-2.5 mg/3 ml nebulizer Take 3 mL by nebulization Every 4 (Four) Hours As Needed for Wheezing. Dispense 1box 5/4/21   Anish Lane MD   warfarin (COUMADIN) 1 MG tablet 3 mg. 8/27/20   Grayson Pope MD   warfarin (COUMADIN) 2 MG tablet Take 2 mg by mouth Every Night.    Grayson Pope MD   warfarin (COUMADIN) 3 MG tablet Take 3 mg by mouth Every Night. 3mg every other  day alternating with 3.5 every other day   LD 2/20 and will go on Lovenox bridge    Provider, MD Grayson   warfarin (COUMADIN)  5 MG tablet 3 mg. 8/27/20   Provider, Grayson, MD       Allergies:  No Known Allergies    Objective   Objective     Vital Signs  Temp:  [98 °F (36.7 °C)-98.5 °F (36.9 °C)] 98 °F (36.7 °C)  Heart Rate:  [68-81] 78  Resp:  [15-17] 16  BP: (163-169)/(85-91) 163/86  SpO2:  [94 %-98 %] 96 %  on  Flow (L/min):  [2] 2;   Device (Oxygen Therapy): nasal cannula  Body mass index is 30.03 kg/m².    Physical Exam  Vitals and nursing note reviewed.   Constitutional:       Appearance: Normal appearance.   HENT:      Head: Normocephalic and atraumatic.      Right Ear: External ear normal.      Left Ear: External ear normal.      Mouth/Throat:      Mouth: Mucous membranes are moist.   Eyes:      General: No scleral icterus.        Right eye: No discharge.         Left eye: No discharge.      Extraocular Movements: Extraocular movements intact.      Conjunctiva/sclera: Conjunctivae normal.      Pupils: Pupils are equal, round, and reactive to light.   Cardiovascular:      Rate and Rhythm: Normal rate and regular rhythm.      Pulses: Normal pulses.      Heart sounds: Normal heart sounds. No murmur heard.      Pulmonary:      Effort: Pulmonary effort is normal.      Breath sounds: Normal breath sounds.   Abdominal:      General: Bowel sounds are normal. There is no distension.      Palpations: Abdomen is soft.      Tenderness: There is abdominal tenderness.   Musculoskeletal:      Cervical back: Normal range of motion and neck supple.      Right lower leg: No edema.      Left lower leg: No edema.   Skin:     General: Skin is warm and dry.      Capillary Refill: Capillary refill takes less than 2 seconds.   Neurological:      General: No focal deficit present.      Mental Status: She is alert and oriented to person, place, and time.   Psychiatric:         Mood and Affect: Mood normal.         Behavior: Behavior normal.         Thought Content: Thought content normal.         Judgment: Judgment normal.           Results Review:  I  have personally reviewed most recent cardiac tracings, lab results and radiology images and interpretations and agree with findings.    Results from last 7 days   Lab Units 11/01/21  1141   WBC 10*3/mm3 7.80   HEMOGLOBIN g/dL 12.3   HEMATOCRIT % 36.5   PLATELETS 10*3/mm3 303   INR  2.43     Results from last 7 days   Lab Units 11/01/21  1145 11/01/21  1141   SODIUM mmol/L  --  141   POTASSIUM mmol/L  --  3.6   CHLORIDE mmol/L  --  102   CO2 mmol/L  --  25.0   BUN mg/dL  --  7*   CREATININE mg/dL  --  0.94   GLUCOSE mg/dL  --  102*   CALCIUM mg/dL  --  9.6   ALT (SGPT) U/L  --  8   AST (SGOT) U/L  --  19   TROPONIN T ng/mL  --  <0.010   LACTATE mmol/L 1.8  --      Estimated Creatinine Clearance: 59.4 mL/min (by C-G formula based on SCr of 0.94 mg/dL).  Brief Urine Lab Results  (Last result in the past 365 days)      Color   Clarity   Blood   Leuk Est   Nitrite   Protein   CREAT   Urine HCG        11/01/21 1141 Yellow   Hazy   Negative   Moderate (2+)   Negative   Negative                 Microbiology Results (last 10 days)     Procedure Component Value - Date/Time    COVID PRE-OP / PRE-PROCEDURE SCREENING ORDER (NO ISOLATION) - Swab, Nasopharynx [[id number]]  (Normal) Collected: 11/01/21 1458    Lab Status: Final result Specimen: Swab from Nasopharynx Updated: 11/01/21 1522    Narrative:      The following orders were created for panel order COVID PRE-OP / PRE-PROCEDURE SCREENING ORDER (NO ISOLATION) - Swab, Nasopharynx.  Procedure                               Abnormality         Status                     ---------                               -----------         ------                     COVID-19,CEPHEID/NAHUM/BD...[[id number]]  Normal              Final result                 Please view results for these tests on the individual orders.    COVID-19,CEPHEID/NAHUM/BDMAX,COR/PIPO/PAD/[facility] IN-HOUSE(OR EMERGENT/ADD-ON),NP SWAB IN TRANSPORT MEDIA 3-4 HR TAT, RT-PCR - Swab, Nasopharynx [[id number]]  (Normal) Collected:  11/01/21 1458    Lab Status: Final result Specimen: Swab from Nasopharynx Updated: 11/01/21 1522     COVID19 Not Detected    Narrative:      Fact sheet for providers: https://www.fda.gov/media/149329/download     Fact sheet for patients: https://www.fda.gov/media/882136/download  Fact sheet for providers: https://www.fda.gov/media/252899/download    Fact sheet for patients: https://www.[employer]gov/media/462719/download    Test performed by PCR.          ECG/EMG Results (most recent)     Procedure Component Value Units Date/Time    ECG 12 Lead [[id number]] Collected: 11/01/21 1128     Updated: 11/01/21 1130     QT Interval 395 ms     Narrative:      HEART RATE= 64  bpm  RR Interval= 932  ms  WI Interval= 182  ms  P Horizontal Axis= -16  deg  P Front Axis= 66  deg  QRSD Interval= 110  ms  QT Interval= 395  ms  QRS Axis= 76  deg  T Wave Axis= 74  deg  - ABNORMAL ECG -  Sinus rhythm  Abnrm T, consider ischemia, anterolateral lds  Electronically Signed By:   Date and Time of Study: 2021-11-01 11:28:08          Results for orders placed during the hospital encounter of 09/05/19    Duplex Venous Lower Extremity - Bilateral    Interpretation Summary  · Chronic right lower extremity deep vein thrombosis noted in the proximal femoral, mid femoral, distal femoral and popliteal.  · Acute right lower extremity deep vein thrombosis noted in the peroneal.  · All other veins appeared normal bilaterally.          CT Abdomen Pelvis With Contrast    Result Date: 11/1/2021  1.There is some minimal dilatation mild wall thickening of loops of jejunum that could be seen with enteritis. No obstruction is seen. 2.There is diverticulosis without diverticulitis. 3.There is mild pelvic dilatation in patient status post cholecystectomy. This is similar to previous exam. 4.There are some chronic atelectasis in the right middle lobe and lingula. 5.Probable left renal cysts. 6.Small hiatal hernia. 7.Limited evaluation pelvis due to pelvic hardware.  8.Findings of anastomosis in the large intestine. 9.Diverticulosis without diverticulitis.    Electronically Signed By-Brittani Blanco MD On:11/1/2021 1:24 PM This report was finalized on [id number] by  Brittani Blanco MD.        Estimated Creatinine Clearance: 59.4 mL/min (by C-G formula based on SCr of 0.94 mg/dL).    Assessment/Plan   Assessment/Plan       Active Hospital Problems    Diagnosis  POA   • Abdominal pain [R10.9]  Yes      Resolved Hospital Problems   No resolved problems to display.     Acute abdominal pain, uncertain etiology--cardiac cause needs to be considered; consider enteritis: analgesics as needed; serial troponin; IV fluids      Atrial fib, chronic: continue Coreg; continue Warfarin    Hypertension, chronic: continue Coreg    Hypothyroidism, chronic: continue levothyroxine; check TSH        VTE Prophylaxis -   Mechanical Order History:     None      Pharmalogical Order History:     None          CODE STATUS:    There are no questions and answers to display.       This patient has been examined wearing personal protective equipment.     I discussed the patient's findings and my recommendations with patient.      Signature:Electronically signed by SHILA Pressley, 11/01/21, 5:06 PM EDT.

## 2021-11-01 NOTE — PLAN OF CARE
Goal Outcome Evaluation:  Plan of Care Reviewed With: patient        Progress: no change  Outcome Summary: Patient arrived to floor continuing to c/o abdominal pain in epigastric area. patient is hypertensive but has history of hypertension. Will continue to monitor

## 2021-11-02 VITALS
HEART RATE: 73 BPM | WEIGHT: 169.53 LBS | RESPIRATION RATE: 20 BRPM | TEMPERATURE: 97.8 F | DIASTOLIC BLOOD PRESSURE: 83 MMHG | HEIGHT: 63 IN | BODY MASS INDEX: 30.04 KG/M2 | SYSTOLIC BLOOD PRESSURE: 147 MMHG | OXYGEN SATURATION: 87 %

## 2021-11-02 PROBLEM — R10.9 ABDOMINAL PAIN: Status: RESOLVED | Noted: 2021-11-01 | Resolved: 2021-11-02

## 2021-11-02 LAB
INR PPP: 2.97 (ref 2–3)
PROTHROMBIN TIME: 30.4 SECONDS (ref 19.4–28.5)
TROPONIN T SERPL-MCNC: <0.01 NG/ML (ref 0–0.03)
TSH SERPL DL<=0.05 MIU/L-ACNC: 9.14 UIU/ML (ref 0.27–4.2)

## 2021-11-02 PROCEDURE — 85610 PROTHROMBIN TIME: CPT | Performed by: NURSE PRACTITIONER

## 2021-11-02 PROCEDURE — G0378 HOSPITAL OBSERVATION PER HR: HCPCS

## 2021-11-02 PROCEDURE — 96361 HYDRATE IV INFUSION ADD-ON: CPT

## 2021-11-02 PROCEDURE — 84443 ASSAY THYROID STIM HORMONE: CPT | Performed by: NURSE PRACTITIONER

## 2021-11-02 PROCEDURE — 84484 ASSAY OF TROPONIN QUANT: CPT | Performed by: NURSE PRACTITIONER

## 2021-11-02 RX ORDER — ONDANSETRON 4 MG/1
4 TABLET, FILM COATED ORAL EVERY 8 HOURS PRN
Qty: 12 TABLET | Refills: 0 | Status: CANCELLED | OUTPATIENT
Start: 2021-11-02

## 2021-11-02 RX ORDER — HYDROCODONE BITARTRATE AND ACETAMINOPHEN 7.5; 325 MG/1; MG/1
1 TABLET ORAL EVERY 4 HOURS PRN
Qty: 12 TABLET | Refills: 0 | Status: CANCELLED | OUTPATIENT
Start: 2021-11-02 | End: 2021-11-08

## 2021-11-02 RX ORDER — HYDROCODONE BITARTRATE AND ACETAMINOPHEN 7.5; 325 MG/1; MG/1
1 TABLET ORAL EVERY 4 HOURS PRN
Qty: 12 TABLET | Refills: 0 | Status: SHIPPED | OUTPATIENT
Start: 2021-11-02 | End: 2021-11-04

## 2021-11-02 RX ORDER — ALBUTEROL SULFATE 2.5 MG/3ML
2.5 SOLUTION RESPIRATORY (INHALATION) EVERY 6 HOURS PRN
Status: DISCONTINUED | OUTPATIENT
Start: 2021-11-02 | End: 2021-11-02 | Stop reason: HOSPADM

## 2021-11-02 RX ORDER — ONDANSETRON 4 MG/1
4 TABLET, FILM COATED ORAL EVERY 8 HOURS PRN
Qty: 12 TABLET | Refills: 0 | Status: SHIPPED | OUTPATIENT
Start: 2021-11-02 | End: 2022-05-15

## 2021-11-02 RX ADMIN — CARVEDILOL 3.12 MG: 3.12 TABLET, FILM COATED ORAL at 08:21

## 2021-11-02 RX ADMIN — HYDROCODONE BITARTRATE AND ACETAMINOPHEN 1 TABLET: 7.5; 325 TABLET ORAL at 01:18

## 2021-11-02 RX ADMIN — SODIUM CHLORIDE 100 ML/HR: 9 INJECTION, SOLUTION INTRAVENOUS at 04:04

## 2021-11-02 RX ADMIN — HYDROCODONE BITARTRATE AND ACETAMINOPHEN 1 TABLET: 7.5; 325 TABLET ORAL at 11:01

## 2021-11-02 RX ADMIN — LEVOTHYROXINE SODIUM 50 MCG: 0.05 TABLET ORAL at 06:01

## 2021-11-02 RX ADMIN — HYDROCODONE BITARTRATE AND ACETAMINOPHEN 1 TABLET: 7.5; 325 TABLET ORAL at 06:03

## 2021-11-02 RX ADMIN — Medication 10 ML: at 08:20

## 2021-11-02 NOTE — DISCHARGE SUMMARY
[facility]  DISCHARGE SUMMARY        Prepared For PCP:  Michael Gerardo MD    Patient Name: Renuka Pelayo  : 1955  MRN: [id number]      Date of Admission:   2021    Date of Discharge:  2021    Length of stay:  LOS: 0 days     Hospital Course     Presenting Problem:   Epigastric abdominal pain [R10.13]  Abdominal pain [R10.9]      Active Hospital Problems   No active problems to display.      Resolved Hospital Problems    Diagnosis Date Resolved POA   • Abdominal pain [R10.9] 2021 Yes     Minimal dilation      Hospital Course:  Renuka Pelayo is a 65 y.o. female presented to the ER with a chief complaint of abdominal pain primarily in the epigastrium times several days.  Patient had CT which showed minimal mild dilation And mild wall thickening of the loops of the jejunum consistent with enteritis.  Was no overt obstruction seen.  The patient also has small hiatal hernia.  Patient was admitted overnight with analgesics for pain control and IV fluids as well as clear liquid diet.  She tolerated clear liquids and had improvement of her pain.  This a.m. she was requesting solid food so she was given a low residue diet which she tolerated without difficulty.  The patient would like to be discharged home as she has had resolution of her symptoms.  The patient will be discharged with pain medication as needed for up to 3 days as well as antiemetics.  The patient was instructed that her pain continues longer than that she will need to see her gastroenterologist or general surgeon for follow-up.  If her symptoms worsen she should return to a facility for rapid evaluation.          Recommendation for Outpatient Providers:             Reasons For Change In Medications and Indications for New Medications:        Day of Discharge     HPI:   65-year-old female presents to the ER with a chief complaint of epigastric abdominal pain which is worsened over the last several days.  The patient  reports she has chronic discomfort in that area but is been significantly worse over the last several days.  The patient reports initially having diarrhea but taking Pepto-Bismol for that with improvement of those symptoms.  The patient denies nausea or loss of appetite and states that she is very hungry.     Review of records: The patient had colon resection February 2021 for diverticulitis.  At that time it was noted that the patient had a bladder tumor which turned out to be malignant and was resected at that time.  The patient reports that she has been told that she is cancer free at this time.    Vital Signs:   Temp:  [97.4 °F (36.3 °C)-98.2 °F (36.8 °C)] 97.4 °F (36.3 °C)  Heart Rate:  [61-80] 67  Resp:  [16-20] 18  BP: (137-166)/(76-91) 152/82     ROS:  Review of Systems   All other systems reviewed and are negative.    Physical Exam  Vitals and nursing note reviewed.   Constitutional:       Appearance: Normal appearance.   HENT:      Head: Normocephalic and atraumatic.      Right Ear: External ear normal.      Left Ear: External ear normal.      Mouth/Throat:      Mouth: Mucous membranes are moist.   Eyes:      General: No scleral icterus.        Right eye: No discharge.         Left eye: No discharge.      Extraocular Movements: Extraocular movements intact.      Conjunctiva/sclera: Conjunctivae normal.      Pupils: Pupils are equal, round, and reactive to light.   Cardiovascular:      Rate and Rhythm: Normal rate and regular rhythm.      Pulses: Normal pulses.      Heart sounds: Normal heart sounds. No murmur heard.       Pulmonary:      Effort: Pulmonary effort is normal.      Breath sounds: Normal breath sounds.   Abdominal:      General: Bowel sounds are normal. There is no distension.      Palpations: Abdomen is soft.      Tenderness: There is no abdominal tenderness.   Musculoskeletal:      Cervical back: Normal range of motion and neck supple.      Right lower leg: No edema.      Left lower leg: No  edema.   Skin:     General: Skin is warm and dry.      Capillary Refill: Capillary refill takes less than 2 seconds.   Neurological:      General: No focal deficit present.      Mental Status: She is alert and oriented to person, place, and time.   Psychiatric:         Mood and Affect: Mood normal.         Behavior: Behavior normal.         Thought Content: Thought content normal.         Judgment: Judgment normal.        Pertinent  and/or Most Recent Results     Results from last 7 days   Lab Units 11/01/21  1141   WBC 10*3/mm3 7.80   HEMOGLOBIN g/dL 12.3   HEMATOCRIT % 36.5   PLATELETS 10*3/mm3 303   SODIUM mmol/L 141   POTASSIUM mmol/L 3.6   CHLORIDE mmol/L 102   CO2 mmol/L 25.0   BUN mg/dL 7*   CREATININE mg/dL 0.94   GLUCOSE mg/dL 102*   CALCIUM mg/dL 9.6     Results from last 7 days   Lab Units 11/02/21  0550 11/01/21  1853 11/01/21  1141   BILIRUBIN mg/dL  --   --  0.3   ALK PHOS U/L  --   --  102   ALT (SGPT) U/L  --   --  8   AST (SGOT) U/L  --   --  19   PROTIME Seconds 30.4* 27.2 25.3   INR  2.97 2.63 2.43   APTT seconds  --   --  39.6*           Invalid input(s): TG, LDLCALC, LDLREALC  Results from last 7 days   Lab Units 11/02/21  0550 11/01/21  1145 11/01/21  1141   TSH uIU/mL 9.140*  --   --    TROPONIN T ng/mL <0.010  --  <0.010   LACTATE mmol/L  --  1.8  --        Brief Urine Lab Results  (Last result in the past 365 days)      Color   Clarity   Blood   Leuk Est   Nitrite   Protein   CREAT   Urine HCG        11/01/21 1141 Yellow   Hazy   Negative   Moderate (2+)   Negative   Negative                 Microbiology Results Abnormal     Procedure Component Value - Date/Time    COVID PRE-OP / PRE-PROCEDURE SCREENING ORDER (NO ISOLATION) - Swab, Nasopharynx [[id number]]  (Normal) Collected: 11/01/21 7334    Lab Status: Final result Specimen: Swab from Nasopharynx Updated: 11/01/21 1522    Narrative:      The following orders were created for panel order COVID PRE-OP / PRE-PROCEDURE SCREENING ORDER (NO  ISOLATION) - Swab, Nasopharynx.  Procedure                               Abnormality         Status                     ---------                               -----------         ------                     COVID-19,CEPHEID/NAHUM/BD...[[id number]]  Normal              Final result                 Please view results for these tests on the individual orders.    COVID-19,CEPHEID/NAHUM/BDMAX,COR/PIPO/PAD/[facility] IN-HOUSE(OR EMERGENT/ADD-ON),NP SWAB IN TRANSPORT MEDIA 3-4 HR TAT, RT-PCR - Swab, Nasopharynx [[id number]]  (Normal) Collected: 11/01/21 1458    Lab Status: Final result Specimen: Swab from Nasopharynx Updated: 11/01/21 1522     COVID19 Not Detected    Narrative:      Fact sheet for providers: https://www.fda.gov/media/169299/download     Fact sheet for patients: https://www.fda.gov/media/063596/download  Fact sheet for providers: https://www.fda.gov/media/098138/download    Fact sheet for patients: https://www.fda.gov/media/800421/download    Test performed by PCR.          CT Abdomen Pelvis With Contrast    Result Date: 11/1/2021  Impression: 1.There is some minimal dilatation mild wall thickening of loops of jejunum that could be seen with enteritis. No obstruction is seen. 2.There is diverticulosis without diverticulitis. 3.There is mild pelvic dilatation in patient status post cholecystectomy. This is similar to previous exam. 4.There are some chronic atelectasis in the right middle lobe and lingula. 5.Probable left renal cysts. 6.Small hiatal hernia. 7.Limited evaluation pelvis due to pelvic hardware. 8.Findings of anastomosis in the large intestine. 9.Diverticulosis without diverticulitis.    Electronically Signed By-Brittani Blanco MD On:11/1/2021 1:24 PM This report was finalized on [id number] by  Brittani Blanco MD.      Results for orders placed during the hospital encounter of 09/05/19    Duplex Venous Lower Extremity - Bilateral    Interpretation Summary  · Chronic right lower extremity deep vein thrombosis  noted in the proximal femoral, mid femoral, distal femoral and popliteal.  · Acute right lower extremity deep vein thrombosis noted in the peroneal.  · All other veins appeared normal bilaterally.      Results for orders placed during the hospital encounter of 09/05/19    Duplex Venous Lower Extremity - Bilateral    Interpretation Summary  · Chronic right lower extremity deep vein thrombosis noted in the proximal femoral, mid femoral, distal femoral and popliteal.  · Acute right lower extremity deep vein thrombosis noted in the peroneal.  · All other veins appeared normal bilaterally.          Mild work        Test Results Pending at Discharge        Procedures Performed           Consults:   Consults     No orders found for last 30 day(s).            Discharge Details        Discharge Medications      New Medications      Instructions Start Date   HYDROcodone-acetaminophen 7.5-325 MG per tablet  Commonly known as: NORCO  Replaces: HYDROcodone-acetaminophen  MG per tablet   1 tablet, Oral, Every 4 Hours PRN      ondansetron 4 MG tablet  Commonly known as: Zofran   4 mg, Oral, Every 8 Hours PRN         Changes to Medications      Instructions Start Date   warfarin 3 MG tablet  Commonly known as: COUMADIN  What changed: Another medication with the same name was removed. Continue taking this medication, and follow the directions you see here.   3 mg, Oral, Nightly         Continue These Medications      Instructions Start Date   albuterol sulfate  (90 Base) MCG/ACT inhaler  Commonly known as: PROVENTIL HFA;VENTOLIN HFA;PROAIR HFA   2 puffs, Inhalation, Every 6 Hours PRN, Use and bring      albuterol 0.63 MG/3ML nebulizer solution  Commonly known as: ACCUNEB   1 ampule, Nebulization, Every 6 Hours PRN, Use am of surgery      Breztri Aerosphere 160-9-4.8 MCG/ACT aerosol inhaler  Generic drug: Budeson-Glycopyrrol-Formoterol   1 puff, Inhalation, 2 Times Daily      carvedilol 3.125 MG tablet  Commonly known  as: COREG   No dose, route, or frequency recorded.      Daliresp 500 MCG tablet tablet  Generic drug: roflumilast   500 mcg, Oral, Nightly      famotidine 40 MG tablet  Commonly known as: PEPCID   No dose, route, or frequency recorded.      furosemide 20 MG tablet  Commonly known as: LASIX   20 mg, Oral, As Needed      hydrALAZINE 25 MG tablet  Commonly known as: APRESOLINE   25 mg, Oral, 2 Times Daily PRN, For SBP >160       ipratropium 0.02 % nebulizer solution  Commonly known as: ATROVENT   500 mcg, Nebulization, 3 Times Daily - RT      ipratropium-albuterol 0.5-2.5 mg/3 ml nebulizer  Commonly known as: DUO-NEB   3 mL, Nebulization, Every 4 Hours PRN, Dispense 1box      levothyroxine 50 MCG tablet  Commonly known as: SYNTHROID, LEVOTHROID   50 mcg, Oral, Daily, Take DOS       traZODone 100 MG tablet  Commonly known as: DESYREL   100 mg, Oral, Nightly PRN      zolpidem 5 MG tablet  Commonly known as: AMBIEN   5 mg, Oral, Nightly         Stop These Medications    HYDROcodone-acetaminophen  MG per tablet  Commonly known as: NORCO  Replaced by: HYDROcodone-acetaminophen 7.5-325 MG per tablet     Trelegy Ellipta 100-62.5-25 MCG/INH inhaler  Generic drug: Fluticasone-Umeclidin-Vilant            No Known Allergies      Discharge Disposition: Home, self-care      Diet:  Hospital:  Diet Order   Procedures   • Diet Gastrointestinal; Low Residue         Discharge Activity: As tolerated        CODE STATUS:    Code Status and Medical Interventions:   Ordered at: 11/01/21 1701     Code Status (Patient has no pulse and is not breathing):    CPR (Attempt to Resuscitate)     Medical Interventions (Patient has pulse or is breathing):    Full         Follow-up Appointments  No future appointments.          Condition on Discharge:      Stable      This patient has been examined wearing appropriate Personal Protective Equipment. 11/02/21      Electronically signed by SHILA Pressley, 11/02/21, 2:39 PM EDT.      Time: I spent   60  minutes on this discharge activity which included face-to-face encounter with the patient/reviewing the data in the system/coordination of the care with the nursing staff as well as consultants/documentation/entering orders.

## 2021-11-02 NOTE — PLAN OF CARE
Problem: Adult Inpatient Plan of Care  Goal: Patient-Specific Goal (Individualized)  11/2/2021 1501 by Gabriela Valdez RN  Outcome: Ongoing, Progressing  11/2/2021 1014 by Gabriela Valdez RN  Outcome: Ongoing, Progressing     Problem: Adult Inpatient Plan of Care  Goal: Absence of Hospital-Acquired Illness or Injury  11/2/2021 1501 by Gabriela Valdez RN  Outcome: Ongoing, Progressing  11/2/2021 1014 by Gabriela Valdez RN  Outcome: Ongoing, Progressing  Intervention: Identify and Manage Fall Risk  Recent Flowsheet Documentation  Taken 11/2/2021 0910 by Gabriela Valdez RN  Safety Promotion/Fall Prevention: safety round/check completed  Taken 11/2/2021 0715 by Gabriela Valdez RN  Safety Promotion/Fall Prevention: safety round/check completed  Intervention: Prevent Infection  Recent Flowsheet Documentation  Taken 11/2/2021 1300 by Gabriela Valdez RN  Infection Prevention: hand hygiene promoted   Goal Outcome Evaluation:

## 2021-11-02 NOTE — CASE MANAGEMENT/SOCIAL WORK
Discharge Planning Assessment  [facility]     Patient Name: Renuka Pelayo  MRN: [id number]  Today's Date: 11/2/2021    Admit Date: 11/1/2021     Discharge Needs Assessment     Row Name 11/02/21 1416       Living Environment    Lives With spouse    Name(s) of Who Lives With Patient Adrian Pelayo    Current Living Arrangements home/apartment/condo    Primary Care Provided by self    Provides Primary Care For no one    Family Caregiver if Needed spouse    Quality of Family Relationships unable to assess    Able to Return to Prior Arrangements yes       Resource/Environmental Concerns    Resource/Environmental Concerns none    Transportation Concerns car, none       Transition Planning    Patient/Family Anticipates Transition to home    Patient/Family Anticipated Services at Transition none    Transportation Anticipated family or friend will provide       Discharge Needs Assessment    Equipment Currently Used at Home pulse ox; oxygen; nebulizer; bp cuff    Concerns to be Addressed denies needs/concerns at this time; discharge planning    Anticipated Changes Related to Illness none    Equipment Needed After Discharge none    Provided Post Acute Provider List? N/A    N/A Provider List Comment denies dc needs    Provided Post Acute Provider Quality & Resource List? N/A               Discharge Plan     Row Name 11/02/21 1418       Plan    Plan Home    Plan Comments Spoke with patient at bedside.  Confirmed pcp and pharmacy.  Denies dc needs              Continued Care and Services - Admitted Since 11/1/2021    Coordination has not been started for this encounter.       Expected Discharge Date and Time     Expected Discharge Date Expected Discharge Time    Nov 3, 2021          Demographic Summary     Row Name 11/02/21 1416       General Information    Admission Type observation    Arrived From emergency department    Required Notices Provided Observation Status Notice    Referral Source admission list    Preferred Language  English               Functional Status     Row Name 11/02/21 1416       Functional Status    Usual Activity Tolerance good    Current Activity Tolerance good       Functional Status, IADL    Medications independent    Meal Preparation independent    Housekeeping independent    Laundry independent    Shopping independent       Mental Status    General Appearance WDL WDL       Mental Status Summary    Recent Changes in Mental Status/Cognitive Functioning no changes                         Gwendolyn Almonte, RN   Met with patient in room wearing PPE: mask, face shield/goggles, gloves, gown.      Maintained distance greater than six feet and spent less than 15 minutes in the room.

## 2021-11-03 LAB — QT INTERVAL: 395 MS

## 2022-01-14 ENCOUNTER — HOSPITAL ENCOUNTER (EMERGENCY)
Facility: HOSPITAL | Age: 67
Discharge: LEFT BEFORE DISCHARGE | End: 2022-01-14

## 2022-01-14 VITALS
DIASTOLIC BLOOD PRESSURE: 73 MMHG | SYSTOLIC BLOOD PRESSURE: 157 MMHG | OXYGEN SATURATION: 100 % | TEMPERATURE: 97.6 F | WEIGHT: 170 LBS | HEIGHT: 63 IN | RESPIRATION RATE: 18 BRPM | BODY MASS INDEX: 30.12 KG/M2 | HEART RATE: 69 BPM

## 2022-01-14 LAB — QT INTERVAL: 389 MS

## 2022-01-14 PROCEDURE — 93005 ELECTROCARDIOGRAM TRACING: CPT

## 2022-01-14 PROCEDURE — 99211 OFF/OP EST MAY X REQ PHY/QHP: CPT

## 2022-03-16 ENCOUNTER — APPOINTMENT (OUTPATIENT)
Dept: GENERAL RADIOLOGY | Facility: HOSPITAL | Age: 67
End: 2022-03-16

## 2022-03-16 ENCOUNTER — HOSPITAL ENCOUNTER (EMERGENCY)
Facility: HOSPITAL | Age: 67
Discharge: HOME | End: 2022-03-16
Attending: EMERGENCY MEDICINE | Admitting: EMERGENCY MEDICINE

## 2022-03-16 VITALS
HEART RATE: 80 BPM | SYSTOLIC BLOOD PRESSURE: 138 MMHG | BODY MASS INDEX: 30.48 KG/M2 | RESPIRATION RATE: 18 BRPM | DIASTOLIC BLOOD PRESSURE: 71 MMHG | HEIGHT: 63 IN | OXYGEN SATURATION: 99 % | TEMPERATURE: 98.1 F | WEIGHT: 172 LBS

## 2022-03-16 DIAGNOSIS — M79.641 RIGHT HAND PAIN: Primary | ICD-10-CM

## 2022-03-16 PROCEDURE — 73130 X-RAY EXAM OF HAND: CPT

## 2022-03-16 PROCEDURE — 99282 EMERGENCY DEPT VISIT SF MDM: CPT

## 2022-03-16 RX ORDER — PREDNISONE 20 MG/1
20 TABLET ORAL 2 TIMES DAILY
Qty: 10 TABLET | Refills: 0 | Status: SHIPPED | OUTPATIENT
Start: 2022-03-16 | End: 2022-03-21

## 2022-03-17 NOTE — DISCHARGE INSTRUCTIONS
Continue current home medications including your home pain medication.  Follow-up with your hand surgeon.  Wear splint as previously instructed.  Return for new or worsening symptoms.

## 2022-03-17 NOTE — ED PROVIDER NOTES
Subjective   Patient is a 66-year-old white female who presents today with complaints of pain to her right hand and wrist.  She states she is had this pain for the last 3 or 4 months.  She states she saw a hand surgeon recently and was placed in a splint.  She states the splint causes her pain.  She denies any injury or trauma to her wrist.  She states she occasionally has some swelling to the right wrist and hand.  She denies alleviating or exacerbating factor.  She states she has not had x-rays taken and is worried that something else is wrong.  She denies any fever or chills.  She denies any numbness tingling or weakness in any of her fingers or hand.          Review of Systems   Constitutional: Negative for chills and fever.   Gastrointestinal: Negative for nausea and vomiting.   Musculoskeletal:        Pain in right wrist and hand   Skin: Negative for rash.   Neurological: Negative for weakness and numbness.       Past Medical History:   Diagnosis Date   • Bladder cancer (HCC)    • Chronic back pain    • Closed nondisplaced fracture of head of right radius 03/2017   • Congenital deformity of right hip joint 1955   • COPD (chronic obstructive pulmonary disease) (HCC)     former smoker   • Deep venous thrombosis (HCC)     unprovoked   • Depressive disorder    • Disease of thyroid gland    • Diverticulitis of colon    • Essential hypertension    • Gastroesophageal reflux disease    • Insomnia    • Obesity    • Pulmonary embolism (HCC)     provoked by surgery       No Known Allergies    Past Surgical History:   Procedure Laterality Date   • ABDOMINAL SURGERY     • BRONCHOSCOPY N/A 11/25/2020    Procedure: BRONCHOSCOPY with bronchial washing;  Surgeon: Win Johnston MD;  Location: [facility] ENDOSCOPY;  Service: Pulmonary;  Laterality: N/A;  Post: mucous plugs   • CHOLECYSTECTOMY     • COLON SURGERY     • COLONOSCOPY     • COLOSTOMY CLOSURE N/A 2/25/2021    Procedure: COLOSTOMY TAKEDOWN OR CLOSURE, extenisve  lysis of adhesions;  Surgeon: Chi Farmer MD;  Location: [facility] MAIN OR;  Service: General;  Laterality: N/A;   • CYSTOSCOPY N/A 2/25/2021    Procedure: CYSTOSCOPY with bladder tumor removal and fulgeration, insertion of 3-way rizzo catheter;  Surgeon: Alfonzo Hayward MD;  Location: [facility] MAIN OR;  Service: Urology;  Laterality: N/A;   • ENDOSCOPY     • EXPLORATORY LAPAROTOMY N/A 7/16/2020    Procedure: LAPAROTOMY EXPLORATORY HARTMANS;  Surgeon: Chi Farmer MD;  Location: [facility] MAIN OR;  Service: General;  Laterality: N/A;   • HYSTERECTOMY     • TOTAL HIP ARTHROPLASTY Right    • TOTAL HIP ARTHROPLASTY REVISION Right     x3       Family History   Problem Relation Age of Onset   • No Known Problems Mother    • No Known Problems Father        Social History     Socioeconomic History   • Marital status:    Tobacco Use   • Smoking status: Former Smoker     Packs/day: 2.00     Years: 40.00     Pack years: 80.00     Types: Cigarettes     Quit date: 2019     Years since quitting: 3.2   • Smokeless tobacco: Never Used   • Tobacco comment: ELECTRONIC   Vaping Use   • Vaping Use: Every day   • Substances: Nicotine, Flavoring   Substance and Sexual Activity   • Alcohol use: Never   • Drug use: Never   • Sexual activity: Defer           Objective   Physical Exam  Vital signs and triage nurse note reviewed.  Constitutional: Awake, alert; well-developed and well-nourished. No acute distress is noted.  Cardiovascular: Regular rate and rhythm  Pulmonary: Respiratory effort regular nonlabored  Musculoskeletal: Independent range of motion of all extremities.  There is mild tenderness over the dorsum of the right wrist and hand particularly over the ulnar aspect and fifth metacarpal.  Mild edema.  There is no erythema.  No open wounds.  No crepitus.  There is good cap refill and sensation distally.  No streaking.  Neuro: Alert oriented x3, speech is clear and appropriate, GCS 15. Skin: Flesh tone,  warm, dry, intact; no erythematous or petechial rash or lesion.      Procedures           ED Course      Labs Reviewed - No data to display  XR Hand 3+ View Right    Result Date: 3/16/2022  Mild joint space narrowing at the interphalangeal joints and moderate to severe joint space narrowing at the radiocarpal and radial ulnar joints. There is soft tissue swelling. No acute bony abnormality or aggressive appearing focal osseous lesion.  Electronically Signed By-Mark Donaldson DO On:3/16/2022 9:25 PM This report was finalized on [id number] by  Mark Donaldson DO.    Medications - No data to display                                             MDM  Number of Diagnoses or Management Options  Right hand pain  Diagnosis management comments: Patient had x-rays obtained of the right hand.    X-rays reviewed by me and interpreted by the radiologist show Mild joint space narrowing at the interphalangeal joints and moderate to  severe joint space narrowing at the radiocarpal and radial ulnar joints.  There is soft tissue swelling. No acute bony abnormality or aggressive  appearing focal osseous lesion.    On reexamination patient resting comfortably and in no distress.  She has no signs of infection.  There is no redness.  No crepitus.  No open wounds.  She is neurovascular intact.    Diagnosis and treatment plan discussed with patient.  Patient agreeable to plan.   I discussed findings with patient who voices understanding of discharge instructions, signs and symptoms requiring return to ED; discharged improved and in stable condition with follow up for re-evaluation.  Prescription for prednisone.  Inspect reviewed and patient receives monthly hydrocodone most recently filled on 2/22/2022 #120.       Amount and/or Complexity of Data Reviewed  Tests in the radiology section of CPT®: reviewed and ordered    Patient Progress  Patient progress: stable      Final diagnoses:   Right hand pain       ED Disposition  ED Disposition      ED Disposition   Discharge    Condition   Stable    Comment   --             Your hand surgeon    Schedule an appointment as soon as possible for a visit       Raymond Mckeon MD  [address] [place] [address] [phone]               Medication List      New Prescriptions    predniSONE 20 MG tablet  Commonly known as: DELTASONE  Take 1 tablet by mouth 2 (Two) Times a Day for 5 days.           Where to Get Your Medications      These medications were sent to [id number] - [place], [place] - [address] - [phone]  - [phone]   [address] [facility] [place] [address]    Phone: [phone]   · predniSONE 20 MG tablet          Nellie Oconnell, SHILA  03/16/22 6419

## 2022-03-17 NOTE — ED NOTES
Pt c/o right hand pain x 3 months.  Pt sts she saw hand specialist last week and he told her to wear a brace x 10 weeks.  Pt sts she has been wearing brace.

## 2022-03-22 ENCOUNTER — HOSPITAL ENCOUNTER (EMERGENCY)
Facility: HOSPITAL | Age: 67
Discharge: HOME | End: 2022-03-22
Attending: EMERGENCY MEDICINE | Admitting: EMERGENCY MEDICINE

## 2022-03-22 ENCOUNTER — APPOINTMENT (OUTPATIENT)
Dept: GENERAL RADIOLOGY | Facility: HOSPITAL | Age: 67
End: 2022-03-22

## 2022-03-22 VITALS
TEMPERATURE: 98.4 F | HEART RATE: 98 BPM | RESPIRATION RATE: 17 BRPM | BODY MASS INDEX: 31.25 KG/M2 | WEIGHT: 176.37 LBS | SYSTOLIC BLOOD PRESSURE: 182 MMHG | HEIGHT: 63 IN | DIASTOLIC BLOOD PRESSURE: 101 MMHG | OXYGEN SATURATION: 96 %

## 2022-03-22 DIAGNOSIS — R06.00 DYSPNEA, UNSPECIFIED TYPE: ICD-10-CM

## 2022-03-22 DIAGNOSIS — J44.1 COPD EXACERBATION: Primary | ICD-10-CM

## 2022-03-22 DIAGNOSIS — Z20.822 COVID-19 RULED OUT BY LABORATORY TESTING: ICD-10-CM

## 2022-03-22 LAB
ALBUMIN SERPL-MCNC: 3.9 G/DL (ref 3.5–5.2)
ALBUMIN/GLOB SERPL: 1.4 G/DL
ALP SERPL-CCNC: 80 U/L (ref 39–117)
ALT SERPL W P-5'-P-CCNC: 10 U/L (ref 1–33)
ANION GAP SERPL CALCULATED.3IONS-SCNC: 11 MMOL/L (ref 5–15)
AST SERPL-CCNC: 12 U/L (ref 1–32)
BASOPHILS # BLD AUTO: 0.1 10*3/MM3 (ref 0–0.2)
BASOPHILS NFR BLD AUTO: 1.2 % (ref 0–1.5)
BILIRUB SERPL-MCNC: 0.4 MG/DL (ref 0–1.2)
BUN SERPL-MCNC: 12 MG/DL (ref 8–23)
BUN/CREAT SERPL: 12.2 (ref 7–25)
CALCIUM SPEC-SCNC: 8.7 MG/DL (ref 8.6–10.5)
CHLORIDE SERPL-SCNC: 101 MMOL/L (ref 98–107)
CO2 SERPL-SCNC: 28 MMOL/L (ref 22–29)
CREAT SERPL-MCNC: 0.98 MG/DL (ref 0.57–1)
DEPRECATED RDW RBC AUTO: 46.4 FL (ref 37–54)
EGFRCR SERPLBLD CKD-EPI 2021: 64.2 ML/MIN/1.73
EOSINOPHIL # BLD AUTO: 0.2 10*3/MM3 (ref 0–0.4)
EOSINOPHIL NFR BLD AUTO: 2.3 % (ref 0.3–6.2)
ERYTHROCYTE [DISTWIDTH] IN BLOOD BY AUTOMATED COUNT: 16.2 % (ref 12.3–15.4)
GLOBULIN UR ELPH-MCNC: 2.7 GM/DL
GLUCOSE SERPL-MCNC: 99 MG/DL (ref 65–99)
HCT VFR BLD AUTO: 35.5 % (ref 34–46.6)
HGB BLD-MCNC: 11.8 G/DL (ref 12–15.9)
INR PPP: 1.39 (ref 2–3)
LYMPHOCYTES # BLD AUTO: 1.5 10*3/MM3 (ref 0.7–3.1)
LYMPHOCYTES NFR BLD AUTO: 17.3 % (ref 19.6–45.3)
MCH RBC QN AUTO: 27.2 PG (ref 26.6–33)
MCHC RBC AUTO-ENTMCNC: 33.4 G/DL (ref 31.5–35.7)
MCV RBC AUTO: 81.4 FL (ref 79–97)
MONOCYTES # BLD AUTO: 0.7 10*3/MM3 (ref 0.1–0.9)
MONOCYTES NFR BLD AUTO: 7.7 % (ref 5–12)
NEUTROPHILS NFR BLD AUTO: 6.3 10*3/MM3 (ref 1.7–7)
NEUTROPHILS NFR BLD AUTO: 71.5 % (ref 42.7–76)
NRBC BLD AUTO-RTO: 0.1 /100 WBC (ref 0–0.2)
NT-PROBNP SERPL-MCNC: 317.2 PG/ML (ref 0–900)
PLATELET # BLD AUTO: 320 10*3/MM3 (ref 140–450)
PMV BLD AUTO: 7 FL (ref 6–12)
POTASSIUM SERPL-SCNC: 3.2 MMOL/L (ref 3.5–5.2)
PROT SERPL-MCNC: 6.6 G/DL (ref 6–8.5)
PROTHROMBIN TIME: 15.1 SECONDS (ref 19.4–28.5)
RBC # BLD AUTO: 4.36 10*6/MM3 (ref 3.77–5.28)
SARS-COV-2 RNA PNL SPEC NAA+PROBE: NOT DETECTED
SODIUM SERPL-SCNC: 140 MMOL/L (ref 136–145)
TROPONIN T SERPL-MCNC: <0.01 NG/ML (ref 0–0.03)
WBC NRBC COR # BLD: 8.8 10*3/MM3 (ref 3.4–10.8)

## 2022-03-22 PROCEDURE — 84484 ASSAY OF TROPONIN QUANT: CPT | Performed by: NURSE PRACTITIONER

## 2022-03-22 PROCEDURE — 94640 AIRWAY INHALATION TREATMENT: CPT

## 2022-03-22 PROCEDURE — 85025 COMPLETE CBC W/AUTO DIFF WBC: CPT | Performed by: NURSE PRACTITIONER

## 2022-03-22 PROCEDURE — 80053 COMPREHEN METABOLIC PANEL: CPT | Performed by: NURSE PRACTITIONER

## 2022-03-22 PROCEDURE — 93005 ELECTROCARDIOGRAM TRACING: CPT

## 2022-03-22 PROCEDURE — 96374 THER/PROPH/DIAG INJ IV PUSH: CPT

## 2022-03-22 PROCEDURE — 71045 X-RAY EXAM CHEST 1 VIEW: CPT

## 2022-03-22 PROCEDURE — 94799 UNLISTED PULMONARY SVC/PX: CPT

## 2022-03-22 PROCEDURE — 93005 ELECTROCARDIOGRAM TRACING: CPT | Performed by: NURSE PRACTITIONER

## 2022-03-22 PROCEDURE — 85610 PROTHROMBIN TIME: CPT | Performed by: NURSE PRACTITIONER

## 2022-03-22 PROCEDURE — U0003 INFECTIOUS AGENT DETECTION BY NUCLEIC ACID (DNA OR RNA); SEVERE ACUTE RESPIRATORY SYNDROME CORONAVIRUS 2 (SARS-COV-2) (CORONAVIRUS DISEASE [COVID-19]), AMPLIFIED PROBE TECHNIQUE, MAKING USE OF HIGH THROUGHPUT TECHNOLOGIES AS DESCRIBED BY CMS-2020-01-R: HCPCS | Performed by: NURSE PRACTITIONER

## 2022-03-22 PROCEDURE — 25010000002 METHYLPREDNISOLONE PER 125 MG: Performed by: NURSE PRACTITIONER

## 2022-03-22 PROCEDURE — 99284 EMERGENCY DEPT VISIT MOD MDM: CPT

## 2022-03-22 PROCEDURE — 83880 ASSAY OF NATRIURETIC PEPTIDE: CPT | Performed by: NURSE PRACTITIONER

## 2022-03-22 RX ORDER — IPRATROPIUM BROMIDE AND ALBUTEROL SULFATE 2.5; .5 MG/3ML; MG/3ML
3 SOLUTION RESPIRATORY (INHALATION) ONCE
Status: COMPLETED | OUTPATIENT
Start: 2022-03-22 | End: 2022-03-22

## 2022-03-22 RX ORDER — PREDNISONE 20 MG/1
20 TABLET ORAL DAILY
Qty: 5 TABLET | Refills: 0 | Status: SHIPPED | OUTPATIENT
Start: 2022-03-22 | End: 2022-05-15

## 2022-03-22 RX ORDER — ALBUTEROL SULFATE 2.5 MG/3ML
2.5 SOLUTION RESPIRATORY (INHALATION) ONCE
Status: COMPLETED | OUTPATIENT
Start: 2022-03-22 | End: 2022-03-22

## 2022-03-22 RX ORDER — SODIUM CHLORIDE 0.9 % (FLUSH) 0.9 %
10 SYRINGE (ML) INJECTION AS NEEDED
Status: DISCONTINUED | OUTPATIENT
Start: 2022-03-22 | End: 2022-03-22 | Stop reason: HOSPADM

## 2022-03-22 RX ORDER — METHYLPREDNISOLONE SODIUM SUCCINATE 125 MG/2ML
125 INJECTION, POWDER, LYOPHILIZED, FOR SOLUTION INTRAMUSCULAR; INTRAVENOUS ONCE
Status: COMPLETED | OUTPATIENT
Start: 2022-03-22 | End: 2022-03-22

## 2022-03-22 RX ADMIN — IPRATROPIUM BROMIDE AND ALBUTEROL SULFATE 3 ML: .5; 3 SOLUTION RESPIRATORY (INHALATION) at 17:50

## 2022-03-22 RX ADMIN — METHYLPREDNISOLONE SODIUM SUCCINATE 125 MG: 125 INJECTION, POWDER, FOR SOLUTION INTRAMUSCULAR; INTRAVENOUS at 16:16

## 2022-03-22 RX ADMIN — ALBUTEROL SULFATE 2.5 MG: 2.5 SOLUTION RESPIRATORY (INHALATION) at 17:58

## 2022-03-23 ENCOUNTER — TRANSCRIBE ORDERS (OUTPATIENT)
Dept: ADMINISTRATIVE | Facility: HOSPITAL | Age: 67
End: 2022-03-23

## 2022-03-23 DIAGNOSIS — Z12.31 VISIT FOR SCREENING MAMMOGRAM: Primary | ICD-10-CM

## 2022-03-24 LAB — QT INTERVAL: 376 MS

## 2022-04-22 ENCOUNTER — LAB (OUTPATIENT)
Dept: LAB | Facility: HOSPITAL | Age: 67
End: 2022-04-22

## 2022-04-22 LAB — SARS-COV-2 ORF1AB RESP QL NAA+PROBE: NOT DETECTED

## 2022-04-22 PROCEDURE — U0004 COV-19 TEST NON-CDC HGH THRU: HCPCS

## 2022-04-22 PROCEDURE — C9803 HOPD COVID-19 SPEC COLLECT: HCPCS

## 2022-04-25 ENCOUNTER — ANESTHESIA EVENT (OUTPATIENT)
Dept: GASTROENTEROLOGY | Facility: HOSPITAL | Age: 67
End: 2022-04-25

## 2022-04-25 ENCOUNTER — ANESTHESIA (OUTPATIENT)
Dept: GASTROENTEROLOGY | Facility: HOSPITAL | Age: 67
End: 2022-04-25

## 2022-04-25 ENCOUNTER — HOSPITAL ENCOUNTER (OUTPATIENT)
Facility: HOSPITAL | Age: 67
Setting detail: HOSPITAL OUTPATIENT SURGERY
Discharge: HOME | End: 2022-04-25
Attending: INTERNAL MEDICINE | Admitting: INTERNAL MEDICINE

## 2022-04-25 VITALS
HEART RATE: 95 BPM | BODY MASS INDEX: 31.52 KG/M2 | DIASTOLIC BLOOD PRESSURE: 61 MMHG | OXYGEN SATURATION: 95 % | HEIGHT: 63 IN | WEIGHT: 177.91 LBS | RESPIRATION RATE: 16 BRPM | TEMPERATURE: 98.5 F | SYSTOLIC BLOOD PRESSURE: 131 MMHG

## 2022-04-25 DIAGNOSIS — J18.9 PNEUMONIA: ICD-10-CM

## 2022-04-25 LAB
B PARAPERT DNA SPEC QL NAA+PROBE: NOT DETECTED
B PERT DNA SPEC QL NAA+PROBE: NOT DETECTED
C PNEUM DNA NPH QL NAA+NON-PROBE: NOT DETECTED
FLUAV SUBTYP SPEC NAA+PROBE: NOT DETECTED
FLUBV RNA ISLT QL NAA+PROBE: NOT DETECTED
HADV DNA SPEC NAA+PROBE: NOT DETECTED
HCOV 229E RNA SPEC QL NAA+PROBE: NOT DETECTED
HCOV HKU1 RNA SPEC QL NAA+PROBE: NOT DETECTED
HCOV NL63 RNA SPEC QL NAA+PROBE: NOT DETECTED
HCOV OC43 RNA SPEC QL NAA+PROBE: NOT DETECTED
HMPV RNA NPH QL NAA+NON-PROBE: NOT DETECTED
HPIV1 RNA ISLT QL NAA+PROBE: NOT DETECTED
HPIV2 RNA SPEC QL NAA+PROBE: NOT DETECTED
HPIV3 RNA NPH QL NAA+PROBE: NOT DETECTED
HPIV4 P GENE NPH QL NAA+PROBE: NOT DETECTED
INR PPP: 1.07 (ref 2–3)
M PNEUMO IGG SER IA-ACNC: NOT DETECTED
PROTHROMBIN TIME: 11 SECONDS (ref 19.4–28.5)
RHINOVIRUS RNA SPEC NAA+PROBE: NOT DETECTED
RSV RNA NPH QL NAA+NON-PROBE: NOT DETECTED
SARS-COV-2 RNA NPH QL NAA+NON-PROBE: NOT DETECTED

## 2022-04-25 PROCEDURE — 87070 CULTURE OTHR SPECIMN AEROBIC: CPT | Performed by: INTERNAL MEDICINE

## 2022-04-25 PROCEDURE — 0202U NFCT DS 22 TRGT SARS-COV-2: CPT | Performed by: INTERNAL MEDICINE

## 2022-04-25 PROCEDURE — 87116 MYCOBACTERIA CULTURE: CPT | Performed by: INTERNAL MEDICINE

## 2022-04-25 PROCEDURE — 87102 FUNGUS ISOLATION CULTURE: CPT | Performed by: INTERNAL MEDICINE

## 2022-04-25 PROCEDURE — 87205 SMEAR GRAM STAIN: CPT | Performed by: INTERNAL MEDICINE

## 2022-04-25 PROCEDURE — 85610 PROTHROMBIN TIME: CPT | Performed by: INTERNAL MEDICINE

## 2022-04-25 PROCEDURE — 87798 DETECT AGENT NOS DNA AMP: CPT | Performed by: INTERNAL MEDICINE

## 2022-04-25 PROCEDURE — 87305 ASPERGILLUS AG IA: CPT | Performed by: INTERNAL MEDICINE

## 2022-04-25 PROCEDURE — 87186 SC STD MICRODIL/AGAR DIL: CPT | Performed by: INTERNAL MEDICINE

## 2022-04-25 PROCEDURE — 87206 SMEAR FLUORESCENT/ACID STAI: CPT | Performed by: INTERNAL MEDICINE

## 2022-04-25 PROCEDURE — 87385 HISTOPLASMA CAPSUL AG IA: CPT | Performed by: INTERNAL MEDICINE

## 2022-04-25 PROCEDURE — 25010000002 PROPOFOL 200 MG/20ML EMULSION: Performed by: ANESTHESIOLOGY

## 2022-04-25 RX ORDER — LIDOCAINE HYDROCHLORIDE 20 MG/ML
JELLY TOPICAL
Status: DISCONTINUED
Start: 2022-04-25 | End: 2022-04-25 | Stop reason: HOSPADM

## 2022-04-25 RX ORDER — SODIUM CHLORIDE 9 MG/ML
9 INJECTION, SOLUTION INTRAVENOUS CONTINUOUS
Status: DISCONTINUED | OUTPATIENT
Start: 2022-04-25 | End: 2022-04-25 | Stop reason: HOSPADM

## 2022-04-25 RX ORDER — LIDOCAINE HYDROCHLORIDE 20 MG/ML
JELLY TOPICAL AS NEEDED
Status: DISCONTINUED | OUTPATIENT
Start: 2022-04-25 | End: 2022-04-25 | Stop reason: HOSPADM

## 2022-04-25 RX ORDER — LIDOCAINE HYDROCHLORIDE 10 MG/ML
INJECTION, SOLUTION EPIDURAL; INFILTRATION; INTRACAUDAL; PERINEURAL AS NEEDED
Status: DISCONTINUED | OUTPATIENT
Start: 2022-04-25 | End: 2022-04-25 | Stop reason: SURG

## 2022-04-25 RX ORDER — PROPOFOL 10 MG/ML
INJECTION, EMULSION INTRAVENOUS AS NEEDED
Status: DISCONTINUED | OUTPATIENT
Start: 2022-04-25 | End: 2022-04-25 | Stop reason: SURG

## 2022-04-25 RX ORDER — MAGNESIUM HYDROXIDE 1200 MG/15ML
LIQUID ORAL
Status: DISCONTINUED
Start: 2022-04-25 | End: 2022-04-25 | Stop reason: HOSPADM

## 2022-04-25 RX ORDER — LIDOCAINE HYDROCHLORIDE 20 MG/ML
INJECTION, SOLUTION INFILTRATION; PERINEURAL AS NEEDED
Status: DISCONTINUED | OUTPATIENT
Start: 2022-04-25 | End: 2022-04-25 | Stop reason: HOSPADM

## 2022-04-25 RX ORDER — LIDOCAINE HYDROCHLORIDE 20 MG/ML
INJECTION, SOLUTION EPIDURAL; INFILTRATION; INTRACAUDAL; PERINEURAL
Status: DISCONTINUED
Start: 2022-04-25 | End: 2022-04-25 | Stop reason: HOSPADM

## 2022-04-25 RX ADMIN — SODIUM CHLORIDE 9 ML/HR: 9 INJECTION, SOLUTION INTRAVENOUS at 08:03

## 2022-04-25 RX ADMIN — PROPOFOL 110 MG: 10 INJECTION, EMULSION INTRAVENOUS at 08:31

## 2022-04-25 RX ADMIN — LIDOCAINE HYDROCHLORIDE 50 MG: 10 INJECTION, SOLUTION EPIDURAL; INFILTRATION; INTRACAUDAL; PERINEURAL at 08:31

## 2022-04-25 NOTE — ANESTHESIA POSTPROCEDURE EVALUATION
Patient: Renuka Pelayo    Procedure Summary     Date: 04/25/22 Room / Location: [facility] ENDOSCOPY 3 / [facility] ENDOSCOPY    Anesthesia Start: 0827 Anesthesia Stop: 0847    Procedure: BRONCHOSCOPY with bronchial washing (N/A Bronchus) Diagnosis:       Pneumonia      (Pneumonia [J18.9])    Surgeons: Win Johnston MD Provider: Jun Barnett MD    Anesthesia Type: MAC ASA Status: 4          Anesthesia Type: MAC    Vitals  Vitals Value Taken Time   /55 04/25/22 0845   Temp     Pulse 92 04/25/22 0848   Resp 16 04/25/22 0844   SpO2 94 % 04/25/22 0848   Vitals shown include unvalidated device data.        Post Anesthesia Care and Evaluation    Patient location during evaluation: PACU  Patient participation: complete - patient participated  Level of consciousness: awake  Pain scale: See nurse's notes for pain score.  Pain management: adequate  Airway patency: patent  Anesthetic complications: No anesthetic complications  PONV Status: none  Cardiovascular status: acceptable  Respiratory status: acceptable  Hydration status: acceptable    Comments: Patient seen and examined postoperatively; vital signs stable; SpO2 greater than or equal to 90%; cardiopulmonary status stable; nausea/vomiting adequately controlled; pain adequately controlled; no apparent anesthesia complications; patient discharged from anesthesia care when discharge criteria were met

## 2022-04-25 NOTE — ANESTHESIA PREPROCEDURE EVALUATION
Anesthesia Evaluation     Patient summary reviewed and Nursing notes reviewed   no history of anesthetic complications:  NPO Solid Status: > 8 hours  NPO Liquid Status: > 8 hours           Airway   Dental      Pulmonary    (+) pneumonia , pulmonary embolism, a smoker Former, COPD,   Cardiovascular     ECG reviewed  PT is on anticoagulation therapy  Patient on routine beta blocker    (+) hypertension, DVT,       Neuro/Psych  (+) psychiatric history Depression,    GI/Hepatic/Renal/Endo    (+) obesity,  GERD,  thyroid problem hypothyroidism    Musculoskeletal     (+) back pain, chronic pain,   Abdominal    Substance History      OB/GYN          Other      history of cancer    ROS/Med Hx Other: Bladder cancer, hypokalemia, severe sepsis, hypoxemia, insomnia    PSH  HYSTERECTOMY CHOLECYSTECTOMY  TOTAL HIP ARTHROPLASTY TOTAL HIP ARTHROPLASTY REVISION  EXPLORATORY LAPAROTOMY COLONOSCOPY  ENDOSCOPY BRONCHOSCOPY  ABDOMINAL SURGERY COLON SURGERY  COLOSTOMY CLOSURE CYSTOSCOPY                  Anesthesia Plan    ASA 4     MAC   (Patient identified; pre-operative vital signs, all relevant labs/studies, complete medical/surgical/anesthetic history, full medication list, full allergy list, and NPO status obtained/reviewed; physical assessment performed; anesthetic options, side effects, potential complications, risks, and benefits discussed; questions answered; written anesthesia consent obtained; patient cleared for procedure; anesthesia machine and equipment checked and functioning)    Anesthetic plan, all risks, benefits, and alternatives have been provided, discussed and informed consent has been obtained with: patient.        CODE STATUS:

## 2022-04-27 LAB
P JIROVECII DNA L RESP QL NAA+NON-PROBE: NEGATIVE
REF LAB TEST METHOD: NORMAL
REF LAB TEST METHOD: NORMAL

## 2022-04-28 LAB
BACTERIA SPEC RESP CULT: ABNORMAL
BACTERIA SPEC RESP CULT: ABNORMAL
GRAM STN SPEC: ABNORMAL
REF LAB TEST METHOD: NORMAL
REF LAB TEST METHOD: NORMAL

## 2022-05-15 ENCOUNTER — APPOINTMENT (OUTPATIENT)
Dept: GENERAL RADIOLOGY | Facility: HOSPITAL | Age: 67
End: 2022-05-15

## 2022-05-15 ENCOUNTER — HOSPITAL ENCOUNTER (OUTPATIENT)
Facility: HOSPITAL | Age: 67
Setting detail: OBSERVATION
LOS: 2 days | Discharge: HOME | End: 2022-05-17
Attending: EMERGENCY MEDICINE | Admitting: INTERNAL MEDICINE

## 2022-05-15 DIAGNOSIS — A49.1 STREPTOCOCCUS PNEUMONIAE: ICD-10-CM

## 2022-05-15 DIAGNOSIS — R06.00 DYSPNEA, UNSPECIFIED TYPE: Primary | ICD-10-CM

## 2022-05-15 PROBLEM — J18.9 PNEUMONIA: Status: ACTIVE | Noted: 2022-05-15

## 2022-05-15 LAB
ANION GAP SERPL CALCULATED.3IONS-SCNC: 14 MMOL/L (ref 5–15)
B PARAPERT DNA SPEC QL NAA+PROBE: NOT DETECTED
B PERT DNA SPEC QL NAA+PROBE: NOT DETECTED
BASOPHILS # BLD AUTO: 0.1 10*3/MM3 (ref 0–0.2)
BASOPHILS NFR BLD AUTO: 1.6 % (ref 0–1.5)
BUN SERPL-MCNC: 9 MG/DL (ref 8–23)
BUN/CREAT SERPL: 9.9 (ref 7–25)
C PNEUM DNA NPH QL NAA+NON-PROBE: NOT DETECTED
CALCIUM SPEC-SCNC: 9.9 MG/DL (ref 8.6–10.5)
CHLORIDE SERPL-SCNC: 100 MMOL/L (ref 98–107)
CO2 SERPL-SCNC: 26 MMOL/L (ref 22–29)
CREAT SERPL-MCNC: 0.91 MG/DL (ref 0.57–1)
D-LACTATE SERPL-SCNC: 0.9 MMOL/L (ref 0.5–2)
DEPRECATED RDW RBC AUTO: 44.2 FL (ref 37–54)
EGFRCR SERPLBLD CKD-EPI 2021: 70.2 ML/MIN/1.73
EOSINOPHIL # BLD AUTO: 0.3 10*3/MM3 (ref 0–0.4)
EOSINOPHIL NFR BLD AUTO: 3.8 % (ref 0.3–6.2)
ERYTHROCYTE [DISTWIDTH] IN BLOOD BY AUTOMATED COUNT: 15.5 % (ref 12.3–15.4)
FLUAV SUBTYP SPEC NAA+PROBE: NOT DETECTED
FLUBV RNA ISLT QL NAA+PROBE: NOT DETECTED
GLUCOSE SERPL-MCNC: 107 MG/DL (ref 65–99)
HADV DNA SPEC NAA+PROBE: NOT DETECTED
HCOV 229E RNA SPEC QL NAA+PROBE: NOT DETECTED
HCOV HKU1 RNA SPEC QL NAA+PROBE: NOT DETECTED
HCOV NL63 RNA SPEC QL NAA+PROBE: NOT DETECTED
HCOV OC43 RNA SPEC QL NAA+PROBE: NOT DETECTED
HCT VFR BLD AUTO: 40.5 % (ref 34–46.6)
HGB BLD-MCNC: 13.2 G/DL (ref 12–15.9)
HMPV RNA NPH QL NAA+NON-PROBE: NOT DETECTED
HPIV1 RNA ISLT QL NAA+PROBE: NOT DETECTED
HPIV2 RNA SPEC QL NAA+PROBE: NOT DETECTED
HPIV3 RNA NPH QL NAA+PROBE: NOT DETECTED
HPIV4 P GENE NPH QL NAA+PROBE: NOT DETECTED
INR PPP: 3.6 (ref 2–3)
L PNEUMO1 AG UR QL IA: NEGATIVE
LYMPHOCYTES # BLD AUTO: 1.9 10*3/MM3 (ref 0.7–3.1)
LYMPHOCYTES NFR BLD AUTO: 27.5 % (ref 19.6–45.3)
M PNEUMO IGG SER IA-ACNC: NOT DETECTED
MCH RBC QN AUTO: 26.5 PG (ref 26.6–33)
MCHC RBC AUTO-ENTMCNC: 32.6 G/DL (ref 31.5–35.7)
MCV RBC AUTO: 81.2 FL (ref 79–97)
MONOCYTES # BLD AUTO: 0.6 10*3/MM3 (ref 0.1–0.9)
MONOCYTES NFR BLD AUTO: 8.5 % (ref 5–12)
NEUTROPHILS NFR BLD AUTO: 4 10*3/MM3 (ref 1.7–7)
NEUTROPHILS NFR BLD AUTO: 58.6 % (ref 42.7–76)
NRBC BLD AUTO-RTO: 0.1 /100 WBC (ref 0–0.2)
NT-PROBNP SERPL-MCNC: 45 PG/ML (ref 0–900)
PLATELET # BLD AUTO: 367 10*3/MM3 (ref 140–450)
PMV BLD AUTO: 8 FL (ref 6–12)
POTASSIUM SERPL-SCNC: 3.3 MMOL/L (ref 3.5–5.2)
PROCALCITONIN SERPL-MCNC: 0.13 NG/ML (ref 0–0.25)
PROTHROMBIN TIME: 34.5 SECONDS (ref 19.4–28.5)
RBC # BLD AUTO: 4.99 10*6/MM3 (ref 3.77–5.28)
RHINOVIRUS RNA SPEC NAA+PROBE: NOT DETECTED
RSV RNA NPH QL NAA+NON-PROBE: NOT DETECTED
S PNEUM AG SPEC QL LA: NEGATIVE
SARS-COV-2 RNA NPH QL NAA+NON-PROBE: NOT DETECTED
SODIUM SERPL-SCNC: 140 MMOL/L (ref 136–145)
TROPONIN T SERPL-MCNC: <0.01 NG/ML (ref 0–0.03)
WBC NRBC COR # BLD: 6.9 10*3/MM3 (ref 3.4–10.8)

## 2022-05-15 PROCEDURE — 80048 BASIC METABOLIC PNL TOTAL CA: CPT | Performed by: NURSE PRACTITIONER

## 2022-05-15 PROCEDURE — 25010000002 METHYLPREDNISOLONE PER 125 MG: Performed by: NURSE PRACTITIONER

## 2022-05-15 PROCEDURE — 94640 AIRWAY INHALATION TREATMENT: CPT

## 2022-05-15 PROCEDURE — 25010000002 CEFTRIAXONE PER 250 MG: Performed by: HOSPITALIST

## 2022-05-15 PROCEDURE — 25010000002 METHYLPREDNISOLONE PER 40 MG: Performed by: HOSPITALIST

## 2022-05-15 PROCEDURE — G0378 HOSPITAL OBSERVATION PER HR: HCPCS

## 2022-05-15 PROCEDURE — 96375 TX/PRO/DX INJ NEW DRUG ADDON: CPT

## 2022-05-15 PROCEDURE — 94799 UNLISTED PULMONARY SVC/PX: CPT

## 2022-05-15 PROCEDURE — 85610 PROTHROMBIN TIME: CPT | Performed by: NURSE PRACTITIONER

## 2022-05-15 PROCEDURE — 71045 X-RAY EXAM CHEST 1 VIEW: CPT

## 2022-05-15 PROCEDURE — 87147 CULTURE TYPE IMMUNOLOGIC: CPT | Performed by: NURSE PRACTITIONER

## 2022-05-15 PROCEDURE — 94664 DEMO&/EVAL PT USE INHALER: CPT

## 2022-05-15 PROCEDURE — 0202U NFCT DS 22 TRGT SARS-COV-2: CPT | Performed by: NURSE PRACTITIONER

## 2022-05-15 PROCEDURE — 85025 COMPLETE CBC W/AUTO DIFF WBC: CPT | Performed by: NURSE PRACTITIONER

## 2022-05-15 PROCEDURE — 96365 THER/PROPH/DIAG IV INF INIT: CPT

## 2022-05-15 PROCEDURE — 84484 ASSAY OF TROPONIN QUANT: CPT | Performed by: NURSE PRACTITIONER

## 2022-05-15 PROCEDURE — 87040 BLOOD CULTURE FOR BACTERIA: CPT | Performed by: NURSE PRACTITIONER

## 2022-05-15 PROCEDURE — 83605 ASSAY OF LACTIC ACID: CPT | Performed by: HOSPITALIST

## 2022-05-15 PROCEDURE — 99220 PR INITIAL OBSERVATION CARE/DAY 70 MINUTES: CPT | Performed by: HOSPITALIST

## 2022-05-15 PROCEDURE — 99285 EMERGENCY DEPT VISIT HI MDM: CPT

## 2022-05-15 PROCEDURE — 83880 ASSAY OF NATRIURETIC PEPTIDE: CPT | Performed by: NURSE PRACTITIONER

## 2022-05-15 PROCEDURE — 96376 TX/PRO/DX INJ SAME DRUG ADON: CPT

## 2022-05-15 PROCEDURE — 87150 DNA/RNA AMPLIFIED PROBE: CPT | Performed by: NURSE PRACTITIONER

## 2022-05-15 PROCEDURE — 93005 ELECTROCARDIOGRAM TRACING: CPT | Performed by: EMERGENCY MEDICINE

## 2022-05-15 PROCEDURE — 94761 N-INVAS EAR/PLS OXIMETRY MLT: CPT

## 2022-05-15 PROCEDURE — 84145 PROCALCITONIN (PCT): CPT | Performed by: NURSE PRACTITIONER

## 2022-05-15 RX ORDER — SODIUM CHLORIDE 0.9 % (FLUSH) 0.9 %
10 SYRINGE (ML) INJECTION AS NEEDED
Status: DISCONTINUED | OUTPATIENT
Start: 2022-05-15 | End: 2022-05-17 | Stop reason: HOSPADM

## 2022-05-15 RX ORDER — ZOLPIDEM TARTRATE 5 MG/1
5 TABLET ORAL NIGHTLY
Status: DISCONTINUED | OUTPATIENT
Start: 2022-05-15 | End: 2022-05-17 | Stop reason: HOSPADM

## 2022-05-15 RX ORDER — METHYLPREDNISOLONE SODIUM SUCCINATE 40 MG/ML
40 INJECTION, POWDER, LYOPHILIZED, FOR SOLUTION INTRAMUSCULAR; INTRAVENOUS EVERY 6 HOURS
Status: DISCONTINUED | OUTPATIENT
Start: 2022-05-15 | End: 2022-05-17

## 2022-05-15 RX ORDER — IPRATROPIUM BROMIDE AND ALBUTEROL SULFATE 2.5; .5 MG/3ML; MG/3ML
3 SOLUTION RESPIRATORY (INHALATION)
Status: DISCONTINUED | OUTPATIENT
Start: 2022-05-15 | End: 2022-05-15 | Stop reason: SDUPTHER

## 2022-05-15 RX ORDER — BUDESONIDE AND FORMOTEROL FUMARATE DIHYDRATE 160; 4.5 UG/1; UG/1
2 AEROSOL RESPIRATORY (INHALATION)
Status: DISCONTINUED | OUTPATIENT
Start: 2022-05-15 | End: 2022-05-17 | Stop reason: HOSPADM

## 2022-05-15 RX ORDER — SODIUM CHLORIDE 0.9 % (FLUSH) 0.9 %
10 SYRINGE (ML) INJECTION EVERY 12 HOURS SCHEDULED
Status: DISCONTINUED | OUTPATIENT
Start: 2022-05-15 | End: 2022-05-17 | Stop reason: HOSPADM

## 2022-05-15 RX ORDER — METHYLPREDNISOLONE SODIUM SUCCINATE 125 MG/2ML
125 INJECTION, POWDER, LYOPHILIZED, FOR SOLUTION INTRAMUSCULAR; INTRAVENOUS ONCE
Status: DISCONTINUED | OUTPATIENT
Start: 2022-05-15 | End: 2022-05-15 | Stop reason: SDUPTHER

## 2022-05-15 RX ORDER — HYDROCODONE BITARTRATE AND ACETAMINOPHEN 10; 325 MG/1; MG/1
1 TABLET ORAL EVERY 6 HOURS PRN
Status: DISCONTINUED | OUTPATIENT
Start: 2022-05-15 | End: 2022-05-17 | Stop reason: HOSPADM

## 2022-05-15 RX ORDER — HYDROCODONE BITARTRATE AND ACETAMINOPHEN 10; 325 MG/1; MG/1
1 TABLET ORAL EVERY 6 HOURS PRN
COMMUNITY

## 2022-05-15 RX ORDER — WARFARIN SODIUM 3 MG/1
3 TABLET ORAL NIGHTLY
Status: DISCONTINUED | OUTPATIENT
Start: 2022-05-15 | End: 2022-05-15

## 2022-05-15 RX ORDER — DOXYCYCLINE 100 MG/1
100 TABLET ORAL EVERY 12 HOURS SCHEDULED
Status: DISCONTINUED | OUTPATIENT
Start: 2022-05-15 | End: 2022-05-16

## 2022-05-15 RX ORDER — ALBUTEROL SULFATE 0.63 MG/3ML
1 SOLUTION RESPIRATORY (INHALATION) EVERY 6 HOURS PRN
Status: DISCONTINUED | OUTPATIENT
Start: 2022-05-15 | End: 2022-05-17 | Stop reason: HOSPADM

## 2022-05-15 RX ORDER — IPRATROPIUM BROMIDE AND ALBUTEROL SULFATE 2.5; .5 MG/3ML; MG/3ML
3 SOLUTION RESPIRATORY (INHALATION)
Status: DISCONTINUED | OUTPATIENT
Start: 2022-05-15 | End: 2022-05-17 | Stop reason: HOSPADM

## 2022-05-15 RX ORDER — METHYLPREDNISOLONE SODIUM SUCCINATE 125 MG/2ML
125 INJECTION, POWDER, LYOPHILIZED, FOR SOLUTION INTRAMUSCULAR; INTRAVENOUS ONCE
Status: COMPLETED | OUTPATIENT
Start: 2022-05-15 | End: 2022-05-15

## 2022-05-15 RX ADMIN — IPRATROPIUM BROMIDE AND ALBUTEROL SULFATE 3 ML: .5; 3 SOLUTION RESPIRATORY (INHALATION) at 18:00

## 2022-05-15 RX ADMIN — ZOLPIDEM TARTRATE 5 MG: 5 TABLET ORAL at 21:47

## 2022-05-15 RX ADMIN — METHYLPREDNISOLONE SODIUM SUCCINATE 125 MG: 125 INJECTION, POWDER, FOR SOLUTION INTRAMUSCULAR; INTRAVENOUS at 13:18

## 2022-05-15 RX ADMIN — Medication 10 ML: at 16:33

## 2022-05-15 RX ADMIN — IPRATROPIUM BROMIDE AND ALBUTEROL SULFATE 3 ML: .5; 3 SOLUTION RESPIRATORY (INHALATION) at 22:45

## 2022-05-15 RX ADMIN — CEFTRIAXONE 1 G: 1 INJECTION, POWDER, FOR SOLUTION INTRAMUSCULAR; INTRAVENOUS at 14:11

## 2022-05-15 RX ADMIN — IPRATROPIUM BROMIDE AND ALBUTEROL SULFATE 3 ML: .5; 3 SOLUTION RESPIRATORY (INHALATION) at 15:27

## 2022-05-15 RX ADMIN — DOXYCYCLINE 100 MG: 100 TABLET ORAL at 16:31

## 2022-05-15 RX ADMIN — METHYLPREDNISOLONE SODIUM SUCCINATE 40 MG: 40 INJECTION, POWDER, FOR SOLUTION INTRAMUSCULAR; INTRAVENOUS at 21:47

## 2022-05-15 RX ADMIN — HYDROCODONE BITARTRATE AND ACETAMINOPHEN 1 TABLET: 10; 325 TABLET ORAL at 21:49

## 2022-05-15 RX ADMIN — Medication 10 ML: at 21:47

## 2022-05-15 NOTE — PROGRESS NOTES
"Pharmacy dosing service  Anticoagulant  Warfarin     Subjective:    Renuka Pelayo is a 66 y.o.female being continued on warfarin for  history DVT/PE .    INR Goal: 2 - 3  Home medication?: Yes, warfarin 3 mg PO every day at 1800  Bridge Therapy Present?:  No  Interacting Medications Evaluation (New/Present/Discontinued):   Antibiotics--ceftriaxone & doxycycline--can increase warfarin sensitivity  Additional Contributing Factors:       Assessment/Plan:    INR supratherapeutic. Will hold warfarin today and monitor.     Continue to monitor and adjust based on INR.         Date 5/15           INR 3.6           Dose held               Objective:  [Ht: 160 cm (63\"); Wt: 77 kg (169 lb 12.1 oz); BMI: Body mass index is 30.07 kg/m².]    Lab Results   Component Value Date    ALBUMIN 3.90 03/22/2022     Lab Results   Component Value Date    INR 3.60 (H) 05/15/2022    INR 1.07 (L) 04/25/2022    INR 1.39 (L) 03/22/2022    PROTIME 34.5 (H) 05/15/2022    PROTIME 11.0 (L) 04/25/2022    PROTIME 15.1 (L) 03/22/2022     Lab Results   Component Value Date    HGB 13.2 05/15/2022    HGB 11.8 (L) 03/22/2022    HGB 12.3 11/01/2021     Lab Results   Component Value Date    HCT 40.5 05/15/2022    HCT 35.5 03/22/2022    HCT 36.5 11/01/2021     Carlito Larios RPH  05/15/22 15:34 EDT   "

## 2022-05-15 NOTE — ED PROVIDER NOTES
Subjective   Chief complaint: Shortness of breath      Context: Patient is a 66-year-old female who comes in complaining of shortness of breath that is worse with exertion for the last several weeks.  She is chronically on 3 L nasal cannula.  She denies any productive cough or fever.  She states she quit smoking cigarettes 4 years ago and quit vaping 3 weeks ago.  She states that she normally has some swelling to her feet which is at baseline and has been taking Lasix intermittently for that.  She denies any recent travel or known ill contacts.  Has been vaccinated for flu COVID and pneumonia.   She states she had a bronchoscopy on April 25 and was given a prescription for antibiotics but only took 2 pills.  She denies any chest pain or diaphoresis, denies any abdominal pain nausea vomiting diarrhea.  She states that she follows with cardiology and think she had a stress test less than 5 years ago [facility].    Location:   Duration: Worse over the last 3 weeks  Timing: Waxes and wanes  Quality/Severity: Denies    Modifying factors:worse with exertion  Associated symptoms:        Additional hx provided by: Self    PCP:   emily ibarra               Review of Systems   Constitutional: Negative for fever.   HENT: Negative.    Eyes: Negative.    Respiratory: Positive for cough and shortness of breath.    Cardiovascular: Positive for leg swelling. Negative for chest pain and palpitations.   Gastrointestinal: Negative.    Genitourinary: Negative.    Musculoskeletal: Negative.    Skin: Negative.    Allergic/Immunologic: Negative for immunocompromised state.   Neurological: Positive for weakness.   Hematological: Bruises/bleeds easily.   Psychiatric/Behavioral: Negative for confusion.       Past Medical History:   Diagnosis Date   • Bladder cancer (HCC)    • Chronic back pain    • Closed nondisplaced fracture of head of right radius 03/2017   • Congenital deformity of right hip joint 1956   • COPD  (chronic obstructive pulmonary disease) (HCC)     former smoker   • Deep venous thrombosis (HCC)     unprovoked   • Depressive disorder    • Disease of thyroid gland    • Diverticulitis of colon    • Essential hypertension    • Gastroesophageal reflux disease    • Insomnia    • Obesity    • Pulmonary embolism (HCC)     provoked by surgery       No Known Allergies    Past Surgical History:   Procedure Laterality Date   • ABDOMINAL SURGERY     • BRONCHOSCOPY N/A 11/25/2020    Procedure: BRONCHOSCOPY with bronchial washing;  Surgeon: Win Johnston MD;  [facility];  Service: Pulmonary;  Laterality: N/A;  Post: mucous plugs   • BRONCHOSCOPY N/A 4/25/2022    Procedure: BRONCHOSCOPY with bronchial washing;  Surgeon: Win Johnston MD;  [facility];  Service: Pulmonary;  Laterality: N/A;  post op: pneumonia   • CHOLECYSTECTOMY     • COLON SURGERY     • COLONOSCOPY     • COLOSTOMY CLOSURE N/A 2/25/2021    Procedure: COLOSTOMY TAKEDOWN OR CLOSURE, extenisve lysis of adhesions;  Surgeon: Chi Farmer MD;  [facility];  Service: General;  Laterality: N/A;   • CYSTOSCOPY N/A 2/25/2021    Procedure: CYSTOSCOPY with bladder tumor removal and fulgeration, insertion of 3-way rizzo catheter;  Surgeon: Alfonzo Hayward MD;  [facility];  Service: Urology;  Laterality: N/A;   • ENDOSCOPY     • EXPLORATORY LAPAROTOMY N/A 7/16/2020    Procedure: LAPAROTOMY EXPLORATORY HARTMANS;  Surgeon: Chi Farmer MD;  [facility];  Service: General;  Laterality: N/A;   • HYSTERECTOMY     • TOTAL HIP ARTHROPLASTY Right    • TOTAL HIP ARTHROPLASTY REVISION Right     x3       Family History   Problem Relation Age of Onset   • No Known Problems Mother    • No Known Problems Father        Social History     Socioeconomic History   • Marital status:    Tobacco Use   • Smoking status: Former Smoker     Packs/day: 2.00     Years: 40.00     Pack years:  80.00     Types: Cigarettes     Quit date: 2019     Years since quitting: 3.3   • Smokeless tobacco: Never Used   • Tobacco comment: ELECTRONIC   Vaping Use   • Vaping Use: Every day   • Substances: Nicotine, Flavoring   Substance and Sexual Activity   • Alcohol use: Never   • Drug use: Never   • Sexual activity: Defer           Objective   Physical Exam     Vital signs and triage nurse note reviewed.   Constitutional: Awake, alert; chronically ill appearing   HEENT: Normocephalic, atraumatic; pupils are PERRL with intact EOM; oropharynx is pink and moist without exudate or erythema.   Neck: Supple, full range of motion without pain;    Cardiovascular: Regular rate and rhythm, normal S1-S2.  Murmur   Pulmonary: Respiratory effort regular nonlabored, breath sounds faint expiratory wheezes bilaterally   Abdomen: Soft, nontender nondistended with normoactive bowel sounds; no rebound or guarding.   Musculoskeletal: Independent range of motion of all extremities with no palpable tenderness, 1+ nonpitting edema bilateral lower extremities, negative Jason.   Neuro: Alert oriented x3, speech is clear and appropriate, GCS 15   Skin:  Fleshtone warm, dry, intact; no erythematous or petechial rash or lesion       Procedures      EKG viewed by me and interpreted by Dr. Griffith   , sinus rhythm rate of 94  comparison:         ED Course           Labs Reviewed   BASIC METABOLIC PANEL - Abnormal; Notable for the following components:       Result Value    Glucose 107 (*)     Potassium 3.3 (*)     All other components within normal limits    Narrative:     GFR Normal >60  Chronic Kidney Disease <60  Kidney Failure <15     PROTIME-INR - Abnormal; Notable for the following components:    Protime 34.5 (*)     INR 3.60 (*)     All other components within normal limits   CBC WITH AUTO DIFFERENTIAL - Abnormal; Notable for the following components:    MCH 26.5 (*)     RDW 15.5 (*)     Basophil % 1.6 (*)     All other components within  "normal limits   RESPIRATORY PANEL PCR W/ COVID-19 (SARS-COV-2) ONOFRE/GABRIELLE/PIPO/PAD/COR/MAD/STEVE IN-HOUSE, NP SWAB IN UTM/VTP, 3-4 HR TAT - Normal    Narrative:     In the setting of a positive respiratory panel with a viral infection PLUS a negative procalcitonin without other underlying concern for bacterial infection, consider observing off antibiotics or discontinuation of antibiotics and continue supportive care. If the respiratory panel is positive for atypical bacterial infection (Bordetella pertussis, Chlamydophila pneumoniae, or Mycoplasma pneumoniae), consider antibiotic de-escalation to target atypical bacterial infection.   PROCALCITONIN - Normal    Narrative:     As a Marker for Sepsis (Non-Neonates):    1. <0.5 ng/mL represents a low risk of severe sepsis and/or septic shock.  2. >2 ng/mL represents a high risk of severe sepsis and/or septic shock.    As a Marker for Lower Respiratory Tract Infections that require antibiotic therapy:    PCT on Admission    Antibiotic Therapy       6-12 Hrs later    >0.5                Strongly Recommended  >0.25 - <0.5        Recommended   0.1 - 0.25          Discouraged              Remeasure/reassess PCT  <0.1                Strongly Discouraged     Remeasure/reassess PCT    As 28 day mortality risk marker: \"Change in Procalcitonin Result\" (>80% or <=80%) if Day 0 (or Day 1) and Day 4 values are available. Refer to http://www.[facility]-pct-calculator.com    Change in PCT <=80%  A decrease of PCT levels below or equal to 80% defines a positive change in PCT test result representing a higher risk for 28-day all-cause mortality of patients diagnosed with severe sepsis for septic shock.    Change in PCT >80%  A decrease of PCT levels of more than 80% defines a negative change in PCT result representing a lower risk for 28-day all-cause mortality of patients diagnosed with severe sepsis or septic shock.      BNP (IN-HOUSE) - Normal    Narrative:     Among patients with dyspnea, " NT-proBNP is highly sensitive for the detection of acute congestive heart failure. In addition NT-proBNP of <300 pg/ml effectively rules out acute congestive heart failure with 99% negative predictive value.    Results may be falsely decreased if patient taking Biotin.     TROPONIN (IN-HOUSE) - Normal    Narrative:     Troponin T Reference Range:  <= 0.03 ng/mL-   Negative for AMI  >0.03 ng/mL-     Abnormal for myocardial necrosis.  Clinicians would have to utilize clinical acumen, EKG, Troponin and serial changes to determine if it is an Acute Myocardial Infarction or myocardial injury due to an underlying chronic condition.       Results may be falsely decreased if patient taking Biotin.     BLOOD CULTURE   BLOOD CULTURE   STREP PNEUMO AG, URINE OR CSF   LEGIONELLA ANTIGEN, URINE   BLOOD CULTURE   BLOOD CULTURE   LACTIC ACID, PLASMA   BASIC METABOLIC PANEL   PROTIME-INR   CBC WITH AUTO DIFFERENTIAL   CBC AND DIFFERENTIAL    Narrative:     The following orders were created for panel order CBC & Differential.  Procedure                               Abnormality         Status                     ---------                               -----------         ------                     CBC Auto Differential[[id number]]        Abnormal            Final result                 Please view results for these tests on the individual orders.   CBC AND DIFFERENTIAL    Narrative:     The following orders were created for panel order CBC & Differential.  Procedure                               Abnormality         Status                     ---------                               -----------         ------                     CBC Auto Differential[[id number]]                                                         Please view results for these tests on the individual orders.     Medications   sodium chloride 0.9 % flush 10 mL (has no administration in time range)   ipratropium-albuterol (DUO-NEB) nebulizer solution 3 mL (has no  administration in time range)   sodium chloride 0.9 % flush 10 mL (has no administration in time range)   sodium chloride 0.9 % flush 10 mL (has no administration in time range)   cefTRIAXone (ROCEPHIN) 1 g in sodium chloride 0.9 % 50 mL IVPB (0 g Intravenous Stopped 5/15/22 1449)   doxycycline (ADOXA) tablet 100 mg (has no administration in time range)   methylPREDNISolone sodium succinate (SOLU-Medrol) injection 125 mg (has no administration in time range)   albuterol (ACCUNEB) nebulizer solution 0.63 mg (has no administration in time range)   budesonide-formoterol (SYMBICORT) 160-4.5 MCG/ACT inhaler 2 puff (has no administration in time range)   HYDROcodone-acetaminophen (NORCO)  MG per tablet 1 tablet (has no administration in time range)   zolpidem (AMBIEN) tablet 5 mg (has no administration in time range)   warfarin (COUMADIN) tablet 3 mg (has no administration in time range)   ipratropium-albuterol (DUO-NEB) nebulizer solution 3 mL (has no administration in time range)   methylPREDNISolone sodium succinate (SOLU-Medrol) injection 40 mg (has no administration in time range)   methylPREDNISolone sodium succinate (SOLU-Medrol) injection 125 mg (125 mg Intravenous Given 5/15/22 1318)     XR Chest AP    Result Date: 5/15/2022    1.  Right basilar airspace disease which may be secondary to atelectasis or pneumonia.   Electronically Signed By-Renny Trinh MD On:5/15/2022 12:03 PM This report was finalized on [id number] by  Renny Trinh MD.    Prior to Admission medications    Medication Sig Start Date End Date Taking? Authorizing Provider   albuterol (ACCUNEB) 0.63 MG/3ML nebulizer solution Take 1 ampule by nebulization Every 6 (Six) Hours As Needed for Wheezing. Use am of surgery    ProviderGrayson MD   albuterol sulfate  (90 Base) MCG/ACT inhaler Inhale 2 puffs Every 6 (Six) Hours As Needed for Wheezing. Use and bring    Grayson Pope MD   Budeson-Glycopyrrol-Formoterol (Breztri  Aerosphere) 160-9-4.8 MCG/ACT aerosol inhaler Inhale 1 puff 2 (Two) Times a Day.    Grayson Pope MD   carvedilol (COREG) 3.125 MG tablet  9/11/20   Grayson Pope MD   famotidine (PEPCID) 40 MG tablet  8/24/20   Grayson Pope MD   furosemide (LASIX) 20 MG tablet Take 20 mg by mouth As Needed. 8/26/20   Grayson Pope MD   hydrALAZINE (APRESOLINE) 25 MG tablet Take 25 mg by mouth 2 (Two) Times a Day As Needed. For SBP >160    Grayson Pope MD   ipratropium (ATROVENT) 0.02 % nebulizer solution Take 500 mcg by nebulization 3 (Three) Times a Day. 7/24/20   Grayson Pope MD   ipratropium-albuterol (DUO-NEB) 0.5-2.5 mg/3 ml nebulizer Take 3 mL by nebulization Every 4 (Four) Hours As Needed for Wheezing. Dispense 1box 5/4/21   Anish Lane MD   levothyroxine (SYNTHROID, LEVOTHROID) 50 MCG tablet Take 50 mcg by mouth Daily. Take DOS    Grayson Pope MD   ondansetron (Zofran) 4 MG tablet Take 1 tablet by mouth Every 8 (Eight) Hours As Needed for Nausea or Vomiting. 11/2/21   Sara Hammonds APRN   predniSONE (DELTASONE) 20 MG tablet Take 1 tablet by mouth Daily. 3/22/22   Salome Anthony APRN   roflumilast (DALIRESP) 500 MCG tablet tablet Take 500 mcg by mouth Every Night.    Grayson Pope MD   traZODone (DESYREL) 100 MG tablet Take 100 mg by mouth At Night As Needed for Sleep.    Grayson Pope MD   warfarin (COUMADIN) 3 MG tablet Take 3 mg by mouth Every Night. LD 4/21 per Pcp pt aware    Grayson Pope MD   zolpidem (AMBIEN) 5 MG tablet Take 5 mg by mouth Every Night.    Grayson Pope MD                                           MDM  Number of Diagnoses or Management Options  Dyspnea, unspecified type  Streptococcus pneumoniae  Diagnosis management comments: Chart review:  4/25/22: toan  Renuka Pelayo is a 66 y.o. female with a history of COPD, HTN, Insomnia, Hx DVT/PE, GERD, and Obesity who came to our office with complaints of  "a chronic cough and shortness of breath.   80 pack years  murmur  rhonchi  4/29/22: + strep pneumoniae; felisha rocephin; fungus candida +        /80 (BP Location: Left arm, Patient Position: Lying)   Pulse 72   Temp 97.8 °F (36.6 °C)   Resp 20   Ht 160 cm (63\")   Wt 77 kg (169 lb 12.1 oz)   SpO2 96%   BMI 30.07 kg/m²          Comorbidities:  has a past medical history of Bladder cancer ([facility]), Chronic back pain, Closed nondisplaced fracture of head of right radius (03/2017), Congenital deformity of right hip joint (1956), COPD (chronic obstructive pulmonary disease) ([facility]), Deep venous thrombosis ([facility]), Depressive disorder, Disease of thyroid gland, Diverticulitis of colon, Essential hypertension, Gastroesophageal reflux disease, Insomnia, Obesity, and Pulmonary embolism ([facility]).  Differentials: Flu strep pneumonia COVID   not all inclusive of differentials considered  Discussion with provider: toya  Radiology interpretation:  X-rays reviewed by me and interpreted by radiologist,   XR Chest AP    Result Date: 5/15/2022    1.  Right basilar airspace disease which may be secondary to atelectasis or pneumonia.   Electronically Signed By-Renny Trinh MD On:5/15/2022 12:03 PM This report was finalized on [id number] by  Renny Trinh MD.    Lab interpretation:  Labs viewed by me significant for, respiratory PCR negative    Appropriate PPE worn during exam.  Patient had an IV established labs obtained and was given Rocephin & Medrol and discussed with hospitalist for admission    i discussed findings with patient who voices understanding of admission        Final diagnoses:   Dyspnea, unspecified type   Streptococcus pneumoniae       ED Disposition  ED Disposition     ED Disposition   Decision to Admit    Condition   --    Comment   Level of Care: Telemetry [5]   Admitting Physician: GLORIA RUTHERFORD [[id number]]   Attending Physician: GLORIA RUTHERFORD [[id number]]               No follow-up " provider specified.       Medication List      ASK your doctor about these medications    albuterol 0.63 MG/3ML nebulizer solution  Commonly known as: ACCUNEB  Ask about: Which instructions should I use?             Esther Anthony, APRN  05/15/22 6019

## 2022-05-15 NOTE — H&P
[facility] Medicine Services      Patient Name: Renuka Pelayo  : 1955  MRN: [id number]  Primary Care Physician:  Michael Gerardo MD  Date of admission: 5/15/2022      Subjective      Chief Complaint: Shortness of breath and cough    History of Present Illness: Renuka Pelayo is a 66 y.o. female who presented to [facility] on 5/15/2022 complaint of progressive shortness of breath and cough, at times productive.  She says she uses between 2 to 3 L of oxygen at home.  She is ex-smoker.  Patient has very recent bronchoscopy, cultures revealed growth of Streptococcus pneumonia and Candida.  Patient was prescribed outpatient antibiotic, she said she hardly took her 2 doses.  Patient found to have very significant wheezing in both lung fields.  Patient underwent chest x-ray revealing concerning right lower lobe pneumonia.  Following this we were asked to admit patient for the further care.        Review of Systems   Respiratory: Positive for cough and shortness of breath.    All other systems reviewed and are negative.       Personal History     Past Medical History:   Diagnosis Date   • Bladder cancer (HCC)    • Chronic back pain    • Closed nondisplaced fracture of head of right radius 2017   • Congenital deformity of right hip joint 1956   • COPD (chronic obstructive pulmonary disease) (HCC)     former smoker   • Deep venous thrombosis (HCC)     unprovoked   • Depressive disorder    • Disease of thyroid gland    • Diverticulitis of colon    • Essential hypertension    • Gastroesophageal reflux disease    • Insomnia    • Obesity    • Pulmonary embolism (HCC)     provoked by surgery       Past Surgical History:   Procedure Laterality Date   • ABDOMINAL SURGERY     • BRONCHOSCOPY N/A 2020    Procedure: BRONCHOSCOPY with bronchial washing;  Surgeon: Win Johnston MD;  Location: [facility];  Service: Pulmonary;  Laterality: N/A;  Post: mucous plugs   •  BRONCHOSCOPY N/A 4/25/2022    Procedure: BRONCHOSCOPY with bronchial washing;  Surgeon: Win Johnston MD;  Location: [facility] ENDOSCOPY;  Service: Pulmonary;  Laterality: N/A;  post op: pneumonia   • CHOLECYSTECTOMY     • COLON SURGERY     • COLONOSCOPY     • COLOSTOMY CLOSURE N/A 2/25/2021    Procedure: COLOSTOMY TAKEDOWN OR CLOSURE, extenisve lysis of adhesions;  Surgeon: Chi Famrer MD;  Location: [facility] MAIN OR;  Service: General;  Laterality: N/A;   • CYSTOSCOPY N/A 2/25/2021    Procedure: CYSTOSCOPY with bladder tumor removal and fulgeration, insertion of 3-way rizzo catheter;  Surgeon: Alfonzo Hayward MD;  Location: [facility] MAIN OR;  Service: Urology;  Laterality: N/A;   • ENDOSCOPY     • EXPLORATORY LAPAROTOMY N/A 7/16/2020    Procedure: LAPAROTOMY EXPLORATORY HARTMANS;  Surgeon: Chi Farmer MD;  Location: [facility] MAIN OR;  Service: General;  Laterality: N/A;   • HYSTERECTOMY     • TOTAL HIP ARTHROPLASTY Right    • TOTAL HIP ARTHROPLASTY REVISION Right     x3       Family History: family history includes No Known Problems in her father and mother. Otherwise pertinent FHx was reviewed and not pertinent to current issue.    Social History:  reports that she quit smoking about 3 years ago. Her smoking use included cigarettes. She has a 80.00 pack-year smoking history. She has never used smokeless tobacco. She reports that she does not drink alcohol and does not use drugs.    Home Medications:  Prior to Admission Medications     Prescriptions Last Dose Informant Patient Reported? Taking?    albuterol (ACCUNEB) 0.63 MG/3ML nebulizer solution   Yes No    Take 1 ampule by nebulization Every 6 (Six) Hours As Needed for Wheezing. Use am of surgery    albuterol sulfate  (90 Base) MCG/ACT inhaler   Yes No    Inhale 2 puffs Every 6 (Six) Hours As Needed for Wheezing. Use and bring    Budeson-Glycopyrrol-Formoterol (Breztri Aerosphere) 160-9-4.8 MCG/ACT aerosol inhaler   Yes No    Inhale  1 puff 2 (Two) Times a Day.    carvedilol (COREG) 3.125 MG tablet   Yes No    famotidine (PEPCID) 40 MG tablet   Yes No    furosemide (LASIX) 20 MG tablet   Yes No    Take 20 mg by mouth As Needed.    hydrALAZINE (APRESOLINE) 25 MG tablet   Yes No    Take 25 mg by mouth 2 (Two) Times a Day As Needed. For SBP >160    ipratropium (ATROVENT) 0.02 % nebulizer solution   Yes No    Take 500 mcg by nebulization 3 (Three) Times a Day.    ipratropium-albuterol (DUO-NEB) 0.5-2.5 mg/3 ml nebulizer   No No    Take 3 mL by nebulization Every 4 (Four) Hours As Needed for Wheezing. Dispense 1box    levothyroxine (SYNTHROID, LEVOTHROID) 50 MCG tablet   Yes No    Take 50 mcg by mouth Daily. Take DOS    ondansetron (Zofran) 4 MG tablet   No No    Take 1 tablet by mouth Every 8 (Eight) Hours As Needed for Nausea or Vomiting.    predniSONE (DELTASONE) 20 MG tablet   No No    Take 1 tablet by mouth Daily.    roflumilast (DALIRESP) 500 MCG tablet tablet   Yes No    Take 500 mcg by mouth Every Night.    traZODone (DESYREL) 100 MG tablet   Yes No    Take 100 mg by mouth At Night As Needed for Sleep.    warfarin (COUMADIN) 3 MG tablet  Self Yes No    Take 3 mg by mouth Every Night. LD 4/21 per Pcp pt aware    zolpidem (AMBIEN) 5 MG tablet   Yes No    Take 5 mg by mouth Every Night.            Allergies:  No Known Allergies    Objective      Vitals:   Temp:  [97.8 °F (36.6 °C)] 97.8 °F (36.6 °C)  Heart Rate:  [68-89] 76  Resp:  [16-18] 16  BP: (111-149)/(62-79) 133/72  Flow (L/min):  [3] 3    Physical Exam  Vitals and nursing note reviewed.   Constitutional:       General: She is not in acute distress.     Appearance: Normal appearance. She is well-developed. She is not ill-appearing, toxic-appearing or diaphoretic.   HENT:      Head: Normocephalic and atraumatic.      Right Ear: Ear canal and external ear normal.      Left Ear: Ear canal and external ear normal.      Nose: Nose normal. No congestion or rhinorrhea.      Mouth/Throat:       Mouth: Mucous membranes are moist.      Pharynx: No oropharyngeal exudate.   Eyes:      General: No scleral icterus.        Right eye: No discharge.         Left eye: No discharge.      Extraocular Movements: Extraocular movements intact.      Conjunctiva/sclera: Conjunctivae normal.      Pupils: Pupils are equal, round, and reactive to light.   Neck:      Thyroid: No thyromegaly.      Vascular: No carotid bruit or JVD.      Trachea: No tracheal deviation.   Cardiovascular:      Rate and Rhythm: Normal rate and regular rhythm.      Pulses: Normal pulses.      Heart sounds: Normal heart sounds. No murmur heard.    No friction rub. No gallop.   Pulmonary:      Effort: No respiratory distress.      Breath sounds: No stridor. Rhonchi and rales present. No wheezing.   Chest:      Chest wall: No tenderness.   Abdominal:      General: Bowel sounds are normal. There is no distension.      Palpations: Abdomen is soft. There is no mass.      Tenderness: There is no abdominal tenderness. There is no guarding or rebound.      Hernia: No hernia is present.   Musculoskeletal:         General: No swelling, tenderness, deformity or signs of injury. Normal range of motion.      Cervical back: Normal range of motion and neck supple. No rigidity. No muscular tenderness.      Right lower leg: No edema.      Left lower leg: No edema.   Lymphadenopathy:      Cervical: No cervical adenopathy.   Skin:     General: Skin is warm and dry.      Coloration: Skin is not jaundiced or pale.      Findings: No bruising, erythema or rash.   Neurological:      General: No focal deficit present.      Mental Status: She is alert and oriented to person, place, and time. Mental status is at baseline.      Cranial Nerves: No cranial nerve deficit.      Sensory: No sensory deficit.      Motor: No weakness or abnormal muscle tone.      Coordination: Coordination normal.   Psychiatric:         Mood and Affect: Mood normal.         Behavior: Behavior normal.          Thought Content: Thought content normal.         Judgment: Judgment normal.              Result Review    Result Review:  I have personally reviewed the results from the time of this admission to 5/15/2022 14:22 EDT and agree with these findings:  [x]  Laboratory  [x]  Microbiology  [x]  Radiology  []  EKG/Telemetry   []  Cardiology/Vascular   []  Pathology  []  Old records  []  Other:  Most notable findings include:       Assessment & Plan        Active Hospital Problems:  Active Hospital Problems    Diagnosis    • Pneumonia    • Hypothyroid    • COPD (chronic obstructive pulmonary disease) (HCC)    • Gastroesophageal reflux disease    • Essential hypertension    • Chronic anticoagulation      Plan:     Likely right-sided Streptococcus pneumonia, IV Rocephin and doxycycline for now, sputum culture if possible, monitor clinical progress.  Patient has a recent bronchoscopy revealing cultures growth of Streptococcus pneumonia not treated yet.    Acute exacerbation of COPD, DuoNeb, IV Solu-Medrol, IV antibiotics as above, monitor clinical progress.    Chronic hypoxic respiratory failure, continue home supplemental oxygen.    Hypertension, resume home regime, monitor vitals.    Hypothyroidism, continue Synthroid, monitor TSH as needed.    Chronic anticoagulation with Coumadin, unclear to me either for A. fib or for DVT, will try to find out from patient, continue Coumadin, monitor INR.    DVT prophylaxis, already on Coumadin with therapeutic INR.          DVT prophylaxis:  No DVT prophylaxis order currently exists.    CODE STATUS:       Admission Status:  I believe this patient meets inpatient status.    I discussed the patient's findings and my recommendations with patient.    This patient has been examined wearing appropriate Personal Protective Equipment and discussed with hospital infection control department. 05/15/22      Signature:

## 2022-05-16 ENCOUNTER — APPOINTMENT (OUTPATIENT)
Dept: CT IMAGING | Facility: HOSPITAL | Age: 67
End: 2022-05-16

## 2022-05-16 PROBLEM — I71.40 AAA (ABDOMINAL AORTIC ANEURYSM): Status: ACTIVE | Noted: 2022-05-16

## 2022-05-16 PROBLEM — J44.1 COPD WITH ACUTE EXACERBATION (HCC): Status: ACTIVE | Noted: 2022-05-16

## 2022-05-16 PROBLEM — E03.9 ACQUIRED HYPOTHYROIDISM: Status: ACTIVE | Noted: 2021-02-26

## 2022-05-16 LAB
ANION GAP SERPL CALCULATED.3IONS-SCNC: 14 MMOL/L (ref 5–15)
BACTERIA BLD CULT: ABNORMAL
BASOPHILS # BLD AUTO: 0 10*3/MM3 (ref 0–0.2)
BASOPHILS NFR BLD AUTO: 0.5 % (ref 0–1.5)
BOTTLE TYPE: ABNORMAL
BUN SERPL-MCNC: 11 MG/DL (ref 8–23)
BUN/CREAT SERPL: 11 (ref 7–25)
CALCIUM SPEC-SCNC: 9.1 MG/DL (ref 8.6–10.5)
CHLORIDE SERPL-SCNC: 99 MMOL/L (ref 98–107)
CO2 SERPL-SCNC: 25 MMOL/L (ref 22–29)
CREAT SERPL-MCNC: 1 MG/DL (ref 0.57–1)
DEPRECATED RDW RBC AUTO: 44.2 FL (ref 37–54)
EGFRCR SERPLBLD CKD-EPI 2021: 62.6 ML/MIN/1.73
EOSINOPHIL # BLD AUTO: 0 10*3/MM3 (ref 0–0.4)
EOSINOPHIL NFR BLD AUTO: 0 % (ref 0.3–6.2)
ERYTHROCYTE [DISTWIDTH] IN BLOOD BY AUTOMATED COUNT: 15.4 % (ref 12.3–15.4)
GLUCOSE SERPL-MCNC: 178 MG/DL (ref 65–99)
HCT VFR BLD AUTO: 38.9 % (ref 34–46.6)
HGB BLD-MCNC: 12 G/DL (ref 12–15.9)
INR PPP: 3.6 (ref 2–3)
LYMPHOCYTES # BLD AUTO: 0.5 10*3/MM3 (ref 0.7–3.1)
LYMPHOCYTES NFR BLD AUTO: 7.3 % (ref 19.6–45.3)
MCH RBC QN AUTO: 25.5 PG (ref 26.6–33)
MCHC RBC AUTO-ENTMCNC: 31 G/DL (ref 31.5–35.7)
MCV RBC AUTO: 82.3 FL (ref 79–97)
MONOCYTES # BLD AUTO: 0.1 10*3/MM3 (ref 0.1–0.9)
MONOCYTES NFR BLD AUTO: 0.9 % (ref 5–12)
NEUTROPHILS NFR BLD AUTO: 5.7 10*3/MM3 (ref 1.7–7)
NEUTROPHILS NFR BLD AUTO: 91.3 % (ref 42.7–76)
NRBC BLD AUTO-RTO: 0 /100 WBC (ref 0–0.2)
PLATELET # BLD AUTO: 304 10*3/MM3 (ref 140–450)
PMV BLD AUTO: 8.4 FL (ref 6–12)
POTASSIUM SERPL-SCNC: 3.1 MMOL/L (ref 3.5–5.2)
POTASSIUM SERPL-SCNC: 3.3 MMOL/L (ref 3.5–5.2)
POTASSIUM SERPL-SCNC: 3.6 MMOL/L (ref 3.5–5.2)
PROTHROMBIN TIME: 34.5 SECONDS (ref 19.4–28.5)
RBC # BLD AUTO: 4.72 10*6/MM3 (ref 3.77–5.28)
SODIUM SERPL-SCNC: 138 MMOL/L (ref 136–145)
WBC NRBC COR # BLD: 6.2 10*3/MM3 (ref 3.4–10.8)

## 2022-05-16 PROCEDURE — 85025 COMPLETE CBC W/AUTO DIFF WBC: CPT | Performed by: HOSPITALIST

## 2022-05-16 PROCEDURE — G0378 HOSPITAL OBSERVATION PER HR: HCPCS

## 2022-05-16 PROCEDURE — 87205 SMEAR GRAM STAIN: CPT

## 2022-05-16 PROCEDURE — 94761 N-INVAS EAR/PLS OXIMETRY MLT: CPT

## 2022-05-16 PROCEDURE — 94799 UNLISTED PULMONARY SVC/PX: CPT

## 2022-05-16 PROCEDURE — 84132 ASSAY OF SERUM POTASSIUM: CPT | Performed by: INTERNAL MEDICINE

## 2022-05-16 PROCEDURE — 25010000002 METHYLPREDNISOLONE PER 40 MG: Performed by: HOSPITALIST

## 2022-05-16 PROCEDURE — 80048 BASIC METABOLIC PNL TOTAL CA: CPT | Performed by: HOSPITALIST

## 2022-05-16 PROCEDURE — 87070 CULTURE OTHR SPECIMN AEROBIC: CPT

## 2022-05-16 PROCEDURE — 96376 TX/PRO/DX INJ SAME DRUG ADON: CPT

## 2022-05-16 PROCEDURE — 36415 COLL VENOUS BLD VENIPUNCTURE: CPT | Performed by: HOSPITALIST

## 2022-05-16 PROCEDURE — 99226 PR SBSQ OBSERVATION CARE/DAY 35 MINUTES: CPT | Performed by: INTERNAL MEDICINE

## 2022-05-16 PROCEDURE — 71250 CT THORAX DX C-: CPT

## 2022-05-16 PROCEDURE — 85610 PROTHROMBIN TIME: CPT | Performed by: HOSPITALIST

## 2022-05-16 PROCEDURE — 94664 DEMO&/EVAL PT USE INHALER: CPT

## 2022-05-16 PROCEDURE — 87040 BLOOD CULTURE FOR BACTERIA: CPT | Performed by: HOSPITALIST

## 2022-05-16 PROCEDURE — 25010000002 CEFTRIAXONE PER 250 MG: Performed by: HOSPITALIST

## 2022-05-16 RX ORDER — POTASSIUM CHLORIDE 1.5 G/1.77G
40 POWDER, FOR SOLUTION ORAL AS NEEDED
Status: DISCONTINUED | OUTPATIENT
Start: 2022-05-16 | End: 2022-05-17 | Stop reason: HOSPADM

## 2022-05-16 RX ORDER — POTASSIUM CHLORIDE 7.45 MG/ML
10 INJECTION INTRAVENOUS
Status: DISCONTINUED | OUTPATIENT
Start: 2022-05-16 | End: 2022-05-17 | Stop reason: HOSPADM

## 2022-05-16 RX ORDER — POTASSIUM CHLORIDE 20 MEQ/1
40 TABLET, EXTENDED RELEASE ORAL AS NEEDED
Status: DISCONTINUED | OUTPATIENT
Start: 2022-05-16 | End: 2022-05-17 | Stop reason: HOSPADM

## 2022-05-16 RX ADMIN — IPRATROPIUM BROMIDE AND ALBUTEROL SULFATE 3 ML: .5; 3 SOLUTION RESPIRATORY (INHALATION) at 03:16

## 2022-05-16 RX ADMIN — HYDROCODONE BITARTRATE AND ACETAMINOPHEN 1 TABLET: 10; 325 TABLET ORAL at 05:24

## 2022-05-16 RX ADMIN — IPRATROPIUM BROMIDE AND ALBUTEROL SULFATE 3 ML: .5; 3 SOLUTION RESPIRATORY (INHALATION) at 11:08

## 2022-05-16 RX ADMIN — DOXYCYCLINE 100 MG: 100 TABLET ORAL at 05:24

## 2022-05-16 RX ADMIN — IPRATROPIUM BROMIDE AND ALBUTEROL SULFATE 3 ML: .5; 3 SOLUTION RESPIRATORY (INHALATION) at 20:25

## 2022-05-16 RX ADMIN — Medication 10 ML: at 20:12

## 2022-05-16 RX ADMIN — METHYLPREDNISOLONE SODIUM SUCCINATE 40 MG: 40 INJECTION, POWDER, FOR SOLUTION INTRAMUSCULAR; INTRAVENOUS at 02:51

## 2022-05-16 RX ADMIN — IPRATROPIUM BROMIDE AND ALBUTEROL SULFATE 3 ML: .5; 3 SOLUTION RESPIRATORY (INHALATION) at 15:00

## 2022-05-16 RX ADMIN — BUDESONIDE AND FORMOTEROL FUMARATE DIHYDRATE 2 PUFF: 160; 4.5 AEROSOL RESPIRATORY (INHALATION) at 07:14

## 2022-05-16 RX ADMIN — POTASSIUM CHLORIDE 40 MEQ: 1500 TABLET, EXTENDED RELEASE ORAL at 06:14

## 2022-05-16 RX ADMIN — IPRATROPIUM BROMIDE AND ALBUTEROL SULFATE 3 ML: .5; 3 SOLUTION RESPIRATORY (INHALATION) at 07:10

## 2022-05-16 RX ADMIN — ZOLPIDEM TARTRATE 5 MG: 5 TABLET ORAL at 20:12

## 2022-05-16 RX ADMIN — METHYLPREDNISOLONE SODIUM SUCCINATE 40 MG: 40 INJECTION, POWDER, FOR SOLUTION INTRAMUSCULAR; INTRAVENOUS at 07:36

## 2022-05-16 RX ADMIN — HYDROCODONE BITARTRATE AND ACETAMINOPHEN 1 TABLET: 10; 325 TABLET ORAL at 19:21

## 2022-05-16 RX ADMIN — METHYLPREDNISOLONE SODIUM SUCCINATE 40 MG: 40 INJECTION, POWDER, FOR SOLUTION INTRAMUSCULAR; INTRAVENOUS at 13:27

## 2022-05-16 RX ADMIN — POTASSIUM CHLORIDE 40 MEQ: 1500 TABLET, EXTENDED RELEASE ORAL at 10:04

## 2022-05-16 RX ADMIN — METHYLPREDNISOLONE SODIUM SUCCINATE 40 MG: 40 INJECTION, POWDER, FOR SOLUTION INTRAMUSCULAR; INTRAVENOUS at 19:20

## 2022-05-16 RX ADMIN — BUDESONIDE AND FORMOTEROL FUMARATE DIHYDRATE 2 PUFF: 160; 4.5 AEROSOL RESPIRATORY (INHALATION) at 20:29

## 2022-05-16 RX ADMIN — Medication 10 ML: at 08:13

## 2022-05-16 RX ADMIN — CEFTRIAXONE 1 G: 1 INJECTION, POWDER, FOR SOLUTION INTRAMUSCULAR; INTRAVENOUS at 13:27

## 2022-05-16 NOTE — PLAN OF CARE
Goal Outcome Evaluation:         Wears O2 at 2-3lpm at home and currently on 3lpm here. Resp even and unlabored. With insp and exp wheezes. Occas loose cough. VSS. No falls. Call light in reach.

## 2022-05-16 NOTE — CONSULTS
Group: [facility]         CONSULT NOTE    Patient Identification:  Renuka Pelayo  66 y.o.  female  1955  [id number]            Requesting physician: Attending physician    Reason for Consultation: Pneumonia      History of Present Illness:    Renuka Pelayo is a 66-year-old female who presents to [facility] ED on 5/15/2022 with complaints of worsening shortness of breath and cough.  The patient is on chronic home O2 2 to 3 L.  She had a recent bronchoscopy growing Streptococcus pneumonia and was prescribed outpatient antibiotics, Levaquin but reports only taking 6 out of 10 days and stopped..  She has a past medical history of COPD, former tobacco dependency, pulmonary embolism provoked by surgery, DVT, hypothyroidism, diverticulitis, hypertension, GERD, obesity, and insomnia.  She reports she stopped smoking cigarettes 4 years ago, and quit vaping 3 weeks ago.    ED course: Potassium 3.1, INR 3.60, neutrophils 91.3, CXR right basilar airspace disease which may be secondary to atelectasis or pneumonia    Assessment:    Right lower lobe pneumonia, completed 6 doses of Levaquin as outpatient  Chronic obstructive pulmonary disease, on home O2 2 to 3 L    Last bronchoscopy 4/25/2022,+ Streptococcus pneumonia  -Significant amount of mucous plugs in multiple segment    5/15/2022 strep pneumo antigen and Legionella urine negative    History of pulmonary embolism  History of DVT    Essential hypertension  Atrial fibrillation, on Coumadin  Hypothyroidism  GERD  Diverticulitis  Insomnia  obesity       Recommendations:    Chest CT without contrast  Respiratory culture    Antibiotic Rocephin    Titrate oxygen, requiring 2 L per nasal cannula    Solu-Medrol 40 every 6  Bronchodilator\inhaled corticosteroid    Anticoagulation Coumadin on hold, elevated INR        Review of Sytems:  Review of Systems   Respiratory: Positive for cough and shortness of breath.        Past Medical History:  Past Medical  History:   Diagnosis Date   • Bladder cancer (HCC)    • Chronic back pain    • Closed nondisplaced fracture of head of right radius 03/2017   • Congenital deformity of right hip joint 1956   • COPD (chronic obstructive pulmonary disease) (HCC)     former smoker   • Deep venous thrombosis (HCC)     unprovoked   • Depressive disorder    • Disease of thyroid gland    • Diverticulitis of colon    • Essential hypertension    • Gastroesophageal reflux disease    • Insomnia    • Obesity    • Pulmonary embolism (HCC)     provoked by surgery       Past Surgical History:  Past Surgical History:   Procedure Laterality Date   • ABDOMINAL SURGERY     • BRONCHOSCOPY N/A 11/25/2020    Procedure: BRONCHOSCOPY with bronchial washing;  Surgeon: Win Johnston MD;  Location: [facility] ENDOSCOPY;  Service: Pulmonary;  Laterality: N/A;  Post: mucous plugs   • BRONCHOSCOPY N/A 4/25/2022    Procedure: BRONCHOSCOPY with bronchial washing;  Surgeon: Win Johnston MD;  Location: [facility] ENDOSCOPY;  Service: Pulmonary;  Laterality: N/A;  post op: pneumonia   • CHOLECYSTECTOMY     • COLON SURGERY     • COLONOSCOPY     • COLOSTOMY CLOSURE N/A 2/25/2021    Procedure: COLOSTOMY TAKEDOWN OR CLOSURE, extenisve lysis of adhesions;  Surgeon: Chi Farmer MD;  Location: [facility] MAIN OR;  Service: General;  Laterality: N/A;   • CYSTOSCOPY N/A 2/25/2021    Procedure: CYSTOSCOPY with bladder tumor removal and fulgeration, insertion of 3-way rizzo catheter;  Surgeon: Alfonzo Hayward MD;  Location: [facility] MAIN OR;  Service: Urology;  Laterality: N/A;   • ENDOSCOPY     • EXPLORATORY LAPAROTOMY N/A 7/16/2020    Procedure: LAPAROTOMY EXPLORATORY HARTMANS;  Surgeon: Chi Farmer MD;  Location: [facility] MAIN OR;  Service: General;  Laterality: N/A;   • HYSTERECTOMY     • TOTAL HIP ARTHROPLASTY Right    • TOTAL HIP ARTHROPLASTY REVISION Right     x3        Home Meds:  Medications Prior to Admission   Medication Sig Dispense Refill  "Last Dose   • albuterol (ACCUNEB) 0.63 MG/3ML nebulizer solution Take 1 ampule by nebulization Every 6 (Six) Hours As Needed for Wheezing. Use am of surgery      • Budeson-Glycopyrrol-Formoterol (Breztri Aerosphere) 160-9-4.8 MCG/ACT aerosol inhaler Inhale 1 puff 2 (Two) Times a Day.      • HYDROcodone-acetaminophen (NORCO)  MG per tablet Take 1 tablet by mouth Every 6 (Six) Hours As Needed for Moderate Pain .      • ipratropium-albuterol (DUO-NEB) 0.5-2.5 mg/3 ml nebulizer Take 3 mL by nebulization Every 4 (Four) Hours As Needed for Wheezing. Dispense 1box 3 mL 0    • roflumilast (DALIRESP) 500 MCG tablet tablet Take 500 mcg by mouth Every Night.      • warfarin (COUMADIN) 3 MG tablet Take 3 mg by mouth Every Night.   5/14/2022 at Unknown time   • zolpidem (AMBIEN) 5 MG tablet Take 5 mg by mouth Every Night.          Allergies:  No Known Allergies    Social History:   Social History     Socioeconomic History   • Marital status:    Tobacco Use   • Smoking status: Former Smoker     Packs/day: 2.00     Years: 40.00     Pack years: 80.00     Types: Cigarettes     Quit date: 2019     Years since quitting: 3.3   • Smokeless tobacco: Never Used   • Tobacco comment: ELECTRONIC   Vaping Use   • Vaping Use: Every day   • Substances: Nicotine, Flavoring   Substance and Sexual Activity   • Alcohol use: Never   • Drug use: Never   • Sexual activity: Defer       Family History:  Family History   Problem Relation Age of Onset   • No Known Problems Mother    • No Known Problems Father        Physical Exam:  /64 (BP Location: Left arm, Patient Position: Lying)   Pulse 87   Temp 98 °F (36.7 °C) (Oral)   Resp 21   Ht 160 cm (63\")   Wt 80 kg (176 lb 5.9 oz)   SpO2 95%   BMI 31.24 kg/m²  Body mass index is 31.24 kg/m². 95% 80 kg (176 lb 5.9 oz)  Physical Exam  Vitals reviewed.   Pulmonary:      Breath sounds: Rhonchi present.   Skin:     General: Skin is warm and dry.   Neurological:      Mental Status: She is " alert and oriented to person, place, and time.         LABS:  Lab Results   Component Value Date    CALCIUM 9.1 05/16/2022     Results from last 7 days   Lab Units 05/16/22  0617 05/16/22  0242 05/15/22  1158   SODIUM mmol/L  --  138 140   POTASSIUM mmol/L 3.3* 3.1* 3.3*   CHLORIDE mmol/L  --  99 100   CO2 mmol/L  --  25.0 26.0   BUN mg/dL  --  11 9   CREATININE mg/dL  --  1.00 0.91   GLUCOSE mg/dL  --  178* 107*   CALCIUM mg/dL  --  9.1 9.9   WBC 10*3/mm3  --  6.20 6.90   HEMOGLOBIN g/dL  --  12.0 13.2   PLATELETS 10*3/mm3  --  304 367   PROBNP pg/mL  --   --  45.0   PROCALCITONIN ng/mL  --   --  0.13     Lab Results   Component Value Date    TROPONINT <0.010 05/15/2022     Results from last 7 days   Lab Units 05/15/22  1158   TROPONIN T ng/mL <0.010         Results from last 7 days   Lab Units 05/15/22  1529 05/15/22  1158   PROCALCITONIN ng/mL  --  0.13   LACTATE mmol/L 0.9  --          Results from last 7 days   Lab Units 05/15/22  1202   ADENOVIRUS DETECTION BY PCR  Not Detected   CORONAVIRUS 229E  Not Detected   CORONAVIRUS HKU1  Not Detected   CORONAVIRUS NL63  Not Detected   CORONAVIRUS OC43  Not Detected   HUMAN METAPNEUMOVIRUS  Not Detected   HUMAN RHINOVIRUS/ENTEROVIRUS  Not Detected   INFLUENZA B PCR  Not Detected   PARAINFLUENZA 1  Not Detected   PARAINFLUENZA VIRUS 2  Not Detected   PARAINFLUENZA VIRUS 3  Not Detected   PARAINFLUENZA VIRUS 4  Not Detected   BORDETELLA PERTUSSIS PCR  Not Detected   CHLAMYDOPHILA PNEUMONIAE PCR  Not Detected   MYCOPLAMA PNEUMO PCR  Not Detected   INFLUENZA A PCR  Not Detected   RSV, PCR  Not Detected     Results from last 7 days   Lab Units 05/16/22  0242 05/15/22  1158   INR  3.60* 3.60*         Lab Results   Component Value Date    TSH 9.140 (H) 11/02/2021     Estimated Creatinine Clearance: 56.1 mL/min (by C-G formula based on SCr of 1 mg/dL).         Imaging:  Imaging Results (Last 24 Hours)     Procedure Component Value Units Date/Time    XR Chest AP [[id number]]  Collected: 05/15/22 1202     Updated: 05/15/22 1205    Narrative:      XR CHEST AP-     Date of Exam: 5/15/2022 11:57 AM     Indication: COVID Evaluation, Cough, Fever.     Comparison: 03/22/2022     Technique: A single view of the chest was obtained.     FINDINGS:      Heart size and pulmonary vessels are within normal limits.  There  is hazy right basilar airspace disease which may be secondary to  atelectasis or pneumonia.  The left lung appears clear.  No pleural  effusion or pneumothorax identified.             Impression:            1.  Right basilar airspace disease which may be secondary to atelectasis  or pneumonia.        Electronically Signed By-Renny Trinh MD On:5/15/2022 12:03 PM  This report was finalized on [id number] by  Renny Trinh MD.            Current Meds:   SCHEDULE  budesonide-formoterol, 2 puff, Inhalation, BID - RT  cefTRIAXone, 1 g, Intravenous, Q24H  ipratropium-albuterol, 3 mL, Nebulization, Q4H - RT  methylPREDNISolone sodium succinate, 40 mg, Intravenous, Q6H  sodium chloride, 10 mL, Intravenous, Q12H  zolpidem, 5 mg, Oral, Nightly      Infusions  Pharmacy to dose warfarin,       PRNs  •  albuterol  •  HYDROcodone-acetaminophen  •  Pharmacy to dose warfarin  •  potassium chloride **OR** potassium chloride **OR** potassium chloride  •  [COMPLETED] Insert peripheral IV **AND** sodium chloride  •  sodium chloride        SHILA Shirley  5/16/2022  10:25 EDT      Much of this encounter note is an electronic transcription/translation of spoken language to printed text using Dragon Software.

## 2022-05-16 NOTE — CASE MANAGEMENT/SOCIAL WORK
Discharge Planning Assessment  [facility]     Patient Name: Renuka Pelayo  MRN: [id number]  Today's Date: 5/16/2022    Admit Date: 5/15/2022     Discharge Needs Assessment     Row Name 05/16/22 1527       Living Environment    People in Home spouse;child(ras), adult    Current Living Arrangements home    Primary Care Provided by self    Provides Primary Care For no one    Family Caregiver if Needed spouse    Family Caregiver Names Adrian    Quality of Family Relationships supportive    Able to Return to Prior Arrangements yes       Resource/Environmental Concerns    Resource/Environmental Concerns none    Transportation Concerns none       Transition Planning    Patient/Family Anticipates Transition to home with family    Patient/Family Anticipated Services at Transition none    Transportation Anticipated family or friend will provide       Discharge Needs Assessment    Readmission Within the Last 30 Days no previous admission in last 30 days    Equipment Currently Used at Home oxygen;nebulizer    Concerns to be Addressed discharge planning    Anticipated Changes Related to Illness none    Equipment Needed After Discharge none    Provided Post Acute Provider List? N/A    N/A Provider List Comment Denies dc needs               Discharge Plan     Row Name 05/16/22 1529       Plan    Plan Anticipate home with family.    Patient/Family in Agreement with Plan yes    Plan Comments Spoke with patient at bedside. Confirmed pcp and pharmacy. Denies dc needs.  Home with family. Patient wears oxygen at home 2-3 L  Supplied by [employer].  Family can transport patient at discharge.              Continued Care and Services - Admitted Since 5/15/2022    Coordination has not been started for this encounter.          Demographic Summary     Row Name 05/16/22 1527       General Information    Admission Type inpatient    Arrived From emergency department    Referral Source admission list    Reason for Consult discharge planning    Preferred  Language English               Functional Status     Row Name 05/16/22 1527       Functional Status    Usual Activity Tolerance good    Current Activity Tolerance good       Functional Status, IADL    Medications independent    Meal Preparation independent    Housekeeping independent    Laundry independent    Shopping independent       Mental Status    General Appearance WDL WDL       Mental Status Summary    Recent Changes in Mental Status/Cognitive Functioning no changes                  Gwendolyn Almonte, RN   Met with patient in room wearing PPE: mask, face shield/goggles, gloves, gown.      Maintained distance greater than six feet and spent less than 15 minutes in the room.

## 2022-05-16 NOTE — PROGRESS NOTES
"Pharmacy dosing service  Anticoagulant  Warfarin     Subjective:    Renuka Pelayo is a 66 y.o.female being continued on warfarin for  history DVT/PE .    INR Goal: 2 - 3  Home medication?: Yes, warfarin 3 mg PO every day at 1800  Bridge Therapy Present?:  No  Interacting Medications Evaluation (New/Present/Discontinued): CTX 5/15 -5/22 (may increase INR), doxy 5/15 - 5/22 (may increase INR)  Additional Contributing Factors: Prescribed abx outpt PTA and took 2 doses (unsure what abx but used for pulmonary illness)       Assessment/Plan:    INR supratherapeutic on admission and unchanged today. Possible INR increased d/t interaction with unknown antibiotic PTA. Will continue to hold warfarin and monitor.     Continue to monitor and adjust based on INR.         Date 5/15 5/16          INR 3.6 3.6          Dose HOLD HOLD              Objective:  [Ht: 160 cm (63\"); Wt: 80 kg (176 lb 5.9 oz); BMI: Body mass index is 31.24 kg/m².]    Lab Results   Component Value Date    ALBUMIN 3.90 03/22/2022     Lab Results   Component Value Date    INR 3.60 (H) 05/16/2022    INR 3.60 (H) 05/15/2022    INR 1.07 (L) 04/25/2022    PROTIME 34.5 (H) 05/16/2022    PROTIME 34.5 (H) 05/15/2022    PROTIME 11.0 (L) 04/25/2022     Lab Results   Component Value Date    HGB 12.0 05/16/2022    HGB 13.2 05/15/2022    HGB 11.8 (L) 03/22/2022     Lab Results   Component Value Date    HCT 38.9 05/16/2022    HCT 40.5 05/15/2022    HCT 35.5 03/22/2022     Dasia Seay, PharmD  05/16/22 08:52 EDT     "

## 2022-05-16 NOTE — PLAN OF CARE
Goal Outcome Evaluation:              Outcome Evaluation: pt states she is doing well with some mild breathing issues. Pt remains on 2L O2 and will continue to be monitored.

## 2022-05-16 NOTE — PROGRESS NOTES
[facility] Medicine Services Daily Progress Note    Patient Name: Renuka Pelayo  : 1955  MRN: [id number]  Primary Care Physician:  Michael Gerardo MD  Date of admission: 5/15/2022      Subjective      Chief Complaint: Cough and shortness of breath.      Patient Reports      Patient was seen and examined.  Patient reports slight improvement in her symptoms.  Patient requested the services of Dr. Johnston.             Review of Systems   Constitutional: Negative.   HENT: Negative.    Eyes: Negative.    Cardiovascular: Negative.    Respiratory: Negative.    Endocrine: Negative.    Hematologic/Lymphatic: Negative.    Skin: Negative.    Musculoskeletal: Negative.    Gastrointestinal: Negative.    Genitourinary: Negative.    Neurological: Negative.    Psychiatric/Behavioral: Negative.    Allergic/Immunologic: Negative.             Objective      Vitals:   Temp:  [97.9 °F (36.6 °C)-98.1 °F (36.7 °C)] 98 °F (36.7 °C)  Heart Rate:  [] 98  Resp:  [15-21] 16  BP: (111-152)/(64-97) 111/70  Flow (L/min):  [0-3] 2    Physical Exam  Vitals and nursing note reviewed.   Constitutional:       General: She is not in acute distress.  HENT:      Head: Normocephalic and atraumatic.      Nose: Nose normal. No congestion or rhinorrhea.      Mouth/Throat:      Mouth: Mucous membranes are moist.      Pharynx: Oropharynx is clear. No oropharyngeal exudate or posterior oropharyngeal erythema.   Eyes:      Pupils: Pupils are equal, round, and reactive to light.   Cardiovascular:      Pulses: Normal pulses.      Heart sounds: Normal heart sounds. No murmur heard.    No friction rub. No gallop.      Comments: S1 and S2 present.  No tachycardia.  Pulmonary:      Effort: No respiratory distress.      Breath sounds: Wheezing and rhonchi present. No rales.      Comments: Decreased air entry bilaterally.  Positive rhonchi.  Chest:      Chest wall: No tenderness.   Abdominal:      General: Abdomen is flat. Bowel  sounds are normal. There is no distension.      Palpations: Abdomen is soft.      Tenderness: There is no right CVA tenderness.   Musculoskeletal:         General: No swelling, tenderness, deformity or signs of injury.      Cervical back: Neck supple. No tenderness.      Right lower leg: No edema.      Left lower leg: No edema.   Skin:     Capillary Refill: Capillary refill takes less than 2 seconds.      Coloration: Skin is not jaundiced.      Findings: No bruising, lesion or rash.   Neurological:      Mental Status: She is alert.      Comments: No facial asymmetry noted.  Gait and station not tested.   Psychiatric:      Comments: No agitation.               Result Review    Result Review:  I have personally reviewed the results from the time of this admission to 5/16/2022 17:33 EDT and agree with these findings:  [x]  Laboratory  [x]  Microbiology  [x]  Radiology  []  EKG/Telemetry   []  Cardiology/Vascular   []  Pathology  []  Old records  []  Other:  Most notable findings include:      Results (last 21 days)    Procedure Component Value Units Date/Time   CT Chest Without Contrast Diagnostic [[id number]] Galdino as Reviewed   Order Status: Completed Collected: 05/16/22 1530    Updated: 05/16/22 1540   Narrative:     CT CHEST WO CONTRAST DIAGNOSTIC-       Date of Exam: 5/16/2022 11:24 AM       Indication: Pneumonia, complication suspected, xray done;   R06.00-Dyspnea, unspecified; A49.1-Streptococcal infection, unspecified   site.       Comparison: AP chest x-ray 05/15/2022, [facility] chest x-ray 03/22/2022, CT   chest without contrast 02/18/2021.       Technique: Serial and axial CT images of the chest were obtained.   Reconstructions in the coronal and sagittal planes were performed.   Automated exposure control and iterative reconstruction methods were   used.       FINDINGS:   Heart size is not enlarged. There are mild coronary artery   calcifications. Mild aneurysmal dilatation of the ascending thoracic   aorta measures  up to 4.1 cm on sagittal image 58. There is no   pericardial or pleural effusion. There is stable chronic partial   atelectasis of the lingula and right middle lobe. Moderate centrilobular   emphysematous changes are upper lobe predominant. No adenopathy is seen   in the chest. Partially included solid organs of the upper abdomen   appear within normal limits for noncontrast CT. No acute osseous   abnormality is identified.       Impression:     1.Stable chronic partial atelectasis of the right middle lobe and   lingula.   2.Moderate emphysematous changes.   3.Stable mild aneurysmal dilatation of the ascending thoracic aorta,   which measures up to 4.1 cm in diameter.               Electronically Signed By-Mai Bhat MD On:5/16/2022 3:38 PM   This report was finalized on [id number] by  Mai Bhat MD.           Assessment & Plan    From previous notes and with minor updates.  Brief Patient Summary:    Patient is a 66 y.o. 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  She says she uses between 2 to 3 L of oxygen at home.  She is ex-smoker.  Patient has very recent bronchoscopy, cultures revealed growth of Streptococcus pneumonia and Candida.  Patient was prescribed outpatient antibiotic, she said she hardly took her 2 doses.  Patient found to have very significant wheezing in both lung fields.  Patient underwent chest x-ray revealing concerning right lower lobe pneumonia.  Following this we were asked to admit patient for the further care.    Patient reported seeing Dr. Johnston, pulmonologist, in the outpatient clinic and requested the services  of wonderful and astute physician.      budesonide-formoterol, 2 puff, Inhalation, BID - RT  cefTRIAXone, 1 g, Intravenous, Q24H  ipratropium-albuterol, 3 mL, Nebulization, Q4H - RT  methylPREDNISolone sodium succinate, 40 mg, Intravenous, Q6H  sodium chloride, 10 mL, Intravenous, Q12H  zolpidem, 5 mg, Oral, Nightly       Pharmacy to dose warfarin,          Active Hospital Problems:  Active Hospital Problems    Diagnosis    • COPD with acute exacerbation (HCC)  Treat with nebs and steroids.  Pulmonary is following.      • Pneumonia of right lower lobe due to infectious organism  Treat with antibiotics.        • Acquired hypothyroidism  Treat with Synthroid.      • Status post Mariya procedure (HCC)    • COPD (chronic obstructive pulmonary disease) (HCC)    • GERD without esophagitis  Treat with Protonix.       • Primary hypertension  Stable.      • Chronic anticoagulation  Continue anticoagulation with Eliquis.      • Chronic back pain  Treat with Roxicodone.            Continue appropriate patient's home medications for other chronic medical conditions.  Continue the present level of care.  Patient and family agreed with the plan of care.        DVT prophylaxis:  Medical DVT prophylaxis orders are present.    CODE STATUS:         Disposition: Patient can discharge in 24 to 72 hours depending on improvement in the lung function..    This patient has been examined wearing appropriate Personal Protective Equipment and discussed with hospital infection control department, [facility], infectious disease specialist and pulmonologist. 05/16/22      Electronically signed by Jonnie Ordonez MD, FACP, 05/16/22, 17:33 EDT.      [facility] Hospitalist Team

## 2022-05-17 ENCOUNTER — READMISSION MANAGEMENT (OUTPATIENT)
Dept: CALL CENTER | Facility: HOSPITAL | Age: 67
End: 2022-05-17

## 2022-05-17 VITALS
HEIGHT: 63 IN | SYSTOLIC BLOOD PRESSURE: 148 MMHG | WEIGHT: 176.37 LBS | DIASTOLIC BLOOD PRESSURE: 76 MMHG | HEART RATE: 80 BPM | BODY MASS INDEX: 31.25 KG/M2 | RESPIRATION RATE: 18 BRPM | TEMPERATURE: 98.5 F | OXYGEN SATURATION: 95 %

## 2022-05-17 PROBLEM — J44.1 COPD EXACERBATION (HCC): Status: ACTIVE | Noted: 2022-05-17

## 2022-05-17 PROBLEM — R06.00 DYSPNEA, UNSPECIFIED TYPE: Status: ACTIVE | Noted: 2022-05-17

## 2022-05-17 LAB
ANION GAP SERPL CALCULATED.3IONS-SCNC: 13 MMOL/L (ref 5–15)
BACTERIA SPEC AEROBE CULT: ABNORMAL
BASOPHILS # BLD AUTO: 0 10*3/MM3 (ref 0–0.2)
BASOPHILS NFR BLD AUTO: 0 % (ref 0–1.5)
BUN SERPL-MCNC: 21 MG/DL (ref 8–23)
BUN/CREAT SERPL: 23.9 (ref 7–25)
CALCIUM SPEC-SCNC: 9 MG/DL (ref 8.6–10.5)
CHLORIDE SERPL-SCNC: 102 MMOL/L (ref 98–107)
CO2 SERPL-SCNC: 21 MMOL/L (ref 22–29)
CREAT SERPL-MCNC: 0.88 MG/DL (ref 0.57–1)
DEPRECATED RDW RBC AUTO: 44.2 FL (ref 37–54)
EGFRCR SERPLBLD CKD-EPI 2021: 73 ML/MIN/1.73
EOSINOPHIL # BLD AUTO: 0 10*3/MM3 (ref 0–0.4)
EOSINOPHIL NFR BLD AUTO: 0.1 % (ref 0.3–6.2)
ERYTHROCYTE [DISTWIDTH] IN BLOOD BY AUTOMATED COUNT: 15.7 % (ref 12.3–15.4)
GLUCOSE SERPL-MCNC: 164 MG/DL (ref 65–99)
GRAM STN SPEC: ABNORMAL
HCT VFR BLD AUTO: 36.4 % (ref 34–46.6)
HGB BLD-MCNC: 11.8 G/DL (ref 12–15.9)
INR PPP: 2.51 (ref 2–3)
ISOLATED FROM: ABNORMAL
LYMPHOCYTES # BLD AUTO: 0.7 10*3/MM3 (ref 0.7–3.1)
LYMPHOCYTES NFR BLD AUTO: 3.7 % (ref 19.6–45.3)
MCH RBC QN AUTO: 26.1 PG (ref 26.6–33)
MCHC RBC AUTO-ENTMCNC: 32.6 G/DL (ref 31.5–35.7)
MCV RBC AUTO: 80.2 FL (ref 79–97)
MONOCYTES # BLD AUTO: 0.5 10*3/MM3 (ref 0.1–0.9)
MONOCYTES NFR BLD AUTO: 2.5 % (ref 5–12)
NEUTROPHILS NFR BLD AUTO: 18.4 10*3/MM3 (ref 1.7–7)
NEUTROPHILS NFR BLD AUTO: 93.7 % (ref 42.7–76)
NRBC BLD AUTO-RTO: 0.1 /100 WBC (ref 0–0.2)
PLATELET # BLD AUTO: 344 10*3/MM3 (ref 140–450)
PMV BLD AUTO: 8.5 FL (ref 6–12)
POTASSIUM SERPL-SCNC: 3.9 MMOL/L (ref 3.5–5.2)
PROTHROMBIN TIME: 24.5 SECONDS (ref 19.4–28.5)
RBC # BLD AUTO: 4.53 10*6/MM3 (ref 3.77–5.28)
SODIUM SERPL-SCNC: 136 MMOL/L (ref 136–145)
WBC NRBC COR # BLD: 19.6 10*3/MM3 (ref 3.4–10.8)

## 2022-05-17 PROCEDURE — 36415 COLL VENOUS BLD VENIPUNCTURE: CPT | Performed by: HOSPITALIST

## 2022-05-17 PROCEDURE — 94799 UNLISTED PULMONARY SVC/PX: CPT

## 2022-05-17 PROCEDURE — 25010000002 METHYLPREDNISOLONE PER 40 MG: Performed by: HOSPITALIST

## 2022-05-17 PROCEDURE — 99217 PR OBSERVATION CARE DISCHARGE MANAGEMENT: CPT | Performed by: INTERNAL MEDICINE

## 2022-05-17 PROCEDURE — 96376 TX/PRO/DX INJ SAME DRUG ADON: CPT

## 2022-05-17 PROCEDURE — 94664 DEMO&/EVAL PT USE INHALER: CPT

## 2022-05-17 PROCEDURE — 85025 COMPLETE CBC W/AUTO DIFF WBC: CPT | Performed by: HOSPITALIST

## 2022-05-17 PROCEDURE — G0378 HOSPITAL OBSERVATION PER HR: HCPCS

## 2022-05-17 PROCEDURE — 63710000001 PREDNISONE PER 5 MG

## 2022-05-17 PROCEDURE — 63710000001 PREDNISONE PER 1 MG

## 2022-05-17 PROCEDURE — 94761 N-INVAS EAR/PLS OXIMETRY MLT: CPT

## 2022-05-17 PROCEDURE — 85610 PROTHROMBIN TIME: CPT | Performed by: HOSPITALIST

## 2022-05-17 PROCEDURE — 80048 BASIC METABOLIC PNL TOTAL CA: CPT | Performed by: HOSPITALIST

## 2022-05-17 RX ORDER — PREDNISONE 10 MG/1
10 TABLET ORAL DAILY
Status: DISCONTINUED | OUTPATIENT
Start: 2022-05-21 | End: 2022-05-17 | Stop reason: HOSPADM

## 2022-05-17 RX ORDER — PREDNISONE 20 MG/1
20 TABLET ORAL DAILY
Status: DISCONTINUED | OUTPATIENT
Start: 2022-05-19 | End: 2022-05-17 | Stop reason: HOSPADM

## 2022-05-17 RX ORDER — CEFDINIR 300 MG/1
300 CAPSULE ORAL 2 TIMES DAILY
Qty: 4 CAPSULE | Refills: 0 | Status: SHIPPED | OUTPATIENT
Start: 2022-05-17 | End: 2022-05-19

## 2022-05-17 RX ORDER — PREDNISONE 20 MG/1
TABLET ORAL
Qty: 21 TABLET | Refills: 0 | Status: SHIPPED | OUTPATIENT
Start: 2022-05-17 | End: 2022-10-04 | Stop reason: HOSPADM

## 2022-05-17 RX ORDER — CEFDINIR 300 MG/1
300 CAPSULE ORAL EVERY 12 HOURS SCHEDULED
Status: DISCONTINUED | OUTPATIENT
Start: 2022-05-17 | End: 2022-05-17 | Stop reason: HOSPADM

## 2022-05-17 RX ORDER — WARFARIN SODIUM 3 MG/1
3 TABLET ORAL
Status: DISCONTINUED | OUTPATIENT
Start: 2022-05-17 | End: 2022-05-17 | Stop reason: HOSPADM

## 2022-05-17 RX ADMIN — IPRATROPIUM BROMIDE AND ALBUTEROL SULFATE 3 ML: .5; 3 SOLUTION RESPIRATORY (INHALATION) at 07:38

## 2022-05-17 RX ADMIN — HYDROCODONE BITARTRATE AND ACETAMINOPHEN 1 TABLET: 10; 325 TABLET ORAL at 09:55

## 2022-05-17 RX ADMIN — PREDNISONE 30 MG: 20 TABLET ORAL at 09:56

## 2022-05-17 RX ADMIN — BUDESONIDE AND FORMOTEROL FUMARATE DIHYDRATE 2 PUFF: 160; 4.5 AEROSOL RESPIRATORY (INHALATION) at 07:41

## 2022-05-17 RX ADMIN — Medication 10 ML: at 08:23

## 2022-05-17 RX ADMIN — CEFDINIR 300 MG: 300 CAPSULE ORAL at 09:55

## 2022-05-17 RX ADMIN — METHYLPREDNISOLONE SODIUM SUCCINATE 40 MG: 40 INJECTION, POWDER, FOR SOLUTION INTRAMUSCULAR; INTRAVENOUS at 08:23

## 2022-05-17 RX ADMIN — METHYLPREDNISOLONE SODIUM SUCCINATE 40 MG: 40 INJECTION, POWDER, FOR SOLUTION INTRAMUSCULAR; INTRAVENOUS at 05:11

## 2022-05-17 NOTE — PLAN OF CARE
Goal Outcome Evaluation:  Plan of Care Reviewed With: patient     Pt has been doing well today. No complaints of shortness of air. 2L NC. Will be discharging home today. VSS. A/O x4. Ambulating to bathroom ad bala. RN will continue to monitor.      Progress: improving

## 2022-05-17 NOTE — PROGRESS NOTES
Daily Progress Note        COPD (chronic obstructive pulmonary disease) ([facility])    Chronic back pain    Depressive disorder    GERD without esophagitis    Primary hypertension    Chronic anticoagulation    Status post Mariya procedure ([facility])    Acquired hypothyroidism    Pneumonia of right lower lobe due to infectious organism    COPD with acute exacerbation ([facility])    AAA (abdominal aortic aneurysm) ([facility])      Assessment    Right lobe pneumonia, completed 6 doses of Levaquin as outpatient  Chronic obstructive pulmonary disease, on home O2 2 to 3 L     Last bronchoscopy 4/25/2022,+ Streptococcus pneumonia  -Significant amount of mucous plugs in multiple segment     5/15/2022 strep pneumo antigen and Legionella urine negative     History of pulmonary embolism  History of DVT     Essential hypertension  Atrial fibrillation, on Coumadin  Hypothyroidism  GERD  Diverticulitis  Insomnia  obesity         Recommendations:     Respiratory culture, pending     Antibiotic will de-escalate Rocephin to Omnicef p.o.x 5 days     Titrate oxygen, requiring 2 L per nasal cannula  -Wears 2 L at home     De-escalate Solu-Medrol to prednisone taper dose  Bronchodilator\inhaled corticosteroid     Anticoagulation Coumadin on hold, elevated INR          LOS: 2 days     Subjective         Objective     Vital signs for last 24 hours:  Vitals:    05/16/22 2100 05/17/22 0046 05/17/22 0327 05/17/22 0500   BP: 147/80 146/78  148/74   BP Location: Left arm Left arm  Left arm   Patient Position: Lying Lying  Lying   Pulse: 96 97 69 85   Resp: 19 20  19   Temp: 98.2 °F (36.8 °C) 98.2 °F (36.8 °C)  98.6 °F (37 °C)   TempSrc: Oral Oral  Oral   SpO2: 91% 91% 95% 98%   Weight:       Height:           Intake/Output last 3 shifts:  I/O last 3 completed shifts:  In: 760 [P.O.:760]  Out: -   Intake/Output this shift:  No intake/output data recorded.      Radiology  Imaging Results (Last 24 Hours)     Procedure Component Value Units Date/Time    CT Chest  Without Contrast Diagnostic [[id number]] Collected: 05/16/22 1530     Updated: 05/16/22 1540    Narrative:      CT CHEST WO CONTRAST DIAGNOSTIC-     Date of Exam: 5/16/2022 11:24 AM     Indication: Pneumonia, complication suspected, xray done;  R06.00-Dyspnea, unspecified; A49.1-Streptococcal infection, unspecified  site.     Comparison: AP chest x-ray 05/15/2022, [facility] chest x-ray 03/22/2022, CT  chest without contrast 02/18/2021.     Technique: Serial and axial CT images of the chest were obtained.  Reconstructions in the coronal and sagittal planes were performed.   Automated exposure control and iterative reconstruction methods were  used.     FINDINGS:  Heart size is not enlarged. There are mild coronary artery  calcifications. Mild aneurysmal dilatation of the ascending thoracic  aorta measures up to 4.1 cm on sagittal image 58. There is no  pericardial or pleural effusion. There is stable chronic partial  atelectasis of the lingula and right middle lobe. Moderate centrilobular  emphysematous changes are upper lobe predominant. No adenopathy is seen  in the chest. Partially included solid organs of the upper abdomen  appear within normal limits for noncontrast CT. No acute osseous  abnormality is identified.       Impression:      1.Stable chronic partial atelectasis of the right middle lobe and  lingula.  2.Moderate emphysematous changes.  3.Stable mild aneurysmal dilatation of the ascending thoracic aorta,  which measures up to 4.1 cm in diameter.           Electronically Signed By-Mai Bhat MD On:5/16/2022 3:38 PM  This report was finalized on [id number] by  Mai Bhat MD.          Labs:  Results from last 7 days   Lab Units 05/17/22  0356   WBC 10*3/mm3 19.60*   HEMOGLOBIN g/dL 11.8*   HEMATOCRIT % 36.4   PLATELETS 10*3/mm3 344     Results from last 7 days   Lab Units 05/17/22  0356   SODIUM mmol/L 136   POTASSIUM mmol/L 3.9   CHLORIDE mmol/L 102   CO2 mmol/L 21.0*   BUN mg/dL 21   CREATININE  mg/dL 0.88   CALCIUM mg/dL 9.0   GLUCOSE mg/dL 164*             Results from last 7 days   Lab Units 05/15/22  1158   TROPONIN T ng/mL <0.010             Results from last 7 days   Lab Units 05/17/22  0356 05/16/22  0242 05/15/22  1158   INR  2.51 3.60* 3.60*               Meds:   SCHEDULE  budesonide-formoterol, 2 puff, Inhalation, BID - RT  cefTRIAXone, 1 g, Intravenous, Q24H  ipratropium-albuterol, 3 mL, Nebulization, Q4H - RT  methylPREDNISolone sodium succinate, 40 mg, Intravenous, Q6H  sodium chloride, 10 mL, Intravenous, Q12H  zolpidem, 5 mg, Oral, Nightly      Infusions  Pharmacy to dose warfarin,       PRNs  •  albuterol  •  HYDROcodone-acetaminophen  •  Pharmacy to dose warfarin  •  potassium chloride **OR** potassium chloride **OR** potassium chloride  •  [COMPLETED] Insert peripheral IV **AND** sodium chloride  •  sodium chloride    Physical Exam:  Physical Exam  Vitals reviewed.   Pulmonary:      Breath sounds: Wheezing present.   Skin:     General: Skin is warm and dry.      Findings: Erythema present.   Neurological:      Mental Status: She is alert and oriented to person, place, and time.         ROS  Review of Systems   Respiratory: Positive for cough, shortness of breath and wheezing.        I reviewed the patient's new clinical results    Electronically signed by SHILA Shirley

## 2022-05-17 NOTE — DISCHARGE SUMMARY
[facility] Medicine Services  DISCHARGE SUMMARY    Patient Name: Renuka Pelayo  : 1955  MRN: [id number]    Date of Admission: 5/15/2022  Discharge Diagnosis: Pneumonia/COPD exacerbation.  Date of Discharge: 2022.  Primary Care Physician: Michael Gerardo MD      Presenting Problem:   Pneumonia [J18.9]  Streptococcus pneumoniae [A49.1]  Dyspnea, unspecified type [R06.00]    Active and Resolved Hospital Problems:  Active Hospital Problems    Diagnosis POA   • COPD exacerbation (HCC) [J44.1] Yes     Priority: High   • COPD with acute exacerbation (HCC) [J44.1] Yes     Priority: High   • Pneumonia of right lower lobe due to infectious organism [J18.9] Yes     Priority: High   • AAA (abdominal aortic aneurysm) ([facility]) [I71.4] Yes   • Acquired hypothyroidism [E03.9] Yes   • Status post Mariya procedure ([facility]) [Z93.3] Not Applicable   • COPD (chronic obstructive pulmonary disease) ([facility]) [J44.9] Yes   • GERD without esophagitis [K21.9] Yes   • Primary hypertension [I10] Yes   • Chronic anticoagulation [Z79.01] Not Applicable   • Chronic back pain [M54.9, G89.29] Yes   • Depressive disorder [F32.A] Yes      Resolved Hospital Problems   No resolved problems to display.         Hospital Course     Hospital Course:    Patient is a 66 y.o. 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  She says she uses between 2 to 3 L of oxygen at home.  She is ex-smoker.  Patient has very recent bronchoscopy, cultures revealed growth of Streptococcus pneumonia and Candida.  Patient was prescribed outpatient antibiotic, she said she  hardly took her 2 doses.  Patient found to have very significant wheezing in both lung fields.  Patient underwent chest x-ray revealing concerning right lower lobe pneumonia.  Following this we were asked to admit patient for the further care.    Patient reported seeing Dr. Johnston, pulmonologist, in the outpatient clinic and requested the services of this wonderful and astute physician.  Pulmonary consult was completed.  COPD exacerbation was treated with nebs and steroids.  Pneumonia was treated with antibiotics.  Insomnia was treated with Ambien.  DVT/PE was treated with warfarin.  Appropriate patient's home medications were resumed in the hospital for other chronic medical conditions.  Patient reported significant improvement in her symptoms after over 48 hours in the hospital and requested to be discharged home.  Patient was advised to take her medications as prescribed.  The discharge medications are as per medication reconciliation list.  Patient was advised to follow-up with her primary care physician within 3 to 5 days of discharge.  Patient was advised to follow-up with her pulmonologist within 14 days of discharge.  Patient was advised to return to the emergency department if she experiences any recurrence of her symptoms.  Patient and family agreed with the plan and patient was discharged in a stable condition.      DISCHARGE Follow Up Recommendations for labs and diagnostics:    Patient was advised to follow-up with her primary care physician who will review her current medications.      Patient was advised to follow-up with her pulmonologist who will reassess her pulmonary function.          Reasons For Change In Medications and Indications for New Medications:      Day of Discharge     Vital Signs:  Temp:  [98 °F (36.7 °C)-98.6 °F (37 °C)] 98.5 °F (36.9 °C)  Heart Rate:  [] 80  Resp:  [16-20] 18  BP: (111-148)/(70-80) 148/76  Flow (L/min):  [2] 2    Physical Exam:  Physical Exam  Vitals and  nursing note reviewed.   Constitutional:       General: She is not in acute distress.  HENT:      Head: Normocephalic and atraumatic.      Nose: Nose normal. No congestion or rhinorrhea.      Mouth/Throat:      Mouth: Mucous membranes are moist.      Pharynx: Oropharynx is clear. No oropharyngeal exudate or posterior oropharyngeal erythema.   Eyes:      Pupils: Pupils are equal, round, and reactive to light.   Cardiovascular:      Pulses: Normal pulses.      Heart sounds: Normal heart sounds. No murmur heard.    No friction rub. No gallop.      Comments: S1 and S2 present.  No tachycardia.  Pulmonary:      Effort: No respiratory distress.      Breath sounds: No wheezing, rhonchi or rales.   Chest:      Chest wall: No tenderness.   Abdominal:      General: Abdomen is flat. Bowel sounds are normal. There is no distension.      Palpations: Abdomen is soft.      Tenderness: There is no right CVA tenderness.   Musculoskeletal:         General: No swelling, tenderness, deformity or signs of injury.      Cervical back: Neck supple. No tenderness.      Right lower leg: No edema.      Left lower leg: No edema.   Skin:     Capillary Refill: Capillary refill takes less than 2 seconds.      Coloration: Skin is not jaundiced.      Findings: No bruising, lesion or rash.   Neurological:      Mental Status: She is alert.      Comments: No facial asymmetry noted.  Gait and station not tested.   Psychiatric:      Comments: No agitation.              Pertinent  and/or Most Recent Results     LAB RESULTS:      Lab 05/17/22  0356 05/16/22  0242 05/15/22  1529 05/15/22  1158   WBC 19.60* 6.20  --  6.90   HEMOGLOBIN 11.8* 12.0  --  13.2   HEMATOCRIT 36.4 38.9  --  40.5   PLATELETS 344 304  --  367   NEUTROS ABS 18.40* 5.70  --  4.00   LYMPHS ABS 0.70 0.50*  --  1.90   MONOS ABS 0.50 0.10  --  0.60   EOS ABS 0.00 0.00  --  0.30   MCV 80.2 82.3  --  81.2   PROCALCITONIN  --   --   --  0.13   LACTATE  --   --  0.9  --    PROTIME 24.5 34.5*   --  34.5*         Lab 05/17/22  0356 05/16/22  1354 05/16/22  0617 05/16/22  0242 05/15/22  1158   SODIUM 136  --   --  138 140   POTASSIUM 3.9 3.6 3.3* 3.1* 3.3*   CHLORIDE 102  --   --  99 100   CO2 21.0*  --   --  25.0 26.0   ANION GAP 13.0  --   --  14.0 14.0   BUN 21  --   --  11 9   CREATININE 0.88  --   --  1.00 0.91   EGFR 73.0  --   --  62.6 70.2   GLUCOSE 164*  --   --  178* 107*   CALCIUM 9.0  --   --  9.1 9.9             Lab 05/17/22  0356 05/16/22  0242 05/15/22  1158   PROBNP  --   --  45.0   TROPONIN T  --   --  <0.010   PROTIME 24.5 34.5* 34.5*   INR 2.51 3.60* 3.60*                 Brief Urine Lab Results  (Last result in the past 365 days)      Color   Clarity   Blood   Leuk Est   Nitrite   Protein   CREAT   Urine HCG        11/01/21 1141 Yellow   Hazy   Negative   Moderate (2+)   Negative   Negative               Microbiology Results (last 10 days)     Procedure Component Value - Date/Time    Respiratory Culture - Sputum, Cough [[id number]] Collected: 05/16/22 1256    Lab Status: Preliminary result Specimen: Sputum from Cough Updated: 05/17/22 0837     Respiratory Culture Scant growth (1+) The culture consists of normal respiratory rafi. This is a preliminary report; final report to follow.     Gram Stain Moderate (3+) WBCs per low power field      Few (2+) Epithelial cells per low power field      No organisms seen    Blood Culture - Blood, Hand, Left [[id number]]  (Normal) Collected: 05/16/22 0242    Lab Status: Preliminary result Specimen: Blood from Hand, Left Updated: 05/17/22 0317     Blood Culture No growth at 24 hours    S. Pneumo Ag Urine or CSF - Urine, Urine, Clean Catch [[id number]]  (Normal) Collected: 05/15/22 1529    Lab Status: Final result Specimen: Urine, Clean Catch Updated: 05/15/22 1618     Strep Pneumo Ag Negative    Legionella Antigen, Urine - Urine, Urine, Clean Catch [[id number]]  (Normal) Collected: 05/15/22 1529    Lab Status: Final result Specimen: Urine, Clean Catch  Updated: 05/15/22 1618     LEGIONELLA ANTIGEN, URINE Negative    Blood Culture - Blood, Arm, Right [[id number]]  (Normal) Collected: 05/15/22 1404    Lab Status: Preliminary result Specimen: Blood from Arm, Right Updated: 05/16/22 1417     Blood Culture No growth at 24 hours    Blood Culture - Blood, Arm, Left [[id number]]  (Abnormal) Collected: 05/15/22 1404    Lab Status: Final result Specimen: Blood from Arm, Left Updated: 05/17/22 0829     Blood Culture Staphylococcus, coagulase negative     Isolated from Aerobic Bottle     Gram Stain Gram positive cocci in clusters    Narrative:      Probable contaminant requires clinical correlation, susceptibility not performed unless requested by physician.      Blood Culture ID, PCR - Blood, Arm, Left [[id number]]  (Abnormal) Collected: 05/15/22 1404    Lab Status: Final result Specimen: Blood from Arm, Left Updated: 05/16/22 1325     BCID, PCR Staph spp, not aureus or lugdunesis. Identification by BCID2 PCR.     BOTTLE TYPE Aerobic Bottle    Respiratory Panel PCR w/COVID-19(SARS-CoV-2) ONOFRE/GABRIELLE/PIPO/PAD/COR/MAD/STEVE In-House, NP Swab in UTM/VTM, 3-4 HR TAT - Swab, Nasopharynx [[id number]]  (Normal) Collected: 05/15/22 1202    Lab Status: Final result Specimen: Swab from Nasopharynx Updated: 05/15/22 1253     ADENOVIRUS, PCR Not Detected     Coronavirus 229E Not Detected     Coronavirus HKU1 Not Detected     Coronavirus NL63 Not Detected     Coronavirus OC43 Not Detected     COVID19 Not Detected     Human Metapneumovirus Not Detected     Human Rhinovirus/Enterovirus Not Detected     Influenza A PCR Not Detected     Influenza B PCR Not Detected     Parainfluenza Virus 1 Not Detected     Parainfluenza Virus 2 Not Detected     Parainfluenza Virus 3 Not Detected     Parainfluenza Virus 4 Not Detected     RSV, PCR Not Detected     Bordetella pertussis pcr Not Detected     Bordetella parapertussis PCR Not Detected     Chlamydophila pneumoniae PCR Not Detected     Mycoplasma pneumo by  PCR Not Detected    Narrative:      In the setting of a positive respiratory panel with a viral infection PLUS a negative procalcitonin without other underlying concern for bacterial infection, consider observing off antibiotics or discontinuation of antibiotics and continue supportive care. If the respiratory panel is positive for atypical bacterial infection (Bordetella pertussis, Chlamydophila pneumoniae, or Mycoplasma pneumoniae), consider antibiotic de-escalation to target atypical bacterial infection.          CT Chest Without Contrast Diagnostic    Result Date: 5/16/2022  Impression: 1.Stable chronic partial atelectasis of the right middle lobe and lingula. 2.Moderate emphysematous changes. 3.Stable mild aneurysmal dilatation of the ascending thoracic aorta, which measures up to 4.1 cm in diameter.    Electronically Signed By-Mai Bhat MD On:5/16/2022 3:38 PM This report was finalized on [id number] by  Mai Bhat MD.    XR Chest AP    Result Date: 5/15/2022  Impression:   1.  Right basilar airspace disease which may be secondary to atelectasis or pneumonia.   Electronically Signed By-Renny Trinh MD On:5/15/2022 12:03 PM This report was finalized on [id number] by  Renny Trinh MD.      Results for orders placed during the hospital encounter of 09/05/19    Duplex Venous Lower Extremity - Bilateral    Interpretation Summary  · Chronic right lower extremity deep vein thrombosis noted in the proximal femoral, mid femoral, distal femoral and popliteal.  · Acute right lower extremity deep vein thrombosis noted in the peroneal.  · All other veins appeared normal bilaterally.      Results for orders placed during the hospital encounter of 09/05/19    Duplex Venous Lower Extremity - Bilateral    Interpretation Summary  · Chronic right lower extremity deep vein thrombosis noted in the proximal femoral, mid femoral, distal femoral and popliteal.  · Acute right lower extremity deep vein thrombosis  noted in the peroneal.  · All other veins appeared normal bilaterally.          Labs Pending at Discharge:  Pending Labs     Order Current Status    Blood Culture - Blood, Arm, Right Preliminary result    Blood Culture - Blood, Hand, Left Preliminary result    Respiratory Culture - Sputum, Cough Preliminary result          Procedures Performed           Consults:   Consults     Date and Time Order Name Status Description    5/16/2022  9:03 AM Inpatient Pulmonology Consult Completed     5/15/2022  1:11 PM Hospitalist (on-call MD unless specified)              Discharge Details        Discharge Medications      New Medications      Instructions Start Date   cefdinir 300 MG capsule  Commonly known as: OMNICEF   300 mg, Oral, 2 Times Daily      predniSONE 20 MG tablet  Commonly known as: DELTASONE   take 2 tablets by mouth daily for 6 days, then take 1 tablet daily for 6 days and then take 0.5 tablet daily for 6 days.         Continue These Medications      Instructions Start Date   albuterol 0.63 MG/3ML nebulizer solution  Commonly known as: ACCUNEB   1 ampule, Nebulization, Every 6 Hours PRN, Use am of surgery      [facility] Aerosphere 160-9-4.8 MCG/ACT aerosol inhaler  Generic drug: Budeson-Glycopyrrol-Formoterol   1 puff, Inhalation, 2 Times Daily      HYDROcodone-acetaminophen  MG per tablet  Commonly known as: NORCO   1 tablet, Oral, Every 6 Hours PRN      ipratropium-albuterol 0.5-2.5 mg/3 ml nebulizer  Commonly known as: DUO-NEB   3 mL, Nebulization, Every 4 Hours PRN, Dispense 1box      roflumilast 500 MCG tablet tablet  Commonly known as: DALIRESP   500 mcg, Oral, Nightly      warfarin 3 MG tablet  Commonly known as: COUMADIN   3 mg, Oral, Nightly      zolpidem 5 MG tablet  Commonly known as: AMBIEN   5 mg, Oral, Nightly             No Known Allergies      Discharge Disposition: Stable.  Home or Self Care    Diet:  Hospital:  Diet Order   Procedures   • Diet Diabetic/Consistent Carbs; Diabetic - Consistent  Carb         Discharge Activity: As tolerated.        CODE STATUS:  There are no questions and answers to display.         No future appointments.        Time spent on Discharge including face to face service: 40 minutes    This patient has been examined wearing appropriate Personal Protective Equipment and discussed with hospital infection control department, [facility], infectious disease specialist and pulmonologist. 05/17/22      Signature:Electronically signed by Jonnie Ordonez MD, FACP, 05/17/22, 8:49 AM EDT.

## 2022-05-17 NOTE — CASE MANAGEMENT/SOCIAL WORK
Case Management Discharge Note           Provided Post Acute Provider List?: N/A  N/A Provider List Comment: Denies dc needs    Selected Continued Care - Discharged on 5/17/2022 Admission date: 5/15/2022 - Discharge disposition: Home or Self Care              Transportation Services  Private: Car    Final Discharge Disposition Code: 01 - home or self-care

## 2022-05-17 NOTE — OUTREACH NOTE
Prep Survey    Flowsheet Row Responses   [facility] patient discharged from? [facility]   Is LACE score < 7 ? No   Emergency Room discharge w/ pulse ox? No   Eligibility Readm Mgmt   Discharge diagnosis Streptococcus pneumoniae ,  COPD exacerbation    Does the patient have one of the following disease processes/diagnoses(primary or secondary)? COPD/Pneumonia   Does the patient have Home health ordered? No   Is there a DME ordered? No   Comments regarding appointments See AVS   Medication alerts for this patient Cefdinir, Warfarin   Prep survey completed? Yes          LISA DALTON - Registered Nurse

## 2022-05-17 NOTE — PLAN OF CARE
Goal Outcome Evaluation:  Plan of Care Reviewed With: patient           Outcome Evaluation: Patient reports doing better. Patient rested/slept well during shift. Patient remains on 2L O2. Call light and personal items in reach. Will continue to monitor.

## 2022-05-17 NOTE — PROGRESS NOTES
"Pharmacy dosing service  Anticoagulant  Warfarin     Subjective:    Renuka Pelayo is a 66 y.o.female being continued on warfarin for  history DVT/PE .    INR Goal: 2 - 3  Home medication?: Yes, warfarin 3 mg PO every day at 1800  Bridge Therapy Present?:  No  Interacting Medications Evaluation (New/Present/Discontinued): CTX 5/15 -5/17 (may increase INR), cefdinir 5/17 - 5/21 (may increase INR), doxy 5/15 - 5/16 (may increase INR)  Additional Contributing Factors: Prescribed abx outpt PTA and took 2 doses (unsure what abx but used for pulmonary illness)       Assessment/Plan:    INR supratherapeutic on admission and next day. Possible INR increased d/t interaction with unknown antibiotic PTA. INR returned to goal range today, therefore will restart home regimen. Suspect INR may further decrease tomorrow but given recent supratherapeutic INRs will not bolus today. Will monitor closely.     Continue to monitor and adjust based on INR.         Date 5/15 5/16 5/17         INR 3.6 3.6 2.51         Dose HOLD HOLD 3 mg             Objective:  [Ht: 160 cm (63\"); Wt: 80 kg (176 lb 5.9 oz); BMI: Body mass index is 31.24 kg/m².]    Lab Results   Component Value Date    ALBUMIN 3.90 03/22/2022     Lab Results   Component Value Date    INR 2.51 05/17/2022    INR 3.60 (H) 05/16/2022    INR 3.60 (H) 05/15/2022    PROTIME 24.5 05/17/2022    PROTIME 34.5 (H) 05/16/2022    PROTIME 34.5 (H) 05/15/2022     Lab Results   Component Value Date    HGB 11.8 (L) 05/17/2022    HGB 12.0 05/16/2022    HGB 13.2 05/15/2022     Lab Results   Component Value Date    HCT 36.4 05/17/2022    HCT 38.9 05/16/2022    HCT 40.5 05/15/2022     Dasia Seay, PharmD  05/17/22 09:23 EDT       "

## 2022-05-18 LAB
BACTERIA SPEC RESP CULT: NORMAL
GRAM STN SPEC: NORMAL

## 2022-05-19 LAB — FUNGUS WND CULT: ABNORMAL

## 2022-05-20 LAB — BACTERIA SPEC AEROBE CULT: NORMAL

## 2022-05-21 LAB — BACTERIA SPEC AEROBE CULT: NORMAL

## 2022-05-26 ENCOUNTER — READMISSION MANAGEMENT (OUTPATIENT)
Dept: CALL CENTER | Facility: HOSPITAL | Age: 67
End: 2022-05-26

## 2022-05-26 NOTE — OUTREACH NOTE
COPD/PN Week 2 Survey    Flowsheet Row Responses   [facility] patient discharged from? [facility]   Does the patient have one of the following disease processes/diagnoses(primary or secondary)? COPD/Pneumonia   Was the primary reason for admission: Pneumonia   Week 2 attempt successful? Yes   Call start time 1352   Call end time 1354   Discharge diagnosis Streptococcus pneumoniae ,  COPD exacerbation    Meds reviewed with patient/caregiver? Yes   Is the patient taking all medications as directed (includes completed medication regime)? Yes   Comments regarding appointments Cardio this am   Does the patient have a primary care provider?  Yes   Comments regarding PCP complete.    Has the patient kept scheduled appointments due by today? Yes   Has home health visited the patient within 72 hours of discharge? N/A   Pulse Ox monitoring Intermittent   Pulse Ox device source Patient   O2 Sat: education provided Sat levels, Monitoring frequency, When to seek care   Did the patient receive a copy of their discharge instructions? Yes   Nursing interventions Reviewed instructions with patient   What is the patient's perception of their health status since discharge? Improving   Is the patient/caregiver able to teach back the hierarchy of who to call/visit for symptoms/problems? PCP, Specialist, Home health nurse, Urgent Care, ED, 911 Yes   Is the patient/caregiver able to teach back signs and symptoms of worsening condition: Fever/chills, Shortness of breath, Chest pain   Is the patient/caregiver able to teach back importance of completing antibiotic course of treatment? Yes   Week 2 call completed? Yes   Wrap up additional comments Pt reports she is improving. No needs at this time.           MITCHELL INTERIANO - Registered Nurse

## 2022-06-06 LAB
MYCOBACTERIUM SPEC CULT: NORMAL
NIGHT BLUE STAIN TISS: NORMAL

## 2022-06-25 ENCOUNTER — HOSPITAL ENCOUNTER (EMERGENCY)
Facility: HOSPITAL | Age: 67
Discharge: HOME | End: 2022-06-25
Attending: EMERGENCY MEDICINE | Admitting: EMERGENCY MEDICINE

## 2022-06-25 VITALS
WEIGHT: 172.18 LBS | OXYGEN SATURATION: 94 % | DIASTOLIC BLOOD PRESSURE: 84 MMHG | RESPIRATION RATE: 16 BRPM | HEART RATE: 98 BPM | TEMPERATURE: 97.7 F | BODY MASS INDEX: 30.51 KG/M2 | SYSTOLIC BLOOD PRESSURE: 166 MMHG | HEIGHT: 63 IN

## 2022-06-25 DIAGNOSIS — M65.231: Primary | ICD-10-CM

## 2022-06-25 PROCEDURE — 96372 THER/PROPH/DIAG INJ SC/IM: CPT

## 2022-06-25 PROCEDURE — 99282 EMERGENCY DEPT VISIT SF MDM: CPT

## 2022-06-25 PROCEDURE — 25010000002 KETOROLAC TROMETHAMINE PER 15 MG: Performed by: NURSE PRACTITIONER

## 2022-06-25 RX ORDER — KETOROLAC TROMETHAMINE 30 MG/ML
30 INJECTION, SOLUTION INTRAMUSCULAR; INTRAVENOUS ONCE
Status: COMPLETED | OUTPATIENT
Start: 2022-06-25 | End: 2022-06-25

## 2022-06-25 RX ADMIN — KETOROLAC TROMETHAMINE 30 MG: 30 INJECTION, SOLUTION INTRAMUSCULAR at 09:39

## 2022-06-25 NOTE — ED NOTES
Patient presents to the ED with right arm pain. Patient reports that she is seeing Sen and [facility] for her hand pain. She reports receiving an injection in May that has not helped. No deformity, bruising, or crepitus noted. Full AROM and PROM upon exam. Tenderness noted with palpation of the carpal bones of the right wrist.

## 2022-06-25 NOTE — ED PROVIDER NOTES
"Subjective   66-year-old female presents with a 1 year history of right proximal forearm pain that became worse 6 months ago.  She denies injury.  Denies repetitive motion nor recent exercise routine.  She reports that she has been seen by [facility] having an MRI in April that showed a congenital abnormality.  She also reports that she received a \"shot\" in May that did not result in resolution of symptoms.  She is also followed by her primary care provider for the same.  She reports that she recently completed a course of steroids for her COPD.    1. Location: Right proximal forearm  2. Quality: Throbbing  3. Severity: Moderate  4. Worsening factors: Dorsiflexion  5. Alleviating factors: Denies  6. Onset: 1 year  7. Radiation: Right wrist  8. Frequency: Constant  9. 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 Source: Patient            Review of Systems   Musculoskeletal: Positive for arthralgias and myalgias. Negative for gait problem.   Skin: Negative for color change, pallor, rash and wound.   Neurological: Negative for weakness and numbness.   Hematological: Does not bruise/bleed easily.   All other systems reviewed and are negative.      Past Medical History:   Diagnosis Date   • Bladder cancer (HCC)    • Chronic back pain    • Closed nondisplaced fracture of head of right radius 03/2017   • Congenital " deformity of right hip joint 1956   • COPD (chronic obstructive pulmonary disease) (HCC)     former smoker   • Deep venous thrombosis (HCC)     unprovoked   • Depressive disorder    • Disease of thyroid gland    • Diverticulitis of colon    • Essential hypertension    • Gastroesophageal reflux disease    • Insomnia    • Obesity    • Pulmonary embolism (HCC)     provoked by surgery       No Known Allergies    Past Surgical History:   Procedure Laterality Date   • ABDOMINAL SURGERY     • BRONCHOSCOPY N/A 11/25/2020    Procedure: BRONCHOSCOPY with bronchial washing;  Surgeon: Win Johnston MD;  Location: [facility] ENDOSCOPY;  Service: Pulmonary;  Laterality: N/A;  Post: mucous plugs   • BRONCHOSCOPY N/A 4/25/2022    Procedure: BRONCHOSCOPY with bronchial washing;  Surgeon: Win Johnston MD;  Location: [facility] ENDOSCOPY;  Service: Pulmonary;  Laterality: N/A;  post op: pneumonia   • CHOLECYSTECTOMY     • COLON SURGERY     • COLONOSCOPY     • COLOSTOMY CLOSURE N/A 2/25/2021    Procedure: COLOSTOMY TAKEDOWN OR CLOSURE, extenisve lysis of adhesions;  Surgeon: Chi Farmer MD;  Location: [facility] MAIN OR;  Service: General;  Laterality: N/A;   • CYSTOSCOPY N/A 2/25/2021    Procedure: CYSTOSCOPY with bladder tumor removal and fulgeration, insertion of 3-way rizzo catheter;  Surgeon: Alfonzo Haywrad MD;  Location: [facility] MAIN OR;  Service: Urology;  Laterality: N/A;   • ENDOSCOPY     • EXPLORATORY LAPAROTOMY N/A 7/16/2020    Procedure: LAPAROTOMY EXPLORATORY HARTMANS;  Surgeon: Chi Farmer MD;  Location: [facility] MAIN OR;  Service: General;  Laterality: N/A;   • HYSTERECTOMY     • TOTAL HIP ARTHROPLASTY Right    • TOTAL HIP ARTHROPLASTY REVISION Right     x3       Family History   Problem Relation Age of Onset   • No Known Problems Mother    • No Known Problems Father        Social History     Socioeconomic History   • Marital status:    Tobacco Use   • Smoking status: Former Smoker      Packs/day: 2.00     Years: 40.00     Pack years: 80.00     Types: Cigarettes     Quit date: 2019     Years since quitting: 3.4   • Smokeless tobacco: Never Used   • Tobacco comment: ELECTRONIC   Vaping Use   • Vaping Use: Every day   • Substances: Nicotine, Flavoring   Substance and Sexual Activity   • Alcohol use: Never   • Drug use: Never   • Sexual activity: Defer           Objective   Physical Exam  Vitals and nursing note reviewed.   Constitutional:       General: She is awake. She is not in acute distress.     Appearance: Normal appearance. She is well-developed and normal weight.   Cardiovascular:      Pulses: Normal pulses.           Radial pulses are 2+ on the right side and 2+ on the left side.   Musculoskeletal:         General: Tenderness present. No swelling, deformity or signs of injury. Normal range of motion.      Right forearm: Tenderness present. No swelling or bony tenderness.      Left forearm: Normal.        Arms:       Comments: Patient has point tenderness over the proximal ulnar forearm.  There is no overlying erythema swelling, nor ecchymosis noted.  It is worse with dorsiflexion.  Distal pulses strong equal bilaterally 2+.  Cap refill less than 2 seconds.   Skin:     General: Skin is warm and dry.      Capillary Refill: Capillary refill takes less than 2 seconds.      Coloration: Skin is not pale.   Neurological:      Mental Status: She is alert and oriented to person, place, and time.      Sensory: No sensory deficit.      Motor: No abnormal muscle tone.   Psychiatric:         Mood and Affect: Mood normal.         Behavior: Behavior normal. Behavior is cooperative.         Thought Content: Thought content normal.         Judgment: Judgment normal.         Procedures           ED Course    No radiology results for the last day  Medications   ketorolac (TORADOL) injection 30 mg (30 mg Intramuscular Given 6/25/22 0939)     Labs Reviewed - No data to display                                          MDM  Number of Diagnoses or Management Options  Calcific tendonitis of forearm, right  Diagnosis management comments: Chart Review: 6/17/2022 patient was seen for INR check and was told to hold her Coumadin.  Comorbidity:Past Medical History:  No date: Bladder cancer ([facility])  No date: Chronic back pain  03/2017: Closed nondisplaced fracture of head of right radius  1956: Congenital deformity of right hip joint  No date: COPD (chronic obstructive pulmonary disease) ([facility])      Comment:  former smoker  No date: Deep venous thrombosis ([facility])      Comment:  unprovoked  No date: Depressive disorder  No date: Disease of thyroid gland  No date: Diverticulitis of colon  No date: Essential hypertension  No date: Gastroesophageal reflux disease  No date: Insomnia  No date: Obesity  No date: Pulmonary embolism ([facility])      Comment:  provoked by surgery    Patient undressed and placed in gown for exam.  Appropriate PPE worn during patient exam. Patient has point tenderness over the proximal ulnar forearm.  There is no overlying erythema swelling, nor ecchymosis noted.  It is worse with dorsiflexion.  Distal pulses strong equal bilaterally 2+.  Cap refill less than 2 seconds.  Patient was given Toradol 30 mg IM.  She has a splint that she was given by hand surgeon that she is encouraged to wear as directed.  She is also encouraged to apply ice every 2 hours while awake.  She has a scheduled follow-up with [facility] that she was encouraged to keep.     Disposition/Treatment: Discussed results with patient, verbalized understanding.  Discussed reasons to return to the ER, patient verbalized understanding.  Agreeable with plan of care.  Patient was stable upon discharge.       Part of this note may be an electronic transcription/translation of spoken language to printed text using the Dragon Dictation System.            Amount and/or Complexity of Data Reviewed  Obtain history from someone other than the patient:  (Inspect performed per this provider yielding Norco quantity 120 on 6/20/2022.  Because of this, patient was not given any narcotic analgesia.  NELSON Hay present at bedside for conversation with patient.  Patient was encouraged to take over-the-counter Tylenol.  Patient was agreeable to this plan.)    Patient Progress  Patient progress: stable      Final diagnoses:   Calcific tendonitis of forearm, right       ED Disposition  ED Disposition     ED Disposition   Discharge    Condition   Stable    Comment   --             Michael Gerardo MD  [address]  [facility] [address] [phone]    Schedule an appointment as soon as possible for a visit       [facility]  [address] [place] [address] [address]  [facility] [address] [phone]  Go to       [facility] EMERGENCY DEPARTMENT  [address] [facility] [address] [facility] [address] [phone]  Go to   If symptoms worsen         Medication List      No changes were made to your prescriptions during this visit.          Yuliya Hernandez, APRN  06/25/22 1300

## 2022-06-25 NOTE — DISCHARGE INSTRUCTIONS
Take your Norco as prescribed.  Apply ice every 2 hours while awake, on for 20 minutes.  Perform exercises per handout.  Make sure you are drinking at least 90 ounces of water daily.  Schedule follow-up with your primary care for recheck.  Keep your scheduled follow-up with [facility].  Return to the ER for new or worsening symptoms.

## 2022-10-03 ENCOUNTER — APPOINTMENT (OUTPATIENT)
Dept: GENERAL RADIOLOGY | Facility: HOSPITAL | Age: 67
End: 2022-10-03

## 2022-10-03 ENCOUNTER — HOSPITAL ENCOUNTER (OUTPATIENT)
Facility: HOSPITAL | Age: 67
Setting detail: OBSERVATION
Discharge: HOME | End: 2022-10-04
Attending: EMERGENCY MEDICINE | Admitting: EMERGENCY MEDICINE

## 2022-10-03 DIAGNOSIS — J44.1 COPD EXACERBATION: Primary | ICD-10-CM

## 2022-10-03 LAB
ALBUMIN SERPL-MCNC: 4.3 G/DL (ref 3.5–5.2)
ALBUMIN/GLOB SERPL: 1.5 G/DL
ALP SERPL-CCNC: 91 U/L (ref 39–117)
ALT SERPL W P-5'-P-CCNC: 9 U/L (ref 1–33)
ANION GAP SERPL CALCULATED.3IONS-SCNC: 11 MMOL/L (ref 5–15)
APTT PPP: 38.4 SECONDS (ref 61–76.5)
AST SERPL-CCNC: 15 U/L (ref 1–32)
BASOPHILS # BLD AUTO: 0.1 10*3/MM3 (ref 0–0.2)
BASOPHILS NFR BLD AUTO: 1 % (ref 0–1.5)
BILIRUB SERPL-MCNC: 0.2 MG/DL (ref 0–1.2)
BUN SERPL-MCNC: 9 MG/DL (ref 8–23)
BUN/CREAT SERPL: 11.4 (ref 7–25)
CALCIUM SPEC-SCNC: 9.6 MG/DL (ref 8.6–10.5)
CHLORIDE SERPL-SCNC: 103 MMOL/L (ref 98–107)
CO2 SERPL-SCNC: 28 MMOL/L (ref 22–29)
CREAT SERPL-MCNC: 0.79 MG/DL (ref 0.57–1)
DEPRECATED RDW RBC AUTO: 43.3 FL (ref 37–54)
EGFRCR SERPLBLD CKD-EPI 2021: 83.1 ML/MIN/1.73
EOSINOPHIL # BLD AUTO: 0.3 10*3/MM3 (ref 0–0.4)
EOSINOPHIL NFR BLD AUTO: 2.5 % (ref 0.3–6.2)
ERYTHROCYTE [DISTWIDTH] IN BLOOD BY AUTOMATED COUNT: 15 % (ref 12.3–15.4)
GLOBULIN UR ELPH-MCNC: 2.9 GM/DL
GLUCOSE SERPL-MCNC: 118 MG/DL (ref 65–99)
HCT VFR BLD AUTO: 37.3 % (ref 34–46.6)
HGB BLD-MCNC: 12.2 G/DL (ref 12–15.9)
INR PPP: 2 (ref 0.93–1.1)
LYMPHOCYTES # BLD AUTO: 2.2 10*3/MM3 (ref 0.7–3.1)
LYMPHOCYTES NFR BLD AUTO: 21.2 % (ref 19.6–45.3)
MCH RBC QN AUTO: 26.8 PG (ref 26.6–33)
MCHC RBC AUTO-ENTMCNC: 32.8 G/DL (ref 31.5–35.7)
MCV RBC AUTO: 81.6 FL (ref 79–97)
MONOCYTES # BLD AUTO: 0.7 10*3/MM3 (ref 0.1–0.9)
MONOCYTES NFR BLD AUTO: 6.6 % (ref 5–12)
NEUTROPHILS NFR BLD AUTO: 68.7 % (ref 42.7–76)
NEUTROPHILS NFR BLD AUTO: 7 10*3/MM3 (ref 1.7–7)
NRBC BLD AUTO-RTO: 0.2 /100 WBC (ref 0–0.2)
NT-PROBNP SERPL-MCNC: 196.3 PG/ML (ref 0–900)
PLATELET # BLD AUTO: 305 10*3/MM3 (ref 140–450)
PMV BLD AUTO: 8 FL (ref 6–12)
POTASSIUM SERPL-SCNC: 3.5 MMOL/L (ref 3.5–5.2)
PROT SERPL-MCNC: 7.2 G/DL (ref 6–8.5)
PROTHROMBIN TIME: 19.8 SECONDS (ref 9.6–11.7)
RBC # BLD AUTO: 4.57 10*6/MM3 (ref 3.77–5.28)
SARS-COV-2 RNA PNL SPEC NAA+PROBE: NOT DETECTED
SODIUM SERPL-SCNC: 142 MMOL/L (ref 136–145)
TROPONIN T SERPL-MCNC: <0.01 NG/ML (ref 0–0.03)
WBC NRBC COR # BLD: 10.3 10*3/MM3 (ref 3.4–10.8)

## 2022-10-03 PROCEDURE — 85025 COMPLETE CBC W/AUTO DIFF WBC: CPT | Performed by: EMERGENCY MEDICINE

## 2022-10-03 PROCEDURE — 80053 COMPREHEN METABOLIC PANEL: CPT | Performed by: EMERGENCY MEDICINE

## 2022-10-03 PROCEDURE — 87635 SARS-COV-2 COVID-19 AMP PRB: CPT | Performed by: EMERGENCY MEDICINE

## 2022-10-03 PROCEDURE — C9803 HOPD COVID-19 SPEC COLLECT: HCPCS

## 2022-10-03 PROCEDURE — 85610 PROTHROMBIN TIME: CPT | Performed by: EMERGENCY MEDICINE

## 2022-10-03 PROCEDURE — G0378 HOSPITAL OBSERVATION PER HR: HCPCS

## 2022-10-03 PROCEDURE — 83880 ASSAY OF NATRIURETIC PEPTIDE: CPT | Performed by: EMERGENCY MEDICINE

## 2022-10-03 PROCEDURE — 71045 X-RAY EXAM CHEST 1 VIEW: CPT

## 2022-10-03 PROCEDURE — 99285 EMERGENCY DEPT VISIT HI MDM: CPT

## 2022-10-03 PROCEDURE — 85730 THROMBOPLASTIN TIME PARTIAL: CPT | Performed by: EMERGENCY MEDICINE

## 2022-10-03 PROCEDURE — 96374 THER/PROPH/DIAG INJ IV PUSH: CPT

## 2022-10-03 PROCEDURE — 25010000002 METHYLPREDNISOLONE PER 125 MG: Performed by: EMERGENCY MEDICINE

## 2022-10-03 PROCEDURE — 84484 ASSAY OF TROPONIN QUANT: CPT | Performed by: EMERGENCY MEDICINE

## 2022-10-03 PROCEDURE — 93005 ELECTROCARDIOGRAM TRACING: CPT | Performed by: EMERGENCY MEDICINE

## 2022-10-03 RX ORDER — METHYLPREDNISOLONE SODIUM SUCCINATE 125 MG/2ML
125 INJECTION, POWDER, LYOPHILIZED, FOR SOLUTION INTRAMUSCULAR; INTRAVENOUS ONCE
Status: COMPLETED | OUTPATIENT
Start: 2022-10-03 | End: 2022-10-03

## 2022-10-03 RX ORDER — ACETAMINOPHEN 325 MG/1
650 TABLET ORAL EVERY 4 HOURS PRN
Status: DISCONTINUED | OUTPATIENT
Start: 2022-10-03 | End: 2022-10-04 | Stop reason: HOSPADM

## 2022-10-03 RX ORDER — NITROGLYCERIN 0.4 MG/1
0.4 TABLET SUBLINGUAL
Status: DISCONTINUED | OUTPATIENT
Start: 2022-10-03 | End: 2022-10-04 | Stop reason: HOSPADM

## 2022-10-03 RX ORDER — IPRATROPIUM BROMIDE AND ALBUTEROL SULFATE 2.5; .5 MG/3ML; MG/3ML
3 SOLUTION RESPIRATORY (INHALATION)
Status: DISCONTINUED | OUTPATIENT
Start: 2022-10-04 | End: 2022-10-04 | Stop reason: HOSPADM

## 2022-10-03 RX ORDER — METHYLPREDNISOLONE SODIUM SUCCINATE 40 MG/ML
40 INJECTION, POWDER, LYOPHILIZED, FOR SOLUTION INTRAMUSCULAR; INTRAVENOUS 2 TIMES DAILY
Status: DISCONTINUED | OUTPATIENT
Start: 2022-10-04 | End: 2022-10-04

## 2022-10-03 RX ORDER — CHOLECALCIFEROL (VITAMIN D3) 125 MCG
5 CAPSULE ORAL NIGHTLY PRN
Status: DISCONTINUED | OUTPATIENT
Start: 2022-10-03 | End: 2022-10-04 | Stop reason: HOSPADM

## 2022-10-03 RX ORDER — SODIUM CHLORIDE 0.9 % (FLUSH) 0.9 %
10 SYRINGE (ML) INJECTION EVERY 12 HOURS SCHEDULED
Status: DISCONTINUED | OUTPATIENT
Start: 2022-10-04 | End: 2022-10-04 | Stop reason: HOSPADM

## 2022-10-03 RX ORDER — SODIUM CHLORIDE 0.9 % (FLUSH) 0.9 %
10 SYRINGE (ML) INJECTION AS NEEDED
Status: DISCONTINUED | OUTPATIENT
Start: 2022-10-03 | End: 2022-10-04 | Stop reason: HOSPADM

## 2022-10-03 RX ORDER — GUAIFENESIN/DEXTROMETHORPHAN 100-10MG/5
10 SYRUP ORAL EVERY 6 HOURS PRN
Status: DISCONTINUED | OUTPATIENT
Start: 2022-10-03 | End: 2022-10-04 | Stop reason: HOSPADM

## 2022-10-03 RX ORDER — FUROSEMIDE 20 MG/1
20 TABLET ORAL 2 TIMES DAILY PRN
COMMUNITY

## 2022-10-03 RX ORDER — ONDANSETRON 2 MG/ML
4 INJECTION INTRAMUSCULAR; INTRAVENOUS EVERY 6 HOURS PRN
Status: DISCONTINUED | OUTPATIENT
Start: 2022-10-03 | End: 2022-10-04 | Stop reason: HOSPADM

## 2022-10-03 RX ORDER — IPRATROPIUM BROMIDE AND ALBUTEROL SULFATE 2.5; .5 MG/3ML; MG/3ML
3 SOLUTION RESPIRATORY (INHALATION) ONCE
Status: DISCONTINUED | OUTPATIENT
Start: 2022-10-03 | End: 2022-10-04 | Stop reason: HOSPADM

## 2022-10-03 RX ORDER — LISINOPRIL 20 MG/1
20 TABLET ORAL ONCE
Status: COMPLETED | OUTPATIENT
Start: 2022-10-03 | End: 2022-10-03

## 2022-10-03 RX ORDER — LISINOPRIL 20 MG/1
20 TABLET ORAL DAILY
COMMUNITY

## 2022-10-03 RX ADMIN — LISINOPRIL 20 MG: 20 TABLET ORAL at 22:00

## 2022-10-03 RX ADMIN — METHYLPREDNISOLONE SODIUM SUCCINATE 125 MG: 125 INJECTION, POWDER, FOR SOLUTION INTRAMUSCULAR; INTRAVENOUS at 19:33

## 2022-10-03 NOTE — ED PROVIDER NOTES
Subjective   History of Present Illness  Chief complaint: Patient is a pleasant 66-year-old female.  History of COPD.  She has a progressive cough over a week.  She has been coughing up yellow mucus.  She has no chest pain or palpitations.  No syncope.  She has chronic edema in her legs.  She has a history of DVT and is on warfarin.  She has no fever.  She is chronically on oxygen for COPD and this feels similar to a flare for her    Context: As above    Duration: 1 week    Timin week progressive    Severity: Breathing is becoming more severe.  Has no pain    Associated Symptoms: Negative except as noted above.        PCP:  LMP:        Review of Systems   Constitutional: Negative for fever.   HENT: Positive for congestion.    Respiratory: Positive for cough.    Cardiovascular: Positive for leg swelling. Negative for chest pain.   Gastrointestinal: Negative for abdominal pain.   Musculoskeletal: Negative.    Skin: Negative.    All other systems reviewed and are negative.      Past Medical History:   Diagnosis Date   • Bladder cancer (HCC)    • Chronic back pain    • Closed nondisplaced fracture of head of right radius 2017   • Congenital deformity of right hip joint 1956   • COPD (chronic obstructive pulmonary disease) (HCC)     former smoker   • Deep venous thrombosis (HCC)     unprovoked   • Depressive disorder    • Disease of thyroid gland    • Diverticulitis of colon    • Essential hypertension    • Gastroesophageal reflux disease    • Insomnia    • Obesity    • Pulmonary embolism (HCC)     provoked by surgery       No Known Allergies    Past Surgical History:   Procedure Laterality Date   • ABDOMINAL SURGERY     • BRONCHOSCOPY N/A 2020    Procedure: BRONCHOSCOPY with bronchial washing;  Surgeon: Win Johnston MD;  Location: [facility] ENDOSCOPY;  Service: Pulmonary;  Laterality: N/A;  Post: mucous plugs   • BRONCHOSCOPY N/A 2022    Procedure: BRONCHOSCOPY with bronchial washing;  Surgeon:  Win Johnston MD;  Location: [facility] ENDOSCOPY;  Service: Pulmonary;  Laterality: N/A;  post op: pneumonia   • CHOLECYSTECTOMY     • COLON SURGERY     • COLONOSCOPY     • COLOSTOMY CLOSURE N/A 2/25/2021    Procedure: COLOSTOMY TAKEDOWN OR CLOSURE, extenisve lysis of adhesions;  Surgeon: Chi Farmer MD;  Location: [facility] MAIN OR;  Service: General;  Laterality: N/A;   • CYSTOSCOPY N/A 2/25/2021    Procedure: CYSTOSCOPY with bladder tumor removal and fulgeration, insertion of 3-way rizzo catheter;  Surgeon: Alfonzo Hayward MD;  Location: [facility] MAIN OR;  Service: Urology;  Laterality: N/A;   • ENDOSCOPY     • EXPLORATORY LAPAROTOMY N/A 7/16/2020    Procedure: LAPAROTOMY EXPLORATORY HARTMANS;  Surgeon: Chi Farmer MD;  Location: [facility] MAIN OR;  Service: General;  Laterality: N/A;   • HYSTERECTOMY     • TOTAL HIP ARTHROPLASTY Right    • TOTAL HIP ARTHROPLASTY REVISION Right     x3       Family History   Problem Relation Age of Onset   • No Known Problems Mother    • No Known Problems Father        Social History     Socioeconomic History   • Marital status:    Tobacco Use   • Smoking status: Former Smoker     Packs/day: 2.00     Years: 40.00     Pack years: 80.00     Types: Cigarettes     Quit date: 2019     Years since quitting: 3.7   • Smokeless tobacco: Never Used   • Tobacco comment: ELECTRONIC   Vaping Use   • Vaping Use: Every day   • Substances: Nicotine, Flavoring   Substance and Sexual Activity   • Alcohol use: Never   • Drug use: Never   • Sexual activity: Defer           Objective   Physical Exam  Vitals and nursing note reviewed.   Constitutional:       Appearance: She is well-developed.   HENT:      Head: Normocephalic and atraumatic.   Eyes:      Extraocular Movements: Extraocular movements intact.   Pulmonary:      Effort: Pulmonary effort is normal. No tachypnea, accessory muscle usage or respiratory distress.      Breath sounds: Wheezing and rhonchi present.    Abdominal:      Palpations: Abdomen is soft.      Tenderness: There is no abdominal tenderness.   Musculoskeletal:      Cervical back: Neck supple.      Right lower leg: No tenderness. Edema present.      Left lower leg: No tenderness. Edema present.   Neurological:      General: No focal deficit present.      Mental Status: She is alert and oriented to person, place, and time.   Psychiatric:         Mood and Affect: Mood normal.         Behavior: Behavior normal.         Procedures           ED Course            Results for orders placed or performed during the hospital encounter of 10/03/22   COVID-19,CEPHEID/NAHUM,COR/PIPO/PAD/ANTONIA IN-HOUSE(OR EMERGENT/ADD-ON),NP SWAB IN TRANSPORT MEDIA 3-4 HR TAT, RT-PCR - Swab, Nasopharynx    Specimen: Nasopharynx; Swab   Result Value Ref Range    COVID19 Not Detected Not Detected - Ref. Range   Comprehensive Metabolic Panel    Specimen: Blood   Result Value Ref Range    Glucose 118 (H) 65 - 99 mg/dL    BUN 9 8 - 23 mg/dL    Creatinine 0.79 0.57 - 1.00 mg/dL    Sodium 142 136 - 145 mmol/L    Potassium 3.5 3.5 - 5.2 mmol/L    Chloride 103 98 - 107 mmol/L    CO2 28.0 22.0 - 29.0 mmol/L    Calcium 9.6 8.6 - 10.5 mg/dL    Total Protein 7.2 6.0 - 8.5 g/dL    Albumin 4.30 3.50 - 5.20 g/dL    ALT (SGPT) 9 1 - 33 U/L    AST (SGOT) 15 1 - 32 U/L    Alkaline Phosphatase 91 39 - 117 U/L    Total Bilirubin 0.2 0.0 - 1.2 mg/dL    Globulin 2.9 gm/dL    A/G Ratio 1.5 g/dL    BUN/Creatinine Ratio 11.4 7.0 - 25.0    Anion Gap 11.0 5.0 - 15.0 mmol/L    eGFR 83.1 >60.0 mL/min/1.73   Protime-INR    Specimen: Blood   Result Value Ref Range    Protime 19.8 (H) 9.6 - 11.7 Seconds    INR 2.00 (H) 0.93 - 1.10   aPTT    Specimen: Blood   Result Value Ref Range    PTT 38.4 (L) 61.0 - 76.5 seconds   Troponin    Specimen: Blood   Result Value Ref Range    Troponin T <0.010 0.000 - 0.030 ng/mL   BNP    Specimen: Blood   Result Value Ref Range    proBNP 196.3 0.0 - 900.0 pg/mL   CBC Auto Differential     Specimen: Blood   Result Value Ref Range    WBC 10.30 3.40 - 10.80 10*3/mm3    RBC 4.57 3.77 - 5.28 10*6/mm3    Hemoglobin 12.2 12.0 - 15.9 g/dL    Hematocrit 37.3 34.0 - 46.6 %    MCV 81.6 79.0 - 97.0 fL    MCH 26.8 26.6 - 33.0 pg    MCHC 32.8 31.5 - 35.7 g/dL    RDW 15.0 12.3 - 15.4 %    RDW-SD 43.3 37.0 - 54.0 fl    MPV 8.0 6.0 - 12.0 fL    Platelets 305 140 - 450 10*3/mm3    Neutrophil % 68.7 42.7 - 76.0 %    Lymphocyte % 21.2 19.6 - 45.3 %    Monocyte % 6.6 5.0 - 12.0 %    Eosinophil % 2.5 0.3 - 6.2 %    Basophil % 1.0 0.0 - 1.5 %    Neutrophils, Absolute 7.00 1.70 - 7.00 10*3/mm3    Lymphocytes, Absolute 2.20 0.70 - 3.10 10*3/mm3    Monocytes, Absolute 0.70 0.10 - 0.90 10*3/mm3    Eosinophils, Absolute 0.30 0.00 - 0.40 10*3/mm3    Basophils, Absolute 0.10 0.00 - 0.20 10*3/mm3    nRBC 0.2 0.0 - 0.2 /100 WBC   ECG 12 Lead   Result Value Ref Range    QT Interval 406 ms       XR Chest 1 View    Result Date: 10/3/2022  No active cardiopulmonary disease.   Electronically Signed By-Mark Donaldson DO On:10/3/2022 8:47 PM This report was finalized on [id number] by  Mark Donaldson DO.       EKG shows sinus rhythm rate of 76                             MDM  Number of Diagnoses or Management Options  Diagnosis management comments: Patient has been persistently hypertensive here.  She states she typically takes 20 mg of lisinopril in the evening and did not get to take that dose today.  I did supplement here.  I did discuss with her COPD history and presentation I would like to place her in the ED observation unit and have pulmonary consult.  She did decide to stay in the observation ED unit at this point.  Will place in the hospital.  No dense infiltrate or fever.  I did not add antibiotics as she is chronically on warfarin and her mucus production is typical of her COPD and she does not have fever.       Amount and/or Complexity of Data Reviewed  Clinical lab tests: reviewed  Tests in the radiology section of CPT®:  reviewed  Tests in the medicine section of CPT®: reviewed  Independent visualization of images, tracings, or specimens: yes    Patient Progress  Patient progress: stable      Final diagnoses:   None     Acute COPD exacerbation  ED Disposition  ED Disposition     None          No follow-up provider specified.       Medication List      No changes were made to your prescriptions during this visit.          Clement Griffith,   10/03/22 2126       Clement Griffith,   10/03/22 2132

## 2022-10-04 VITALS
HEART RATE: 67 BPM | HEIGHT: 63 IN | DIASTOLIC BLOOD PRESSURE: 81 MMHG | WEIGHT: 174.4 LBS | BODY MASS INDEX: 30.9 KG/M2 | TEMPERATURE: 98.8 F | OXYGEN SATURATION: 98 % | SYSTOLIC BLOOD PRESSURE: 132 MMHG | RESPIRATION RATE: 16 BRPM

## 2022-10-04 LAB
ANION GAP SERPL CALCULATED.3IONS-SCNC: 11 MMOL/L (ref 5–15)
ANION GAP SERPL CALCULATED.3IONS-SCNC: 13 MMOL/L (ref 5–15)
BASOPHILS # BLD AUTO: 0 10*3/MM3 (ref 0–0.2)
BASOPHILS NFR BLD AUTO: 0.5 % (ref 0–1.5)
BUN SERPL-MCNC: 11 MG/DL (ref 8–23)
BUN SERPL-MCNC: 11 MG/DL (ref 8–23)
BUN/CREAT SERPL: 12.5 (ref 7–25)
BUN/CREAT SERPL: 13.8 (ref 7–25)
CALCIUM SPEC-SCNC: 9.4 MG/DL (ref 8.6–10.5)
CALCIUM SPEC-SCNC: 9.8 MG/DL (ref 8.6–10.5)
CHLORIDE SERPL-SCNC: 102 MMOL/L (ref 98–107)
CHLORIDE SERPL-SCNC: 103 MMOL/L (ref 98–107)
CO2 SERPL-SCNC: 27 MMOL/L (ref 22–29)
CO2 SERPL-SCNC: 27 MMOL/L (ref 22–29)
CREAT SERPL-MCNC: 0.8 MG/DL (ref 0.57–1)
CREAT SERPL-MCNC: 0.88 MG/DL (ref 0.57–1)
DEPRECATED RDW RBC AUTO: 41.6 FL (ref 37–54)
DEPRECATED RDW RBC AUTO: 44.2 FL (ref 37–54)
EGFRCR SERPLBLD CKD-EPI 2021: 73 ML/MIN/1.73
EGFRCR SERPLBLD CKD-EPI 2021: 81.9 ML/MIN/1.73
EOSINOPHIL # BLD AUTO: 0 10*3/MM3 (ref 0–0.4)
EOSINOPHIL NFR BLD AUTO: 0 % (ref 0.3–6.2)
ERYTHROCYTE [DISTWIDTH] IN BLOOD BY AUTOMATED COUNT: 14.4 % (ref 12.3–15.4)
ERYTHROCYTE [DISTWIDTH] IN BLOOD BY AUTOMATED COUNT: 15 % (ref 12.3–15.4)
GLUCOSE SERPL-MCNC: 163 MG/DL (ref 65–99)
GLUCOSE SERPL-MCNC: 171 MG/DL (ref 65–99)
HCT VFR BLD AUTO: 37.4 % (ref 34–46.6)
HCT VFR BLD AUTO: 38.7 % (ref 34–46.6)
HGB BLD-MCNC: 12.3 G/DL (ref 12–15.9)
HGB BLD-MCNC: 12.5 G/DL (ref 12–15.9)
INR PPP: 2.05 (ref 2–3)
LYMPHOCYTES # BLD AUTO: 1.1 10*3/MM3 (ref 0.7–3.1)
LYMPHOCYTES NFR BLD AUTO: 12.8 % (ref 19.6–45.3)
MCH RBC QN AUTO: 26.6 PG (ref 26.6–33)
MCH RBC QN AUTO: 26.7 PG (ref 26.6–33)
MCHC RBC AUTO-ENTMCNC: 32.4 G/DL (ref 31.5–35.7)
MCHC RBC AUTO-ENTMCNC: 32.7 G/DL (ref 31.5–35.7)
MCV RBC AUTO: 81.4 FL (ref 79–97)
MCV RBC AUTO: 82.1 FL (ref 79–97)
MONOCYTES # BLD AUTO: 0.1 10*3/MM3 (ref 0.1–0.9)
MONOCYTES NFR BLD AUTO: 0.9 % (ref 5–12)
NEUTROPHILS NFR BLD AUTO: 7.2 10*3/MM3 (ref 1.7–7)
NEUTROPHILS NFR BLD AUTO: 85.8 % (ref 42.7–76)
NRBC BLD AUTO-RTO: 0 /100 WBC (ref 0–0.2)
PLATELET # BLD AUTO: 298 10*3/MM3 (ref 140–450)
PLATELET # BLD AUTO: 320 10*3/MM3 (ref 140–450)
PMV BLD AUTO: 7.9 FL (ref 6–12)
PMV BLD AUTO: 8.4 FL (ref 6–12)
POTASSIUM SERPL-SCNC: 3.8 MMOL/L (ref 3.5–5.2)
POTASSIUM SERPL-SCNC: 4.1 MMOL/L (ref 3.5–5.2)
PROTHROMBIN TIME: 20.3 SECONDS (ref 19.4–28.5)
RBC # BLD AUTO: 4.6 10*6/MM3 (ref 3.77–5.28)
RBC # BLD AUTO: 4.71 10*6/MM3 (ref 3.77–5.28)
SODIUM SERPL-SCNC: 141 MMOL/L (ref 136–145)
SODIUM SERPL-SCNC: 142 MMOL/L (ref 136–145)
WBC NRBC COR # BLD: 8.1 10*3/MM3 (ref 3.4–10.8)
WBC NRBC COR # BLD: 8.4 10*3/MM3 (ref 3.4–10.8)

## 2022-10-04 PROCEDURE — 94640 AIRWAY INHALATION TREATMENT: CPT

## 2022-10-04 PROCEDURE — 94799 UNLISTED PULMONARY SVC/PX: CPT

## 2022-10-04 PROCEDURE — 96376 TX/PRO/DX INJ SAME DRUG ADON: CPT

## 2022-10-04 PROCEDURE — G0378 HOSPITAL OBSERVATION PER HR: HCPCS

## 2022-10-04 PROCEDURE — 25010000002 METHYLPREDNISOLONE PER 40 MG: Performed by: EMERGENCY MEDICINE

## 2022-10-04 PROCEDURE — 85025 COMPLETE CBC W/AUTO DIFF WBC: CPT | Performed by: NURSE PRACTITIONER

## 2022-10-04 PROCEDURE — 85610 PROTHROMBIN TIME: CPT | Performed by: NURSE PRACTITIONER

## 2022-10-04 PROCEDURE — 80048 BASIC METABOLIC PNL TOTAL CA: CPT | Performed by: NURSE PRACTITIONER

## 2022-10-04 PROCEDURE — 80048 BASIC METABOLIC PNL TOTAL CA: CPT | Performed by: EMERGENCY MEDICINE

## 2022-10-04 PROCEDURE — 63710000001 PREDNISONE PER 5 MG

## 2022-10-04 PROCEDURE — 85027 COMPLETE CBC AUTOMATED: CPT | Performed by: EMERGENCY MEDICINE

## 2022-10-04 RX ORDER — ZOLPIDEM TARTRATE 5 MG/1
5 TABLET ORAL NIGHTLY
Status: DISCONTINUED | OUTPATIENT
Start: 2022-10-04 | End: 2022-10-04 | Stop reason: HOSPADM

## 2022-10-04 RX ORDER — FUROSEMIDE 20 MG/1
20 TABLET ORAL
Status: DISCONTINUED | OUTPATIENT
Start: 2022-10-04 | End: 2022-10-04 | Stop reason: HOSPADM

## 2022-10-04 RX ORDER — POLYETHYLENE GLYCOL 3350 17 G/17G
17 POWDER, FOR SOLUTION ORAL DAILY PRN
Status: DISCONTINUED | OUTPATIENT
Start: 2022-10-04 | End: 2022-10-04 | Stop reason: HOSPADM

## 2022-10-04 RX ORDER — PREDNISONE 10 MG/1
30 TABLET ORAL DAILY
Qty: 9 TABLET | Refills: 0 | Status: SHIPPED | OUTPATIENT
Start: 2022-10-05 | End: 2023-03-22

## 2022-10-04 RX ORDER — ALBUTEROL SULFATE 0.63 MG/3ML
1 SOLUTION RESPIRATORY (INHALATION) EVERY 6 HOURS PRN
Status: DISCONTINUED | OUTPATIENT
Start: 2022-10-04 | End: 2022-10-04 | Stop reason: HOSPADM

## 2022-10-04 RX ORDER — BUDESONIDE AND FORMOTEROL FUMARATE DIHYDRATE 160; 4.5 UG/1; UG/1
2 AEROSOL RESPIRATORY (INHALATION)
Qty: 10.2 G | Refills: 12 | Status: SHIPPED | OUTPATIENT
Start: 2022-10-04

## 2022-10-04 RX ORDER — ACETAMINOPHEN 325 MG/1
650 TABLET ORAL EVERY 4 HOURS PRN
Status: DISCONTINUED | OUTPATIENT
Start: 2022-10-04 | End: 2022-10-04 | Stop reason: HOSPADM

## 2022-10-04 RX ORDER — IPRATROPIUM BROMIDE AND ALBUTEROL SULFATE 2.5; .5 MG/3ML; MG/3ML
3 SOLUTION RESPIRATORY (INHALATION) EVERY 4 HOURS PRN
Status: DISCONTINUED | OUTPATIENT
Start: 2022-10-04 | End: 2022-10-04 | Stop reason: HOSPADM

## 2022-10-04 RX ORDER — LEVOFLOXACIN 750 MG/1
750 TABLET ORAL EVERY 24 HOURS
Status: DISCONTINUED | OUTPATIENT
Start: 2022-10-04 | End: 2022-10-04 | Stop reason: HOSPADM

## 2022-10-04 RX ORDER — BISACODYL 10 MG
10 SUPPOSITORY, RECTAL RECTAL DAILY PRN
Status: DISCONTINUED | OUTPATIENT
Start: 2022-10-04 | End: 2022-10-04 | Stop reason: HOSPADM

## 2022-10-04 RX ORDER — KETOROLAC TROMETHAMINE 30 MG/ML
30 INJECTION, SOLUTION INTRAMUSCULAR; INTRAVENOUS EVERY 6 HOURS PRN
Status: DISCONTINUED | OUTPATIENT
Start: 2022-10-04 | End: 2022-10-04 | Stop reason: HOSPADM

## 2022-10-04 RX ORDER — HYDROCODONE BITARTRATE AND ACETAMINOPHEN 10; 325 MG/1; MG/1
1 TABLET ORAL EVERY 6 HOURS PRN
Status: DISCONTINUED | OUTPATIENT
Start: 2022-10-04 | End: 2022-10-04 | Stop reason: HOSPADM

## 2022-10-04 RX ORDER — LEVOFLOXACIN 750 MG/1
750 TABLET ORAL EVERY 24 HOURS
Qty: 6 TABLET | Refills: 0 | Status: SHIPPED | OUTPATIENT
Start: 2022-10-05 | End: 2022-10-11

## 2022-10-04 RX ORDER — SODIUM CHLORIDE 0.9 % (FLUSH) 0.9 %
10 SYRINGE (ML) INJECTION EVERY 12 HOURS SCHEDULED
Status: DISCONTINUED | OUTPATIENT
Start: 2022-10-04 | End: 2022-10-04 | Stop reason: HOSPADM

## 2022-10-04 RX ORDER — PREDNISONE 10 MG/1
10 TABLET ORAL DAILY
Qty: 2 TABLET | Refills: 0 | Status: SHIPPED | OUTPATIENT
Start: 2022-10-08 | End: 2022-10-10

## 2022-10-04 RX ORDER — LISINOPRIL 20 MG/1
20 TABLET ORAL DAILY
Status: DISCONTINUED | OUTPATIENT
Start: 2022-10-04 | End: 2022-10-04 | Stop reason: HOSPADM

## 2022-10-04 RX ORDER — WARFARIN SODIUM 3 MG/1
3 TABLET ORAL NIGHTLY
Status: DISCONTINUED | OUTPATIENT
Start: 2022-10-04 | End: 2022-10-04 | Stop reason: HOSPADM

## 2022-10-04 RX ORDER — BISACODYL 5 MG/1
5 TABLET, DELAYED RELEASE ORAL DAILY PRN
Status: DISCONTINUED | OUTPATIENT
Start: 2022-10-04 | End: 2022-10-04 | Stop reason: HOSPADM

## 2022-10-04 RX ORDER — ONDANSETRON 4 MG/1
4 TABLET, FILM COATED ORAL EVERY 6 HOURS PRN
Status: DISCONTINUED | OUTPATIENT
Start: 2022-10-04 | End: 2022-10-04 | Stop reason: HOSPADM

## 2022-10-04 RX ORDER — PREDNISONE 10 MG/1
10 TABLET ORAL DAILY
Status: DISCONTINUED | OUTPATIENT
Start: 2022-10-08 | End: 2022-10-04 | Stop reason: HOSPADM

## 2022-10-04 RX ORDER — PREDNISONE 20 MG/1
20 TABLET ORAL DAILY
Status: DISCONTINUED | OUTPATIENT
Start: 2022-10-06 | End: 2022-10-04 | Stop reason: HOSPADM

## 2022-10-04 RX ORDER — ROFLUMILAST 500 UG/1
500 TABLET ORAL NIGHTLY
Status: DISCONTINUED | OUTPATIENT
Start: 2022-10-04 | End: 2022-10-04 | Stop reason: HOSPADM

## 2022-10-04 RX ORDER — BUDESONIDE AND FORMOTEROL FUMARATE DIHYDRATE 160; 4.5 UG/1; UG/1
2 AEROSOL RESPIRATORY (INHALATION)
Status: DISCONTINUED | OUTPATIENT
Start: 2022-10-04 | End: 2022-10-04 | Stop reason: HOSPADM

## 2022-10-04 RX ORDER — PREDNISONE 20 MG/1
20 TABLET ORAL DAILY
Qty: 2 TABLET | Refills: 0 | Status: SHIPPED | OUTPATIENT
Start: 2022-10-06 | End: 2022-10-08

## 2022-10-04 RX ORDER — SODIUM CHLORIDE 0.9 % (FLUSH) 0.9 %
10 SYRINGE (ML) INJECTION AS NEEDED
Status: DISCONTINUED | OUTPATIENT
Start: 2022-10-04 | End: 2022-10-04 | Stop reason: HOSPADM

## 2022-10-04 RX ORDER — ONDANSETRON 2 MG/ML
4 INJECTION INTRAMUSCULAR; INTRAVENOUS EVERY 6 HOURS PRN
Status: DISCONTINUED | OUTPATIENT
Start: 2022-10-04 | End: 2022-10-04 | Stop reason: HOSPADM

## 2022-10-04 RX ADMIN — IPRATROPIUM BROMIDE AND ALBUTEROL SULFATE 3 ML: .5; 3 SOLUTION RESPIRATORY (INHALATION) at 11:48

## 2022-10-04 RX ADMIN — PREDNISONE 30 MG: 10 TABLET ORAL at 09:43

## 2022-10-04 RX ADMIN — Medication 10 ML: at 06:02

## 2022-10-04 RX ADMIN — HYDROCODONE BITARTRATE AND ACETAMINOPHEN 1 TABLET: 10; 325 TABLET ORAL at 08:15

## 2022-10-04 RX ADMIN — LEVOFLOXACIN 750 MG: 750 TABLET, FILM COATED ORAL at 09:43

## 2022-10-04 RX ADMIN — IPRATROPIUM BROMIDE AND ALBUTEROL SULFATE 3 ML: .5; 3 SOLUTION RESPIRATORY (INHALATION) at 07:32

## 2022-10-04 RX ADMIN — Medication 10 ML: at 08:16

## 2022-10-04 RX ADMIN — BUDESONIDE AND FORMOTEROL FUMARATE DIHYDRATE 2 PUFF: 160; 4.5 AEROSOL RESPIRATORY (INHALATION) at 07:37

## 2022-10-04 RX ADMIN — Medication 10 ML: at 08:15

## 2022-10-04 RX ADMIN — FUROSEMIDE 20 MG: 20 TABLET ORAL at 08:15

## 2022-10-04 RX ADMIN — METHYLPREDNISOLONE SODIUM SUCCINATE 40 MG: 40 INJECTION, POWDER, FOR SOLUTION INTRAMUSCULAR; INTRAVENOUS at 06:02

## 2022-10-04 RX ADMIN — LISINOPRIL 20 MG: 20 TABLET ORAL at 08:15

## 2022-10-04 NOTE — CONSULTS
Group: [facility]         CONSULT NOTE    Patient Identification:  Renuka Pelayo  66 y.o.  female  1955  [id number]            Requesting physician: Attending physician    Reason for Consultation: COPD      History of Present Illness:    Renuka Pelayo is a 65-year-old female who presents to [facility] ED on 10/3/2022 with complaints of epigastric abdominal pain that has worsened over the past several days.  She reports this is a chronic issue but became significantly worse which prompted her to report to ED for further evaluation.  She denies nausea or vomiting but does report diarrhea.  She had a colon resection February 2021 for diverticulitis.  At that time she was found to have bladder tumor that was no malignant and resected at that time and reports being cancer free.  She has a past medical history of COPD, pulmonary embolism, chronic right lower extremity DVT, hypothyroidism, hypertension, GERD.    Assessment:     acute COPD exacerbation   Acute bronchitis   Chronic obstructive pulmonary disease    4/20/2022 bronchoscopy Streptococcus pneumonia  2020 bronchoscopy history of Pseudomonas    Chronic right lower extremity DVT  History of pulmonary embolism    History of bladder cancer  Hypothyroidism  Hypertension  GERD  Diverticulitis    Recommendations:    Due to history of Pseudomonas pneumonia as well as streptococcal pneumonia within the last 12 months both are sensitive to quinolones, will initiate Levaquin    Titrate oxygen, currently requiring 2 L per NC  -Wears 2-3 L at home    Lasix 20 mg twice daily  Wean steroids, start prednisone 6-day taper  Symbicort and DuoNeb  Daliresp  Anticoagulation Coumadin, chronic right lower extremity DVT     if patient get discharged today we will see her in the office 2-3 weeks        Review of Sytems:  Review of Systems   Respiratory: Positive for cough and shortness of breath.        Past Medical History:  Past Medical History:   Diagnosis  Date   • Bladder cancer (HCC)    • Chronic back pain    • Closed nondisplaced fracture of head of right radius 03/2017   • Congenital deformity of right hip joint 1955   • COPD (chronic obstructive pulmonary disease) (HCC)     former smoker   • Deep venous thrombosis (HCC)     unprovoked   • Depressive disorder    • Disease of thyroid gland    • Diverticulitis of colon    • Essential hypertension    • Gastroesophageal reflux disease    • Insomnia    • Obesity    • Pulmonary embolism (HCC)     provoked by surgery       Past Surgical History:  Past Surgical History:   Procedure Laterality Date   • ABDOMINAL SURGERY     • BRONCHOSCOPY N/A 11/25/2020    Procedure: BRONCHOSCOPY with bronchial washing;  Surgeon: Win Johnston MD;  Location: [facility] ENDOSCOPY;  Service: Pulmonary;  Laterality: N/A;  Post: mucous plugs   • BRONCHOSCOPY N/A 4/25/2022    Procedure: BRONCHOSCOPY with bronchial washing;  Surgeon: Win Johnston MD;  Location: [facility] ENDOSCOPY;  Service: Pulmonary;  Laterality: N/A;  post op: pneumonia   • CHOLECYSTECTOMY     • COLON SURGERY     • COLONOSCOPY     • COLOSTOMY CLOSURE N/A 2/25/2021    Procedure: COLOSTOMY TAKEDOWN OR CLOSURE, extenisve lysis of adhesions;  Surgeon: Chi Farmer MD;  Location: [facility] MAIN OR;  Service: General;  Laterality: N/A;   • CYSTOSCOPY N/A 2/25/2021    Procedure: CYSTOSCOPY with bladder tumor removal and fulgeration, insertion of 3-way rizzo catheter;  Surgeon: Alfonzo Hayward MD;  Location: [facility] MAIN OR;  Service: Urology;  Laterality: N/A;   • ENDOSCOPY     • EXPLORATORY LAPAROTOMY N/A 7/16/2020    Procedure: LAPAROTOMY EXPLORATORY HARTMANS;  Surgeon: Chi Farmer MD;  Location: [facility] MAIN OR;  Service: General;  Laterality: N/A;   • HYSTERECTOMY     • TOTAL HIP ARTHROPLASTY Right    • TOTAL HIP ARTHROPLASTY REVISION Right     x3        Home Meds:  Medications Prior to Admission   Medication Sig Dispense Refill Last Dose   •  Budeson-Glycopyrrol-Formoterol (Breztri Aerosphere) 160-9-4.8 MCG/ACT aerosol inhaler Inhale 1 puff 2 (Two) Times a Day.   10/2/2022 at Unknown time   • furosemide (LASIX) 20 MG tablet Take 20 mg by mouth 2 (Two) Times a Day As Needed.   Past Week at Unknown time   • HYDROcodone-acetaminophen (NORCO)  MG per tablet Take 1 tablet by mouth Every 6 (Six) Hours As Needed for Moderate Pain .   10/2/2022 at Unknown time   • ipratropium-albuterol (DUO-NEB) 0.5-2.5 mg/3 ml nebulizer Take 3 mL by nebulization Every 4 (Four) Hours As Needed for Wheezing. Dispense 1box 3 mL 0 10/3/2022 at Unknown time   • lisinopril (PRINIVIL,ZESTRIL) 20 MG tablet Take 20 mg by mouth Daily.   10/3/2022 at Unknown time   • roflumilast (DALIRESP) 500 MCG tablet tablet Take 500 mcg by mouth Every Night.   10/2/2022 at Unknown time   • warfarin (COUMADIN) 3 MG tablet Take 3 mg by mouth Every Night.   10/2/2022 at Unknown time   • zolpidem (AMBIEN) 5 MG tablet Take 5 mg by mouth Every Night.   10/2/2022 at Unknown time   • albuterol (ACCUNEB) 0.63 MG/3ML nebulizer solution Take 1 ampule by nebulization Every 6 (Six) Hours As Needed for Wheezing. Use am of surgery      • predniSONE (DELTASONE) 20 MG tablet take 2 tablets by mouth daily for 6 days, then take 1 tablet daily for 6 days and then take 0.5 tablet daily for 6 days. 21 tablet 0        Allergies:  No Known Allergies    Social History:   Social History     Socioeconomic History   • Marital status:    Tobacco Use   • Smoking status: Former Smoker     Packs/day: 2.00     Years: 40.00     Pack years: 80.00     Types: Cigarettes     Quit date: 2019     Years since quitting: 3.7   • Smokeless tobacco: Never Used   • Tobacco comment: ELECTRONIC   Vaping Use   • Vaping Use: Every day   • Substances: Nicotine, Flavoring   Substance and Sexual Activity   • Alcohol use: Never   • Drug use: Never   • Sexual activity: Defer       Family History:  Family History   Problem Relation Age of  "Onset   • No Known Problems Mother    • No Known Problems Father        Physical Exam:  /68 (BP Location: Left arm, Patient Position: Lying)   Pulse 63   Temp 98.3 °F (36.8 °C)   Resp 16   Ht 160 cm (63\")   Wt 79.1 kg (174 lb 6.4 oz)   SpO2 98%   BMI 30.89 kg/m²  Body mass index is 30.89 kg/m². 98% 79.1 kg (174 lb 6.4 oz)  Physical Exam    LABS:  Lab Results   Component Value Date    CALCIUM 9.4 10/04/2022     Results from last 7 days   Lab Units 10/04/22  0513 10/03/22  1916   SODIUM mmol/L 141 142   POTASSIUM mmol/L 3.8 3.5   CHLORIDE mmol/L 103 103   CO2 mmol/L 27.0 28.0   BUN mg/dL 11 9   CREATININE mg/dL 0.88 0.79   GLUCOSE mg/dL 171* 118*   CALCIUM mg/dL 9.4 9.6   WBC 10*3/mm3 8.10 10.30   HEMOGLOBIN g/dL 12.3 12.2   PLATELETS 10*3/mm3 320 305   ALT (SGPT) U/L  --  9   AST (SGOT) U/L  --  15   PROBNP pg/mL  --  196.3     Lab Results   Component Value Date    TROPONINT <0.010 10/03/2022     Results from last 7 days   Lab Units 10/03/22  1916   TROPONIN T ng/mL <0.010                     Results from last 7 days   Lab Units 10/03/22  1916   INR  2.00*         Lab Results   Component Value Date    TSH 9.140 (H) 11/02/2021     Estimated Creatinine Clearance: 63.5 mL/min (by C-G formula based on SCr of 0.88 mg/dL).         Imaging:  Imaging Results (Last 24 Hours)     Procedure Component Value Units Date/Time    XR Chest 1 View [[id number]] Collected: 10/03/22 2046     Updated: 10/03/22 2050    Narrative:      XR CHEST 1 VW-     Date of Exam: 10/3/2022 7:18 PM     Indication: soa  66-year-old COPD, hypertension, history of abdominal  aortic aneurysm     Comparison: 05/15/2022, 03/22/2022 05/04/2021     Technique: 1 view(s) of the chest were obtained.     FINDINGS: The lungs are well expanded. Cardiac, hilar and  mediastinal silhouettes are stable. No pneumothorax, pleural effusion or  focal pulmonary parenchymal opacity. Pulmonary vascularity is within  normal limits. The trachea is midline. Visualized " bony structures are  intact.       Impression:      No active cardiopulmonary disease.        Electronically Signed By-Mark Donaldson DO On:10/3/2022 8:47 PM  This report was finalized on [id number] by  Mark Donaldson DO.            Current Meds:   SCHEDULE  budesonide-formoterol, 2 puff, Inhalation, BID - RT  furosemide, 20 mg, Oral, BID  ipratropium-albuterol, 3 mL, Nebulization, Once  ipratropium-albuterol, 3 mL, Nebulization, Q6H - RT  lisinopril, 20 mg, Oral, Daily  methylPREDNISolone sodium succinate, 40 mg, Intravenous, BID  roflumilast, 500 mcg, Oral, Nightly  sodium chloride, 10 mL, Intravenous, Q12H  sodium chloride, 10 mL, Intravenous, Q12H  warfarin, 3 mg, Oral, Nightly  zolpidem, 5 mg, Oral, Nightly      Infusions     PRNs  •  acetaminophen  •  acetaminophen  •  albuterol  •  polyethylene glycol **AND** bisacodyl **AND** bisacodyl  •  guaiFENesin-dextromethorphan  •  HYDROcodone-acetaminophen  •  influenza vaccine  •  ipratropium-albuterol  •  melatonin  •  nitroglycerin  •  ondansetron  •  ondansetron **OR** ondansetron  •  [COMPLETED] Insert peripheral IV **AND** sodium chloride  •  sodium chloride  •  sodium chloride        Willie Hilario MD  10/4/2022  07:14 EDT      Much of this encounter note is an electronic transcription/translation of spoken language to printed text using Dragon Software.

## 2022-10-04 NOTE — DISCHARGE SUMMARY
[facility]    Michael Gerardo MD    CHIEF COMPLAINT:     Dyspnea    HISTORY OF PRESENT ILLNESS:    [facility]    ED 10/3/22: Patient is a pleasant 66-year-old female.  History of COPD.  She has a progressive cough over a week.  She has been coughing up yellow mucus.  She has no chest pain or palpitations.  No syncope.  She has chronic edema in her legs.  She has a history of DVT and is on warfarin.  She has no fever.  She is chronically on oxygen for COPD and this feels similar to a flare for her      Past Medical History:   Diagnosis Date   • Bladder cancer (HCC)    • Chronic back pain    • Closed nondisplaced fracture of head of right radius 03/2017   • Congenital deformity of right hip joint 1956   • COPD (chronic obstructive pulmonary disease) (HCC)     former smoker   • Deep venous thrombosis (HCC)     unprovoked   • Depressive disorder    • Disease of thyroid gland    • Diverticulitis of colon    • Essential hypertension    • Gastroesophageal reflux disease    • Insomnia    • Obesity    • Pulmonary embolism (HCC)     provoked by surgery     Past Surgical History:   Procedure Laterality Date   • ABDOMINAL SURGERY     • BRONCHOSCOPY N/A 11/25/2020    Procedure: BRONCHOSCOPY with bronchial washing;  Surgeon: Win Johnston MD;  Location: [facility] ENDOSCOPY;  Service: Pulmonary;  Laterality: N/A;  Post: mucous plugs   • BRONCHOSCOPY N/A 4/25/2022    Procedure: BRONCHOSCOPY with bronchial washing;  Surgeon: Win Johnston MD;  Location: [facility] ENDOSCOPY;  Service: Pulmonary;  Laterality: N/A;  post op: pneumonia   • CHOLECYSTECTOMY     • COLON SURGERY     • COLONOSCOPY     • COLOSTOMY CLOSURE N/A 2/25/2021    Procedure: COLOSTOMY TAKEDOWN OR CLOSURE, extenisve lysis of adhesions;  Surgeon: Chi Farmer MD;  Location: [facility] MAIN OR;  Service: General;  Laterality: N/A;   • CYSTOSCOPY N/A 2/25/2021    Procedure: CYSTOSCOPY with bladder tumor removal and fulgeration, insertion  of 3-way rizzo catheter;  Surgeon: Alfonzo Hayward MD;  Location: [facility] MAIN OR;  Service: Urology;  Laterality: N/A;   • ENDOSCOPY     • EXPLORATORY LAPAROTOMY N/A 7/16/2020    Procedure: LAPAROTOMY EXPLORATORY HARTMANS;  Surgeon: Chi Farmer MD;  Location: [facility] MAIN OR;  Service: General;  Laterality: N/A;   • HYSTERECTOMY     • TOTAL HIP ARTHROPLASTY Right    • TOTAL HIP ARTHROPLASTY REVISION Right     x3     Family History   Problem Relation Age of Onset   • No Known Problems Mother    • No Known Problems Father      Social History     Tobacco Use   • Smoking status: Former Smoker     Packs/day: 2.00     Years: 40.00     Pack years: 80.00     Types: Cigarettes     Quit date: 2019     Years since quitting: 3.7   • Smokeless tobacco: Never Used   • Tobacco comment: ELECTRONIC   Vaping Use   • Vaping Use: Every day   • Substances: Nicotine, Flavoring   Substance Use Topics   • Alcohol use: Never   • Drug use: Never     Medications Prior to Admission   Medication Sig Dispense Refill Last Dose   • Budeson-Glycopyrrol-Formoterol (Breztri Aerosphere) 160-9-4.8 MCG/ACT aerosol inhaler Inhale 1 puff 2 (Two) Times a Day.   10/2/2022 at Unknown time   • furosemide (LASIX) 20 MG tablet Take 20 mg by mouth 2 (Two) Times a Day As Needed.   Past Week at Unknown time   • HYDROcodone-acetaminophen (NORCO)  MG per tablet Take 1 tablet by mouth Every 6 (Six) Hours As Needed for Moderate Pain .   10/2/2022 at Unknown time   • ipratropium-albuterol (DUO-NEB) 0.5-2.5 mg/3 ml nebulizer Take 3 mL by nebulization Every 4 (Four) Hours As Needed for Wheezing. Dispense 1box 3 mL 0 10/3/2022 at Unknown time   • lisinopril (PRINIVIL,ZESTRIL) 20 MG tablet Take 20 mg by mouth Daily.   10/3/2022 at Unknown time   • roflumilast (DALIRESP) 500 MCG tablet tablet Take 500 mcg by mouth Every Night.   10/2/2022 at Unknown time   • warfarin (COUMADIN) 3 MG tablet Take 3 mg by mouth Every Night.   10/2/2022 at Unknown time   •  zolpidem (AMBIEN) 5 MG tablet Take 5 mg by mouth Every Night.   10/2/2022 at Unknown time   • albuterol (ACCUNEB) 0.63 MG/3ML nebulizer solution Take 1 ampule by nebulization Every 6 (Six) Hours As Needed for Wheezing. Use am of surgery      • predniSONE (DELTASONE) 20 MG tablet take 2 tablets by mouth daily for 6 days, then take 1 tablet daily for 6 days and then take 0.5 tablet daily for 6 days. 21 tablet 0      Allergies:  Patient has no known allergies.    Immunization History   Administered Date(s) Administered   • Flu Vaccine Split Quad 10/26/2021   • Influenza, Unspecified 09/26/2018, 12/03/2020   • flucelvax quad pfs =>4 YRS 10/26/2021           REVIEW OF SYSTEMS:    Review of Systems   Constitutional: Positive for malaise/fatigue.   HENT: Negative.    Eyes: Negative.    Cardiovascular: Positive for dyspnea on exertion.   Respiratory: Positive for cough, shortness of breath and sputum production.    Endocrine: Negative.    Hematologic/Lymphatic: Negative.    Skin: Negative.    Musculoskeletal: Negative.    Gastrointestinal: Negative.    Genitourinary: Negative.    Neurological: Negative.    Psychiatric/Behavioral: Negative.    Allergic/Immunologic: Negative.        Vital Signs  Temp:  [97.8 °F (36.6 °C)-98.8 °F (37.1 °C)] 98.8 °F (37.1 °C)  Heart Rate:  [55-93] 67  Resp:  [16-18] 16  BP: (123-207)/() 132/81          Physical Exam:  Physical Exam  Vitals and nursing note reviewed.   Constitutional:       Appearance: Normal appearance.   HENT:      Head: Normocephalic and atraumatic.      Right Ear: External ear normal.      Left Ear: External ear normal.      Nose: Nose normal.      Mouth/Throat:      Mouth: Mucous membranes are moist.      Pharynx: Oropharynx is clear.   Eyes:      Extraocular Movements: Extraocular movements intact.      Conjunctiva/sclera: Conjunctivae normal.      Pupils: Pupils are equal, round, and reactive to light.   Cardiovascular:      Rate and Rhythm: Normal rate and regular  rhythm.      Pulses: Normal pulses.      Heart sounds: Normal heart sounds.   Pulmonary:      Breath sounds: Wheezing present.   Abdominal:      General: Bowel sounds are normal.   Musculoskeletal:         General: Normal range of motion.      Cervical back: Normal range of motion.   Skin:     General: Skin is warm.      Capillary Refill: Capillary refill takes less than 2 seconds.   Neurological:      General: No focal deficit present.      Mental Status: She is alert and oriented to person, place, and time. Mental status is at baseline.   Psychiatric:         Mood and Affect: Mood normal.         Behavior: Behavior normal.         Thought Content: Thought content normal.         Judgment: Judgment normal.         Emotional Behavior:    WNL   Debilities:   none  Results Review:    I reviewed the patient's new clinical results.  Lab Results (most recent)     Procedure Component Value Units Date/Time    Basic Metabolic Panel [[id number]]  (Abnormal) Collected: 10/04/22 0820    Specimen: Blood Updated: 10/04/22 0912     Glucose 163 mg/dL      BUN 11 mg/dL      Creatinine 0.80 mg/dL      Sodium 142 mmol/L      Potassium 4.1 mmol/L      Chloride 102 mmol/L      CO2 27.0 mmol/L      Calcium 9.8 mg/dL      BUN/Creatinine Ratio 13.8     Anion Gap 13.0 mmol/L      eGFR 81.9 mL/min/1.73      Comment: National Kidney Foundation and American Society of Nephrology (ASN) Task Force recommended calculation based on the Chronic Kidney Disease Epidemiology Collaboration (CKD-EPI) equation refit without adjustment for race.       Narrative:      GFR Normal >60  Chronic Kidney Disease <60  Kidney Failure <15      Protime-INR [[id number]]  (Normal) Collected: 10/04/22 0820    Specimen: Blood Updated: 10/04/22 0908     Protime 20.3 Seconds      INR 2.05    CBC & Differential [[id number]]  (Abnormal) Collected: 10/04/22 0820    Specimen: Blood Updated: 10/04/22 0901    Narrative:      The following orders were created for panel order CBC &  Differential.  Procedure                               Abnormality         Status                     ---------                               -----------         ------                     CBC Auto Differential[[id number]]        Abnormal            Final result                 Please view results for these tests on the individual orders.    CBC Auto Differential [[id number]]  (Abnormal) Collected: 10/04/22 0820    Specimen: Blood Updated: 10/04/22 0901     WBC 8.40 10*3/mm3      RBC 4.71 10*6/mm3      Hemoglobin 12.5 g/dL      Hematocrit 38.7 %      MCV 82.1 fL      MCH 26.6 pg      MCHC 32.4 g/dL      RDW 15.0 %      RDW-SD 44.2 fl      MPV 8.4 fL      Platelets 298 10*3/mm3      Neutrophil % 85.8 %      Lymphocyte % 12.8 %      Monocyte % 0.9 %      Eosinophil % 0.0 %      Basophil % 0.5 %      Neutrophils, Absolute 7.20 10*3/mm3      Lymphocytes, Absolute 1.10 10*3/mm3      Monocytes, Absolute 0.10 10*3/mm3      Eosinophils, Absolute 0.00 10*3/mm3      Basophils, Absolute 0.00 10*3/mm3      nRBC 0.0 /100 WBC     Basic Metabolic Panel [[id number]]  (Abnormal) Collected: 10/04/22 0513    Specimen: Blood Updated: 10/04/22 0610     Glucose 171 mg/dL      BUN 11 mg/dL      Creatinine 0.88 mg/dL      Sodium 141 mmol/L      Potassium 3.8 mmol/L      Chloride 103 mmol/L      CO2 27.0 mmol/L      Calcium 9.4 mg/dL      BUN/Creatinine Ratio 12.5     Anion Gap 11.0 mmol/L      eGFR 73.0 mL/min/1.73      Comment: National Kidney Foundation and American Society of Nephrology (ASN) Task Force recommended calculation based on the Chronic Kidney Disease Epidemiology Collaboration (CKD-EPI) equation refit without adjustment for race.       Narrative:      GFR Normal >60  Chronic Kidney Disease <60  Kidney Failure <15      CBC (No Diff) [[id number]]  (Normal) Collected: 10/04/22 0513    Specimen: Blood Updated: 10/04/22 0539     WBC 8.10 10*3/mm3      RBC 4.60 10*6/mm3      Hemoglobin 12.3 g/dL      Hematocrit 37.4 %      MCV 81.4 fL       MCH 26.7 pg      MCHC 32.7 g/dL      RDW 14.4 %      RDW-SD 41.6 fl      MPV 7.9 fL      Platelets 320 10*3/mm3     Protime-INR [[id number]]  (Abnormal) Collected: 10/03/22 1916    Specimen: Blood Updated: 10/03/22 1947     Protime 19.8 Seconds      INR 2.00    aPTT [[id number]]  (Abnormal) Collected: 10/03/22 1916    Specimen: Blood Updated: 10/03/22 1946     PTT 38.4 seconds     Comprehensive Metabolic Panel [[id number]]  (Abnormal) Collected: 10/03/22 1916    Specimen: Blood Updated: 10/03/22 1943     Glucose 118 mg/dL      BUN 9 mg/dL      Creatinine 0.79 mg/dL      Sodium 142 mmol/L      Potassium 3.5 mmol/L      Chloride 103 mmol/L      CO2 28.0 mmol/L      Calcium 9.6 mg/dL      Total Protein 7.2 g/dL      Albumin 4.30 g/dL      ALT (SGPT) 9 U/L      AST (SGOT) 15 U/L      Alkaline Phosphatase 91 U/L      Total Bilirubin 0.2 mg/dL      Globulin 2.9 gm/dL      A/G Ratio 1.5 g/dL      BUN/Creatinine Ratio 11.4     Anion Gap 11.0 mmol/L      eGFR 83.1 mL/min/1.73      Comment: National Kidney Foundation and American Society of Nephrology (ASN) Task Force recommended calculation based on the Chronic Kidney Disease Epidemiology Collaboration (CKD-EPI) equation refit without adjustment for race.       Narrative:      GFR Normal >60  Chronic Kidney Disease <60  Kidney Failure <15      Troponin [[id number]]  (Normal) Collected: 10/03/22 1916    Specimen: Blood Updated: 10/03/22 1943     Troponin T <0.010 ng/mL     Narrative:      Troponin T Reference Range:  <= 0.03 ng/mL-   Negative for AMI  >0.03 ng/mL-     Abnormal for myocardial necrosis.  Clinicians would have to utilize clinical acumen, EKG, Troponin and serial changes to determine if it is an Acute Myocardial Infarction or myocardial injury due to an underlying chronic condition.       Results may be falsely decreased if patient taking Biotin.      COVID-19,CEPHEID/NAHUM,COR/PIPO/PAD/ANTONIA IN-HOUSE(OR EMERGENT/ADD-ON),NP SWAB IN TRANSPORT MEDIA 3-4 HR TAT, RT-PCR  - Swab, Nasopharynx [[id number]]  (Normal) Collected: 10/03/22 1918    Specimen: Swab from Nasopharynx Updated: 10/03/22 1942     COVID19 Not Detected    Narrative:      Fact sheet for providers: https://www.fda.gov/media/660640/download     Fact sheet for patients: https://www.fda.gov/media/123502/download  Fact sheet for providers: https://www.fda.gov/media/322295/download    Fact sheet for patients: https://www.fda.gov/media/185611/download    Test performed by PCR.    BNP [[id number]]  (Normal) Collected: 10/03/22 1916    Specimen: Blood Updated: 10/03/22 1941     proBNP 196.3 pg/mL     Narrative:      Among patients with dyspnea, NT-proBNP is highly sensitive for the detection of acute congestive heart failure. In addition NT-proBNP of <300 pg/ml effectively rules out acute congestive heart failure with 99% negative predictive value.    Results may be falsely decreased if patient taking Biotin.      CBC & Differential [[id number]]  (Normal) Collected: 10/03/22 1916    Specimen: Blood Updated: 10/03/22 1929    Narrative:      The following orders were created for panel order CBC & Differential.  Procedure                               Abnormality         Status                     ---------                               -----------         ------                     CBC Auto Differential[[id number]]        Normal              Final result                 Please view results for these tests on the individual orders.    CBC Auto Differential [[id number]]  (Normal) Collected: 10/03/22 1916    Specimen: Blood Updated: 10/03/22 1929     WBC 10.30 10*3/mm3      RBC 4.57 10*6/mm3      Hemoglobin 12.2 g/dL      Hematocrit 37.3 %      MCV 81.6 fL      MCH 26.8 pg      MCHC 32.8 g/dL      RDW 15.0 %      RDW-SD 43.3 fl      MPV 8.0 fL      Platelets 305 10*3/mm3      Neutrophil % 68.7 %      Lymphocyte % 21.2 %      Monocyte % 6.6 %      Eosinophil % 2.5 %      Basophil % 1.0 %      Neutrophils, Absolute 7.00 10*3/mm3       Lymphocytes, Absolute 2.20 10*3/mm3      Monocytes, Absolute 0.70 10*3/mm3      Eosinophils, Absolute 0.30 10*3/mm3      Basophils, Absolute 0.10 10*3/mm3      nRBC 0.2 /100 WBC           Imaging Results (Most Recent)     Procedure Component Value Units Date/Time    XR Chest 1 View [[id number]] Collected: 10/03/22 2046     Updated: 10/03/22 2050    Narrative:      XR CHEST 1 VW-     Date of Exam: 10/3/2022 7:18 PM     Indication: soa  66-year-old COPD, hypertension, history of abdominal  aortic aneurysm     Comparison: 05/15/2022, 03/22/2022 05/04/2021     Technique: 1 view(s) of the chest were obtained.     FINDINGS: The lungs are well expanded. Cardiac, hilar and  mediastinal silhouettes are stable. No pneumothorax, pleural effusion or  focal pulmonary parenchymal opacity. Pulmonary vascularity is within  normal limits. The trachea is midline. Visualized bony structures are  intact.       Impression:      No active cardiopulmonary disease.        Electronically Signed By-Mark Donaldson DO On:10/3/2022 8:47 PM  This report was finalized on [id number] by  Mark Donaldson DO.        reviewed    ECG/EMG Results (most recent)     Procedure Component Value Units Date/Time    ECG 12 Lead [[id number]] Collected: 10/03/22 1832     Updated: 10/03/22 1833     QT Interval 406 ms     Narrative:      HEART RATE= 76  bpm  RR Interval= 792  ms  DE Interval= 188  ms  P Horizontal Axis= -30  deg  P Front Axis= 59  deg  QRSD Interval= 104  ms  QT Interval= 406  ms  QRS Axis= 74  deg  T Wave Axis= 58  deg  - NORMAL ECG -  Sinus rhythm  When compared with ECG of 22-Mar-2022 14:37:29,  No significant change  Electronically Signed By:   Date and Time of Study: 2022-10-03 18:32:17    SCANNED - TELEMETRY   [[id number]] Resulted: 10/03/22     Updated: 10/04/22 1103        reviewed    Results for orders placed during the hospital encounter of 09/05/19    Duplex Venous Lower Extremity - Bilateral    Interpretation Summary  · Chronic right  lower extremity deep vein thrombosis noted in the proximal femoral, mid femoral, distal femoral and popliteal.  · Acute right lower extremity deep vein thrombosis noted in the peroneal.  · All other veins appeared normal bilaterally.          Microbiology Results (last 10 days)     Procedure Component Value - Date/Time    COVID-19,CEPHEID/NAHUM,COR/PIPO/PAD/ANTONIA IN-HOUSE(OR EMERGENT/ADD-ON),NP SWAB IN TRANSPORT MEDIA 3-4 HR TAT, RT-PCR - Swab, Nasopharynx [[id number]]  (Normal) Collected: 10/03/22 1918    Lab Status: Final result Specimen: Swab from Nasopharynx Updated: 10/03/22 1942     COVID19 Not Detected    Narrative:      Fact sheet for providers: https://www.fda.gov/media/181204/download     Fact sheet for patients: https://www.fda.gov/media/367825/download  Fact sheet for providers: https://www.fda.gov/media/950152/download    Fact sheet for patients: https://www.[employer].gov/media/130528/download    Test performed by PCR.          Assessment & Plan     COPD exacerbation (HCC)    - CXR shows no acute cardiopulmonary process  -WBC 8.4, trend  - .3  -Respiratory panel negative  -Monitor troponin, last trend 0.010  - EKG showed normal sinus rhythm without ST elevation  -Continue Lasix and Daliresp  - Levaquin p.o.  -Continue prednisone  - 40 mg prednisone PO q day recommended currently  - Duoneb  - Albuterol  - Continue home inhalers  -Pulmonology consult    History of DVT  -Continue Coumadin  -Pharmacy to dose  -INR 2.05    I discussed the patients findings and my recommendations with patient.     Discharge Diagnosis:      COPD exacerbation (HCC)      Hospital Course  Patient is a 66 y.o. female presented with dyspnea.  Patient states she has had worsening cough and shortness of breath of the past few days.  Patient reports history of COPD and wears 2 L oxygen at home as well.  Patient reports DVT in 2013 and on Coumadin and taking daily.  Patient denies chest pain, edema, diaphoresis, syncope, abdominal pain, or  fever.  Patient evaluated by pulmonology and patient evaluated to go home with steroids, antibiotics, and to continue breathing treatments.  Patient to follow-up with pulmonology in 2 weeks.  Patient to follow-up with primary care in 1 week for continued care management.  Testing recommendations reviewed with patient and she agrees with treatment plan.  If symptoms worsen patient to call 911 or go to nearest ED.    Past Medical History:     Past Medical History:   Diagnosis Date   • Bladder cancer (HCC)    • Chronic back pain    • Closed nondisplaced fracture of head of right radius 03/2017   • Congenital deformity of right hip joint 1956   • COPD (chronic obstructive pulmonary disease) (HCC)     former smoker   • Deep venous thrombosis (HCC)     unprovoked   • Depressive disorder    • Disease of thyroid gland    • Diverticulitis of colon    • Essential hypertension    • Gastroesophageal reflux disease    • Insomnia    • Obesity    • Pulmonary embolism (HCC)     provoked by surgery       Past Surgical History:     Past Surgical History:   Procedure Laterality Date   • ABDOMINAL SURGERY     • BRONCHOSCOPY N/A 11/25/2020    Procedure: BRONCHOSCOPY with bronchial washing;  Surgeon: Win Johnston MD;  Location: [facility] ENDOSCOPY;  Service: Pulmonary;  Laterality: N/A;  Post: mucous plugs   • BRONCHOSCOPY N/A 4/25/2022    Procedure: BRONCHOSCOPY with bronchial washing;  Surgeon: Win Johnston MD;  Location: [facility] ENDOSCOPY;  Service: Pulmonary;  Laterality: N/A;  post op: pneumonia   • CHOLECYSTECTOMY     • COLON SURGERY     • COLONOSCOPY     • COLOSTOMY CLOSURE N/A 2/25/2021    Procedure: COLOSTOMY TAKEDOWN OR CLOSURE, extenisve lysis of adhesions;  Surgeon: Chi Farmer MD;  Location: [facility] MAIN OR;  Service: General;  Laterality: N/A;   • CYSTOSCOPY N/A 2/25/2021    Procedure: CYSTOSCOPY with bladder tumor removal and fulgeration, insertion of 3-way rizzo catheter;  Surgeon: Alfonzo Hayward,  MD;  Location: [facility] MAIN OR;  Service: Urology;  Laterality: N/A;   • ENDOSCOPY     • EXPLORATORY LAPAROTOMY N/A 7/16/2020    Procedure: LAPAROTOMY EXPLORATORY HARTMANS;  Surgeon: Chi Farmer MD;  Location: [facility] MAIN OR;  Service: General;  Laterality: N/A;   • HYSTERECTOMY     • TOTAL HIP ARTHROPLASTY Right    • TOTAL HIP ARTHROPLASTY REVISION Right     x3       Social History:   Social History     Socioeconomic History   • Marital status:    Tobacco Use   • Smoking status: Former Smoker     Packs/day: 2.00     Years: 40.00     Pack years: 80.00     Types: Cigarettes     Quit date: 2019     Years since quitting: 3.7   • Smokeless tobacco: Never Used   • Tobacco comment: ELECTRONIC   Vaping Use   • Vaping Use: Every day   • Substances: Nicotine, Flavoring   Substance and Sexual Activity   • Alcohol use: Never   • Drug use: Never   • Sexual activity: Defer       Procedures Performed         Consults:   Consults     Date and Time Order Name Status Description    10/3/2022 11:20 PM Inpatient Pulmonology Consult            Condition on Discharge:     Stable    Discharge Disposition  Home or Self Care    Discharge Medications     Discharge Medications      New Medications      Instructions Start Date   budesonide-formoterol 160-4.5 MCG/ACT inhaler  Commonly known as: Symbicort   2 puffs, Inhalation, 2 Times Daily - RT      levoFLOXacin 750 MG tablet  Commonly known as: LEVAQUIN   750 mg, Oral, Every 24 Hours   Start Date: October 5, 2022        Changes to Medications      Instructions Start Date   predniSONE 10 MG tablet  Commonly known as: DELTASONE  What changed:   · medication strength  · how much to take  · how to take this  · when to take this  · additional instructions   30 mg, Oral, Daily   Start Date: October 5, 2022     predniSONE 20 MG tablet  Commonly known as: DELTASONE  What changed: You were already taking a medication with the same name, and this prescription was added. Make sure  you understand how and when to take each.   20 mg, Oral, Daily   Start Date: October 6, 2022     predniSONE 10 MG tablet  Commonly known as: DELTASONE  What changed: You were already taking a medication with the same name, and this prescription was added. Make sure you understand how and when to take each.   10 mg, Oral, Daily   Start Date: October 8, 2022        Continue These Medications      Instructions Start Date   albuterol 0.63 MG/3ML nebulizer solution  Commonly known as: ACCUNEB   1 ampule, Nebulization, Every 6 Hours PRN, Use am of surgery      Breztri Aerosphere 160-9-4.8 MCG/ACT aerosol inhaler  Generic drug: Budeson-Glycopyrrol-Formoterol   1 puff, Inhalation, 2 Times Daily      furosemide 20 MG tablet  Commonly known as: LASIX   20 mg, Oral, 2 Times Daily PRN      HYDROcodone-acetaminophen  MG per tablet  Commonly known as: NORCO   1 tablet, Oral, Every 6 Hours PRN      ipratropium-albuterol 0.5-2.5 mg/3 ml nebulizer  Commonly known as: DUO-NEB   3 mL, Nebulization, Every 4 Hours PRN, Dispense 1box      lisinopril 20 MG tablet  Commonly known as: PRINIVIL,ZESTRIL   20 mg, Oral, Daily      roflumilast 500 MCG tablet tablet  Commonly known as: DALIRESP   500 mcg, Oral, Nightly      warfarin 3 MG tablet  Commonly known as: COUMADIN   3 mg, Oral, Nightly      zolpidem 5 MG tablet  Commonly known as: AMBIEN   5 mg, Oral, Nightly             Discharge Diet:   Diet Instructions     Diet: Cardiac      Discharge Diet: Cardiac          Activity at Discharge:   Activity Instructions     Activity as Tolerated      Measure Blood Pressure            Follow-up Appointments  No future appointments.  Additional Instructions for the Follow-ups that You Need to Schedule     Discharge Follow-up with PCP   As directed       Currently Documented PCP:    Michael Gerardo MD    PCP Phone Number:    [phone]     Follow Up Details: 7-10 days               Test Results Pending at Discharge       Risk for  Readmission (LACE) Score: 6 (10/4/2022  6:00 AM)          SHILA Lopez  10/04/22  13:14 EDT

## 2022-10-04 NOTE — CASE MANAGEMENT/SOCIAL WORK
Discharge Planning Assessment  [facility]     Patient Name: Renuka Pelayo  MRN: [id number]  Today's Date: 10/4/2022    Admit Date: 10/3/2022    Plan: Home with family. Current with [facility] for O2 needs   Discharge Needs Assessment     Row Name 10/04/22 1205       Living Environment    People in Home spouse;child(ras), adult    Name(s) of People in Home Spouse Adrian; Lizandro Macias    Current Living Arrangements home    Primary Care Provided by self    Provides Primary Care For no one    Family Caregiver if Needed child(ras), adult;spouse    Family Caregiver Names Spouse Adrian; Lizandro Macias    Quality of Family Relationships supportive    Able to Return to Prior Arrangements yes       Resource/Environmental Concerns    Resource/Environmental Concerns none    Transportation Concerns none       Transition Planning    Patient/Family Anticipates Transition to home with family    Patient/Family Anticipated Services at Transition none    Transportation Anticipated family or friend will provide  Spouse Adrian       Discharge Needs Assessment    Equipment Currently Used at Home shower chair;oxygen;respiratory supplies;nebulizer  O2 @ 2L continuously. [employer] is provider    Concerns to be Addressed denies needs/concerns at this time    Anticipated Changes Related to Illness none    Equipment Needed After Discharge none               Discharge Plan     Row Name 10/04/22 1206       Plan    Plan Home with family. Current with [facility] for O2 needs    Patient/Family in Agreement with Plan yes    Plan Comments  spoke with patient at bedside. She reports she is independent with ADLs, but has help of family if needed. PCP and enrollment in meds to bed confirmed. She denies problems affording medications. She reports she is on 2L O2 continuously and [facility] is her dme provider. She expects to return home with family at dc and denies dc needs. DC Barriers: pulmonology consult              Continued Care and Services - Admitted Since  10/3/2022    Coordination has not been started for this encounter.       Expected Discharge Date and Time     Expected Discharge Date Expected Discharge Time    Oct 5, 2022          Demographic Summary     Row Name 10/04/22 1204       General Information    Admission Type observation    Arrived From home    Required Notices Provided Observation Status Notice    Referral Source admission list    Reason for Consult discharge planning    Preferred Language English       Contact Information    Permission Granted to Share Info With                Functional Status     Row Name 10/04/22 1205       Functional Status    Usual Activity Tolerance moderate    Current Activity Tolerance moderate       Functional Status, IADL    Medications independent    Meal Preparation independent    Housekeeping independent    Laundry independent    Shopping independent                 Patient Forms     Row Name 10/04/22 1208       Patient Forms    Important Message from Medicare ([facility]) --  MONTANEZ 10/3/22 per registration    Patient Observation Letter Delivered  MONTANEZ 10/3/22 per registration              Lisa Baldwin RN, [facility]  Office: [phone]  Fax: [phone]  [email or web address]      I met with patient in room wearing PPE: mask and goggles.     Maintained distance greater than six feet and spent </=15 minutes in the room      Lisa Baldwin RN

## 2022-10-09 LAB — QT INTERVAL: 406 MS

## 2022-11-09 ENCOUNTER — APPOINTMENT (OUTPATIENT)
Dept: CT IMAGING | Facility: HOSPITAL | Age: 67
End: 2022-11-09

## 2022-11-09 ENCOUNTER — HOSPITAL ENCOUNTER (EMERGENCY)
Facility: HOSPITAL | Age: 67
Discharge: HOME | End: 2022-11-09
Attending: EMERGENCY MEDICINE | Admitting: EMERGENCY MEDICINE

## 2022-11-09 VITALS
OXYGEN SATURATION: 97 % | HEART RATE: 61 BPM | WEIGHT: 173.5 LBS | BODY MASS INDEX: 30.74 KG/M2 | TEMPERATURE: 97.9 F | SYSTOLIC BLOOD PRESSURE: 164 MMHG | HEIGHT: 63 IN | DIASTOLIC BLOOD PRESSURE: 91 MMHG | RESPIRATION RATE: 18 BRPM

## 2022-11-09 DIAGNOSIS — R10.9 ABDOMINAL PAIN, UNSPECIFIED ABDOMINAL LOCATION: ICD-10-CM

## 2022-11-09 DIAGNOSIS — K52.9 ENTERITIS: ICD-10-CM

## 2022-11-09 DIAGNOSIS — N39.0 ACUTE UTI: Primary | ICD-10-CM

## 2022-11-09 LAB
ALBUMIN SERPL-MCNC: 4.5 G/DL (ref 3.5–5.2)
ALBUMIN/GLOB SERPL: 1.7 G/DL
ALP SERPL-CCNC: 94 U/L (ref 39–117)
ALT SERPL W P-5'-P-CCNC: 43 U/L (ref 1–33)
AMYLASE SERPL-CCNC: 35 U/L (ref 28–100)
ANION GAP SERPL CALCULATED.3IONS-SCNC: 12 MMOL/L (ref 5–15)
AST SERPL-CCNC: 48 U/L (ref 1–32)
BACTERIA UR QL AUTO: ABNORMAL /HPF
BASOPHILS # BLD AUTO: 0.1 10*3/MM3 (ref 0–0.2)
BASOPHILS NFR BLD AUTO: 0.7 % (ref 0–1.5)
BILIRUB SERPL-MCNC: 0.3 MG/DL (ref 0–1.2)
BILIRUB UR QL STRIP: NEGATIVE
BUN SERPL-MCNC: 15 MG/DL (ref 8–23)
BUN/CREAT SERPL: 15.6 (ref 7–25)
CALCIUM SPEC-SCNC: 9.4 MG/DL (ref 8.6–10.5)
CHLORIDE SERPL-SCNC: 98 MMOL/L (ref 98–107)
CLARITY UR: CLEAR
CO2 SERPL-SCNC: 31 MMOL/L (ref 22–29)
COLOR UR: YELLOW
CREAT SERPL-MCNC: 0.96 MG/DL (ref 0.57–1)
DEPRECATED RDW RBC AUTO: 48.1 FL (ref 37–54)
EGFRCR SERPLBLD CKD-EPI 2021: 65.8 ML/MIN/1.73
EOSINOPHIL # BLD AUTO: 0 10*3/MM3 (ref 0–0.4)
EOSINOPHIL NFR BLD AUTO: 0.2 % (ref 0.3–6.2)
ERYTHROCYTE [DISTWIDTH] IN BLOOD BY AUTOMATED COUNT: 16 % (ref 12.3–15.4)
FLUAV SUBTYP SPEC NAA+PROBE: NOT DETECTED
FLUBV RNA ISLT QL NAA+PROBE: NOT DETECTED
GLOBULIN UR ELPH-MCNC: 2.7 GM/DL
GLUCOSE SERPL-MCNC: 112 MG/DL (ref 65–99)
GLUCOSE UR STRIP-MCNC: NEGATIVE MG/DL
HCT VFR BLD AUTO: 39.5 % (ref 34–46.6)
HGB BLD-MCNC: 13 G/DL (ref 12–15.9)
HGB UR QL STRIP.AUTO: NEGATIVE
HYALINE CASTS UR QL AUTO: ABNORMAL /LPF
KETONES UR QL STRIP: ABNORMAL
LEUKOCYTE ESTERASE UR QL STRIP.AUTO: ABNORMAL
LIPASE SERPL-CCNC: 24 U/L (ref 13–60)
LYMPHOCYTES # BLD AUTO: 2 10*3/MM3 (ref 0.7–3.1)
LYMPHOCYTES NFR BLD AUTO: 19.7 % (ref 19.6–45.3)
MAGNESIUM SERPL-MCNC: 2.4 MG/DL (ref 1.6–2.4)
MCH RBC QN AUTO: 26.8 PG (ref 26.6–33)
MCHC RBC AUTO-ENTMCNC: 32.8 G/DL (ref 31.5–35.7)
MCV RBC AUTO: 81.7 FL (ref 79–97)
MONOCYTES # BLD AUTO: 0.7 10*3/MM3 (ref 0.1–0.9)
MONOCYTES NFR BLD AUTO: 6.5 % (ref 5–12)
NEUTROPHILS NFR BLD AUTO: 7.6 10*3/MM3 (ref 1.7–7)
NEUTROPHILS NFR BLD AUTO: 72.9 % (ref 42.7–76)
NITRITE UR QL STRIP: NEGATIVE
NRBC BLD AUTO-RTO: 0 /100 WBC (ref 0–0.2)
PH UR STRIP.AUTO: 8 [PH] (ref 5–8)
PLATELET # BLD AUTO: 349 10*3/MM3 (ref 140–450)
PMV BLD AUTO: 7.8 FL (ref 6–12)
POTASSIUM SERPL-SCNC: 4.1 MMOL/L (ref 3.5–5.2)
PROT SERPL-MCNC: 7.2 G/DL (ref 6–8.5)
PROT UR QL STRIP: ABNORMAL
RBC # BLD AUTO: 4.84 10*6/MM3 (ref 3.77–5.28)
RBC # UR STRIP: ABNORMAL /HPF
REF LAB TEST METHOD: ABNORMAL
SARS-COV-2 RNA PNL SPEC NAA+PROBE: NOT DETECTED
SODIUM SERPL-SCNC: 141 MMOL/L (ref 136–145)
SP GR UR STRIP: 1.02 (ref 1–1.03)
SQUAMOUS #/AREA URNS HPF: ABNORMAL /HPF
TRANS CELLS #/AREA URNS HPF: ABNORMAL /HPF
TROPONIN T SERPL-MCNC: <0.01 NG/ML (ref 0–0.03)
UROBILINOGEN UR QL STRIP: ABNORMAL
WBC # UR STRIP: ABNORMAL /HPF
WBC NRBC COR # BLD: 10.4 10*3/MM3 (ref 3.4–10.8)

## 2022-11-09 PROCEDURE — 99283 EMERGENCY DEPT VISIT LOW MDM: CPT

## 2022-11-09 PROCEDURE — 87502 INFLUENZA DNA AMP PROBE: CPT

## 2022-11-09 PROCEDURE — 87635 SARS-COV-2 COVID-19 AMP PRB: CPT

## 2022-11-09 PROCEDURE — 83690 ASSAY OF LIPASE: CPT

## 2022-11-09 PROCEDURE — 82150 ASSAY OF AMYLASE: CPT

## 2022-11-09 PROCEDURE — 85025 COMPLETE CBC W/AUTO DIFF WBC: CPT

## 2022-11-09 PROCEDURE — 81001 URINALYSIS AUTO W/SCOPE: CPT

## 2022-11-09 PROCEDURE — 36415 COLL VENOUS BLD VENIPUNCTURE: CPT

## 2022-11-09 PROCEDURE — 93005 ELECTROCARDIOGRAM TRACING: CPT | Performed by: EMERGENCY MEDICINE

## 2022-11-09 PROCEDURE — 83735 ASSAY OF MAGNESIUM: CPT

## 2022-11-09 PROCEDURE — 74176 CT ABD & PELVIS W/O CONTRAST: CPT

## 2022-11-09 PROCEDURE — 80053 COMPREHEN METABOLIC PANEL: CPT

## 2022-11-09 PROCEDURE — 84484 ASSAY OF TROPONIN QUANT: CPT | Performed by: EMERGENCY MEDICINE

## 2022-11-09 RX ORDER — CEPHALEXIN 500 MG/1
500 CAPSULE ORAL 2 TIMES DAILY
Qty: 14 CAPSULE | Refills: 0 | Status: SHIPPED | OUTPATIENT
Start: 2022-11-09 | End: 2022-11-16

## 2022-11-10 LAB — QT INTERVAL: 422 MS

## 2022-11-10 NOTE — ED PROVIDER NOTES
Subjective   History of Present Illness  Upper abd. Pain x3 day. Has been dizzy today. Hx of hiatal hernia but not sure if that is the caused. Had something similar about 3 years ago resultsing in colostomy bag x6 months but no big problems since then. No n/v/d/c. Denies dysuria, fever, SOA, dizziness, change in appetiete. Has no gall bladder.  Takes meds for HTN and COPD. NKDA.     Patient denies dysuria.  Had bowel movement yesterday.  States it was normal.  Denies constipation or diarrhea.    Review of Systems   Constitutional: Negative for appetite change, fatigue and fever.   HENT: Negative for congestion and rhinorrhea.    Respiratory: Negative for cough and shortness of breath.    Cardiovascular: Negative for chest pain and palpitations.   Gastrointestinal: Positive for abdominal pain. Negative for diarrhea, nausea and vomiting.   Genitourinary: Negative for dysuria and pelvic pain.   Musculoskeletal: Negative for arthralgias and myalgias.   Skin: Negative.    Neurological: Positive for dizziness. Negative for weakness and headaches.   Psychiatric/Behavioral: Negative for confusion. The patient is not nervous/anxious.    All other systems reviewed and are negative.      Past Medical History:   Diagnosis Date   • Bladder cancer (HCC)    • Chronic back pain    • Closed nondisplaced fracture of head of right radius 03/2017   • Congenital deformity of right hip joint 1956   • COPD (chronic obstructive pulmonary disease) (HCC)     former smoker   • Deep venous thrombosis (HCC)     unprovoked   • Depressive disorder    • Disease of thyroid gland    • Diverticulitis of colon    • Essential hypertension    • Gastroesophageal reflux disease    • Insomnia    • Obesity    • Pulmonary embolism (HCC)     provoked by surgery       No Known Allergies    Past Surgical History:   Procedure Laterality Date   • ABDOMINAL SURGERY     • BRONCHOSCOPY N/A 11/25/2020    Procedure: BRONCHOSCOPY with bronchial washing;  Surgeon:  Win Johnston MD;  Location: [facility] ENDOSCOPY;  Service: Pulmonary;  Laterality: N/A;  Post: mucous plugs   • BRONCHOSCOPY N/A 4/25/2022    Procedure: BRONCHOSCOPY with bronchial washing;  Surgeon: Win Johnston MD;  Location: [facility] ENDOSCOPY;  Service: Pulmonary;  Laterality: N/A;  post op: pneumonia   • CHOLECYSTECTOMY     • COLON SURGERY     • COLONOSCOPY     • COLOSTOMY CLOSURE N/A 2/25/2021    Procedure: COLOSTOMY TAKEDOWN OR CLOSURE, extenisve lysis of adhesions;  Surgeon: Chi Farmer MD;  Location: [facility] MAIN OR;  Service: General;  Laterality: N/A;   • CYSTOSCOPY N/A 2/25/2021    Procedure: CYSTOSCOPY with bladder tumor removal and fulgeration, insertion of 3-way rizzo catheter;  Surgeon: Alfonzo Hayward MD;  Location: [facility] MAIN OR;  Service: Urology;  Laterality: N/A;   • ENDOSCOPY     • EXPLORATORY LAPAROTOMY N/A 7/16/2020    Procedure: LAPAROTOMY EXPLORATORY HARTMANS;  Surgeon: Chi Farmer MD;  Location: [facility] MAIN OR;  Service: General;  Laterality: N/A;   • HYSTERECTOMY     • TOTAL HIP ARTHROPLASTY Right    • TOTAL HIP ARTHROPLASTY REVISION Right     x3       Family History   Problem Relation Age of Onset   • No Known Problems Mother    • No Known Problems Father        Social History     Socioeconomic History   • Marital status:    Tobacco Use   • Smoking status: Former     Packs/day: 2.00     Years: 40.00     Pack years: 80.00     Types: Cigarettes     Quit date: 2019     Years since quitting: 3.8   • Smokeless tobacco: Never   • Tobacco comments:     ELECTRONIC   Vaping Use   • Vaping Use: Every day   • Substances: Nicotine, Flavoring   Substance and Sexual Activity   • Alcohol use: Never   • Drug use: Never   • Sexual activity: Defer           Objective   Physical Exam  Vitals and nursing note reviewed.   Constitutional:       General: She is not in acute distress.     Appearance: Normal appearance. She is well-developed. She is obese. She is not  ill-appearing.   HENT:      Head: Normocephalic and atraumatic.   Eyes:      Extraocular Movements: Extraocular movements intact.      Pupils: Pupils are equal, round, and reactive to light.   Cardiovascular:      Rate and Rhythm: Normal rate and regular rhythm.      Pulses: Normal pulses.      Heart sounds: Normal heart sounds. No murmur heard.  Pulmonary:      Effort: Pulmonary effort is normal. No respiratory distress.      Breath sounds: Normal breath sounds.   Abdominal:      General: Bowel sounds are decreased.      Palpations: Abdomen is soft.      Tenderness: There is abdominal tenderness in the right upper quadrant and left upper quadrant.   Musculoskeletal:      Cervical back: Normal range of motion and neck supple.   Skin:     General: Skin is warm and dry.      Capillary Refill: Capillary refill takes less than 2 seconds.   Neurological:      General: No focal deficit present.      Mental Status: She is alert and oriented to person, place, and time. Mental status is at baseline.      GCS: GCS eye subscore is 4. GCS verbal subscore is 5. GCS motor subscore is 6.      Cranial Nerves: Cranial nerves 2-12 are intact.      Sensory: Sensation is intact.      Motor: Motor function is intact.      Deep Tendon Reflexes: Reflexes are normal and symmetric.   Psychiatric:         Mood and Affect: Mood normal.         Behavior: Behavior normal.       Constitutional:  No acute distress.  Head:  Atraumatic.  Normocephalic.   Eyes:  No scleral icterus. Normal conjunctiva  ENT:  Moist mucosa.  No nasal discharge present.  Cardiovascular:  Well perfused.  Equal pulses.  Regular rate.  Normal capillary refill.    Pulmonary/Chest:  No respiratory distress.  Airway patent.  No tachypnea.  No accessory muscle usage.  Clear to auscultation bilaterally.  Abdominal: Distended.  Mild epigastric tenderness to palpation without rebound or guarding.  Extremities:  No peripheral edema.  No Deformity  Skin:  Warm, dry  Neurological:   "Alert, awake, and appropriate.  Normal speech.    Psych: Pleasant, cooperative      Procedures           ED Course      /91 (BP Location: Right arm, Patient Position: Lying)   Pulse 61   Temp 97.9 °F (36.6 °C) (Oral)   Resp 16   Ht 160 cm (63\")   Wt 78.7 kg (173 lb 8 oz)   SpO2 97%   BMI 30.73 kg/m²   Labs Reviewed   COMPREHENSIVE METABOLIC PANEL - Abnormal; Notable for the following components:       Result Value    Glucose 112 (*)     CO2 31.0 (*)     ALT (SGPT) 43 (*)     AST (SGOT) 48 (*)     All other components within normal limits    Narrative:     GFR Normal >60  Chronic Kidney Disease <60  Kidney Failure <15     URINALYSIS W/ MICROSCOPIC IF INDICATED (NO CULTURE) - Abnormal; Notable for the following components:    Ketones, UA Trace (*)     Protein, UA 30 mg/dL (1+) (*)     Leuk Esterase, UA Large (3+) (*)     All other components within normal limits   CBC WITH AUTO DIFFERENTIAL - Abnormal; Notable for the following components:    RDW 16.0 (*)     Eosinophil % 0.2 (*)     Neutrophils, Absolute 7.60 (*)     All other components within normal limits   URINALYSIS, MICROSCOPIC ONLY - Abnormal; Notable for the following components:    RBC, UA 0-2 (*)     WBC, UA 13-20 (*)     Bacteria, UA 2+ (*)     Squamous Epithelial Cells, UA 3-6 (*)     All other components within normal limits   INFLUENZA ANTIGEN, RAPID - Normal   COVID-19,CEPHEID/NAHUM,COR/PIPO/PAD/ANTONIA/MAD IN-HOUSE,NP SWAB IN TRANSPORT MEDIA 3-4 HR TAT, RT-PCR - Normal    Narrative:     Fact sheet for providers: https://www.fda.gov/media/090057/download     Fact sheet for patients: https://www.fda.gov/media/758806/download  Fact sheet for providers: https://www.fda.gov/media/616588/download    Fact sheet for patients: https://www.fda.gov/media/256000/download    Test performed by PCR.   LIPASE - Normal   AMYLASE - Normal   MAGNESIUM - Normal   TROPONIN (IN-HOUSE) - Normal    Narrative:     Troponin T Reference Range:  <= 0.03 ng/mL-   Negative " for AMI  >0.03 ng/mL-     Abnormal for myocardial necrosis.  Clinicians would have to utilize clinical acumen, EKG, Troponin and serial changes to determine if it is an Acute Myocardial Infarction or myocardial injury due to an underlying chronic condition.       Results may be falsely decreased if patient taking Biotin.     COVID PRE-OP / PRE-PROCEDURE SCREENING ORDER (NO ISOLATION)    Narrative:     The following orders were created for panel order COVID PRE-OP / PRE-PROCEDURE SCREENING ORDER (NO ISOLATION) - Swab, Nasopharynx.  Procedure                               Abnormality         Status                     ---------                               -----------         ------                     COVID-19,CEPHEID/NAHUM,CO...[[id number]]  Normal              Final result                 Please view results for these tests on the individual orders.   CBC AND DIFFERENTIAL    Narrative:     The following orders were created for panel order CBC & Differential.  Procedure                               Abnormality         Status                     ---------                               -----------         ------                     CBC Auto Differential[[id number]]        Abnormal            Final result                 Please view results for these tests on the individual orders.     Medications - No data to display  CT Abdomen Pelvis Without Contrast    Result Date: 11/9/2022   1. Fluid-filled distended small bowel loops that may relate to an enteritis and should be correlated with clinical findings. 2. Moderate stool burden within the colon. 3. There is a small hiatal hernia. 4. Atelectatic changes in the right middle lobe and lingula.  Exam limited by the lack of oral and IV contrast.  Electronically Signed By-Brian Hyman MD On:11/9/2022 8:57 PM This report was finalized on [id number] by  Brian Hyman MD.    /91 (BP Location: Right arm, Patient Position: Lying)   Pulse 61   Temp 97.9 °F (36.6 °C)  "(Oral)   Resp 16   Ht 160 cm (63\")   Wt 78.7 kg (173 lb 8 oz)   SpO2 97%   BMI 30.73 kg/m²   Labs Reviewed   COMPREHENSIVE METABOLIC PANEL - Abnormal; Notable for the following components:       Result Value    Glucose 112 (*)     CO2 31.0 (*)     ALT (SGPT) 43 (*)     AST (SGOT) 48 (*)     All other components within normal limits    Narrative:     GFR Normal >60  Chronic Kidney Disease <60  Kidney Failure <15     URINALYSIS W/ MICROSCOPIC IF INDICATED (NO CULTURE) - Abnormal; Notable for the following components:    Ketones, UA Trace (*)     Protein, UA 30 mg/dL (1+) (*)     Leuk Esterase, UA Large (3+) (*)     All other components within normal limits   CBC WITH AUTO DIFFERENTIAL - Abnormal; Notable for the following components:    RDW 16.0 (*)     Eosinophil % 0.2 (*)     Neutrophils, Absolute 7.60 (*)     All other components within normal limits   URINALYSIS, MICROSCOPIC ONLY - Abnormal; Notable for the following components:    RBC, UA 0-2 (*)     WBC, UA 13-20 (*)     Bacteria, UA 2+ (*)     Squamous Epithelial Cells, UA 3-6 (*)     All other components within normal limits   INFLUENZA ANTIGEN, RAPID - Normal   COVID-19,CEPHEID/NAHUM,COR/PIPO/PAD/ANTONIA/MAD IN-HOUSE,NP SWAB IN TRANSPORT MEDIA 3-4 HR TAT, RT-PCR - Normal    Narrative:     Fact sheet for providers: https://www.fda.gov/media/385053/download     Fact sheet for patients: https://www.fda.gov/media/297399/download  Fact sheet for providers: https://www.fda.gov/media/284455/download    Fact sheet for patients: https://www.fda.gov/media/578333/download    Test performed by PCR.   LIPASE - Normal   AMYLASE - Normal   MAGNESIUM - Normal   TROPONIN (IN-HOUSE) - Normal    Narrative:     Troponin T Reference Range:  <= 0.03 ng/mL-   Negative for AMI  >0.03 ng/mL-     Abnormal for myocardial necrosis.  Clinicians would have to utilize clinical acumen, EKG, Troponin and serial changes to determine if it is an Acute Myocardial Infarction or myocardial injury " due to an underlying chronic condition.       Results may be falsely decreased if patient taking Biotin.     COVID PRE-OP / PRE-PROCEDURE SCREENING ORDER (NO ISOLATION)    Narrative:     The following orders were created for panel order COVID PRE-OP / PRE-PROCEDURE SCREENING ORDER (NO ISOLATION) - Swab, Nasopharynx.  Procedure                               Abnormality         Status                     ---------                               -----------         ------                     COVID-19,CEPHEID/NAHUM,CO...[[id number]]  Normal              Final result                 Please view results for these tests on the individual orders.   CBC AND DIFFERENTIAL    Narrative:     The following orders were created for panel order CBC & Differential.  Procedure                               Abnormality         Status                     ---------                               -----------         ------                     CBC Auto Differential[[id number]]        Abnormal            Final result                 Please view results for these tests on the individual orders.     Medications - No data to display  CT Abdomen Pelvis Without Contrast    Result Date: 11/9/2022   1. Fluid-filled distended small bowel loops that may relate to an enteritis and should be correlated with clinical findings. 2. Moderate stool burden within the colon. 3. There is a small hiatal hernia. 4. Atelectatic changes in the right middle lobe and lingula.  Exam limited by the lack of oral and IV contrast.  Electronically Signed By-Brian Hyman MD On:11/9/2022 8:57 PM This report was finalized on [id number] by  Brian Hyman MD.                                         [facility]  EKG # 1  Signed and interpreted by the EP.  Time Interpreted: 9:25 PM  Rate: 62 normal sinus rhythm  Rhythm: Normal sinus rhythm  Axis: Within normal limits  Intervals: Within normal limits  ST Segments: No acute ischemic changes     Comparison: Subtle T wave inversion in lead  aVL when compared to October 3, 2022 EKG.    Differential Dx (Includes but not limited to): Constipation, bowel obstruction, enteritis, GERD, ACS, GERD  Medical Records Reviewed: Recent observation stay on October 4, 2022 for dyspnea  Labs: Normal white count.  COVID-negative.  Reassuring electrolytes.  Urine concerning for UTI.  Mildly elevated liver enzymes.  Imaging: CT concerning for enteritis.  Also with moderate stool burden.  Telemetry: Not indicated  Discussion: Patient somewhat distended.  Suspect she may have some enteritis and constipation given her exam.  Will treat UTI given the abdominal pain.  States she has stool softeners at home that she will try.        Final diagnoses:   Acute UTI   Enteritis   Abdominal pain, unspecified abdominal location       ED Disposition  ED Disposition     ED Disposition   Discharge    Condition   Stable    Comment   --             Michael Gerardo MD  [address] [place] [address] [place] [address] [phone]    In 3 days           Medication List      New Prescriptions    cephalexin 500 MG capsule  Commonly known as: KEFLEX  Take 1 capsule by mouth 2 (Two) Times a Day for 7 days.           Where to Get Your Medications      These medications were sent to [facility] [id number] - [place], [place] - [address] - [phone]  - [phone] FX  [address] [facility] [place] [address]    Phone: [phone]   · cephalexin 500 MG capsule          Ruben Kimball MD  11/09/22 9881

## 2023-03-22 ENCOUNTER — HOSPITAL ENCOUNTER (EMERGENCY)
Facility: HOSPITAL | Age: 68
Discharge: HOME | End: 2023-03-22
Attending: EMERGENCY MEDICINE | Admitting: EMERGENCY MEDICINE
Payer: MEDICARE

## 2023-03-22 ENCOUNTER — APPOINTMENT (OUTPATIENT)
Dept: GENERAL RADIOLOGY | Facility: HOSPITAL | Age: 68
End: 2023-03-22
Payer: MEDICARE

## 2023-03-22 VITALS
DIASTOLIC BLOOD PRESSURE: 91 MMHG | BODY MASS INDEX: 32.27 KG/M2 | RESPIRATION RATE: 16 BRPM | HEART RATE: 93 BPM | SYSTOLIC BLOOD PRESSURE: 157 MMHG | WEIGHT: 182.1 LBS | OXYGEN SATURATION: 98 % | TEMPERATURE: 98 F | HEIGHT: 63 IN

## 2023-03-22 DIAGNOSIS — J44.1 CHRONIC OBSTRUCTIVE PULMONARY DISEASE WITH ACUTE EXACERBATION: ICD-10-CM

## 2023-03-22 DIAGNOSIS — R06.03 ACUTE RESPIRATORY DISTRESS: Primary | ICD-10-CM

## 2023-03-22 LAB
ALBUMIN SERPL-MCNC: 4.3 G/DL (ref 3.5–5.2)
ALBUMIN/GLOB SERPL: 1.7 G/DL
ALP SERPL-CCNC: 86 U/L (ref 39–117)
ALT SERPL W P-5'-P-CCNC: 15 U/L (ref 1–33)
ANION GAP SERPL CALCULATED.3IONS-SCNC: 12 MMOL/L (ref 5–15)
AST SERPL-CCNC: 19 U/L (ref 1–32)
BASOPHILS # BLD AUTO: 0.1 10*3/MM3 (ref 0–0.2)
BASOPHILS NFR BLD AUTO: 1 % (ref 0–1.5)
BILIRUB SERPL-MCNC: 0.2 MG/DL (ref 0–1.2)
BUN SERPL-MCNC: 15 MG/DL (ref 8–23)
BUN/CREAT SERPL: 13.8 (ref 7–25)
CALCIUM SPEC-SCNC: 9.3 MG/DL (ref 8.6–10.5)
CHLORIDE SERPL-SCNC: 104 MMOL/L (ref 98–107)
CO2 SERPL-SCNC: 26 MMOL/L (ref 22–29)
CREAT SERPL-MCNC: 1.09 MG/DL (ref 0.57–1)
DEPRECATED RDW RBC AUTO: 49 FL (ref 37–54)
EGFRCR SERPLBLD CKD-EPI 2021: 56.1 ML/MIN/1.73
EOSINOPHIL # BLD AUTO: 0.2 10*3/MM3 (ref 0–0.4)
EOSINOPHIL NFR BLD AUTO: 1.2 % (ref 0.3–6.2)
ERYTHROCYTE [DISTWIDTH] IN BLOOD BY AUTOMATED COUNT: 16 % (ref 12.3–15.4)
GLOBULIN UR ELPH-MCNC: 2.6 GM/DL
GLUCOSE SERPL-MCNC: 110 MG/DL (ref 65–99)
HCT VFR BLD AUTO: 38.5 % (ref 34–46.6)
HGB BLD-MCNC: 12.4 G/DL (ref 12–15.9)
HOLD SPECIMEN: NORMAL
INR PPP: 0.97 (ref 2–3)
LYMPHOCYTES # BLD AUTO: 2.7 10*3/MM3 (ref 0.7–3.1)
LYMPHOCYTES NFR BLD AUTO: 20.6 % (ref 19.6–45.3)
MCH RBC QN AUTO: 26.7 PG (ref 26.6–33)
MCHC RBC AUTO-ENTMCNC: 32.1 G/DL (ref 31.5–35.7)
MCV RBC AUTO: 82.9 FL (ref 79–97)
MONOCYTES # BLD AUTO: 0.8 10*3/MM3 (ref 0.1–0.9)
MONOCYTES NFR BLD AUTO: 6.1 % (ref 5–12)
NEUTROPHILS NFR BLD AUTO: 71.1 % (ref 42.7–76)
NEUTROPHILS NFR BLD AUTO: 9.2 10*3/MM3 (ref 1.7–7)
NRBC BLD AUTO-RTO: 0.1 /100 WBC (ref 0–0.2)
PLATELET # BLD AUTO: 344 10*3/MM3 (ref 140–450)
PMV BLD AUTO: 8.1 FL (ref 6–12)
POTASSIUM SERPL-SCNC: 4.1 MMOL/L (ref 3.5–5.2)
PROT SERPL-MCNC: 6.9 G/DL (ref 6–8.5)
PROTHROMBIN TIME: 10 SECONDS (ref 19.4–28.5)
RBC # BLD AUTO: 4.65 10*6/MM3 (ref 3.77–5.28)
SARS-COV-2 RNA RESP QL NAA+PROBE: NOT DETECTED
SODIUM SERPL-SCNC: 142 MMOL/L (ref 136–145)
WBC NRBC COR # BLD: 12.9 10*3/MM3 (ref 3.4–10.8)

## 2023-03-22 PROCEDURE — 94799 UNLISTED PULMONARY SVC/PX: CPT

## 2023-03-22 PROCEDURE — 94664 DEMO&/EVAL PT USE INHALER: CPT

## 2023-03-22 PROCEDURE — 99283 EMERGENCY DEPT VISIT LOW MDM: CPT

## 2023-03-22 PROCEDURE — 96374 THER/PROPH/DIAG INJ IV PUSH: CPT

## 2023-03-22 PROCEDURE — 85610 PROTHROMBIN TIME: CPT | Performed by: EMERGENCY MEDICINE

## 2023-03-22 PROCEDURE — 80053 COMPREHEN METABOLIC PANEL: CPT | Performed by: EMERGENCY MEDICINE

## 2023-03-22 PROCEDURE — 93005 ELECTROCARDIOGRAM TRACING: CPT

## 2023-03-22 PROCEDURE — 94640 AIRWAY INHALATION TREATMENT: CPT

## 2023-03-22 PROCEDURE — 71045 X-RAY EXAM CHEST 1 VIEW: CPT

## 2023-03-22 PROCEDURE — 93005 ELECTROCARDIOGRAM TRACING: CPT | Performed by: EMERGENCY MEDICINE

## 2023-03-22 PROCEDURE — 25010000002 METHYLPREDNISOLONE PER 125 MG: Performed by: EMERGENCY MEDICINE

## 2023-03-22 PROCEDURE — 85025 COMPLETE CBC W/AUTO DIFF WBC: CPT | Performed by: EMERGENCY MEDICINE

## 2023-03-22 PROCEDURE — 87635 SARS-COV-2 COVID-19 AMP PRB: CPT | Performed by: EMERGENCY MEDICINE

## 2023-03-22 RX ORDER — SODIUM CHLORIDE 0.9 % (FLUSH) 0.9 %
10 SYRINGE (ML) INJECTION AS NEEDED
Status: DISCONTINUED | OUTPATIENT
Start: 2023-03-22 | End: 2023-03-22 | Stop reason: HOSPADM

## 2023-03-22 RX ORDER — METHYLPREDNISOLONE SODIUM SUCCINATE 125 MG/2ML
125 INJECTION, POWDER, LYOPHILIZED, FOR SOLUTION INTRAMUSCULAR; INTRAVENOUS ONCE
Status: COMPLETED | OUTPATIENT
Start: 2023-03-22 | End: 2023-03-22

## 2023-03-22 RX ORDER — ALBUTEROL SULFATE 90 UG/1
2 AEROSOL, METERED RESPIRATORY (INHALATION) EVERY 4 HOURS PRN
Qty: 8.5 G | Refills: 0 | Status: SHIPPED | OUTPATIENT
Start: 2023-03-22

## 2023-03-22 RX ORDER — PREDNISONE 50 MG/1
50 TABLET ORAL DAILY
Qty: 5 TABLET | Refills: 0 | Status: SHIPPED | OUTPATIENT
Start: 2023-03-22

## 2023-03-22 RX ORDER — IPRATROPIUM BROMIDE AND ALBUTEROL SULFATE 2.5; .5 MG/3ML; MG/3ML
3 SOLUTION RESPIRATORY (INHALATION) ONCE
Status: COMPLETED | OUTPATIENT
Start: 2023-03-22 | End: 2023-03-22

## 2023-03-22 RX ORDER — ALBUTEROL SULFATE 2.5 MG/3ML
2.5 SOLUTION RESPIRATORY (INHALATION) ONCE
Status: COMPLETED | OUTPATIENT
Start: 2023-03-22 | End: 2023-03-22

## 2023-03-22 RX ADMIN — ALBUTEROL SULFATE 2.5 MG: 2.5 SOLUTION RESPIRATORY (INHALATION) at 14:02

## 2023-03-22 RX ADMIN — IPRATROPIUM BROMIDE AND ALBUTEROL SULFATE 3 ML: 2.5; .5 SOLUTION RESPIRATORY (INHALATION) at 13:52

## 2023-03-22 RX ADMIN — METHYLPREDNISOLONE SODIUM SUCCINATE 125 MG: 125 INJECTION, POWDER, FOR SOLUTION INTRAMUSCULAR; INTRAVENOUS at 14:45

## 2023-03-22 NOTE — ED PROVIDER NOTES
Subjective   History of Present Illness  Chief complaint cough congestion short of breath COPD    History of present illness a 67-year-old female with history of COPD who complains of a 2 to 3-day history of cough congestion shortness of breath.  No fever chills no chest pain neck arm or jaw pain.  Patient use her medications at home without relief cough nonproductive.  No leg pain or swelling no recent long car ride plane ride immobilization.  Has not had any recent hospitalization steroid use or antibiotic use.  No foreign travels no one in the home with similar illness.  Symptoms worse with exertion better with rest moderate degree.  States she would like to have steroids as that seems to be the only thing that helps her.        Review of Systems   Constitutional: Negative for chills and fever.   HENT: Positive for congestion. Negative for sinus pressure.    Respiratory: Positive for cough and shortness of breath. Negative for chest tightness.    Cardiovascular: Negative for chest pain and palpitations.   Gastrointestinal: Negative for abdominal pain and vomiting.   Musculoskeletal: Negative for back pain and neck pain.   Skin: Negative for rash and wound.   Psychiatric/Behavioral: Negative for agitation and confusion.       Past Medical History:   Diagnosis Date   • Bladder cancer (HCC)    • Chronic back pain    • Closed nondisplaced fracture of head of right radius 03/2017   • Congenital deformity of right hip joint 1956   • COPD (chronic obstructive pulmonary disease) (HCC)     former smoker   • Deep venous thrombosis (HCC)     unprovoked   • Depressive disorder    • Disease of thyroid gland    • Diverticulitis of colon    • Essential hypertension    • Gastroesophageal reflux disease    • Insomnia    • Obesity    • Pulmonary embolism (HCC)     provoked by surgery       No Known Allergies    Past Surgical History:   Procedure Laterality Date   • ABDOMINAL SURGERY     • BRONCHOSCOPY N/A 11/25/2020     Procedure: BRONCHOSCOPY with bronchial washing;  Surgeon: Win Johnston MD;  Location: [facility] ENDOSCOPY;  Service: Pulmonary;  Laterality: N/A;  Post: mucous plugs   • BRONCHOSCOPY N/A 2022    Procedure: BRONCHOSCOPY with bronchial washing;  Surgeon: Win Johnston MD;  Location: [facility] ENDOSCOPY;  Service: Pulmonary;  Laterality: N/A;  post op: pneumonia   • CHOLECYSTECTOMY     • COLON SURGERY     • COLONOSCOPY     • COLOSTOMY CLOSURE N/A 2021    Procedure: COLOSTOMY TAKEDOWN OR CLOSURE, extenisve lysis of adhesions;  Surgeon: Chi Farmer MD;  Location: [facility] MAIN OR;  Service: General;  Laterality: N/A;   • CYSTOSCOPY N/A 2021    Procedure: CYSTOSCOPY with bladder tumor removal and fulgeration, insertion of 3-way rizzo catheter;  Surgeon: Alfonzo Hayward MD;  Location: [facility] MAIN OR;  Service: Urology;  Laterality: N/A;   • ENDOSCOPY     • EXPLORATORY LAPAROTOMY N/A 2020    Procedure: LAPAROTOMY EXPLORATORY HARTMANS;  Surgeon: Chi Farmer MD;  Location: [facility] MAIN OR;  Service: General;  Laterality: N/A;   • HYSTERECTOMY     • TOTAL HIP ARTHROPLASTY Right    • TOTAL HIP ARTHROPLASTY REVISION Right     x3       Family History   Problem Relation Age of Onset   • No Known Problems Mother    • No Known Problems Father        Social History     Socioeconomic History   • Marital status:    Tobacco Use   • Smoking status: Former     Packs/day: 2.00     Years: 40.00     Pack years: 80.00     Types: Cigarettes     Quit date: 2019     Years since quittin.2   • Smokeless tobacco: Never   • Tobacco comments:     ELECTRONIC   Vaping Use   • Vaping Use: Every day   • Substances: Nicotine, Flavoring   Substance and Sexual Activity   • Alcohol use: Never   • Drug use: Never   • Sexual activity: Defer     Prior to Admission medications    Medication Sig Start Date End Date Taking? Authorizing Provider   albuterol sulfate  (90 Base) MCG/ACT inhaler Inhale 2  puffs Every 4 (Four) Hours As Needed for Wheezing. 3/22/23   Chi Stevens MD   Budeson-Glycopyrrol-Formoterol (Breztri Aerosphere) 160-9-4.8 MCG/ACT aerosol inhaler Inhale 1 puff 2 (Two) Times a Day.    Grayson Pope MD   budesonide-formoterol (Symbicort) 160-4.5 MCG/ACT inhaler Inhale 2 puffs by mouth 2 (Two) Times a Day. 10/4/22   Reta Whitmore APRN   furosemide (LASIX) 20 MG tablet Take 20 mg by mouth 2 (Two) Times a Day As Needed.    Grayson Pope MD   HYDROcodone-acetaminophen (NORCO)  MG per tablet Take 1 tablet by mouth Every 6 (Six) Hours As Needed for Moderate Pain .    Grayson Pope MD   lisinopril (PRINIVIL,ZESTRIL) 20 MG tablet Take 20 mg by mouth Daily.    Grayson Pope MD   predniSONE (DELTASONE) 50 MG tablet Take 1 tablet by mouth Daily. 3/22/23   Chi Stevens MD   roflumilast (DALIRESP) 500 MCG tablet tablet Take 500 mcg by mouth Every Night.    Grayson Pope MD   warfarin (COUMADIN) 3 MG tablet Take 3 mg by mouth Every Night.    Grayson Pope MD   zolpidem (AMBIEN) 5 MG tablet Take 5 mg by mouth Every Night.    rGayson Pope MD   albuterol (ACCUNEB) 0.63 MG/3ML nebulizer solution Take 1 ampule by nebulization Every 6 (Six) Hours As Needed for Wheezing. Use am of surgery  3/22/23  Grayson Pope MD   ipratropium-albuterol (DUO-NEB) 0.5-2.5 mg/3 ml nebulizer Take 3 mL by nebulization Every 4 (Four) Hours As Needed for Wheezing. Dispense 1box 5/4/21 3/22/23  Anish Lane MD   predniSONE (DELTASONE) 10 MG tablet Take 3 tablets by mouth once daily for 1 day, then 2 tablets once daily for 2 days, then 1 tablet once daily for 2 days, stop. 10/5/22 3/22/23  Reta Whitmore APRN           Objective   Physical Exam  Constitutional is a 67-year-old female awake alert no acute distress temperature is 98 blood pressure 157/91 heart rate is 93 respirations 18 sats 98% on room air.  HEENT extraocular muscles are intact pupils equal  round react sclera clear.  Mouth clear.  Neck supple no adenopathy no meningeal signs no JVD lungs diffuse scattered wheezes no retraction no use of accessories.  Heart regular without murmur abdomen soft without tenderness good bowel sounds extremities no edema cords or Homans' sign or evidence of DVT.  Skin warm and dry without rashes or cellulitic changes pulses equal upper lower extremities neurologic awake alert and follows commands monitorings normal without focal weakness  Procedures           ED Course      Results for orders placed or performed during the hospital encounter of 03/22/23   COVID-19,CEPHEID/NAHUM,COR/PIPO/PAD/ANTONIA/MAD IN-HOUSE(OR EMERGENT/ADD-ON),NP SWAB IN TRANSPORT MEDIA 3-4 HR TAT, RT-PCR - Swab, Nasopharynx    Specimen: Nasopharynx; Swab   Result Value Ref Range    COVID19 Not Detected Not Detected - Ref. Range   Comprehensive Metabolic Panel    Specimen: Blood   Result Value Ref Range    Glucose 110 (H) 65 - 99 mg/dL    BUN 15 8 - 23 mg/dL    Creatinine 1.09 (H) 0.57 - 1.00 mg/dL    Sodium 142 136 - 145 mmol/L    Potassium 4.1 3.5 - 5.2 mmol/L    Chloride 104 98 - 107 mmol/L    CO2 26.0 22.0 - 29.0 mmol/L    Calcium 9.3 8.6 - 10.5 mg/dL    Total Protein 6.9 6.0 - 8.5 g/dL    Albumin 4.3 3.5 - 5.2 g/dL    ALT (SGPT) 15 1 - 33 U/L    AST (SGOT) 19 1 - 32 U/L    Alkaline Phosphatase 86 39 - 117 U/L    Total Bilirubin 0.2 0.0 - 1.2 mg/dL    Globulin 2.6 gm/dL    A/G Ratio 1.7 g/dL    BUN/Creatinine Ratio 13.8 7.0 - 25.0    Anion Gap 12.0 5.0 - 15.0 mmol/L    eGFR 56.1 (L) >60.0 mL/min/1.73   Protime-INR    Specimen: Blood   Result Value Ref Range    Protime 10.0 (L) 19.4 - 28.5 Seconds    INR 0.97 (L) 2.00 - 3.00   CBC Auto Differential    Specimen: Blood   Result Value Ref Range    WBC 12.90 (H) 3.40 - 10.80 10*3/mm3    RBC 4.65 3.77 - 5.28 10*6/mm3    Hemoglobin 12.4 12.0 - 15.9 g/dL    Hematocrit 38.5 34.0 - 46.6 %    MCV 82.9 79.0 - 97.0 fL    MCH 26.7 26.6 - 33.0 pg    MCHC 32.1 31.5 - 35.7  g/dL    RDW 16.0 (H) 12.3 - 15.4 %    RDW-SD 49.0 37.0 - 54.0 fl    MPV 8.1 6.0 - 12.0 fL    Platelets 344 140 - 450 10*3/mm3    Neutrophil % 71.1 42.7 - 76.0 %    Lymphocyte % 20.6 19.6 - 45.3 %    Monocyte % 6.1 5.0 - 12.0 %    Eosinophil % 1.2 0.3 - 6.2 %    Basophil % 1.0 0.0 - 1.5 %    Neutrophils, Absolute 9.20 (H) 1.70 - 7.00 10*3/mm3    Lymphocytes, Absolute 2.70 0.70 - 3.10 10*3/mm3    Monocytes, Absolute 0.80 0.10 - 0.90 10*3/mm3    Eosinophils, Absolute 0.20 0.00 - 0.40 10*3/mm3    Basophils, Absolute 0.10 0.00 - 0.20 10*3/mm3    nRBC 0.1 0.0 - 0.2 /100 WBC   ECG 12 Lead Dyspnea   Result Value Ref Range    QT Interval 363 ms   Gold Top - SST   Result Value Ref Range    Extra Tube hold      XR Chest 1 View    Result Date: 3/22/2023  Impression: No acute process. Electronically Signed: John Gutierrez  3/22/2023 2:26 PM EDT  Workstation ID: [id number]    Medications   sodium chloride 0.9 % flush 10 mL (has no administration in time range)   ipratropium-albuterol (DUO-NEB) nebulizer solution 3 mL (3 mL Nebulization Given 3/22/23 1352)   albuterol (PROVENTIL) nebulizer solution 0.083% 2.5 mg/3mL (2.5 mg Nebulization Given 3/22/23 1402)   methylPREDNISolone sodium succinate (SOLU-Medrol) injection 125 mg (125 mg Intravenous Given 3/22/23 1445)     EKG my interpretation normal sinus rhythm rate of 85 normal axis no hypertrophy QTc of 425 is a normal EKG is unchanged from previous 1 from 11/9/2022                                       Medical Decision Making  Medical decision making.  IV established monitor placed with sinus rhythm.  She was given a DuoNeb and albuterol 125 mg Solu-Medrol IV patient's labs obtained and pending read by me COVID-19 was negative the patient's comprehensive metabolic profile was unremarkable INR was only 0.97 the CBC white count 12.9 COVID-19 negative.  Chest x-ray on my independent review shows no acute pneumonia pneumothorax or acute findings COPD radiology review unremarkable.  Patient  repeat exam was feeling much better at this time.  Lungs occasional wheeze but no retraction no use of accessory sats at 98% on room air.  I do not see evidence to suggest acute pneumonia or acute infection or sepsis process no evidence suggest acute myocardial infarction or congestive heart failure DVT pulm embolism by my history physical clinical exam and EKGs and lab findings here.  Her blood is a little thick at this point.  We talked about the findings.  She desires to go home to be placed on prednisone and rescue inhaler of albuterol.  She will follow with her doctor tomorrow and her pulmonologist.  This is not a complete list of all possibilities of shortness of breath and COPD exasperation.  Do not see need for antibiotics at this point.  She was agreeable and discharged home for outpatient management we talked about what to return for.    Acute respiratory distress: acute illness or injury  Chronic obstructive pulmonary disease with acute exacerbation (HCC): acute illness or injury  Amount and/or Complexity of Data Reviewed  Labs: ordered. Decision-making details documented in ED Course.  Radiology: ordered and independent interpretation performed. Decision-making details documented in ED Course.  ECG/medicine tests: ordered and independent interpretation performed. Decision-making details documented in ED Course.      Risk  Prescription drug management.          Final diagnoses:   Acute respiratory distress   Chronic obstructive pulmonary disease with acute exacerbation (HCC)       ED Disposition  ED Disposition     ED Disposition   Discharge    Condition   Stable    Comment   --             Michael Gerardo MD  [address] [place] [address] [place] [address]  [phone]    In 1 day           Medication List      New Prescriptions    albuterol sulfate  (90 Base) MCG/ACT inhaler  Commonly known as: PROVENTIL HFA;VENTOLIN HFA;PROAIR HFA  Inhale 2 puffs Every 4 (Four) Hours As Needed for  Wheezing.  Replaces: albuterol 0.63 MG/3ML nebulizer solution     predniSONE 50 MG tablet  Commonly known as: DELTASONE  Take 1 tablet by mouth Daily.        Stop    albuterol 0.63 MG/3ML nebulizer solution  Commonly known as: ACCUNEB  Replaced by: albuterol sulfate  (90 Base) MCG/ACT inhaler     ipratropium-albuterol 0.5-2.5 mg/3 ml nebulizer  Commonly known as: DUO-NEB           Where to Get Your Medications      These medications were sent to [facility] [address] [place] [address]    Hours: Mon-Fri 7:00AM-7:00PM Phone: [phone]   · albuterol sulfate  (90 Base) MCG/ACT inhaler  · predniSONE 50 MG tablet          Chi Stevens MD  03/22/23 8921

## 2023-03-22 NOTE — DISCHARGE INSTRUCTIONS
Rest.  Prednisone and albuterol inhaler sent to [facility] pharmacy.  Return for increasing shortness of breath fever vomiting chest pain neck arm jaw pain altered mental status or any other new or worse problems or concerns return me to the ER  Please have your INR checked in the next few days as prednisone may thin your blood a bit.  Today your INR was only 0.97 please call your doctor tomorrow to see if they need to adjust your Coumadin dose

## 2023-03-24 LAB — QT INTERVAL: 363 MS

## 2023-04-20 ENCOUNTER — ANESTHESIA (OUTPATIENT)
Dept: GASTROENTEROLOGY | Facility: HOSPITAL | Age: 68
End: 2023-04-20
Payer: MEDICARE

## 2023-04-20 ENCOUNTER — HOSPITAL ENCOUNTER (OUTPATIENT)
Facility: HOSPITAL | Age: 68
Setting detail: HOSPITAL OUTPATIENT SURGERY
Discharge: HOME | End: 2023-04-20
Attending: INTERNAL MEDICINE | Admitting: INTERNAL MEDICINE
Payer: MEDICARE

## 2023-04-20 ENCOUNTER — ANESTHESIA EVENT (OUTPATIENT)
Dept: GASTROENTEROLOGY | Facility: HOSPITAL | Age: 68
End: 2023-04-20
Payer: MEDICARE

## 2023-04-20 VITALS
WEIGHT: 193.78 LBS | HEIGHT: 63 IN | TEMPERATURE: 97.9 F | OXYGEN SATURATION: 97 % | HEART RATE: 87 BPM | DIASTOLIC BLOOD PRESSURE: 64 MMHG | BODY MASS INDEX: 34.34 KG/M2 | RESPIRATION RATE: 18 BRPM | SYSTOLIC BLOOD PRESSURE: 156 MMHG

## 2023-04-20 VITALS
RESPIRATION RATE: 19 BRPM | DIASTOLIC BLOOD PRESSURE: 95 MMHG | HEART RATE: 91 BPM | SYSTOLIC BLOOD PRESSURE: 156 MMHG | OXYGEN SATURATION: 100 %

## 2023-04-20 DIAGNOSIS — J18.9 PNEUMONIA: ICD-10-CM

## 2023-04-20 LAB
DEPRECATED RDW RBC AUTO: 46.4 FL (ref 37–54)
ERYTHROCYTE [DISTWIDTH] IN BLOOD BY AUTOMATED COUNT: 16 % (ref 12.3–15.4)
HCT VFR BLD AUTO: 37.9 % (ref 34–46.6)
HGB BLD-MCNC: 12.2 G/DL (ref 12–15.9)
INR PPP: 1.24 (ref 2–3)
MCH RBC QN AUTO: 26.7 PG (ref 26.6–33)
MCHC RBC AUTO-ENTMCNC: 32.2 G/DL (ref 31.5–35.7)
MCV RBC AUTO: 82.8 FL (ref 79–97)
PLATELET # BLD AUTO: 315 10*3/MM3 (ref 140–450)
PMV BLD AUTO: 8.9 FL (ref 6–12)
PROTHROMBIN TIME: 13.1 SECONDS (ref 19.4–28.5)
RBC # BLD AUTO: 4.58 10*6/MM3 (ref 3.77–5.28)
WBC NRBC COR # BLD: 9.1 10*3/MM3 (ref 3.4–10.8)

## 2023-04-20 PROCEDURE — 87102 FUNGUS ISOLATION CULTURE: CPT | Performed by: INTERNAL MEDICINE

## 2023-04-20 PROCEDURE — 87205 SMEAR GRAM STAIN: CPT | Performed by: INTERNAL MEDICINE

## 2023-04-20 PROCEDURE — 87116 MYCOBACTERIA CULTURE: CPT | Performed by: INTERNAL MEDICINE

## 2023-04-20 PROCEDURE — 87305 ASPERGILLUS AG IA: CPT | Performed by: INTERNAL MEDICINE

## 2023-04-20 PROCEDURE — 87206 SMEAR FLUORESCENT/ACID STAI: CPT | Performed by: INTERNAL MEDICINE

## 2023-04-20 PROCEDURE — 25010000002 PROPOFOL 200 MG/20ML EMULSION: Performed by: NURSE ANESTHETIST, CERTIFIED REGISTERED

## 2023-04-20 PROCEDURE — 85610 PROTHROMBIN TIME: CPT | Performed by: INTERNAL MEDICINE

## 2023-04-20 PROCEDURE — 85027 COMPLETE CBC AUTOMATED: CPT | Performed by: INTERNAL MEDICINE

## 2023-04-20 PROCEDURE — 87070 CULTURE OTHR SPECIMN AEROBIC: CPT | Performed by: INTERNAL MEDICINE

## 2023-04-20 PROCEDURE — 87798 DETECT AGENT NOS DNA AMP: CPT | Performed by: INTERNAL MEDICINE

## 2023-04-20 RX ORDER — LIDOCAINE HYDROCHLORIDE 20 MG/ML
INJECTION, SOLUTION INFILTRATION; PERINEURAL AS NEEDED
Status: DISCONTINUED | OUTPATIENT
Start: 2023-04-20 | End: 2023-04-20 | Stop reason: HOSPADM

## 2023-04-20 RX ORDER — LIDOCAINE HYDROCHLORIDE 20 MG/ML
JELLY TOPICAL AS NEEDED
Status: DISCONTINUED | OUTPATIENT
Start: 2023-04-20 | End: 2023-04-20 | Stop reason: HOSPADM

## 2023-04-20 RX ORDER — PROPOFOL 10 MG/ML
INJECTION, EMULSION INTRAVENOUS AS NEEDED
Status: DISCONTINUED | OUTPATIENT
Start: 2023-04-20 | End: 2023-04-20 | Stop reason: SURG

## 2023-04-20 RX ORDER — SODIUM CHLORIDE 9 MG/ML
50 INJECTION, SOLUTION INTRAVENOUS CONTINUOUS
Status: DISCONTINUED | OUTPATIENT
Start: 2023-04-20 | End: 2023-04-20 | Stop reason: HOSPADM

## 2023-04-20 RX ORDER — LIDOCAINE HYDROCHLORIDE 20 MG/ML
INJECTION, SOLUTION EPIDURAL; INFILTRATION; INTRACAUDAL; PERINEURAL AS NEEDED
Status: DISCONTINUED | OUTPATIENT
Start: 2023-04-20 | End: 2023-04-20 | Stop reason: SURG

## 2023-04-20 RX ADMIN — LIDOCAINE HYDROCHLORIDE 100 MG: 20 INJECTION, SOLUTION EPIDURAL; INFILTRATION; INTRACAUDAL; PERINEURAL at 08:54

## 2023-04-20 RX ADMIN — PROPOFOL 80 MG: 10 INJECTION, EMULSION INTRAVENOUS at 08:54

## 2023-04-20 RX ADMIN — SODIUM CHLORIDE: 0.9 INJECTION, SOLUTION INTRAVENOUS at 08:42

## 2023-04-20 RX ADMIN — PROPOFOL 20 MG: 10 INJECTION, EMULSION INTRAVENOUS at 08:57

## 2023-04-20 NOTE — ANESTHESIA POSTPROCEDURE EVALUATION
Patient: Renuka Pelayo    Procedure Summary     Date: 04/20/23 Room / Location: [facility] ENDOSCOPY 2 / [facility] ENDOSCOPY    Anesthesia Start: 0842 Anesthesia Stop: 0906    Procedure: BRONCHOSCOPY with bilateral wash (Bronchus) Diagnosis:       Pneumonia      (Pneumonia [J18.9])    Surgeons: Win Johnston MD Provider: Saskia Melgar MD    Anesthesia Type: MAC, general ASA Status: 4          Anesthesia Type: MAC, general    Vitals  Vitals Value Taken Time   /59 04/20/23 0949   Temp     Pulse 76 04/20/23 0949   Resp 19 04/20/23 0901   SpO2 97 % 04/20/23 0949   Vitals shown include unvalidated device data.        Post Anesthesia Care and Evaluation    Patient location during evaluation: PACU  Patient participation: complete - patient participated  Level of consciousness: awake and alert  Pain management: satisfactory to patient    Airway patency: patent  Anesthetic complications: No anesthetic complications  PONV Status: none  Cardiovascular status: acceptable  Respiratory status: acceptable  Hydration status: acceptable

## 2023-04-20 NOTE — PROCEDURES
Bronchoscopy Procedure Note    Name: Renuka Pelayo   Age:  67 y.o.   Sex: female  :  1955  MRN: [id number]  Time of procedure: 10:12 EDT      Date of Operation: 2023     Pre-op Diagnosis: Possible mucous plugs COPD exacerbation    Post-op Diagnosis: same    Surgeon: Win Johnston MD    Anesthesia: per anesthesia Sedation    Operation: Flexible fiberoptic bronchoscopy, diagnostic bronchoscope  Findings: Mucous plugs    Specimen: Lung wash    Estimated Blood Loss: less than 10 mL    Drains: none    Complications: None    Indications and History:  The patient is a 67 y.o. with with persistent coughing unable to bring sputum up and persistent thick mucus did not respond to outpatient treatment multiple times.  The risks, benefits, complications, treatment options and expected outcomes were discussed with the patient and informed consent was obtained. The possibilities of reaction to medication, pulmonary aspiration, pneumothorax, bleeding, respiratory failure and failure to diagnose a condition and creating a complication requiring transfusion or operation were discussed with the patient who freely signed the consent.      Informed consent obtained  Time out performed with staff confirming patient ID, procedure and location.    Description of Procedure:  After the induction of topical nasopharyngeal anesthesia, the bronchoscope was passed through the oropharynx . The vocal cords were visualized and 2% lidocaine  was topically placed onto the cords and below in the bronchus.  Careful inspection of the tracheal lumen was accomplished.     An entire bronchial exam was performed including the right and left bronchi with the following findings:     Endobronchial findings: Multiple segment mucus plugs suctioned and removed, washed the airway but all was white and clear  Significant COPD changes  Trachea: Normal  Courtney: Normal shape  Right upper lobe bronchus: nicely open, no lesions  Right middle lobe  bronchus:nicely open, no lesions  Right lower lobe bronchus: nicely open, no lesions  Left main bronchus: nicely open, no lesions  Left upper lobe bronchus: nicely open, no lesions  Left lower lobe bronchus: nicely open, no lesions    The patient tolerated the procedure well.       Win Johnston MD   4/20/2023  10:12 EDT

## 2023-04-20 NOTE — H&P
History and Physical Short Stay Note    PATIENT IDENTIFICATION:  Name: Renuka Pelayo  MRN: [id number]  :  1955     Age: 67 y.o.  Sex: female      DATE OF PROCEDURE:  2023                   CHIEF COMPLAINT: SOB    History of Present Illness:   Renuka Pelayo is a 67 y.o. female with a history of COPD, Chronic respiratory failure, HTN, RUDDY, Hx DVT, GERD, Hypothyroidism, Obesity, and Insomnia who came to our office with   Complaints of cough and wheezing. Was prescribed antibiotics and had no improvement in symptoms so comes now for bronchoscopy.      Review of Systems:   Constitutional: As above   Eyes: negative   ENT/oropharynx: negative   Cardiovascular: As above   Respiratory: As above   Gastrointestinal: negative   Genitourinary: negative   Neurological: negative   Musculoskeletal: negative   Integument/breast: negative   Endocrine: negative   Allergic/Immunologic: negative     Past Medical History:  Past Medical History:   Diagnosis Date   • Arthritis    • Bladder cancer    • Chronic back pain    • Closed nondisplaced fracture of head of right radius 2017   • Congenital deformity of right hip joint 1956   • COPD (chronic obstructive pulmonary disease)     former smoker   • Deep venous thrombosis     unprovoked   • Depressive disorder    • Disease of thyroid gland    • Diverticulitis of colon    • Essential hypertension    • Gastroesophageal reflux disease    • Insomnia    • Obesity    • Pulmonary embolism     provoked by surgery       Past Surgical History:  Past Surgical History:   Procedure Laterality Date   • ABDOMINAL SURGERY     • BRONCHOSCOPY N/A 2020    Procedure: BRONCHOSCOPY with bronchial washing;  Surgeon: Win Johnston MD;  Location: [facility] ENDOSCOPY;  Service: Pulmonary;  Laterality: N/A;  Post: mucous plugs   • BRONCHOSCOPY N/A 2022    Procedure: BRONCHOSCOPY with bronchial washing;  Surgeon: Win Johnston MD;  Location: [facility] ENDOSCOPY;  Service: Pulmonary;   Laterality: N/A;  post op: pneumonia   • CHOLECYSTECTOMY     • COLON SURGERY     • COLONOSCOPY     • COLOSTOMY CLOSURE N/A 2021    Procedure: COLOSTOMY TAKEDOWN OR CLOSURE, extenisve lysis of adhesions;  Surgeon: Chi Farmer MD;  Location: [facility] MAIN OR;  Service: General;  Laterality: N/A;   • CYSTOSCOPY N/A 2021    Procedure: CYSTOSCOPY with bladder tumor removal and fulgeration, insertion of 3-way rizzo catheter;  Surgeon: Alfonzo Hayward MD;  Location: [facility] MAIN OR;  Service: Urology;  Laterality: N/A;   • ENDOSCOPY     • EXPLORATORY LAPAROTOMY N/A 2020    Procedure: LAPAROTOMY EXPLORATORY HARTMANS;  Surgeon: Chi Farmer MD;  Location: [facility] MAIN OR;  Service: General;  Laterality: N/A;   • HYSTERECTOMY     • TOTAL HIP ARTHROPLASTY Right    • TOTAL HIP ARTHROPLASTY REVISION Right     x3        Family History:  Family History   Problem Relation Age of Onset   • No Known Problems Mother    • No Known Problems Father         Social History:   Social History     Tobacco Use   • Smoking status: Former     Packs/day: 2.00     Years: 40.00     Pack years: 80.00     Types: Cigarettes     Quit date:      Years since quittin.3   • Smokeless tobacco: Never   • Tobacco comments:     ELECTRONIC   Substance Use Topics   • Alcohol use: Never        Allergies:  No Known Allergies    Home Meds:  Medications Prior to Admission   Medication Sig Dispense Refill Last Dose   • albuterol sulfate  (90 Base) MCG/ACT inhaler Inhale 2 puffs Every 4 (Four) Hours As Needed for Wheezing. 8.5 g 0 2023   • Budeson-Glycopyrrol-Formoterol (Breztri Aerosphere) 160-9-4.8 MCG/ACT aerosol inhaler Inhale 1 puff 2 (Two) Times a Day.   Past Week   • budesonide-formoterol (Symbicort) 160-4.5 MCG/ACT inhaler Inhale 2 puffs by mouth 2 (Two) Times a Day. 10.2 g 12 2023   • furosemide (LASIX) 20 MG tablet Take 1 tablet by mouth 2 (Two) Times a Day As Needed.   Past Month   •  "HYDROcodone-acetaminophen (NORCO)  MG per tablet Take 1 tablet by mouth Every 6 (Six) Hours As Needed for Moderate Pain.   2023   • lisinopril (PRINIVIL,ZESTRIL) 20 MG tablet Take 1 tablet by mouth Daily.   Past Week   • predniSONE (DELTASONE) 50 MG tablet Take 1 tablet by mouth Daily. 5 tablet 0 Past Week   • roflumilast (DALIRESP) 500 MCG tablet tablet Take 1 tablet by mouth Every Night.   Past Week   • warfarin (COUMADIN) 3 MG tablet Take 1 tablet by mouth Every Night.   Past Week   • zolpidem (AMBIEN) 5 MG tablet Take 1 tablet by mouth Every Night.   2023       Objective:  tMax 24 hrs: Temp (24hrs), Av.9 °F (36.6 °C), Min:97.9 °F (36.6 °C), Max:97.9 °F (36.6 °C)      Vitals Ranges:   Temp:  [97.9 °F (36.6 °C)] 97.9 °F (36.6 °C)  Heart Rate:  [83] 83  Resp:  [18] 18  BP: (162)/(82) 162/82    Intake and Output Last 3 Shifts:   No intake/output data recorded.    Exam:  /82 (BP Location: Left arm, Patient Position: Sitting)   Pulse 83   Temp 97.9 °F (36.6 °C) (Oral)   Resp 18   Ht 160 cm (63\")   Wt 87.9 kg (193 lb 12.6 oz)   SpO2 97%   BMI 34.33 kg/m²     General Appearance:    HEENT:  Normocephalic, without obvious abnormality, atraumatic.  Conjunctivae/corneas clear,.  Normal external ear canals. Nares normal, no drainage  Neck:  Supple, symmetrical, trachea midline. No JVD.  Lungs /Chest wall:   Bilateral basal rhonchi, respirations unlabored, symmetrical wall movement.     Heart:  Regular rate and rhythm, systolic murmur PMI left sternal border  Abdomen: Soft, nontender, no masses, no organomegaly.    Extremities: Trace edema, no clubbing or cyanosis        Data Review:  All labs (24hrs):   Recent Results (from the past 24 hour(s))   Protime-INR    Collection Time: 23  7:26 AM    Specimen: Arm, Right; Blood   Result Value Ref Range    Protime 13.1 (L) 19.4 - 28.5 Seconds    INR 1.24 (L) 2.00 - 3.00   CBC (No Diff)    Collection Time: 23  7:26 AM    Specimen: Arm, " Right; Blood   Result Value Ref Range    WBC 9.10 3.40 - 10.80 10*3/mm3    RBC 4.58 3.77 - 5.28 10*6/mm3    Hemoglobin 12.2 12.0 - 15.9 g/dL    Hematocrit 37.9 34.0 - 46.6 %    MCV 82.8 79.0 - 97.0 fL    MCH 26.7 26.6 - 33.0 pg    MCHC 32.2 31.5 - 35.7 g/dL    RDW 16.0 (H) 12.3 - 15.4 %    RDW-SD 46.4 37.0 - 54.0 fl    MPV 8.9 6.0 - 12.0 fL    Platelets 315 140 - 450 10*3/mm3        Imaging:  XR Chest 1 View  Narrative: XR CHEST 1 VW    Date of Exam: 3/22/2023 1:50 PM EDT    Indication: Cough congestion short of breath.    Comparison: 10/3/2022    Findings:  Stable mild cardiac enlargement. Chronic scarring in the medial right lung base involving the right middle lobe unchanged. No new consolidation. No pneumothorax or pleural effusion. Osseous structures grossly intact.  Impression: Impression:  No acute process.    Electronically Signed: John Gutierrez    3/22/2023 2:26 PM EDT    Workstation ID: [id number]       ASSESSMENT:  Chronic respiratory failure  COPD  RUDDY  HTN  Hx PE/DVT  Hypothyroidism  Obesity  Insomnia      PLAN:  Bronchoscopy this am and fluid sent for culture  Follow up in our office in 2 weeks   Continue   Bronchodilators  Inhaled corticosteroids  SteroidsElectrolytes/ glycemic control  Restart DVT prophylaxis    Discussed with Dr Preston Zhong, APRN   4/20/2023  08:35 EDT    I personally have examined  and interviewed the patient. I have reviewed the history, data, problems, assessment and plan with our NP.  Critical care time in direct medical management (   ) minutes  Electronically signed by Win Johnston MD. D, ABSM.

## 2023-04-20 NOTE — ANESTHESIA PREPROCEDURE EVALUATION
Anesthesia Evaluation     Patient summary reviewed and Nursing notes reviewed   no history of anesthetic complications:  NPO Solid Status: > 8 hours  NPO Liquid Status: > 8 hours           Airway   Mallampati: II  Dental      Pulmonary    (+) pneumonia , pulmonary embolism, a smoker Former, COPD,   Cardiovascular - normal exam    ECG reviewed  PT is on anticoagulation therapy  Patient on routine beta blocker    (+) hypertension, DVT,       Neuro/Psych  (+) psychiatric history Depression,    GI/Hepatic/Renal/Endo    (+) obesity,  GERD,  thyroid problem hypothyroidism    Musculoskeletal     (+) back pain, chronic pain,   Abdominal    Substance History      OB/GYN          Other   arthritis,    history of cancer    ROS/Med Hx Other: Bladder cancer, hypokalemia, severe sepsis, hypoxemia, insomnia    PSH  HYSTERECTOMY CHOLECYSTECTOMY  TOTAL HIP ARTHROPLASTY TOTAL HIP ARTHROPLASTY REVISION  EXPLORATORY LAPAROTOMY COLONOSCOPY  ENDOSCOPY BRONCHOSCOPY  ABDOMINAL SURGERY COLON SURGERY  COLOSTOMY CLOSURE CYSTOSCOPY                    Anesthesia Plan    ASA 4     MAC and general     (Patient identified; pre-operative vital signs, all relevant labs/studies, complete medical/surgical/anesthetic history, full medication list, full allergy list, and NPO status obtained/reviewed; physical assessment performed; anesthetic options, side effects, potential complications, risks, and benefits discussed; questions answered; written anesthesia consent obtained; patient cleared for procedure; anesthesia machine and equipment checked and functioning)    Anesthetic plan, risks, benefits, and alternatives have been provided, discussed and informed consent has been obtained with: patient.    Plan discussed with CRNA and CAA.        CODE STATUS:

## 2023-04-20 NOTE — DISCHARGE INSTRUCTIONS
A responsible adult should stay with you and you should rest quietly for the rest of the day.    Do not drink alcohol, drive, operate any heavy machinery or power tools or make any legal/important decisions for the next 24 hours.    Progress your diet as tolerated.  If you begin to experience severe pain, increased shortness of breath, racing heartbeat or a fever above 101 F, seek immediate medical attention.    Follow up with MD as instructed. Call office for results in 3 to 5 days if needed. [phone]    NOTHING BY MOUTH UNTIL 11 AM

## 2023-04-21 LAB — NIGHT BLUE STAIN TISS: NORMAL

## 2023-04-22 LAB
BACTERIA SPEC RESP CULT: NORMAL
GRAM STN SPEC: NORMAL

## 2023-04-27 LAB
FUNGUS WND CULT: ABNORMAL
MYCOBACTERIUM SPEC CULT: NORMAL
NIGHT BLUE STAIN TISS: NORMAL

## 2023-05-04 LAB
MYCOBACTERIUM SPEC CULT: NORMAL
NIGHT BLUE STAIN TISS: NORMAL

## 2023-05-11 LAB
MYCOBACTERIUM SPEC CULT: NORMAL
NIGHT BLUE STAIN TISS: NORMAL

## 2023-05-18 LAB
FUNGUS WND CULT: ABNORMAL
MYCOBACTERIUM SPEC CULT: NORMAL
NIGHT BLUE STAIN TISS: NORMAL

## 2023-05-25 LAB
MYCOBACTERIUM SPEC CULT: NORMAL
NIGHT BLUE STAIN TISS: NORMAL

## 2023-06-01 LAB
MYCOBACTERIUM SPEC CULT: NORMAL
NIGHT BLUE STAIN TISS: NORMAL

## 2023-06-05 ENCOUNTER — APPOINTMENT (OUTPATIENT)
Dept: GENERAL RADIOLOGY | Facility: HOSPITAL | Age: 68
End: 2023-06-05
Payer: MEDICARE

## 2023-06-05 ENCOUNTER — HOSPITAL ENCOUNTER (OUTPATIENT)
Facility: HOSPITAL | Age: 68
Setting detail: OBSERVATION
Discharge: HOME | End: 2023-06-07
Attending: EMERGENCY MEDICINE | Admitting: STUDENT IN AN ORGANIZED HEALTH CARE EDUCATION/TRAINING PROGRAM
Payer: MEDICARE

## 2023-06-05 DIAGNOSIS — E87.6 HYPOKALEMIA: ICD-10-CM

## 2023-06-05 DIAGNOSIS — E83.42 HYPOMAGNESEMIA: ICD-10-CM

## 2023-06-05 DIAGNOSIS — R60.0 LOWER EXTREMITY EDEMA: Primary | ICD-10-CM

## 2023-06-05 LAB
ALBUMIN SERPL-MCNC: 4.4 G/DL (ref 3.5–5.2)
ALBUMIN/GLOB SERPL: 1.5 G/DL
ALP SERPL-CCNC: 99 U/L (ref 39–117)
ALT SERPL W P-5'-P-CCNC: 38 U/L (ref 1–33)
ANION GAP SERPL CALCULATED.3IONS-SCNC: 14 MMOL/L (ref 5–15)
ANION GAP SERPL CALCULATED.3IONS-SCNC: 18 MMOL/L (ref 5–15)
AST SERPL-CCNC: 58 U/L (ref 1–32)
BASOPHILS # BLD AUTO: 0 10*3/MM3 (ref 0–0.2)
BASOPHILS NFR BLD AUTO: 0.2 % (ref 0–1.5)
BILIRUB SERPL-MCNC: 0.5 MG/DL (ref 0–1.2)
BUN SERPL-MCNC: 5 MG/DL (ref 8–23)
BUN SERPL-MCNC: 5 MG/DL (ref 8–23)
BUN/CREAT SERPL: 4.5 (ref 7–25)
BUN/CREAT SERPL: 4.6 (ref 7–25)
CALCIUM SPEC-SCNC: 9.1 MG/DL (ref 8.6–10.5)
CALCIUM SPEC-SCNC: 9.2 MG/DL (ref 8.6–10.5)
CHLORIDE SERPL-SCNC: 100 MMOL/L (ref 98–107)
CHLORIDE SERPL-SCNC: 100 MMOL/L (ref 98–107)
CO2 SERPL-SCNC: 28 MMOL/L (ref 22–29)
CO2 SERPL-SCNC: 32 MMOL/L (ref 22–29)
CREAT SERPL-MCNC: 1.09 MG/DL (ref 0.57–1)
CREAT SERPL-MCNC: 1.11 MG/DL (ref 0.57–1)
D DIMER PPP FEU-MCNC: 0.45 MG/L (FEU) (ref 0–0.66)
DEPRECATED RDW RBC AUTO: 45.9 FL (ref 37–54)
EGFRCR SERPLBLD CKD-EPI 2021: 54.9 ML/MIN/1.73
EGFRCR SERPLBLD CKD-EPI 2021: 56.1 ML/MIN/1.73
EOSINOPHIL # BLD AUTO: 0.2 10*3/MM3 (ref 0–0.4)
EOSINOPHIL NFR BLD AUTO: 1.6 % (ref 0.3–6.2)
ERYTHROCYTE [DISTWIDTH] IN BLOOD BY AUTOMATED COUNT: 15.4 % (ref 12.3–15.4)
GLOBULIN UR ELPH-MCNC: 2.9 GM/DL
GLUCOSE SERPL-MCNC: 107 MG/DL (ref 65–99)
GLUCOSE SERPL-MCNC: 129 MG/DL (ref 65–99)
HCT VFR BLD AUTO: 39.1 % (ref 34–46.6)
HGB BLD-MCNC: 12.9 G/DL (ref 12–15.9)
INR PPP: 2.04 (ref 2–3)
LYMPHOCYTES # BLD AUTO: 1.7 10*3/MM3 (ref 0.7–3.1)
LYMPHOCYTES NFR BLD AUTO: 16 % (ref 19.6–45.3)
MAGNESIUM SERPL-MCNC: 1.5 MG/DL (ref 1.6–2.4)
MCH RBC QN AUTO: 26.8 PG (ref 26.6–33)
MCHC RBC AUTO-ENTMCNC: 33.1 G/DL (ref 31.5–35.7)
MCV RBC AUTO: 81 FL (ref 79–97)
MONOCYTES # BLD AUTO: 0.9 10*3/MM3 (ref 0.1–0.9)
MONOCYTES NFR BLD AUTO: 8.2 % (ref 5–12)
NEUTROPHILS NFR BLD AUTO: 74 % (ref 42.7–76)
NEUTROPHILS NFR BLD AUTO: 8 10*3/MM3 (ref 1.7–7)
NRBC BLD AUTO-RTO: 0.2 /100 WBC (ref 0–0.2)
PLATELET # BLD AUTO: 316 10*3/MM3 (ref 140–450)
PMV BLD AUTO: 8.3 FL (ref 6–12)
POTASSIUM SERPL-SCNC: 2.4 MMOL/L (ref 3.5–5.2)
POTASSIUM SERPL-SCNC: 2.5 MMOL/L (ref 3.5–5.2)
PROT SERPL-MCNC: 7.3 G/DL (ref 6–8.5)
PROTHROMBIN TIME: 21 SECONDS (ref 19.4–28.5)
RBC # BLD AUTO: 4.83 10*6/MM3 (ref 3.77–5.28)
SODIUM SERPL-SCNC: 146 MMOL/L (ref 136–145)
SODIUM SERPL-SCNC: 146 MMOL/L (ref 136–145)
WBC NRBC COR # BLD: 10.8 10*3/MM3 (ref 3.4–10.8)

## 2023-06-05 PROCEDURE — 73502 X-RAY EXAM HIP UNI 2-3 VIEWS: CPT

## 2023-06-05 PROCEDURE — 85610 PROTHROMBIN TIME: CPT | Performed by: EMERGENCY MEDICINE

## 2023-06-05 PROCEDURE — 96365 THER/PROPH/DIAG IV INF INIT: CPT

## 2023-06-05 PROCEDURE — 83880 ASSAY OF NATRIURETIC PEPTIDE: CPT | Performed by: STUDENT IN AN ORGANIZED HEALTH CARE EDUCATION/TRAINING PROGRAM

## 2023-06-05 PROCEDURE — 36415 COLL VENOUS BLD VENIPUNCTURE: CPT | Performed by: EMERGENCY MEDICINE

## 2023-06-05 PROCEDURE — 85379 FIBRIN DEGRADATION QUANT: CPT | Performed by: EMERGENCY MEDICINE

## 2023-06-05 PROCEDURE — 25010000002 MAGNESIUM SULFATE 2 GM/50ML SOLUTION: Performed by: EMERGENCY MEDICINE

## 2023-06-05 PROCEDURE — 83735 ASSAY OF MAGNESIUM: CPT | Performed by: EMERGENCY MEDICINE

## 2023-06-05 PROCEDURE — 80053 COMPREHEN METABOLIC PANEL: CPT | Performed by: EMERGENCY MEDICINE

## 2023-06-05 PROCEDURE — 93005 ELECTROCARDIOGRAM TRACING: CPT | Performed by: EMERGENCY MEDICINE

## 2023-06-05 PROCEDURE — 99285 EMERGENCY DEPT VISIT HI MDM: CPT

## 2023-06-05 PROCEDURE — 85025 COMPLETE CBC W/AUTO DIFF WBC: CPT | Performed by: EMERGENCY MEDICINE

## 2023-06-05 RX ORDER — POTASSIUM CHLORIDE 20 MEQ/1
40 TABLET, EXTENDED RELEASE ORAL EVERY 4 HOURS
Status: COMPLETED | OUTPATIENT
Start: 2023-06-05 | End: 2023-06-06

## 2023-06-05 RX ORDER — MAGNESIUM SULFATE HEPTAHYDRATE 40 MG/ML
2 INJECTION, SOLUTION INTRAVENOUS
Status: COMPLETED | OUTPATIENT
Start: 2023-06-05 | End: 2023-06-07

## 2023-06-05 RX ORDER — SODIUM CHLORIDE 0.9 % (FLUSH) 0.9 %
10 SYRINGE (ML) INJECTION AS NEEDED
Status: DISCONTINUED | OUTPATIENT
Start: 2023-06-05 | End: 2023-06-07 | Stop reason: HOSPADM

## 2023-06-05 RX ADMIN — MAGNESIUM SULFATE HEPTAHYDRATE 2 G: 2 INJECTION, SOLUTION INTRAVENOUS at 23:43

## 2023-06-05 RX ADMIN — POTASSIUM CHLORIDE 40 MEQ: 1500 TABLET, EXTENDED RELEASE ORAL at 21:04

## 2023-06-05 NOTE — Clinical Note
Level of Care: Telemetry [5]   Admitting Physician: JORDAN BROWNING [[id number]]   Attending Physician: JORDAN BROWNING [[id number]]

## 2023-06-06 ENCOUNTER — APPOINTMENT (OUTPATIENT)
Dept: GENERAL RADIOLOGY | Facility: HOSPITAL | Age: 68
End: 2023-06-06
Payer: MEDICARE

## 2023-06-06 ENCOUNTER — APPOINTMENT (OUTPATIENT)
Dept: CARDIOLOGY | Facility: HOSPITAL | Age: 68
End: 2023-06-06
Payer: MEDICARE

## 2023-06-06 PROBLEM — R60.0 LOWER EXTREMITY EDEMA: Status: ACTIVE | Noted: 2023-06-06

## 2023-06-06 LAB
ANION GAP SERPL CALCULATED.3IONS-SCNC: 14 MMOL/L (ref 5–15)
BH CV LOWER VASCULAR LEFT COMMON FEMORAL AUGMENT: NORMAL
BH CV LOWER VASCULAR LEFT COMMON FEMORAL COMPETENT: NORMAL
BH CV LOWER VASCULAR LEFT COMMON FEMORAL COMPRESS: NORMAL
BH CV LOWER VASCULAR LEFT COMMON FEMORAL PHASIC: NORMAL
BH CV LOWER VASCULAR LEFT COMMON FEMORAL SPONT: NORMAL
BH CV LOWER VASCULAR LEFT DISTAL FEMORAL COMPRESS: NORMAL
BH CV LOWER VASCULAR LEFT GASTRONEMIUS COMPRESS: NORMAL
BH CV LOWER VASCULAR LEFT GREATER SAPH AK COMPRESS: NORMAL
BH CV LOWER VASCULAR LEFT GREATER SAPH BK COMPRESS: NORMAL
BH CV LOWER VASCULAR LEFT LESSER SAPH COMPRESS: NORMAL
BH CV LOWER VASCULAR LEFT MID FEMORAL AUGMENT: NORMAL
BH CV LOWER VASCULAR LEFT MID FEMORAL COMPETENT: NORMAL
BH CV LOWER VASCULAR LEFT MID FEMORAL COMPRESS: NORMAL
BH CV LOWER VASCULAR LEFT MID FEMORAL PHASIC: NORMAL
BH CV LOWER VASCULAR LEFT MID FEMORAL SPONT: NORMAL
BH CV LOWER VASCULAR LEFT PERONEAL COMPRESS: NORMAL
BH CV LOWER VASCULAR LEFT POPLITEAL AUGMENT: NORMAL
BH CV LOWER VASCULAR LEFT POPLITEAL COMPETENT: NORMAL
BH CV LOWER VASCULAR LEFT POPLITEAL COMPRESS: NORMAL
BH CV LOWER VASCULAR LEFT POPLITEAL PHASIC: NORMAL
BH CV LOWER VASCULAR LEFT POPLITEAL SPONT: NORMAL
BH CV LOWER VASCULAR LEFT POSTERIOR TIBIAL COMPRESS: NORMAL
BH CV LOWER VASCULAR LEFT PROXIMAL FEMORAL COMPRESS: NORMAL
BH CV LOWER VASCULAR LEFT SAPHENOFEMORAL JUNCTION COMPRESS: NORMAL
BH CV LOWER VASCULAR RIGHT COMMON FEMORAL AUGMENT: NORMAL
BH CV LOWER VASCULAR RIGHT COMMON FEMORAL COMPETENT: NORMAL
BH CV LOWER VASCULAR RIGHT COMMON FEMORAL COMPRESS: NORMAL
BH CV LOWER VASCULAR RIGHT COMMON FEMORAL PHASIC: NORMAL
BH CV LOWER VASCULAR RIGHT COMMON FEMORAL SPONT: NORMAL
BH CV LOWER VASCULAR RIGHT DISTAL FEMORAL COMPRESS: NORMAL
BH CV LOWER VASCULAR RIGHT GASTRONEMIUS COMPRESS: NORMAL
BH CV LOWER VASCULAR RIGHT GREATER SAPH AK COMPRESS: NORMAL
BH CV LOWER VASCULAR RIGHT GREATER SAPH BK COMPRESS: NORMAL
BH CV LOWER VASCULAR RIGHT LESSER SAPH COMPRESS: NORMAL
BH CV LOWER VASCULAR RIGHT MID FEMORAL AUGMENT: NORMAL
BH CV LOWER VASCULAR RIGHT MID FEMORAL COMPETENT: NORMAL
BH CV LOWER VASCULAR RIGHT MID FEMORAL COMPRESS: NORMAL
BH CV LOWER VASCULAR RIGHT MID FEMORAL PHASIC: NORMAL
BH CV LOWER VASCULAR RIGHT MID FEMORAL SPONT: NORMAL
BH CV LOWER VASCULAR RIGHT PERONEAL COMPRESS: NORMAL
BH CV LOWER VASCULAR RIGHT POPLITEAL AUGMENT: NORMAL
BH CV LOWER VASCULAR RIGHT POPLITEAL COMPETENT: NORMAL
BH CV LOWER VASCULAR RIGHT POPLITEAL COMPRESS: NORMAL
BH CV LOWER VASCULAR RIGHT POPLITEAL PHASIC: NORMAL
BH CV LOWER VASCULAR RIGHT POPLITEAL SPONT: NORMAL
BH CV LOWER VASCULAR RIGHT POSTERIOR TIBIAL COMPRESS: NORMAL
BH CV LOWER VASCULAR RIGHT PROXIMAL FEMORAL COMPRESS: NORMAL
BH CV LOWER VASCULAR RIGHT SAPHENOFEMORAL JUNCTION COMPRESS: NORMAL
BUN SERPL-MCNC: 6 MG/DL (ref 8–23)
BUN/CREAT SERPL: 5.2 (ref 7–25)
CALCIUM SPEC-SCNC: 9.2 MG/DL (ref 8.6–10.5)
CHLORIDE SERPL-SCNC: 102 MMOL/L (ref 98–107)
CO2 SERPL-SCNC: 30 MMOL/L (ref 22–29)
CREAT SERPL-MCNC: 1.15 MG/DL (ref 0.57–1)
DEPRECATED RDW RBC AUTO: 45.9 FL (ref 37–54)
EGFRCR SERPLBLD CKD-EPI 2021: 52.6 ML/MIN/1.73
ERYTHROCYTE [DISTWIDTH] IN BLOOD BY AUTOMATED COUNT: 15.8 % (ref 12.3–15.4)
GLUCOSE BLDC GLUCOMTR-MCNC: 112 MG/DL (ref 70–105)
GLUCOSE BLDC GLUCOMTR-MCNC: 113 MG/DL (ref 70–105)
GLUCOSE BLDC GLUCOMTR-MCNC: 117 MG/DL (ref 70–105)
GLUCOSE BLDC GLUCOMTR-MCNC: 136 MG/DL (ref 70–105)
GLUCOSE SERPL-MCNC: 151 MG/DL (ref 65–99)
HAV IGM SERPL QL IA: NORMAL
HBA1C MFR BLD: 5.8 % (ref 4.8–5.6)
HBV CORE IGM SERPL QL IA: NORMAL
HBV SURFACE AG SERPL QL IA: NORMAL
HCT VFR BLD AUTO: 38.5 % (ref 34–46.6)
HCV AB SER DONR QL: NORMAL
HGB BLD-MCNC: 12.4 G/DL (ref 12–15.9)
INR PPP: 2.44 (ref 2–3)
MAGNESIUM SERPL-MCNC: 1.6 MG/DL (ref 1.6–2.4)
MAGNESIUM SERPL-MCNC: 2.1 MG/DL (ref 1.6–2.4)
MCH RBC QN AUTO: 26.8 PG (ref 26.6–33)
MCHC RBC AUTO-ENTMCNC: 32.3 G/DL (ref 31.5–35.7)
MCV RBC AUTO: 83 FL (ref 79–97)
NT-PROBNP SERPL-MCNC: 84.8 PG/ML (ref 0–900)
PLATELET # BLD AUTO: 336 10*3/MM3 (ref 140–450)
PMV BLD AUTO: 8.2 FL (ref 6–12)
POTASSIUM SERPL-SCNC: 3 MMOL/L (ref 3.5–5.2)
POTASSIUM SERPL-SCNC: 3.2 MMOL/L (ref 3.5–5.2)
PROTHROMBIN TIME: 24.8 SECONDS (ref 19.4–28.5)
QT INTERVAL: 413 MS
RBC # BLD AUTO: 4.65 10*6/MM3 (ref 3.77–5.28)
SODIUM SERPL-SCNC: 146 MMOL/L (ref 136–145)
WBC NRBC COR # BLD: 11.1 10*3/MM3 (ref 3.4–10.8)

## 2023-06-06 PROCEDURE — G0378 HOSPITAL OBSERVATION PER HR: HCPCS

## 2023-06-06 PROCEDURE — 96366 THER/PROPH/DIAG IV INF ADDON: CPT

## 2023-06-06 PROCEDURE — 80074 ACUTE HEPATITIS PANEL: CPT | Performed by: STUDENT IN AN ORGANIZED HEALTH CARE EDUCATION/TRAINING PROGRAM

## 2023-06-06 PROCEDURE — 83036 HEMOGLOBIN GLYCOSYLATED A1C: CPT | Performed by: STUDENT IN AN ORGANIZED HEALTH CARE EDUCATION/TRAINING PROGRAM

## 2023-06-06 PROCEDURE — 96361 HYDRATE IV INFUSION ADD-ON: CPT

## 2023-06-06 PROCEDURE — 25010000002 MAGNESIUM SULFATE 2 GM/50ML SOLUTION: Performed by: STUDENT IN AN ORGANIZED HEALTH CARE EDUCATION/TRAINING PROGRAM

## 2023-06-06 PROCEDURE — 97165 OT EVAL LOW COMPLEX 30 MIN: CPT

## 2023-06-06 PROCEDURE — 83735 ASSAY OF MAGNESIUM: CPT | Performed by: STUDENT IN AN ORGANIZED HEALTH CARE EDUCATION/TRAINING PROGRAM

## 2023-06-06 PROCEDURE — 73630 X-RAY EXAM OF FOOT: CPT

## 2023-06-06 PROCEDURE — 80048 BASIC METABOLIC PNL TOTAL CA: CPT | Performed by: STUDENT IN AN ORGANIZED HEALTH CARE EDUCATION/TRAINING PROGRAM

## 2023-06-06 PROCEDURE — 84132 ASSAY OF SERUM POTASSIUM: CPT | Performed by: HOSPITALIST

## 2023-06-06 PROCEDURE — 94799 UNLISTED PULMONARY SVC/PX: CPT

## 2023-06-06 PROCEDURE — 85610 PROTHROMBIN TIME: CPT | Performed by: HOSPITALIST

## 2023-06-06 PROCEDURE — 85027 COMPLETE CBC AUTOMATED: CPT | Performed by: STUDENT IN AN ORGANIZED HEALTH CARE EDUCATION/TRAINING PROGRAM

## 2023-06-06 PROCEDURE — 82948 REAGENT STRIP/BLOOD GLUCOSE: CPT

## 2023-06-06 PROCEDURE — 93970 EXTREMITY STUDY: CPT

## 2023-06-06 PROCEDURE — 94640 AIRWAY INHALATION TREATMENT: CPT

## 2023-06-06 PROCEDURE — 97161 PT EVAL LOW COMPLEX 20 MIN: CPT

## 2023-06-06 RX ORDER — NICOTINE POLACRILEX 4 MG
15 LOZENGE BUCCAL
Status: DISCONTINUED | OUTPATIENT
Start: 2023-06-06 | End: 2023-06-07 | Stop reason: HOSPADM

## 2023-06-06 RX ORDER — BISACODYL 10 MG
10 SUPPOSITORY, RECTAL RECTAL DAILY PRN
Status: DISCONTINUED | OUTPATIENT
Start: 2023-06-06 | End: 2023-06-07 | Stop reason: HOSPADM

## 2023-06-06 RX ORDER — INSULIN LISPRO 100 [IU]/ML
2-9 INJECTION, SOLUTION INTRAVENOUS; SUBCUTANEOUS
Status: DISCONTINUED | OUTPATIENT
Start: 2023-06-06 | End: 2023-06-07 | Stop reason: HOSPADM

## 2023-06-06 RX ORDER — POLYETHYLENE GLYCOL 3350 17 G/17G
17 POWDER, FOR SOLUTION ORAL DAILY PRN
Status: DISCONTINUED | OUTPATIENT
Start: 2023-06-06 | End: 2023-06-07 | Stop reason: HOSPADM

## 2023-06-06 RX ORDER — OXYCODONE HYDROCHLORIDE 5 MG/1
10 TABLET ORAL EVERY 6 HOURS PRN
Status: DISCONTINUED | OUTPATIENT
Start: 2023-06-06 | End: 2023-06-07 | Stop reason: HOSPADM

## 2023-06-06 RX ORDER — SODIUM CHLORIDE 0.9 % (FLUSH) 0.9 %
10 SYRINGE (ML) INJECTION EVERY 12 HOURS SCHEDULED
Status: DISCONTINUED | OUTPATIENT
Start: 2023-06-06 | End: 2023-06-07 | Stop reason: HOSPADM

## 2023-06-06 RX ORDER — ONDANSETRON 4 MG/1
4 TABLET, FILM COATED ORAL EVERY 6 HOURS PRN
Status: DISCONTINUED | OUTPATIENT
Start: 2023-06-06 | End: 2023-06-07 | Stop reason: HOSPADM

## 2023-06-06 RX ORDER — PANTOPRAZOLE SODIUM 40 MG/1
40 TABLET, DELAYED RELEASE ORAL
Status: DISCONTINUED | OUTPATIENT
Start: 2023-06-06 | End: 2023-06-07 | Stop reason: HOSPADM

## 2023-06-06 RX ORDER — ALBUTEROL SULFATE 2.5 MG/3ML
2.5 SOLUTION RESPIRATORY (INHALATION) EVERY 4 HOURS PRN
Status: DISCONTINUED | OUTPATIENT
Start: 2023-06-06 | End: 2023-06-07 | Stop reason: HOSPADM

## 2023-06-06 RX ORDER — POTASSIUM CHLORIDE 20 MEQ/1
40 TABLET, EXTENDED RELEASE ORAL DAILY
Status: DISCONTINUED | OUTPATIENT
Start: 2023-06-06 | End: 2023-06-07 | Stop reason: HOSPADM

## 2023-06-06 RX ORDER — DEXTROSE MONOHYDRATE 25 G/50ML
25 INJECTION, SOLUTION INTRAVENOUS
Status: DISCONTINUED | OUTPATIENT
Start: 2023-06-06 | End: 2023-06-07 | Stop reason: HOSPADM

## 2023-06-06 RX ORDER — ROFLUMILAST 500 UG/1
500 TABLET ORAL NIGHTLY
Status: DISCONTINUED | OUTPATIENT
Start: 2023-06-06 | End: 2023-06-07 | Stop reason: HOSPADM

## 2023-06-06 RX ORDER — SODIUM CHLORIDE, SODIUM LACTATE, POTASSIUM CHLORIDE, CALCIUM CHLORIDE 600; 310; 30; 20 MG/100ML; MG/100ML; MG/100ML; MG/100ML
75 INJECTION, SOLUTION INTRAVENOUS CONTINUOUS
Status: DISCONTINUED | OUTPATIENT
Start: 2023-06-06 | End: 2023-06-07 | Stop reason: HOSPADM

## 2023-06-06 RX ORDER — SODIUM CHLORIDE 9 MG/ML
40 INJECTION, SOLUTION INTRAVENOUS AS NEEDED
Status: DISCONTINUED | OUTPATIENT
Start: 2023-06-06 | End: 2023-06-07 | Stop reason: HOSPADM

## 2023-06-06 RX ORDER — BISACODYL 5 MG/1
5 TABLET, DELAYED RELEASE ORAL DAILY PRN
Status: DISCONTINUED | OUTPATIENT
Start: 2023-06-06 | End: 2023-06-07 | Stop reason: HOSPADM

## 2023-06-06 RX ORDER — WARFARIN SODIUM 3 MG/1
3 TABLET ORAL
Status: DISCONTINUED | OUTPATIENT
Start: 2023-06-06 | End: 2023-06-07 | Stop reason: HOSPADM

## 2023-06-06 RX ORDER — SODIUM CHLORIDE 0.9 % (FLUSH) 0.9 %
10 SYRINGE (ML) INJECTION AS NEEDED
Status: DISCONTINUED | OUTPATIENT
Start: 2023-06-06 | End: 2023-06-07 | Stop reason: HOSPADM

## 2023-06-06 RX ORDER — BUDESONIDE AND FORMOTEROL FUMARATE DIHYDRATE 160; 4.5 UG/1; UG/1
2 AEROSOL RESPIRATORY (INHALATION)
Status: DISCONTINUED | OUTPATIENT
Start: 2023-06-06 | End: 2023-06-07 | Stop reason: HOSPADM

## 2023-06-06 RX ORDER — NITROGLYCERIN 0.4 MG/1
0.4 TABLET SUBLINGUAL
Status: DISCONTINUED | OUTPATIENT
Start: 2023-06-06 | End: 2023-06-07 | Stop reason: HOSPADM

## 2023-06-06 RX ORDER — IBUPROFEN 600 MG/1
1 TABLET ORAL
Status: DISCONTINUED | OUTPATIENT
Start: 2023-06-06 | End: 2023-06-07 | Stop reason: HOSPADM

## 2023-06-06 RX ORDER — ONDANSETRON 2 MG/ML
4 INJECTION INTRAMUSCULAR; INTRAVENOUS EVERY 6 HOURS PRN
Status: DISCONTINUED | OUTPATIENT
Start: 2023-06-06 | End: 2023-06-07 | Stop reason: HOSPADM

## 2023-06-06 RX ORDER — AMOXICILLIN 250 MG
2 CAPSULE ORAL 2 TIMES DAILY
Status: DISCONTINUED | OUTPATIENT
Start: 2023-06-06 | End: 2023-06-07 | Stop reason: HOSPADM

## 2023-06-06 RX ORDER — ZOLPIDEM TARTRATE 5 MG/1
5 TABLET ORAL NIGHTLY
Status: DISCONTINUED | OUTPATIENT
Start: 2023-06-06 | End: 2023-06-07 | Stop reason: HOSPADM

## 2023-06-06 RX ADMIN — BUDESONIDE AND FORMOTEROL FUMARATE DIHYDRATE 2 PUFF: 160; 4.5 AEROSOL RESPIRATORY (INHALATION) at 06:54

## 2023-06-06 RX ADMIN — SENNOSIDES AND DOCUSATE SODIUM 2 TABLET: 50; 8.6 TABLET ORAL at 08:29

## 2023-06-06 RX ADMIN — MAGNESIUM SULFATE HEPTAHYDRATE 2 G: 2 INJECTION, SOLUTION INTRAVENOUS at 02:30

## 2023-06-06 RX ADMIN — WARFARIN SODIUM 3 MG: 3 TABLET ORAL at 18:00

## 2023-06-06 RX ADMIN — Medication 10 ML: at 20:56

## 2023-06-06 RX ADMIN — ROFLUMILAST 500 MCG: 500 TABLET ORAL at 20:55

## 2023-06-06 RX ADMIN — BUDESONIDE AND FORMOTEROL FUMARATE DIHYDRATE 2 PUFF: 160; 4.5 AEROSOL RESPIRATORY (INHALATION) at 19:05

## 2023-06-06 RX ADMIN — Medication 10 ML: at 11:08

## 2023-06-06 RX ADMIN — PANTOPRAZOLE SODIUM 40 MG: 40 TABLET, DELAYED RELEASE ORAL at 06:15

## 2023-06-06 RX ADMIN — SODIUM CHLORIDE, POTASSIUM CHLORIDE, SODIUM LACTATE AND CALCIUM CHLORIDE 75 ML/HR: 600; 310; 30; 20 INJECTION, SOLUTION INTRAVENOUS at 20:55

## 2023-06-06 RX ADMIN — POTASSIUM CHLORIDE 40 MEQ: 1500 TABLET, EXTENDED RELEASE ORAL at 00:14

## 2023-06-06 RX ADMIN — POTASSIUM CHLORIDE 40 MEQ: 1500 TABLET, EXTENDED RELEASE ORAL at 06:15

## 2023-06-06 RX ADMIN — ZOLPIDEM TARTRATE 5 MG: 5 TABLET ORAL at 20:56

## 2023-06-06 RX ADMIN — SODIUM CHLORIDE, POTASSIUM CHLORIDE, SODIUM LACTATE AND CALCIUM CHLORIDE 75 ML/HR: 600; 310; 30; 20 INJECTION, SOLUTION INTRAVENOUS at 00:45

## 2023-06-06 RX ADMIN — POTASSIUM CHLORIDE 40 MEQ: 1500 TABLET, EXTENDED RELEASE ORAL at 16:37

## 2023-06-06 RX ADMIN — WARFARIN SODIUM 3 MG: 3 TABLET ORAL at 03:11

## 2023-06-06 RX ADMIN — MAGNESIUM SULFATE HEPTAHYDRATE 2 G: 2 INJECTION, SOLUTION INTRAVENOUS at 06:14

## 2023-06-06 RX ADMIN — Medication 10 ML: at 08:30

## 2023-06-06 NOTE — PLAN OF CARE
Goal Outcome Evaluation:   Patient is alert and oriented, able to make needs known to staff. Patient has no complaints of pain noted a this time. Will continue to monitor.

## 2023-06-06 NOTE — PROGRESS NOTES
"Pharmacy dosing service  Anticoagulant  Warfarin     Subjective:    Renuka Pelayo is a 67 y.o.female being continued on warfarin for  hx of DVT/PE  .    INR Goal: 2 - 3  Home medication?: warfarin 3 mg PO daily  Bridge Therapy Present?:  No  Interacting Medications Evaluation (New/Present/Discontinued): N/A  Additional Contributing Factors: N/A      Assessment/Plan:    Patient received 6/5 dose after midnight on 6/6. INR acutely increased today. Clarified with the patient that no other doses were taken on 6/5.     There is no concerning cause for the acute elevation and the level is still within goal range. Will continue home regimen of 3 mg daily for today and monitor INR closely for further increases in INR.         Date 6/5 6/6          INR 2.04 2.44          Dose 3mg   3 mg              Objective:  [Ht: 160 cm (63\"); Wt: 82.1 kg (181 lb); BMI: Body mass index is 32.06 kg/m².]    Lab Results   Component Value Date    ALBUMIN 4.4 06/05/2023     Lab Results   Component Value Date    INR 2.44 06/06/2023    INR 2.04 06/05/2023    INR 1.24 (L) 04/20/2023    PROTIME 24.8 06/06/2023    PROTIME 21.0 06/05/2023    PROTIME 13.1 (L) 04/20/2023     Lab Results   Component Value Date    HGB 12.4 06/06/2023    HGB 12.9 06/05/2023    HGB 12.2 04/20/2023     Lab Results   Component Value Date    HCT 38.5 06/06/2023    HCT 39.1 06/05/2023    HCT 37.9 04/20/2023       Concepcion Suero [facility]  06/06/23 14:41 EDT       "

## 2023-06-06 NOTE — PLAN OF CARE
Goal Outcome Evaluation:              Outcome Evaluation: Renuka Pelayo is a 67 y.o. female with PMHx of HTN, GERD, obesity, depression, chronic back pain, COPD, h/o DVT and PE who presented to [facility] on 6/5/2023 complaining of Left foot swelling.  X-ray and venus duplex (-).  This date pt was independent with mobility and ADLs.  Strength  is WFL.  She appars to be near baseline level of function.  No further OT needs.

## 2023-06-06 NOTE — PLAN OF CARE
Goal Outcome Evaluation:  Plan of Care Reviewed With: patient           Outcome Evaluation: 66 yo female who came to ER on 6/5/23 for assessment of LLE edema. Has edema primarily in L foot/ankle. Has been having this since onset of L hip pain ~ 3 weeks ago. Duplex scans of LLE were normal. L hip xray (-) for any acute or osseous changes. Shows only OA. ProBNP level normal. Dx w/ hypokalemia & hypernatremia. Hx of bladder ca, aaa, dvt, PE, among other comorbidities. Pt is quite active and lives w/ her  in single level function home.  Today, pt is observed ambulating independently w/ an arthritic/antalgic gait. Reports this is her gait at baseline and has been for several years. Pt's strength is WNL. She has 2+ edema in the L foot. Pt appears to be back to her baseline. No need for skilled PT at this time. Recommend home at d/c. Will sign off.

## 2023-06-06 NOTE — CASE MANAGEMENT/SOCIAL WORK
Discharge Planning Assessment  [facility]     Patient Name: Renuka Pelayo  MRN: [id number]  Today's Date: 6/6/2023    Admit Date: 6/5/2023    Plan: Return Home, currently has home 02@2L via [employer]   Discharge Needs Assessment       Row Name 06/06/23 1102       Living Environment    People in Home spouse    Name(s) of People in Home Spouse-Adrian    Current Living Arrangements home    Primary Care Provided by self    Provides Primary Care For no one    Family Caregiver if Needed spouse    Family Caregiver Names Adrian    Quality of Family Relationships helpful;involved;supportive    Able to Return to Prior Arrangements yes       Resource/Environmental Concerns    Resource/Environmental Concerns none    Transportation Concerns none       Transition Planning    Patient/Family Anticipates Transition to home;home with family    Patient/Family Anticipated Services at Transition none    Transportation Anticipated family or friend will provide  Spouse       Discharge Needs Assessment    Readmission Within the Last 30 Days no previous admission in last 30 days    Equipment Currently Used at Home oxygen  02@ 2L([employer]) portable and concentrator    Concerns to be Addressed discharge planning    Anticipated Changes Related to Illness none    Equipment Needed After Discharge none                   Discharge Plan       Row Name 06/06/23 1104       Plan    Plan Return Home, currently has home 02@2L via [employer]    Plan Comments Spoke with isacc at bedside, states IADL's, Currentyl has home 02 via [employer], Confirmed PCP and Pharmacy. No transportation or medication coverage issues.                  Continued Care and Services - Admitted Since 6/5/2023    Coordination has not been started for this encounter.       Expected Discharge Date and Time       Expected Discharge Date Expected Discharge Time    Jun 7, 2023            Demographic Summary       Row Name 06/06/23 1101       General Information    Admission Type observation     Arrived From home    Required Notices Provided Observation Status Notice    Referral Source emergency department;patient    Reason for Consult discharge planning    Preferred Language English                   Functional Status       Row Name 06/06/23 1101       Functional Status    Usual Activity Tolerance good    Current Activity Tolerance good       Functional Status, IADL    Medications independent    Meal Preparation independent    Housekeeping independent    Laundry independent    Shopping independent       Mental Status    General Appearance WDL WDL                Patient Forms       Row Name 06/06/23 1100       Patient Forms    Patient Observation Letter Delivered  6/6/23 Registration             Met with patient in room wearing PPE: mask, face shield/goggles, gloves, gown.      Maintained distance greater than six feet and spent less than 15 minutes in the room  Maddy Saldana RN

## 2023-06-06 NOTE — ED PROVIDER NOTES
Subjective   History of Present Illness  67-year-old female presents for left foot swelling and left hip pain.  Been going on for several weeks.  Denies any new injuries.  Is on Coumadin.  Last INR was 1.6 approximately a week ago she states.  No chest pain or change in her baseline shortness of breath.  Wears oxygen at baseline.  No fevers.  States she walks with a limp at baseline and has had her right hip replaced multiple times.  Review of Systems  Positive for left lower extremity edema and left hip pain.  Past Medical History:   Diagnosis Date    Arthritis     Bladder cancer     Chronic back pain     Closed nondisplaced fracture of head of right radius 03/2017    Congenital deformity of right hip joint 1956    COPD (chronic obstructive pulmonary disease)     former smoker    Deep venous thrombosis     unprovoked    Depressive disorder     Disease of thyroid gland     Diverticulitis of colon     Essential hypertension     Gastroesophageal reflux disease     Insomnia     Obesity     Pulmonary embolism     provoked by surgery       No Known Allergies    Past Surgical History:   Procedure Laterality Date    ABDOMINAL SURGERY      BRONCHOSCOPY N/A 11/25/2020    Procedure: BRONCHOSCOPY with bronchial washing;  Surgeon: Win Johnston MD;  Location: [facility] ENDOSCOPY;  Service: Pulmonary;  Laterality: N/A;  Post: mucous plugs    BRONCHOSCOPY N/A 4/25/2022    Procedure: BRONCHOSCOPY with bronchial washing;  Surgeon: Win Johnston MD;  Location: [facility] ENDOSCOPY;  Service: Pulmonary;  Laterality: N/A;  post op: pneumonia    BRONCHOSCOPY N/A 4/20/2023    Procedure: BRONCHOSCOPY with bilateral wash;  Surgeon: Win Johnston MD;  Location: [facility] ENDOSCOPY;  Service: Pulmonary;  Laterality: N/A;  mucous plugs    CHOLECYSTECTOMY      COLON SURGERY      COLONOSCOPY      COLOSTOMY CLOSURE N/A 2/25/2021    Procedure: COLOSTOMY TAKEDOWN OR CLOSURE, extenisve lysis of adhesions;  Surgeon: Chi Farmer MD;   Location: [facility] MAIN OR;  Service: General;  Laterality: N/A;    CYSTOSCOPY N/A 2021    Procedure: CYSTOSCOPY with bladder tumor removal and fulgeration, insertion of 3-way rizzo catheter;  Surgeon: Alfonzo Hayward MD;  Location: [facility] MAIN OR;  Service: Urology;  Laterality: N/A;    ENDOSCOPY      EXPLORATORY LAPAROTOMY N/A 2020    Procedure: LAPAROTOMY EXPLORATORY HARTMANS;  Surgeon: Chi Farmer MD;  Location: [facility] MAIN OR;  Service: General;  Laterality: N/A;    HYSTERECTOMY      TOTAL HIP ARTHROPLASTY Right     TOTAL HIP ARTHROPLASTY REVISION Right     x3       Family History   Problem Relation Age of Onset    No Known Problems Mother     No Known Problems Father        Social History     Socioeconomic History    Marital status:    Tobacco Use    Smoking status: Former     Packs/day: 2.00     Years: 40.00     Pack years: 80.00     Types: Cigarettes     Quit date:      Years since quittin.4    Smokeless tobacco: Never    Tobacco comments:     ELECTRONIC   Vaping Use    Vaping Use: Former    Substances: Nicotine, Flavoring   Substance and Sexual Activity    Alcohol use: Never    Drug use: Never    Sexual activity: Defer           Objective   Physical Exam  Constitutional:  No acute distress.  Head:  Atraumatic.  Normocephalic.   Eyes:  No scleral icterus. Normal conjunctivae  ENT:  Moist mucosa.  No nasal discharge present.  Nasal cannula in place.  Cardiovascular:  Well perfused.  Equal pulses.  Regular rhythm and normal rate.  Normal capillary refill.    Pulmonary/Chest:  No respiratory distress.  Airway patent.  No tachypnea.  No accessory muscle usage.    Abdominal:  Nondistended. Nontender.   Extremities: Edema of the left foot.  Pain with range of motion in left hip but range of motion intact.  Intact distal pulses.  Sensation intact to light touch distally.  Strength intact.  Skin:  Warm, dry  Neurological:  Alert, awake, and appropriate.  Normal speech.       Procedures           ED Course                                           Medical Decision Making  Amount and/or Complexity of Data Reviewed  Labs: ordered.  Radiology: ordered.  ECG/medicine tests: ordered.    Risk  OTC drugs.  Prescription drug management.    EKG # 1  Signed and interpreted by the EP.  Time Interpreted: 8:33 PM  Rate: 80  Rhythm: Normal sinus rhythm  Axis: Normal axis  Intervals: Within normal limits  ST Segments: No acute ischemic changes     Comparison: PVC present on this EKG but not present on March 22, 2023 EKG.      Patient with some significant electrolyte abnormalities that were not expected.  We will repeat to check their veracity.    Final diagnoses:   None       ED Disposition  ED Disposition       None            No follow-up provider specified.       Medication List      No changes were made to your prescriptions during this visit.          medication?     predniSONE 50 MG tablet  Commonly known as: DELTASONE  Take 1 tablet by mouth Daily.               Where to Get Your Medications        These medications were sent to [facility] - [address] [place] [address]      Hours: Mon-Fri 7:00AM-7:00PM Phone: [phone]   potassium chloride 20 MEQ CR tablet            Jonnie Kimball MD  06/14/23 0042

## 2023-06-06 NOTE — THERAPY EVALUATION
Patient Name: Renuka Pelayo  : 1955    MRN: [id number]                              Today's Date: 2023       Admit Date: 2023    Visit Dx:     ICD-10-CM ICD-9-CM   1. Lower extremity edema  R60.0 782.3   2. Hypokalemia  E87.6 276.8   3. Hypomagnesemia  E83.42 275.2     Patient Active Problem List   Diagnosis    Severe sepsis    COPD (chronic obstructive pulmonary disease)    Chronic back pain    Depressive disorder    GERD without esophagitis    Primary hypertension    Insomnia    Chronic anticoagulation    Obesity    Status post Mariya procedure (HCC)    S/P bladder tumor excision with fulguration    Acquired hypothyroidism    Pneumonia of right lower lobe due to infectious organism    COPD with acute exacerbation    AAA (abdominal aortic aneurysm)    COPD exacerbation    Dyspnea, unspecified type    Lower extremity edema     Past Medical History:   Diagnosis Date    Arthritis     Bladder cancer     Chronic back pain     Closed nondisplaced fracture of head of right radius 2017    Congenital deformity of right hip joint 1956    COPD (chronic obstructive pulmonary disease)     former smoker    Deep venous thrombosis     unprovoked    Depressive disorder     Disease of thyroid gland     Diverticulitis of colon     Essential hypertension     Gastroesophageal reflux disease     Insomnia     Obesity     Pulmonary embolism     provoked by surgery     Past Surgical History:   Procedure Laterality Date    ABDOMINAL SURGERY      BRONCHOSCOPY N/A 2020    Procedure: BRONCHOSCOPY with bronchial washing;  Surgeon: Win Johnston MD;  Location: [facility] ENDOSCOPY;  Service: Pulmonary;  Laterality: N/A;  Post: mucous plugs    BRONCHOSCOPY N/A 2022    Procedure: BRONCHOSCOPY with bronchial washing;  Surgeon: Win Johnston MD;  Location: [facility] ENDOSCOPY;  Service: Pulmonary;  Laterality: N/A;  post op: pneumonia    BRONCHOSCOPY N/A 2023    Procedure: BRONCHOSCOPY with bilateral wash;   Surgeon: Win Johnston MD;  Location: [facility] ENDOSCOPY;  Service: Pulmonary;  Laterality: N/A;  mucous plugs    CHOLECYSTECTOMY      COLON SURGERY      COLONOSCOPY      COLOSTOMY CLOSURE N/A 2/25/2021    Procedure: COLOSTOMY TAKEDOWN OR CLOSURE, extenisve lysis of adhesions;  Surgeon: Chi Farmer MD;  Location: [facility] MAIN OR;  Service: General;  Laterality: N/A;    CYSTOSCOPY N/A 2/25/2021    Procedure: CYSTOSCOPY with bladder tumor removal and fulgeration, insertion of 3-way rizzo catheter;  Surgeon: Alfonzo Hayward MD;  Location: [facility] MAIN OR;  Service: Urology;  Laterality: N/A;    ENDOSCOPY      EXPLORATORY LAPAROTOMY N/A 7/16/2020    Procedure: LAPAROTOMY EXPLORATORY HARTMANS;  Surgeon: Chi Farmer MD;  Location: [facility] MAIN OR;  Service: General;  Laterality: N/A;    HYSTERECTOMY      TOTAL HIP ARTHROPLASTY Right     TOTAL HIP ARTHROPLASTY REVISION Right     x3      General Information       Row Name 06/06/23 1606          OT Time and Intention    Document Type evaluation  -SR     Mode of Treatment occupational therapy  -SR       Row Name 06/06/23 1606          Occupational Profile    Reason for Services/Referral (Occupational Profile) Renuka Pelayo is a 67 y.o. female with PMHx of HTN, GERD, obesity, depression, chronic back pain, COPD, h/o DVT and PE who presented to [facility] on 6/5/2023 complaining of Left foot swelling.  X-ray and venus duplex (-).  -SR       Row Name 06/06/23 1606          Living Environment    People in Home spouse  -SR       Row Name 06/06/23 1606          Cognition    Orientation Status (Cognition) oriented x 4  -SR               User Key  (r) = Recorded By, (t) = Taken By, (c) = Cosigned By      Initials Name Provider Type    SR Malina Sotelo OT Occupational Therapist                     Mobility/ADL's       Row Name 06/06/23 1607          Sit-Stand Transfer    Sit-Stand [place] (Transfers) independent  -SR       Row Name  06/06/23 1607          Functional Mobility    Functional Mobility- Ind. Level independent  -SR       Row Name 06/06/23 1607          Activities of Daily Living    BADL Assessment/Intervention lower body dressing  -SR       [facility] Name 06/06/23 1607          Lower Body Dressing Assessment/Training    [place] Level (Lower Body Dressing) don;shoes/slippers;independent  -SR               User Key  (r) = Recorded By, (t) = Taken By, (c) = Cosigned By      Initials Name Provider Type    SR Malina Sotelo OT Occupational Therapist                   Obj/Interventions       Row Name 06/06/23 1607          Range of Motion Comprehensive    General Range of Motion no range of motion deficits identified  -SR       [facility] Name 06/06/23 1607          Strength Comprehensive (MMT)    General Manual Muscle Testing (MMT) Assessment no strength deficits identified  -SR               User Key  (r) = Recorded By, (t) = Taken By, (c) = Cosigned By      Initials Name Provider Type    SR Malina Sotelo, EDIS Occupational Therapist                   Goals/Plan    No documentation.                  Clinical Impression       [facility] Name 06/06/23 1607          Pain Assessment    Pretreatment Pain Rating 0/10 - no pain  -SR     Posttreatment Pain Rating 0/10 - no pain  -SR       [facility] Name 06/06/23 1607          Plan of Care Review    Outcome Evaluation Renuka Pelayo is a 67 y.o. female with PMHx of HTN, GERD, obesity, depression, chronic back pain, COPD, h/o DVT and PE who presented to [facility] on 6/5/2023 complaining of Left foot swelling.  X-ray and venus duplex (-).  This date pt was independent with mobility and ADLs.  Strength  is WFL.  She appars to be near baseline level of function.  No further OT needs.  -SR       [facility] Name 06/06/23 1607          Therapy Assessment/Plan (OT)    Criteria for Skilled Therapeutic Interventions Met (OT) no problems identified which require skilled intervention  -SR     Therapy Frequency  (OT) evaluation only  -SR       Row Name 06/06/23 1607          Therapy Plan Review/Discharge Plan (OT)    Anticipated Discharge Disposition (OT) home with assist  -SR       Row Name 06/06/23 1607          Positioning and Restraints    Pre-Treatment Position standing in room  -SR     Post Treatment Position bed  -SR               User Key  (r) = Recorded By, (t) = Taken By, (c) = Cosigned By      Initials Name Provider Type    SR Malina Sotelo, OT Occupational Therapist                   Outcome Measures       Row Name 06/06/23 1116          How much help from another person do you currently need...    Turning from your back to your side while in flat bed without using bedrails? 4  -CM     Moving from lying on back to sitting on the side of a flat bed without bedrails? 4  -CM     Moving to and from a bed to a chair (including a wheelchair)? 4  -CM     Standing up from a chair using your arms (e.g., wheelchair, bedside chair)? 4  -CM     Climbing 3-5 steps with a railing? 4  -CM     To walk in hospital room? 4  -CM     AM-PAC 6 Clicks Score (PT) 24  -CM     Highest level of mobility 8 --> Walked 250 feet or more  -CM       Row Name 06/06/23 1116          Functional Assessment    Outcome Measure Options AM-PAC 6 Clicks Basic Mobility (PT)  -CM               User Key  (r) = Recorded By, (t) = Taken By, (c) = Cosigned By      Initials Name Provider Type    CM Katina Llamas, PT Physical Therapist                    Occupational Therapy Education       Title: PT OT SLP Therapies (In Progress)       Topic: Occupational Therapy (In Progress)       Point: ADL training (Done)       Description:   Instruct learner(s) on proper safety adaptation and remediation techniques during self care or transfers.   Instruct in proper use of assistive devices.                  Learning Progress Summary             Patient Acceptance, E,TB, VU by SR at 6/6/2023 1611                         Point: Home exercise program (Not  Started)       Description:   Instruct learner(s) on appropriate technique for monitoring, assisting and/or progressing therapeutic exercises/activities.                  Learner Progress:  Not documented in this visit.              Point: Precautions (Not Started)       Description:   Instruct learner(s) on prescribed precautions during self-care and functional transfers.                  Learner Progress:  Not documented in this visit.              Point: Body mechanics (Not Started)       Description:   Instruct learner(s) on proper positioning and spine alignment during self-care, functional mobility activities and/or exercises.                  Learner Progress:  Not documented in this visit.                              User Key       Initials Effective Dates Name Provider Type Discipline    SR 06/16/21 -  Malina Sotelo OT Occupational Therapist OT                  OT Recommendation and Plan  Therapy Frequency (OT): evaluation only  Plan of Care Review  Outcome Evaluation: Renuka Pelayo is a 67 y.o. female with PMHx of HTN, GERD, obesity, depression, chronic back pain, COPD, h/o DVT and PE who presented to [facility] on 6/5/2023 complaining of Left foot swelling.  X-ray and venus duplex (-).  This date pt was independent with mobility and ADLs.  Strength  is WFL.  She appars to be near baseline level of function.  No further OT needs.     Time Calculation:    Time Calculation- OT       Row Name 06/06/23 1611             Time Calculation- OT    OT Start Time 0903  -SR      OT Stop Time 0910  -SR      OT Time Calculation (min) 7 min  -SR      Total Timed Code Minutes- OT 0 minute(s)  -SR      OT Received On 06/06/23  -SR                User Key  (r) = Recorded By, (t) = Taken By, (c) = Cosigned By      Initials Name Provider Type    SR Malina Sotelo OT Occupational Therapist                  Therapy Charges for Today       Code Description Service Date Service Provider Modifiers Qty     [id number]  OT EVAL LOW COMPLEXITY 2 6/6/2023 Malina Sotelo, OT GO 1                 Malina Sotelo, OT  6/6/2023

## 2023-06-06 NOTE — PROGRESS NOTES
[facility] Medicine Services   Daily Progress Note    Patient Name: Renuka Pelayo  : 1955  MRN: [id number]  Primary Care Physician:  Michael Gerardo MD  Date of admission: 2023  Date and Time of Service: 23        Subjective      Chief Complaint: Leg swelling and left hip pain    Patient Reports improvement in leg swelling as well as hip pain.    ROS   Negative    Objective      Vitals:   Temp:  [97.7 °F (36.5 °C)-98.1 °F (36.7 °C)] 97.7 °F (36.5 °C)  Heart Rate:  [79-90] 90  Resp:  [20-24] 24  BP: (132-174)/(63-98) 149/70  Flow (L/min):  [2] 2    Physical Exam   General Appearance: AOO x 4, cooperative, no distress, appropriate for age  Head:  Normocephalic, without obvious abnormality  Eyes:  PERRL, EOM's intact, conjunctivae and cornea clear  Nose:  Nares symmetrical, septum midline, mucosa pink  Throat:  Lips, tongue, and mucosa are moist, pink, and intact  Neck:  Supple; symmetrical, trachea midline, no adenopathy  Back:  Symmetrical, ROM normal, no CVA tenderness  Lungs: Respirations unlabored, no audible wheeze  Heart: Regular rate & rhythm, S1 and S2 normal  Abdomen:  Soft, nontender, bowel sounds active all four quadrants  Musculoskeletal: Tone and strength strong and symmetrical, all extremities; no joint pain or edema         Skin/Hair/Nails:  Skin warm, dry and intact, no rashes or abnormal dyspigmentation        Result Review    Result Review:  I have personally reviewed the results from the time of this admission to 2023 19:08 EDT and agree with these findings:  [x]  Laboratory  []  Microbiology  [x]  Radiology  []  EKG/Telemetry   []  Cardiology/Vascular   []  Pathology  []  Old records  []  Other:  Most notable findings include: Potassium 3.0, sodium 146          Assessment & Plan      Brief Patient Summary:  Renuka Pelayo is a 67 y.o. female who presented with leg swelling and hip pain       budesonide-formoterol, 2 puff, Inhalation, BID - RT  insulin lispro,  2-9 Units, Subcutaneous, 4x Daily AC & at Bedtime  pantoprazole, 40 mg, Oral, Q AM  potassium chloride, 40 mEq, Oral, Daily  roflumilast, 500 mcg, Oral, Nightly  senna-docusate sodium, 2 tablet, Oral, BID  sodium chloride, 10 mL, Intravenous, Q12H  warfarin, 3 mg, Oral, Daily  zolpidem, 5 mg, Oral, Nightly       lactated ringers, 75 mL/hr, Last Rate: 75 mL/hr (06/06/23 0045)  [START ON 6/7/2023] Pharmacy to dose warfarin,          Active Hospital Problems:  Active Hospital Problems    Diagnosis     **Lower extremity edema      Plan:   Continue IV fluids  Continue potassium replacement  Left hip x-ray negative for any fractures of the arthritis.  Continue oxycodone  Magnesium level improved  PT OT eval  Continue home meds    DVT prophylaxis:  Medical DVT prophylaxis orders are present.    CODE STATUS:    Code Status (Patient has no pulse and is not breathing): CPR (Attempt to Resuscitate)  Medical Interventions (Patient has pulse or is breathing): Full Support      Disposition:  I expect patient to be discharged in 24 hours.      Electronically signed by Rimma Grossman MD, 06/06/23, 19:08 EDT.  [facility]ist Team

## 2023-06-06 NOTE — THERAPY EVALUATION
Patient Name: Renuka Pelayo  : 1955    MRN: [id number]                              Today's Date: 2023       Admit Date: 2023    Visit Dx:     ICD-10-CM ICD-9-CM   1. Lower extremity edema  R60.0 782.3   2. Hypokalemia  E87.6 276.8   3. Hypomagnesemia  E83.42 275.2     Patient Active Problem List   Diagnosis    Severe sepsis    COPD (chronic obstructive pulmonary disease)    Chronic back pain    Depressive disorder    GERD without esophagitis    Primary hypertension    Insomnia    Chronic anticoagulation    Obesity    Status post Mariya procedure (HCC)    S/P bladder tumor excision with fulguration    Acquired hypothyroidism    Pneumonia of right lower lobe due to infectious organism    COPD with acute exacerbation    AAA (abdominal aortic aneurysm)    COPD exacerbation    Dyspnea, unspecified type    Lower extremity edema     Past Medical History:   Diagnosis Date    Arthritis     Bladder cancer     Chronic back pain     Closed nondisplaced fracture of head of right radius 2017    Congenital deformity of right hip joint 1956    COPD (chronic obstructive pulmonary disease)     former smoker    Deep venous thrombosis     unprovoked    Depressive disorder     Disease of thyroid gland     Diverticulitis of colon     Essential hypertension     Gastroesophageal reflux disease     Insomnia     Obesity     Pulmonary embolism     provoked by surgery     Past Surgical History:   Procedure Laterality Date    ABDOMINAL SURGERY      BRONCHOSCOPY N/A 2020    Procedure: BRONCHOSCOPY with bronchial washing;  Surgeon: Win Johnston MD;  Location: [facility] ENDOSCOPY;  Service: Pulmonary;  Laterality: N/A;  Post: mucous plugs    BRONCHOSCOPY N/A 2022    Procedure: BRONCHOSCOPY with bronchial washing;  Surgeon: Win Johnston MD;  Location: [facility] ENDOSCOPY;  Service: Pulmonary;  Laterality: N/A;  post op: pneumonia    BRONCHOSCOPY N/A 2023    Procedure: BRONCHOSCOPY with bilateral wash;   Surgeon: Win Johnston MD;  Location: [facility] ENDOSCOPY;  Service: Pulmonary;  Laterality: N/A;  mucous plugs    CHOLECYSTECTOMY      COLON SURGERY      COLONOSCOPY      COLOSTOMY CLOSURE N/A 2/25/2021    Procedure: COLOSTOMY TAKEDOWN OR CLOSURE, extenisve lysis of adhesions;  Surgeon: Chi Farmer MD;  Location: [facility] MAIN OR;  Service: General;  Laterality: N/A;    CYSTOSCOPY N/A 2/25/2021    Procedure: CYSTOSCOPY with bladder tumor removal and fulgeration, insertion of 3-way rizzo catheter;  Surgeon: Alfonzo Hayward MD;  Location: [facility] MAIN OR;  Service: Urology;  Laterality: N/A;    ENDOSCOPY      EXPLORATORY LAPAROTOMY N/A 7/16/2020    Procedure: LAPAROTOMY EXPLORATORY HARTMANS;  Surgeon: Chi Farmer MD;  Location: [facility] MAIN OR;  Service: General;  Laterality: N/A;    HYSTERECTOMY      TOTAL HIP ARTHROPLASTY Right     TOTAL HIP ARTHROPLASTY REVISION Right     x3      General Information       Row Name 06/06/23 1106          Physical Therapy Time and Intention    Document Type evaluation  -CM     Mode of Treatment physical therapy  -CM       Row Name 06/06/23 1106          General Information    Patient Profile Reviewed yes  -CM     Prior Level of Function independent:;community mobility;gait;all household mobility;yard work;driving;ADL's  uses scooter in large stores due to arthritic pain.  -CM     Existing Precautions/Restrictions no known precautions/restrictions  -CM     Barriers to Rehab none identified  -CM       Row Name 06/06/23 1106          Living Environment    People in Home spouse  -CM       Row Name 06/06/23 1106          Cognition    Orientation Status (Cognition) oriented x 4  -CM               User Key  (r) = Recorded By, (t) = Taken By, (c) = Cosigned By      Initials Name Provider Type    CM Katina Llamas, PT Physical Therapist                   Mobility       Row Name 06/06/23 1108          Bed Mobility    Bed Mobility bed mobility (all) activities  -CM      All Activities, [place] (Bed Mobility) not tested  -CM     Comment, (Bed Mobility) pt up amb when PT entered room  -CM       Row Name 06/06/23 1108          Sit-Stand Transfer    Sit-Stand [place] (Transfers) independent  -CM       Row Name 06/06/23 1108          Gait/Stairs (Locomotion)    [place] Level (Gait) independent  -CM     Distance in Feet (Gait) pt ambulating independently back from bathroom when PT entered room. Bathroom 30 ft from pt's room. Gait - marked antalgic gait, arthritic gait w/ decreased stance on LLE, hiking of L hip w/ swingthrough. Pt reports this is her normal gait and has been for several years. Reports she has had 3 hip replacements on RLE and refuses to have L hip replaced.  -CM               User Key  (r) = Recorded By, (t) = Taken By, (c) = Cosigned By      Initials Name Provider Type    CM Katina Llamas, PT Physical Therapist                   Obj/Interventions       Row Name 06/06/23 1110          Range of Motion Comprehensive    General Range of Motion bilateral lower extremity ROM WNL;bilateral upper extremity ROM WFL  -CM       Row Name 06/06/23 1110          Strength Comprehensive (MMT)    General Manual Muscle Testing (MMT) Assessment no strength deficits identified  -CM       Row Name 06/06/23 1110          Balance    Balance Assessment sitting static balance;sitting dynamic balance;standing static balance;standing dynamic balance  -CM     Static Sitting Balance independent  -CM     Dynamic Sitting Balance independent  -CM     Position, Sitting Balance unsupported;sitting edge of bed  -CM     Static Standing Balance independent  -CM     Dynamic Standing Balance independent  -CM     Position/Device Used, Standing Balance unsupported  -CM       Row Name 06/06/23 1110          Sensory Assessment (Somatosensory)    Sensory Assessment (Somatosensory) sensation intact  -CM               User Key  (r) = Recorded By, (t) = Taken By, (c) = Cosigned By      Initials  Name Provider Type    Katina Maher, PT Physical Therapist                   Goals/Plan    No documentation.                  Clinical Impression       Row Name 06/06/23 1110          Pain    Pretreatment Pain Rating 0/10 - no pain  -CM     Posttreatment Pain Rating 0/10 - no pain  -CM     Pre/Posttreatment Pain Comment denies pain; reports she still has some mild edema in L foot, but no other significant issues at this time.  -CM     Pain Intervention(s) Repositioned;Nursing Notified;Ambulation/increased activity;Emotional support  -       Row Name 06/06/23 1110          Plan of Care Review    Plan of Care Reviewed With patient  -CM     Outcome Evaluation 68 yo female who came to ER on 6/5/23 for assessment of LLE edema. Has edema primarily in L foot/ankle. Has been having this since onset of L hip pain ~ 3 weeks ago. Duplex scans of LLE were normal. L hip xray (-) for any acute or osseous changes. Shows only OA. ProBNP level normal. Dx w/ hypokalemia & hypernatremia. Hx of bladder ca, aaa, dvt, PE, among other comorbidities. Pt is quite active and lives w/ her  in single level function home.  Today, pt is observed ambulating independently w/ an arthritic/antalgic gait. Reports this is her gait at baseline and has been for several years. Pt's strength is WNL. She has 2+ edema in the L foot. Pt appears to be back to her baseline. No need for skilled PT at this time. Recommend home at d/c. Will sign off.  -       Row Name 06/06/23 1115          Therapy Assessment/Plan (PT)    Criteria for Skilled Interventions Met (PT) no;no problems identified which require skilled intervention  -     Therapy Frequency (PT) evaluation only  -CM       Row Name 06/06/23 1115          Vital Signs    O2 Delivery Pre Treatment room air  -CM     Intra SpO2 (%) 90  -CM     O2 Delivery Intra Treatment room air  -CM     O2 Delivery Post Treatment room air  -CM     Recovery Time VSS  -CM       Row Name 06/06/23 1115           Positioning and Restraints    Pre-Treatment Position standing in room  -CM     Post Treatment Position bed  -CM     In Bed notified nsg;fowlers;call light within reach;encouraged to call for assist  -CM               User Key  (r) = Recorded By, (t) = Taken By, (c) = Cosigned By      Initials Name Provider Type    Katina Maher, PT Physical Therapist                   Outcome Measures       Row Name 06/06/23 1116          How much help from another person do you currently need...    Turning from your back to your side while in flat bed without using bedrails? 4  -CM     Moving from lying on back to sitting on the side of a flat bed without bedrails? 4  -CM     Moving to and from a bed to a chair (including a wheelchair)? 4  -CM     Standing up from a chair using your arms (e.g., wheelchair, bedside chair)? 4  -CM     Climbing 3-5 steps with a railing? 4  -CM     To walk in hospital room? 4  -CM     AM-PAC 6 Clicks Score (PT) 24  -CM     Highest level of mobility 8 --> Walked 250 feet or more  -CM       Row Name 06/06/23 1116          Functional Assessment    Outcome Measure Options AM-PAC 6 Clicks Basic Mobility (PT)  -CM               User Key  (r) = Recorded By, (t) = Taken By, (c) = Cosigned By      Initials Name Provider Type    Katina Maher, PT Physical Therapist                                 Physical Therapy Education       Title: PT OT SLP Therapies (Done)       Topic: Physical Therapy (Done)       Point: Mobility training (Done)       Learning Progress Summary             Patient Acceptance, E,TB, VU,DU by  at 6/6/2023 1117                                         User Key       Initials Effective Dates Name Provider Type Discipline     06/16/21 -  Katina Llamas, PT Physical Therapist PT                  PT Recommendation and Plan     Plan of Care Reviewed With: patient  Outcome Evaluation: 68 yo female who came to ER on 6/5/23 for assessment of LLE edema. Has edema primarily in L  foot/ankle. Has been having this since onset of L hip pain ~ 3 weeks ago. Duplex scans of LLE were normal. L hip xray (-) for any acute or osseous changes. Shows only OA. ProBNP level normal. Dx w/ hypokalemia & hypernatremia. Hx of bladder ca, aaa, dvt, PE, among other comorbidities. Pt is quite active and lives w/ her  in single level function home.  Today, pt is observed ambulating independently w/ an arthritic/antalgic gait. Reports this is her gait at baseline and has been for several years. Pt's strength is WNL. She has 2+ edema in the L foot. Pt appears to be back to her baseline. No need for skilled PT at this time. Recommend home at d/c. Will sign off.     Time Calculation:    PT Charges       Row Name 06/06/23 1304             Time Calculation    Start Time 0904  -CM      Stop Time 0910  -CM      Time Calculation (min) 6 min  -CM      PT Received On 06/06/23  -CM         Time Calculation- PT    Total Timed Code Minutes- PT 0 minute(s)  -CM                User Key  (r) = Recorded By, (t) = Taken By, (c) = Cosigned By      Initials Name Provider Type    Katina Maher, PT Physical Therapist                  Therapy Charges for Today       Code Description Service Date Service Provider Modifiers Qty    09243200072 HC PT EVAL LOW COMPLEXITY 2 6/6/2023 Katina Llamas, PT GP 1            PT G-Codes  Outcome Measure Options: AM-PAC 6 Clicks Basic Mobility (PT)  AM-PAC 6 Clicks Score (PT): 24  PT Discharge Summary  Anticipated Discharge Disposition (PT): home    Katina Llamas PT  6/6/2023

## 2023-06-06 NOTE — H&P
[facility] Medicine Services      Patient Name: Renuka Pelayo  : 1955  MRN: [id number]  Primary Care Physician:  Michael Gerardo MD  Date of admission: 2023      Subjective      Chief Complaint: left foot swelling    History of Present Illness: Renuka Pelayo is a 67 y.o. female with PMHx of HTN, GERD, obesity, depression, chronic back pain, COPD, h/o DVT and PE who presented to [facility] on 2023 complaining of Left foot swelling.  Admits to left foot swelling complicated by left hip pain over the past 3 weeks without improvement.  Symptoms worse with exertion and better with rest.  Denies chest pain, dyspnea, pitting edema, trauma, injury, abdominal pain, fever, ulcers.  Was put on PO lasix last week but had no improvement in symptoms and presented to [facility] for evaluation    In the ER, vitals 97.5, HR 85, RR 20, /98, 96% 2L. Labs notable for sodium 146, K 2.5, bicarb 32, Cr 1.11, glucose 129, D-dimer 0.45, INR 2.04. Left hip x-ray without acute abnormality.  EKG NSR without gross ischemia, PVC noted, similar to previous.    Patient to be admitted for electrolyte replacement    ROS   12 point ROS reviewed and negative except as mentioned above      Personal History     Past Medical History:   Diagnosis Date    Arthritis     Bladder cancer     Chronic back pain     Closed nondisplaced fracture of head of right radius 2017    Congenital deformity of right hip joint 1956    COPD (chronic obstructive pulmonary disease)     former smoker    Deep venous thrombosis     unprovoked    Depressive disorder     Disease of thyroid gland     Diverticulitis of colon     Essential hypertension     Gastroesophageal reflux disease     Insomnia     Obesity     Pulmonary embolism     provoked by surgery       Past Surgical History:   Procedure Laterality Date    ABDOMINAL SURGERY      BRONCHOSCOPY N/A 2020    Procedure: BRONCHOSCOPY with bronchial washing;   Surgeon: Win Johnston MD;  Location: [facility] ENDOSCOPY;  Service: Pulmonary;  Laterality: N/A;  Post: mucous plugs    BRONCHOSCOPY N/A 4/25/2022    Procedure: BRONCHOSCOPY with bronchial washing;  Surgeon: Win Johnston MD;  Location: [facility] ENDOSCOPY;  Service: Pulmonary;  Laterality: N/A;  post op: pneumonia    BRONCHOSCOPY N/A 4/20/2023    Procedure: BRONCHOSCOPY with bilateral wash;  Surgeon: Win Johnston MD;  Location: [facility] ENDOSCOPY;  Service: Pulmonary;  Laterality: N/A;  mucous plugs    CHOLECYSTECTOMY      COLON SURGERY      COLONOSCOPY      COLOSTOMY CLOSURE N/A 2/25/2021    Procedure: COLOSTOMY TAKEDOWN OR CLOSURE, extenisve lysis of adhesions;  Surgeon: Chi Farmer MD;  Location: [facility] MAIN OR;  Service: General;  Laterality: N/A;    CYSTOSCOPY N/A 2/25/2021    Procedure: CYSTOSCOPY with bladder tumor removal and fulgeration, insertion of 3-way rizzo catheter;  Surgeon: Alfonzo Hayward MD;  Location: [facility] MAIN OR;  Service: Urology;  Laterality: N/A;    ENDOSCOPY      EXPLORATORY LAPAROTOMY N/A 7/16/2020    Procedure: LAPAROTOMY EXPLORATORY HARTMANS;  Surgeon: Chi Farmer MD;  Location: [facility] MAIN OR;  Service: General;  Laterality: N/A;    HYSTERECTOMY      TOTAL HIP ARTHROPLASTY Right     TOTAL HIP ARTHROPLASTY REVISION Right     x3       Family History: family history includes No Known Problems in her father and mother. Otherwise pertinent FHx was reviewed and not pertinent to current issue.    Social History:  reports that she quit smoking about 4 years ago. Her smoking use included cigarettes. She has a 80.00 pack-year smoking history. She has never used smokeless tobacco. She reports that she does not drink alcohol and does not use drugs.    Home Medications:  Prior to Admission Medications       Prescriptions Last Dose Informant Patient Reported? Taking?    albuterol sulfate  (90 Base) MCG/ACT inhaler   No No    Inhale 2 puffs Every 4 (Four) Hours  As Needed for Wheezing.    Budeson-Glycopyrrol-Formoterol (Breztri Aerosphere) 160-9-4.8 MCG/ACT aerosol inhaler   Yes No    Inhale 1 puff 2 (Two) Times a Day.    budesonide-formoterol (Symbicort) 160-4.5 MCG/ACT inhaler   No No    Inhale 2 puffs by mouth 2 (Two) Times a Day.    furosemide (LASIX) 20 MG tablet   Yes No    Take 1 tablet by mouth 2 (Two) Times a Day As Needed.    HYDROcodone-acetaminophen (NORCO)  MG per tablet   Yes No    Take 1 tablet by mouth Every 6 (Six) Hours As Needed for Moderate Pain.    lisinopril (PRINIVIL,ZESTRIL) 20 MG tablet   Yes No    Take 1 tablet by mouth Daily.    predniSONE (DELTASONE) 50 MG tablet   No No    Take 1 tablet by mouth Daily.    roflumilast (DALIRESP) 500 MCG tablet tablet   Yes No    Take 1 tablet by mouth Every Night.    warfarin (COUMADIN) 3 MG tablet  Self Yes No    Take 1 tablet by mouth Every Night.    zolpidem (AMBIEN) 5 MG tablet   Yes No    Take 1 tablet by mouth Every Night.              Allergies:  No Known Allergies    Objective      Vitals:   Temp:  [97.5 °F (36.4 °C)] 97.5 °F (36.4 °C)  Heart Rate:  [] 85  Resp:  [20] 20  BP: (161-174)/(92-98) 174/98  Flow (L/min):  [2] 2    Physical Exam  Constitutional:       General: She is not in acute distress.     Appearance: She is not toxic-appearing.   HENT:      Head: Normocephalic and atraumatic.      Nose: Nose normal. No congestion.      Mouth/Throat:      Pharynx: Oropharynx is clear. No oropharyngeal exudate.   Eyes:      General: No scleral icterus.  Cardiovascular:      Rate and Rhythm: Normal rate and regular rhythm.      Heart sounds: No murmur heard.    No friction rub. No gallop.   Pulmonary:      Effort: No respiratory distress.      Breath sounds: No wheezing or rales.      Comments: Patient on 2L NC  Abdominal:      General: There is no distension.      Tenderness: There is no abdominal tenderness. There is no guarding.   Musculoskeletal:      Cervical back: Normal range of motion. No  rigidity.      Comments: Slight Left foot swelling, no pitting edema  Skin discoloration over dorsum of both feet   Skin:     Findings: No lesion or rash.   Neurological:      General: No focal deficit present.      Mental Status: She is alert and oriented to person, place, and time.      Motor: No weakness.       Result Review    Result Review:  I have personally reviewed the results from the time of this admission to 6/5/2023 23:25 EDT and agree with these findings:  [x]  Laboratory  []  Microbiology  [x]  Radiology  []  EKG/Telemetry   []  Cardiology/Vascular   []  Pathology  []  Old records  []  Other:        Assessment & Plan        Active Hospital Problems:  There are no active hospital problems to display for this patient.    Plan:     #Left foot swelling  #Left hip pain    - check left venous duplex    - check proBNP    - check left foot x-ray    - no improvement with PO lasix, no pitting edema    - left hip x-ray shows osteopenia with moderate arthritis of the left hip similar to previous studies along with remote healed fractures of the right pubic rami    -pt/ot    - check A1c, glucose 129, possible left foot neuropathy    #COPD    - Patient on 2L NC, home O2 is 2L NC    - Symbicort BID    - Duoneb QID    - resume home Daliresp    - Patient is off prednisone    #Hypokalemia    - K 2.4 on admission with mag 1.5    - replace per protocol    - Recently started lasix, suspect cause of hypokalemia, hold lasix    #Hypernatremia    - sodium 146 with AG 18 and slightly elevated creatinine on admission    - suspect over-diuresis from recently started lasix    - hold lasix    - trial LR @ 75/hr to replace volume and electrolytes    #h/o DVT and PE    -continue home warfarin, pharm to dose    - current INR is 2.04, goal is 2-3    - D-dimer 0.45    - check left duplex    #GERD    - PPI    #Transaminitis    - AST 58 and ALT 38    - check hepatitis panel    #Obesity    - BMI 32.06    - weight loss  encouraged    #Depression    -reconcile home meds    - resume home ambien QHS    #Chronic back pain    - hold home Norco 2/2 transaminitis    - Oxycodone 10mg PO q6h PRN pain    #HTN    - hold home lisinopril due to elevated creatinine       DVT prophylaxis:continue home warfarin    CODE STATUS:       Admission Status:  I believe this patient meets observation status.    I discussed the patient's findings and my recommendations with patient.    This patient has been examined wearing appropriate Personal Protective Equipment and discussed with  Patient . 06/05/23      Signature: Electronically signed by Juventino Hubbard DO, 06/05/23, 11:39 PM EDT.

## 2023-06-07 VITALS
SYSTOLIC BLOOD PRESSURE: 135 MMHG | RESPIRATION RATE: 24 BRPM | WEIGHT: 185.63 LBS | BODY MASS INDEX: 32.89 KG/M2 | OXYGEN SATURATION: 99 % | HEIGHT: 63 IN | TEMPERATURE: 98.3 F | HEART RATE: 86 BPM | DIASTOLIC BLOOD PRESSURE: 74 MMHG

## 2023-06-07 LAB
ANION GAP SERPL CALCULATED.3IONS-SCNC: 11 MMOL/L (ref 5–15)
BUN SERPL-MCNC: 6 MG/DL (ref 8–23)
BUN/CREAT SERPL: 6.9 (ref 7–25)
CALCIUM SPEC-SCNC: 8.5 MG/DL (ref 8.6–10.5)
CHLORIDE SERPL-SCNC: 107 MMOL/L (ref 98–107)
CO2 SERPL-SCNC: 26 MMOL/L (ref 22–29)
CREAT SERPL-MCNC: 0.87 MG/DL (ref 0.57–1)
DEPRECATED RDW RBC AUTO: 46.4 FL (ref 37–54)
EGFRCR SERPLBLD CKD-EPI 2021: 73.6 ML/MIN/1.73
ERYTHROCYTE [DISTWIDTH] IN BLOOD BY AUTOMATED COUNT: 15.9 % (ref 12.3–15.4)
GLUCOSE BLDC GLUCOMTR-MCNC: 97 MG/DL (ref 70–105)
GLUCOSE SERPL-MCNC: 127 MG/DL (ref 65–99)
HCT VFR BLD AUTO: 35.3 % (ref 34–46.6)
HGB BLD-MCNC: 11.2 G/DL (ref 12–15.9)
INR PPP: 2.74 (ref 2–3)
MCH RBC QN AUTO: 26.6 PG (ref 26.6–33)
MCHC RBC AUTO-ENTMCNC: 31.7 G/DL (ref 31.5–35.7)
MCV RBC AUTO: 83.8 FL (ref 79–97)
PLATELET # BLD AUTO: 263 10*3/MM3 (ref 140–450)
PMV BLD AUTO: 8.6 FL (ref 6–12)
POTASSIUM SERPL-SCNC: 3.2 MMOL/L (ref 3.5–5.2)
PROTHROMBIN TIME: 27.7 SECONDS (ref 19.4–28.5)
RBC # BLD AUTO: 4.21 10*6/MM3 (ref 3.77–5.28)
SODIUM SERPL-SCNC: 144 MMOL/L (ref 136–145)
WBC NRBC COR # BLD: 8.6 10*3/MM3 (ref 3.4–10.8)

## 2023-06-07 PROCEDURE — G0378 HOSPITAL OBSERVATION PER HR: HCPCS

## 2023-06-07 PROCEDURE — 36415 COLL VENOUS BLD VENIPUNCTURE: CPT | Performed by: STUDENT IN AN ORGANIZED HEALTH CARE EDUCATION/TRAINING PROGRAM

## 2023-06-07 PROCEDURE — 85027 COMPLETE CBC AUTOMATED: CPT | Performed by: STUDENT IN AN ORGANIZED HEALTH CARE EDUCATION/TRAINING PROGRAM

## 2023-06-07 PROCEDURE — 85610 PROTHROMBIN TIME: CPT | Performed by: STUDENT IN AN ORGANIZED HEALTH CARE EDUCATION/TRAINING PROGRAM

## 2023-06-07 PROCEDURE — 94761 N-INVAS EAR/PLS OXIMETRY MLT: CPT

## 2023-06-07 PROCEDURE — 82948 REAGENT STRIP/BLOOD GLUCOSE: CPT

## 2023-06-07 PROCEDURE — 80048 BASIC METABOLIC PNL TOTAL CA: CPT | Performed by: STUDENT IN AN ORGANIZED HEALTH CARE EDUCATION/TRAINING PROGRAM

## 2023-06-07 PROCEDURE — 94664 DEMO&/EVAL PT USE INHALER: CPT

## 2023-06-07 PROCEDURE — 94799 UNLISTED PULMONARY SVC/PX: CPT

## 2023-06-07 RX ORDER — POTASSIUM CHLORIDE 20 MEQ/1
40 TABLET, EXTENDED RELEASE ORAL DAILY
Qty: 10 TABLET | Refills: 0 | Status: SHIPPED | OUTPATIENT
Start: 2023-06-08 | End: 2023-06-13

## 2023-06-07 RX ORDER — POTASSIUM CHLORIDE 20 MEQ/1
40 TABLET, EXTENDED RELEASE ORAL ONCE
Status: COMPLETED | OUTPATIENT
Start: 2023-06-07 | End: 2023-06-07

## 2023-06-07 RX ADMIN — ALBUTEROL SULFATE 2.5 MG: 2.5 SOLUTION RESPIRATORY (INHALATION) at 07:56

## 2023-06-07 RX ADMIN — POTASSIUM CHLORIDE 40 MEQ: 1500 TABLET, EXTENDED RELEASE ORAL at 05:23

## 2023-06-07 RX ADMIN — Medication 10 ML: at 08:18

## 2023-06-07 RX ADMIN — PANTOPRAZOLE SODIUM 40 MG: 40 TABLET, DELAYED RELEASE ORAL at 05:23

## 2023-06-07 RX ADMIN — POTASSIUM CHLORIDE 40 MEQ: 1500 TABLET, EXTENDED RELEASE ORAL at 08:18

## 2023-06-07 NOTE — CASE MANAGEMENT/SOCIAL WORK
Case Management Discharge Note      Final Note: Home         Selected Continued Care - Discharged on 6/7/2023 Admission date: 6/5/2023 - Discharge disposition: Home or Self Care                  Transportation Services  Private: Car    Final Discharge Disposition Code: 01 - home or self-care

## 2023-06-07 NOTE — DISCHARGE SUMMARY
[facility] Medicine Services  Discharge Summary    Date of Service: 23    Patient Name: Renuka Pelayo  : 1955  MRN: [id number]    Date of Admission: 2023  Discharge Diagnosis: Electrolytes imbalances  Date of Discharge:  23    Primary Care Physician: Michael Gerardo MD      Presenting Problem:   Hypokalemia [E87.6]  Hypomagnesemia [E83.42]  Lower extremity edema [R60.0]    Active and Resolved Hospital Problems:  Active Hospital Problems    Diagnosis POA    **Lower extremity edema [R60.0] Yes      Resolved Hospital Problems   No resolved problems to display.         Hospital Course     Hospital Course:  Renuka Pelayo is a 67 y.o. female admitted due to leg swelling, hip pain and electrolyte imbalances.  Patient was started on potassium and magnesium replacement.  Her pain was managed with oxycodone.  Left hip x-ray was negative for any acute fractures however did show arthritis.  Patient had mild BRYNN and was given some IV fluids.  Electrolytes improved and renal function improved.  PT OT evaluated patient and recommended home.  Patient was deemed stable to be discharged at this time.        DISCHARGE Follow Up Recommendations for labs and diagnostics:       Reasons For Change In Medications and Indications for New Medications:      Day of Discharge     Vital Signs:  Temp:  [97.7 °F (36.5 °C)-98.9 °F (37.2 °C)] 98.3 °F (36.8 °C)  Heart Rate:  [77-93] 86  Resp:  [15-26] 24  BP: (129-150)/(61-79) 135/74  Flow (L/min):  [2] 2    Physical Exam:  Physical Exam   General Appearance: AOO x 4, cooperative, no distress, appropriate for age  Head:  Normocephalic, without obvious abnormality  Eyes:  PERRL, EOM's intact, conjunctivae and cornea clear  Nose:  Nares symmetrical, septum midline, mucosa pink  Throat:  Lips, tongue, and mucosa are moist, pink, and intact  Neck:  Supple; symmetrical, trachea midline, no adenopathy  Back:  Symmetrical, ROM normal, no CVA  tenderness  Lungs: Respirations unlabored, no audible wheeze  Heart: Regular rate & rhythm, S1 and S2 normal  Abdomen:  Soft, nontender, bowel sounds active all four quadrants  Musculoskeletal: Tone and strength strong and symmetrical, all extremities; no joint pain or edema         Skin/Hair/Nails:  Skin warm, dry and intact, no rashes or abnormal dyspigmentation       Pertinent  and/or Most Recent Results     LAB RESULTS:      Lab 06/07/23 0228 06/06/23  1253 06/06/23  0203 06/05/23  1906   WBC 8.60  --  11.10* 10.80   HEMOGLOBIN 11.2*  --  12.4 12.9   HEMATOCRIT 35.3  --  38.5 39.1   PLATELETS 263  --  336 316   NEUTROS ABS  --   --   --  8.00*   LYMPHS ABS  --   --   --  1.70   MONOS ABS  --   --   --  0.90   EOS ABS  --   --   --  0.20   MCV 83.8  --  83.0 81.0   PROTIME 27.7 24.8  --  21.0         Lab 06/07/23 0228 06/06/23  1526 06/06/23  0203 06/06/23  0202 06/05/23 2231 06/05/23 1940   SODIUM 144  --  146*  --  146* 146*   POTASSIUM 3.2* 3.2* 3.0*  --  2.5* 2.4*   CHLORIDE 107  --  102  --  100 100   CO2 26.0  --  30.0*  --  32.0* 28.0   ANION GAP 11.0  --  14.0  --  14.0 18.0*   BUN 6*  --  6*  --  5* 5*   CREATININE 0.87  --  1.15*  --  1.11* 1.09*   EGFR 73.6  --  52.6*  --  54.9* 56.1*   GLUCOSE 127*  --  151*  --  129* 107*   CALCIUM 8.5*  --  9.2  --  9.2 9.1   MAGNESIUM  --   --  2.1  --  1.6 1.5*   HEMOGLOBIN A1C  --   --   --  5.80*  --   --          Lab 06/05/23 1940   TOTAL PROTEIN 7.3   ALBUMIN 4.4   GLOBULIN 2.9   ALT (SGPT) 38*   AST (SGOT) 58*   BILIRUBIN 0.5   ALK PHOS 99         Lab 06/07/23  0228 06/06/23  1253 06/05/23  2231 06/05/23  1906   PROBNP  --   --  84.8  --    PROTIME 27.7 24.8  --  21.0   INR 2.74 2.44  --  2.04                 Brief Urine Lab Results  (Last result in the past 365 days)        Color   Clarity   Blood   Leuk Est   Nitrite   Protein   CREAT   Urine HCG        11/09/22 2019 Yellow   Clear   Negative   Large (3+)   Negative   30 mg/dL (1+)                  Microbiology Results (last 10 days)       ** No results found for the last 240 hours. **            XR Foot 3+ View Left    Result Date: 6/6/2023  Impression: Impression: 1. No acute osseous abnormality. 2. Mild hallux valgus with mild first MTP osteoarthritis. 3. No focal osseous erosion. Electronically Signed: John Gutierrez  6/6/2023 7:33 AM EDT  Workstation ID: [id number]    XR Hip With or Without Pelvis 2 - 3 View Left    Result Date: 6/5/2023  Impression: Impression: Osteopenia with moderate arthritis of the left hip similar to previous studies. Remote healed fractures of the right pubic rami. Postsurgical changes right hip. Electronically Signed: Brittanijemima Blanco  6/5/2023 7:35 PM EDT  Workstation ID: [id number]     Results for orders placed during the hospital encounter of 06/05/23    Duplex Venous Lower Extremity - Bilateral CAR    Interpretation Summary    Normal bilateral lower extremity venous duplex scan.      Results for orders placed during the hospital encounter of 06/05/23    Duplex Venous Lower Extremity - Bilateral CAR    Interpretation Summary    Normal bilateral lower extremity venous duplex scan.        Labs Pending at Discharge:      Procedures Performed         Consults:   Consults       No orders found from 5/7/2023 to 6/6/2023.              Discharge Details        Discharge Medications        New Medications        Instructions Start Date   potassium chloride 20 MEQ CR tablet  Commonly known as: K-DUR,KLOR-CON   40 mEq, Oral, Daily   Start Date: June 8, 2023            Continue These Medications        Instructions Start Date   Breztri Aerosphere 160-9-4.8 MCG/ACT aerosol inhaler  Generic drug: Budeson-Glycopyrrol-Formoterol   1 puff, Inhalation, 2 Times Daily      furosemide 20 MG tablet  Commonly known as: LASIX   20 mg, Oral, 2 Times Daily PRN      HYDROcodone-acetaminophen  MG per tablet  Commonly known as: NORCO   1 tablet, Oral, Every 6 Hours PRN      lisinopril 20 MG tablet  Commonly  known as: PRINIVIL,ZESTRIL   20 mg, Oral, Daily      roflumilast 500 MCG tablet tablet  Commonly known as: DALIRESP   500 mcg, Oral, Nightly      warfarin 3 MG tablet  Commonly known as: COUMADIN   3 mg, Oral, Nightly      zolpidem 5 MG tablet  Commonly known as: AMBIEN   5 mg, Oral, Nightly             ASK your doctor about these medications        Instructions Start Date   predniSONE 50 MG tablet  Commonly known as: DELTASONE   50 mg, Oral, Daily               No Known Allergies      Discharge Disposition:   Home or Self Care    Diet:  Hospital:  Diet Order   Procedures    Diet: Cardiac Diets, Diabetic Diets; Healthy Heart (2-3 Na+); Consistent Carbohydrate; Texture: Regular Texture (IDDSI 7); Fluid Consistency: Thin (IDDSI 0)         Discharge Activity:     As tolerated    CODE STATUS:  Code Status and Medical Interventions:   Ordered at: 06/06/23 0007     Code Status (Patient has no pulse and is not breathing):    CPR (Attempt to Resuscitate)     Medical Interventions (Patient has pulse or is breathing):    Full Support         No future appointments.        Time spent on Discharge including face to face service:  31 minutes    06/07/23      Signature: Electronically signed by Rimma Grossman MD, 06/07/23, 10:55 EDT.  [facility] Hospitalist Team

## 2023-06-07 NOTE — PLAN OF CARE
Problem: Adult Inpatient Plan of Care  Goal: Plan of Care Review  Outcome: Met  Flowsheets (Taken 6/7/2023 1120)  Plan of Care Reviewed With: patient  Outcome Evaluation: patient discharging home with spouse via personal vehicle.  Goal: Patient-Specific Goal (Individualized)  Outcome: Met  Goal: Absence of Hospital-Acquired Illness or Injury  Outcome: Met  Intervention: Identify and Manage Fall Risk  Description: Perform standard risk assessment on admission using a validated tool or comprehensive approach appropriate to the patient; reassess fall risk frequently, with change in status or transfer to another level of care.  Communicate fall injury risk to interprofessional healthcare team.  Determine need for increased observation, equipment and environmental modification, such as low bed, signage and supportive, nonskid footwear.  Adjust safety measures to individual developmental age, stage and identified risk factors.  Reinforce the importance of safety and physical activity with patient and family.  Perform regular intentional rounding to assess need for position change, pain assessment and personal needs, including assistance with toileting.  Recent Flowsheet Documentation  Taken 6/7/2023 1000 by Crystal Pruitt RN  Safety Promotion/Fall Prevention: safety round/check completed  Taken 6/7/2023 0800 by Crystal Pruitt RN  Safety Promotion/Fall Prevention:   safety round/check completed   nonskid shoes/slippers when out of bed  Intervention: Prevent Skin Injury  Description: Perform a screening for skin injury risk, such as pressure or moisture associated skin damage on admission and at regular intervals throughout hospital stay.  Keep all areas of skin (especially folds) clean and dry.  Maintain adequate skin hydration.  Relieve and redistribute pressure and protect bony prominences; implement measures based on patient-specific risk factors.  Match turning and repositioning schedule to clinical  condition.  Encourage weight shift frequently; assist with reposition if unable to complete independently.  Float heels off bed; avoid pressure on the Achilles tendon.  Keep skin free from extended contact with medical devices.  Encourage functional activity and mobility, as early as tolerated.  Use aids (e.g., slide boards, mechanical lift) during transfer.  Recent Flowsheet Documentation  Taken 6/7/2023 0800 by Crystal Pruitt RN  Body Position: position changed independently  Skin Protection:   adhesive use limited   tubing/devices free from skin contact  Intervention: Prevent and Manage VTE (Venous Thromboembolism) Risk  Description: Assess for VTE (venous thromboembolism) risk.  Encourage and assist with early ambulation.  Initiate and maintain compression or other therapy, as indicated, based on identified risk in accordance with organizational protocol and provider order.  Encourage both active and passive leg exercises while in bed, if unable to ambulate.  Recent Flowsheet Documentation  Taken 6/7/2023 0800 by Crystal Pruitt RN  Activity Management:   ambulated in room   ambulated to bathroom  VTE Prevention/Management:   bilateral   sequential compression devices off   patient refused intervention  Goal: Optimal Comfort and Wellbeing  Outcome: Met  Intervention: Provide Person-Centered Care  Description: Use a family-focused approach to care.  Develop trust and rapport by proactively providing information, encouraging questions, addressing concerns and offering reassurance.  Acknowledge emotional response to hospitalization.  Recognize and utilize personal coping strategies.  Honor spiritual and cultural preferences.  Recent Flowsheet Documentation  Taken 6/7/2023 0800 by Crystal Pruitt RN  Trust Relationship/Rapport:   care explained   choices provided   reassurance provided   thoughts/feelings acknowledged  Goal: Readiness for Transition of Care  Outcome: Met   Goal Outcome Evaluation:  Plan of Care  Reviewed With: patient           Outcome Evaluation: patient discharging home with spouse via personal vehicle.

## 2023-06-07 NOTE — PROGRESS NOTES
"Pharmacy dosing service  Anticoagulant  Warfarin     Subjective:    Renuka Pelayo is a 67 y.o.female being continued on warfarin for  hx of DVT/PE  .    INR Goal: 2 - 3  Home medication?: warfarin 3 mg PO daily  Bridge Therapy Present?:  No  Interacting Medications Evaluation (New/Present/Discontinued): none  Additional Contributing Factors: N/A      Assessment/Plan:    INR therapeutic. Increased again today, but still within goal range. No clear cause for continued INR increase, could be due to acute illness. Hgb down slightly today, no mention of bleeding per review of notes.     Will continue home dose of 3 mg today, but could consider giving half or reduced dose tomorrow if INR continues to trend up.    Pharmacy will continue to follow daily.       Date 6/5 6/6 6/7         INR 2.04 2.44 2.74         Dose 3mg   3 mg 3 mg           Objective:  [Ht: 160 cm (63\"); Wt: 84.2 kg (185 lb 10 oz); BMI: Body mass index is 32.88 kg/m².]    Lab Results   Component Value Date    ALBUMIN 4.4 06/05/2023     Lab Results   Component Value Date    INR 2.74 06/07/2023    INR 2.44 06/06/2023    INR 2.04 06/05/2023    PROTIME 27.7 06/07/2023    PROTIME 24.8 06/06/2023    PROTIME 21.0 06/05/2023     Lab Results   Component Value Date    HGB 11.2 (L) 06/07/2023    HGB 12.4 06/06/2023    HGB 12.9 06/05/2023     Lab Results   Component Value Date    HCT 35.3 06/07/2023    HCT 38.5 06/06/2023    HCT 39.1 06/05/2023     Carlito Larios RP  06/07/23 08:14 EDT   "

## 2023-06-27 PROBLEM — J18.9 PNEUMONIA DUE TO INFECTIOUS ORGANISM, UNSPECIFIED LATERALITY, UNSPECIFIED PART OF LUNG: Status: ACTIVE | Noted: 2023-06-27

## 2023-06-29 PROBLEM — R78.81 BACTEREMIA: Status: ACTIVE | Noted: 2023-06-29

## 2023-07-11 NOTE — PROGRESS NOTES
"Post-op Note    Subjective   Renuka Pelayo is a 64 y.o. female returns status post Alejo's procedure performed on 7/16/2020 for perforated diverticulitis.  The patient denies having any fevers but does report that she has had some drainage coming from her midline incision.  There is also some erythema around her midline incision.  Her colostomy is functioning well.  She is off antibiotics.      Objective   /78 (BP Location: Left arm, Patient Position: Sitting, Cuff Size: Adult)   Pulse 101   Temp 97.5 °F (36.4 °C) (Oral)   Ht 160 cm (63\")   Wt 76.2 kg (168 lb)   SpO2 (!) 87%   BMI 29.76 kg/m²   Staples removed from the midline incision, there is some scant antoni-incisional erythema, but no induration.  There is a moderate amount of serous drainage coming from the midline wound.  I probed the area where the drainage was more prevalent, but could not express any further serous drainage.      Assessment/Plan   64-year-old lady status post Mariya procedure performed on 7/16/2020 for perforated diverticulitis.    We will start on clindamycin  Follow-up with me in 1 week  Local wound care with packing of the open incision daily with dry gauze.  Patient has home health care coming out to her house.  If develops fevers, worsening abdominal pain, nausea/vomiting, or failure to put out through her colostomy should call and be seen immediately.    Chi Farmer MD  8/4/2020  11:30  " Patient Needs Assistance to Leave Residence...

## 2023-08-02 ENCOUNTER — HOSPITAL ENCOUNTER (OUTPATIENT)
Facility: HOSPITAL | Age: 68
Setting detail: OBSERVATION
Discharge: HOME | End: 2023-08-03
Attending: EMERGENCY MEDICINE | Admitting: EMERGENCY MEDICINE
Payer: MEDICARE

## 2023-08-02 DIAGNOSIS — R23.3 ABNORMAL BRUISING: ICD-10-CM

## 2023-08-02 DIAGNOSIS — T45.515A WARFARIN-INDUCED COAGULOPATHY: ICD-10-CM

## 2023-08-02 DIAGNOSIS — D68.32 WARFARIN-INDUCED COAGULOPATHY: ICD-10-CM

## 2023-08-02 DIAGNOSIS — E87.6 HYPOKALEMIA: Primary | ICD-10-CM

## 2023-08-02 PROBLEM — T45.511A: Status: ACTIVE | Noted: 2023-08-02

## 2023-08-02 PROBLEM — IMO0001: Status: ACTIVE | Noted: 2023-08-02

## 2023-08-02 LAB
ALBUMIN SERPL-MCNC: 4.2 G/DL (ref 3.5–5.2)
ALBUMIN/GLOB SERPL: 1.5 G/DL
ALP SERPL-CCNC: 97 U/L (ref 39–117)
ALT SERPL W P-5'-P-CCNC: 15 U/L (ref 1–33)
ANION GAP SERPL CALCULATED.3IONS-SCNC: 14 MMOL/L (ref 5–15)
AST SERPL-CCNC: 23 U/L (ref 1–32)
BASOPHILS # BLD AUTO: 0.1 10*3/MM3 (ref 0–0.2)
BASOPHILS NFR BLD AUTO: 1 % (ref 0–1.5)
BILIRUB SERPL-MCNC: 0.4 MG/DL (ref 0–1.2)
BUN SERPL-MCNC: 10 MG/DL (ref 8–23)
BUN/CREAT SERPL: 12.3 (ref 7–25)
CALCIUM SPEC-SCNC: 9.3 MG/DL (ref 8.6–10.5)
CHLORIDE SERPL-SCNC: 104 MMOL/L (ref 98–107)
CO2 SERPL-SCNC: 25 MMOL/L (ref 22–29)
CREAT SERPL-MCNC: 0.81 MG/DL (ref 0.57–1)
DEPRECATED RDW RBC AUTO: 50.3 FL (ref 37–54)
EGFRCR SERPLBLD CKD-EPI 2021: 80.2 ML/MIN/1.73
EOSINOPHIL # BLD AUTO: 0.1 10*3/MM3 (ref 0–0.4)
EOSINOPHIL NFR BLD AUTO: 1.2 % (ref 0.3–6.2)
ERYTHROCYTE [DISTWIDTH] IN BLOOD BY AUTOMATED COUNT: 17 % (ref 12.3–15.4)
GLOBULIN UR ELPH-MCNC: 2.8 GM/DL
GLUCOSE SERPL-MCNC: 110 MG/DL (ref 65–99)
HCT VFR BLD AUTO: 37.7 % (ref 34–46.6)
HGB BLD-MCNC: 12.1 G/DL (ref 12–15.9)
INR PPP: >=8 (ref 2–3)
LYMPHOCYTES # BLD AUTO: 1.8 10*3/MM3 (ref 0.7–3.1)
LYMPHOCYTES NFR BLD AUTO: 17.1 % (ref 19.6–45.3)
MAGNESIUM SERPL-MCNC: 1.7 MG/DL (ref 1.6–2.4)
MCH RBC QN AUTO: 25.9 PG (ref 26.6–33)
MCHC RBC AUTO-ENTMCNC: 32.2 G/DL (ref 31.5–35.7)
MCV RBC AUTO: 80.4 FL (ref 79–97)
MONOCYTES # BLD AUTO: 0.6 10*3/MM3 (ref 0.1–0.9)
MONOCYTES NFR BLD AUTO: 5.9 % (ref 5–12)
NEUTROPHILS NFR BLD AUTO: 74.8 % (ref 42.7–76)
NEUTROPHILS NFR BLD AUTO: 8.1 10*3/MM3 (ref 1.7–7)
NRBC BLD AUTO-RTO: 0.2 /100 WBC (ref 0–0.2)
PLATELET # BLD AUTO: 375 10*3/MM3 (ref 140–450)
PMV BLD AUTO: 7.8 FL (ref 6–12)
POTASSIUM SERPL-SCNC: 2.8 MMOL/L (ref 3.5–5.2)
PROT SERPL-MCNC: 7 G/DL (ref 6–8.5)
PROTHROMBIN TIME: >90 SECONDS (ref 19.4–28.5)
RBC # BLD AUTO: 4.69 10*6/MM3 (ref 3.77–5.28)
SODIUM SERPL-SCNC: 143 MMOL/L (ref 136–145)
WBC NRBC COR # BLD: 10.8 10*3/MM3 (ref 3.4–10.8)

## 2023-08-02 PROCEDURE — 85610 PROTHROMBIN TIME: CPT | Performed by: EMERGENCY MEDICINE

## 2023-08-02 PROCEDURE — 96375 TX/PRO/DX INJ NEW DRUG ADDON: CPT

## 2023-08-02 PROCEDURE — 94799 UNLISTED PULMONARY SVC/PX: CPT

## 2023-08-02 PROCEDURE — 96366 THER/PROPH/DIAG IV INF ADDON: CPT

## 2023-08-02 PROCEDURE — G0378 HOSPITAL OBSERVATION PER HR: HCPCS

## 2023-08-02 PROCEDURE — 85025 COMPLETE CBC W/AUTO DIFF WBC: CPT | Performed by: EMERGENCY MEDICINE

## 2023-08-02 PROCEDURE — 80053 COMPREHEN METABOLIC PANEL: CPT | Performed by: EMERGENCY MEDICINE

## 2023-08-02 PROCEDURE — 96365 THER/PROPH/DIAG IV INF INIT: CPT

## 2023-08-02 PROCEDURE — 36415 COLL VENOUS BLD VENIPUNCTURE: CPT

## 2023-08-02 PROCEDURE — 96376 TX/PRO/DX INJ SAME DRUG ADON: CPT

## 2023-08-02 PROCEDURE — 94640 AIRWAY INHALATION TREATMENT: CPT

## 2023-08-02 PROCEDURE — 99284 EMERGENCY DEPT VISIT MOD MDM: CPT

## 2023-08-02 PROCEDURE — 0 POTASSIUM CHLORIDE 10 MEQ/100ML SOLUTION: Performed by: EMERGENCY MEDICINE

## 2023-08-02 PROCEDURE — 0 PHYTONADIONE 10 MG/ML SOLUTION 1 ML AMPULE: Performed by: EMERGENCY MEDICINE

## 2023-08-02 PROCEDURE — 83735 ASSAY OF MAGNESIUM: CPT | Performed by: EMERGENCY MEDICINE

## 2023-08-02 RX ORDER — PANTOPRAZOLE SODIUM 40 MG/1
40 TABLET, DELAYED RELEASE ORAL 2 TIMES DAILY
Status: DISCONTINUED | OUTPATIENT
Start: 2023-08-02 | End: 2023-08-03 | Stop reason: HOSPADM

## 2023-08-02 RX ORDER — POLYETHYLENE GLYCOL 3350 17 G/17G
17 POWDER, FOR SOLUTION ORAL DAILY PRN
Status: DISCONTINUED | OUTPATIENT
Start: 2023-08-02 | End: 2023-08-03 | Stop reason: HOSPADM

## 2023-08-02 RX ORDER — HYDROCHLOROTHIAZIDE 25 MG/1
25 TABLET ORAL
Status: DISCONTINUED | OUTPATIENT
Start: 2023-08-02 | End: 2023-08-03 | Stop reason: HOSPADM

## 2023-08-02 RX ORDER — POTASSIUM CHLORIDE 7.45 MG/ML
10 INJECTION INTRAVENOUS ONCE
Status: COMPLETED | OUTPATIENT
Start: 2023-08-02 | End: 2023-08-03

## 2023-08-02 RX ORDER — SODIUM CHLORIDE 0.9 % (FLUSH) 0.9 %
10 SYRINGE (ML) INJECTION EVERY 12 HOURS SCHEDULED
Status: DISCONTINUED | OUTPATIENT
Start: 2023-08-02 | End: 2023-08-03 | Stop reason: SDUPTHER

## 2023-08-02 RX ORDER — ONDANSETRON 2 MG/ML
4 INJECTION INTRAMUSCULAR; INTRAVENOUS EVERY 6 HOURS PRN
Status: DISCONTINUED | OUTPATIENT
Start: 2023-08-02 | End: 2023-08-02 | Stop reason: SDUPTHER

## 2023-08-02 RX ORDER — ACETAMINOPHEN 325 MG/1
650 TABLET ORAL EVERY 4 HOURS PRN
Status: DISCONTINUED | OUTPATIENT
Start: 2023-08-02 | End: 2023-08-03 | Stop reason: HOSPADM

## 2023-08-02 RX ORDER — ALBUTEROL SULFATE 2.5 MG/3ML
2.5 SOLUTION RESPIRATORY (INHALATION) EVERY 6 HOURS PRN
COMMUNITY

## 2023-08-02 RX ORDER — PANTOPRAZOLE SODIUM 40 MG/1
40 TABLET, DELAYED RELEASE ORAL 2 TIMES DAILY
COMMUNITY

## 2023-08-02 RX ORDER — SODIUM CHLORIDE 9 MG/ML
40 INJECTION, SOLUTION INTRAVENOUS AS NEEDED
Status: DISCONTINUED | OUTPATIENT
Start: 2023-08-02 | End: 2023-08-03 | Stop reason: SDUPTHER

## 2023-08-02 RX ORDER — SODIUM CHLORIDE 0.9 % (FLUSH) 0.9 %
10 SYRINGE (ML) INJECTION AS NEEDED
Status: DISCONTINUED | OUTPATIENT
Start: 2023-08-02 | End: 2023-08-03 | Stop reason: HOSPADM

## 2023-08-02 RX ORDER — AMOXICILLIN 250 MG
2 CAPSULE ORAL 2 TIMES DAILY
Status: DISCONTINUED | OUTPATIENT
Start: 2023-08-02 | End: 2023-08-03 | Stop reason: HOSPADM

## 2023-08-02 RX ORDER — ONDANSETRON 2 MG/ML
4 INJECTION INTRAMUSCULAR; INTRAVENOUS EVERY 6 HOURS PRN
Status: DISCONTINUED | OUTPATIENT
Start: 2023-08-02 | End: 2023-08-03 | Stop reason: HOSPADM

## 2023-08-02 RX ORDER — FUROSEMIDE 20 MG/1
20 TABLET ORAL 2 TIMES DAILY PRN
Status: DISCONTINUED | OUTPATIENT
Start: 2023-08-02 | End: 2023-08-03 | Stop reason: HOSPADM

## 2023-08-02 RX ORDER — POTASSIUM CHLORIDE 7.45 MG/ML
10 INJECTION INTRAVENOUS ONCE
Status: COMPLETED | OUTPATIENT
Start: 2023-08-02 | End: 2023-08-02

## 2023-08-02 RX ORDER — ONDANSETRON 4 MG/1
4 TABLET, FILM COATED ORAL EVERY 6 HOURS PRN
Status: DISCONTINUED | OUTPATIENT
Start: 2023-08-02 | End: 2023-08-03 | Stop reason: HOSPADM

## 2023-08-02 RX ORDER — PREDNISONE 10 MG/1
10 TABLET ORAL DAILY
COMMUNITY

## 2023-08-02 RX ORDER — BISACODYL 10 MG
10 SUPPOSITORY, RECTAL RECTAL DAILY PRN
Status: DISCONTINUED | OUTPATIENT
Start: 2023-08-02 | End: 2023-08-03 | Stop reason: HOSPADM

## 2023-08-02 RX ORDER — BISACODYL 5 MG/1
5 TABLET, DELAYED RELEASE ORAL DAILY PRN
Status: DISCONTINUED | OUTPATIENT
Start: 2023-08-02 | End: 2023-08-03 | Stop reason: HOSPADM

## 2023-08-02 RX ORDER — ALBUTEROL SULFATE 2.5 MG/3ML
2.5 SOLUTION RESPIRATORY (INHALATION) EVERY 6 HOURS PRN
Status: DISCONTINUED | OUTPATIENT
Start: 2023-08-02 | End: 2023-08-03 | Stop reason: HOSPADM

## 2023-08-02 RX ORDER — VALSARTAN 80 MG/1
320 TABLET ORAL
Status: DISCONTINUED | OUTPATIENT
Start: 2023-08-02 | End: 2023-08-03 | Stop reason: HOSPADM

## 2023-08-02 RX ORDER — AMLODIPINE BESYLATE 5 MG/1
5 TABLET ORAL
Status: DISCONTINUED | OUTPATIENT
Start: 2023-08-02 | End: 2023-08-03 | Stop reason: HOSPADM

## 2023-08-02 RX ORDER — SODIUM CHLORIDE 9 MG/ML
40 INJECTION, SOLUTION INTRAVENOUS AS NEEDED
Status: DISCONTINUED | OUTPATIENT
Start: 2023-08-02 | End: 2023-08-03 | Stop reason: HOSPADM

## 2023-08-02 RX ORDER — SODIUM CHLORIDE 0.9 % (FLUSH) 0.9 %
10 SYRINGE (ML) INJECTION AS NEEDED
Status: DISCONTINUED | OUTPATIENT
Start: 2023-08-02 | End: 2023-08-03 | Stop reason: SDUPTHER

## 2023-08-02 RX ORDER — SODIUM CHLORIDE 0.9 % (FLUSH) 0.9 %
10 SYRINGE (ML) INJECTION EVERY 12 HOURS SCHEDULED
Status: DISCONTINUED | OUTPATIENT
Start: 2023-08-02 | End: 2023-08-03 | Stop reason: HOSPADM

## 2023-08-02 RX ORDER — CHOLECALCIFEROL (VITAMIN D3) 125 MCG
5 CAPSULE ORAL NIGHTLY PRN
Status: DISCONTINUED | OUTPATIENT
Start: 2023-08-02 | End: 2023-08-03 | Stop reason: HOSPADM

## 2023-08-02 RX ADMIN — POTASSIUM CHLORIDE 10 MEQ: 7.46 INJECTION, SOLUTION INTRAVENOUS at 20:30

## 2023-08-02 RX ADMIN — POTASSIUM CHLORIDE 10 MEQ: 7.46 INJECTION, SOLUTION INTRAVENOUS at 22:29

## 2023-08-02 RX ADMIN — IPRATROPIUM BROMIDE 0.5 MG: 0.5 SOLUTION RESPIRATORY (INHALATION) at 22:58

## 2023-08-02 RX ADMIN — PHYTONADIONE 10 MG: 10 INJECTION, EMULSION INTRAMUSCULAR; INTRAVENOUS; SUBCUTANEOUS at 15:50

## 2023-08-02 RX ADMIN — HYDROCHLOROTHIAZIDE 25 MG: 25 TABLET ORAL at 20:32

## 2023-08-02 RX ADMIN — PANTOPRAZOLE SODIUM 40 MG: 40 TABLET, DELAYED RELEASE ORAL at 20:31

## 2023-08-02 RX ADMIN — POTASSIUM CHLORIDE 10 MEQ: 7.46 INJECTION, SOLUTION INTRAVENOUS at 15:45

## 2023-08-02 RX ADMIN — VALSARTAN 320 MG: 80 TABLET, FILM COATED ORAL at 20:32

## 2023-08-02 RX ADMIN — AMLODIPINE BESYLATE 5 MG: 5 TABLET ORAL at 20:31

## 2023-08-02 RX ADMIN — Medication 10 ML: at 20:45

## 2023-08-02 RX ADMIN — ALBUTEROL SULFATE 2.5 MG: 2.5 SOLUTION RESPIRATORY (INHALATION) at 22:58

## 2023-08-02 RX ADMIN — Medication 10 ML: at 20:44

## 2023-08-02 NOTE — CASE MANAGEMENT/SOCIAL WORK
"Discharge Planning Assessment  [facility]     Patient Name: Renuka Pelayo  MRN: [id number]  Today's Date: 8/2/2023    Admit Date: 8/2/2023    Plan: Home with family   Discharge Needs Assessment       Row Name 08/02/23 1518       Living Environment    People in Home spouse;child(ras), adult    Name(s) of People in Home Spouse Adrian; isa Macias    Current Living Arrangements home    Potentially Unsafe Housing Conditions none    Primary Care Provided by self    Provides Primary Care For no one    Family Caregiver if Needed spouse;child(ras), adult    Family Caregiver Names Spouse Adrian; isa Macias    Quality of Family Relationships supportive    Able to Return to Prior Arrangements yes       Resource/Environmental Concerns    Resource/Environmental Concerns none    Transportation Concerns none       Transition Planning    Patient/Family Anticipates Transition to home with family    Patient/Family Anticipated Services at Transition none    Transportation Anticipated family or friend will provide       Discharge Needs Assessment    Equipment Currently Used at Home oxygen;nebulizer    Concerns to be Addressed denies needs/concerns at this time    Anticipated Changes Related to Illness none    Equipment Needed After Discharge none                   Discharge Plan       Row Name 08/02/23 1519       Plan    Plan Home with family    Patient/Family in Agreement with Plan no  At time of discussion with , patient states she is not agreeable to being admitted. Dr. Caraballo notified    Plan Comments  spoke with patient and her spouse Adrian.She reports she is IADLs. Confirms pcp and pharmacy. She denies difficulty obtaining medications or transportation. She reports she has O2 through [facility] [place]. Spouse confirms he will pick patient up at dc and has her portable O2 tank. Pt reports NP Kristina Chávez manages her PT/INR, and that it is often \"up and down\".Pt reports she doesn't follow a consistent diet.  " attempted to discuss the importance of maintaining a consistent diet.Pt acknowledge this,but isn't interested in additional information. Pt intends to return home,and states she isn't agreeable to being admitted. attempted to educate her as to why she is being admitted, but pt states she isn't going to stay. She denies any dc needs. Dr. Caraballo informed that patient told  she doesn't plan to be admitted to the hospital.                       Demographic Summary       Row Name 08/02/23 1517       General Information    Admission Type observation    Arrived From home    Referral Source admission list    Reason for Consult discharge planning    Preferred Language English       Contact Information    Permission Granted to Share Info With ;family/designee                   Functional Status       Row Name 08/02/23 1517       Functional Status    Usual Activity Tolerance good    Current Activity Tolerance good       Functional Status, IADL    Medications independent    Meal Preparation independent    Housekeeping independent    Laundry independent    Shopping independent                  Lisa Baldwin RN, [facility]  Office: [phone]  Fax: [phone]  [email or web address]      I met with patient in room wearing PPE: mask and glasses     Maintained distance greater than six feet and spent </=15 minutes in the room  Lisa Baldwin RN

## 2023-08-02 NOTE — ED PROVIDER NOTES
Subjective   History of Present Illness  67-year-old female sent in from primary care provider due to possible coagulopathy.  The patient states that she had developed blood-filled blisters on her hands.  She reports no palm of hand rash or foot rash.  She denies melena hematemesis hematochezia.  She said she thought she saw some blood when she brushed her teeth last night.  She reports that that she has had no epistaxis or hemoptysis.  She reports that she has had some bruising recently that she was unaware of.  She reports that he has had no recent dietary changes    Review of Systems   Constitutional:  Positive for fatigue. Negative for chills and fever.   HENT:  Negative for nosebleeds.    Gastrointestinal:  Negative for anal bleeding and blood in stool.   Genitourinary:  Negative for hematuria.   Hematological:  Bruises/bleeds easily.   All other systems reviewed and are negative.    Past Medical History:   Diagnosis Date    Arthritis     Bladder cancer     Chronic back pain     Closed nondisplaced fracture of head of right radius 03/2017    Congenital deformity of right hip joint 1956    COPD (chronic obstructive pulmonary disease)     former smoker    Deep venous thrombosis     unprovoked    Depressive disorder     Disease of thyroid gland     Diverticulitis of colon     Essential hypertension     Gastroesophageal reflux disease     Insomnia     Obesity     Pulmonary embolism     provoked by surgery       No Known Allergies    Past Surgical History:   Procedure Laterality Date    ABDOMINAL SURGERY      BRONCHOSCOPY N/A 11/25/2020    Procedure: BRONCHOSCOPY with bronchial washing;  Surgeon: Win Johnston MD;  Location: [facility] ENDOSCOPY;  Service: Pulmonary;  Laterality: N/A;  Post: mucous plugs    BRONCHOSCOPY N/A 4/25/2022    Procedure: BRONCHOSCOPY with bronchial washing;  Surgeon: Win Johnston MD;  Location: [facility] ENDOSCOPY;  Service: Pulmonary;  Laterality: N/A;  post op: pneumonia     BRONCHOSCOPY N/A 2023    Procedure: BRONCHOSCOPY with bilateral wash;  Surgeon: Win Johnston MD;  Location: [facility] ENDOSCOPY;  Service: Pulmonary;  Laterality: N/A;  mucous plugs    CHOLECYSTECTOMY      COLON SURGERY      COLONOSCOPY      COLOSTOMY CLOSURE N/A 2021    Procedure: COLOSTOMY TAKEDOWN OR CLOSURE, extenisve lysis of adhesions;  Surgeon: Chi Farmer MD;  Location: [facility] MAIN OR;  Service: General;  Laterality: N/A;    CYSTOSCOPY N/A 2021    Procedure: CYSTOSCOPY with bladder tumor removal and fulgeration, insertion of 3-way rizzo catheter;  Surgeon: Alfonzo Hayward MD;  Location: [facility] MAIN OR;  Service: Urology;  Laterality: N/A;    ENDOSCOPY      EXPLORATORY LAPAROTOMY N/A 2020    Procedure: LAPAROTOMY EXPLORATORY HARTMANS;  Surgeon: Chi Farmer MD;  Location: [facility] MAIN OR;  Service: General;  Laterality: N/A;    HYSTERECTOMY      TOTAL HIP ARTHROPLASTY Right     TOTAL HIP ARTHROPLASTY REVISION Right     x3       Family History   Problem Relation Age of Onset    No Known Problems Mother     No Known Problems Father        Social History     Socioeconomic History    Marital status:    Tobacco Use    Smoking status: Former     Packs/day: 2.00     Years: 40.00     Pack years: 80.00     Types: Cigarettes     Quit date:      Years since quittin.5    Smokeless tobacco: Never    Tobacco comments:     ELECTRONIC   Vaping Use    Vaping Use: Former    Substances: Nicotine, Flavoring   Substance and Sexual Activity    Alcohol use: Never    Drug use: Never    Sexual activity: Defer     Reports no recent unusual food water travel or activity      Objective   Physical Exam  Alert Mason Coma Scale 15   HEENT: Pupils equal and reactive to light. Conjunctivae are not injected. Normal tympanic membranes. Oropharynx and nares are normal.   Neck: Supple. Midline trachea. No JVD. No goiter.   Chest: Clear and equal breath sounds bilaterally, regular  rate and rhythm without murmur or rub.   Abdomen: Positive bowel sounds, nontender, nondistended. No rebound or peritoneal signs. No CVA tenderness.   Extremities no clubbing. cyanosis or edema. Motor sensory exam is normal. The full range of motion is intact blood-filled blisters are noted on the dorsum of the right hand and fingers.  Patient is right-hand dominant.  There is also some pretibial bruising noted that appears to be older   Skin: Warm and dry, no rashes or petechia.   Lymphatic: No regional lymphadenopathy. No calf pain, swelling or Homans sign    Procedures           ED Course      Labs Reviewed   COMPREHENSIVE METABOLIC PANEL - Abnormal; Notable for the following components:       Result Value    Glucose 110 (*)     Potassium 2.8 (*)     All other components within normal limits    Narrative:     GFR Normal >60  Chronic Kidney Disease <60  Kidney Failure <15     PROTIME-INR - Abnormal; Notable for the following components:    Protime >90.0 (*)     INR >=8.00 (*)     All other components within normal limits   CBC WITH AUTO DIFFERENTIAL - Abnormal; Notable for the following components:    MCH 25.9 (*)     RDW 17.0 (*)     Lymphocyte % 17.1 (*)     Neutrophils, Absolute 8.10 (*)     All other components within normal limits   CBC AND DIFFERENTIAL    Narrative:     The following orders were created for panel order CBC & Differential.  Procedure                               Abnormality         Status                     ---------                               -----------         ------                     CBC Auto Differential[[id number]]        Abnormal            Final result                 Please view results for these tests on the individual orders.     Medications - No data to display  No radiology results for the last day                                       Medical Decision Making  The patient was given vitamin K.  The patient was also started on IV potassium replacement.  The patient was  agreeable with observation the patient will be placed in the observation area, potassium replaced patient will have surveillance for ongoing hemorrhage and will need rapid reversal should this develop.  The patient will have PT/INR rechecked in the morning patient was agreeable with this plan of treatment    Amount and/or Complexity of Data Reviewed  Independent Historian: spouse     Details: Family  External Data Reviewed: notes.  Labs: ordered. Decision-making details documented in ED Course.    Risk  Prescription drug management.  Decision regarding hospitalization.        Final diagnoses:   None       ED Disposition  ED Disposition       None            No follow-up provider specified.       Medication List      No changes were made to your prescriptions during this visit.            Jun Caraballo MD  08/09/23 0171

## 2023-08-03 VITALS
OXYGEN SATURATION: 97 % | TEMPERATURE: 97.1 F | HEIGHT: 63 IN | SYSTOLIC BLOOD PRESSURE: 119 MMHG | RESPIRATION RATE: 18 BRPM | HEART RATE: 86 BPM | BODY MASS INDEX: 30.65 KG/M2 | DIASTOLIC BLOOD PRESSURE: 60 MMHG | WEIGHT: 173 LBS

## 2023-08-03 LAB
ANION GAP SERPL CALCULATED.3IONS-SCNC: 12 MMOL/L (ref 5–15)
BASOPHILS # BLD AUTO: 0 10*3/MM3 (ref 0–0.2)
BASOPHILS NFR BLD AUTO: 0.3 % (ref 0–1.5)
BUN SERPL-MCNC: 8 MG/DL (ref 8–23)
BUN/CREAT SERPL: 10.4 (ref 7–25)
CALCIUM SPEC-SCNC: 8.5 MG/DL (ref 8.6–10.5)
CHLORIDE SERPL-SCNC: 105 MMOL/L (ref 98–107)
CO2 SERPL-SCNC: 24 MMOL/L (ref 22–29)
CREAT SERPL-MCNC: 0.77 MG/DL (ref 0.57–1)
DEPRECATED RDW RBC AUTO: 49.9 FL (ref 37–54)
EGFRCR SERPLBLD CKD-EPI 2021: 85.2 ML/MIN/1.73
EOSINOPHIL # BLD AUTO: 0.2 10*3/MM3 (ref 0–0.4)
EOSINOPHIL NFR BLD AUTO: 1.6 % (ref 0.3–6.2)
ERYTHROCYTE [DISTWIDTH] IN BLOOD BY AUTOMATED COUNT: 16.8 % (ref 12.3–15.4)
GEN 5 2HR TROPONIN T REFLEX: 12 NG/L
GLUCOSE SERPL-MCNC: 115 MG/DL (ref 65–99)
HCT VFR BLD AUTO: 34.1 % (ref 34–46.6)
HGB BLD-MCNC: 10.9 G/DL (ref 12–15.9)
INR PPP: 1.4 (ref 2–3)
LYMPHOCYTES # BLD AUTO: 2.4 10*3/MM3 (ref 0.7–3.1)
LYMPHOCYTES NFR BLD AUTO: 19 % (ref 19.6–45.3)
MCH RBC QN AUTO: 25.6 PG (ref 26.6–33)
MCHC RBC AUTO-ENTMCNC: 31.9 G/DL (ref 31.5–35.7)
MCV RBC AUTO: 80.5 FL (ref 79–97)
MONOCYTES # BLD AUTO: 0.8 10*3/MM3 (ref 0.1–0.9)
MONOCYTES NFR BLD AUTO: 6.6 % (ref 5–12)
NEUTROPHILS NFR BLD AUTO: 72.5 % (ref 42.7–76)
NEUTROPHILS NFR BLD AUTO: 9.1 10*3/MM3 (ref 1.7–7)
NRBC BLD AUTO-RTO: 0.1 /100 WBC (ref 0–0.2)
PLATELET # BLD AUTO: 352 10*3/MM3 (ref 140–450)
PMV BLD AUTO: 7.8 FL (ref 6–12)
POTASSIUM SERPL-SCNC: 3.1 MMOL/L (ref 3.5–5.2)
POTASSIUM SERPL-SCNC: 3.1 MMOL/L (ref 3.5–5.2)
PROTHROMBIN TIME: 14.7 SECONDS (ref 19.4–28.5)
RBC # BLD AUTO: 4.23 10*6/MM3 (ref 3.77–5.28)
SODIUM SERPL-SCNC: 141 MMOL/L (ref 136–145)
TROPONIN T DELTA: 3 NG/L
TROPONIN T SERPL HS-MCNC: 9 NG/L
WBC NRBC COR # BLD: 12.6 10*3/MM3 (ref 3.4–10.8)

## 2023-08-03 PROCEDURE — 84132 ASSAY OF SERUM POTASSIUM: CPT | Performed by: PHYSICIAN ASSISTANT

## 2023-08-03 PROCEDURE — G0378 HOSPITAL OBSERVATION PER HR: HCPCS

## 2023-08-03 PROCEDURE — 80048 BASIC METABOLIC PNL TOTAL CA: CPT | Performed by: EMERGENCY MEDICINE

## 2023-08-03 PROCEDURE — 84484 ASSAY OF TROPONIN QUANT: CPT | Performed by: EMERGENCY MEDICINE

## 2023-08-03 PROCEDURE — 94799 UNLISTED PULMONARY SVC/PX: CPT

## 2023-08-03 PROCEDURE — 0 POTASSIUM CHLORIDE 10 MEQ/100ML SOLUTION: Performed by: EMERGENCY MEDICINE

## 2023-08-03 PROCEDURE — 85025 COMPLETE CBC W/AUTO DIFF WBC: CPT | Performed by: PHYSICIAN ASSISTANT

## 2023-08-03 PROCEDURE — 96366 THER/PROPH/DIAG IV INF ADDON: CPT

## 2023-08-03 PROCEDURE — 85610 PROTHROMBIN TIME: CPT | Performed by: EMERGENCY MEDICINE

## 2023-08-03 RX ORDER — WARFARIN SODIUM 3 MG/1
TABLET ORAL
Start: 2023-08-03

## 2023-08-03 RX ORDER — WARFARIN SODIUM 3 MG/1
3 TABLET ORAL
Status: DISCONTINUED | OUTPATIENT
Start: 2023-08-03 | End: 2023-08-03 | Stop reason: HOSPADM

## 2023-08-03 RX ORDER — WARFARIN SODIUM 3 MG/1
1.5 TABLET ORAL
Status: DISCONTINUED | OUTPATIENT
Start: 2023-08-04 | End: 2023-08-03 | Stop reason: HOSPADM

## 2023-08-03 RX ORDER — POTASSIUM CHLORIDE 20 MEQ/1
40 TABLET, EXTENDED RELEASE ORAL EVERY 4 HOURS
Status: DISCONTINUED | OUTPATIENT
Start: 2023-08-03 | End: 2023-08-03 | Stop reason: HOSPADM

## 2023-08-03 RX ADMIN — VALSARTAN 320 MG: 80 TABLET, FILM COATED ORAL at 10:35

## 2023-08-03 RX ADMIN — POTASSIUM CHLORIDE 10 MEQ: 7.46 INJECTION, SOLUTION INTRAVENOUS at 00:03

## 2023-08-03 RX ADMIN — POTASSIUM CHLORIDE 10 MEQ: 7.46 INJECTION, SOLUTION INTRAVENOUS at 03:01

## 2023-08-03 RX ADMIN — ALBUTEROL SULFATE 2.5 MG: 2.5 SOLUTION RESPIRATORY (INHALATION) at 03:44

## 2023-08-03 RX ADMIN — POTASSIUM CHLORIDE 10 MEQ: 7.46 INJECTION, SOLUTION INTRAVENOUS at 01:24

## 2023-08-03 RX ADMIN — POTASSIUM CHLORIDE 40 MEQ: 1500 TABLET, EXTENDED RELEASE ORAL at 11:00

## 2023-08-03 RX ADMIN — Medication 10 ML: at 08:00

## 2023-08-03 RX ADMIN — AMLODIPINE BESYLATE 5 MG: 5 TABLET ORAL at 10:35

## 2023-08-03 RX ADMIN — PANTOPRAZOLE SODIUM 40 MG: 40 TABLET, DELAYED RELEASE ORAL at 10:35

## 2023-08-03 RX ADMIN — HYDROCHLOROTHIAZIDE 25 MG: 25 TABLET ORAL at 10:35

## 2023-08-03 NOTE — CASE MANAGEMENT/SOCIAL WORK
Continued Stay Note  [facility]     Patient Name: Renuka Pelayo  MRN: [id number]  Today's Date: 8/3/2023    Admit Date: 8/2/2023    Plan: Home with family   Discharge Plan       Row Name 08/03/23 0903       Plan    Plan Home with family    Plan Comments Barriers: Abnormal labwork. Mrs. Pelayo plans to return home with her family at discharge. She has home oxygen through [employer]                        Phone communication or documentation only - no physical contact with patient or family.     Dariana CROCKETT,RN Case Manager  [facility]  Phone: [place] [employer]- [phone] cell- [phone]

## 2023-08-03 NOTE — DISCHARGE SUMMARY
[facility]    Michael Gerardo MD    CHIEF COMPLAINT:     Easy bruising/bleeding    HISTORY OF PRESENT ILLNESS:    Obtained from admitting physician HPI on 8/2/2023:  67-year-old female sent in from primary care provider due to possible coagulopathy. The patient states that she had developed blood-filled blisters on her hands. She reports no palm of hand rash or foot rash. She denies melena hematemesis hematochezia. She said she thought she saw some blood when she brushed her teeth last night. She reports that that she has had no epistaxis or hemoptysis. She reports that she has had some bruising recently that she was unaware of. She reports that he has had no recent dietary changes    08/03/23:  Patient confirms the HPI noted above including recent blood-filled blisters noted on the back of her hands as well as some bleeding while brushing her teeth.  She denies any other acute symptoms including pain, dyspnea, melena or hematochezia.  Her warfarin dose has been the same for quite some time and she denies any other medication or diet changes.  No cough or fever has been reported.  Patient notes that she has been on chronic warfarin therapy due to history of PE and DVT dating back to 2011 and states she has always had this for her anticoagulation and has never attempted a DOAC.          Past Medical History:   Diagnosis Date    Arthritis     Bladder cancer     Chronic back pain     Closed nondisplaced fracture of head of right radius 03/2017    Congenital deformity of right hip joint 1956    COPD (chronic obstructive pulmonary disease)     former smoker    Deep venous thrombosis     unprovoked    Depressive disorder     Disease of thyroid gland     Diverticulitis of colon     Essential hypertension     Gastroesophageal reflux disease     Insomnia     Obesity     Pulmonary embolism     provoked by surgery     Past Surgical History:   Procedure Laterality Date    ABDOMINAL SURGERY       BRONCHOSCOPY N/A 2020    Procedure: BRONCHOSCOPY with bronchial washing;  Surgeon: Win Johnston MD;  Location: [facility] ENDOSCOPY;  Service: Pulmonary;  Laterality: N/A;  Post: mucous plugs    BRONCHOSCOPY N/A 2022    Procedure: BRONCHOSCOPY with bronchial washing;  Surgeon: Win Johnston MD;  Location: [facility] ENDOSCOPY;  Service: Pulmonary;  Laterality: N/A;  post op: pneumonia    BRONCHOSCOPY N/A 2023    Procedure: BRONCHOSCOPY with bilateral wash;  Surgeon: Win Johnstno MD;  Location: [facility] ENDOSCOPY;  Service: Pulmonary;  Laterality: N/A;  mucous plugs    CHOLECYSTECTOMY      COLON SURGERY      COLONOSCOPY      COLOSTOMY CLOSURE N/A 2021    Procedure: COLOSTOMY TAKEDOWN OR CLOSURE, extenisve lysis of adhesions;  Surgeon: Chi Farmer MD;  Location: [facility] MAIN OR;  Service: General;  Laterality: N/A;    CYSTOSCOPY N/A 2021    Procedure: CYSTOSCOPY with bladder tumor removal and fulgeration, insertion of 3-way rizzo catheter;  Surgeon: Alfonzo Hayward MD;  Location: [facility] MAIN OR;  Service: Urology;  Laterality: N/A;    ENDOSCOPY      EXPLORATORY LAPAROTOMY N/A 2020    Procedure: LAPAROTOMY EXPLORATORY HARTMANS;  Surgeon: Chi Farmer MD;  Location: [facility] MAIN OR;  Service: General;  Laterality: N/A;    HYSTERECTOMY      TOTAL HIP ARTHROPLASTY Right     TOTAL HIP ARTHROPLASTY REVISION Right     x3     Family History   Problem Relation Age of Onset    No Known Problems Mother     No Known Problems Father      Social History     Tobacco Use    Smoking status: Former     Packs/day: 2.00     Years: 40.00     Pack years: 80.00     Types: Cigarettes     Quit date:      Years since quittin.5    Smokeless tobacco: Never    Tobacco comments:     ELECTRONIC   Vaping Use    Vaping Use: Former    Substances: Nicotine, Flavoring   Substance Use Topics    Alcohol use: Never    Drug use: Never     Medications Prior to Admission   Medication Sig  Dispense Refill Last Dose    albuterol (PROVENTIL) (2.5 MG/3ML) 0.083% nebulizer solution Take 2.5 mg by nebulization Every 6 (Six) Hours As Needed for Wheezing.       amLODIPine (NORVASC) 5 MG tablet Take 1 tablet by mouth Daily. 30 tablet 0     Budeson-Glycopyrrol-Formoterol (Breztri Aerosphere) 160-9-4.8 MCG/ACT aerosol inhaler Inhale 2 puffs 2 (Two) Times a Day.       furosemide (LASIX) 20 MG tablet Take 1 tablet by mouth 2 (Two) Times a Day As Needed.       HYDROcodone-acetaminophen (NORCO)  MG per tablet Take 1 tablet by mouth Every 6 (Six) Hours As Needed for Moderate Pain.       ipratropium (ATROVENT) 0.02 % nebulizer solution Take 2.5 mL by nebulization Every 6 (Six) Hours As Needed for Wheezing or Shortness of Air.       pantoprazole (PROTONIX) 40 MG EC tablet Take 1 tablet by mouth 2 (Two) Times a Day.       predniSONE (DELTASONE) 10 MG tablet Take 1 tablet by mouth Daily.       roflumilast (DALIRESP) 500 MCG tablet tablet Take 1 tablet by mouth Every Night.       valsartan-hydrochlorothiazide (DIOVAN-HCT) 320-25 MG per tablet Take 1 tablet by mouth Daily.       zolpidem (AMBIEN) 5 MG tablet Take 1 tablet by mouth At Night As Needed.       [DISCONTINUED] warfarin (COUMADIN) 3 MG tablet Take 1 tablet by mouth Every Morning.   7/31/2023     Allergies:  Patient has no known allergies.    Immunization History   Administered Date(s) Administered    Flu Vaccine Split Quad 10/26/2021    Influenza, Unspecified 09/26/2018, 12/03/2020    flucelvax quad pfs =>4 YRS 10/26/2021           REVIEW OF SYSTEMS:    Review of Systems   Constitutional: Negative.   HENT: Negative.     Eyes: Negative.    Cardiovascular: Negative.    Respiratory: Negative.     Hematologic/Lymphatic: Positive for bleeding problem. Bruises/bleeds easily.   Skin: Negative.    Musculoskeletal: Negative.    Gastrointestinal: Negative.    Genitourinary: Negative.    Neurological: Negative.    Psychiatric/Behavioral: Negative.         Vital  Signs  Temp:  [97.1 øF (36.2 øC)-97.9 øF (36.6 øC)] 97.1 øF (36.2 øC)  Heart Rate:  [75-92] 86  Resp:  [16-20] 18  BP: (106-152)/(59-80) 119/60          Physical Exam:  Physical Exam  Vitals reviewed.   Constitutional:       General: She is not in acute distress.     Appearance: Normal appearance. She is obese. She is not ill-appearing, toxic-appearing or diaphoretic.   HENT:      Head: Normocephalic.      Right Ear: External ear normal.      Left Ear: External ear normal.      Nose: Nose normal.      Mouth/Throat:      Mouth: Mucous membranes are moist.   Eyes:      Extraocular Movements: Extraocular movements intact.   Cardiovascular:      Rate and Rhythm: Normal rate and regular rhythm.      Pulses: Normal pulses.   Pulmonary:      Effort: Pulmonary effort is normal.      Breath sounds: Normal breath sounds.   Abdominal:      General: Bowel sounds are normal.      Palpations: Abdomen is soft.   Musculoskeletal:         General: Normal range of motion.      Cervical back: Normal range of motion.      Right lower leg: No edema.      Left lower leg: No edema.   Skin:     General: Skin is warm and dry.      Capillary Refill: Capillary refill takes less than 2 seconds.   Neurological:      General: No focal deficit present.      Mental Status: She is alert.   Psychiatric:         Mood and Affect: Mood normal.         Behavior: Behavior normal.         Thought Content: Thought content normal.         Judgment: Judgment normal.         Emotional Behavior:   Normal   Debilities:  None  Results Review:    I reviewed the patient's new clinical results.  Lab Results (most recent)       Procedure Component Value Units Date/Time    High Sensitivity Troponin T 2Hr [[id number]]  (Abnormal) Collected: 08/03/23 0921    Specimen: Blood Updated: 08/03/23 1024     HS Troponin T 12 ng/L      Troponin T Delta 3 ng/L     Narrative:      High Sensitive Troponin T Reference Range:  <10.0 ng/L- Negative Female for AMI  <15.0 ng/L- Negative  Male for AMI  >=10 - Abnormal Female indicating possible myocardial injury.  >=15 - Abnormal Male indicating possible myocardial injury.   Clinicians would have to utilize clinical acumen, EKG, Troponin, and serial changes to determine if it is an Acute Myocardial Infarction or myocardial injury due to an underlying chronic condition.         Potassium [[id number]]  (Abnormal) Collected: 08/03/23 0921    Specimen: Blood Updated: 08/03/23 1015     Potassium 3.1 mmol/L     Basic Metabolic Panel [[id number]]  (Abnormal) Collected: 08/03/23 0316    Specimen: Blood Updated: 08/03/23 0352     Glucose 115 mg/dL      BUN 8 mg/dL      Creatinine 0.77 mg/dL      Sodium 141 mmol/L      Potassium 3.1 mmol/L      Chloride 105 mmol/L      CO2 24.0 mmol/L      Calcium 8.5 mg/dL      BUN/Creatinine Ratio 10.4     Anion Gap 12.0 mmol/L      eGFR 85.2 mL/min/1.73     Narrative:      GFR Normal >60  Chronic Kidney Disease <60  Kidney Failure <15      High Sensitivity Troponin T [[id number]]  (Normal) Collected: 08/03/23 0316    Specimen: Blood Updated: 08/03/23 0352     HS Troponin T 9 ng/L     Narrative:      High Sensitive Troponin T Reference Range:  <10.0 ng/L- Negative Female for AMI  <15.0 ng/L- Negative Male for AMI  >=10 - Abnormal Female indicating possible myocardial injury.  >=15 - Abnormal Male indicating possible myocardial injury.   Clinicians would have to utilize clinical acumen, EKG, Troponin, and serial changes to determine if it is an Acute Myocardial Infarction or myocardial injury due to an underlying chronic condition.         Protime-INR [[id number]]  (Abnormal) Collected: 08/03/23 0316    Specimen: Blood Updated: 08/03/23 0345     Protime 14.7 Seconds      INR 1.40    CBC & Differential [[id number]]  (Abnormal) Collected: 08/03/23 0316    Specimen: Blood Updated: 08/03/23 0330    Narrative:      The following orders were created for panel order CBC & Differential.  Procedure                               Abnormality          Status                     ---------                               -----------         ------                     CBC Auto Differential[[id number]]        Abnormal            Final result                 Please view results for these tests on the individual orders.    CBC Auto Differential [[id number]]  (Abnormal) Collected: 08/03/23 0316    Specimen: Blood Updated: 08/03/23 0330     WBC 12.60 10*3/mm3      RBC 4.23 10*6/mm3      Hemoglobin 10.9 g/dL      Hematocrit 34.1 %      MCV 80.5 fL      MCH 25.6 pg      MCHC 31.9 g/dL      RDW 16.8 %      RDW-SD 49.9 fl      MPV 7.8 fL      Platelets 352 10*3/mm3      Neutrophil % 72.5 %      Lymphocyte % 19.0 %      Monocyte % 6.6 %      Eosinophil % 1.6 %      Basophil % 0.3 %      Neutrophils, Absolute 9.10 10*3/mm3      Lymphocytes, Absolute 2.40 10*3/mm3      Monocytes, Absolute 0.80 10*3/mm3      Eosinophils, Absolute 0.20 10*3/mm3      Basophils, Absolute 0.00 10*3/mm3      nRBC 0.1 /100 WBC     Magnesium [[id number]]  (Normal) Collected: 08/02/23 1237    Specimen: Blood from Arm, Right Updated: 08/02/23 1455     Magnesium 1.7 mg/dL     Protime-INR [[id number]]  (Abnormal) Collected: 08/02/23 1237    Specimen: Blood from Arm, Right Updated: 08/02/23 1318     Protime >90.0 Seconds      INR >=8.00    Comprehensive Metabolic Panel [[id number]]  (Abnormal) Collected: 08/02/23 1237    Specimen: Blood from Arm, Right Updated: 08/02/23 1310     Glucose 110 mg/dL      BUN 10 mg/dL      Creatinine 0.81 mg/dL      Sodium 143 mmol/L      Potassium 2.8 mmol/L      Chloride 104 mmol/L      CO2 25.0 mmol/L      Calcium 9.3 mg/dL      Total Protein 7.0 g/dL      Albumin 4.2 g/dL      ALT (SGPT) 15 U/L      AST (SGOT) 23 U/L      Alkaline Phosphatase 97 U/L      Total Bilirubin 0.4 mg/dL      Globulin 2.8 gm/dL      A/G Ratio 1.5 g/dL      BUN/Creatinine Ratio 12.3     Anion Gap 14.0 mmol/L      eGFR 80.2 mL/min/1.73     Narrative:      GFR Normal >60  Chronic Kidney Disease  <60  Kidney Failure <15      CBC & Differential [[id number]]  (Abnormal) Collected: 08/02/23 1237    Specimen: Blood from Arm, Right Updated: 08/02/23 1250    Narrative:      The following orders were created for panel order CBC & Differential.  Procedure                               Abnormality         Status                     ---------                               -----------         ------                     CBC Auto Differential[[id number]]        Abnormal            Final result                 Please view results for these tests on the individual orders.    CBC Auto Differential [[id number]]  (Abnormal) Collected: 08/02/23 1237    Specimen: Blood from Arm, Right Updated: 08/02/23 1250     WBC 10.80 10*3/mm3      RBC 4.69 10*6/mm3      Hemoglobin 12.1 g/dL      Hematocrit 37.7 %      MCV 80.4 fL      MCH 25.9 pg      MCHC 32.2 g/dL      RDW 17.0 %      RDW-SD 50.3 fl      MPV 7.8 fL      Platelets 375 10*3/mm3      Neutrophil % 74.8 %      Lymphocyte % 17.1 %      Monocyte % 5.9 %      Eosinophil % 1.2 %      Basophil % 1.0 %      Neutrophils, Absolute 8.10 10*3/mm3      Lymphocytes, Absolute 1.80 10*3/mm3      Monocytes, Absolute 0.60 10*3/mm3      Eosinophils, Absolute 0.10 10*3/mm3      Basophils, Absolute 0.10 10*3/mm3      nRBC 0.2 /100 WBC             Imaging Results (Most Recent)       None          not reviewed    ECG/EMG Results (most recent)       None          not reviewed    Results for orders placed during the hospital encounter of 06/05/23    Duplex Venous Lower Extremity - Bilateral CAR    Interpretation Summary    Normal bilateral lower extremity venous duplex scan.          Microbiology Results (last 10 days)       ** No results found for the last 240 hours. **            Assessment & Plan     Warfarin poisoning, accidental or unintentional, initial encounter       Supratherapeutic INR with a history of PE/DVT  -INR initially greater than 8.00 with a small blood-filled blister on the back of  the hand but no other overt signs of bleeding  -Patient given 10 mg vitamin K in the ED  -INR decreased to one-point 4 in the morning following admission with no signs of overt bleeding noted overnight  -Pharmacist recommends decreasing Coumadin dose to 1.5 mg Monday and Friday with 3 mg the rest of the week and outpatient follow-up in 2-3 days  -Patient encouraged to discuss possible switch to Eliquis with PCP    Hypokalemia  Lab Results   Component Value Date    K 3.1 (L) 08/03/2023    MG 1.7 08/02/2023   -Electrolyte replacement protocol ordered  -Monitor potassium and magnesium    Hypertension  -Well controlled   BP Readings from Last 1 Encounters:   08/03/23 119/60   - Continue amlodipine, valsartan-hydrochlorothiazide and Lasix  - Monitor while admitted    COPD  -Symbicort, albuterol and Daliresp    GERD  -PPI    Obesity (BMI: 30.65)  -Encouraged on lifestyle modifications    I discussed the patients findings and my recommendations with patient and nursing staff.     Discharge Diagnosis:      Warfarin poisoning, accidental or unintentional, initial encounter      Hospital Course  Patient is a 67 y.o. female presented with blood-filled blisters and gingival bleeding with brushing teeth with an HPI noted above.  INR was initially noted is elevated at 8.00 and she was given 2 mg of vitamin K in the ED with improvement in INR to 1.4 on the morning following admission.  No obvious signs of active bleeding were noted during her admission.  Blood pressures been well controlled and she denies any new or acute symptoms.  Discussed warfarin dosing with patient who notes her dosing has been constant for quite some time and she has had no other medication or diet changes that she is aware of.  Patient also reports that she has never attempted DOAC therapy in the past and has been on warfarin since her initial PE in 2011.  Pharmacist was consulted who recommended decreasing Coumadin to 1.5 mg Monday and Friday and 3 mg the  rest of the week with close outpatient follow-up in 2-3 days to reassess INR.  Pharmacy was also able to run a test claim for Eliquis and notes that it would be covered 100% on patient's insurance.  This was discussed with patient is recommended that she consider this medication change with her primary care provider.  Potassium was also noted as low at 2.8 on presentation and replacement protocol has been given.  At this time patient is felt to be in good condition for discharge with close follow-up with her PCP on an outpatient basis.  Her full testing/results and plan were discussed with patient along with concerning/alarm symptoms for which to call 911/return to the ED with specific focus on any signs of active bleeding including melena, hematochezia, hematemesis, lightheadedness or syncope/near syncope.  All questions were answered she verbalizes her understanding and agreement.    Past Medical History:     Past Medical History:   Diagnosis Date    Arthritis     Bladder cancer     Chronic back pain     Closed nondisplaced fracture of head of right radius 03/2017    Congenital deformity of right hip joint 1956    COPD (chronic obstructive pulmonary disease)     former smoker    Deep venous thrombosis     unprovoked    Depressive disorder     Disease of thyroid gland     Diverticulitis of colon     Essential hypertension     Gastroesophageal reflux disease     Insomnia     Obesity     Pulmonary embolism     provoked by surgery       Past Surgical History:     Past Surgical History:   Procedure Laterality Date    ABDOMINAL SURGERY      BRONCHOSCOPY N/A 11/25/2020    Procedure: BRONCHOSCOPY with bronchial washing;  Surgeon: Win Johnston MD;  Location: [facility] ENDOSCOPY;  Service: Pulmonary;  Laterality: N/A;  Post: mucous plugs    BRONCHOSCOPY N/A 4/25/2022    Procedure: BRONCHOSCOPY with bronchial washing;  Surgeon: Win Johnston MD;  Location: [facility] ENDOSCOPY;  Service: Pulmonary;  Laterality: N/A;   post op: pneumonia    BRONCHOSCOPY N/A 2023    Procedure: BRONCHOSCOPY with bilateral wash;  Surgeon: Win Johnston MD;  Location: [facility] ENDOSCOPY;  Service: Pulmonary;  Laterality: N/A;  mucous plugs    CHOLECYSTECTOMY      COLON SURGERY      COLONOSCOPY      COLOSTOMY CLOSURE N/A 2021    Procedure: COLOSTOMY TAKEDOWN OR CLOSURE, extenisve lysis of adhesions;  Surgeon: Chi Farmer MD;  Location: [facility] MAIN OR;  Service: General;  Laterality: N/A;    CYSTOSCOPY N/A 2021    Procedure: CYSTOSCOPY with bladder tumor removal and fulgeration, insertion of 3-way rizzo catheter;  Surgeon: Alfonzo Hayward MD;  Location: [facility] MAIN OR;  Service: Urology;  Laterality: N/A;    ENDOSCOPY      EXPLORATORY LAPAROTOMY N/A 2020    Procedure: LAPAROTOMY EXPLORATORY HARTMANS;  Surgeon: Chi Farmer MD;  Location: [facility] MAIN OR;  Service: General;  Laterality: N/A;    HYSTERECTOMY      TOTAL HIP ARTHROPLASTY Right     TOTAL HIP ARTHROPLASTY REVISION Right     x3       Social History:   Social History     Socioeconomic History    Marital status:    Tobacco Use    Smoking status: Former     Packs/day: 2.00     Years: 40.00     Pack years: 80.00     Types: Cigarettes     Quit date: 2019     Years since quittin.5    Smokeless tobacco: Never    Tobacco comments:     ELECTRONIC   Vaping Use    Vaping Use: Former    Substances: Nicotine, Flavoring   Substance and Sexual Activity    Alcohol use: Never    Drug use: Never    Sexual activity: Defer       Procedures Performed         Consults:   Consults       No orders found for last 30 day(s).            Condition on Discharge:     Stable    Discharge Disposition  Home or Self Care    Discharge Medications     Discharge Medications        Changes to Medications        Instructions Start Date   warfarin 3 MG tablet  Commonly known as: COUMADIN  What changed:   how much to take  how to take this  when to take this  additional  instructions   Take 3 mg (1 tablet) on Sunday, Tuesday, Wednesday, Thursday and Saturday Take 1.5 mg (1/2 tablet) on Monday and Friday             Continue These Medications        Instructions Start Date   albuterol (2.5 MG/3ML) 0.083% nebulizer solution  Commonly known as: PROVENTIL   2.5 mg, Nebulization, Every 6 Hours PRN      amLODIPine 5 MG tablet  Commonly known as: NORVASC   5 mg, Oral, Every 24 Hours Scheduled      Breztri Aerosphere 160-9-4.8 MCG/ACT aerosol inhaler  Generic drug: Budeson-Glycopyrrol-Formoterol   2 puffs, Inhalation, 2 Times Daily      furosemide 20 MG tablet  Commonly known as: LASIX   20 mg, Oral, 2 Times Daily PRN      HYDROcodone-acetaminophen  MG per tablet  Commonly known as: NORCO   1 tablet, Oral, Every 6 Hours PRN      ipratropium 0.02 % nebulizer solution  Commonly known as: ATROVENT   500 mcg, Nebulization, Every 6 Hours PRN      pantoprazole 40 MG EC tablet  Commonly known as: PROTONIX   40 mg, Oral, 2 Times Daily      predniSONE 10 MG tablet  Commonly known as: DELTASONE   10 mg, Oral, Daily      roflumilast 500 MCG tablet tablet  Commonly known as: DALIRESP   500 mcg, Oral, Nightly      valsartan-hydrochlorothiazide 320-25 MG per tablet  Commonly known as: DIOVAN-HCT   1 tablet, Oral, Daily      zolpidem 5 MG tablet  Commonly known as: AMBIEN   5 mg, Oral, Nightly PRN               Discharge Diet:     Activity at Discharge:     Follow-up Appointments  No future appointments.  Additional Instructions for the Follow-ups that You Need to Schedule       Discharge Follow-up with PCP   As directed       Currently Documented PCP:    Michael Gerardo MD    PCP Phone Number:    [phone]     Follow Up Details: 3-5 days                Test Results Pending at Discharge       Risk for Readmission (LACE) Score: 7 (8/3/2023  6:00 AM)      Greater than 30 minutes spent in discharge activities for this patient    Florentino Travis PA-C  08/03/23  11:19 EDT

## 2023-08-03 NOTE — PLAN OF CARE
Goal Outcome Evaluation:  Plan of Care Reviewed With: patient        Progress: improving  Outcome Evaluation: Was awake most of the night while recieving IV potassium replacement. INR has improved. requesting regular diet.

## 2023-08-03 NOTE — CASE MANAGEMENT/SOCIAL WORK
Case Management Discharge Note      Final Note: Routine home                      Transportation Services  Private: Car    Final Discharge Disposition Code: 01 - home or self-care

## 2023-08-03 NOTE — PROGRESS NOTES
"Pharmacy dosing service  Anticoagulant  Warfarin     Subjective:    Renuka Pelayo is a 67 y.o.female being continued on warfarin for History of DVT/PE.    INR Goal: 2 - 3  Home medication?: warfarin 3 mg PO daily (21 mg/week)  Bridge Therapy Present?:  none at time of initial consult  Interacting Medications Evaluation (New/Present/Discontinued): none significant  Additional Contributing Factors: elevated INR at admission      Assessment/Plan:    INR subtherapeutic. Sharply decreased following administration of vitamin K on 8/2. Etiology of elevated INR unclear.     Recommend decreasing home dose to 1.5 mg MonFri, 3 mg rest of week (18 mg weekly dose; ~15% total weekly dose decrease) with outpatient follow up in 2-3 days if discharged today.    Continue to monitor and adjust based on INR.         Date 8/2 8/3          INR >8 1.4          Dose Vitamin K 10 mg IV 3 mg              Objective:  [Ht: 160 cm (63\"); Wt: 78.5 kg (173 lb); BMI: Body mass index is 30.65 kg/mý.]    Lab Results   Component Value Date    ALBUMIN 4.2 08/02/2023     Lab Results   Component Value Date    INR 1.40 (L) 08/03/2023    INR >=8.00 (C) 08/02/2023    INR 4.54 (H) 07/01/2023    PROTIME 14.7 (L) 08/03/2023    PROTIME >90.0 (H) 08/02/2023    PROTIME 44.6 (H) 07/01/2023     Lab Results   Component Value Date    HGB 10.9 (L) 08/03/2023    HGB 12.1 08/02/2023    HGB 11.0 (L) 07/12/2023     Lab Results   Component Value Date    HCT 34.1 08/03/2023    HCT 37.7 08/02/2023    HCT 33.8 (L) 07/12/2023     Carlito Larios RPH  08/03/23 09:19 EDT   "

## 2023-08-04 ENCOUNTER — READMISSION MANAGEMENT (OUTPATIENT)
Dept: CALL CENTER | Facility: HOSPITAL | Age: 68
End: 2023-08-04
Payer: MEDICARE

## 2023-08-08 ENCOUNTER — READMISSION MANAGEMENT (OUTPATIENT)
Dept: CALL CENTER | Facility: HOSPITAL | Age: 68
End: 2023-08-08
Payer: MEDICARE

## 2023-08-08 NOTE — OUTREACH NOTE
Medical Week 1 Survey      Flowsheet Row Responses   [facility] facility patient discharged from? [facility]   Does the patient have one of the following disease processes/diagnoses(primary or secondary)? Other   Week 1 attempt successful? No   Unsuccessful attempts Attempt 1            Francoise Marrero Registered Nurse

## 2023-08-15 ENCOUNTER — READMISSION MANAGEMENT (OUTPATIENT)
Dept: CALL CENTER | Facility: HOSPITAL | Age: 68
End: 2023-08-15
Payer: MEDICARE

## 2023-08-15 NOTE — OUTREACH NOTE
Medical Week 2 Survey      Flowsheet Row Responses   [facility] facility patient discharged from? [facility]   Does the patient have one of the following disease processes/diagnoses(primary or secondary)? Other   Week 2 attempt successful? No   Unsuccessful attempts Attempt 1            Sara MEANS - Licensed Nurse

## 2023-08-21 ENCOUNTER — READMISSION MANAGEMENT (OUTPATIENT)
Dept: CALL CENTER | Facility: HOSPITAL | Age: 68
End: 2023-08-21
Payer: MEDICARE

## 2023-08-21 NOTE — OUTREACH NOTE
Medical Week 3 Survey      Flowsheet Row Responses   [facility] patient discharged from? [facility]   Does the patient have one of the following disease processes/diagnoses(primary or secondary)? Other   Week 3 attempt successful? Yes   Call start time 1446   Call end time 1449   Discharge diagnosis Warfarin poisoning, accidental or unintentional, initial encounter   Medication alerts for this patient Warfarin--alternating 2mg with 3 mg QOD and last INR on 8/18/23 with 2.1 with next due on 8/25/23   Meds reviewed with patient/caregiver? Yes   Is the patient having any side effects they believe may be caused by any medication additions or changes? No   Does the patient have all medications ordered at discharge? Yes   Is the patient taking all medications as directed (includes completed medication regime)? Yes   Does the patient have a primary care provider?  Yes   Does the patient have an appointment with their PCP within 7 days of discharge? Greater than 7 days   Nursing Interventions Verified appointment date/time/provider  [8/18/23]   Has the patient kept scheduled appointments due by today? Yes   Has home health visited the patient within 72 hours of discharge? N/A   Psychosocial issues? No   Did the patient receive a copy of their discharge instructions? Yes   Nursing interventions Reviewed instructions with patient   What is the patient's perception of their health status since discharge? Improving  [Doing well--denies any further s/s bleeding, mindful to be on alert. Compliant with f/u and warfarin dosing, INR checks.]   Is the patient/caregiver able to teach back signs and symptoms related to disease process for when to call PCP? Yes   Is the patient/caregiver able to teach back signs and symptoms related to disease process for when to call 911? Yes   Week 3 Call Completed? Yes   Graduated Yes   Call end time 1449            JU ROSS - Registered Nurse

## 2023-11-17 ENCOUNTER — HOSPITAL ENCOUNTER (EMERGENCY)
Facility: HOSPITAL | Age: 68
Discharge: HOME | End: 2023-11-17
Attending: EMERGENCY MEDICINE
Payer: MEDICARE

## 2023-11-17 ENCOUNTER — APPOINTMENT (OUTPATIENT)
Dept: CT IMAGING | Facility: HOSPITAL | Age: 68
End: 2023-11-17
Payer: MEDICARE

## 2023-11-17 VITALS
DIASTOLIC BLOOD PRESSURE: 84 MMHG | SYSTOLIC BLOOD PRESSURE: 147 MMHG | RESPIRATION RATE: 16 BRPM | BODY MASS INDEX: 31.01 KG/M2 | HEIGHT: 63 IN | HEART RATE: 83 BPM | WEIGHT: 175 LBS | TEMPERATURE: 98.2 F | OXYGEN SATURATION: 97 %

## 2023-11-17 DIAGNOSIS — E87.6 HYPOKALEMIA: ICD-10-CM

## 2023-11-17 DIAGNOSIS — M54.50 ACUTE LEFT-SIDED LOW BACK PAIN WITHOUT SCIATICA: Primary | ICD-10-CM

## 2023-11-17 LAB
ANION GAP SERPL CALCULATED.3IONS-SCNC: 11 MMOL/L (ref 5–15)
BASOPHILS # BLD AUTO: 0 10*3/MM3 (ref 0–0.2)
BASOPHILS NFR BLD AUTO: 0.3 % (ref 0–1.5)
BUN SERPL-MCNC: 13 MG/DL (ref 8–23)
BUN/CREAT SERPL: 15.5 (ref 7–25)
CALCIUM SPEC-SCNC: 9.2 MG/DL (ref 8.6–10.5)
CHLORIDE SERPL-SCNC: 100 MMOL/L (ref 98–107)
CO2 SERPL-SCNC: 31 MMOL/L (ref 22–29)
CREAT SERPL-MCNC: 0.84 MG/DL (ref 0.57–1)
DEPRECATED RDW RBC AUTO: 49.9 FL (ref 37–54)
EGFRCR SERPLBLD CKD-EPI 2021: 76.8 ML/MIN/1.73
EOSINOPHIL # BLD AUTO: 0 10*3/MM3 (ref 0–0.4)
EOSINOPHIL NFR BLD AUTO: 0 % (ref 0.3–6.2)
ERYTHROCYTE [DISTWIDTH] IN BLOOD BY AUTOMATED COUNT: 16.4 % (ref 12.3–15.4)
GLUCOSE SERPL-MCNC: 147 MG/DL (ref 65–99)
HCT VFR BLD AUTO: 37.2 % (ref 34–46.6)
HGB BLD-MCNC: 12 G/DL (ref 12–15.9)
HOLD SPECIMEN: NORMAL
INR PPP: 2.91 (ref 2–3)
LYMPHOCYTES # BLD AUTO: 0.6 10*3/MM3 (ref 0.7–3.1)
LYMPHOCYTES NFR BLD AUTO: 5 % (ref 19.6–45.3)
MCH RBC QN AUTO: 26.8 PG (ref 26.6–33)
MCHC RBC AUTO-ENTMCNC: 32.4 G/DL (ref 31.5–35.7)
MCV RBC AUTO: 82.7 FL (ref 79–97)
MONOCYTES # BLD AUTO: 0.2 10*3/MM3 (ref 0.1–0.9)
MONOCYTES NFR BLD AUTO: 1.6 % (ref 5–12)
NEUTROPHILS NFR BLD AUTO: 10.5 10*3/MM3 (ref 1.7–7)
NEUTROPHILS NFR BLD AUTO: 93.1 % (ref 42.7–76)
NRBC BLD AUTO-RTO: 0 /100 WBC (ref 0–0.2)
PLATELET # BLD AUTO: 303 10*3/MM3 (ref 140–450)
PMV BLD AUTO: 8.1 FL (ref 6–12)
POTASSIUM SERPL-SCNC: 3 MMOL/L (ref 3.5–5.2)
PROTHROMBIN TIME: 29.3 SECONDS (ref 19.4–28.5)
RBC # BLD AUTO: 4.5 10*6/MM3 (ref 3.77–5.28)
SODIUM SERPL-SCNC: 142 MMOL/L (ref 136–145)
WBC NRBC COR # BLD AUTO: 11.3 10*3/MM3 (ref 3.4–10.8)

## 2023-11-17 PROCEDURE — 96375 TX/PRO/DX INJ NEW DRUG ADDON: CPT

## 2023-11-17 PROCEDURE — 25010000002 MORPHINE PER 10 MG: Performed by: NURSE PRACTITIONER

## 2023-11-17 PROCEDURE — 72193 CT PELVIS W/DYE: CPT

## 2023-11-17 PROCEDURE — 25510000001 IOPAMIDOL PER 1 ML: Performed by: EMERGENCY MEDICINE

## 2023-11-17 PROCEDURE — 99285 EMERGENCY DEPT VISIT HI MDM: CPT

## 2023-11-17 PROCEDURE — 97162 PT EVAL MOD COMPLEX 30 MIN: CPT | Performed by: PHYSICAL THERAPIST

## 2023-11-17 PROCEDURE — 80048 BASIC METABOLIC PNL TOTAL CA: CPT | Performed by: NURSE PRACTITIONER

## 2023-11-17 PROCEDURE — 85610 PROTHROMBIN TIME: CPT | Performed by: NURSE PRACTITIONER

## 2023-11-17 PROCEDURE — 96374 THER/PROPH/DIAG INJ IV PUSH: CPT

## 2023-11-17 PROCEDURE — 85025 COMPLETE CBC W/AUTO DIFF WBC: CPT | Performed by: NURSE PRACTITIONER

## 2023-11-17 PROCEDURE — 25010000002 ONDANSETRON PER 1 MG: Performed by: NURSE PRACTITIONER

## 2023-11-17 RX ORDER — ONDANSETRON 2 MG/ML
4 INJECTION INTRAMUSCULAR; INTRAVENOUS ONCE
Status: COMPLETED | OUTPATIENT
Start: 2023-11-17 | End: 2023-11-17

## 2023-11-17 RX ORDER — MORPHINE SULFATE 2 MG/ML
2 INJECTION, SOLUTION INTRAMUSCULAR; INTRAVENOUS ONCE
Status: COMPLETED | OUTPATIENT
Start: 2023-11-17 | End: 2023-11-17

## 2023-11-17 RX ORDER — POTASSIUM CHLORIDE 20 MEQ/1
40 TABLET, EXTENDED RELEASE ORAL ONCE
Status: COMPLETED | OUTPATIENT
Start: 2023-11-17 | End: 2023-11-17

## 2023-11-17 RX ORDER — METHOCARBAMOL 500 MG/1
500 TABLET, FILM COATED ORAL 4 TIMES DAILY
Qty: 12 TABLET | Refills: 0 | Status: SHIPPED | OUTPATIENT
Start: 2023-11-17

## 2023-11-17 RX ORDER — LIDOCAINE 50 MG/G
1 PATCH TOPICAL EVERY 24 HOURS
Qty: 6 EACH | Refills: 0 | Status: SHIPPED | OUTPATIENT
Start: 2023-11-17

## 2023-11-17 RX ORDER — SODIUM CHLORIDE 0.9 % (FLUSH) 0.9 %
10 SYRINGE (ML) INJECTION AS NEEDED
Status: DISCONTINUED | OUTPATIENT
Start: 2023-11-17 | End: 2023-11-17 | Stop reason: HOSPADM

## 2023-11-17 RX ADMIN — MORPHINE SULFATE 2 MG: 2 INJECTION, SOLUTION INTRAMUSCULAR; INTRAVENOUS at 09:52

## 2023-11-17 RX ADMIN — Medication 10 ML: at 09:52

## 2023-11-17 RX ADMIN — POTASSIUM CHLORIDE 40 MEQ: 1500 TABLET, EXTENDED RELEASE ORAL at 10:35

## 2023-11-17 RX ADMIN — IOPAMIDOL 100 ML: 755 INJECTION, SOLUTION INTRAVENOUS at 11:00

## 2023-11-17 RX ADMIN — ONDANSETRON 4 MG: 2 INJECTION INTRAMUSCULAR; INTRAVENOUS at 09:52

## 2023-11-17 NOTE — PLAN OF CARE
ASSESSMENT:   Pt is a a 67 yr/o female presenting with (L) sided hip pain and SIJ dysfunction anteriorly. Improved pain level and function following mobilization. Issued HEP for isometric hamstring with good understanding and good motivation. OPPT order.       PLAN:    HEP and OPPT

## 2023-11-17 NOTE — ED PROVIDER NOTES
Subjective   History of Present Illness  Patient is a 67-year-old white female with history of prior blood clots currently on warfarin.  She is in oxygen-dependent COPD.  She presents today with complaints of pain in the left lower back and posterior hip.  She states that started about 4 days ago with no known injury or trauma.  It is worse when she moves bends or twists.  Her pain does not radiate.  There is no numbness tingling or weakness in either lower extremity.  She denies any bowel or bladder dysfunction or saddle anesthesia.  No urinary symptoms fever or chills.      Review of Systems   Constitutional:  Negative for fever.   Gastrointestinal:  Negative for abdominal pain.   Genitourinary:  Negative for difficulty urinating and dysuria.   Musculoskeletal:  Positive for back pain.        Left lower back and posterior hip/buttock pain   Neurological:  Negative for weakness and numbness.       Past Medical History:   Diagnosis Date    Arthritis     Bladder cancer     Chronic back pain     Closed nondisplaced fracture of head of right radius 03/2017    Congenital deformity of right hip joint 1956    COPD (chronic obstructive pulmonary disease)     former smoker    Deep venous thrombosis     unprovoked    Depressive disorder     Disease of thyroid gland     Diverticulitis of colon     Essential hypertension     Gastroesophageal reflux disease     Insomnia     Obesity     Pulmonary embolism     provoked by surgery       No Known Allergies    Past Surgical History:   Procedure Laterality Date    ABDOMINAL SURGERY      BRONCHOSCOPY N/A 11/25/2020    Procedure: BRONCHOSCOPY with bronchial washing;  Surgeon: Win Johnston MD;  Location: [facility] ENDOSCOPY;  Service: Pulmonary;  Laterality: N/A;  Post: mucous plugs    BRONCHOSCOPY N/A 4/25/2022    Procedure: BRONCHOSCOPY with bronchial washing;  Surgeon: Win Johnston MD;  Location: [facility] ENDOSCOPY;  Service: Pulmonary;  Laterality: N/A;  post op: pneumonia     BRONCHOSCOPY N/A 2023    Procedure: BRONCHOSCOPY with bilateral wash;  Surgeon: Win Johnston MD;  Location: [facility] ENDOSCOPY;  Service: Pulmonary;  Laterality: N/A;  mucous plugs    CHOLECYSTECTOMY      COLON SURGERY      COLONOSCOPY      COLOSTOMY CLOSURE N/A 2021    Procedure: COLOSTOMY TAKEDOWN OR CLOSURE, extenisve lysis of adhesions;  Surgeon: Chi Farmer MD;  Location: [facility] MAIN OR;  Service: General;  Laterality: N/A;    CYSTOSCOPY N/A 2021    Procedure: CYSTOSCOPY with bladder tumor removal and fulgeration, insertion of 3-way rizzo catheter;  Surgeon: Alfonzo Hayward MD;  Location: [facility] MAIN OR;  Service: Urology;  Laterality: N/A;    ENDOSCOPY      EXPLORATORY LAPAROTOMY N/A 2020    Procedure: LAPAROTOMY EXPLORATORY HARTMANS;  Surgeon: Chi Farmer MD;  Location: [facility] MAIN OR;  Service: General;  Laterality: N/A;    HYSTERECTOMY      TOTAL HIP ARTHROPLASTY Right     TOTAL HIP ARTHROPLASTY REVISION Right     x3       Family History   Problem Relation Age of Onset    No Known Problems Mother     No Known Problems Father        Social History     Socioeconomic History    Marital status:    Tobacco Use    Smoking status: Former     Packs/day: 2.00     Years: 40.00     Additional pack years: 0.00     Total pack years: 80.00     Types: Cigarettes     Quit date: 2019     Years since quittin.8    Smokeless tobacco: Never    Tobacco comments:     ELECTRONIC   Vaping Use    Vaping Use: Former    Substances: Nicotine, Flavoring   Substance and Sexual Activity    Alcohol use: Never    Drug use: Never    Sexual activity: Defer           Objective   Physical Exam  Vital signs and triage nurse note reviewed.  Constitutional: Awake, alert; well developed and well nourished. No acute distress, the patient is examined in hospital gown.  Chronically ill in appearance.  Appears older than stated age.  HEENT: Normocephalic, atraumatic; pupils are PERRL with  intact EOM; oropharynx is pink and moist without edema or erythema.  Neck: Supple, full range of motion without pain; no cervical lymphadenopathy.  Cardiovascular: Regular rate and rhythm, normal S1-S2.  Pulmonary: Respiratory effort regular, nonlabored; breath sounds CTA without wheezing or rhonchi.  Abdomen: Soft, nontender, nondistended with normoactive bowel sounds; no rebound or guarding.  Musculoskeletal: Independent range of motion of all extremities, no palpable tenderness or edema.  Back: Spine is midline and without midline tenderness on palpation of cervical, thoracic, and lumbar spine; paraspinal musculature is tender left lower lumbar and posterior hip and without soft tissue swelling, overlying ecchymosis or erythema. ROM elicits pain, Distal muscle strength is 5/5.  Neuro: Alert oriented x3, speech is clear and appropriate. DTRs are symmetrical bilateral lower extremity, negative Babinski BLE, negative straight leg raise BLE, strong and equal dorsiflexion of bilateral great toes L4, L5, S1 motor sensory intact.        Procedures           ED Course      Labs Reviewed   PROTIME-INR - Abnormal; Notable for the following components:       Result Value    Protime 29.3 (*)     All other components within normal limits   BASIC METABOLIC PANEL - Abnormal; Notable for the following components:    Glucose 147 (*)     Potassium 3.0 (*)     CO2 31.0 (*)     All other components within normal limits    Narrative:     GFR Normal >60  Chronic Kidney Disease <60  Kidney Failure <15     CBC WITH AUTO DIFFERENTIAL - Abnormal; Notable for the following components:    WBC 11.30 (*)     RDW 16.4 (*)     Neutrophil % 93.1 (*)     Lymphocyte % 5.0 (*)     Monocyte % 1.6 (*)     Eosinophil % 0.0 (*)     Neutrophils, Absolute 10.50 (*)     Lymphocytes, Absolute 0.60 (*)     All other components within normal limits   CBC AND DIFFERENTIAL    Narrative:     The following orders were created for panel order CBC &  Differential.  Procedure                               Abnormality         Status                     ---------                               -----------         ------                     CBC Auto Differential[[id number]]        Abnormal            Final result                 Please view results for these tests on the individual orders.   EXTRA TUBES    Narrative:     The following orders were created for panel order Extra Tubes.  Procedure                               Abnormality         Status                     ---------                               -----------         ------                     Gold Top - SST[[id number]]                                   Final result                 Please view results for these tests on the individual orders.   GOLD TOP - SST     CT Pelvis With Contrast    Result Date: 11/17/2023  Impression: 1.No acute process identified. Electronically Signed: Hussein Chaney MD  11/17/2023 10:06 AM [facility]  Workstation ID: [id number]   Medications   sodium chloride 0.9 % flush 10 mL (10 mL Intravenous Given 11/17/23 0952)   morphine injection 2 mg (2 mg Intravenous Given 11/17/23 0952)   ondansetron (ZOFRAN) injection 4 mg (4 mg Intravenous Given 11/17/23 0952)   potassium chloride (K-DUR,KLOR-CON) CR tablet 40 mEq (40 mEq Oral Given 11/17/23 1035)   iopamidol (ISOVUE-370) 76 % injection 100 mL (100 mL Intravenous Given 11/17/23 1100)                                          Medical Decision Making  Patient presents today with the above complaint.  Differentials include but not limited to muscle strain, spasm, nerve impingement, bleeding    Patient had above exam and evaluation.  IV was established.  Labs and CT were obtained.  She was given a small dose of IV morphine for pain.    Work-up: CBC is significant for WBC 11.3, no bands.  Metabolic panel significant for potassium 3.0.  INR 2.91.  CT shows no acute process.    PT was consulted and evaluated the patient.    On reexamination patient  resting quietly and in no distress.  She has no new complaints.  She remains well-appearing and neurologically intact.  She is afebrile and hemodynamically stable.  She was given potassium 40 mEq p.o.    Findings were discussed with her.  She will be discharged home with prescriptions for Robaxin and Lidoderm patches.  Inspect reviewed and patient receives monthly hydrocodone 10/325mg tablets most recently filled on 10/19/2023 #120.  She will be instructed to continue taking these as prescribed.  She is instructed to follow-up with her primary care provider but was given warning signs for prompt return of voiced understanding.    Amount and/or Complexity of Data Reviewed  Labs: ordered.  Radiology: ordered.    Risk  Prescription drug management.        Final diagnoses:   Acute left-sided low back pain without sciatica   Hypokalemia       ED Disposition  ED Disposition       ED Disposition   Discharge    Condition   Stable    Comment   --               Michael Gerardo MD  [address] [place] [address] [phone]               Medication List        New Prescriptions      lidocaine 5 %  Commonly known as: Lidoderm  Place 1 patch on the skin as directed by provider Daily. Remove & Discard patch within 12 hours or as directed by MD     methocarbamol 500 MG tablet  Commonly known as: ROBAXIN  Take 1 tablet by mouth 4 (Four) Times a Day.               Where to Get Your Medications        These medications were sent to [facility] [id number] - [place], [place] - [address] - [phone]  - [phone] FX  [address] [place] [address]      Phone: [phone]   lidocaine 5 %  methocarbamol 500 MG tablet            Nellie Oconnell APRN  11/17/23 0529

## 2023-11-17 NOTE — THERAPY EVALUATION
Patient Name: Renuka Pelayo  : 1955    MRN: [id number]                              Today's Date: 2023       Admit Date: 2023    Visit Dx:     ICD-10-CM ICD-9-CM   1. Acute left-sided low back pain without sciatica  M54.50 724.2   2. Hypokalemia  E87.6 276.8     Patient Active Problem List   Diagnosis    Severe sepsis    COPD (chronic obstructive pulmonary disease)    Chronic back pain    Depressive disorder    GERD without esophagitis    Primary hypertension    Insomnia    Chronic anticoagulation    Obesity    Status post Mariya procedure    S/P bladder tumor excision with fulguration    Acquired hypothyroidism    Pneumonia of right lower lobe due to infectious organism    COPD with acute exacerbation    AAA (abdominal aortic aneurysm)    COPD exacerbation    Dyspnea, unspecified type    Lower extremity edema    Pneumonia due to infectious organism, unspecified laterality, unspecified part of lung    Bacteremia    Warfarin poisoning, accidental or unintentional, initial encounter     Past Medical History:   Diagnosis Date    Arthritis     Bladder cancer     Chronic back pain     Closed nondisplaced fracture of head of right radius 2017    Congenital deformity of right hip joint 1956    COPD (chronic obstructive pulmonary disease)     former smoker    Deep venous thrombosis     unprovoked    Depressive disorder     Disease of thyroid gland     Diverticulitis of colon     Essential hypertension     Gastroesophageal reflux disease     Insomnia     Obesity     Pulmonary embolism     provoked by surgery     Past Surgical History:   Procedure Laterality Date    ABDOMINAL SURGERY      BRONCHOSCOPY N/A 2020    Procedure: BRONCHOSCOPY with bronchial washing;  Surgeon: Win Johnston MD;  Location: [facility];  Service: Pulmonary;  Laterality: N/A;  Post: mucous plugs    BRONCHOSCOPY N/A 2022    Procedure: BRONCHOSCOPY with bronchial washing;  Surgeon: Win Johnston MD;   Location: [facility] ENDOSCOPY;  Service: Pulmonary;  Laterality: N/A;  post op: pneumonia    BRONCHOSCOPY N/A 4/20/2023    Procedure: BRONCHOSCOPY with bilateral wash;  Surgeon: Win Johnston MD;  Location: [facility] ENDOSCOPY;  Service: Pulmonary;  Laterality: N/A;  mucous plugs    CHOLECYSTECTOMY      COLON SURGERY      COLONOSCOPY      COLOSTOMY CLOSURE N/A 2/25/2021    Procedure: COLOSTOMY TAKEDOWN OR CLOSURE, extenisve lysis of adhesions;  Surgeon: Chi Farmer MD;  Location: [facility] MAIN OR;  Service: General;  Laterality: N/A;    CYSTOSCOPY N/A 2/25/2021    Procedure: CYSTOSCOPY with bladder tumor removal and fulgeration, insertion of 3-way rizzo catheter;  Surgeon: Alfonzo Hayward MD;  Location: [facility] MAIN OR;  Service: Urology;  Laterality: N/A;    ENDOSCOPY      EXPLORATORY LAPAROTOMY N/A 7/16/2020    Procedure: LAPAROTOMY EXPLORATORY HARTMANS;  Surgeon: Chi Farmer MD;  Location: [facility] MAIN OR;  Service: General;  Laterality: N/A;    HYSTERECTOMY      TOTAL HIP ARTHROPLASTY Right     TOTAL HIP ARTHROPLASTY REVISION Right     x3     SUBJECTIVE:  Pt reporting left hip pain that has been worsening over the last day or two. Worse with most any movement.     OBJECTIVE:    AROM - lumbar flex painful at 0-15 deg, ext painful at 0 deg   MMT LE - pain (L) hip flex   SPECIAL TESTING   Passive straight leg raise - negative    Quadrant test - positive (L)    90/90 test - negative    Slump - negative      SIJ TESTING   Compression - positive    Distraction - positive    Gillettes - positive (L)    Posterior ilial rotation - dec pain   Anterior ilial rotation  - inc pain (L)     ASSESSMENT:   Pt is a a 67 yr/o female presenting with (L) sided hip pain and SIJ dysfunction anteriorly. Improved pain level and function following mobilization. Issued HEP for isometric hamstring with good understanding and good motivation. OPPT order.       PLAN:    HEP and OPPT      Time Calculation:   PT Evaluation  Complexity  History, PT Evaluation Complexity: 1-2 personal factors and/or comorbidities  Examination of Body Systems (PT Eval Complexity): total of 3 or more elements  Clinical Presentation (PT Evaluation Complexity): evolving  Clinical Decision Making (PT Evaluation Complexity): moderate complexity  Overall Complexity (PT Evaluation Complexity): moderate complexity     PT Charges       Row Name 11/17/23 1323             Time Calculation    Start Time 1140  -AD      Stop Time 1203  -AD      Time Calculation (min) 23 min  -AD      PT Received On 11/17/23  -AD         Time Calculation- PT    Total Timed Code Minutes- PT 0 minute(s)  -AD                User Key  (r) = Recorded By, (t) = Taken By, (c) = Cosigned By      Initials Name Provider Type    AD Kierra Kilgore, PT Physical Therapist                  Therapy Charges for Today       Code Description Service Date Service Provider Modifiers Qty    21998683268 HC PT EVAL MOD COMPLEXITY 4 11/17/2023 Kierra Kilgore, PT GP 1               PT Discharge Summary  Anticipated Discharge Disposition (PT): home with outpatient therapy services    Kierra Kilgore PT  11/17/2023

## 2023-11-17 NOTE — ED NOTES
Patient presents to the ED with complaints of Left Lower Back Pain started about 4 days seems to be getting worse. Patient denies any Injury, headaches, chest pain.

## 2023-11-17 NOTE — DISCHARGE INSTRUCTIONS
Take muscle relaxer and use patches as prescribed.  Continue current home pain medication.  Gentle stretching and massage.  Follow-up with your primary care provider.  Return for new or worsening symptoms.

## 2023-12-07 ENCOUNTER — HOSPITAL ENCOUNTER (INPATIENT)
Facility: HOSPITAL | Age: 68
LOS: 2 days | Discharge: HOME | DRG: 191 | End: 2023-12-09
Attending: EMERGENCY MEDICINE | Admitting: STUDENT IN AN ORGANIZED HEALTH CARE EDUCATION/TRAINING PROGRAM
Payer: MEDICAID

## 2023-12-07 ENCOUNTER — APPOINTMENT (OUTPATIENT)
Dept: GENERAL RADIOLOGY | Facility: HOSPITAL | Age: 68
DRG: 191 | End: 2023-12-07
Payer: MEDICARE

## 2023-12-07 DIAGNOSIS — R06.00 DYSPNEA, UNSPECIFIED TYPE: ICD-10-CM

## 2023-12-07 DIAGNOSIS — J44.9 CHRONIC OBSTRUCTIVE PULMONARY DISEASE, UNSPECIFIED COPD TYPE: Primary | ICD-10-CM

## 2023-12-07 DIAGNOSIS — J44.1 COPD WITH ACUTE EXACERBATION: ICD-10-CM

## 2023-12-07 DIAGNOSIS — A41.9 SEPSIS, DUE TO UNSPECIFIED ORGANISM, UNSPECIFIED WHETHER ACUTE ORGAN DYSFUNCTION PRESENT: ICD-10-CM

## 2023-12-07 LAB
ALBUMIN SERPL-MCNC: 4 G/DL (ref 3.5–5.2)
ALBUMIN/GLOB SERPL: 1.3 G/DL
ALP SERPL-CCNC: 105 U/L (ref 39–117)
ALT SERPL W P-5'-P-CCNC: 21 U/L (ref 1–33)
ANION GAP SERPL CALCULATED.3IONS-SCNC: 9 MMOL/L (ref 5–15)
ARTERIAL PATENCY WRIST A: POSITIVE
AST SERPL-CCNC: 21 U/L (ref 1–32)
ATMOSPHERIC PRESS: ABNORMAL MM[HG]
B PARAPERT DNA SPEC QL NAA+PROBE: NOT DETECTED
B PERT DNA SPEC QL NAA+PROBE: NOT DETECTED
BASE EXCESS BLDA CALC-SCNC: 2.3 MMOL/L (ref 0–3)
BASOPHILS # BLD AUTO: 0 10*3/MM3 (ref 0–0.2)
BASOPHILS NFR BLD AUTO: 0.6 % (ref 0–1.5)
BDY SITE: ABNORMAL
BILIRUB SERPL-MCNC: 0.2 MG/DL (ref 0–1.2)
BUN SERPL-MCNC: 10 MG/DL (ref 8–23)
BUN/CREAT SERPL: 13 (ref 7–25)
C PNEUM DNA NPH QL NAA+NON-PROBE: NOT DETECTED
CALCIUM SPEC-SCNC: 9 MG/DL (ref 8.6–10.5)
CHLORIDE SERPL-SCNC: 103 MMOL/L (ref 98–107)
CHOLEST SERPL-MCNC: 212 MG/DL (ref 0–200)
CO2 BLDA-SCNC: 30.1 MMOL/L (ref 22–29)
CO2 SERPL-SCNC: 31 MMOL/L (ref 22–29)
CREAT SERPL-MCNC: 0.77 MG/DL (ref 0.57–1)
D-LACTATE SERPL-SCNC: 2.2 MMOL/L (ref 0.3–2)
DEPRECATED RDW RBC AUTO: 53.8 FL (ref 37–54)
EGFRCR SERPLBLD CKD-EPI 2021: 85.2 ML/MIN/1.73
EOSINOPHIL # BLD AUTO: 0.1 10*3/MM3 (ref 0–0.4)
EOSINOPHIL NFR BLD AUTO: 1 % (ref 0.3–6.2)
ERYTHROCYTE [DISTWIDTH] IN BLOOD BY AUTOMATED COUNT: 17 % (ref 12.3–15.4)
FLUAV SUBTYP SPEC NAA+PROBE: NOT DETECTED
FLUBV RNA ISLT QL NAA+PROBE: NOT DETECTED
GLOBULIN UR ELPH-MCNC: 3 GM/DL
GLUCOSE SERPL-MCNC: 130 MG/DL (ref 65–99)
HADV DNA SPEC NAA+PROBE: NOT DETECTED
HCO3 BLDA-SCNC: 28.6 MMOL/L (ref 21–28)
HCOV 229E RNA SPEC QL NAA+PROBE: NOT DETECTED
HCOV HKU1 RNA SPEC QL NAA+PROBE: NOT DETECTED
HCOV NL63 RNA SPEC QL NAA+PROBE: NOT DETECTED
HCOV OC43 RNA SPEC QL NAA+PROBE: NOT DETECTED
HCT VFR BLD AUTO: 36.3 % (ref 34–46.6)
HDLC SERPL-MCNC: 64 MG/DL (ref 40–60)
HEMODILUTION: NO
HGB BLD-MCNC: 12.2 G/DL (ref 12–15.9)
HMPV RNA NPH QL NAA+NON-PROBE: NOT DETECTED
HPIV1 RNA ISLT QL NAA+PROBE: NOT DETECTED
HPIV2 RNA SPEC QL NAA+PROBE: NOT DETECTED
HPIV3 RNA NPH QL NAA+PROBE: NOT DETECTED
HPIV4 P GENE NPH QL NAA+PROBE: NOT DETECTED
INHALED O2 CONCENTRATION: 28 %
INR PPP: 1.69 (ref 2–3)
INR PPP: 1.69 (ref 2–3)
LDLC SERPL CALC-MCNC: 120 MG/DL (ref 0–100)
LDLC/HDLC SERPL: 1.82 {RATIO}
LYMPHOCYTES # BLD AUTO: 1.5 10*3/MM3 (ref 0.7–3.1)
LYMPHOCYTES NFR BLD AUTO: 17.3 % (ref 19.6–45.3)
M PNEUMO IGG SER IA-ACNC: NOT DETECTED
MCH RBC QN AUTO: 28.5 PG (ref 26.6–33)
MCHC RBC AUTO-ENTMCNC: 33.7 G/DL (ref 31.5–35.7)
MCV RBC AUTO: 84.8 FL (ref 79–97)
MODALITY: ABNORMAL
MONOCYTES # BLD AUTO: 0.6 10*3/MM3 (ref 0.1–0.9)
MONOCYTES NFR BLD AUTO: 7.1 % (ref 5–12)
NEUTROPHILS NFR BLD AUTO: 6.3 10*3/MM3 (ref 1.7–7)
NEUTROPHILS NFR BLD AUTO: 74 % (ref 42.7–76)
NRBC BLD AUTO-RTO: 0 /100 WBC (ref 0–0.2)
NT-PROBNP SERPL-MCNC: 184.1 PG/ML (ref 0–900)
PCO2 BLDA: 50 MM HG (ref 35–48)
PH BLDA: 7.37 PH UNITS (ref 7.35–7.45)
PLATELET # BLD AUTO: 313 10*3/MM3 (ref 140–450)
PMV BLD AUTO: 7.9 FL (ref 6–12)
PO2 BLDA: 72.8 MM HG (ref 83–108)
POTASSIUM SERPL-SCNC: 3.4 MMOL/L (ref 3.5–5.2)
PROCALCITONIN SERPL-MCNC: 0.1 NG/ML (ref 0–0.25)
PROT SERPL-MCNC: 7 G/DL (ref 6–8.5)
PROTHROMBIN TIME: 17.7 SECONDS (ref 19.4–28.5)
PROTHROMBIN TIME: 17.7 SECONDS (ref 19.4–28.5)
RBC # BLD AUTO: 4.28 10*6/MM3 (ref 3.77–5.28)
RHINOVIRUS RNA SPEC NAA+PROBE: NOT DETECTED
RSV RNA NPH QL NAA+NON-PROBE: NOT DETECTED
SAO2 % BLDCOA: 93.6 % (ref 94–98)
SARS-COV-2 RNA NPH QL NAA+NON-PROBE: NOT DETECTED
SODIUM SERPL-SCNC: 143 MMOL/L (ref 136–145)
TRIGL SERPL-MCNC: 159 MG/DL (ref 0–150)
TROPONIN T SERPL HS-MCNC: 10 NG/L
TSH SERPL DL<=0.05 MIU/L-ACNC: 1.37 UIU/ML (ref 0.27–4.2)
VLDLC SERPL-MCNC: 28 MG/DL (ref 5–40)
WBC NRBC COR # BLD AUTO: 8.5 10*3/MM3 (ref 3.4–10.8)

## 2023-12-07 PROCEDURE — 0202U NFCT DS 22 TRGT SARS-COV-2: CPT | Performed by: NURSE PRACTITIONER

## 2023-12-07 PROCEDURE — 85610 PROTHROMBIN TIME: CPT | Performed by: NURSE PRACTITIONER

## 2023-12-07 PROCEDURE — 83880 ASSAY OF NATRIURETIC PEPTIDE: CPT | Performed by: NURSE PRACTITIONER

## 2023-12-07 PROCEDURE — 93005 ELECTROCARDIOGRAM TRACING: CPT | Performed by: NURSE PRACTITIONER

## 2023-12-07 PROCEDURE — 99285 EMERGENCY DEPT VISIT HI MDM: CPT

## 2023-12-07 PROCEDURE — 36600 WITHDRAWAL OF ARTERIAL BLOOD: CPT

## 2023-12-07 PROCEDURE — 84145 PROCALCITONIN (PCT): CPT | Performed by: NURSE PRACTITIONER

## 2023-12-07 PROCEDURE — 84484 ASSAY OF TROPONIN QUANT: CPT | Performed by: NURSE PRACTITIONER

## 2023-12-07 PROCEDURE — 84443 ASSAY THYROID STIM HORMONE: CPT | Performed by: NURSE PRACTITIONER

## 2023-12-07 PROCEDURE — 36415 COLL VENOUS BLD VENIPUNCTURE: CPT

## 2023-12-07 PROCEDURE — 80061 LIPID PANEL: CPT | Performed by: NURSE PRACTITIONER

## 2023-12-07 PROCEDURE — 83605 ASSAY OF LACTIC ACID: CPT

## 2023-12-07 PROCEDURE — 71045 X-RAY EXAM CHEST 1 VIEW: CPT

## 2023-12-07 PROCEDURE — 85025 COMPLETE CBC W/AUTO DIFF WBC: CPT | Performed by: NURSE PRACTITIONER

## 2023-12-07 PROCEDURE — 94640 AIRWAY INHALATION TREATMENT: CPT

## 2023-12-07 PROCEDURE — 87040 BLOOD CULTURE FOR BACTERIA: CPT | Performed by: NURSE PRACTITIONER

## 2023-12-07 PROCEDURE — 25010000002 METHYLPREDNISOLONE PER 125 MG: Performed by: NURSE PRACTITIONER

## 2023-12-07 PROCEDURE — 80053 COMPREHEN METABOLIC PANEL: CPT | Performed by: NURSE PRACTITIONER

## 2023-12-07 PROCEDURE — 25010000002 CEFTRIAXONE PER 250 MG: Performed by: NURSE PRACTITIONER

## 2023-12-07 PROCEDURE — 94799 UNLISTED PULMONARY SVC/PX: CPT

## 2023-12-07 PROCEDURE — 82803 BLOOD GASES ANY COMBINATION: CPT

## 2023-12-07 RX ORDER — SODIUM CHLORIDE 9 MG/ML
40 INJECTION, SOLUTION INTRAVENOUS AS NEEDED
Status: DISCONTINUED | OUTPATIENT
Start: 2023-12-07 | End: 2023-12-09 | Stop reason: HOSPADM

## 2023-12-07 RX ORDER — METHYLPREDNISOLONE SODIUM SUCCINATE 125 MG/2ML
60 INJECTION, POWDER, LYOPHILIZED, FOR SOLUTION INTRAMUSCULAR; INTRAVENOUS EVERY 12 HOURS
Status: DISCONTINUED | OUTPATIENT
Start: 2023-12-08 | End: 2023-12-09 | Stop reason: HOSPADM

## 2023-12-07 RX ORDER — SODIUM CHLORIDE 0.9 % (FLUSH) 0.9 %
10 SYRINGE (ML) INJECTION EVERY 12 HOURS SCHEDULED
Status: DISCONTINUED | OUTPATIENT
Start: 2023-12-07 | End: 2023-12-09 | Stop reason: HOSPADM

## 2023-12-07 RX ORDER — IPRATROPIUM BROMIDE AND ALBUTEROL SULFATE 2.5; .5 MG/3ML; MG/3ML
3 SOLUTION RESPIRATORY (INHALATION)
Status: DISCONTINUED | OUTPATIENT
Start: 2023-12-07 | End: 2023-12-09 | Stop reason: HOSPADM

## 2023-12-07 RX ORDER — METHYLPREDNISOLONE SODIUM SUCCINATE 125 MG/2ML
125 INJECTION, POWDER, LYOPHILIZED, FOR SOLUTION INTRAMUSCULAR; INTRAVENOUS ONCE
Status: COMPLETED | OUTPATIENT
Start: 2023-12-07 | End: 2023-12-07

## 2023-12-07 RX ORDER — NITROGLYCERIN 0.4 MG/1
0.4 TABLET SUBLINGUAL
Status: DISCONTINUED | OUTPATIENT
Start: 2023-12-07 | End: 2023-12-09 | Stop reason: HOSPADM

## 2023-12-07 RX ORDER — AMOXICILLIN 250 MG
1 CAPSULE ORAL NIGHTLY PRN
Status: DISCONTINUED | OUTPATIENT
Start: 2023-12-07 | End: 2023-12-09 | Stop reason: HOSPADM

## 2023-12-07 RX ORDER — CALCIUM CARBONATE 500 MG/1
2 TABLET, CHEWABLE ORAL 2 TIMES DAILY PRN
Status: DISCONTINUED | OUTPATIENT
Start: 2023-12-07 | End: 2023-12-09 | Stop reason: HOSPADM

## 2023-12-07 RX ORDER — IPRATROPIUM BROMIDE AND ALBUTEROL SULFATE 2.5; .5 MG/3ML; MG/3ML
3 SOLUTION RESPIRATORY (INHALATION)
Status: DISCONTINUED | OUTPATIENT
Start: 2023-12-08 | End: 2023-12-07

## 2023-12-07 RX ORDER — ONDANSETRON 4 MG/1
4 TABLET, FILM COATED ORAL EVERY 6 HOURS PRN
Status: DISCONTINUED | OUTPATIENT
Start: 2023-12-07 | End: 2023-12-09 | Stop reason: HOSPADM

## 2023-12-07 RX ORDER — ACETAMINOPHEN 325 MG/1
650 TABLET ORAL EVERY 4 HOURS PRN
Status: DISCONTINUED | OUTPATIENT
Start: 2023-12-07 | End: 2023-12-09 | Stop reason: HOSPADM

## 2023-12-07 RX ORDER — SODIUM CHLORIDE 0.9 % (FLUSH) 0.9 %
10 SYRINGE (ML) INJECTION AS NEEDED
Status: DISCONTINUED | OUTPATIENT
Start: 2023-12-07 | End: 2023-12-09 | Stop reason: HOSPADM

## 2023-12-07 RX ORDER — ACETAMINOPHEN 160 MG/5ML
650 SOLUTION ORAL EVERY 4 HOURS PRN
Status: DISCONTINUED | OUTPATIENT
Start: 2023-12-07 | End: 2023-12-09 | Stop reason: HOSPADM

## 2023-12-07 RX ORDER — HYDROCODONE BITARTRATE AND HOMATROPINE METHYLBROMIDE ORAL SOLUTION 5; 1.5 MG/5ML; MG/5ML
5 LIQUID ORAL EVERY 4 HOURS PRN
Status: DISCONTINUED | OUTPATIENT
Start: 2023-12-07 | End: 2023-12-09 | Stop reason: HOSPADM

## 2023-12-07 RX ORDER — ACETAMINOPHEN 650 MG/1
650 SUPPOSITORY RECTAL EVERY 4 HOURS PRN
Status: DISCONTINUED | OUTPATIENT
Start: 2023-12-07 | End: 2023-12-09 | Stop reason: HOSPADM

## 2023-12-07 RX ORDER — ONDANSETRON 2 MG/ML
4 INJECTION INTRAMUSCULAR; INTRAVENOUS EVERY 6 HOURS PRN
Status: DISCONTINUED | OUTPATIENT
Start: 2023-12-07 | End: 2023-12-09 | Stop reason: HOSPADM

## 2023-12-07 RX ORDER — CHOLECALCIFEROL (VITAMIN D3) 125 MCG
5 CAPSULE ORAL NIGHTLY PRN
Status: DISCONTINUED | OUTPATIENT
Start: 2023-12-07 | End: 2023-12-09 | Stop reason: HOSPADM

## 2023-12-07 RX ORDER — GUAIFENESIN 600 MG/1
1200 TABLET, EXTENDED RELEASE ORAL EVERY 12 HOURS SCHEDULED
Status: DISCONTINUED | OUTPATIENT
Start: 2023-12-07 | End: 2023-12-09 | Stop reason: HOSPADM

## 2023-12-07 RX ADMIN — DOXYCYCLINE 100 MG: 100 INJECTION, POWDER, LYOPHILIZED, FOR SOLUTION INTRAVENOUS at 23:08

## 2023-12-07 RX ADMIN — METHYLPREDNISOLONE SODIUM SUCCINATE 125 MG: 125 INJECTION, POWDER, FOR SOLUTION INTRAMUSCULAR; INTRAVENOUS at 20:49

## 2023-12-07 RX ADMIN — CEFTRIAXONE 2000 MG: 2 INJECTION, POWDER, FOR SOLUTION INTRAMUSCULAR; INTRAVENOUS at 22:03

## 2023-12-07 RX ADMIN — IPRATROPIUM BROMIDE AND ALBUTEROL SULFATE 3 ML: .5; 3 SOLUTION RESPIRATORY (INHALATION) at 20:50

## 2023-12-07 RX ADMIN — GUAIFENESIN 1200 MG: 600 TABLET, MULTILAYER, EXTENDED RELEASE ORAL at 23:08

## 2023-12-07 NOTE — ED PROVIDER NOTES
Subjective     Provider in Triage Note  Due to significant overcrowding in the emergency department patient was initially seen and evaluated in triage.  Provider in triage recommended patient placement in the treatment area to initiate therapy and movement to an ER bed as soon as possible.    Patient is a 67-year-old white female with history of oxygen-dependent COPD who presents with complaints of increased shortness of breath and productive cough with yellow sputum.  Reports her cough has been present for last couple days but increased shortness of breath today.  No fever or chills.  No chest pain.      History of Present Illness  I interviewed the patient.    Review of Systems   Constitutional:  Negative for chills and fever.   Respiratory:  Positive for cough and shortness of breath.    Cardiovascular:  Negative for chest pain.   Gastrointestinal:  Negative for abdominal pain, diarrhea and vomiting.   Genitourinary:  Negative for dysuria.   Musculoskeletal:  Negative for back pain and neck pain.   Skin:  Negative for color change and rash.   Neurological:  Negative for light-headedness.       Past Medical History:   Diagnosis Date    Arthritis     Bladder cancer     Chronic back pain     Closed nondisplaced fracture of head of right radius 03/2017    Congenital deformity of right hip joint 1956    COPD (chronic obstructive pulmonary disease)     former smoker    Deep venous thrombosis     unprovoked    Depressive disorder     Disease of thyroid gland     Diverticulitis of colon     Essential hypertension     Gastroesophageal reflux disease     Insomnia     Obesity     Pulmonary embolism     provoked by surgery       No Known Allergies    Past Surgical History:   Procedure Laterality Date    ABDOMINAL SURGERY      BRONCHOSCOPY N/A 11/25/2020    Procedure: BRONCHOSCOPY with bronchial washing;  Surgeon: Win Johnston MD;  Location: [facility] ENDOSCOPY;  Service: Pulmonary;  Laterality: N/A;  Post: mucous plugs     BRONCHOSCOPY N/A 2022    Procedure: BRONCHOSCOPY with bronchial washing;  Surgeon: Win Johnston MD;  Location: [facility] ENDOSCOPY;  Service: Pulmonary;  Laterality: N/A;  post op: pneumonia    BRONCHOSCOPY N/A 2023    Procedure: BRONCHOSCOPY with bilateral wash;  Surgeon: Win Johnston MD;  Location: [facility] ENDOSCOPY;  Service: Pulmonary;  Laterality: N/A;  mucous plugs    CHOLECYSTECTOMY      COLON SURGERY      COLONOSCOPY      COLOSTOMY CLOSURE N/A 2021    Procedure: COLOSTOMY TAKEDOWN OR CLOSURE, extenisve lysis of adhesions;  Surgeon: Chi Farmer MD;  Location: [facility] MAIN OR;  Service: General;  Laterality: N/A;    CYSTOSCOPY N/A 2021    Procedure: CYSTOSCOPY with bladder tumor removal and fulgeration, insertion of 3-way rizzo catheter;  Surgeon: Alfonzo Hayward MD;  Location: [facility] MAIN OR;  Service: Urology;  Laterality: N/A;    ENDOSCOPY      EXPLORATORY LAPAROTOMY N/A 2020    Procedure: LAPAROTOMY EXPLORATORY HARTMANS;  Surgeon: Chi Farmer MD;  Location: [facility] MAIN OR;  Service: General;  Laterality: N/A;    HYSTERECTOMY      TOTAL HIP ARTHROPLASTY Right     TOTAL HIP ARTHROPLASTY REVISION Right     x3       Family History   Problem Relation Age of Onset    No Known Problems Mother     No Known Problems Father        Social History     Socioeconomic History    Marital status:    Tobacco Use    Smoking status: Former     Packs/day: 2.00     Years: 40.00     Additional pack years: 0.00     Total pack years: 80.00     Types: Cigarettes     Quit date: 2019     Years since quittin.9    Smokeless tobacco: Never    Tobacco comments:     ELECTRONIC   Vaping Use    Vaping Use: Former    Substances: Nicotine, Flavoring   Substance and Sexual Activity    Alcohol use: Never    Drug use: Never    Sexual activity: Defer           Objective   Physical Exam  Vitals and nursing note reviewed.   Constitutional:       Appearance: She is well-developed.  "She is ill-appearing.   HENT:      Head: Normocephalic and atraumatic.      Mouth/Throat:      Mouth: Mucous membranes are moist.      Pharynx: Oropharynx is clear.   Eyes:      Extraocular Movements: Extraocular movements intact.      Pupils: Pupils are equal, round, and reactive to light.   Cardiovascular:      Rate and Rhythm: Normal rate and regular rhythm.      Heart sounds: No murmur heard.     No friction rub. No gallop.   Pulmonary:      Effort: Pulmonary effort is normal.      Breath sounds: Wheezing and rales present.   Abdominal:      General: Bowel sounds are normal.      Palpations: Abdomen is soft.   Musculoskeletal:         General: Normal range of motion.      Cervical back: Normal range of motion and neck supple.      Right lower leg: No tenderness. No edema.      Left lower leg: No tenderness. No edema.   Skin:     General: Skin is warm and dry.      Capillary Refill: Capillary refill takes less than 2 seconds.   Neurological:      General: No focal deficit present.      Mental Status: She is alert and oriented to person, place, and time.   Psychiatric:         Mood and Affect: Mood normal.         Behavior: Behavior normal.         Procedures           ED Course      BP (!) 184/99 (BP Location: Right arm, Patient Position: Lying)   Pulse 95   Temp 97.9 °F (36.6 °C) (Oral)   Resp 21   Ht 160 cm (63\")   Wt 80.7 kg (177 lb 14.6 oz)   SpO2 97%   BMI 31.52 kg/m²   Medications   sodium chloride 0.9 % flush 10 mL (has no administration in time range)   ipratropium-albuterol (DUO-NEB) nebulizer solution 3 mL (3 mL Nebulization Given 12/7/23 2050)   sodium chloride 0.9 % flush 10 mL ( Intravenous Canceled Entry 12/7/23 2231)   sodium chloride 0.9 % flush 10 mL (has no administration in time range)   sodium chloride 0.9 % infusion 40 mL (has no administration in time range)   acetaminophen (TYLENOL) tablet 650 mg (has no administration in time range)     Or   acetaminophen (TYLENOL) 160 MG/5ML oral " solution 650 mg (has no administration in time range)     Or   acetaminophen (TYLENOL) suppository 650 mg (has no administration in time range)   calcium carbonate (TUMS) chewable tablet 500 mg (200 mg elemental) (has no administration in time range)   ondansetron (ZOFRAN) tablet 4 mg (has no administration in time range)     Or   ondansetron (ZOFRAN) injection 4 mg (has no administration in time range)   melatonin tablet 5 mg (has no administration in time range)   nitroglycerin (NITROSTAT) SL tablet 0.4 mg (has no administration in time range)   Potassium Replacement - Follow Nurse / BPA Driven Protocol (has no administration in time range)   Magnesium Standard Dose Replacement - Follow Nurse / BPA Driven Protocol (has no administration in time range)   Phosphorus Replacement - Follow Nurse / BPA Driven Protocol (has no administration in time range)   Calcium Replacement - Follow Nurse / BPA Driven Protocol (has no administration in time range)   sennosides-docusate (PERICOLACE) 8.6-50 MG per tablet 1 tablet (has no administration in time range)   HYDROcodone Bit-Homatrop MBr (HYCODAN) 5-1.5 MG/5ML solution 5 mL (has no administration in time range)   guaiFENesin (MUCINEX) 12 hr tablet 1,200 mg (1,200 mg Oral Given 12/7/23 2308)   methylPREDNISolone sodium succinate (SOLU-Medrol) injection 60 mg (has no administration in time range)   Pharmacy to dose warfarin (has no administration in time range)   potassium chloride (K-DUR,KLOR-CON) CR tablet 40 mEq (has no administration in time range)   warfarin (COUMADIN) tablet 3 mg (has no administration in time range)   warfarin (COUMADIN) tablet 1.5 mg (has no administration in time range)   methylPREDNISolone sodium succinate (SOLU-Medrol) injection 125 mg (125 mg Intravenous Given 12/7/23 2049)   cefTRIAXone (ROCEPHIN) 2,000 mg in sodium chloride 0.9 % 100 mL IVPB (0 mg Intravenous Stopped 12/7/23 2310)   doxycycline (VIBRAMYCIN) 100 mg in sodium chloride 0.9 % 100 mL  IVPB (0 mg Intravenous Stopped 12/8/23 0025)   potassium chloride (K-DUR,KLOR-CON) CR tablet 40 mEq (40 mEq Oral Given 12/8/23 0114)     XR Chest 1 View    Result Date: 12/7/2023  Impression: Emphysema and right basilar atelectasis. No active cardiopulmonary disease. No significant change since 12/27/2023. Electronically Signed: Mark Donaldsno DO  12/7/2023 8:04 PM EST  Workstation ID: [id number]   Lab Results (last 24 hours)       Procedure Component Value Units Date/Time    Respiratory Panel PCR w/COVID-19(SARS-CoV-2) ONOFRE/GABRIELLE/PIPO/PAD/COR/STEVE In-House, NP Swab in UTM/VTM, 2 HR TAT - Swab, Nasopharynx [[id number]]  (Normal) Collected: 12/07/23 1827    Specimen: Swab from Nasopharynx Updated: 12/07/23 1926     ADENOVIRUS, PCR Not Detected     Coronavirus 229E Not Detected     Coronavirus HKU1 Not Detected     Coronavirus NL63 Not Detected     Coronavirus OC43 Not Detected     COVID19 Not Detected     Human Metapneumovirus Not Detected     Human Rhinovirus/Enterovirus Not Detected     Influenza A PCR Not Detected     Influenza B PCR Not Detected     Parainfluenza Virus 1 Not Detected     Parainfluenza Virus 2 Not Detected     Parainfluenza Virus 3 Not Detected     Parainfluenza Virus 4 Not Detected     RSV, PCR Not Detected     Bordetella pertussis pcr Not Detected     Bordetella parapertussis PCR Not Detected     Chlamydophila pneumoniae PCR Not Detected     Mycoplasma pneumo by PCR Not Detected    Narrative:      In the setting of a positive respiratory panel with a viral infection PLUS a negative procalcitonin without other underlying concern for bacterial infection, consider observing off antibiotics or discontinuation of antibiotics and continue supportive care. If the respiratory panel is positive for atypical bacterial infection (Bordetella pertussis, Chlamydophila pneumoniae, or Mycoplasma pneumoniae), consider antibiotic de-escalation to target atypical bacterial infection.    CBC & Differential [[id number]]   (Abnormal) Collected: 12/07/23 1827    Specimen: Blood Updated: 12/07/23 1850    Narrative:      The following orders were created for panel order CBC & Differential.  Procedure                               Abnormality         Status                     ---------                               -----------         ------                     CBC Auto Differential[[id number]]        Abnormal            Final result                 Please view results for these tests on the individual orders.    Comprehensive Metabolic Panel [[id number]]  (Abnormal) Collected: 12/07/23 1827    Specimen: Blood Updated: 12/07/23 1909     Glucose 130 mg/dL      BUN 10 mg/dL      Creatinine 0.77 mg/dL      Sodium 143 mmol/L      Potassium 3.4 mmol/L      Chloride 103 mmol/L      CO2 31.0 mmol/L      Calcium 9.0 mg/dL      Total Protein 7.0 g/dL      Albumin 4.0 g/dL      ALT (SGPT) 21 U/L      AST (SGOT) 21 U/L      Alkaline Phosphatase 105 U/L      Total Bilirubin 0.2 mg/dL      Globulin 3.0 gm/dL      A/G Ratio 1.3 g/dL      BUN/Creatinine Ratio 13.0     Anion Gap 9.0 mmol/L      eGFR 85.2 mL/min/1.73     Narrative:      GFR Normal >60  Chronic Kidney Disease <60  Kidney Failure <15      BNP [[id number]]  (Normal) Collected: 12/07/23 1827    Specimen: Blood Updated: 12/07/23 1909     proBNP 184.1 pg/mL     Narrative:      This assay is used as an aid in the diagnosis of individuals suspected of having heart failure. It can be used as an aid in the diagnosis of acute decompensated heart failure (ADHF) in patients presenting with signs and symptoms of ADHF to the emergency department (ED). In addition, NT-proBNP of <300 pg/mL indicates ADHF is not likely.    Age Range Result Interpretation  NT-proBNP Concentration (pg/mL:      <50             Positive            >450                   Gray                 300-450                    Negative             <300    50-75           Positive            >900                  Mack                 "300-900                  Negative            <300      >75             Positive            >1800                  Gray                300-1800                  Negative            <300    Single High Sensitivity Troponin T [[id number]]  (Normal) Collected: 12/07/23 1827    Specimen: Blood Updated: 12/07/23 1909     HS Troponin T 10 ng/L     Narrative:      High Sensitive Troponin T Reference Range:  <14.0 ng/L- Negative Female for AMI  <22.0 ng/L- Negative Male for AMI  >=14 - Abnormal Female indicating possible myocardial injury.  >=22 - Abnormal Male indicating possible myocardial injury.   Clinicians would have to utilize clinical acumen, EKG, Troponin, and serial changes to determine if it is an Acute Myocardial Infarction or myocardial injury due to an underlying chronic condition.         Procalcitonin [[id number]]  (Normal) Collected: 12/07/23 1827    Specimen: Blood Updated: 12/07/23 1908     Procalcitonin 0.10 ng/mL     Narrative:      As a Marker for Sepsis (Non-Neonates):    1. <0.5 ng/mL represents a low risk of severe sepsis and/or septic shock.  2. >2 ng/mL represents a high risk of severe sepsis and/or septic shock.    As a Marker for Lower Respiratory Tract Infections that require antibiotic therapy:    PCT on Admission    Antibiotic Therapy       6-12 Hrs later    >0.5                Strongly Recommended  >0.25 - <0.5        Recommended   0.1 - 0.25          Discouraged              Remeasure/reassess PCT  <0.1                Strongly Discouraged     Remeasure/reassess PCT    As 28 day mortality risk marker: \"Change in Procalcitonin Result\" (>80% or <=80%) if Day 0 (or Day 1) and Day 4 values are available. Refer to http://www.[email or web address]-pct-calculator.com    Change in PCT <=80%  A decrease of PCT levels below or equal to 80% defines a positive change in PCT test result representing a higher risk for 28-day all-cause mortality of patients diagnosed with severe sepsis for septic shock.    Change in PCT " >80%  A decrease of PCT levels of more than 80% defines a negative change in PCT result representing a lower risk for 28-day all-cause mortality of patients diagnosed with severe sepsis or septic shock.       CBC Auto Differential [[id number]]  (Abnormal) Collected: 12/07/23 1827    Specimen: Blood Updated: 12/07/23 1850     WBC 8.50 10*3/mm3      RBC 4.28 10*6/mm3      Hemoglobin 12.2 g/dL      Hematocrit 36.3 %      MCV 84.8 fL      MCH 28.5 pg      MCHC 33.7 g/dL      RDW 17.0 %      RDW-SD 53.8 fl      MPV 7.9 fL      Platelets 313 10*3/mm3      Neutrophil % 74.0 %      Lymphocyte % 17.3 %      Monocyte % 7.1 %      Eosinophil % 1.0 %      Basophil % 0.6 %      Neutrophils, Absolute 6.30 10*3/mm3      Lymphocytes, Absolute 1.50 10*3/mm3      Monocytes, Absolute 0.60 10*3/mm3      Eosinophils, Absolute 0.10 10*3/mm3      Basophils, Absolute 0.00 10*3/mm3      nRBC 0.0 /100 WBC     TSH [[id number]]  (Normal) Collected: 12/07/23 1827    Specimen: Blood Updated: 12/07/23 2226     TSH 1.370 uIU/mL     Lipid Panel [[id number]]  (Abnormal) Collected: 12/07/23 1827    Specimen: Blood Updated: 12/07/23 2221     Total Cholesterol 212 mg/dL      Triglycerides 159 mg/dL      HDL Cholesterol 64 mg/dL      LDL Cholesterol  120 mg/dL      VLDL Cholesterol 28 mg/dL      LDL/HDL Ratio 1.82    Narrative:      Cholesterol Reference Ranges  (U.S. Department of Health and Human Services ATP III Classifications)    Desirable          <200 mg/dL  Borderline High    200-239 mg/dL  High Risk          >240 mg/dL      Triglyceride Reference Ranges  (U.S. Department of Health and Human Services ATP III Classifications)    Normal           <150 mg/dL  Borderline High  150-199 mg/dL  High             200-499 mg/dL  Very High        >500 mg/dL    HDL Reference Ranges  (U.S. Department of Health and Human Services ATP III Classifications)    Low     <40 mg/dl (major risk factor for CHD)  High    >60 mg/dl ('negative' risk factor for  CHD)        LDL Reference Ranges  (U.S. Department of Health and Human Services ATP III Classifications)    Optimal          <100 mg/dL  Near Optimal     100-129 mg/dL  Borderline High  130-159 mg/dL  High             160-189 mg/dL  Very High        >189 mg/dL    Protime-INR [[id number]]  (Abnormal) Collected: 12/07/23 2003    Specimen: Blood Updated: 12/07/23 2054     Protime 17.7 Seconds      INR 1.69    Blood Gas, Arterial - [[id number]]  (Abnormal) Collected: 12/07/23 2042    Specimen: Arterial Blood Updated: 12/07/23 2045     Site Left Radial     Shun's Test Positive     pH, Arterial 7.365 pH units      pCO2, Arterial 50.0 mm Hg      pO2, Arterial 72.8 mm Hg      HCO3, Arterial 28.6 mmol/L      Base Excess, Arterial 2.3 mmol/L      Comment: Serial Number: [id number]:  [id number]        O2 Saturation, Arterial 93.6 %      CO2 Content 30.1 mmol/L      Barometric Pressure for Blood Gas --     Comment: N/A        Modality Cannula     FIO2 28 %      Hemodilution No    Blood Culture - Blood, Arm, Right [[id number]] Collected: 12/07/23 2049    Specimen: Blood from Arm, Right Updated: 12/07/23 2057    POC Lactate [[id number]]  (Abnormal) Collected: 12/07/23 2138    Specimen: Blood Updated: 12/07/23 2139     Lactate 2.2 mmol/L      Comment: Serial Number: [id number]:  [id number]       Blood Culture - Blood, Arm, Left [[id number]] Collected: 12/07/23 2142    Specimen: Blood from Arm, Left Updated: 12/07/23 2145    Protime-INR [[id number]]  (Abnormal) Collected: 12/07/23 2205    Specimen: Blood Updated: 12/07/23 2229     Protime 17.7 Seconds      INR 1.69    STAT Lactic Acid, Reflex [[id number]]  (Abnormal) Collected: 12/08/23 0112    Specimen: Blood Updated: 12/08/23 0136     Lactate 2.4 mmol/L                                                  Medical Decision Making  Due to significant overcrowding in the ED, the patient was evaluated by myself in a hallway bed. Exam may be limited by privacy, noise, and patient wearing a  hospital gown. Explained to limitations to the patient and our overcrowding, they are in agreement to proceed with exam and treatment at this time.  Chart Review: Previous discharge summary 8/3/2023 supratherapeutic INR    Labs: ABG reviewed.  Patient on her home oxygen.  Lactate noted be 2.2 initiated on antibiotics for sepsis.  RVP negative.  BNP normal.  Troponin 10    Imaging: Chest x-ray emphysema, right basilar atelectasis.    Discussion/Consultation with other providers: Hospitalist    67-year-old female presents the ED with complaint of dyspnea, cough, change in sputum production.  She does have a history of COPD, she is a change in sputum production.  Noted to have elevated lactic acid.  Started on a patient is not hypoxic, not hypotensive.  I see no indication for fluid bolus at this time.  Breath sounds improved after breathing treatment.  She was given Solu-Medrol.  Will be admitted for further evaluation and management.  Disposition: Patient will be admitted for further evaluation and management.    Note Disclaimer: At [facility], we believe that sharing information builds trust and better relationships. You are receiving this note because you recently visited [facility]. It is possible you will see health information before a provider has talked with you about it. This kind of information can be easy to misunderstand. To help you fully understand what it means for your health, we urge you to discuss this note with your provider.Note dictated utilizing Dragon Dictation. Appropriate PPE worn during patient interactions.        Problems Addressed:  Chronic obstructive pulmonary disease, unspecified COPD type: chronic illness or injury  Dyspnea, unspecified type: complicated acute illness or injury  Sepsis, due to unspecified organism, unspecified whether acute organ dysfunction present: chronic illness or injury    Amount and/or Complexity of Data Reviewed  Labs: ordered.  Radiology:  ordered.  ECG/medicine tests: ordered.    Risk  Prescription drug management.  Decision regarding hospitalization.        Final diagnoses:   Chronic obstructive pulmonary disease, unspecified COPD type   Sepsis, due to unspecified organism, unspecified whether acute organ dysfunction present   Dyspnea, unspecified type       ED Disposition  ED Disposition       ED Disposition   Decision to Admit    Condition   --    Comment   Level of Care: Telemetry [5]   Diagnosis: COPD exacerbation [289978]   Admitting Physician: JORDAN BROWNING [[id number]]   Attending Physician: JORDAN BROWNING [[id number]]   Certification: I Certify That Inpatient Hospital Services Are Medically Necessary For Greater Than 2 Midnights                 No follow-up provider specified.       Medication List      No changes were made to your prescriptions during this visit.            Eli Bazan, APRN  12/08/23 0412

## 2023-12-07 NOTE — Clinical Note
Level of Care: Telemetry [5]   Admitting Physician: JORDAN BROWNING [[id number]]   Attending Physician: JORDAN BROWNING [[id number]]

## 2023-12-08 ENCOUNTER — APPOINTMENT (OUTPATIENT)
Dept: CT IMAGING | Facility: HOSPITAL | Age: 68
DRG: 191 | End: 2023-12-08
Payer: MEDICARE

## 2023-12-08 PROBLEM — K22.70 BARRETT'S ESOPHAGUS: Status: ACTIVE | Noted: 2023-03-14

## 2023-12-08 LAB
ANION GAP SERPL CALCULATED.3IONS-SCNC: 12 MMOL/L (ref 5–15)
BASOPHILS # BLD AUTO: 0 10*3/MM3 (ref 0–0.2)
BASOPHILS NFR BLD AUTO: 0.3 % (ref 0–1.5)
BUN SERPL-MCNC: 9 MG/DL (ref 8–23)
BUN/CREAT SERPL: 12.2 (ref 7–25)
CALCIUM SPEC-SCNC: 9.3 MG/DL (ref 8.6–10.5)
CHLORIDE SERPL-SCNC: 102 MMOL/L (ref 98–107)
CO2 SERPL-SCNC: 28 MMOL/L (ref 22–29)
CREAT SERPL-MCNC: 0.74 MG/DL (ref 0.57–1)
CRP SERPL-MCNC: 3.03 MG/DL (ref 0–0.5)
CRP SERPL-MCNC: 3.18 MG/DL (ref 0–0.5)
D-LACTATE SERPL-SCNC: 2.4 MMOL/L (ref 0.5–2)
DEPRECATED RDW RBC AUTO: 50.3 FL (ref 37–54)
EGFRCR SERPLBLD CKD-EPI 2021: 89.4 ML/MIN/1.73
EOSINOPHIL # BLD AUTO: 0 10*3/MM3 (ref 0–0.4)
EOSINOPHIL NFR BLD AUTO: 0 % (ref 0.3–6.2)
ERYTHROCYTE [DISTWIDTH] IN BLOOD BY AUTOMATED COUNT: 16.6 % (ref 12.3–15.4)
GLUCOSE SERPL-MCNC: 173 MG/DL (ref 65–99)
HCT VFR BLD AUTO: 38.7 % (ref 34–46.6)
HGB BLD-MCNC: 12.6 G/DL (ref 12–15.9)
LYMPHOCYTES # BLD AUTO: 0.4 10*3/MM3 (ref 0.7–3.1)
LYMPHOCYTES NFR BLD AUTO: 4.5 % (ref 19.6–45.3)
MCH RBC QN AUTO: 28 PG (ref 26.6–33)
MCHC RBC AUTO-ENTMCNC: 32.6 G/DL (ref 31.5–35.7)
MCV RBC AUTO: 86 FL (ref 79–97)
MONOCYTES # BLD AUTO: 0.1 10*3/MM3 (ref 0.1–0.9)
MONOCYTES NFR BLD AUTO: 0.8 % (ref 5–12)
NEUTROPHILS NFR BLD AUTO: 8.9 10*3/MM3 (ref 1.7–7)
NEUTROPHILS NFR BLD AUTO: 94.4 % (ref 42.7–76)
NRBC BLD AUTO-RTO: 0 /100 WBC (ref 0–0.2)
PLATELET # BLD AUTO: 309 10*3/MM3 (ref 140–450)
PMV BLD AUTO: 8.2 FL (ref 6–12)
POTASSIUM SERPL-SCNC: 3.9 MMOL/L (ref 3.5–5.2)
QT INTERVAL: 391 MS
QTC INTERVAL: 465 MS
RBC # BLD AUTO: 4.5 10*6/MM3 (ref 3.77–5.28)
SODIUM SERPL-SCNC: 142 MMOL/L (ref 136–145)
WBC NRBC COR # BLD AUTO: 9.4 10*3/MM3 (ref 3.4–10.8)

## 2023-12-08 PROCEDURE — 83605 ASSAY OF LACTIC ACID: CPT

## 2023-12-08 PROCEDURE — 85025 COMPLETE CBC W/AUTO DIFF WBC: CPT | Performed by: NURSE PRACTITIONER

## 2023-12-08 PROCEDURE — 80048 BASIC METABOLIC PNL TOTAL CA: CPT | Performed by: NURSE PRACTITIONER

## 2023-12-08 PROCEDURE — 94761 N-INVAS EAR/PLS OXIMETRY MLT: CPT

## 2023-12-08 PROCEDURE — 71250 CT THORAX DX C-: CPT

## 2023-12-08 PROCEDURE — 86140 C-REACTIVE PROTEIN: CPT | Performed by: INTERNAL MEDICINE

## 2023-12-08 PROCEDURE — 94799 UNLISTED PULMONARY SVC/PX: CPT

## 2023-12-08 PROCEDURE — 25010000002 METHYLPREDNISOLONE PER 125 MG: Performed by: NURSE PRACTITIONER

## 2023-12-08 RX ORDER — WARFARIN SODIUM 3 MG/1
3 TABLET ORAL
Status: DISCONTINUED | OUTPATIENT
Start: 2023-12-09 | End: 2023-12-08

## 2023-12-08 RX ORDER — FUROSEMIDE 40 MG/1
20 TABLET ORAL
Status: DISCONTINUED | OUTPATIENT
Start: 2023-12-08 | End: 2023-12-09 | Stop reason: HOSPADM

## 2023-12-08 RX ORDER — VALSARTAN 80 MG/1
320 TABLET ORAL
Status: DISCONTINUED | OUTPATIENT
Start: 2023-12-08 | End: 2023-12-09 | Stop reason: HOSPADM

## 2023-12-08 RX ORDER — METHOCARBAMOL 500 MG/1
500 TABLET, FILM COATED ORAL 4 TIMES DAILY
Status: DISCONTINUED | OUTPATIENT
Start: 2023-12-08 | End: 2023-12-08

## 2023-12-08 RX ORDER — AMLODIPINE BESYLATE 5 MG/1
5 TABLET ORAL
Status: DISCONTINUED | OUTPATIENT
Start: 2023-12-08 | End: 2023-12-09 | Stop reason: HOSPADM

## 2023-12-08 RX ORDER — WARFARIN SODIUM 6 MG/1
6 TABLET ORAL
Status: DISCONTINUED | OUTPATIENT
Start: 2023-12-08 | End: 2023-12-08

## 2023-12-08 RX ORDER — VALSARTAN AND HYDROCHLOROTHIAZIDE 320; 25 MG/1; MG/1
1 TABLET, FILM COATED ORAL DAILY
COMMUNITY

## 2023-12-08 RX ORDER — WARFARIN SODIUM 5 MG/1
5 TABLET ORAL
Status: COMPLETED | OUTPATIENT
Start: 2023-12-08 | End: 2023-12-08

## 2023-12-08 RX ORDER — DOXYCYCLINE HYCLATE 100 MG
TABLET ORAL
COMMUNITY
Start: 2023-09-25 | End: 2023-12-08

## 2023-12-08 RX ORDER — PANTOPRAZOLE SODIUM 40 MG/1
40 TABLET, DELAYED RELEASE ORAL 2 TIMES DAILY
Status: DISCONTINUED | OUTPATIENT
Start: 2023-12-08 | End: 2023-12-09 | Stop reason: HOSPADM

## 2023-12-08 RX ORDER — POTASSIUM CHLORIDE 750 MG/1
10 TABLET, FILM COATED, EXTENDED RELEASE ORAL 2 TIMES DAILY PRN
COMMUNITY
Start: 2023-08-12

## 2023-12-08 RX ORDER — ROFLUMILAST 500 UG/1
500 TABLET ORAL NIGHTLY
Status: DISCONTINUED | OUTPATIENT
Start: 2023-12-08 | End: 2023-12-09 | Stop reason: HOSPADM

## 2023-12-08 RX ORDER — AMLODIPINE BESYLATE 5 MG/1
5 TABLET ORAL DAILY
COMMUNITY

## 2023-12-08 RX ORDER — CARVEDILOL 3.12 MG/1
3.12 TABLET ORAL 2 TIMES DAILY WITH MEALS
Status: DISCONTINUED | OUTPATIENT
Start: 2023-12-08 | End: 2023-12-09 | Stop reason: HOSPADM

## 2023-12-08 RX ORDER — LIDOCAINE 50 MG/G
1 PATCH TOPICAL
Status: DISCONTINUED | OUTPATIENT
Start: 2023-12-08 | End: 2023-12-09 | Stop reason: HOSPADM

## 2023-12-08 RX ORDER — ZOLPIDEM TARTRATE 5 MG/1
5 TABLET ORAL NIGHTLY PRN
Status: DISCONTINUED | OUTPATIENT
Start: 2023-12-08 | End: 2023-12-09 | Stop reason: HOSPADM

## 2023-12-08 RX ORDER — WARFARIN SODIUM 3 MG/1
1.5 TABLET ORAL
Status: DISCONTINUED | OUTPATIENT
Start: 2023-12-08 | End: 2023-12-08

## 2023-12-08 RX ORDER — IPRATROPIUM BROMIDE AND ALBUTEROL SULFATE 2.5; .5 MG/3ML; MG/3ML
SOLUTION RESPIRATORY (INHALATION)
COMMUNITY
Start: 2023-12-05 | End: 2023-12-08

## 2023-12-08 RX ORDER — POTASSIUM CHLORIDE 20 MEQ/1
40 TABLET, EXTENDED RELEASE ORAL ONCE
Qty: 2 TABLET | Refills: 0 | Status: COMPLETED | OUTPATIENT
Start: 2023-12-08 | End: 2023-12-08

## 2023-12-08 RX ORDER — HYDROCODONE BITARTRATE AND ACETAMINOPHEN 10; 325 MG/1; MG/1
1 TABLET ORAL EVERY 6 HOURS PRN
Status: DISCONTINUED | OUTPATIENT
Start: 2023-12-08 | End: 2023-12-09 | Stop reason: HOSPADM

## 2023-12-08 RX ORDER — WARFARIN SODIUM 3 MG/1
TABLET ORAL SEE ADMIN INSTRUCTIONS
Status: ON HOLD | COMMUNITY
Start: 2023-11-28 | End: 2023-12-09

## 2023-12-08 RX ORDER — CARVEDILOL 3.12 MG/1
3.12 TABLET ORAL 2 TIMES DAILY WITH MEALS
COMMUNITY
Start: 2023-12-07

## 2023-12-08 RX ORDER — VARENICLINE TARTRATE 0.5 MG/1
1 TABLET, FILM COATED ORAL 2 TIMES DAILY WITH MEALS
Status: DISCONTINUED | OUTPATIENT
Start: 2023-12-08 | End: 2023-12-08

## 2023-12-08 RX ORDER — HYDROCHLOROTHIAZIDE 25 MG/1
25 TABLET ORAL
Status: DISCONTINUED | OUTPATIENT
Start: 2023-12-08 | End: 2023-12-09 | Stop reason: HOSPADM

## 2023-12-08 RX ADMIN — AMLODIPINE BESYLATE 5 MG: 5 TABLET ORAL at 08:34

## 2023-12-08 RX ADMIN — Medication 10 ML: at 08:35

## 2023-12-08 RX ADMIN — WARFARIN SODIUM 5 MG: 5 TABLET ORAL at 17:08

## 2023-12-08 RX ADMIN — IPRATROPIUM BROMIDE AND ALBUTEROL SULFATE 3 ML: .5; 3 SOLUTION RESPIRATORY (INHALATION) at 08:04

## 2023-12-08 RX ADMIN — GUAIFENESIN 1200 MG: 600 TABLET, MULTILAYER, EXTENDED RELEASE ORAL at 08:33

## 2023-12-08 RX ADMIN — GUAIFENESIN 1200 MG: 600 TABLET, MULTILAYER, EXTENDED RELEASE ORAL at 20:43

## 2023-12-08 RX ADMIN — HYDROCHLOROTHIAZIDE 25 MG: 25 TABLET ORAL at 08:34

## 2023-12-08 RX ADMIN — PANTOPRAZOLE SODIUM 40 MG: 40 TABLET, DELAYED RELEASE ORAL at 08:34

## 2023-12-08 RX ADMIN — ZOLPIDEM TARTRATE 5 MG: 5 TABLET ORAL at 20:43

## 2023-12-08 RX ADMIN — Medication 10 ML: at 20:44

## 2023-12-08 RX ADMIN — FUROSEMIDE 20 MG: 40 TABLET ORAL at 08:33

## 2023-12-08 RX ADMIN — VALSARTAN 320 MG: 80 TABLET, FILM COATED ORAL at 08:34

## 2023-12-08 RX ADMIN — METHYLPREDNISOLONE SODIUM SUCCINATE 60 MG: 125 INJECTION, POWDER, FOR SOLUTION INTRAMUSCULAR; INTRAVENOUS at 08:34

## 2023-12-08 RX ADMIN — LIDOCAINE 1 PATCH: 50 PATCH CUTANEOUS at 08:35

## 2023-12-08 RX ADMIN — METHOCARBAMOL 500 MG: 500 TABLET ORAL at 08:34

## 2023-12-08 RX ADMIN — ROFLUMILAST 500 MCG: 500 TABLET ORAL at 21:32

## 2023-12-08 RX ADMIN — POTASSIUM CHLORIDE 40 MEQ: 1500 TABLET, EXTENDED RELEASE ORAL at 08:33

## 2023-12-08 RX ADMIN — METHYLPREDNISOLONE SODIUM SUCCINATE 60 MG: 125 INJECTION, POWDER, FOR SOLUTION INTRAMUSCULAR; INTRAVENOUS at 20:43

## 2023-12-08 RX ADMIN — PANTOPRAZOLE SODIUM 40 MG: 40 TABLET, DELAYED RELEASE ORAL at 20:43

## 2023-12-08 RX ADMIN — HYDROCODONE BITARTRATE AND HOMATROPINE METHYLBROMIDE 5 ML: 5; 1.5 SOLUTION ORAL at 21:32

## 2023-12-08 RX ADMIN — IPRATROPIUM BROMIDE AND ALBUTEROL SULFATE 3 ML: .5; 3 SOLUTION RESPIRATORY (INHALATION) at 12:24

## 2023-12-08 RX ADMIN — POTASSIUM CHLORIDE 40 MEQ: 1500 TABLET, EXTENDED RELEASE ORAL at 01:14

## 2023-12-08 RX ADMIN — CARVEDILOL 3.12 MG: 3.12 TABLET, FILM COATED ORAL at 17:08

## 2023-12-08 RX ADMIN — CARVEDILOL 3.12 MG: 3.12 TABLET, FILM COATED ORAL at 08:34

## 2023-12-08 NOTE — ED NOTES
Pt reports having COPD so she always has trouble breathing but states she had increased SOA since this evening way worse than usually. Pt states usually if she's given a steroid it gets better. Pt states she is always on oxygen via [place].

## 2023-12-08 NOTE — PLAN OF CARE
Goal Outcome Evaluation:  Pt admitted for COPD exacerbation, VSS, pt at baseline 02 requirements, up ad-bala.

## 2023-12-08 NOTE — H&P
[facility] Medicine Services  History & Physical    Patient Name: Renuka Pelayo  : 1955  MRN: [id number]  Primary Care Physician:  Michael Gerardo MD  Date of admission: 2023  Date and Time of Service: 2023 at 2220    Subjective      Chief Complaint: shortness of air    History of Present Illness: Renuka Pelayo is a 67 y.o. female with a CMH of COPD, DVT/PE on coumadin, chronic back pain who presented to [facility] on 2023 with  shortness of breath that started approximately 1 week ago, and significantly worsened on . Shortness of air is worse with exertion and better with rest. She complains of productive cough with yellow sputum. She denies chest pain, fever or chills.  She wears 2L supplemental O2 per NC at home, she is currently on 3L. Patient takes coumadin at home for h/o DVT/PE, last dose  evening.    In the ED, CXR showed emphysema and right basilar atelectasis. EKG showed sinus rhythm. All labs unremarkable except lactic acid 2.2, potassium 3.4, INR 1.69. All VS unremarkable except /92. Patient received Rocephin, doxycycline, steroid, duo-neb in the ED. Patient admitted to Hospitalist group for futher evaluation and treatment.    Review of Systems   Constitutional: Negative.  Negative for chills, fatigue and fever.   HENT: Negative.     Eyes: Negative.    Respiratory:  Positive for cough and shortness of breath. Negative for chest tightness.    Cardiovascular: Negative.    Gastrointestinal: Negative.    Endocrine: Negative.    Genitourinary: Negative.    Musculoskeletal: Negative.    Skin: Negative.    Neurological: Negative.  Negative for weakness.   Psychiatric/Behavioral: Negative.         Personal History     Past Medical History:   Diagnosis Date    Arthritis     Bladder cancer     Chronic back pain     Closed nondisplaced fracture of head of right radius 2017    Congenital deformity of right hip joint 1955    COPD (chronic  obstructive pulmonary disease)     former smoker    Deep venous thrombosis     unprovoked    Depressive disorder     Disease of thyroid gland     Diverticulitis of colon     Essential hypertension     Gastroesophageal reflux disease     Insomnia     Obesity     Pulmonary embolism     provoked by surgery       Past Surgical History:   Procedure Laterality Date    ABDOMINAL SURGERY      BRONCHOSCOPY N/A 11/25/2020    Procedure: BRONCHOSCOPY with bronchial washing;  Surgeon: Win Johnston MD;  Location: [facility] ENDOSCOPY;  Service: Pulmonary;  Laterality: N/A;  Post: mucous plugs    BRONCHOSCOPY N/A 4/25/2022    Procedure: BRONCHOSCOPY with bronchial washing;  Surgeon: Win Johnston MD;  Location: [facility] ENDOSCOPY;  Service: Pulmonary;  Laterality: N/A;  post op: pneumonia    BRONCHOSCOPY N/A 4/20/2023    Procedure: BRONCHOSCOPY with bilateral wash;  Surgeon: Win Johnston MD;  Location: [facility] ENDOSCOPY;  Service: Pulmonary;  Laterality: N/A;  mucous plugs    CHOLECYSTECTOMY      COLON SURGERY      COLONOSCOPY      COLOSTOMY CLOSURE N/A 2/25/2021    Procedure: COLOSTOMY TAKEDOWN OR CLOSURE, extenisve lysis of adhesions;  Surgeon: Chi Farmer MD;  Location: [facility] MAIN OR;  Service: General;  Laterality: N/A;    CYSTOSCOPY N/A 2/25/2021    Procedure: CYSTOSCOPY with bladder tumor removal and fulgeration, insertion of 3-way rizzo catheter;  Surgeon: Alfonzo Hayward MD;  Location: [facility] MAIN OR;  Service: Urology;  Laterality: N/A;    ENDOSCOPY      EXPLORATORY LAPAROTOMY N/A 7/16/2020    Procedure: LAPAROTOMY EXPLORATORY HARTMANS;  Surgeon: Chi Farmer MD;  Location: [facility] MAIN OR;  Service: General;  Laterality: N/A;    HYSTERECTOMY      TOTAL HIP ARTHROPLASTY Right     TOTAL HIP ARTHROPLASTY REVISION Right     x3       Family History: family history includes No Known Problems in her father and mother. Otherwise pertinent FHx was reviewed and not pertinent to current issue.    Social  History:  reports that she quit smoking about 4 years ago. Her smoking use included cigarettes. She has a 80.00 pack-year smoking history. She has never used smokeless tobacco. She reports that she does not drink alcohol and does not use drugs.    Home Medications:  Prior to Admission Medications       Prescriptions Last Dose Informant Patient Reported? Taking?    albuterol (PROVENTIL) (2.5 MG/3ML) 0.083% nebulizer solution   Yes No    Take 2.5 mg by nebulization Every 6 (Six) Hours As Needed for Wheezing.    amLODIPine (NORVASC) 5 MG tablet   No No    Take 1 tablet by mouth Daily.    Budeson-Glycopyrrol-Formoterol (Breztri Aerosphere) 160-9-4.8 MCG/ACT aerosol inhaler   Yes No    Inhale 2 puffs 2 (Two) Times a Day.    furosemide (LASIX) 20 MG tablet   Yes No    Take 1 tablet by mouth 2 (Two) Times a Day As Needed.    HYDROcodone-acetaminophen (NORCO)  MG per tablet   Yes No    Take 1 tablet by mouth Every 6 (Six) Hours As Needed for Moderate Pain.    ipratropium (ATROVENT) 0.02 % nebulizer solution   Yes No    Take 2.5 mL by nebulization Every 6 (Six) Hours As Needed for Wheezing or Shortness of Air.    lidocaine (Lidoderm) 5 %   No No    Place 1 patch on the skin as directed by provider Daily. Remove & Discard patch within 12 hours or as directed by MD    methocarbamol (ROBAXIN) 500 MG tablet   No No    Take 1 tablet by mouth 4 (Four) Times a Day.    pantoprazole (PROTONIX) 40 MG EC tablet   Yes No    Take 1 tablet by mouth 2 (Two) Times a Day.    predniSONE (DELTASONE) 10 MG tablet   Yes No    Take 1 tablet by mouth Daily.    roflumilast (DALIRESP) 500 MCG tablet tablet   Yes No    Take 1 tablet by mouth Every Night.    valsartan-hydrochlorothiazide (DIOVAN-HCT) 320-25 MG per tablet   Yes No    Take 1 tablet by mouth Daily.    varenicline (Chantix Continuing Month Tim) 1 MG tablet   No No    take 1 tablet (1 mg) with glass of water by oral route 2 times per day after meals    warfarin (COUMADIN) 3 MG tablet    No No    Take 3 mg (1 tablet) on Sunday, Tuesday, Wednesday, Thursday and Saturday  Take 1.5 mg (1/2 tablet) on Monday and Friday    zolpidem (AMBIEN) 5 MG tablet   Yes No    Take 1 tablet by mouth At Night As Needed.              Allergies:  No Known Allergies    Objective      Vitals:   Temp:  [97.4 °F (36.3 °C)-97.9 °F (36.6 °C)] 97.9 °F (36.6 °C)  Heart Rate:  [79-95] 95  Resp:  [19-26] 21  BP: (162-192)/(88-99) 184/99  Body mass index is 31.52 kg/m².  Physical Exam  Vitals and nursing note reviewed.   Constitutional:       Appearance: Normal appearance.   HENT:      Head: Normocephalic.      Right Ear: External ear normal.      Left Ear: External ear normal.      Nose: Nose normal.      Mouth/Throat:      Pharynx: Oropharynx is clear.   Eyes:      Extraocular Movements: Extraocular movements intact.   Cardiovascular:      Rate and Rhythm: Normal rate and regular rhythm.      Pulses: Normal pulses.      Heart sounds: Normal heart sounds.   Pulmonary:      Effort: Pulmonary effort is normal.      Breath sounds: Wheezing present.   Abdominal:      General: Bowel sounds are normal.      Palpations: Abdomen is soft.   Musculoskeletal:         General: Normal range of motion.      Cervical back: Normal range of motion.      Right lower leg: Edema present.      Left lower leg: Edema present.   Skin:     General: Skin is warm and dry.   Neurological:      Mental Status: She is alert and oriented to person, place, and time.   Psychiatric:         Mood and Affect: Mood normal.         Behavior: Behavior normal.         Diagnostic Data:  Lab Results (last 24 hours)       Procedure Component Value Units Date/Time    STAT Lactic Acid, Reflex [[id number]]  (Abnormal) Collected: 12/08/23 0112    Specimen: Blood Updated: 12/08/23 0136     Lactate 2.4 mmol/L     Protime-INR [[id number]]  (Abnormal) Collected: 12/07/23 2205    Specimen: Blood Updated: 12/07/23 2229     Protime 17.7 Seconds      INR 1.69    TSH [[id number]]   (Normal) Collected: 12/07/23 1827    Specimen: Blood Updated: 12/07/23 2226     TSH 1.370 uIU/mL     Lipid Panel [[id number]]  (Abnormal) Collected: 12/07/23 1827    Specimen: Blood Updated: 12/07/23 2221     Total Cholesterol 212 mg/dL      Triglycerides 159 mg/dL      HDL Cholesterol 64 mg/dL      LDL Cholesterol  120 mg/dL      VLDL Cholesterol 28 mg/dL      LDL/HDL Ratio 1.82    Narrative:      Cholesterol Reference Ranges  (U.S. Department of Health and Human Services ATP III Classifications)    Desirable          <200 mg/dL  Borderline High    200-239 mg/dL  High Risk          >240 mg/dL      Triglyceride Reference Ranges  (U.S. Department of Health and Human Services ATP III Classifications)    Normal           <150 mg/dL  Borderline High  150-199 mg/dL  High             200-499 mg/dL  Very High        >500 mg/dL    HDL Reference Ranges  (U.S. Department of Health and Human Services ATP III Classifications)    Low     <40 mg/dl (major risk factor for CHD)  High    >60 mg/dl ('negative' risk factor for CHD)        LDL Reference Ranges  (U.S. Department of Health and Human Services ATP III Classifications)    Optimal          <100 mg/dL  Near Optimal     100-129 mg/dL  Borderline High  130-159 mg/dL  High             160-189 mg/dL  Very High        >189 mg/dL    Blood Culture - Blood, Arm, Left [[id number]] Collected: 12/07/23 2142    Specimen: Blood from Arm, Left Updated: 12/07/23 2145    POC Lactate [[id number]]  (Abnormal) Collected: 12/07/23 2138    Specimen: Blood Updated: 12/07/23 2139     Lactate 2.2 mmol/L      Comment: Serial Number: [id number]:  [id number]       Blood Culture - Blood, Arm, Right [[id number]] Collected: 12/07/23 2049    Specimen: Blood from Arm, Right Updated: 12/07/23 2057    Protime-INR [[id number]]  (Abnormal) Collected: 12/07/23 2003    Specimen: Blood Updated: 12/07/23 2054     Protime 17.7 Seconds      INR 1.69    Blood Gas, Arterial - [[id number]]  (Abnormal) Collected:  12/07/23 2042    Specimen: Arterial Blood Updated: 12/07/23 2045     Site Left Radial     Shun's Test Positive     pH, Arterial 7.365 pH units      pCO2, Arterial 50.0 mm Hg      pO2, Arterial 72.8 mm Hg      HCO3, Arterial 28.6 mmol/L      Base Excess, Arterial 2.3 mmol/L      Comment: Serial Number: [id number]:  [id number]        O2 Saturation, Arterial 93.6 %      CO2 Content 30.1 mmol/L      Barometric Pressure for Blood Gas --     Comment: N/A        Modality Cannula     FIO2 28 %      Hemodilution No    Respiratory Panel PCR w/COVID-19(SARS-CoV-2) ONOFRE/GABRIELLE/PIPO/PAD/COR/STEVE In-House, NP Swab in UTM/VTM, 2 HR TAT - Swab, Nasopharynx [[id number]]  (Normal) Collected: 12/07/23 1827    Specimen: Swab from Nasopharynx Updated: 12/07/23 1926     ADENOVIRUS, PCR Not Detected     Coronavirus 229E Not Detected     Coronavirus HKU1 Not Detected     Coronavirus NL63 Not Detected     Coronavirus OC43 Not Detected     COVID19 Not Detected     Human Metapneumovirus Not Detected     Human Rhinovirus/Enterovirus Not Detected     Influenza A PCR Not Detected     Influenza B PCR Not Detected     Parainfluenza Virus 1 Not Detected     Parainfluenza Virus 2 Not Detected     Parainfluenza Virus 3 Not Detected     Parainfluenza Virus 4 Not Detected     RSV, PCR Not Detected     Bordetella pertussis pcr Not Detected     Bordetella parapertussis PCR Not Detected     Chlamydophila pneumoniae PCR Not Detected     Mycoplasma pneumo by PCR Not Detected    Narrative:      In the setting of a positive respiratory panel with a viral infection PLUS a negative procalcitonin without other underlying concern for bacterial infection, consider observing off antibiotics or discontinuation of antibiotics and continue supportive care. If the respiratory panel is positive for atypical bacterial infection (Bordetella pertussis, Chlamydophila pneumoniae, or Mycoplasma pneumoniae), consider antibiotic de-escalation to target atypical bacterial infection.     Comprehensive Metabolic Panel [[id number]]  (Abnormal) Collected: 12/07/23 1827    Specimen: Blood Updated: 12/07/23 1909     Glucose 130 mg/dL      BUN 10 mg/dL      Creatinine 0.77 mg/dL      Sodium 143 mmol/L      Potassium 3.4 mmol/L      Chloride 103 mmol/L      CO2 31.0 mmol/L      Calcium 9.0 mg/dL      Total Protein 7.0 g/dL      Albumin 4.0 g/dL      ALT (SGPT) 21 U/L      AST (SGOT) 21 U/L      Alkaline Phosphatase 105 U/L      Total Bilirubin 0.2 mg/dL      Globulin 3.0 gm/dL      A/G Ratio 1.3 g/dL      BUN/Creatinine Ratio 13.0     Anion Gap 9.0 mmol/L      eGFR 85.2 mL/min/1.73     Narrative:      GFR Normal >60  Chronic Kidney Disease <60  Kidney Failure <15      BNP [[id number]]  (Normal) Collected: 12/07/23 1827    Specimen: Blood Updated: 12/07/23 1909     proBNP 184.1 pg/mL     Narrative:      This assay is used as an aid in the diagnosis of individuals suspected of having heart failure. It can be used as an aid in the diagnosis of acute decompensated heart failure (ADHF) in patients presenting with signs and symptoms of ADHF to the emergency department (ED). In addition, NT-proBNP of <300 pg/mL indicates ADHF is not likely.    Age Range Result Interpretation  NT-proBNP Concentration (pg/mL:      <50             Positive            >450                   Gray                 300-450                    Negative             <300    50-75           Positive            >900                  Gray                300-900                  Negative            <300      >75             Positive            >1800                  Gray                300-1800                  Negative            <300    Single High Sensitivity Troponin T [[id number]]  (Normal) Collected: 12/07/23 1827    Specimen: Blood Updated: 12/07/23 1909     HS Troponin T 10 ng/L     Narrative:      High Sensitive Troponin T Reference Range:  <14.0 ng/L- Negative Female for AMI  <22.0 ng/L- Negative Male for AMI  >=14 - Abnormal Female  "indicating possible myocardial injury.  >=22 - Abnormal Male indicating possible myocardial injury.   Clinicians would have to utilize clinical acumen, EKG, Troponin, and serial changes to determine if it is an Acute Myocardial Infarction or myocardial injury due to an underlying chronic condition.         Procalcitonin [[id number]]  (Normal) Collected: 12/07/23 1827    Specimen: Blood Updated: 12/07/23 1908     Procalcitonin 0.10 ng/mL     Narrative:      As a Marker for Sepsis (Non-Neonates):    1. <0.5 ng/mL represents a low risk of severe sepsis and/or septic shock.  2. >2 ng/mL represents a high risk of severe sepsis and/or septic shock.    As a Marker for Lower Respiratory Tract Infections that require antibiotic therapy:    PCT on Admission    Antibiotic Therapy       6-12 Hrs later    >0.5                Strongly Recommended  >0.25 - <0.5        Recommended   0.1 - 0.25          Discouraged              Remeasure/reassess PCT  <0.1                Strongly Discouraged     Remeasure/reassess PCT    As 28 day mortality risk marker: \"Change in Procalcitonin Result\" (>80% or <=80%) if Day 0 (or Day 1) and Day 4 values are available. Refer to http://www.[facility]-pct-calculator.com    Change in PCT <=80%  A decrease of PCT levels below or equal to 80% defines a positive change in PCT test result representing a higher risk for 28-day all-cause mortality of patients diagnosed with severe sepsis for septic shock.    Change in PCT >80%  A decrease of PCT levels of more than 80% defines a negative change in PCT result representing a lower risk for 28-day all-cause mortality of patients diagnosed with severe sepsis or septic shock.       CBC & Differential [[id number]]  (Abnormal) Collected: 12/07/23 1827    Specimen: Blood Updated: 12/07/23 1850    Narrative:      The following orders were created for panel order CBC & Differential.  Procedure                               Abnormality         Status                   "   ---------                               -----------         ------                     CBC Auto Differential[[id number]]        Abnormal            Final result                 Please view results for these tests on the individual orders.    CBC Auto Differential [[id number]]  (Abnormal) Collected: 12/07/23 1827    Specimen: Blood Updated: 12/07/23 1850     WBC 8.50 10*3/mm3      RBC 4.28 10*6/mm3      Hemoglobin 12.2 g/dL      Hematocrit 36.3 %      MCV 84.8 fL      MCH 28.5 pg      MCHC 33.7 g/dL      RDW 17.0 %      RDW-SD 53.8 fl      MPV 7.9 fL      Platelets 313 10*3/mm3      Neutrophil % 74.0 %      Lymphocyte % 17.3 %      Monocyte % 7.1 %      Eosinophil % 1.0 %      Basophil % 0.6 %      Neutrophils, Absolute 6.30 10*3/mm3      Lymphocytes, Absolute 1.50 10*3/mm3      Monocytes, Absolute 0.60 10*3/mm3      Eosinophils, Absolute 0.10 10*3/mm3      Basophils, Absolute 0.00 10*3/mm3      nRBC 0.0 /100 WBC              Imaging Results (Last 24 Hours)       Procedure Component Value Units Date/Time    XR Chest 1 View [[id number]] Collected: 12/07/23 2002     Updated: 12/07/23 2006    Narrative:      XR CHEST 1 VW    Date of Exam: 12/7/2023 7:19 PM EST    Indication: soa 40-pack-year smoker, quit smoking 10 years ago    Comparison: 6/27/2023, 3/22/2023 and chest CT without contrast from 5/6/2022    Findings:  There is underlying emphysema. There is linear atelectasis in the right lung base and mild elevation of the right diaphragm. The lungs otherwise are grossly clear.  Cardiac, hilar, and mediastinal silhouettes are stable radiographically. Heart size is normal. Pulmonary vascularity is within normal limits. No pneumothorax or pleural effusions. The trachea is midline.  No acute bony abnormality or aggressive appearing   focal osseous lesions in the visualized bony thorax.           Impression:      Impression:  Emphysema and right basilar atelectasis. No active cardiopulmonary disease. No significant change  since 12/27/2023.        Electronically Signed: Mark Donaldson, DO    12/7/2023 8:04 PM EST    Workstation ID: [id number]              Assessment & Plan        This is a 67 y.o. female with:    Active and Resolved Problems  Active Hospital Problems    Diagnosis  POA    **COPD exacerbation [J44.1]  Yes     Priority: High    Chronic anticoagulation [Z79.01]  Not Applicable    Obesity [E66.9]  Yes    Chronic back pain [M54.9, G89.29]  Yes    Primary hypertension [I10]  Yes      Resolved Hospital Problems   No resolved problems to display.     COPD exacerbation  -CXR reviewed  -blood cultures pending  -procalcitonin WNL  -CRP ordered   -Rocephin and doxycycline given in the ED  -neb treatment and solu-medrol given in the ED, continue  -Pulmonology consult    HTN  -hypertensive  -continue home antihypertensive meds    Chronic anticoagulation  -Pharmacy to dose warfarin    Chronic back pain    Obesity  -BMI 31.52  -encourage lifestyle and dietary modifications    DVT prophylaxis:  Medical DVT prophylaxis orders are present.    The patient desires to be as follows:    CODE STATUS:    Code Status (Patient has no pulse and is not breathing): CPR (Attempt to Resuscitate)  Medical Interventions (Patient has pulse or is breathing): Full Support        Admission Status:  I believe this patient meets inpatient status.    Expected Length of Stay: greater than 2 nights    PDMP and Medication Dispenses via Sidebar reviewed and consistent with patient reported medications.    I discussed the patient's findings and my recommendations with patient and nursing staff.      Signature:     This document has been electronically signed by SHILA Benavides on December 8, 2023 04:13 KRYSTIN   [facility] Hospitalist Team

## 2023-12-08 NOTE — PROGRESS NOTES
"Pharmacy dosing service  Anticoagulant  Warfarin     Subjective:    Renuka Pelayo is a 67 y.o.female being continued on warfarin for History of DVT/PE.    INR Goal: 2 - 3  Home medication?: warfarin 4 mg PO daily (3mg tab and half of a 2mg tab)  Bridge Therapy Present?:  No      Assessment/Plan:    Today's INR is subtherapeutic (1.69) on home warfarin, will give bolus of 5 mg today (only 4% TWD increase). Follow-up INR ordered tomorrow. Of note, patient was admitted for supratherapeutic INR in August, so will need to give bolus doses cautiously as patient may be sensitive to increases/decreases in dosing.     Continue to monitor and adjust based on INR.         Date 12/8           INR 1.69           Dose 5 mg               Objective:  [Ht: 160 cm (63\"); Wt: 80.7 kg (177 lb 14.6 oz); BMI: Body mass index is 31.52 kg/m².]    Lab Results   Component Value Date    ALBUMIN 4.0 12/07/2023     Lab Results   Component Value Date    INR 1.69 (L) 12/07/2023    INR 1.69 (L) 12/07/2023    INR 2.91 11/17/2023    PROTIME 17.7 (L) 12/07/2023    PROTIME 17.7 (L) 12/07/2023    PROTIME 29.3 (H) 11/17/2023     Lab Results   Component Value Date    HGB 12.6 12/08/2023    HGB 12.2 12/07/2023    HGB 12.0 11/17/2023     Lab Results   Component Value Date    HCT 38.7 12/08/2023    HCT 36.3 12/07/2023    HCT 37.2 11/17/2023       Claudia Angel RPH  12/08/23 12:07 EST       "

## 2023-12-08 NOTE — PROGRESS NOTES
[facility]     Progress Note    Patient Name: Renuka Pelayo  : 1955  MRN: [id number]  Primary Care Physician:  Michael Gerardo MD  Date of admission: 2023  Service date and time: 23 15:22 EST  Subjective   Subjective     Chief Complaint: shortness of air     HPI:  Patient feels better today, denies any shortness of breath or any chest pain.      Objective   Objective     Vitals:   Temp:  [97.4 °F (36.3 °C)-97.9 °F (36.6 °C)] 97.5 °F (36.4 °C)  Heart Rate:  [69-95] 83  Resp:  [19-26] 22  BP: (157-192)/(87-99) 157/91  Flow (L/min):  [2] 2  Physical Exam    Constitutional: Awake, alert in no distress    Eyes: PERRLA, sclerae anicteric, no conjunctival injection   HENT: NCAT, mucous membranes moist   Neck: Supple, no thyromegaly, no lymphadenopathy, trachea midline   Respiratory: Clear to auscultation bilaterally, nonlabored respirations    Cardiovascular: RRR, no murmurs, rubs, or gallops, palpable pedal pulses bilaterally   Gastrointestinal: Positive bowel sounds, soft, nontender, nondistended   Musculoskeletal: No bilateral ankle edema, no clubbing or cyanosis to extremities   Psychiatric: Appropriate affect, cooperative   Neurologic: Oriented x 3, strength symmetric in all extremities, Cranial Nerves grossly intact to confrontation, speech clear   Skin: No rashes     Result Review    Result Review:  I have personally reviewed the results from the time of this admission to 2023 15:22 EST and agree with these findings:  [x]  Laboratory list / accordion  []  Microbiology  []  Radiology  []  EKG/Telemetry   []  Cardiology/Vascular   []  Pathology  []  Old records  []  Other:  Most notable findings include: Globin 12.6, hematocrit 38.7, platelet count 309, lactic acid 2.4, C-reactive protein 3.18, INR 1.69      Assessment & Plan   Assessment / Plan       Active Hospital Problems:  Active Hospital Problems    Diagnosis     **COPD exacerbation     Chronic anticoagulation     Obesity      Chronic back pain     Primary hypertension      Plan:    COPD exacerbation  -CXR reviewed  -blood cultures pending  -procalcitonin WNL  -CRP ordered   -Rocephin and doxycycline given in the ED  -neb treatment and solu-medrol given in the ED, continue  -Pulmonology consulted  -improving      HTN  -hypertensive  -continue home antihypertensive meds     Chronic anticoagulation  -Pharmacy to dose warfarin     Chronic back pain     Obesity  -BMI 31.52  -encourage lifestyle and dietary modifications     DVT prophylaxis:  Medical DVT prophylaxis orders are present.    DVT prophylaxis:  Medical DVT prophylaxis orders are present.    CODE STATUS:   Code Status (Patient has no pulse and is not breathing): CPR (Attempt to Resuscitate)  Medical Interventions (Patient has pulse or is breathing): Full Support    Disposition:  I expect patient to be discharged in 1 day.    Ney Lopez MD

## 2023-12-08 NOTE — PLAN OF CARE
Patient moving to room 1116, report called to [facility]. CT chest completed. Patient is able to ambulate without oxygen with no distress. Patient moving to new room via wheelchair.

## 2023-12-08 NOTE — CONSULTS
Group: [facility]         CONSULT NOTE    Patient Identification:  Renuka Pelayo  67 y.o.  female  1955  [id number]            Requesting physician: Attending physician    Reason for Consultation: Pneumonia      History of Present Illness:  67-year-old female was admitted on 12/7/2023 with increasing shortness of breath and productive cough    Assessment:      acute COPD exacerbation   Acute bronchitis   Chronic obstructive pulmonary disease, no PFTs on records     4/20/2022 bronchoscopy Streptococcus pneumonia  2020 bronchoscopy history of Pseudomonas     Chronic right lower extremity DVT  History of pulmonary embolism     History of bladder cancer  Hypothyroidism  Hypertension  GERD  Diverticulitis          Recommendations:    Chest CT without contrast  Possible need for bronchoscopy  Antibiotics currently on Rocephin and doxycycline, need to monitor response due to history of Pseudomonas pneumonia  Steroids currently on IV Solu-Medrol  Diuresis currently on p.o. Lasix 20 mg twice daily  Metoprolol    Patient on warfarin            Review of Sytems:  Review of Systems   Respiratory:  Positive for cough and shortness of breath. Negative for wheezing and stridor.    Cardiovascular:  Negative for chest pain, palpitations and leg swelling.       Past Medical History:  Past Medical History:   Diagnosis Date    Arthritis     Bladder cancer     Chronic back pain     Closed nondisplaced fracture of head of right radius 03/2017    Congenital deformity of right hip joint 1955    COPD (chronic obstructive pulmonary disease)     former smoker    Deep venous thrombosis     unprovoked    Depressive disorder     Disease of thyroid gland     Diverticulitis of colon     Essential hypertension     Gastroesophageal reflux disease     Insomnia     Obesity     Pulmonary embolism     provoked by surgery       Past Surgical History:  Past Surgical History:   Procedure Laterality Date    ABDOMINAL SURGERY       BRONCHOSCOPY N/A 2020    Procedure: BRONCHOSCOPY with bronchial washing;  Surgeon: Win Johnston MD;  Location: [facility] ENDOSCOPY;  Service: Pulmonary;  Laterality: N/A;  Post: mucous plugs    BRONCHOSCOPY N/A 2022    Procedure: BRONCHOSCOPY with bronchial washing;  Surgeon: Win Johnston MD;  Location: [facility] ENDOSCOPY;  Service: Pulmonary;  Laterality: N/A;  post op: pneumonia    BRONCHOSCOPY N/A 2023    Procedure: BRONCHOSCOPY with bilateral wash;  Surgeon: Win Johnston MD;  Location: [facility] ENDOSCOPY;  Service: Pulmonary;  Laterality: N/A;  mucous plugs    CHOLECYSTECTOMY      COLON SURGERY      COLONOSCOPY      COLOSTOMY CLOSURE N/A 2021    Procedure: COLOSTOMY TAKEDOWN OR CLOSURE, extenisve lysis of adhesions;  Surgeon: Chi Farmer MD;  Location: [facility] MAIN OR;  Service: General;  Laterality: N/A;    CYSTOSCOPY N/A 2021    Procedure: CYSTOSCOPY with bladder tumor removal and fulgeration, insertion of 3-way rizzo catheter;  Surgeon: Alfonzo Hayward MD;  Location: [facility] MAIN OR;  Service: Urology;  Laterality: N/A;    ENDOSCOPY      EXPLORATORY LAPAROTOMY N/A 2020    Procedure: LAPAROTOMY EXPLORATORY HARTMANS;  Surgeon: Chi Farmer MD;  Location: [facility] MAIN OR;  Service: General;  Laterality: N/A;    HYSTERECTOMY      TOTAL HIP ARTHROPLASTY Right     TOTAL HIP ARTHROPLASTY REVISION Right     x3        Home Meds:  (Not in a hospital admission)      Allergies:  No Known Allergies    Social History:   Social History     Socioeconomic History    Marital status:    Tobacco Use    Smoking status: Former     Packs/day: 2.00     Years: 40.00     Additional pack years: 0.00     Total pack years: 80.00     Types: Cigarettes     Quit date: 2019     Years since quittin.9    Smokeless tobacco: Never    Tobacco comments:     ELECTRONIC   Vaping Use    Vaping Use: Former    Substances: Nicotine, Flavoring   Substance and Sexual Activity    Alcohol  "use: Never    Drug use: Never    Sexual activity: Defer       Family History:  Family History   Problem Relation Age of Onset    No Known Problems Mother     No Known Problems Father        Physical Exam:  BP (!) 184/97 (BP Location: Left arm, Patient Position: Lying)   Pulse 81   Temp 97.8 °F (36.6 °C) (Oral)   Resp 23   Ht 160 cm (63\")   Wt 80.7 kg (177 lb 14.6 oz)   SpO2 98%   BMI 31.52 kg/m²  Body mass index is 31.52 kg/m². 98% 80.7 kg (177 lb 14.6 oz)  Physical Exam  Cardiovascular:      Heart sounds: Murmur heard.   Pulmonary:      Effort: No respiratory distress.      Breath sounds: No stridor. Rhonchi and rales present. No wheezing.   Chest:      Chest wall: No tenderness.         LABS:  Lab Results   Component Value Date    CALCIUM 9.3 12/08/2023     Results from last 7 days   Lab Units 12/08/23  0425 12/07/23  1827   SODIUM mmol/L 142 143   POTASSIUM mmol/L 3.9 3.4*   CHLORIDE mmol/L 102 103   CO2 mmol/L 28.0 31.0*   BUN mg/dL 9 10   CREATININE mg/dL 0.74 0.77   GLUCOSE mg/dL 173* 130*   CALCIUM mg/dL 9.3 9.0   WBC 10*3/mm3 9.40 8.50   HEMOGLOBIN g/dL 12.6 12.2   PLATELETS 10*3/mm3 309 313   ALT (SGPT) U/L  --  21   AST (SGOT) U/L  --  21   PROBNP pg/mL  --  184.1   PROCALCITONIN ng/mL  --  0.10     Lab Results   Component Value Date    TROPONINT 10 12/07/2023     Results from last 7 days   Lab Units 12/07/23  1827   HSTROP T ng/L 10         Results from last 7 days   Lab Units 12/08/23  0112 12/07/23  2138 12/07/23  1827   PROCALCITONIN ng/mL  --   --  0.10   LACTATE mmol/L 2.4* 2.2*  --      Results from last 7 days   Lab Units 12/07/23  2042   PH, ARTERIAL pH units 7.365   PCO2, ARTERIAL mm Hg 50.0*   PO2 ART mm Hg 72.8*   O2 SATURATION ART % 93.6*   MODALITY  Cannula     Results from last 7 days   Lab Units 12/07/23  1822   ADENOVIRUS DETECTION BY PCR  Not Detected   CORONAVIRUS 229E  Not Detected   CORONAVIRUS HKU1  Not Detected   CORONAVIRUS NL63  Not Detected   CORONAVIRUS OC43  Not Detected "   HUMAN METAPNEUMOVIRUS  Not Detected   HUMAN RHINOVIRUS/ENTEROVIRUS  Not Detected   INFLUENZA B PCR  Not Detected   PARAINFLUENZA 1  Not Detected   PARAINFLUENZA VIRUS 2  Not Detected   PARAINFLUENZA VIRUS 3  Not Detected   PARAINFLUENZA VIRUS 4  Not Detected   BORDETELLA PERTUSSIS PCR  Not Detected   CHLAMYDOPHILA PNEUMONIAE PCR  Not Detected   MYCOPLAMA PNEUMO PCR  Not Detected   INFLUENZA A PCR  Not Detected   RSV, PCR  Not Detected     Results from last 7 days   Lab Units 12/07/23  2205 12/07/23 2003   INR  1.69* 1.69*         Lab Results   Component Value Date    TSH 1.370 12/07/2023     Estimated Creatinine Clearance: 75.2 mL/min (by C-G formula based on SCr of 0.74 mg/dL).         Imaging:  Imaging Results (Last 24 Hours)       Procedure Component Value Units Date/Time    XR Chest 1 View [[id number]] Collected: 12/07/23 2002     Updated: 12/07/23 2006    Narrative:      XR CHEST 1 VW    Date of Exam: 12/7/2023 7:19 PM EST    Indication: soa 40-pack-year smoker, quit smoking 10 years ago    Comparison: 6/27/2023, 3/22/2023 and chest CT without contrast from 5/6/2022    Findings:  There is underlying emphysema. There is linear atelectasis in the right lung base and mild elevation of the right diaphragm. The lungs otherwise are grossly clear.  Cardiac, hilar, and mediastinal silhouettes are stable radiographically. Heart size is normal. Pulmonary vascularity is within normal limits. No pneumothorax or pleural effusions. The trachea is midline.  No acute bony abnormality or aggressive appearing   focal osseous lesions in the visualized bony thorax.           Impression:      Impression:  Emphysema and right basilar atelectasis. No active cardiopulmonary disease. No significant change since 12/27/2023.        Electronically Signed: Mark Donaldson DO    12/7/2023 8:04 PM EST    Workstation ID: [id number]              Current Meds:   SCHEDULE  amLODIPine, 5 mg, Oral, Q24H  carvedilol, 3.125 mg, Oral, BID With  Meals  furosemide, 20 mg, Oral, BID  guaiFENesin, 1,200 mg, Oral, Q12H  valsartan, 320 mg, Oral, Q24H   And  hydroCHLOROthiazide, 25 mg, Oral, Q24H  ipratropium-albuterol, 3 mL, Nebulization, 4x Daily - RT  lidocaine, 1 patch, Transdermal, Q24H  methylPREDNISolone sodium succinate, 60 mg, Intravenous, Q12H  pantoprazole, 40 mg, Oral, BID  roflumilast, 500 mcg, Oral, Nightly  sodium chloride, 10 mL, Intravenous, Q12H  warfarin, 1.5 mg, Oral, Once per day on Mon Fri  [START ON 12/9/2023] warfarin, 3 mg, Oral, Once per day on Sun Tue Wed Thu Sat      Infusions  Pharmacy to dose warfarin,       PRNs    acetaminophen **OR** acetaminophen **OR** acetaminophen    calcium carbonate    Calcium Replacement - Follow Nurse / BPA Driven Protocol    HYDROcodone Bit-Homatrop MBr    HYDROcodone-acetaminophen    Magnesium Standard Dose Replacement - Follow Nurse / BPA Driven Protocol    melatonin    nitroglycerin    ondansetron **OR** ondansetron    Pharmacy to dose warfarin    Phosphorus Replacement - Follow Nurse / BPA Driven Protocol    Potassium Replacement - Follow Nurse / BPA Driven Protocol    senna-docusate sodium    [COMPLETED] Insert Peripheral IV **AND** sodium chloride    sodium chloride    sodium chloride    danay Hilario MD  12/8/2023  10:57 EST      Much of this encounter note is an electronic transcription/translation of spoken language to printed text using Dragon Software.

## 2023-12-08 NOTE — NURSING NOTE
Patient just arrived to [facility] room 1116. Vitals done. Patient calm and cooperative. Patient states SOB when up walking/moving around not when laying down. Awaiting CT results. Call light within reach.

## 2023-12-08 NOTE — PROGRESS NOTES
"Pharmacy dosing service  Anticoagulant  Warfarin     Subjective:    Renuka Pelayo is a 67 y.o.female being continued on warfarin for History of DVT/PE.    INR Goal: 2 - 3  Home medication?: warfarin 3mg mg PO daily, except warfarin 1.5 mg PO on Mon, Fri.   Bridge Therapy Present?:  No      Assessment/Plan:    Today's INR is subtherapeutic (1.69) on home warfarin 3mg daily except 1.5mg on Monday and Friday. Will resume home dose and consider increasing dose if INR remains subtherapeutic.     Continue to monitor and adjust based on INR.         Date 12/8           INR 1.69           Dose 1.5mg               Objective:  [Ht: 160 cm (63\"); Wt: 80.7 kg (177 lb 14.6 oz); BMI: Body mass index is 31.52 kg/m².]    Lab Results   Component Value Date    ALBUMIN 4.0 12/07/2023     Lab Results   Component Value Date    INR 1.69 (L) 12/07/2023    INR 1.69 (L) 12/07/2023    INR 2.91 11/17/2023    PROTIME 17.7 (L) 12/07/2023    PROTIME 17.7 (L) 12/07/2023    PROTIME 29.3 (H) 11/17/2023     Lab Results   Component Value Date    HGB 12.2 12/07/2023    HGB 12.0 11/17/2023    HGB 10.9 (L) 08/03/2023     Lab Results   Component Value Date    HCT 36.3 12/07/2023    HCT 37.2 11/17/2023    HCT 34.1 08/03/2023       Julieth Coles, PharmD  12/08/23 01:57 EST     "

## 2023-12-09 ENCOUNTER — READMISSION MANAGEMENT (OUTPATIENT)
Dept: CALL CENTER | Facility: HOSPITAL | Age: 68
End: 2023-12-09
Payer: MEDICAID

## 2023-12-09 VITALS
HEART RATE: 85 BPM | DIASTOLIC BLOOD PRESSURE: 90 MMHG | RESPIRATION RATE: 17 BRPM | OXYGEN SATURATION: 91 % | BODY MASS INDEX: 31.52 KG/M2 | WEIGHT: 177.91 LBS | SYSTOLIC BLOOD PRESSURE: 145 MMHG | HEIGHT: 63 IN | TEMPERATURE: 98.3 F

## 2023-12-09 LAB
ANION GAP SERPL CALCULATED.3IONS-SCNC: 15 MMOL/L (ref 5–15)
BASOPHILS # BLD AUTO: 0 10*3/MM3 (ref 0–0.2)
BASOPHILS NFR BLD AUTO: 0.1 % (ref 0–1.5)
BUN SERPL-MCNC: 23 MG/DL (ref 8–23)
BUN/CREAT SERPL: 27.1 (ref 7–25)
CALCIUM SPEC-SCNC: 10 MG/DL (ref 8.6–10.5)
CHLORIDE SERPL-SCNC: 95 MMOL/L (ref 98–107)
CO2 SERPL-SCNC: 28 MMOL/L (ref 22–29)
CREAT SERPL-MCNC: 0.85 MG/DL (ref 0.57–1)
D-LACTATE SERPL-SCNC: 3.5 MMOL/L (ref 0.5–2)
DEPRECATED RDW RBC AUTO: 52.5 FL (ref 37–54)
EGFRCR SERPLBLD CKD-EPI 2021: 75.7 ML/MIN/1.73
EOSINOPHIL # BLD AUTO: 0 10*3/MM3 (ref 0–0.4)
EOSINOPHIL NFR BLD AUTO: 0 % (ref 0.3–6.2)
ERYTHROCYTE [DISTWIDTH] IN BLOOD BY AUTOMATED COUNT: 17 % (ref 12.3–15.4)
GLUCOSE SERPL-MCNC: 211 MG/DL (ref 65–99)
HCT VFR BLD AUTO: 38.6 % (ref 34–46.6)
HGB BLD-MCNC: 12.5 G/DL (ref 12–15.9)
INR PPP: 1.85 (ref 2–3)
LYMPHOCYTES # BLD AUTO: 0.7 10*3/MM3 (ref 0.7–3.1)
LYMPHOCYTES NFR BLD AUTO: 3.9 % (ref 19.6–45.3)
MCH RBC QN AUTO: 27.1 PG (ref 26.6–33)
MCHC RBC AUTO-ENTMCNC: 32.4 G/DL (ref 31.5–35.7)
MCV RBC AUTO: 83.7 FL (ref 79–97)
MONOCYTES # BLD AUTO: 0.4 10*3/MM3 (ref 0.1–0.9)
MONOCYTES NFR BLD AUTO: 2.1 % (ref 5–12)
NEUTROPHILS NFR BLD AUTO: 17.6 10*3/MM3 (ref 1.7–7)
NEUTROPHILS NFR BLD AUTO: 93.9 % (ref 42.7–76)
NRBC BLD AUTO-RTO: 0 /100 WBC (ref 0–0.2)
PLATELET # BLD AUTO: 377 10*3/MM3 (ref 140–450)
PMV BLD AUTO: 8.1 FL (ref 6–12)
POTASSIUM SERPL-SCNC: 3.4 MMOL/L (ref 3.5–5.2)
PROTHROMBIN TIME: 19.3 SECONDS (ref 19.4–28.5)
RBC # BLD AUTO: 4.61 10*6/MM3 (ref 3.77–5.28)
SODIUM SERPL-SCNC: 138 MMOL/L (ref 136–145)
WBC NRBC COR # BLD AUTO: 18.8 10*3/MM3 (ref 3.4–10.8)

## 2023-12-09 PROCEDURE — 83605 ASSAY OF LACTIC ACID: CPT | Performed by: INTERNAL MEDICINE

## 2023-12-09 PROCEDURE — 94799 UNLISTED PULMONARY SVC/PX: CPT

## 2023-12-09 PROCEDURE — 85025 COMPLETE CBC W/AUTO DIFF WBC: CPT | Performed by: NURSE PRACTITIONER

## 2023-12-09 PROCEDURE — 80048 BASIC METABOLIC PNL TOTAL CA: CPT | Performed by: NURSE PRACTITIONER

## 2023-12-09 PROCEDURE — 94664 DEMO&/EVAL PT USE INHALER: CPT

## 2023-12-09 PROCEDURE — 25010000002 METHYLPREDNISOLONE PER 125 MG: Performed by: NURSE PRACTITIONER

## 2023-12-09 PROCEDURE — 94761 N-INVAS EAR/PLS OXIMETRY MLT: CPT

## 2023-12-09 PROCEDURE — 85610 PROTHROMBIN TIME: CPT | Performed by: EMERGENCY MEDICINE

## 2023-12-09 RX ORDER — GUAIFENESIN 600 MG/1
1200 TABLET, EXTENDED RELEASE ORAL EVERY 12 HOURS SCHEDULED
Qty: 14 TABLET | Refills: 0 | Status: SHIPPED | OUTPATIENT
Start: 2023-12-09

## 2023-12-09 RX ORDER — DOXYCYCLINE HYCLATE 100 MG/1
100 CAPSULE ORAL 2 TIMES DAILY
Qty: 10 CAPSULE | Refills: 0 | Status: SHIPPED | OUTPATIENT
Start: 2023-12-09 | End: 2023-12-14

## 2023-12-09 RX ORDER — WARFARIN SODIUM 3 MG/1
3 TABLET ORAL NIGHTLY
Qty: 30 TABLET | Refills: 0 | Status: SHIPPED | OUTPATIENT
Start: 2023-12-09

## 2023-12-09 RX ORDER — METHYLPREDNISOLONE 4 MG/1
TABLET ORAL
Qty: 21 TABLET | Refills: 0 | Status: SHIPPED | OUTPATIENT
Start: 2023-12-09

## 2023-12-09 RX ORDER — HYDROCODONE BITARTRATE AND HOMATROPINE METHYLBROMIDE ORAL SOLUTION 5; 1.5 MG/5ML; MG/5ML
5 LIQUID ORAL EVERY 4 HOURS PRN
Qty: 250 ML | Refills: 0 | Status: SHIPPED | OUTPATIENT
Start: 2023-12-09 | End: 2023-12-14

## 2023-12-09 RX ADMIN — HYDROCHLOROTHIAZIDE 25 MG: 25 TABLET ORAL at 08:35

## 2023-12-09 RX ADMIN — VALSARTAN 320 MG: 80 TABLET, FILM COATED ORAL at 08:34

## 2023-12-09 RX ADMIN — CARVEDILOL 3.12 MG: 3.12 TABLET, FILM COATED ORAL at 08:35

## 2023-12-09 RX ADMIN — IPRATROPIUM BROMIDE AND ALBUTEROL SULFATE 3 ML: .5; 3 SOLUTION RESPIRATORY (INHALATION) at 02:19

## 2023-12-09 RX ADMIN — METHYLPREDNISOLONE SODIUM SUCCINATE 60 MG: 125 INJECTION, POWDER, FOR SOLUTION INTRAMUSCULAR; INTRAVENOUS at 08:34

## 2023-12-09 RX ADMIN — IPRATROPIUM BROMIDE AND ALBUTEROL SULFATE 3 ML: .5; 3 SOLUTION RESPIRATORY (INHALATION) at 12:00

## 2023-12-09 RX ADMIN — PANTOPRAZOLE SODIUM 40 MG: 40 TABLET, DELAYED RELEASE ORAL at 08:35

## 2023-12-09 RX ADMIN — GUAIFENESIN 1200 MG: 600 TABLET, MULTILAYER, EXTENDED RELEASE ORAL at 08:35

## 2023-12-09 RX ADMIN — AMLODIPINE BESYLATE 5 MG: 5 TABLET ORAL at 08:35

## 2023-12-09 RX ADMIN — IPRATROPIUM BROMIDE AND ALBUTEROL SULFATE 3 ML: .5; 3 SOLUTION RESPIRATORY (INHALATION) at 08:06

## 2023-12-09 RX ADMIN — Medication 10 ML: at 08:36

## 2023-12-09 RX ADMIN — HYDROCODONE BITARTRATE AND HOMATROPINE METHYLBROMIDE 5 ML: 5; 1.5 SOLUTION ORAL at 02:52

## 2023-12-09 NOTE — OUTREACH NOTE
Prep Survey      Flowsheet Row Responses   [facility] facility patient discharged from? [facility]   Is LACE score < 7 ? No   Eligibility Readm Mgmt   Discharge diagnosis COPD exacerbation   Does the patient have one of the following disease processes/diagnoses(primary or secondary)? COPD   Does the patient have Home health ordered? No   Is there a DME ordered? No   Medication alerts for this patient see AVS   Prep survey completed? Yes            Bita CHACON - Registered Nurse

## 2023-12-09 NOTE — DISCHARGE SUMMARY
[facility]         DISCHARGE SUMMARY    Patient Name: Renuka Pelayo  : 1955  MRN: [id number]    Date of Admission: 2023  Date of Discharge:  2023  Primary Care Physician: Michael Gerardo MD    Consults       Date and Time Order Name Status Description    2023  4:20 AM Inpatient Pulmonology Consult Completed     2023  9:46 PM Hospitalist (on-call MD unless specified)              Presenting Problem:   COPD exacerbation [J44.1]  Chronic obstructive pulmonary disease, unspecified COPD type [J44.9]  Dyspnea, unspecified type [R06.00]  Sepsis, due to unspecified organism, unspecified whether acute organ dysfunction present [A41.9]    Active and Resolved Hospital Problems:  Active Hospital Problems    Diagnosis POA    **COPD exacerbation [J44.1] Yes    Chronic anticoagulation [Z79.01] Not Applicable    Obesity [E66.9] Yes    Chronic back pain [M54.9, G89.29] Yes    Primary hypertension [I10] Yes      Resolved Hospital Problems   No resolved problems to display.         Hospital Course     Hospital Course:      Renuka Pelayo is a 67 y.o. female with a CMH of COPD, DVT/PE on coumadin, chronic back pain who presented to [facility] on 2023 with  shortness of breath that started approximately 1 week ago, and significantly worsened on . Shortness of air is worse with exertion and better with rest. She complains of productive cough with yellow sputum. She denies chest pain, fever or chills.  She wears 2L supplemental O2 per NC at home, she is currently on 3L. Patient takes coumadin at home for h/o DVT/PE, last dose  evening.     In the ED, CXR showed emphysema and right basilar atelectasis. EKG showed sinus rhythm. All labs unremarkable except lactic acid 2.2, potassium 3.4, INR 1.69. All VS unremarkable except /92. Patient received Rocephin, doxycycline, steroid, duo-neb in the ED. Patient admitted to Hospitalist group for futher evaluation and  treatment    Patient was admitted, she was started on IV Solu-Medrol, breathing treatment, incentive spirometer, she remained afebrile, it was noted that she had some leukocytosis, she had uncomplicated course, she will be discharged home today, needs to follow-up with primary care physician in 3 to 5 days as well as pulmonary.  For Medrol Dosepak as well as doxycycline was given to the patient.    Day of Discharge     Vital Signs:  Temp:  [97.8 °F (36.6 °C)-98.7 °F (37.1 °C)] 98.3 °F (36.8 °C)  Heart Rate:  [] 85  Resp:  [16-25] 17  BP: (117-158)/(68-90) 145/90  Flow (L/min):  [1-2] 2  Physical Exam:  Constitutional: Awake, alert   Eyes: PERRLA, sclerae anicteric, no conjunctival injection   HENT: NCAT, mucous membranes moist   Neck: Supple, no thyromegaly, no lymphadenopathy, trachea midline   Respiratory: Clear to auscultation bilaterally, nonlabored respirations    Cardiovascular: RRR, no murmurs, rubs, or gallops, palpable pedal pulses bilaterally   Gastrointestinal: Positive bowel sounds, soft, nontender, nondistended   Musculoskeletal: No bilateral ankle edema, no clubbing or cyanosis to extremities   Psychiatric: Appropriate affect, cooperative   Neurologic: Oriented x 3, strength symmetric in all extremities, Cranial Nerves grossly intact to confrontation, speech clear   Skin: No rashes     Pertinent  and/or Most Recent Results     LAB RESULTS:      Lab 12/09/23  1105 12/08/23  0425 12/08/23  0112 12/07/23  2205 12/07/23  2138 12/07/23 2003 12/07/23  1827   WBC 18.80* 9.40  --   --   --   --  8.50   HEMOGLOBIN 12.5 12.6  --   --   --   --  12.2   HEMATOCRIT 38.6 38.7  --   --   --   --  36.3   PLATELETS 377 309  --   --   --   --  313   NEUTROS ABS 17.60* 8.90*  --   --   --   --  6.30   LYMPHS ABS 0.70 0.40*  --   --   --   --  1.50   MONOS ABS 0.40 0.10  --   --   --   --  0.60   EOS ABS 0.00 0.00  --   --   --   --  0.10   MCV 83.7 86.0  --   --   --   --  84.8   CRP  --  3.18*  --   --   --   --   3.03*   PROCALCITONIN  --   --   --   --   --   --  0.10   LACTATE 3.5*  --  2.4*  --  2.2*  --   --    PROTIME 19.3*  --   --  17.7*  --  17.7*  --          Lab 12/09/23  1105 12/08/23  0425 12/07/23  1827   SODIUM 138 142 143   POTASSIUM 3.4* 3.9 3.4*   CHLORIDE 95* 102 103   CO2 28.0 28.0 31.0*   ANION GAP 15.0 12.0 9.0   BUN 23 9 10   CREATININE 0.85 0.74 0.77   EGFR 75.7 89.4 85.2   GLUCOSE 211* 173* 130*   CALCIUM 10.0 9.3 9.0   TSH  --   --  1.370         Lab 12/07/23  1827   TOTAL PROTEIN 7.0   ALBUMIN 4.0   GLOBULIN 3.0   ALT (SGPT) 21   AST (SGOT) 21   BILIRUBIN 0.2   ALK PHOS 105         Lab 12/09/23  1105 12/07/23  2205 12/07/23 2003 12/07/23  1827   PROBNP  --   --   --  184.1   HSTROP T  --   --   --  10   PROTIME 19.3* 17.7* 17.7*  --    INR 1.85* 1.69* 1.69*  --          Lab 12/07/23  1827   CHOLESTEROL 212*   LDL CHOL 120*   HDL CHOL 64*   TRIGLYCERIDES 159*             Lab 12/07/23 2042   PH, ARTERIAL 7.365   PCO2, ARTERIAL 50.0*   PO2 ART 72.8*   O2 SATURATION ART 93.6*   FIO2 28   HCO3 ART 28.6*   BASE EXCESS ART 2.3     Brief Urine Lab Results  (Last result in the past 365 days)        Color   Clarity   Blood   Leuk Est   Nitrite   Protein   CREAT   Urine HCG        06/28/23 2155 Yellow   Clear   Trace   Moderate (2+)   Negative   Negative                 Microbiology Results (last 10 days)       Procedure Component Value - Date/Time    Blood Culture - Blood, Arm, Left [[id number]]  (Normal) Collected: 12/07/23 2142    Lab Status: Preliminary result Specimen: Blood from Arm, Left Updated: 12/08/23 2147     Blood Culture No growth at 24 hours    Blood Culture - Blood, Arm, Right [[id number]]  (Normal) Collected: 12/07/23 2049    Lab Status: Preliminary result Specimen: Blood from Arm, Right Updated: 12/08/23 2100     Blood Culture No growth at 24 hours    Respiratory Panel PCR w/COVID-19(SARS-CoV-2) [facility] In-House, NP Swab in UTM/VTM, 2 HR TAT - Swab, Nasopharynx [[id number]]   (Normal) Collected: 12/07/23 1827    Lab Status: Final result Specimen: Swab from Nasopharynx Updated: 12/07/23 1926     ADENOVIRUS, PCR Not Detected     Coronavirus 229E Not Detected     Coronavirus HKU1 Not Detected     Coronavirus NL63 Not Detected     Coronavirus OC43 Not Detected     COVID19 Not Detected     Human Metapneumovirus Not Detected     Human Rhinovirus/Enterovirus Not Detected     Influenza A PCR Not Detected     Influenza B PCR Not Detected     Parainfluenza Virus 1 Not Detected     Parainfluenza Virus 2 Not Detected     Parainfluenza Virus 3 Not Detected     Parainfluenza Virus 4 Not Detected     RSV, PCR Not Detected     Bordetella pertussis pcr Not Detected     Bordetella parapertussis PCR Not Detected     Chlamydophila pneumoniae PCR Not Detected     Mycoplasma pneumo by PCR Not Detected    Narrative:      In the setting of a positive respiratory panel with a viral infection PLUS a negative procalcitonin without other underlying concern for bacterial infection, consider observing off antibiotics or discontinuation of antibiotics and continue supportive care. If the respiratory panel is positive for atypical bacterial infection (Bordetella pertussis, Chlamydophila pneumoniae, or Mycoplasma pneumoniae), consider antibiotic de-escalation to target atypical bacterial infection.            CT Chest Without Contrast Diagnostic    Result Date: 12/8/2023  Impression: 1. Chronic near complete right middle lobe atelectasis and partial lingular atelectasis, unchanged. 2. Moderate emphysema. 3. No acute airspace disease. 4. Suspected subacute compression deformity of the superior endplate of T12. Electronically Signed: Daly Rich MD  12/8/2023 3:55 PM EST  Workstation ID: [id number]    XR Chest 1 View    Result Date: 12/7/2023  Impression: Impression: Emphysema and right basilar atelectasis. No active cardiopulmonary disease. No significant change since 12/27/2023. Electronically Signed: Mark Donaldson DO   12/7/2023 8:04 PM EST  Workstation ID: [id number]    CT Pelvis With Contrast    Result Date: 11/17/2023  Impression: Impression: 1.No acute process identified. Electronically Signed: Hussein Chaney MD  11/17/2023 10:06 AM CST  Workstation ID: [id number]     Results for orders placed during the hospital encounter of 06/05/23    Duplex Venous Lower Extremity - Bilateral CAR    Interpretation Summary    Normal bilateral lower extremity venous duplex scan.      Results for orders placed during the hospital encounter of 06/05/23    Duplex Venous Lower Extremity - Bilateral CAR    Interpretation Summary    Normal bilateral lower extremity venous duplex scan.          Labs Pending at Discharge:  Pending Labs       Order Current Status    Blood Culture - Blood, Arm, Left Preliminary result    Blood Culture - Blood, Arm, Right Preliminary result              Discharge Details        Discharge Medications        New Medications        Instructions Start Date   doxycycline 100 MG capsule  Commonly known as: VIBRAMYCIN   100 mg, Oral, 2 Times Daily      guaiFENesin 600 MG 12 hr tablet  Commonly known as: MUCINEX   1,200 mg, Oral, Every 12 Hours Scheduled      HYDROcodone Bit-Homatrop MBr 5-1.5 MG/5ML solution  Commonly known as: HYCODAN   5 mL, Oral, Every 4 Hours PRN      methylPREDNISolone 4 MG dose pack  Commonly known as: MEDROL   Take as directed on package instructions.             Changes to Medications        Instructions Start Date   warfarin 3 MG tablet  Commonly known as: COUMADIN  What changed:   how much to take  when to take this  additional instructions   3 mg, Oral, Nightly             Continue These Medications        Instructions Start Date   albuterol (2.5 MG/3ML) 0.083% nebulizer solution  Commonly known as: PROVENTIL   2.5 mg, Nebulization, Every 6 Hours PRN      amLODIPine 5 MG tablet  Commonly known as: NORVASC   5 mg, Oral, Daily      Breztri Aerosphere 160-9-4.8 MCG/ACT aerosol inhaler  Generic drug:  Budeson-Glycopyrrol-Formoterol   2 puffs, Inhalation, 2 Times Daily      carvedilol 3.125 MG tablet  Commonly known as: COREG   Take 1 tablet by mouth 2 (Two) Times a Day With Meals.      Diovan -25 MG per tablet  Generic drug: valsartan-hydrochlorothiazide   1 tablet, Oral, Daily      furosemide 20 MG tablet  Commonly known as: LASIX   20 mg, Oral, 2 Times Daily PRN      HYDROcodone-acetaminophen  MG per tablet  Commonly known as: NORCO   1 tablet, Oral, Every 6 Hours PRN      ipratropium 0.02 % nebulizer solution  Commonly known as: ATROVENT   500 mcg, Nebulization, Every 6 Hours PRN      lidocaine 5 %  Commonly known as: Lidoderm   1 patch, Transdermal, Every 24 Hours, Remove & Discard patch within 12 hours or as directed by MD      pantoprazole 40 MG EC tablet  Commonly known as: PROTONIX   40 mg, Oral, 2 Times Daily      potassium chloride 10 MEQ CR tablet   Take 1 tablet by mouth 2 (Two) Times a Day As Needed (take with furosemide PRN).      roflumilast 500 MCG tablet tablet  Commonly known as: DALIRESP   500 mcg, Oral, Nightly      zolpidem 5 MG tablet  Commonly known as: AMBIEN   5 mg, Oral, Nightly PRN             Stop These Medications      predniSONE 10 MG tablet  Commonly known as: DELTASONE              No Known Allergies      Discharge Disposition:   Home or Self Care    Discharge Condition: .cond    Diet:  Hospital:  Diet Order   Procedures    Diet: Regular/House Diet; Texture: Regular Texture (IDDSI 7); Fluid Consistency: Thin (IDDSI 0)         Discharge Activity:         CODE STATUS:  Code Status and Medical Interventions:   Ordered at: 12/07/23 2200     Code Status (Patient has no pulse and is not breathing):    CPR (Attempt to Resuscitate)     Medical Interventions (Patient has pulse or is breathing):    Full Support         No future appointments.        Time spent on Discharge including face to face service:  30 minutes    Ney Lopez MD

## 2023-12-09 NOTE — PROGRESS NOTES
Daily Progress Note        COPD exacerbation    Chronic back pain    Primary hypertension    Chronic anticoagulation    Obesity    Assessment:        acute COPD exacerbation   Acute bronchitis   Chronic obstructive pulmonary disease, no PFTs on records     4/20/2022 bronchoscopy Streptococcus pneumonia  2020 bronchoscopy history of Pseudomonas     Chronic right lower extremity DVT  History of pulmonary embolism     History of bladder cancer  Hypothyroidism  Hypertension  GERD  Diverticulitis           Recommendations:     Mucomyst nebulized  Antibiotics currently on Rocephin and doxycycline, need to monitor response due to history of Pseudomonas pneumonia  Steroids currently on IV Solu-Medrol  Diuresis currently on p.o. Lasix 20 mg twice daily  Metoprolol     Patient on warfarin     CT chest 12/8/2023            LOS: 2 days     Subjective         Objective     Vital signs for last 24 hours:  Vitals:    12/09/23 0354 12/09/23 0806 12/09/23 0809 12/09/23 0834   BP: 127/73   136/77   BP Location: Left arm      Patient Position: Lying      Pulse: 88 84 76 81   Resp: 18 18 16    Temp: 98.3 °F (36.8 °C)      TempSrc: Oral      SpO2: 91% 97% 95%    Weight:       Height:           Intake/Output last 3 shifts:  I/O last 3 completed shifts:  In: 920 [P.O.:720; IV Piggyback:200]  Out: 850 [Urine:850]  Intake/Output this shift:  I/O this shift:  In: 480 [P.O.:480]  Out: -       Radiology  Imaging Results (Last 24 Hours)       Procedure Component Value Units Date/Time    CT Chest Without Contrast Diagnostic [[id number]] Collected: 12/08/23 1552     Updated: 12/08/23 1557    Narrative:      CT CHEST WO CONTRAST DIAGNOSTIC    Date of Exam: 12/8/2023 3:36 PM EST    Indication: Dyspnea, chronic, unclear etiology.    Comparison: CT chest 5/16/2023.    Technique: Axial CT images were obtained of the chest without contrast administration.  Sagittal and coronal reconstructions were performed.  Automated exposure control and iterative  reconstruction methods were used.      Findings:  Moderate emphysema. There is chronic near complete right middle lobe atelectasis. There is chronic atelectasis of the lingular segment left upper lobe. These findings are unchanged from prior. No new airspace disease is seen.    Heart size is normal. No significant coronary calcifications are seen. 3.9 cm mid ascending thoracic aortic ectasia is unchanged. There are no pathologically enlarged lymph nodes. There is no pericardial effusion or pleural effusion. Thyroid gland is   within normal limits. There is a small esophageal hiatal hernia.    Benign calcifications are seen near the hepatic dome. Cholecystectomy changes are present. Remainder of the imaged upper abdominal organs is within normal limits.    Old left ninth rib fracture laterally. Mild concavity of the superior endplate of T12 appears subacute with 10% loss of vertebral body height but no bony retropulsion or subluxation.      Impression:      1. Chronic near complete right middle lobe atelectasis and partial lingular atelectasis, unchanged.  2. Moderate emphysema.  3. No acute airspace disease.  4. Suspected subacute compression deformity of the superior endplate of T12.    Electronically Signed: Daly Rich MD    12/8/2023 3:55 PM EST    Workstation ID: [id number]            Labs:  Results from last 7 days   Lab Units 12/08/23  0425   WBC 10*3/mm3 9.40   HEMOGLOBIN g/dL 12.6   HEMATOCRIT % 38.7   PLATELETS 10*3/mm3 309     Results from last 7 days   Lab Units 12/08/23  0425 12/07/23  1827   SODIUM mmol/L 142 143   POTASSIUM mmol/L 3.9 3.4*   CHLORIDE mmol/L 102 103   CO2 mmol/L 28.0 31.0*   BUN mg/dL 9 10   CREATININE mg/dL 0.74 0.77   CALCIUM mg/dL 9.3 9.0   BILIRUBIN mg/dL  --  0.2   ALK PHOS U/L  --  105   ALT (SGPT) U/L  --  21   AST (SGOT) U/L  --  21   GLUCOSE mg/dL 173* 130*     Results from last 7 days   Lab Units 12/07/23  2042   PH, ARTERIAL pH units 7.365   PO2 ART mm Hg 72.8*   PCO2,  ARTERIAL mm Hg 50.0*   HCO3 ART mmol/L 28.6*     Results from last 7 days   Lab Units 12/07/23  1827   ALBUMIN g/dL 4.0     Results from last 7 days   Lab Units 12/07/23 1827   HSTROP T ng/L 10             Results from last 7 days   Lab Units 12/07/23  2205 12/07/23 2003   INR  1.69* 1.69*     Results from last 7 days   Lab Units 12/07/23 1827   TSH uIU/mL 1.370           Meds:   SCHEDULE  amLODIPine, 5 mg, Oral, Q24H  carvedilol, 3.125 mg, Oral, BID With Meals  furosemide, 20 mg, Oral, BID  guaiFENesin, 1,200 mg, Oral, Q12H  valsartan, 320 mg, Oral, Q24H   And  hydroCHLOROthiazide, 25 mg, Oral, Q24H  ipratropium-albuterol, 3 mL, Nebulization, 4x Daily - RT  lidocaine, 1 patch, Transdermal, Q24H  methylPREDNISolone sodium succinate, 60 mg, Intravenous, Q12H  pantoprazole, 40 mg, Oral, BID  roflumilast, 500 mcg, Oral, Nightly  sodium chloride, 10 mL, Intravenous, Q12H      Infusions  Pharmacy to dose warfarin,       PRNs    acetaminophen **OR** acetaminophen **OR** acetaminophen    calcium carbonate    Calcium Replacement - Follow Nurse / BPA Driven Protocol    HYDROcodone Bit-Homatrop MBr    HYDROcodone-acetaminophen    Magnesium Standard Dose Replacement - Follow Nurse / BPA Driven Protocol    melatonin    nitroglycerin    ondansetron **OR** ondansetron    Pharmacy to dose warfarin    Phosphorus Replacement - Follow Nurse / BPA Driven Protocol    Potassium Replacement - Follow Nurse / BPA Driven Protocol    senna-docusate sodium    [COMPLETED] Insert Peripheral IV **AND** sodium chloride    sodium chloride    sodium chloride    zolpidem    Physical Exam:  Physical Exam    ROS  Review of Systems          Total time spent with patient greater than: 45 Minutes

## 2023-12-09 NOTE — PLAN OF CARE
Goal Outcome Evaluation:  Plan of Care Reviewed With: patient        Progress: improving  Outcome Evaluation: No significant events overnight, continues on 2L NC and tolerating well. PRN cough syrup administered x2 this shift per pt request. Vitals stable throughout the shift. Pt has occasional shortness of breath with activity. Getting up to bedside commode independently without difficulty. Tolerating diet and seemed to rest well during the night. No further complaints voiced this AM.

## 2023-12-11 ENCOUNTER — READMISSION MANAGEMENT (OUTPATIENT)
Dept: CALL CENTER | Facility: HOSPITAL | Age: 68
End: 2023-12-11
Payer: MEDICAID

## 2023-12-11 NOTE — OUTREACH NOTE
COPD/PN Week 1 Survey      Flowsheet Row Responses   [facility] patient discharged from? [facility]   Does the patient have one of the following disease processes/diagnoses(primary or secondary)? COPD   Week 1 attempt successful? Yes   Call start time 1444   Call end time 1451   Discharge diagnosis COPD exacerbation   Meds reviewed with patient/caregiver? Yes   Is the patient having any side effects they believe may be caused by any medication additions or changes? No   Does the patient have all medications ordered at discharge? Yes   Is the patient taking all medications as directed (includes completed medication regime)? Yes   Does the patient have a primary care provider?  Yes   DME comments pt wears 2L O2 continuously, has home nebs and inhalers to use, pt reports   Pulse Ox monitoring Intermittent   Pulse Ox device source Patient   O2 Sat comments 2L 97%sitting,  RA 80's   O2 Sat: education provided Sat levels, Monitoring frequency, When to seek care   Comments Pt reports continued improvement since DC. SOA improved as well as her cough pt reports.Pt denies chest pain, fever or other issues at this time.   Patient reports what zone on this call? Yellow Zone   Yellow Zone Reports having a bad day or a COPD flare, Using quick relief inhaler/nebulizer more often   Week 1 call completed? Yes   Revoked No further contact(revokes)-requires comment   Call end time 1451            Jenise GUSMAN - Registered Nurse

## 2023-12-12 LAB
BACTERIA SPEC AEROBE CULT: NORMAL
BACTERIA SPEC AEROBE CULT: NORMAL

## 2023-12-14 RX ORDER — DOXYCYCLINE HYCLATE 100 MG/1
100 CAPSULE ORAL 2 TIMES DAILY
Qty: 10 CAPSULE | Refills: 0 | Status: CANCELLED | OUTPATIENT
Start: 2023-12-14 | End: 2023-12-19

## 2023-12-18 RX ORDER — DOXYCYCLINE HYCLATE 100 MG/1
100 CAPSULE ORAL 2 TIMES DAILY
Qty: 10 CAPSULE | Refills: 0 | Status: CANCELLED | OUTPATIENT
Start: 2023-12-14 | End: 2023-12-19

## 2023-12-20 RX ORDER — DOXYCYCLINE HYCLATE 100 MG/1
100 CAPSULE ORAL 2 TIMES DAILY
Qty: 10 CAPSULE | Refills: 0 | Status: CANCELLED | OUTPATIENT
Start: 2023-12-14 | End: 2023-12-19

## 2023-12-22 RX ORDER — DOXYCYCLINE HYCLATE 100 MG/1
100 CAPSULE ORAL 2 TIMES DAILY
Qty: 10 CAPSULE | Refills: 0 | Status: CANCELLED | OUTPATIENT
Start: 2023-12-14 | End: 2023-12-19

## 2023-12-26 RX ORDER — DOXYCYCLINE HYCLATE 100 MG/1
100 CAPSULE ORAL 2 TIMES DAILY
Qty: 10 CAPSULE | Refills: 0 | Status: CANCELLED | OUTPATIENT
Start: 2023-12-14 | End: 2023-12-19

## 2023-12-28 RX ORDER — DOXYCYCLINE HYCLATE 100 MG/1
100 CAPSULE ORAL 2 TIMES DAILY
Qty: 10 CAPSULE | Refills: 0 | Status: CANCELLED | OUTPATIENT
Start: 2023-12-14 | End: 2023-12-19

## 2023-12-31 ENCOUNTER — HOSPITAL ENCOUNTER (EMERGENCY)
Facility: HOSPITAL | Age: 68
Discharge: HOME | End: 2023-12-31
Attending: EMERGENCY MEDICINE | Admitting: EMERGENCY MEDICINE
Payer: MEDICARE

## 2023-12-31 VITALS
HEART RATE: 99 BPM | SYSTOLIC BLOOD PRESSURE: 162 MMHG | OXYGEN SATURATION: 94 % | RESPIRATION RATE: 20 BRPM | BODY MASS INDEX: 30.48 KG/M2 | TEMPERATURE: 97.9 F | DIASTOLIC BLOOD PRESSURE: 80 MMHG | HEIGHT: 63 IN | WEIGHT: 172 LBS

## 2023-12-31 DIAGNOSIS — J20.9 ACUTE BRONCHITIS, UNSPECIFIED ORGANISM: ICD-10-CM

## 2023-12-31 DIAGNOSIS — M54.50 ACUTE LEFT-SIDED LOW BACK PAIN WITHOUT SCIATICA: Primary | ICD-10-CM

## 2023-12-31 PROCEDURE — 99283 EMERGENCY DEPT VISIT LOW MDM: CPT

## 2023-12-31 PROCEDURE — 99282 EMERGENCY DEPT VISIT SF MDM: CPT

## 2023-12-31 RX ORDER — METHOCARBAMOL 750 MG/1
750 TABLET, FILM COATED ORAL 3 TIMES DAILY PRN
Qty: 20 TABLET | Refills: 0 | Status: SHIPPED | OUTPATIENT
Start: 2023-12-31

## 2023-12-31 RX ORDER — CEPHALEXIN 500 MG/1
500 CAPSULE ORAL 3 TIMES DAILY
Qty: 21 CAPSULE | Refills: 0 | Status: SHIPPED | OUTPATIENT
Start: 2023-12-31 | End: 2024-01-07

## 2023-12-31 NOTE — ED PROVIDER NOTES
Subjective   History of Present Illness  Chief complaint: Back pain    68-year-old female presents with low back pain.  Patient states pain is in the left lower back going into her hip.  She denies any injury.  She states she has had this back pain for quite some time.  She was seen here little over a month ago and was given Robaxin.  She states the Robaxin helped significantly but she is out.  She has an appointment coming up with her primary doctor next week.  She denies any bowel or bladder dysfunction.  She denies any saddle anesthesia.  She states the pain does not go into her left leg.  She also reports some increased cough and congestion.  She has a history of COPD and is on home oxygen.  She denies fever.    History provided by:  Patient      Review of Systems   Constitutional:  Negative for fever.   HENT:  Positive for congestion.    Respiratory:  Positive for cough. Negative for shortness of breath.    Cardiovascular:  Negative for chest pain.   Gastrointestinal:  Negative for abdominal pain and vomiting.   Genitourinary:  Negative for difficulty urinating.   Musculoskeletal:  Positive for back pain.   Neurological:  Negative for weakness and numbness.       Past Medical History:   Diagnosis Date    Arthritis     Bladder cancer     Chronic back pain     Closed nondisplaced fracture of head of right radius 03/2017    Congenital deformity of right hip joint 1956    COPD (chronic obstructive pulmonary disease)     former smoker    Deep venous thrombosis     unprovoked    Depressive disorder     Disease of thyroid gland     Diverticulitis of colon     Essential hypertension     Gastroesophageal reflux disease     Insomnia     Obesity     Pulmonary embolism     provoked by surgery       No Known Allergies    Past Surgical History:   Procedure Laterality Date    ABDOMINAL SURGERY      BRONCHOSCOPY N/A 11/25/2020    Procedure: BRONCHOSCOPY with bronchial washing;  Surgeon: Win Johnston MD;  Location:   [facility];  Service: Pulmonary;  Laterality: N/A;  Post: mucous plugs    BRONCHOSCOPY N/A 2022    Procedure: BRONCHOSCOPY with bronchial washing;  Surgeon: Win Johnston MD;  Location: [facility] ENDOSCOPY;  Service: Pulmonary;  Laterality: N/A;  post op: pneumonia    BRONCHOSCOPY N/A 2023    Procedure: BRONCHOSCOPY with bilateral wash;  Surgeon: Win Johnston MD;  Location: [facility] ENDOSCOPY;  Service: Pulmonary;  Laterality: N/A;  mucous plugs    CHOLECYSTECTOMY      COLON SURGERY      COLONOSCOPY      COLOSTOMY CLOSURE N/A 2021    Procedure: COLOSTOMY TAKEDOWN OR CLOSURE, extenisve lysis of adhesions;  Surgeon: Chi Farmer MD;  Location: [facility] MAIN OR;  Service: General;  Laterality: N/A;    CYSTOSCOPY N/A 2021    Procedure: CYSTOSCOPY with bladder tumor removal and fulgeration, insertion of 3-way rizzo catheter;  Surgeon: Alfonzo Hayward MD;  Location: [facility] MAIN OR;  Service: Urology;  Laterality: N/A;    ENDOSCOPY      EXPLORATORY LAPAROTOMY N/A 2020    Procedure: LAPAROTOMY EXPLORATORY HARTMANS;  Surgeon: Chi Farmer MD;  Location: [facility] MAIN OR;  Service: General;  Laterality: N/A;    HYSTERECTOMY      TOTAL HIP ARTHROPLASTY Right     TOTAL HIP ARTHROPLASTY REVISION Right     x3       Family History   Problem Relation Age of Onset    No Known Problems Mother     No Known Problems Father        Social History     Socioeconomic History    Marital status:    Tobacco Use    Smoking status: Former     Packs/day: 2.00     Years: 40.00     Additional pack years: 0.00     Total pack years: 80.00     Types: Cigarettes     Quit date:      Years since quittin.0    Smokeless tobacco: Never    Tobacco comments:     ELECTRONIC   Vaping Use    Vaping Use: Former    Substances: Nicotine, Flavoring   Substance and Sexual Activity    Alcohol use: Never    Drug use: Never    Sexual activity: Defer       /80 (BP Location: Left arm, Patient  "Position: Sitting)   Pulse 99   Temp 97.9 °F (36.6 °C) (Oral)   Resp 20   Ht 160 cm (63\")   Wt 78 kg (172 lb)   SpO2 94%   BMI 30.47 kg/m²       Objective   Physical Exam  Vitals and nursing note reviewed.   Constitutional:       Appearance: Normal appearance.   HENT:      Head: Normocephalic and atraumatic.      Mouth/Throat:      Mouth: Mucous membranes are moist.   Cardiovascular:      Rate and Rhythm: Normal rate and regular rhythm.      Heart sounds: Normal heart sounds.   Pulmonary:      Effort: Pulmonary effort is normal. No respiratory distress.      Breath sounds: Rhonchi present.   Abdominal:      Palpations: Abdomen is soft.      Tenderness: There is no abdominal tenderness.   Musculoskeletal:         General: No deformity or signs of injury.      Comments: Tender palpation to the left lower back in the area of the left SI joint.  There is no visible injury or swelling.   Skin:     General: Skin is warm and dry.   Neurological:      Mental Status: She is alert and oriented to person, place, and time.         Procedures           ED Course                                             Medical Decision Making    Patient is well-appearing on exam.  She has no evidence of cauda equina syndrome.  She is already taking pain medicine.  She will be given a prescription for Robaxin.  She will also be given a prescription for keflex for her increased cough and congestion with her history of COPD.  She is oxygenating well on her normal nasal cannula.  She will follow-up with her primary doctor.      Final diagnoses:   Acute left-sided low back pain without sciatica   Acute bronchitis, unspecified organism       ED Disposition  ED Disposition       ED Disposition   Discharge    Condition   Stable    Comment   --               Michael Gerardo MD  [address] [place] [address]  [phone]    Call in 2 days           Medication List        New Prescriptions      cephalexin 500 MG " capsule  Commonly known as: KEFLEX  Take 1 capsule by mouth 3 (Three) Times a Day for 7 days.     methocarbamol 750 MG tablet  Commonly known as: ROBAXIN  Take 1 tablet by mouth 3 (Three) Times a Day As Needed for Muscle Spasms.               Where to Get Your Medications        These medications were sent to [id number] - [place], [place] - [address] AT [facility] - [phone]  - [phone] FX  [address] [place] [address]      Phone: [phone]   cephalexin 500 MG capsule  methocarbamol 750 MG tablet            Aries Soto MD  12/31/23 6267

## 2024-01-01 RX ORDER — DOXYCYCLINE HYCLATE 100 MG/1
100 CAPSULE ORAL 2 TIMES DAILY
Qty: 10 CAPSULE | Refills: 0 | Status: CANCELLED | OUTPATIENT
Start: 2023-12-14 | End: 2023-12-19

## 2024-01-03 RX ORDER — DOXYCYCLINE HYCLATE 100 MG/1
100 CAPSULE ORAL 2 TIMES DAILY
Qty: 10 CAPSULE | Refills: 0 | Status: CANCELLED | OUTPATIENT
Start: 2023-12-14 | End: 2023-12-19

## 2024-01-08 RX ORDER — DOXYCYCLINE HYCLATE 100 MG/1
100 CAPSULE ORAL 2 TIMES DAILY
Qty: 10 CAPSULE | Refills: 0 | Status: CANCELLED | OUTPATIENT
Start: 2023-12-14 | End: 2023-12-19

## 2024-01-10 RX ORDER — DOXYCYCLINE HYCLATE 100 MG/1
100 CAPSULE ORAL 2 TIMES DAILY
Qty: 10 CAPSULE | Refills: 0 | Status: CANCELLED | OUTPATIENT
Start: 2023-12-14 | End: 2023-12-19

## 2024-01-12 RX ORDER — DOXYCYCLINE HYCLATE 100 MG/1
100 CAPSULE ORAL 2 TIMES DAILY
Qty: 10 CAPSULE | Refills: 0 | Status: CANCELLED | OUTPATIENT
Start: 2023-12-14 | End: 2023-12-19

## 2024-01-17 ENCOUNTER — TELEPHONE (OUTPATIENT)
Dept: CARDIAC REHAB | Facility: HOSPITAL | Age: 69
End: 2024-01-17
Payer: MEDICAID

## 2024-01-17 NOTE — TELEPHONE ENCOUNTER
Karina:  Co-pay: 15  OOP: 3900  Once OOP met covered @ 100%  36v w/addt'l 36v based on medical necessity lifetime  Ref #: [id number]    Traditional Medicaid - only covers Part B Medicare monthly premium fee so it doesn't come out of Social Security.  Nothing to assist with IA.  Ref#: [id number]    ~amauri

## 2024-01-22 ENCOUNTER — TRANSCRIBE ORDERS (OUTPATIENT)
Dept: CARDIAC REHAB | Facility: HOSPITAL | Age: 69
End: 2024-01-22
Payer: MEDICARE

## 2024-01-22 DIAGNOSIS — J43.9 PULMONARY EMPHYSEMA, UNSPECIFIED EMPHYSEMA TYPE: Primary | ICD-10-CM

## 2024-01-26 ENCOUNTER — TELEPHONE (OUTPATIENT)
Dept: CARDIAC REHAB | Facility: HOSPITAL | Age: 69
End: 2024-01-26
Payer: MEDICARE

## 2024-01-26 NOTE — TELEPHONE ENCOUNTER
Called and spoke w/patient to remind her of her initial pulmonary rehab appt on 1/30 @1p    Sowmya Mansfield, RRT

## 2024-01-30 ENCOUNTER — OFFICE VISIT (OUTPATIENT)
Dept: CARDIAC REHAB | Facility: HOSPITAL | Age: 69
End: 2024-01-30
Payer: MEDICARE

## 2024-01-30 DIAGNOSIS — J43.9 PULMONARY EMPHYSEMA, UNSPECIFIED EMPHYSEMA TYPE: Primary | Chronic | ICD-10-CM

## 2024-01-30 PROCEDURE — 94626 PHY/QHP OP PULM RHB W/MNTR: CPT

## 2024-01-31 ENCOUNTER — TRANSCRIBE ORDERS (OUTPATIENT)
Dept: ADMINISTRATIVE | Facility: HOSPITAL | Age: 69
End: 2024-01-31
Payer: MEDICARE

## 2024-01-31 ENCOUNTER — APPOINTMENT (OUTPATIENT)
Dept: CARDIAC REHAB | Facility: HOSPITAL | Age: 69
End: 2024-01-31
Payer: MEDICARE

## 2024-01-31 DIAGNOSIS — M81.0 OSTEOPOROSIS, POST-MENOPAUSAL: Primary | ICD-10-CM

## 2024-02-05 ENCOUNTER — APPOINTMENT (OUTPATIENT)
Dept: CARDIAC REHAB | Facility: HOSPITAL | Age: 69
End: 2024-02-05
Payer: MEDICARE

## 2024-02-05 ENCOUNTER — TREATMENT (OUTPATIENT)
Dept: CARDIAC REHAB | Facility: HOSPITAL | Age: 69
End: 2024-02-05
Payer: MEDICARE

## 2024-02-05 DIAGNOSIS — J43.9 PULMONARY EMPHYSEMA, UNSPECIFIED EMPHYSEMA TYPE: Primary | ICD-10-CM

## 2024-02-05 PROCEDURE — 94625 PHY/QHP OP PULM RHB W/O MNTR: CPT

## 2024-02-07 ENCOUNTER — TREATMENT (OUTPATIENT)
Dept: CARDIAC REHAB | Facility: HOSPITAL | Age: 69
End: 2024-02-07
Payer: MEDICARE

## 2024-02-07 DIAGNOSIS — J43.9 PULMONARY EMPHYSEMA, UNSPECIFIED EMPHYSEMA TYPE: Primary | ICD-10-CM

## 2024-02-07 PROCEDURE — 94625 PHY/QHP OP PULM RHB W/O MNTR: CPT

## 2024-02-09 ENCOUNTER — HOSPITAL ENCOUNTER (OUTPATIENT)
Dept: BONE DENSITY | Facility: HOSPITAL | Age: 69
Discharge: HOME | End: 2024-02-09
Payer: MEDICARE

## 2024-02-09 DIAGNOSIS — M81.0 OSTEOPOROSIS, POST-MENOPAUSAL: ICD-10-CM

## 2024-02-09 PROCEDURE — 77080 DXA BONE DENSITY AXIAL: CPT

## 2024-02-10 ENCOUNTER — HOSPITAL ENCOUNTER (INPATIENT)
Facility: HOSPITAL | Age: 69
LOS: 1 days | Discharge: HOME | DRG: 194 | End: 2024-02-12
Attending: EMERGENCY MEDICINE
Payer: MEDICARE

## 2024-02-10 ENCOUNTER — APPOINTMENT (OUTPATIENT)
Dept: CT IMAGING | Facility: HOSPITAL | Age: 69
DRG: 194 | End: 2024-02-10
Payer: MEDICARE

## 2024-02-10 ENCOUNTER — APPOINTMENT (OUTPATIENT)
Dept: GENERAL RADIOLOGY | Facility: HOSPITAL | Age: 69
DRG: 194 | End: 2024-02-10
Payer: MEDICARE

## 2024-02-10 DIAGNOSIS — S32.020G CLOSED COMPRESSION FRACTURE OF L2 LUMBAR VERTEBRA WITH DELAYED HEALING, SUBSEQUENT ENCOUNTER: ICD-10-CM

## 2024-02-10 DIAGNOSIS — J98.11 ATELECTASIS: ICD-10-CM

## 2024-02-10 DIAGNOSIS — S22.080A COMPRESSION FRACTURE OF T12 VERTEBRA, INITIAL ENCOUNTER: Primary | ICD-10-CM

## 2024-02-10 DIAGNOSIS — K83.8 DILATION OF BILIARY TRACT: ICD-10-CM

## 2024-02-10 DIAGNOSIS — K44.9 HIATAL HERNIA: ICD-10-CM

## 2024-02-10 DIAGNOSIS — J18.9 PNEUMONIA OF RIGHT LOWER LOBE DUE TO INFECTIOUS ORGANISM: ICD-10-CM

## 2024-02-10 DIAGNOSIS — S32.010A CLOSED COMPRESSION FRACTURE OF BODY OF L1 VERTEBRA: ICD-10-CM

## 2024-02-10 PROBLEM — S32.000A LUMBAR COMPRESSION FRACTURE, CLOSED, INITIAL ENCOUNTER: Status: ACTIVE | Noted: 2024-02-10

## 2024-02-10 LAB
ALBUMIN SERPL-MCNC: 4.5 G/DL (ref 3.5–5.2)
ALBUMIN/GLOB SERPL: 1.7 G/DL
ALP SERPL-CCNC: 144 U/L (ref 39–117)
ALT SERPL W P-5'-P-CCNC: 55 U/L (ref 1–33)
ANION GAP SERPL CALCULATED.3IONS-SCNC: 13 MMOL/L (ref 5–15)
ANISOCYTOSIS BLD QL: ABNORMAL
AST SERPL-CCNC: 30 U/L (ref 1–32)
B PARAPERT DNA SPEC QL NAA+PROBE: NOT DETECTED
B PERT DNA SPEC QL NAA+PROBE: NOT DETECTED
BACTERIA UR QL AUTO: NORMAL /HPF
BILIRUB SERPL-MCNC: 0.2 MG/DL (ref 0–1.2)
BILIRUB UR QL STRIP: NEGATIVE
BUN SERPL-MCNC: 29 MG/DL (ref 8–23)
BUN/CREAT SERPL: 33 (ref 7–25)
C PNEUM DNA NPH QL NAA+NON-PROBE: NOT DETECTED
CALCIUM SPEC-SCNC: 9.6 MG/DL (ref 8.6–10.5)
CHLORIDE SERPL-SCNC: 96 MMOL/L (ref 98–107)
CLARITY UR: CLEAR
CO2 SERPL-SCNC: 26 MMOL/L (ref 22–29)
COLOR UR: YELLOW
CREAT SERPL-MCNC: 0.88 MG/DL (ref 0.57–1)
DEPRECATED RDW RBC AUTO: 51.2 FL (ref 37–54)
EGFRCR SERPLBLD CKD-EPI 2021: 72.1 ML/MIN/1.73
EOSINOPHIL # BLD MANUAL: 0.12 10*3/MM3 (ref 0–0.4)
EOSINOPHIL NFR BLD MANUAL: 1 % (ref 0.3–6.2)
ERYTHROCYTE [DISTWIDTH] IN BLOOD BY AUTOMATED COUNT: 16.9 % (ref 12.3–15.4)
FLUAV SUBTYP SPEC NAA+PROBE: NOT DETECTED
FLUBV RNA ISLT QL NAA+PROBE: NOT DETECTED
GLOBULIN UR ELPH-MCNC: 2.6 GM/DL
GLUCOSE SERPL-MCNC: 122 MG/DL (ref 65–99)
GLUCOSE UR STRIP-MCNC: NEGATIVE MG/DL
HADV DNA SPEC NAA+PROBE: NOT DETECTED
HCOV 229E RNA SPEC QL NAA+PROBE: NOT DETECTED
HCOV HKU1 RNA SPEC QL NAA+PROBE: NOT DETECTED
HCOV NL63 RNA SPEC QL NAA+PROBE: NOT DETECTED
HCOV OC43 RNA SPEC QL NAA+PROBE: NOT DETECTED
HCT VFR BLD AUTO: 39.9 % (ref 34–46.6)
HGB BLD-MCNC: 12.8 G/DL (ref 12–15.9)
HGB UR QL STRIP.AUTO: NEGATIVE
HMPV RNA NPH QL NAA+NON-PROBE: NOT DETECTED
HPIV1 RNA ISLT QL NAA+PROBE: NOT DETECTED
HPIV2 RNA SPEC QL NAA+PROBE: NOT DETECTED
HPIV3 RNA NPH QL NAA+PROBE: NOT DETECTED
HPIV4 P GENE NPH QL NAA+PROBE: NOT DETECTED
HYALINE CASTS UR QL AUTO: NORMAL /LPF
INR PPP: 2.61 (ref 2–3)
KETONES UR QL STRIP: NEGATIVE
L PNEUMO1 AG UR QL IA: NEGATIVE
LEUKOCYTE ESTERASE UR QL STRIP.AUTO: ABNORMAL
LYMPHOCYTES # BLD MANUAL: 1.4 10*3/MM3 (ref 0.7–3.1)
LYMPHOCYTES NFR BLD MANUAL: 8 % (ref 5–12)
M PNEUMO IGG SER IA-ACNC: NOT DETECTED
MCH RBC QN AUTO: 28.2 PG (ref 26.6–33)
MCHC RBC AUTO-ENTMCNC: 32.1 G/DL (ref 31.5–35.7)
MCV RBC AUTO: 87.8 FL (ref 79–97)
MONOCYTES # BLD: 0.94 10*3/MM3 (ref 0.1–0.9)
NEUTROPHILS # BLD AUTO: 9.24 10*3/MM3 (ref 1.7–7)
NEUTROPHILS NFR BLD MANUAL: 76 % (ref 42.7–76)
NEUTS BAND NFR BLD MANUAL: 3 % (ref 0–5)
NITRITE UR QL STRIP: NEGATIVE
PH UR STRIP.AUTO: 5.5 [PH] (ref 5–8)
PLAT MORPH BLD: NORMAL
PLATELET # BLD AUTO: 349 10*3/MM3 (ref 140–450)
PMV BLD AUTO: 7.7 FL (ref 6–12)
POTASSIUM SERPL-SCNC: 3.7 MMOL/L (ref 3.5–5.2)
PROT SERPL-MCNC: 7.1 G/DL (ref 6–8.5)
PROT UR QL STRIP: NEGATIVE
PROTHROMBIN TIME: 26.5 SECONDS (ref 19.4–28.5)
RBC # BLD AUTO: 4.54 10*6/MM3 (ref 3.77–5.28)
RBC # UR STRIP: NORMAL /HPF
REF LAB TEST METHOD: NORMAL
RHINOVIRUS RNA SPEC NAA+PROBE: NOT DETECTED
RSV RNA NPH QL NAA+NON-PROBE: NOT DETECTED
S PNEUM AG SPEC QL LA: NEGATIVE
SARS-COV-2 RNA NPH QL NAA+NON-PROBE: NOT DETECTED
SCAN SLIDE: NORMAL
SODIUM SERPL-SCNC: 135 MMOL/L (ref 136–145)
SP GR UR STRIP: 1.02 (ref 1–1.03)
SQUAMOUS #/AREA URNS HPF: NORMAL /HPF
T4 FREE SERPL-MCNC: 1.2 NG/DL (ref 0.93–1.7)
TOXIC GRANULATION: ABNORMAL
TSH SERPL DL<=0.05 MIU/L-ACNC: 3.2 UIU/ML (ref 0.27–4.2)
UROBILINOGEN UR QL STRIP: ABNORMAL
VARIANT LYMPHS NFR BLD MANUAL: 10 % (ref 19.6–45.3)
VARIANT LYMPHS NFR BLD MANUAL: 2 % (ref 0–5)
WBC # UR STRIP: NORMAL /HPF
WBC NRBC COR # BLD AUTO: 11.7 10*3/MM3 (ref 3.4–10.8)

## 2024-02-10 PROCEDURE — 85610 PROTHROMBIN TIME: CPT | Performed by: EMERGENCY MEDICINE

## 2024-02-10 PROCEDURE — 85007 BL SMEAR W/DIFF WBC COUNT: CPT | Performed by: EMERGENCY MEDICINE

## 2024-02-10 PROCEDURE — 71045 X-RAY EXAM CHEST 1 VIEW: CPT

## 2024-02-10 PROCEDURE — 85610 PROTHROMBIN TIME: CPT | Performed by: STUDENT IN AN ORGANIZED HEALTH CARE EDUCATION/TRAINING PROGRAM

## 2024-02-10 PROCEDURE — 82977 ASSAY OF GGT: CPT | Performed by: STUDENT IN AN ORGANIZED HEALTH CARE EDUCATION/TRAINING PROGRAM

## 2024-02-10 PROCEDURE — 80053 COMPREHEN METABOLIC PANEL: CPT | Performed by: EMERGENCY MEDICINE

## 2024-02-10 PROCEDURE — 80053 COMPREHEN METABOLIC PANEL: CPT | Performed by: STUDENT IN AN ORGANIZED HEALTH CARE EDUCATION/TRAINING PROGRAM

## 2024-02-10 PROCEDURE — 0202U NFCT DS 22 TRGT SARS-COV-2: CPT | Performed by: STUDENT IN AN ORGANIZED HEALTH CARE EDUCATION/TRAINING PROGRAM

## 2024-02-10 PROCEDURE — 84443 ASSAY THYROID STIM HORMONE: CPT | Performed by: STUDENT IN AN ORGANIZED HEALTH CARE EDUCATION/TRAINING PROGRAM

## 2024-02-10 PROCEDURE — 36415 COLL VENOUS BLD VENIPUNCTURE: CPT | Performed by: STUDENT IN AN ORGANIZED HEALTH CARE EDUCATION/TRAINING PROGRAM

## 2024-02-10 PROCEDURE — 94799 UNLISTED PULMONARY SVC/PX: CPT

## 2024-02-10 PROCEDURE — 87449 NOS EACH ORGANISM AG IA: CPT | Performed by: STUDENT IN AN ORGANIZED HEALTH CARE EDUCATION/TRAINING PROGRAM

## 2024-02-10 PROCEDURE — 85025 COMPLETE CBC W/AUTO DIFF WBC: CPT | Performed by: STUDENT IN AN ORGANIZED HEALTH CARE EDUCATION/TRAINING PROGRAM

## 2024-02-10 PROCEDURE — 74176 CT ABD & PELVIS W/O CONTRAST: CPT

## 2024-02-10 PROCEDURE — G0378 HOSPITAL OBSERVATION PER HR: HCPCS

## 2024-02-10 PROCEDURE — 87040 BLOOD CULTURE FOR BACTERIA: CPT | Performed by: STUDENT IN AN ORGANIZED HEALTH CARE EDUCATION/TRAINING PROGRAM

## 2024-02-10 PROCEDURE — 25010000002 CEFTRIAXONE PER 250 MG: Performed by: STUDENT IN AN ORGANIZED HEALTH CARE EDUCATION/TRAINING PROGRAM

## 2024-02-10 PROCEDURE — 81001 URINALYSIS AUTO W/SCOPE: CPT | Performed by: STUDENT IN AN ORGANIZED HEALTH CARE EDUCATION/TRAINING PROGRAM

## 2024-02-10 PROCEDURE — 85025 COMPLETE CBC W/AUTO DIFF WBC: CPT | Performed by: EMERGENCY MEDICINE

## 2024-02-10 PROCEDURE — 84439 ASSAY OF FREE THYROXINE: CPT | Performed by: STUDENT IN AN ORGANIZED HEALTH CARE EDUCATION/TRAINING PROGRAM

## 2024-02-10 PROCEDURE — 99285 EMERGENCY DEPT VISIT HI MDM: CPT

## 2024-02-10 PROCEDURE — 25010000002 ONDANSETRON PER 1 MG: Performed by: EMERGENCY MEDICINE

## 2024-02-10 PROCEDURE — 25010000002 HYDROMORPHONE 1 MG/ML SOLUTION: Performed by: EMERGENCY MEDICINE

## 2024-02-10 PROCEDURE — 25810000003 LACTATED RINGERS SOLUTION: Performed by: EMERGENCY MEDICINE

## 2024-02-10 PROCEDURE — 25810000003 SODIUM CHLORIDE 0.9 % SOLUTION: Performed by: STUDENT IN AN ORGANIZED HEALTH CARE EDUCATION/TRAINING PROGRAM

## 2024-02-10 RX ORDER — ONDANSETRON 2 MG/ML
4 INJECTION INTRAMUSCULAR; INTRAVENOUS EVERY 6 HOURS PRN
Status: DISCONTINUED | OUTPATIENT
Start: 2024-02-10 | End: 2024-02-12 | Stop reason: HOSPADM

## 2024-02-10 RX ORDER — IPRATROPIUM BROMIDE AND ALBUTEROL SULFATE 2.5; .5 MG/3ML; MG/3ML
3 SOLUTION RESPIRATORY (INHALATION) ONCE
Status: DISCONTINUED | OUTPATIENT
Start: 2024-02-10 | End: 2024-02-10

## 2024-02-10 RX ORDER — VALSARTAN 80 MG/1
320 TABLET ORAL
Status: DISCONTINUED | OUTPATIENT
Start: 2024-02-10 | End: 2024-02-12 | Stop reason: HOSPADM

## 2024-02-10 RX ORDER — ACETAMINOPHEN 325 MG/1
650 TABLET ORAL EVERY 6 HOURS PRN
Status: DISCONTINUED | OUTPATIENT
Start: 2024-02-10 | End: 2024-02-12 | Stop reason: HOSPADM

## 2024-02-10 RX ORDER — ACETYLCYSTEINE 200 MG/ML
3 SOLUTION ORAL; RESPIRATORY (INHALATION)
Status: DISCONTINUED | OUTPATIENT
Start: 2024-02-10 | End: 2024-02-12 | Stop reason: HOSPADM

## 2024-02-10 RX ORDER — CYCLOBENZAPRINE HCL 10 MG
10 TABLET ORAL 3 TIMES DAILY PRN
Status: DISCONTINUED | OUTPATIENT
Start: 2024-02-10 | End: 2024-02-12 | Stop reason: HOSPADM

## 2024-02-10 RX ORDER — ONDANSETRON 4 MG/1
4 TABLET, ORALLY DISINTEGRATING ORAL EVERY 6 HOURS PRN
Status: DISCONTINUED | OUTPATIENT
Start: 2024-02-10 | End: 2024-02-12 | Stop reason: HOSPADM

## 2024-02-10 RX ORDER — SODIUM CHLORIDE 0.9 % (FLUSH) 0.9 %
10 SYRINGE (ML) INJECTION AS NEEDED
Status: DISCONTINUED | OUTPATIENT
Start: 2024-02-10 | End: 2024-02-12 | Stop reason: HOSPADM

## 2024-02-10 RX ORDER — IPRATROPIUM BROMIDE AND ALBUTEROL SULFATE 2.5; .5 MG/3ML; MG/3ML
3 SOLUTION RESPIRATORY (INHALATION)
Status: DISCONTINUED | OUTPATIENT
Start: 2024-02-10 | End: 2024-02-12 | Stop reason: HOSPADM

## 2024-02-10 RX ORDER — ROFLUMILAST 500 UG/1
500 TABLET ORAL NIGHTLY
Status: DISCONTINUED | OUTPATIENT
Start: 2024-02-10 | End: 2024-02-12 | Stop reason: HOSPADM

## 2024-02-10 RX ORDER — SODIUM CHLORIDE 0.9 % (FLUSH) 0.9 %
10 SYRINGE (ML) INJECTION EVERY 12 HOURS SCHEDULED
Status: DISCONTINUED | OUTPATIENT
Start: 2024-02-10 | End: 2024-02-12 | Stop reason: HOSPADM

## 2024-02-10 RX ORDER — SODIUM CHLORIDE 9 MG/ML
40 INJECTION, SOLUTION INTRAVENOUS AS NEEDED
Status: DISCONTINUED | OUTPATIENT
Start: 2024-02-10 | End: 2024-02-12 | Stop reason: HOSPADM

## 2024-02-10 RX ORDER — IPRATROPIUM BROMIDE AND ALBUTEROL SULFATE 2.5; .5 MG/3ML; MG/3ML
3 SOLUTION RESPIRATORY (INHALATION) EVERY 4 HOURS PRN
Status: DISCONTINUED | OUTPATIENT
Start: 2024-02-10 | End: 2024-02-12 | Stop reason: HOSPADM

## 2024-02-10 RX ORDER — BUDESONIDE AND FORMOTEROL FUMARATE DIHYDRATE 160; 4.5 UG/1; UG/1
2 AEROSOL RESPIRATORY (INHALATION)
Status: DISCONTINUED | OUTPATIENT
Start: 2024-02-10 | End: 2024-02-10

## 2024-02-10 RX ORDER — HYDROCODONE BITARTRATE AND ACETAMINOPHEN 10; 325 MG/1; MG/1
1 TABLET ORAL EVERY 6 HOURS PRN
Status: DISCONTINUED | OUTPATIENT
Start: 2024-02-10 | End: 2024-02-12 | Stop reason: HOSPADM

## 2024-02-10 RX ORDER — BUDESONIDE 0.5 MG/2ML
0.5 INHALANT ORAL
Status: DISCONTINUED | OUTPATIENT
Start: 2024-02-10 | End: 2024-02-12 | Stop reason: HOSPADM

## 2024-02-10 RX ORDER — HYDROCHLOROTHIAZIDE 12.5 MG/1
12.5 TABLET ORAL
Status: DISCONTINUED | OUTPATIENT
Start: 2024-02-10 | End: 2024-02-12 | Stop reason: HOSPADM

## 2024-02-10 RX ORDER — SODIUM CHLORIDE 9 MG/ML
100 INJECTION, SOLUTION INTRAVENOUS CONTINUOUS
Status: DISCONTINUED | OUTPATIENT
Start: 2024-02-10 | End: 2024-02-10

## 2024-02-10 RX ORDER — PANTOPRAZOLE SODIUM 40 MG/1
40 TABLET, DELAYED RELEASE ORAL
Status: DISCONTINUED | OUTPATIENT
Start: 2024-02-10 | End: 2024-02-12 | Stop reason: HOSPADM

## 2024-02-10 RX ORDER — NITROGLYCERIN 0.4 MG/1
0.4 TABLET SUBLINGUAL
Status: DISCONTINUED | OUTPATIENT
Start: 2024-02-10 | End: 2024-02-12 | Stop reason: HOSPADM

## 2024-02-10 RX ORDER — ONDANSETRON 2 MG/ML
4 INJECTION INTRAMUSCULAR; INTRAVENOUS ONCE
Status: COMPLETED | OUTPATIENT
Start: 2024-02-10 | End: 2024-02-10

## 2024-02-10 RX ORDER — WARFARIN SODIUM 3 MG/1
3 TABLET ORAL
Status: DISCONTINUED | OUTPATIENT
Start: 2024-02-10 | End: 2024-02-11

## 2024-02-10 RX ORDER — CARVEDILOL 3.12 MG/1
3.12 TABLET ORAL 2 TIMES DAILY WITH MEALS
Status: DISCONTINUED | OUTPATIENT
Start: 2024-02-10 | End: 2024-02-12 | Stop reason: HOSPADM

## 2024-02-10 RX ADMIN — CEFTRIAXONE 2000 MG: 2 INJECTION, POWDER, FOR SOLUTION INTRAMUSCULAR; INTRAVENOUS at 06:46

## 2024-02-10 RX ADMIN — SODIUM CHLORIDE 100 ML/HR: 9 INJECTION, SOLUTION INTRAVENOUS at 11:40

## 2024-02-10 RX ADMIN — ACETYLCYSTEINE 3 ML: 200 SOLUTION ORAL; RESPIRATORY (INHALATION) at 19:04

## 2024-02-10 RX ADMIN — SODIUM CHLORIDE, POTASSIUM CHLORIDE, SODIUM LACTATE AND CALCIUM CHLORIDE 500 ML: 600; 310; 30; 20 INJECTION, SOLUTION INTRAVENOUS at 03:34

## 2024-02-10 RX ADMIN — CYCLOBENZAPRINE 10 MG: 10 TABLET, FILM COATED ORAL at 09:08

## 2024-02-10 RX ADMIN — DOXYCYCLINE 100 MG: 100 INJECTION, POWDER, LYOPHILIZED, FOR SOLUTION INTRAVENOUS at 20:17

## 2024-02-10 RX ADMIN — IPRATROPIUM BROMIDE AND ALBUTEROL SULFATE 3 ML: .5; 3 SOLUTION RESPIRATORY (INHALATION) at 19:03

## 2024-02-10 RX ADMIN — IPRATROPIUM BROMIDE AND ALBUTEROL SULFATE 3 ML: .5; 3 SOLUTION RESPIRATORY (INHALATION) at 10:50

## 2024-02-10 RX ADMIN — HYDROCODONE BITARTRATE AND ACETAMINOPHEN 1 TABLET: 10; 325 TABLET ORAL at 21:13

## 2024-02-10 RX ADMIN — ONDANSETRON 4 MG: 2 INJECTION INTRAMUSCULAR; INTRAVENOUS at 03:35

## 2024-02-10 RX ADMIN — PANTOPRAZOLE SODIUM 40 MG: 40 TABLET, DELAYED RELEASE ORAL at 18:04

## 2024-02-10 RX ADMIN — CYCLOBENZAPRINE 10 MG: 10 TABLET, FILM COATED ORAL at 18:04

## 2024-02-10 RX ADMIN — ROFLUMILAST 500 MCG: 500 TABLET ORAL at 20:18

## 2024-02-10 RX ADMIN — CARVEDILOL 3.12 MG: 3.12 TABLET, FILM COATED ORAL at 18:04

## 2024-02-10 RX ADMIN — HYDROCODONE BITARTRATE AND ACETAMINOPHEN 1 TABLET: 10; 325 TABLET ORAL at 09:08

## 2024-02-10 RX ADMIN — BUDESONIDE AND FORMOTEROL FUMARATE DIHYDRATE 2 PUFF: 160; 4.5 AEROSOL RESPIRATORY (INHALATION) at 10:48

## 2024-02-10 RX ADMIN — BUDESONIDE 0.5 MG: 0.5 INHALANT RESPIRATORY (INHALATION) at 19:04

## 2024-02-10 RX ADMIN — IPRATROPIUM BROMIDE AND ALBUTEROL SULFATE 3 ML: .5; 3 SOLUTION RESPIRATORY (INHALATION) at 15:25

## 2024-02-10 RX ADMIN — HYDROCODONE BITARTRATE AND ACETAMINOPHEN 1 TABLET: 10; 325 TABLET ORAL at 15:02

## 2024-02-10 RX ADMIN — Medication 10 ML: at 20:18

## 2024-02-10 RX ADMIN — HYDROMORPHONE HYDROCHLORIDE 0.5 MG: 1 INJECTION, SOLUTION INTRAMUSCULAR; INTRAVENOUS; SUBCUTANEOUS at 03:35

## 2024-02-10 RX ADMIN — PANTOPRAZOLE SODIUM 40 MG: 40 TABLET, DELAYED RELEASE ORAL at 06:46

## 2024-02-10 RX ADMIN — DOXYCYCLINE 100 MG: 100 INJECTION, POWDER, LYOPHILIZED, FOR SOLUTION INTRAVENOUS at 06:46

## 2024-02-10 RX ADMIN — HYDROCHLOROTHIAZIDE 12.5 MG: 12.5 TABLET ORAL at 15:08

## 2024-02-10 NOTE — PROGRESS NOTES
[facility] MEDICINE SERVICE  DAILY PROGRESS NOTE    NAME: Renuka Pelayo  : 1955  MRN: [id number]      LOS: 0 days     PROVIDER OF SERVICE: Daniel Wagoner MD    Chief Complaint: Lumbar compression fracture, closed, initial encounter  Renuka Pelayo is a 68 y.o. female who presented to [facility] for worsening back pain. Of note, states she had a kyphoplasty on 24.  Admits to 6-7/10 worsening lower back pain over the past week that is worse with movement, believes she may have sat down hard but no overt trauama.  Also admits to worsening cough complicated by right flank pain with deep breaths   Subjective:     Interval History:  History taken from: patient  Patient Complaints: Still having cough back pain improving  Patient Denies: Weakness of upper or lower extremities    Review of Systems:   Review of Systems  10 point review of system unremarkable except mentioned above  Objective:     Vital Signs  Temp:  [97.4 °F (36.3 °C)] 97.4 °F (36.3 °C)  Heart Rate:  [82-92] 85  Resp:  [18] 18  BP: (136)/(95) 136/95  Flow (L/min):  [2] 2   Body mass index is 28.52 kg/m².    Physical Exam  Physical Exam  HENT:      Head: Atraumatic.      Mouth/Throat:      Mouth: Mucous membranes are moist.   Eyes:      Pupils: Pupils are equal, round, and reactive to light.   Cardiovascular:      Rate and Rhythm: Normal rate and regular rhythm.   Pulmonary:      Effort: Pulmonary effort is normal.      Breath sounds: Normal breath sounds.      Comments: On oxygen supplementation 2 LPM  Abdominal:      Palpations: Abdomen is soft.   Musculoskeletal:         General: Normal range of motion.      Cervical back: Neck supple.   Skin:     General: Skin is warm.   Neurological:      General: No focal deficit present.      Mental Status: She is alert and oriented to person, place, and time.   Psychiatric:         Mood and Affect: Mood normal.         Scheduled Meds   acetylcysteine, 3 mL, Nebulization, TID - RT  budesonide-formoterol, 2  puff, Inhalation, BID - RT  cefTRIAXone (ROCEPHIN) 2,000 mg in sodium chloride 0.9 % 100 mL IVPB, 2,000 mg, Intravenous, Once  doxycycline, 100 mg, Intravenous, BID  pantoprazole, 40 mg, Oral, BID AC  roflumilast, 500 mcg, Oral, Nightly  sodium chloride, 10 mL, Intravenous, Q12H  warfarin, 3 mg, Oral, Daily       PRN Meds     acetaminophen    Calcium Replacement - Follow Nurse / BPA Driven Protocol    cyclobenzaprine    HYDROcodone-acetaminophen    ipratropium-albuterol    Magnesium Standard Dose Replacement - Follow Nurse / BPA Driven Protocol    nitroglycerin    ondansetron ODT **OR** ondansetron    Pharmacy to dose warfarin    Phosphorus Replacement - Follow Nurse / BPA Driven Protocol    Potassium Replacement - Follow Nurse / BPA Driven Protocol    sodium chloride    sodium chloride   Infusions  Pharmacy to dose warfarin,   sodium chloride, 100 mL/hr          Diagnostic Data    Results from last 7 days   Lab Units 02/10/24  0340   WBC 10*3/mm3 11.70*   HEMOGLOBIN g/dL 12.8   HEMATOCRIT % 39.9   PLATELETS 10*3/mm3 349   GLUCOSE mg/dL 122*   CREATININE mg/dL 0.88   BUN mg/dL 29*   SODIUM mmol/L 135*   POTASSIUM mmol/L 3.7   AST (SGOT) U/L 30   ALT (SGPT) U/L 55*   ALK PHOS U/L 144*   BILIRUBIN mg/dL 0.2   ANION GAP mmol/L 13.0       XR Chest 1 View    Result Date: 2/10/2024  Impression: Persistent partial right lower lobe atelectasis with elevated right hemidiaphragm. Electronically Signed: Zain Mays MD  2/10/2024 6:29 AM EST  Workstation ID: [id number]    CT Abdomen Pelvis Without Contrast    Result Date: 2/10/2024  Impression: 1.There is mucous plugging in the bronchus to the right lower lobe with obstruction and right lower lobe atelectasis. 2.There is new mild biliary dilatation status post cholecystectomy. Correlate for symptoms and laboratory values of biliary obstruction. This could be physiologic 3.New mild compression fractures at T12, L2 and L1 status post kyphoplasty at T12 and L2. 4.Large hiatal  hernia. Electronically Signed: Zain Mays MD  2/10/2024 4:49 AM EST  Workstation ID: [id number]    DEXA Bone Density Axial    Result Date: 2/9/2024  Osteopenia. Based upon the National Osteoporosis Foundation Guide, patient's FRAX score does not meet criteria for pharmacologic treatment to prevent osteoporosis. Individual treatment decisions should be made based upon each patient's full clinical history and risk factors.   Copies of the computerized summary reports can be obtained from [employer] via the health information department of [facility].   Electronically Signed By-Mai Bhat MD On:2/9/2024 11:28 AM         I reviewed the patient's new clinical results.    Assessment/Plan:     Active and Resolved Problems  # multilevel Compression fractures involving T12,L2  #s/p Kyphoplasty T12 and L2  #Osteopenia   -recent surgery on1/7/24     - CT a/p shows new mild compression fractures at T12, L2, and L1 s/p kyphoplasty at T12 and L2    - pt/ot/CM    - f/u  MRI lumbar spine    - neurosurgery consulted    - Flexeril PRN for muscle spasms      # Rt lung Mucous plugging  #Post obstructive pneumonia   #Sepsis - elevated lactic acid +leukocytosis   #Hypoxia on O2    - CT a/p shows mucous plugging in the bronchus to the right lower lobe with obstruction and right lower lobe atelectasis    - supsect post obstructive pneumonia    - f/u antigens for  strep, legionella, sputum    - blood cultures    - Rocephin 2g IV daily    - Doxycycline 100mg IV BID    - Patient on 2L nc, wean as tolerate    - start muco-myst    - pulm consulted to consider bronchoscopy  am     - NPO after midnight    #Mild hyponatremia  - Started on IV fluid  - Will hold HCTZ     #COPD    - does not appear to be in acute exacerbation  - home O2 2 LPM     - Symbicort BID    - Duoneb PRN     #Biliary Dilatation    - CT a/p shows new mild biliary dilatation s/p cholecystectomy that could be biliary obstruction vs physiologic    - ALT 55, AST 30, tbili  0.2    - suspect physiologic in nature    - monitor LFTs    - if trending up will consult GI      #Large hiatal hernia  #GERD     - noted on CT a/p, monitor  - PPI      #H/O DVT and PE    - continue home warfarin, pharm to dose    - INR 2.61 and daily INr         # Essential HTN  - monitor BP and currently hypotensive willhold diovan    will resume if elevated      #Hypothyroid    - resume  synthroid       DVT prophylaxis:  Medical DVT prophylaxis orders are present.         Code status is   Code Status and Medical Interventions:   Ordered at: 02/10/24 0622     Code Status (Patient has no pulse and is not breathing):    CPR (Attempt to Resuscitate)     Medical Interventions (Patient has pulse or is breathing):    Full Support       Plan for disposition:home  in 1-2 days pending clinical improvement     Time: 35 minutes    Signature: Electronically signed by Daniel Wagoner MD, 02/10/24, 07:04 EST.  [facility] Hospitalist Team

## 2024-02-10 NOTE — H&P
[facility] Medicine Services    Hospitalist History and Physical     Renuka Pelayo : 1955 MRN:[id number] LOS:0 ROOM:      Chief Complaint: back pain    Assessment / Plan         #Compression fractures    - CT a/p shows new mild compression fractures at T12, L2, and L1 s/p kyphoplasty at T12 and L2    - pt/ot/CM    - check MRI lumbar spine    - neurosurgery consulted    - Flexeril PRN    #Mucous plugging  #Post obstructive pneumonia - suspected    - CT a/p shows mucous plugging in the bronchus to the right lower lobe with obstruction and right lower lobe atelectasis    - possible post obstructive pneumonia    - strep, legionella, sputum    - blood cultures    - Rocephin 2g IV daily    - Doxycycline 100mg IV BID    - Patient on 2L nc, wean as tolerate    - start muco-myst    - pulm consulted to consider bronchoscopy vs conservative care    - NPO    - NS @ 100/hr    #COPD    - does not appear to be in acute exacerbation    - Symbicort BID    - Duoneb PRN    #Biliary Dilatation    - CT a/p shows new mild biliary dilatation s/p cholecystectomy that could be biliary obstruction vs physiologic    - ALT 55, AST 30, tbili 0.2    - suspect physiologic in nature    - monitor LFTs    - discussed with patient who agrees with plan to monitor    #Large hiatal hernia    - noted on CT a/p, monitor    #H/O DVT and PE    - continue home warfarin, pharm to dose    - INR 2.61    #GERD     - PPI    #HTN    - BP stable, hold home Diovan    #Hypothyroid    - resume when reconciled      DVT prophylaxis: continue home warfarin, pharm to dose           History of Present illness     Renuka Pelayo is a 68 y.o. female who presented to [facility] for worsening back pain. Of note, states she had a kyphoplasty on 24.  Admits to 6-7/10 worsening lower back pain over the past week that is worse with movement, believes she may have sat down hard but no overt trauama.  Also admits to worsening cough complicated by right flank pain  with deep breaths.  Denies chest pain, vomiting, diarrhea.  Admits to dyspnea with exertion.  Presented to [facility] for evaluation.    ED course: In the ER, vitals 97.4, HR 92, RR 18, /95, 99% on 2L NC.  Labs notable for sodium 135, Cl 96, BUN 29, glucose 122, ALT 55, wbc 11.7.  CT a/p shows mucous plugging in the bronchus to the right lower lobe with obstruction and right lower lobe atelectasis; there is new mild biliary dilatation s/p cholecystectomy that could be biliary obstruction vs physiologic; new mild compression fractures at T12, L2, and L1 s/p kyphoplasty at T12 and L2; large hiatal hernia.    Patient was seen and examined on 02/10/24 at 05:44 EST .    Subjective / Review of systems     Review of Systems   12 point ROS reviewed and negative except as mentioned above      Past Medical/Surgical/Social/Family History & Allergies     Past Medical History:   Diagnosis Date    Arthritis     Bladder cancer     Chronic back pain     Closed nondisplaced fracture of head of right radius 03/2017    Congenital deformity of right hip joint 1955    COPD (chronic obstructive pulmonary disease)     former smoker    Deep venous thrombosis     unprovoked    Depressive disorder     Disease of thyroid gland     Diverticulitis of colon     Essential hypertension     Gastroesophageal reflux disease     Insomnia     Obesity     Pulmonary embolism     provoked by surgery      Past Surgical History:   Procedure Laterality Date    ABDOMINAL SURGERY      BRONCHOSCOPY N/A 11/25/2020    Procedure: BRONCHOSCOPY with bronchial washing;  Surgeon: Win Johnston MD;  Location: [facility] ENDOSCOPY;  Service: Pulmonary;  Laterality: N/A;  Post: mucous plugs    BRONCHOSCOPY N/A 4/25/2022    Procedure: BRONCHOSCOPY with bronchial washing;  Surgeon: Win Johnston MD;  Location: [facility] ENDOSCOPY;  Service: Pulmonary;  Laterality: N/A;  post op: pneumonia    BRONCHOSCOPY N/A 4/20/2023    Procedure: BRONCHOSCOPY with bilateral wash;   Surgeon: Win Johnston MD;  Location: [facility] ENDOSCOPY;  Service: Pulmonary;  Laterality: N/A;  mucous plugs    CHOLECYSTECTOMY      COLON SURGERY      COLONOSCOPY      COLOSTOMY CLOSURE N/A 2021    Procedure: COLOSTOMY TAKEDOWN OR CLOSURE, extenisve lysis of adhesions;  Surgeon: Chi Farmer MD;  Location: [facility] MAIN OR;  Service: General;  Laterality: N/A;    CYSTOSCOPY N/A 2021    Procedure: CYSTOSCOPY with bladder tumor removal and fulgeration, insertion of 3-way rizzo catheter;  Surgeon: Alfonzo Hayward MD;  Location: [facility] MAIN OR;  Service: Urology;  Laterality: N/A;    ENDOSCOPY      EXPLORATORY LAPAROTOMY N/A 2020    Procedure: LAPAROTOMY EXPLORATORY HARTMANS;  Surgeon: Chi Farmer MD;  Location: [facility] MAIN OR;  Service: General;  Laterality: N/A;    HYSTERECTOMY      TOTAL HIP ARTHROPLASTY Right     TOTAL HIP ARTHROPLASTY REVISION Right     x3      Social History     Socioeconomic History    Marital status:    Tobacco Use    Smoking status: Former     Packs/day: 2.00     Years: 40.00     Additional pack years: 0.00     Total pack years: 80.00     Types: Cigarettes     Quit date:      Years since quittin.1    Smokeless tobacco: Never    Tobacco comments:     ELECTRONIC   Vaping Use    Vaping Use: Former    Substances: Nicotine, Flavoring   Substance and Sexual Activity    Alcohol use: Never    Drug use: Never    Sexual activity: Defer      Family History   Problem Relation Age of Onset    No Known Problems Mother     No Known Problems Father       No Known Allergies   Social Determinants of Health     Tobacco Use: Medium Risk (2023)    Patient History     Smoking Tobacco Use: Former     Smokeless Tobacco Use: Never     Passive Exposure: Not on file   Alcohol Use: Not At Risk (2023)    AUDIT-C     Frequency of Alcohol Consumption: Never     Average Number of Drinks: Patient does not drink     Frequency of Binge Drinking: Never    Financial Resource Strain: Not on file   Food Insecurity: Not on file   Transportation Needs: Not on file   Physical Activity: Not on file   Stress: Not on file   Social Connections: Unknown (10/12/2023)    Family and Community Support     Help with Day-to-Day Activities: Not on file     Lonely or Isolated: Not on file   Interpersonal Safety: Not At Risk (2/10/2024)    Abuse Screen     Unsafe at Home or Work/School: no     Feels Threatened by Someone?: no     Does Anyone Keep You from Contacting Others or Doint Things Outside the Home?: no     Physical Sign of Abuse Present: no   Depression: Not on file   Housing Stability: Not At Risk (12/8/2023)    Housing Stability     Current Living Arrangements: home     Potentially Unsafe Housing Conditions: none   Utilities: Not on file   Health Literacy: Unknown (8/2/2023)    Education     Help with school or training?: Not on file     Preferred Language: English   Employment: Unknown (10/12/2023)    Employment     Do you want help finding or keeping work or a job?: Not on file   Disabilities: Not At Risk (12/8/2023)    Disabilities     Concentrating, Remembering, or Making Decisions Difficulty: no     Doing Errands Independently Difficulty: no        Home Medications     Prior to Admission medications    Medication Sig Start Date End Date Taking? Authorizing Provider   albuterol (PROVENTIL) (2.5 MG/3ML) 0.083% nebulizer solution Take 2.5 mg by nebulization Every 6 (Six) Hours As Needed for Wheezing.    Grayson Pope MD   amLODIPine (NORVASC) 5 MG tablet Take 1 tablet by mouth Daily.    Grayson Pope MD   Budeson-Glycopyrrol-Formoterol (Breztri Aerosphere) 160-9-4.8 MCG/ACT aerosol inhaler Inhale 2 puffs 2 (Two) Times a Day.    Grayson Pope MD   carvedilol (COREG) 3.125 MG tablet Take 1 tablet by mouth 2 (Two) Times a Day With Meals. 12/7/23   Grayson Pope MD   furosemide (LASIX) 20 MG tablet Take 1 tablet by mouth 2 (Two) Times a Day As  Needed.    Grayson Pope MD   guaiFENesin (MUCINEX) 600 MG 12 hr tablet Take 2 tablets by mouth Every 12 (Twelve) Hours. 12/9/23   Ney Lopez MD   HYDROcodone-acetaminophen (NORCO)  MG per tablet Take 1 tablet by mouth Every 6 (Six) Hours As Needed for Moderate Pain.    Grayson Pope MD   ipratropium (ATROVENT) 0.02 % nebulizer solution Take 2.5 mL by nebulization Every 6 (Six) Hours As Needed for Wheezing or Shortness of Air.    Grayson Pope MD   lidocaine (Lidoderm) 5 % Place 1 patch on the skin as directed by provider Daily. Remove & Discard patch within 12 hours or as directed by MD 11/17/23   Nellie Oconnell APRN   methocarbamol (ROBAXIN) 750 MG tablet Take 1 tablet by mouth 3 (Three) Times a Day As Needed for Muscle Spasms. 12/31/23   Aries Soto MD   methylPREDNISolone (MEDROL) 4 MG dose pack Take as directed on package instructions. 12/9/23   Ney Lopez MD   pantoprazole (PROTONIX) 40 MG EC tablet Take 1 tablet by mouth 2 (Two) Times a Day.    Grayson Pope MD   potassium chloride 10 MEQ CR tablet Take 1 tablet by mouth 2 (Two) Times a Day As Needed (take with furosemide PRN). 8/12/23   Grayson Pope MD   roflumilast (DALIRESP) 500 MCG tablet tablet Take 1 tablet by mouth Every Night.    Grayson Pope MD   valsartan-hydrochlorothiazide (Diovan HCT) 320-25 MG per tablet Take 1 tablet by mouth Daily.    Grayson Pope MD   warfarin (COUMADIN) 3 MG tablet Take 1 tablet by mouth Every Night. 12/9/23   Ney Lopez MD   zolpidem (AMBIEN) 5 MG tablet Take 1 tablet by mouth At Night As Needed.    Grayson Pope MD        Objective / Physical Exam     Vital signs:  Temp: 97.4 °F (36.3 °C)  BP: 136/95  Heart Rate: 92  Resp: 18  SpO2: 99 %  Weight: 73 kg (161 lb)    Admission Weight: Weight: 73 kg (161 lb)    Physical Exam  Constitutional:       General: She is not in acute distress.     Appearance: She is not toxic-appearing.    HENT:      Head: Normocephalic and atraumatic.      Nose: Nose normal. No congestion.      Mouth/Throat:      Pharynx: No oropharyngeal exudate.   Eyes:      General: No scleral icterus.  Cardiovascular:      Rate and Rhythm: Normal rate and regular rhythm.      Heart sounds: No murmur heard.     No friction rub. No gallop.   Pulmonary:      Effort: No respiratory distress.      Breath sounds: Rales present. No rhonchi.      Comments: Patient on home 2L NC  Abdominal:      General: There is no distension.      Tenderness: There is no abdominal tenderness. There is no guarding.   Musculoskeletal:         General: No swelling, deformity or signs of injury.      Cervical back: Normal range of motion. No rigidity.      Comments: Lumbar tenderness   Skin:     Coloration: Skin is not jaundiced.      Findings: No bruising or lesion.   Neurological:      General: No focal deficit present.      Mental Status: She is alert and oriented to person, place, and time.      Motor: Weakness present.            Labs     Results from last 7 days   Lab Units 02/10/24  0340   WBC 10*3/mm3 11.70*   HEMOGLOBIN g/dL 12.8   HEMATOCRIT % 39.9   PLATELETS 10*3/mm3 349      Results from last 7 days   Lab Units 02/10/24  0340   ALK PHOS U/L 144*   AST (SGOT) U/L 30   ALT (SGPT) U/L 55*      Results from last 7 days   Lab Units 02/10/24  0340   PROTIME Seconds 26.5   INR  2.61      Results from last 7 days   Lab Units 02/10/24  0340   SODIUM mmol/L 135*   POTASSIUM mmol/L 3.7   CHLORIDE mmol/L 96*   CO2 mmol/L 26.0   BUN mg/dL 29*   CREATININE mg/dL 0.88   GLUCOSE mg/dL 122*        Imaging     CT Abdomen Pelvis Without Contrast    Result Date: 2/10/2024  CT ABDOMEN PELVIS WO CONTRAST Date of Exam: 2/10/2024 4:34 AM EST Indication: Right flank pain history of lumbar surgery on 7 January. Comparison: 11/17/2023 and 6/29/2023. Technique: Axial CT images were obtained of the abdomen and pelvis without the administration of contrast. Sagittal and  coronal reconstructions were performed.  Automated exposure control and iterative reconstruction methods were used. Findings: There is dependent bibasilar atelectasis, right greater than left. Emphysema noted in the lung bases. Emphysema on CT is an independent risk factor for lung cancer. Low dose lung cancer screening should be considered if patient qualifies based on smoking  history or if not already enrolled in a screening program. Heart size is normal. There is a large stomach containing hiatal hernia. There is mucous plugging in the bronchus to the right lower lobe with obstruction of the airways. No acute findings in the superficial soft tissues. Small fat-containing midline supraumbilical hernia. There is evidence of prior ventral laparotomy. There is streak artifact from a right hip prosthesis. There is minimal DJD at the left hip. Compared with 6/29/2023, there are new mild superior plate compression fractures at T12 and L2 status post kyphoplasty. There is also new mild inferior endplate compression fracture at L1 with approximately 10% loss of height. There is moderate thoracolumbar spondylosis. There is an old healed left lateral ninth rib fracture. The liver is homogeneous. Patient is status post cholecystectomy. There is new mild biliary dilatation with the common bile duct measuring up to 14 mm. The distal stomach, spleen and adrenal glands appear within normal limits. There is no hydronephrosis or visible urolithiasis. The distal ureters are obscured by streak artifact as is the majority of the urinary bladder. The appendix is not seen. There is a colonic anastomosis, likely from partial distal colon resection. Small bowel is nondistended. There is no ascites, pneumoperitoneum or lymphadenopathy. There is mild aortoiliac atherosclerosis. No aneurysm.     Impression: 1.There is mucous plugging in the bronchus to the right lower lobe with obstruction and right lower lobe atelectasis. 2.There is new  mild biliary dilatation status post cholecystectomy. Correlate for symptoms and laboratory values of biliary obstruction. This could be physiologic 3.New mild compression fractures at T12, L2 and L1 status post kyphoplasty at T12 and L2. 4.Large hiatal hernia. Electronically Signed: Zain Mays MD  2/10/2024 4:49 AM EST  Workstation ID: [id number]    DEXA Bone Density Axial    Result Date: 2/9/2024  DATE OF EXAM: 2/9/2024 10:30 AM  PROCEDURE: DEXA BONE DENSITY AXIAL-  INDICATIONS: M81.0; M81.0-Age-related osteoporosis without current pathological fracture  COMPARISON: None.  TECHNIQUE: Lumbar vertebral and proximal femoral Dual-Energy X-ray Absorptiometry (DEXA) was performed on the [employer] W.  FINDINGS: A full detailed summary report from the DEXA scan performed is located in the patients chart in [facility].  The patient reports history of L1 and L3 kyphoplasty.  The T-score in the distal left forearm is -1.5.  The T-score in the proximal femur is -1.9.  According to the World Health Organization criteria, this is classified as osteopenia.  Using the FRAX scores, which utilized risk factors and left femoral neck bone density: The 10 year probability of major osteoporotic fracture is 16%. The 10 year probability of a hip fracture is 2.6%.      Osteopenia. Based upon the National Osteoporosis Foundation Guide, patient's FRAX score does not meet criteria for pharmacologic treatment to prevent osteoporosis. Individual treatment decisions should be made based upon each patient's full clinical history and risk factors.   Copies of the computerized summary reports can be obtained from [facility] via the health information department of [facility].   Electronically Signed By-Mai Bhat MD On:2/9/2024 11:28 AM          Current Medications     Scheduled Meds:  HYDROmorphone, 0.5 mg, Intravenous, Once         Continuous Infusions:          Juventino Hubbard [facility]  02/10/24   05:44 EST

## 2024-02-10 NOTE — ED NOTES
Notified MRI about pt's question answers, MRI tech said it would be about 10am before they are able to scan her.

## 2024-02-10 NOTE — SIGNIFICANT NOTE
02/10/24 1402   OTHER   Discipline physical therapist   Rehab Time/Intention   Session Not Performed other (see comments)  (Pt with new compression fx. PT will follow up after neurosurgery consult)   Therapy Assessment/Plan (PT)   Criteria for Skilled Interventions Met (PT) yes;meets criteria   Recommendation   PT - Next Appointment 02/11/24

## 2024-02-10 NOTE — ED PROVIDER NOTES
Subjective   History of Present Illness  68-year-old female complaining of right low back pain and midline back pain.  She states she had surgery on 7 January.  She states she has had compression fractures in the past and sat down hard recently.  She also reports she has had subjective fever and chills as well as cough.  She reports no right upper quadrant discomfort after she eats and reports that she has had no recent melena hematemesis and hematochezia.  She reports no areas of anesthesia and reports no current radicular pain  Her cough has been nonproductive  Review of Systems   Constitutional:  Positive for chills, fatigue and fever.   HENT:  Negative for sore throat.    Respiratory:  Positive for cough. Negative for shortness of breath and stridor.    Musculoskeletal:  Positive for back pain.   Neurological:  Positive for numbness.   Hematological:  Does not bruise/bleed easily.       Past Medical History:   Diagnosis Date    Arthritis     Bladder cancer     Chronic back pain     Closed nondisplaced fracture of head of right radius 03/2017    Congenital deformity of right hip joint 1956    COPD (chronic obstructive pulmonary disease)     former smoker    Deep venous thrombosis     unprovoked    Depressive disorder     Disease of thyroid gland     Diverticulitis of colon     Essential hypertension     Gastroesophageal reflux disease     Insomnia     Obesity     Pulmonary embolism     provoked by surgery       No Known Allergies    Past Surgical History:   Procedure Laterality Date    ABDOMINAL SURGERY      BRONCHOSCOPY N/A 11/25/2020    Procedure: BRONCHOSCOPY with bronchial washing;  Surgeon: Win Johnston MD;  Location: [facility] ENDOSCOPY;  Service: Pulmonary;  Laterality: N/A;  Post: mucous plugs    BRONCHOSCOPY N/A 4/25/2022    Procedure: BRONCHOSCOPY with bronchial washing;  Surgeon: Win Johnston MD;  Location: [facility] ENDOSCOPY;  Service: Pulmonary;  Laterality: N/A;  post op: pneumonia     BRONCHOSCOPY N/A 2023    Procedure: BRONCHOSCOPY with bilateral wash;  Surgeon: Win Johnston MD;  Location: [facility] ENDOSCOPY;  Service: Pulmonary;  Laterality: N/A;  mucous plugs    CHOLECYSTECTOMY      COLON SURGERY      COLONOSCOPY      COLOSTOMY CLOSURE N/A 2021    Procedure: COLOSTOMY TAKEDOWN OR CLOSURE, extenisve lysis of adhesions;  Surgeon: Chi Farmer MD;  Location: [facility] MAIN OR;  Service: General;  Laterality: N/A;    CYSTOSCOPY N/A 2021    Procedure: CYSTOSCOPY with bladder tumor removal and fulgeration, insertion of 3-way rizzo catheter;  Surgeon: Alfonzo Hayward MD;  Location: [facility] MAIN OR;  Service: Urology;  Laterality: N/A;    ENDOSCOPY      EXPLORATORY LAPAROTOMY N/A 2020    Procedure: LAPAROTOMY EXPLORATORY HARTMANS;  Surgeon: Chi Farmer MD;  Location: [facility] MAIN OR;  Service: General;  Laterality: N/A;    HYSTERECTOMY      TOTAL HIP ARTHROPLASTY Right     TOTAL HIP ARTHROPLASTY REVISION Right     x3       Family History   Problem Relation Age of Onset    No Known Problems Mother     No Known Problems Father        Social History     Socioeconomic History    Marital status:    Tobacco Use    Smoking status: Former     Packs/day: 2.00     Years: 40.00     Additional pack years: 0.00     Total pack years: 80.00     Types: Cigarettes     Quit date:      Years since quittin.1    Smokeless tobacco: Never    Tobacco comments:     ELECTRONIC   Vaping Use    Vaping Use: Former    Substances: Nicotine, Flavoring   Substance and Sexual Activity    Alcohol use: Never    Drug use: Never    Sexual activity: Defer     Reports no unusual food water travel or activity      Objective   Physical Exam  Alert [place] Coma Scale 15 poor historian   HEENT: Pupils equal and reactive to light. Conjunctivae are not injected. Normal tympanic membranes. Oropharynx and nares are normal.   Neck: Supple. Midline trachea. No JVD. No goiter.   Chest: Right  lower lobe Rales and a few scattered expiratory wheezes and equal breath sounds bilaterally, regular rate and rhythm without murmur or rub.   Abdomen: Positive bowel sounds, nontender, nondistended. No rebound or peritoneal signs. No CVA tenderness.   Back: Lumbar midline discomfort is noted in the upper part of the lumbar spine.  There is no SI joint discomfort.  There is negative straight leg raising test  Extremities no clubbing. cyanosis or edema. Motor sensory exam is normal. The full range of motion is intact   Skin: Warm and dry, no rashes or petechia.   Lymphatic: No regional lymphadenopathy. No calf pain, swelling or Homans sign    Procedures           ED Course  ED Course as of 02/10/24 0610   Sat Feb 10, 2024   0308 ED overflow/overcrowding conditions, 2-hour and 20-minute delay in getting to treatment area [TH]      ED Course User Index  [TH] Jun Caraballo MD                 Labs Reviewed   COMPREHENSIVE METABOLIC PANEL - Abnormal; Notable for the following components:       Result Value    Glucose 122 (*)     BUN 29 (*)     Sodium 135 (*)     Chloride 96 (*)     ALT (SGPT) 55 (*)     Alkaline Phosphatase 144 (*)     BUN/Creatinine Ratio 33.0 (*)     All other components within normal limits    Narrative:     GFR Normal >60  Chronic Kidney Disease <60  Kidney Failure <15     CBC WITH AUTO DIFFERENTIAL - Abnormal; Notable for the following components:    WBC 11.70 (*)     RDW 16.9 (*)     All other components within normal limits    Narrative:     The previously reported component NRBC is no longer being reported. Previous result was 0.0 /100 WBC (Reference Range: 0.0-0.2 /100 WBC) on 2/10/2024 at 0347 EST.   MANUAL DIFFERENTIAL - Abnormal; Notable for the following components:    Lymphocyte % 10.0 (*)     Neutrophils Absolute 9.24 (*)     Monocytes Absolute 0.94 (*)     All other components within normal limits   PROTIME-INR - Normal   RESPIRATORY PANEL PCR W/ COVID-19 (SARS-COV-2), NP SWAB IN  UTM/VTP, 2 HR TAT   BLOOD CULTURE   BLOOD CULTURE   SCAN SLIDE   URINALYSIS W/ CULTURE IF INDICATED   CBC AND DIFFERENTIAL    Narrative:     The following orders were created for panel order CBC & Differential.  Procedure                               Abnormality         Status                     ---------                               -----------         ------                     CBC Auto Differential[[id number]]        Abnormal            Final result               Scan Slide[[id number]]                                       Final result                 Please view results for these tests on the individual orders.     Medications   HYDROmorphone (DILAUDID) injection 0.5 mg (0 mg Intravenous Hold 2/10/24 0431)   ipratropium-albuterol (DUO-NEB) nebulizer solution 3 mL (has no administration in time range)   doxycycline (VIBRAMYCIN) 100 mg in sodium chloride 0.9 % 100 mL IVPB (has no administration in time range)   cefTRIAXone (ROCEPHIN) 1,000 mg in sodium chloride 0.9 % 100 mL IVPB (has no administration in time range)   lactated ringers bolus 500 mL (500 mL Intravenous New Bag 2/10/24 0334)   ondansetron (ZOFRAN) injection 4 mg (4 mg Intravenous Given 2/10/24 0335)   HYDROmorphone (DILAUDID) injection 0.5 mg (0.5 mg Intravenous Given 2/10/24 0335)     CT Abdomen Pelvis Without Contrast    Result Date: 2/10/2024  Impression: 1.There is mucous plugging in the bronchus to the right lower lobe with obstruction and right lower lobe atelectasis. 2.There is new mild biliary dilatation status post cholecystectomy. Correlate for symptoms and laboratory values of biliary obstruction. This could be physiologic 3.New mild compression fractures at T12, L2 and L1 status post kyphoplasty at T12 and L2. 4.Large hiatal hernia. Electronically Signed: Zain Mays MD  2/10/2024 4:49 AM EST  Workstation ID: [id number]    DEXA Bone Density Axial    Result Date: 2/9/2024  Osteopenia. Based upon the National Osteoporosis Foundation  Guide, patient's FRAX score does not meet criteria for pharmacologic treatment to prevent osteoporosis. Individual treatment decisions should be made based upon each patient's full clinical history and risk factors.   Copies of the computerized summary reports can be obtained from [employer] via the health information department of [facility].   Electronically Signed By-Mai Bhat MD On:2/9/2024 11:28 AM                                   Medical Decision Making  ET also detected biliary dilation of indeterminate etiology.  The case was discussed with Dr. Browning patient be admitted for pain control and we will place the patient on antibiotics pending culture results.  The patient was agreeable to this plan of treatment    Amount and/or Complexity of Data Reviewed  Labs: ordered. Decision-making details documented in ED Course.  Radiology: ordered and independent interpretation performed.  Discussion of management or test interpretation with external provider(s): Hospitalist    Risk  OTC drugs.  Prescription drug management.  Decision regarding hospitalization.        Final diagnoses:   Compression fracture of T12 vertebra, initial encounter   Closed compression fracture of body of L1 vertebra   Closed compression fracture of L2 lumbar vertebra with delayed healing, subsequent encounter   Pneumonia of right lower lobe due to infectious organism   Atelectasis   Hiatal hernia   Dilation of biliary tract       ED Disposition  ED Disposition       ED Disposition   Decision to Admit    Condition   --    Comment   Level of Care: Telemetry [5]   Diagnosis: Lumbar compression fracture, closed, initial encounter [[id number]]   Admitting Physician: JORDAN BROWNING [[id number]]   Attending Physician: JORDAN BROWNING [[id number]]                 No follow-up provider specified.       Medication List      No changes were made to your prescriptions during this visit.            Jun Caraballo MD  02/10/24 0610

## 2024-02-10 NOTE — ED NOTES
Nursing report ED to floor  Renuka Pelayo  68 y.o.  female    HPI:   Chief Complaint   Patient presents with    Back Pain       Admitting doctor:   Juventino Hubbard DO    Admitting diagnosis:   The primary encounter diagnosis was Compression fracture of T12 vertebra, initial encounter. Diagnoses of Closed compression fracture of body of L1 vertebra, Closed compression fracture of L2 lumbar vertebra with delayed healing, subsequent encounter, Pneumonia of right lower lobe due to infectious organism, Atelectasis, Hiatal hernia, and Dilation of biliary tract were also pertinent to this visit.    Code status:   Current Code Status       Date Active Code Status Order ID Comments User Context       2/10/2024 0622 CPR (Attempt to Resuscitate) [id number]  Juventino Hubbard DO ED        Question Answer    Code Status (Patient has no pulse and is not breathing) CPR (Attempt to Resuscitate)    Medical Interventions (Patient has pulse or is breathing) Full Support                    Allergies:   Patient has no known allergies.    Isolation:  No active isolations     Fall Risk:  Fall Risk Assessment was completed, and patient is at high risk for falls.   Predictive Model Details         16 (Low) Factor Value    Calculated 2/10/2024 07:59 Age 68    Risk of Fall Model Musculoskeletal Assessment WDL     Active Peripheral IV Present     Imaging order in this encounter Present     Number of Distinct Medication Classes administered 6     Respiratory Rate 18     Skin Assessment WDL     Financial Class Other     Magnesium not on file     Diastolic BP 63     Drug Use No     Tobacco Use Quit     Jerald Scale not on file     Chloride 96 mmol/L     Peripheral Vascular Assessment WDL     ALT 55 U/L     Number of administrations of Ulcer Drugs 1     Gastrointestinal Assessment WDL     Number of administrations of Analgesic Narcotics 1     Cardiac Assessment WDL     Days after Admission 0.21     Creatinine 0.88 mg/dL     Albumin 4.5 g/dL      Calcium 9.6 mg/dL     Total Bilirubin 0.2 mg/dL     Potassium 3.7 mmol/L         Weight:       02/10/24  0040   Weight: 73 kg (161 lb)       Intake and Output    Intake/Output Summary (Last 24 hours) at 2/10/2024 0759  Last data filed at 2/10/2024 0755  Gross per 24 hour   Intake 200 ml   Output --   Net 200 ml       Diet:   Dietary Orders (From admission, onward)       Start     Ordered    02/10/24 0624  NPO Diet NPO Type: Sips with Meds  Diet Effective Now        Question:  NPO Type  Answer:  Sips with Meds    02/10/24 0623                     Most recent vitals:   Vitals:    02/10/24 0328 02/10/24 0428 02/10/24 0613 02/10/24 0746   BP:    134/63   BP Location:       Patient Position:       Pulse: 86 82 85 79   Resp:       Temp:       TempSrc:       SpO2: 94% 95% 95% 95%   Weight:       Height:           Active LDAs/IV Access:   Lines, Drains & Airways       Active LDAs       Name Placement date Placement time Site Days    Peripheral IV 02/10/24 0334 Right Antecubital 02/10/24  0334  Antecubital  less than 1    Peripheral IV 02/10/24 0642 Anterior;Left Forearm 02/10/24  0642  Forearm  less than 1    Peripheral IV 02/10/24 0643 Anterior;Right;Lower Forearm 02/10/24  0643  Forearm  less than 1                    Skin Condition:   Skin Assessments (last day)       None             Labs (abnormal labs have a star):   Labs Reviewed   COMPREHENSIVE METABOLIC PANEL - Abnormal; Notable for the following components:       Result Value    Glucose 122 (*)     BUN 29 (*)     Sodium 135 (*)     Chloride 96 (*)     ALT (SGPT) 55 (*)     Alkaline Phosphatase 144 (*)     BUN/Creatinine Ratio 33.0 (*)     All other components within normal limits    Narrative:     GFR Normal >60  Chronic Kidney Disease <60  Kidney Failure <15     URINALYSIS W/ CULTURE IF INDICATED - Abnormal; Notable for the following components:    Leuk Esterase, UA Trace (*)     All other components within normal limits    Narrative:     In absence of clinical  symptoms, the presence of pyuria, bacteria, and/or nitrites on the urinalysis result does not correlate with infection.   CBC WITH AUTO DIFFERENTIAL - Abnormal; Notable for the following components:    WBC 11.70 (*)     RDW 16.9 (*)     All other components within normal limits    Narrative:     The previously reported component NRBC is no longer being reported. Previous result was 0.0 /100 WBC (Reference Range: 0.0-0.2 /100 WBC) on 2/10/2024 at 0347 EST.   MANUAL DIFFERENTIAL - Abnormal; Notable for the following components:    Lymphocyte % 10.0 (*)     Neutrophils Absolute 9.24 (*)     Monocytes Absolute 0.94 (*)     All other components within normal limits   RESPIRATORY PANEL PCR W/ COVID-19 (SARS-COV-2), NP SWAB IN UTM/VTP, 2 HR TAT - Normal    Narrative:     In the setting of a positive respiratory panel with a viral infection PLUS a negative procalcitonin without other underlying concern for bacterial infection, consider observing off antibiotics or discontinuation of antibiotics and continue supportive care. If the respiratory panel is positive for atypical bacterial infection (Bordetella pertussis, Chlamydophila pneumoniae, or Mycoplasma pneumoniae), consider antibiotic de-escalation to target atypical bacterial infection.   STREP PNEUMO AG, URINE OR CSF - Normal   LEGIONELLA ANTIGEN, URINE - Normal   PROTIME-INR - Normal   RESPIRATORY CULTURE   SCAN SLIDE   URINALYSIS, MICROSCOPIC ONLY   TSH   T4, FREE   CBC AND DIFFERENTIAL    Narrative:     The following orders were created for panel order CBC & Differential.  Procedure                               Abnormality         Status                     ---------                               -----------         ------                     CBC Auto Differential[[id number]]        Abnormal            Final result               Scan Slide[[id number]]                                       Final result                 Please view results for these tests on the  individual orders.       LOC: Person, Place, Time, and Situation    Telemetry:  Telemetry    Cardiac Monitoring Ordered:     EKG:   No orders to display       Medications Given in the ED:   Medications   cyclobenzaprine (FLEXERIL) tablet 10 mg (has no administration in time range)   sodium chloride 0.9 % infusion (has no administration in time range)   acetaminophen (TYLENOL) tablet 650 mg (has no administration in time range)   doxycycline (VIBRAMYCIN) 100 mg in sodium chloride 0.9 % 100 mL IVPB (0 mg Intravenous Stopped 2/10/24 7855)   acetylcysteine (MUCOMYST) 20 % nebulizer solution 3 mL (has no administration in time range)   budesonide-formoterol (SYMBICORT) 160-4.5 MCG/ACT inhaler 2 puff (has no administration in time range)   ipratropium-albuterol (DUO-NEB) nebulizer solution 3 mL (has no administration in time range)   Pharmacy to dose warfarin (has no administration in time range)   HYDROcodone-acetaminophen (NORCO)  MG per tablet 1 tablet (has no administration in time range)   pantoprazole (PROTONIX) EC tablet 40 mg (40 mg Oral Given 2/10/24 9075)   roflumilast (DALIRESP) tablet 500 mcg (has no administration in time range)   sodium chloride 0.9 % flush 10 mL (has no administration in time range)   sodium chloride 0.9 % flush 10 mL (has no administration in time range)   sodium chloride 0.9 % infusion 40 mL (has no administration in time range)   ondansetron ODT (ZOFRAN-ODT) disintegrating tablet 4 mg (has no administration in time range)     Or   ondansetron (ZOFRAN) injection 4 mg (has no administration in time range)   nitroglycerin (NITROSTAT) SL tablet 0.4 mg (has no administration in time range)   Potassium Replacement - Follow Nurse / BPA Driven Protocol (has no administration in time range)   Magnesium Standard Dose Replacement - Follow Nurse / BPA Driven Protocol (has no administration in time range)   Phosphorus Replacement - Follow Nurse / BPA Driven Protocol (has no administration in  time range)   Calcium Replacement - Follow Nurse / BPA Driven Protocol (has no administration in time range)   warfarin (COUMADIN) tablet 3 mg (has no administration in time range)   lactated ringers bolus 500 mL (0 mL Intravenous Stopped 2/10/24 0651)   ondansetron (ZOFRAN) injection 4 mg (4 mg Intravenous Given 2/10/24 7135)   HYDROmorphone (DILAUDID) injection 0.5 mg (0.5 mg Intravenous Given 2/10/24 0335)   cefTRIAXone (ROCEPHIN) 2,000 mg in sodium chloride 0.9 % 100 mL IVPB (0 mg Intravenous Stopped 2/10/24 0366)       Imaging results:  XR Chest 1 View    Result Date: 2/10/2024  Impression: Persistent partial right lower lobe atelectasis with elevated right hemidiaphragm. Electronically Signed: Zain Mays MD  2/10/2024 6:29 AM EST  Workstation ID: [id number]    CT Abdomen Pelvis Without Contrast    Result Date: 2/10/2024  Impression: 1.There is mucous plugging in the bronchus to the right lower lobe with obstruction and right lower lobe atelectasis. 2.There is new mild biliary dilatation status post cholecystectomy. Correlate for symptoms and laboratory values of biliary obstruction. This could be physiologic 3.New mild compression fractures at T12, L2 and L1 status post kyphoplasty at T12 and L2. 4.Large hiatal hernia. Electronically Signed: Zain Mays MD  2/10/2024 4:49 AM EST  Workstation ID: [id number]    DEXA Bone Density Axial    Result Date: 2/9/2024  Osteopenia. Based upon the National Osteoporosis Foundation Guide, patient's FRAX score does not meet criteria for pharmacologic treatment to prevent osteoporosis. Individual treatment decisions should be made based upon each patient's full clinical history and risk factors.   Copies of the computerized summary reports can be obtained from [employer] via the health information department of [facility].   Electronically Signed By-Mai Bhat MD On:2/9/2024 11:28 AM       Social issues:   Social History     Socioeconomic History    Marital status:     Tobacco Use    Smoking status: Former     Packs/day: 2.00     Years: 40.00     Additional pack years: 0.00     Total pack years: 80.00     Types: Cigarettes     Quit date: 2019     Years since quittin.1    Smokeless tobacco: Never    Tobacco comments:     ELECTRONIC   Vaping Use    Vaping Use: Former    Substances: Nicotine, Flavoring   Substance and Sexual Activity    Alcohol use: Never    Drug use: Never    Sexual activity: Defer       NIH Stroke Scale:  Interval: (not recorded)  1a. Level of Consciousness: (not recorded)  1b. LOC Questions: (not recorded)  1c. LOC Commands: (not recorded)  2. Best Gaze: (not recorded)  3. Visual: (not recorded)  4. Facial Palsy: (not recorded)  5a. Motor Arm, Left: (not recorded)  5b. Motor Arm, Right: (not recorded)  6a. Motor Leg, Left: (not recorded)  6b. Motor Leg, Right: (not recorded)  7. Limb Ataxia: (not recorded)  8. Sensory: (not recorded)  9. Best Language: (not recorded)  10. Dysarthria: (not recorded)  11. Extinction and Inattention (formerly Neglect): (not recorded)    Total (NIH Stroke Scale): (not recorded)     Additional notable assessment information:     Nursing report ED to floor:  NELSON Segura RN   02/10/24 07:59 EST

## 2024-02-10 NOTE — CONSULTS
PULMONARY CRITICAL CARE CONSULT NOTE      PATIENT IDENTIFICATION:  Name: Renuka Pelayo  MRN: [id number]  :  1955     Age: 68 y.o.  Sex: female        DATE OF CONSULTATION:  2/10/2024  PRIMARY CARE PHYSICIAN    Michael Gerardo MD                  CHIEF COMPLAINT: SOB    History of Present Illness:   Renuka Pelayo is a 68 y.o. female with a history of COPD, history of PE/DVT, Hx bladder cancer, chronic back pain, arthritis, depression, insomnia, GERD, obesity, and hypothyroidism who came to the ER with back pain, admits to worsening cough complicated by right flank pain with deep breaths.  In the ER CT shows mucous plugging of the bronchus in the right lower lobe with obstruction of the right lower lobe atelectasis.  Patient with compression fracture T12 L2 and L1 SP kyphoplasty could T12 and L2.  Minimal large hiatal hernia.  Patient admitted for further treatment.      Review of Systems:   Constitutional: As above   Eyes: negative   ENT/oropharynx: negative   Cardiovascular: negative   Respiratory: As above   Gastrointestinal: negative   Genitourinary: negative   Neurological: negative   Musculoskeletal: negative   Integument/breast: negative   Endocrine: negative   Allergic/Immunologic: negative     Past Medical History:  Past Medical History:   Diagnosis Date    Arthritis     Bladder cancer     Chronic back pain     Closed nondisplaced fracture of head of right radius 2017    Congenital deformity of right hip joint 1955    COPD (chronic obstructive pulmonary disease)     former smoker    Deep venous thrombosis     unprovoked    Depressive disorder     Disease of thyroid gland     Diverticulitis of colon     Essential hypertension     Gastroesophageal reflux disease     Insomnia     Obesity     Pulmonary embolism     provoked by surgery       Past Surgical History:  Past Surgical History:   Procedure Laterality Date    ABDOMINAL SURGERY      BRONCHOSCOPY N/A 2020    Procedure:  BRONCHOSCOPY with bronchial washing;  Surgeon: Win Johnston MD;  Location: [facility] ENDOSCOPY;  Service: Pulmonary;  Laterality: N/A;  Post: mucous plugs    BRONCHOSCOPY N/A 2022    Procedure: BRONCHOSCOPY with bronchial washing;  Surgeon: Win Johnston MD;  Location: [facility] ENDOSCOPY;  Service: Pulmonary;  Laterality: N/A;  post op: pneumonia    BRONCHOSCOPY N/A 2023    Procedure: BRONCHOSCOPY with bilateral wash;  Surgeon: Win Johnston MD;  Location: [facility] ENDOSCOPY;  Service: Pulmonary;  Laterality: N/A;  mucous plugs    CHOLECYSTECTOMY      COLON SURGERY      COLONOSCOPY      COLOSTOMY CLOSURE N/A 2021    Procedure: COLOSTOMY TAKEDOWN OR CLOSURE, extenisve lysis of adhesions;  Surgeon: Chi Farmer MD;  Location: [facility] MAIN OR;  Service: General;  Laterality: N/A;    CYSTOSCOPY N/A 2021    Procedure: CYSTOSCOPY with bladder tumor removal and fulgeration, insertion of 3-way rizzo catheter;  Surgeon: Alfonzo Hayward MD;  Location: [facility] MAIN OR;  Service: Urology;  Laterality: N/A;    ENDOSCOPY      EXPLORATORY LAPAROTOMY N/A 2020    Procedure: LAPAROTOMY EXPLORATORY HARTMANS;  Surgeon: Chi Farmer MD;  Location: [facility] MAIN OR;  Service: General;  Laterality: N/A;    HYSTERECTOMY      TOTAL HIP ARTHROPLASTY Right     TOTAL HIP ARTHROPLASTY REVISION Right     x3        Family History:  Family History   Problem Relation Age of Onset    No Known Problems Mother     No Known Problems Father         Social History:   Social History     Tobacco Use    Smoking status: Former     Packs/day: 2.00     Years: 40.00     Additional pack years: 0.00     Total pack years: 80.00     Types: Cigarettes     Quit date: 2019     Years since quittin.1    Smokeless tobacco: Never    Tobacco comments:     ELECTRONIC   Substance Use Topics    Alcohol use: Never        Allergies:  No Known Allergies    Home Meds:  Medications Prior to Admission   Medication Sig Dispense  Refill Last Dose    albuterol (PROVENTIL) (2.5 MG/3ML) 0.083% nebulizer solution Take 2.5 mg by nebulization Every 6 (Six) Hours As Needed for Wheezing.   2/10/2024    amLODIPine (NORVASC) 5 MG tablet Take 1 tablet by mouth Daily.   2/9/2024    Budeson-Glycopyrrol-Formoterol (Breztri Aerosphere) 160-9-4.8 MCG/ACT aerosol inhaler Inhale 2 puffs 2 (Two) Times a Day.   2/9/2024    carvedilol (COREG) 3.125 MG tablet Take 1 tablet by mouth 2 (Two) Times a Day With Meals.   2/9/2024    guaiFENesin (MUCINEX) 600 MG 12 hr tablet Take 2 tablets by mouth Every 12 (Twelve) Hours. 14 tablet 0 2/9/2024    HYDROcodone-acetaminophen (NORCO)  MG per tablet Take 1 tablet by mouth Every 6 (Six) Hours As Needed for Moderate Pain.   2/10/2024    ipratropium (ATROVENT) 0.02 % nebulizer solution Take 2.5 mL by nebulization Every 6 (Six) Hours As Needed for Wheezing or Shortness of Air.   2/9/2024    methocarbamol (ROBAXIN) 750 MG tablet Take 1 tablet by mouth 3 (Three) Times a Day As Needed for Muscle Spasms. 20 tablet 0 2/9/2024    pantoprazole (PROTONIX) 40 MG EC tablet Take 1 tablet by mouth 2 (Two) Times a Day.   2/9/2024    roflumilast (DALIRESP) 500 MCG tablet tablet Take 1 tablet by mouth Every Night.   2/9/2024    valsartan-hydrochlorothiazide (Diovan HCT) 320-25 MG per tablet Take 1 tablet by mouth Daily.   2/9/2024    warfarin (COUMADIN) 3 MG tablet Take 1 tablet by mouth Every Night. 30 tablet 0 2/9/2024    zolpidem (AMBIEN) 5 MG tablet Take 1 tablet by mouth At Night As Needed.   2/9/2024    furosemide (LASIX) 20 MG tablet Take 1 tablet by mouth 2 (Two) Times a Day As Needed.   Unknown    lidocaine (Lidoderm) 5 % Place 1 patch on the skin as directed by provider Daily. Remove & Discard patch within 12 hours or as directed by MD 6 each 0 Unknown    potassium chloride 10 MEQ CR tablet Take 1 tablet by mouth 2 (Two) Times a Day As Needed (take with furosemide PRN).   Unknown       Objective:  tMax 24 hrs: Temp (24hrs),  "Av.5 °F (36.4 °C), Min:97.4 °F (36.3 °C), Max:97.6 °F (36.4 °C)      Vitals Ranges:   Temp:  [97.4 °F (36.3 °C)-97.6 °F (36.4 °C)] 97.6 °F (36.4 °C)  Heart Rate:  [79-94] 93  Resp:  [14-18] 16  BP: (134-142)/(63-95) 142/75    Intake and Output Last 3 Shifts:   No intake/output data recorded.    Exam:  /75 (BP Location: Left arm, Patient Position: Lying)   Pulse 93   Temp 97.6 °F (36.4 °C) (Oral)   Resp 16   Ht 160 cm (63\")   Wt 73 kg (161 lb)   SpO2 98%   BMI 28.52 kg/m²       02/10/24  0040   Weight: 73 kg (161 lb)     General Appearance:      HEENT:  Normocephalic, without obvious abnormality, atraumatic. Conjunctivae/corneas clear.  Normal external ear canals. Nares normal, no drainage.  Neck:  Supple, symmetrical, trachea midline. No JVD.  Lungs /Chest wall:   Bilateral basal rhonchi, respirations unlabored, symmetrical wall movement.     Heart:  Regular rate and rhythm, systolic murmur PMI left sternal border  Abdomen: Soft, nontender, no masses, no organomegaly.    Extremities: Trace edema, no clubbing or cyanosis        Data Review:  All labs (24hrs):   Recent Results (from the past 24 hour(s))   Comprehensive Metabolic Panel    Collection Time: 02/10/24  3:40 AM    Specimen: Blood   Result Value Ref Range    Glucose 122 (H) 65 - 99 mg/dL    BUN 29 (H) 8 - 23 mg/dL    Creatinine 0.88 0.57 - 1.00 mg/dL    Sodium 135 (L) 136 - 145 mmol/L    Potassium 3.7 3.5 - 5.2 mmol/L    Chloride 96 (L) 98 - 107 mmol/L    CO2 26.0 22.0 - 29.0 mmol/L    Calcium 9.6 8.6 - 10.5 mg/dL    Total Protein 7.1 6.0 - 8.5 g/dL    Albumin 4.5 3.5 - 5.2 g/dL    ALT (SGPT) 55 (H) 1 - 33 U/L    AST (SGOT) 30 1 - 32 U/L    Alkaline Phosphatase 144 (H) 39 - 117 U/L    Total Bilirubin 0.2 0.0 - 1.2 mg/dL    Globulin 2.6 gm/dL    A/G Ratio 1.7 g/dL    BUN/Creatinine Ratio 33.0 (H) 7.0 - 25.0    Anion Gap 13.0 5.0 - 15.0 mmol/L    eGFR 72.1 >60.0 mL/min/1.73   Protime-INR    Collection Time: 02/10/24  3:40 AM    Specimen: Blood "   Result Value Ref Range    Protime 26.5 19.4 - 28.5 Seconds    INR 2.61 2.00 - 3.00   CBC Auto Differential    Collection Time: 02/10/24  3:40 AM    Specimen: Blood   Result Value Ref Range    WBC 11.70 (H) 3.40 - 10.80 10*3/mm3    RBC 4.54 3.77 - 5.28 10*6/mm3    Hemoglobin 12.8 12.0 - 15.9 g/dL    Hematocrit 39.9 34.0 - 46.6 %    MCV 87.8 79.0 - 97.0 fL    MCH 28.2 26.6 - 33.0 pg    MCHC 32.1 31.5 - 35.7 g/dL    RDW 16.9 (H) 12.3 - 15.4 %    RDW-SD 51.2 37.0 - 54.0 fl    MPV 7.7 6.0 - 12.0 fL    Platelets 349 140 - 450 10*3/mm3   Scan Slide    Collection Time: 02/10/24  3:40 AM    Specimen: Blood   Result Value Ref Range    Scan Slide     Manual Differential    Collection Time: 02/10/24  3:40 AM    Specimen: Blood   Result Value Ref Range    Neutrophil % 76.0 42.7 - 76.0 %    Lymphocyte % 10.0 (L) 19.6 - 45.3 %    Monocyte % 8.0 5.0 - 12.0 %    Eosinophil % 1.0 0.3 - 6.2 %    Bands %  3.0 0.0 - 5.0 %    Atypical Lymphocyte % 2.0 0.0 - 5.0 %    Neutrophils Absolute 9.24 (H) 1.70 - 7.00 10*3/mm3    Lymphocytes Absolute 1.40 0.70 - 3.10 10*3/mm3    Monocytes Absolute 0.94 (H) 0.10 - 0.90 10*3/mm3    Eosinophils Absolute 0.12 0.00 - 0.40 10*3/mm3    Anisocytosis Slight/1+ None Seen    Toxic Granulation Slight/1+ None Seen    Platelet Morphology Normal Normal   TSH    Collection Time: 02/10/24  3:40 AM    Specimen: Blood   Result Value Ref Range    TSH 3.200 0.270 - 4.200 uIU/mL   T4, Free    Collection Time: 02/10/24  3:40 AM    Specimen: Blood   Result Value Ref Range    Free T4 1.20 0.93 - 1.70 ng/dL   Urinalysis With Culture If Indicated - Urine, Clean Catch    Collection Time: 02/10/24  6:37 AM    Specimen: Urine, Clean Catch   Result Value Ref Range    Color, UA Yellow Yellow, Straw    Appearance, UA Clear Clear    pH, UA 5.5 5.0 - 8.0    Specific Gravity, UA 1.017 1.005 - 1.030    Glucose, UA Negative Negative    Ketones, UA Negative Negative    Bilirubin, UA Negative Negative    Blood, UA Negative Negative     Protein, UA Negative Negative    Leuk Esterase, UA Trace (A) Negative    Nitrite, UA Negative Negative    Urobilinogen, UA 0.2 E.U./dL 0.2 - 1.0 E.U./dL   Respiratory Panel PCR w/COVID-19(SARS-CoV-2) ONOFRE/GABRIELLE/PIPO/PAD/COR/STEVE In-House, NP Swab in UTM/VTM, 2 HR TAT - Swab, Nasopharynx    Collection Time: 02/10/24  6:37 AM    Specimen: Nasopharynx; Swab   Result Value Ref Range    ADENOVIRUS, PCR Not Detected Not Detected    Coronavirus 229E Not Detected Not Detected    Coronavirus HKU1 Not Detected Not Detected    Coronavirus NL63 Not Detected Not Detected    Coronavirus OC43 Not Detected Not Detected    COVID19 Not Detected Not Detected - Ref. Range    Human Metapneumovirus Not Detected Not Detected    Human Rhinovirus/Enterovirus Not Detected Not Detected    Influenza A PCR Not Detected Not Detected    Influenza B PCR Not Detected Not Detected    Parainfluenza Virus 1 Not Detected Not Detected    Parainfluenza Virus 2 Not Detected Not Detected    Parainfluenza Virus 3 Not Detected Not Detected    Parainfluenza Virus 4 Not Detected Not Detected    RSV, PCR Not Detected Not Detected    Bordetella pertussis pcr Not Detected Not Detected    Bordetella parapertussis PCR Not Detected Not Detected    Chlamydophila pneumoniae PCR Not Detected Not Detected    Mycoplasma pneumo by PCR Not Detected Not Detected   S. Pneumo Ag Urine or CSF - Urine, Urine, Clean Catch    Collection Time: 02/10/24  6:37 AM    Specimen: Urine, Clean Catch   Result Value Ref Range    Strep Pneumo Ag Negative Negative   Legionella Antigen, Urine - Urine, Urine, Clean Catch    Collection Time: 02/10/24  6:37 AM    Specimen: Urine, Clean Catch   Result Value Ref Range    LEGIONELLA ANTIGEN, URINE Negative Negative   Urinalysis, Microscopic Only - Urine, Clean Catch    Collection Time: 02/10/24  6:37 AM    Specimen: Urine, Clean Catch   Result Value Ref Range    RBC, UA 0-2 None Seen, 0-2 /HPF    WBC, UA 0-2 None Seen, 0-2 /HPF    Bacteria, UA None Seen  None Seen /HPF    Squamous Epithelial Cells, UA 0-2 None Seen, 0-2 /HPF    Hyaline Casts, UA None Seen None Seen /LPF    Methodology Automated Microscopy         Imaging:  XR Chest 1 View    Result Date: 2/10/2024  XR CHEST 1 VW Date of Exam: 2/10/2024 6:09 AM EST Indication: RLL mucous plug, atelect per CT abd Comparison: None available. Findings: There is persistent partial right lower lobe atelectasis with elevated right hemidiaphragm. Heart size is normal. No pleural effusion or pneumothorax. No acute osseous abnormalities. There is minor left basilar atelectasis. Pulmonary vasculature is within normal limits.     Impression: Persistent partial right lower lobe atelectasis with elevated right hemidiaphragm. Electronically Signed: Zain Mays MD  2/10/2024 6:29 AM EST  Workstation ID: [id number]    CT Abdomen Pelvis Without Contrast    Result Date: 2/10/2024  CT ABDOMEN PELVIS WO CONTRAST Date of Exam: 2/10/2024 4:34 AM EST Indication: Right flank pain history of lumbar surgery on 7 January. Comparison: 11/17/2023 and 6/29/2023. Technique: Axial CT images were obtained of the abdomen and pelvis without the administration of contrast. Sagittal and coronal reconstructions were performed.  Automated exposure control and iterative reconstruction methods were used. Findings: There is dependent bibasilar atelectasis, right greater than left. Emphysema noted in the lung bases. Emphysema on CT is an independent risk factor for lung cancer. Low dose lung cancer screening should be considered if patient qualifies based on smoking  history or if not already enrolled in a screening program. Heart size is normal. There is a large stomach containing hiatal hernia. There is mucous plugging in the bronchus to the right lower lobe with obstruction of the airways. No acute findings in the superficial soft tissues. Small fat-containing midline supraumbilical hernia. There is evidence of prior ventral laparotomy. There is streak  artifact from a right hip prosthesis. There is minimal DJD at the left hip. Compared with 6/29/2023, there are new mild superior plate compression fractures at T12 and L2 status post kyphoplasty. There is also new mild inferior endplate compression fracture at L1 with approximately 10% loss of height. There is moderate thoracolumbar spondylosis. There is an old healed left lateral ninth rib fracture. The liver is homogeneous. Patient is status post cholecystectomy. There is new mild biliary dilatation with the common bile duct measuring up to 14 mm. The distal stomach, spleen and adrenal glands appear within normal limits. There is no hydronephrosis or visible urolithiasis. The distal ureters are obscured by streak artifact as is the majority of the urinary bladder. The appendix is not seen. There is a colonic anastomosis, likely from partial distal colon resection. Small bowel is nondistended. There is no ascites, pneumoperitoneum or lymphadenopathy. There is mild aortoiliac atherosclerosis. No aneurysm.     Impression: 1.There is mucous plugging in the bronchus to the right lower lobe with obstruction and right lower lobe atelectasis. 2.There is new mild biliary dilatation status post cholecystectomy. Correlate for symptoms and laboratory values of biliary obstruction. This could be physiologic 3.New mild compression fractures at T12, L2 and L1 status post kyphoplasty at T12 and L2. 4.Large hiatal hernia. Electronically Signed: Zain Mays MD  2/10/2024 4:49 AM EST  Workstation ID: [id number]    DEXA Bone Density Axial    Result Date: 2/9/2024  DATE OF EXAM: 2/9/2024 10:30 AM  PROCEDURE: DEXA BONE DENSITY AXIAL-  INDICATIONS: M81.0; M81.0-Age-related osteoporosis without current pathological fracture  COMPARISON: None.  TECHNIQUE: Lumbar vertebral and proximal femoral Dual-Energy X-ray Absorptiometry (DEXA) was performed on the [employer].  FINDINGS: A full detailed summary report from the DEXA scan  performed is located in the patients chart in [facility].  The patient reports history of L1 and L3 kyphoplasty.  The T-score in the distal left forearm is -1.5.  The T-score in the proximal femur is -1.9.  According to the World Health Organization criteria, this is classified as osteopenia.  Using the FRAX scores, which utilized risk factors and left femoral neck bone density: The 10 year probability of major osteoporotic fracture is 16%. The 10 year probability of a hip fracture is 2.6%.      Osteopenia. Based upon the National Osteoporosis Foundation Guide, patient's FRAX score does not meet criteria for pharmacologic treatment to prevent osteoporosis. Individual treatment decisions should be made based upon each patient's full clinical history and risk factors.   Copies of the computerized summary reports can be obtained from [facility] via the health information department of [facility].   Electronically Signed By-Mai Bhat MD On:2/9/2024 11:28 AM      Adult Transthoracic Echo Complete W/ Cont if Necessary Per Protocol    Result Date: 1/12/2024  This result has an attachment that is not available.    NM quantitative differential pulmonary perfusion    Result Date: 1/12/2024  Quantitative lung perfusion scan. INDICATION: Evaluation for bronchial valves placement. COMPARISON: None. TECHNIQUE: 4.5 mCi of technetium 99 MAA was intravenously administered. Subsequently planar images of the lungs were obtained in different projections. Regions of interest around the lungs were drawn and the lungs were divided in upper, mid and lower lung zones. The geometric mean was calculated in the anterior and posterior projections and the percentage of activity was calculated. FINDINGS: There is bilateral heterogeneity in the activity likely related with known emphysematous changes. No perfusion defects were demonstrated. Quantitative perfusion. Left lung: Total: 36.63% Left upper lung zone: 12.73% Left mid lung zone: 17.98%  Left lower lung zone: 5.93% Right lung: Total: 63.37% Right upper lung zone: 16.29% Right mid lung zone: 30.90% Right lower lung zone: 16.2% IMPRESSION: Quantitative perfusion scan as detailed above. No perfusion defects demonstrated. Dictated by: Jose Unger MD Signed by Jose Unger MD on 1/12/2024 12:16 ##### Final ##### Dictated by:    JOSE UNGER MD-RAD Dictated DT/TM: 01/12/2024 12:16 pm Interpreted and electronically signed by:  JOSE UNGER MD-RAD Signed DT/TM:  01/12/2024 12:16 pm    CT navigational chest wo    Result Date: 1/12/2024  ACCESSION NUMBER: [id number] DATE: 1/12/2024 10:18 EXAMINATION: CT Navigational Chest WO PROVIDED INDICATION: pre BLVR COMPARISON: None TECHNIQUE: Axial computed tomographic images of the chest were acquired without administration of intravenous contrast. Images were reconstructed in the axial plane and reformatted as axial MIP, coronal and and sagittal MPR. FINDINGS: Lack of intravenous contrast limits evaluation of the vasculature and hilar structures. Lungs and large airways: Mild secretions within the dependent trachea and right mainstem bronchus. Mild biapical pleural parenchymal scarring. Moderate diffuse centrilobular emphysema predominantly in the upper lobes. Near-complete atelectatic collapse with scarring right middle lobe. Subsegmental atelectasis or scarring in both lower lobes. Pleura: No pleural effusion.  No pneumothorax. Mediastinum and Julianna: No enlarged mediastinal or hilar lymph nodes. Calcified remnants of prior granulomatous disease in the hilum and mediastinum. Heart and Pericardium: Heart size normal. No pericardial effusion. Mild coronary artery calcifications. Vessels: The ascending aorta is nonaneurysmal measuring 3.7 cm in AP dimension.  The main pulmonary artery is nondilated. Chest Wall and Lower Neck: Thyroid gland appears normal. No enlarged supraclavicular or axillary lymph nodes. Included Upper Abdomen: Scattered  calcified granulomas in the posterior subdiaphragmatic aspect of right hepatic lobe. Status post cholecystectomy. Fat-containing midline ventral hernia defect measures 3.0 cm without evidence of herniated bowel. Small to moderate-sized hiatal hernia. Kyphoplasty cement noted in L1 vertebral body. Multilevel endplate changes in the thoracic spine. Bones: No acute or aggressive bony lesions. IMPRESSION: 1.  Near-complete atelectatic collapse with scarring of right middle lobe. Mild subsegmental atelectasis or scarring in both lower lobes. 2.  Additional chronic and incidental findings, as detailed above. Dictated by: Noman Amezquita MD Signed by Noman Amezquita MD on 1/17/2024 11:10 ##### Final ##### Dictated by:    NOMAN AMEZQUITA MD-RAD Dictated DT/TM: 01/17/2024 11:10 am Interpreted and electronically signed by:  NOMAN AMEZQUITA MD-RAD Signed DT/TM:  01/17/2024 11:10 am       Current:  acetylcysteine, 3 mL, Nebulization, TID - RT  budesonide-formoterol, 2 puff, Inhalation, BID - RT  carvedilol, 3.125 mg, Oral, BID With Meals  doxycycline, 100 mg, Intravenous, BID  valsartan, 320 mg, Oral, Q24H   And  hydroCHLOROthiazide, 12.5 mg, Oral, Q24H  pantoprazole, 40 mg, Oral, BID AC  roflumilast, 500 mcg, Oral, Nightly  sodium chloride, 10 mL, Intravenous, Q12H  warfarin, 3 mg, Oral, Daily        Infusion:  Pharmacy to dose warfarin,   sodium chloride, 100 mL/hr         PRN:    acetaminophen    Calcium Replacement - Follow Nurse / BPA Driven Protocol    cyclobenzaprine    HYDROcodone-acetaminophen    ipratropium-albuterol    Magnesium Standard Dose Replacement - Follow Nurse / BPA Driven Protocol    nitroglycerin    ondansetron ODT **OR** ondansetron    Pharmacy to dose warfarin    Phosphorus Replacement - Follow Nurse / BPA Driven Protocol    Potassium Replacement - Follow Nurse / BPA Driven Protocol    sodium chloride    sodium chloride    ASSESSMENT:  COPD  Hx PE  Lumbar compression fracture  Hx bladder  cancer  Arthritis  Hypothyroidism  HTN  Obesity  Anxiety/Depression/Insomnia       PLAN:  Will hold on bronch as her INR is 2.6  Antibiotics  Bronchodilators  Inhaled corticosteroids  Mucomyst  Electrolytes/ glycemic control  DVT and GI prophylaxis-Warfarin    Discussed with Dr Preston Zhong, APRN  2/10/2024  11:10 EST      I personally have examined  and interviewed the patient. I have reviewed the history, data, problems, assessment and plan with our NP.  Total Critical care time in direct medical management (   ) minutes, This time specifically excludes time spent performing procedures.    Win Johnston MD   2/10/2024  22:39 EST

## 2024-02-10 NOTE — PROGRESS NOTES
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    INR Goal: 2 - 3  Home medication?: warfarin 3 mg PO daily  Bridge Therapy Present?:  No  Interacting Medications Evaluation (New/Present/Discontinued): New-doxycycline and rocephin  Additional Contributing Factors: none      Assessment/Plan:    Patient INR currently therapeutic.  Last dose 2/9/24 at 2030 prior to admission.  Will continue current dose and monitor.     Continue to monitor and adjust based on INR.    ADDENDUM: Dr. Wagoner later held warfarin for procedure. Will follow for plans to resume after procedure.          Date 2/10           INR 2.61           Dose HOLD               Objective:  [Ht: 160 cm (63\"); Wt: 73 kg (161 lb); BMI: Body mass index is 28.52 kg/m².]    Lab Results   Component Value Date    ALBUMIN 4.5 02/10/2024     Lab Results   Component Value Date    INR 2.61 02/10/2024    INR 1.85 (L) 12/09/2023    INR 1.69 (L) 12/07/2023    PROTIME 26.5 02/10/2024    PROTIME 19.3 (L) 12/09/2023    PROTIME 17.7 (L) 12/07/2023     Lab Results   Component Value Date    HGB 12.8 02/10/2024    HGB 12.5 12/09/2023    HGB 12.6 12/08/2023     Lab Results   Component Value Date    HCT 39.9 02/10/2024    HCT 38.6 12/09/2023    HCT 38.7 12/08/2023       Kiet Turcios, PharmPARDEEP  02/10/24 06:44 EST     "

## 2024-02-10 NOTE — PLAN OF CARE
Problem: Adult Inpatient Plan of Care  Goal: Plan of Care Review  Outcome: Ongoing, Progressing  Flowsheets (Taken 2/10/2024 1616)  Plan of Care Reviewed With: patient  Outcome Evaluation: patient stable on 2 liters oxygen, norco for pain PRN. Neurosurgery will see patient tomorrow, bronchoscopy tomorrow, NPO at midnight. plan of care to continue.  Goal: Patient-Specific Goal (Individualized)  Outcome: Ongoing, Progressing  Goal: Absence of Hospital-Acquired Illness or Injury  Outcome: Ongoing, Progressing  Intervention: Identify and Manage Fall Risk  Description: Perform standard risk assessment on admission using a validated tool or comprehensive approach appropriate to the patient; reassess fall risk frequently, with change in status or transfer to another level of care.  Communicate fall injury risk to interprofessional healthcare team.  Determine need for increased observation, equipment and environmental modification, such as low bed, signage and supportive, nonskid footwear.  Adjust safety measures to individual developmental age, stage and identified risk factors.  Reinforce the importance of safety and physical activity with patient and family.  Perform regular intentional rounding to assess need for position change, pain assessment and personal needs, including assistance with toileting.  Recent Flowsheet Documentation  Taken 2/10/2024 1600 by Crystal Pruitt RN  Safety Promotion/Fall Prevention: safety round/check completed  Taken 2/10/2024 1400 by Crystal Pruitt RN  Safety Promotion/Fall Prevention: safety round/check completed  Taken 2/10/2024 1200 by Crystal Pruitt RN  Safety Promotion/Fall Prevention: safety round/check completed  Taken 2/10/2024 1004 by Crystal Pruitt, RN  Safety Promotion/Fall Prevention:   safety round/check completed   room organization consistent  Intervention: Prevent Skin Injury  Description: Perform a screening for skin injury risk, such as pressure or moisture associated skin  damage on admission and at regular intervals throughout hospital stay.  Keep all areas of skin (especially folds) clean and dry.  Maintain adequate skin hydration.  Relieve and redistribute pressure and protect bony prominences; implement measures based on patient-specific risk factors.  Match turning and repositioning schedule to clinical condition.  Encourage weight shift frequently; assist with reposition if unable to complete independently.  Float heels off bed; avoid pressure on the Achilles tendon.  Keep skin free from extended contact with medical devices.  Encourage functional activity and mobility, as early as tolerated.  Use aids (e.g., slide boards, mechanical lift) during transfer.  Recent Flowsheet Documentation  Taken 2/10/2024 1600 by Crystal Pruitt RN  Body Position: position changed independently  Skin Protection:   adhesive use limited   tubing/devices free from skin contact  Taken 2/10/2024 1200 by Crystal Pruitt RN  Body Position: position changed independently  Skin Protection:   adhesive use limited   tubing/devices free from skin contact  Taken 2/10/2024 1004 by Crystal Pruitt RN  Body Position: position changed independently  Skin Protection:   adhesive use limited   tubing/devices free from skin contact  Intervention: Prevent and Manage VTE (Venous Thromboembolism) Risk  Description: Assess for VTE (venous thromboembolism) risk.  Encourage and assist with early ambulation.  Initiate and maintain compression or other therapy, as indicated, based on identified risk in accordance with organizational protocol and provider order.  Encourage both active and passive leg exercises while in bed, if unable to ambulate.  Recent Flowsheet Documentation  Taken 2/10/2024 1200 by Crystal Pruitt RN  VTE Prevention/Management:   bilateral   sequential compression devices off  Taken 2/10/2024 1004 by Crystal Pruitt RN  Activity Management: up to bedside commode  VTE Prevention/Management:   bilateral    sequential compression devices off  Goal: Optimal Comfort and Wellbeing  Outcome: Ongoing, Progressing  Intervention: Provide Person-Centered Care  Description: Use a family-focused approach to care.  Develop trust and rapport by proactively providing information, encouraging questions, addressing concerns and offering reassurance.  Acknowledge emotional response to hospitalization.  Recognize and utilize personal coping strategies.  Honor spiritual and cultural preferences.  Recent Flowsheet Documentation  Taken 2/10/2024 1004 by Crystal Pruitt RN  Trust Relationship/Rapport:   care explained   choices provided   thoughts/feelings acknowledged   reassurance provided  Goal: Readiness for Transition of Care  Outcome: Ongoing, Progressing  Intervention: Mutually Develop Transition Plan  Description: Identify available resources for support (e.g., family, friends, community).  Identify and address barriers to ongoing treatment and home management (e.g., environmental, financial).  Provide opportunities to practice self-management skills.  Assess and monitor emotional readiness for transition.  Establish or reconnect linkage with outpatient providers or community-based services.  Recent Flowsheet Documentation  Taken 2/10/2024 1008 by Crystal Pruitt RN  Transportation Anticipated: family or friend will provide  Patient/Family Anticipated Services at Transition: none  Patient/Family Anticipates Transition to: home with family   Goal Outcome Evaluation:  Plan of Care Reviewed With: patient           Outcome Evaluation: patient stable on 2 liters oxygen, norco for pain PRN. Neurosurgery will see patient tomorrow, bronchoscopy tomorrow, NPO at midnight. plan of care to continue.

## 2024-02-11 ENCOUNTER — APPOINTMENT (OUTPATIENT)
Dept: MRI IMAGING | Facility: HOSPITAL | Age: 69
DRG: 194 | End: 2024-02-11
Payer: MEDICARE

## 2024-02-11 LAB
ALBUMIN SERPL-MCNC: 3.5 G/DL (ref 3.5–5.2)
ALBUMIN/GLOB SERPL: 1.6 G/DL
ALP SERPL-CCNC: 126 U/L (ref 39–117)
ALT SERPL W P-5'-P-CCNC: 38 U/L (ref 1–33)
ANION GAP SERPL CALCULATED.3IONS-SCNC: 10 MMOL/L (ref 5–15)
AST SERPL-CCNC: 21 U/L (ref 1–32)
BASOPHILS # BLD AUTO: 0 10*3/MM3 (ref 0–0.2)
BASOPHILS NFR BLD AUTO: 0.2 % (ref 0–1.5)
BILIRUB SERPL-MCNC: <0.2 MG/DL (ref 0–1.2)
BUN SERPL-MCNC: 19 MG/DL (ref 8–23)
BUN/CREAT SERPL: 19.6 (ref 7–25)
CALCIUM SPEC-SCNC: 8.6 MG/DL (ref 8.6–10.5)
CHLORIDE SERPL-SCNC: 102 MMOL/L (ref 98–107)
CO2 SERPL-SCNC: 26 MMOL/L (ref 22–29)
CREAT SERPL-MCNC: 0.97 MG/DL (ref 0.57–1)
DEPRECATED RDW RBC AUTO: 53.4 FL (ref 37–54)
EGFRCR SERPLBLD CKD-EPI 2021: 64.2 ML/MIN/1.73
EOSINOPHIL # BLD AUTO: 0.1 10*3/MM3 (ref 0–0.4)
EOSINOPHIL NFR BLD AUTO: 0.9 % (ref 0.3–6.2)
ERYTHROCYTE [DISTWIDTH] IN BLOOD BY AUTOMATED COUNT: 17.3 % (ref 12.3–15.4)
GGT SERPL-CCNC: 65 U/L (ref 5–36)
GLOBULIN UR ELPH-MCNC: 2.2 GM/DL
GLUCOSE SERPL-MCNC: 117 MG/DL (ref 65–99)
HCT VFR BLD AUTO: 35.9 % (ref 34–46.6)
HGB BLD-MCNC: 11.8 G/DL (ref 12–15.9)
INR PPP: 1.89 (ref 2–3)
INR PPP: 2.39 (ref 2–3)
LYMPHOCYTES # BLD AUTO: 1.2 10*3/MM3 (ref 0.7–3.1)
LYMPHOCYTES NFR BLD AUTO: 11 % (ref 19.6–45.3)
MCH RBC QN AUTO: 29.2 PG (ref 26.6–33)
MCHC RBC AUTO-ENTMCNC: 32.9 G/DL (ref 31.5–35.7)
MCV RBC AUTO: 88.8 FL (ref 79–97)
MONOCYTES # BLD AUTO: 0.7 10*3/MM3 (ref 0.1–0.9)
MONOCYTES NFR BLD AUTO: 7 % (ref 5–12)
NEUTROPHILS NFR BLD AUTO: 8.6 10*3/MM3 (ref 1.7–7)
NEUTROPHILS NFR BLD AUTO: 80.9 % (ref 42.7–76)
NRBC BLD AUTO-RTO: 0 /100 WBC (ref 0–0.2)
PLATELET # BLD AUTO: 278 10*3/MM3 (ref 140–450)
PMV BLD AUTO: 8 FL (ref 6–12)
POTASSIUM SERPL-SCNC: 3.6 MMOL/L (ref 3.5–5.2)
PROT SERPL-MCNC: 5.7 G/DL (ref 6–8.5)
PROTHROMBIN TIME: 19.7 SECONDS (ref 19.4–28.5)
PROTHROMBIN TIME: 24.4 SECONDS (ref 19.4–28.5)
RBC # BLD AUTO: 4.04 10*6/MM3 (ref 3.77–5.28)
SODIUM SERPL-SCNC: 138 MMOL/L (ref 136–145)
WBC NRBC COR # BLD AUTO: 10.6 10*3/MM3 (ref 3.4–10.8)

## 2024-02-11 PROCEDURE — 97162 PT EVAL MOD COMPLEX 30 MIN: CPT

## 2024-02-11 PROCEDURE — 94664 DEMO&/EVAL PT USE INHALER: CPT

## 2024-02-11 PROCEDURE — 85025 COMPLETE CBC W/AUTO DIFF WBC: CPT | Performed by: STUDENT IN AN ORGANIZED HEALTH CARE EDUCATION/TRAINING PROGRAM

## 2024-02-11 PROCEDURE — 99203 OFFICE O/P NEW LOW 30 MIN: CPT | Performed by: NEUROLOGICAL SURGERY

## 2024-02-11 PROCEDURE — 36415 COLL VENOUS BLD VENIPUNCTURE: CPT | Performed by: STUDENT IN AN ORGANIZED HEALTH CARE EDUCATION/TRAINING PROGRAM

## 2024-02-11 PROCEDURE — 94799 UNLISTED PULMONARY SVC/PX: CPT

## 2024-02-11 PROCEDURE — 80053 COMPREHEN METABOLIC PANEL: CPT | Performed by: STUDENT IN AN ORGANIZED HEALTH CARE EDUCATION/TRAINING PROGRAM

## 2024-02-11 PROCEDURE — 85610 PROTHROMBIN TIME: CPT | Performed by: STUDENT IN AN ORGANIZED HEALTH CARE EDUCATION/TRAINING PROGRAM

## 2024-02-11 PROCEDURE — 72148 MRI LUMBAR SPINE W/O DYE: CPT

## 2024-02-11 PROCEDURE — 85610 PROTHROMBIN TIME: CPT | Performed by: NURSE PRACTITIONER

## 2024-02-11 PROCEDURE — 63710000001 PREDNISONE PER 1 MG: Performed by: STUDENT IN AN ORGANIZED HEALTH CARE EDUCATION/TRAINING PROGRAM

## 2024-02-11 PROCEDURE — 82607 VITAMIN B-12: CPT | Performed by: STUDENT IN AN ORGANIZED HEALTH CARE EDUCATION/TRAINING PROGRAM

## 2024-02-11 RX ORDER — PREDNISONE 10 MG/1
10 TABLET ORAL DAILY
COMMUNITY

## 2024-02-11 RX ORDER — WARFARIN SODIUM 3 MG/1
3 TABLET ORAL
Status: DISCONTINUED | OUTPATIENT
Start: 2024-02-11 | End: 2024-02-12 | Stop reason: HOSPADM

## 2024-02-11 RX ORDER — IPRATROPIUM BROMIDE AND ALBUTEROL SULFATE 2.5; .5 MG/3ML; MG/3ML
3 SOLUTION RESPIRATORY (INHALATION) EVERY 6 HOURS PRN
COMMUNITY

## 2024-02-11 RX ORDER — LIDOCAINE 4 G/G
1 PATCH TOPICAL
Status: DISCONTINUED | OUTPATIENT
Start: 2024-02-11 | End: 2024-02-12 | Stop reason: HOSPADM

## 2024-02-11 RX ORDER — WARFARIN SODIUM 2 MG/1
1 TABLET ORAL
Status: DISCONTINUED | OUTPATIENT
Start: 2024-02-11 | End: 2024-02-11

## 2024-02-11 RX ORDER — PREDNISONE 20 MG/1
20 TABLET ORAL
Status: DISCONTINUED | OUTPATIENT
Start: 2024-02-11 | End: 2024-02-12 | Stop reason: HOSPADM

## 2024-02-11 RX ADMIN — Medication 10 ML: at 20:25

## 2024-02-11 RX ADMIN — ROFLUMILAST 500 MCG: 500 TABLET ORAL at 20:25

## 2024-02-11 RX ADMIN — IPRATROPIUM BROMIDE AND ALBUTEROL SULFATE 3 ML: .5; 3 SOLUTION RESPIRATORY (INHALATION) at 19:22

## 2024-02-11 RX ADMIN — CARVEDILOL 3.12 MG: 3.12 TABLET, FILM COATED ORAL at 17:06

## 2024-02-11 RX ADMIN — HYDROCODONE BITARTRATE AND ACETAMINOPHEN 1 TABLET: 10; 325 TABLET ORAL at 20:25

## 2024-02-11 RX ADMIN — HYDROCODONE BITARTRATE AND ACETAMINOPHEN 1 TABLET: 10; 325 TABLET ORAL at 05:21

## 2024-02-11 RX ADMIN — LIDOCAINE 1 PATCH: 4 PATCH TOPICAL at 10:57

## 2024-02-11 RX ADMIN — Medication 10 ML: at 08:06

## 2024-02-11 RX ADMIN — DOXYCYCLINE 100 MG: 100 INJECTION, POWDER, LYOPHILIZED, FOR SOLUTION INTRAVENOUS at 08:06

## 2024-02-11 RX ADMIN — BUDESONIDE 0.5 MG: 0.5 INHALANT RESPIRATORY (INHALATION) at 19:26

## 2024-02-11 RX ADMIN — HYDROCODONE BITARTRATE AND ACETAMINOPHEN 1 TABLET: 10; 325 TABLET ORAL at 14:09

## 2024-02-11 RX ADMIN — ACETYLCYSTEINE 3 ML: 200 SOLUTION ORAL; RESPIRATORY (INHALATION) at 19:22

## 2024-02-11 RX ADMIN — IPRATROPIUM BROMIDE AND ALBUTEROL SULFATE 3 ML: .5; 3 SOLUTION RESPIRATORY (INHALATION) at 07:05

## 2024-02-11 RX ADMIN — PANTOPRAZOLE SODIUM 40 MG: 40 TABLET, DELAYED RELEASE ORAL at 17:06

## 2024-02-11 RX ADMIN — IPRATROPIUM BROMIDE AND ALBUTEROL SULFATE 3 ML: .5; 3 SOLUTION RESPIRATORY (INHALATION) at 04:22

## 2024-02-11 RX ADMIN — WARFARIN SODIUM 3 MG: 3 TABLET ORAL at 17:06

## 2024-02-11 RX ADMIN — BUDESONIDE 0.5 MG: 0.5 INHALANT RESPIRATORY (INHALATION) at 07:10

## 2024-02-11 RX ADMIN — DOXYCYCLINE 100 MG: 100 INJECTION, POWDER, LYOPHILIZED, FOR SOLUTION INTRAVENOUS at 20:25

## 2024-02-11 RX ADMIN — PREDNISONE 20 MG: 20 TABLET ORAL at 10:57

## 2024-02-11 RX ADMIN — IPRATROPIUM BROMIDE AND ALBUTEROL SULFATE 3 ML: .5; 3 SOLUTION RESPIRATORY (INHALATION) at 11:13

## 2024-02-11 RX ADMIN — ACETYLCYSTEINE 3 ML: 200 SOLUTION ORAL; RESPIRATORY (INHALATION) at 07:15

## 2024-02-11 NOTE — PLAN OF CARE
Goal Outcome Evaluation:   Pt rested well during the night.  NPO at midnight.  Will continue current plan of care.

## 2024-02-11 NOTE — PROGRESS NOTES
[facility] MEDICINE SERVICE  DAILY PROGRESS NOTE    NAME: Renuka Pelayo  : 1955  MRN: [id number]      LOS: 0 days     PROVIDER OF SERVICE: Daniel Wagoner MD    Chief Complaint: Lumbar compression fracture, closed, initial encounter  Renuka Pelayo is a 68 y.o. female who presented to [facility] for worsening back pain. Of note, states she had a kyphoplasty on 24.  Admits to -7/10 worsening lower back pain over the past week that is worse with movement, believes she may have sat down hard but no overt trauama.  Also admits to worsening cough complicated by right flank pain with deep breaths   Subjective:     Interval History:  History taken from: patient  Patient Complaints: Cough and shortness of breath improving per pulmonary no need for bronchoscopy as patient is improving was evaluated by neurosurgery recommending TLSO and follow-up with orthopedics team as outpatient  Patient Denies: Weakness of upper or lower extremities    Review of Systems:   Review of Systems  10 point review of system unremarkable except mentioned above  Objective:     Vital Signs  Temp:  [97.5 °F (36.4 °C)-98.4 °F (36.9 °C)] 98.4 °F (36.9 °C)  Heart Rate:  [] 108  Resp:  [16-22] 21  BP: ()/(51-74) 100/63  Flow (L/min):  [2-3] 3   Body mass index is 28.34 kg/m².    Physical Exam  Physical Exam  HENT:      Head: Atraumatic.      Mouth/Throat:      Mouth: Mucous membranes are moist.   Eyes:      Pupils: Pupils are equal, round, and reactive to light.   Cardiovascular:      Rate and Rhythm: Normal rate and regular rhythm.   Pulmonary:      Effort: Pulmonary effort is normal.      Breath sounds: Normal breath sounds.      Comments: On oxygen supplementation 2 LPM  Abdominal:      Palpations: Abdomen is soft.   Musculoskeletal:         General: Normal range of motion.      Cervical back: Neck supple.   Skin:     General: Skin is warm.   Neurological:      General: No focal deficit present.      Mental Status: She is alert  and oriented to person, place, and time.   Psychiatric:         Mood and Affect: Mood normal.         Scheduled Meds   acetylcysteine, 3 mL, Nebulization, TID - RT  budesonide, 0.5 mg, Nebulization, BID - RT  carvedilol, 3.125 mg, Oral, BID With Meals  doxycycline, 100 mg, Intravenous, BID  valsartan, 320 mg, Oral, Q24H   And  [Held by provider] hydroCHLOROthiazide, 12.5 mg, Oral, Q24H  ipratropium-albuterol, 3 mL, Nebulization, 4x Daily - RT  Lidocaine, 1 patch, Transdermal, Q24H  pantoprazole, 40 mg, Oral, BID AC  predniSONE, 20 mg, Oral, Daily With Breakfast  roflumilast, 500 mcg, Oral, Nightly  sodium chloride, 10 mL, Intravenous, Q12H  warfarin, 3 mg, Oral, Daily       PRN Meds     acetaminophen    Calcium Replacement - Follow Nurse / BPA Driven Protocol    cyclobenzaprine    HYDROcodone-acetaminophen    ipratropium-albuterol    Magnesium Standard Dose Replacement - Follow Nurse / BPA Driven Protocol    nitroglycerin    ondansetron ODT **OR** ondansetron    Pharmacy to dose warfarin    Phosphorus Replacement - Follow Nurse / BPA Driven Protocol    Potassium Replacement - Follow Nurse / BPA Driven Protocol    sodium chloride    sodium chloride   Infusions  Pharmacy to dose warfarin,           Diagnostic Data    Results from last 7 days   Lab Units 02/10/24  2334   WBC 10*3/mm3 10.60   HEMOGLOBIN g/dL 11.8*   HEMATOCRIT % 35.9   PLATELETS 10*3/mm3 278   GLUCOSE mg/dL 117*   CREATININE mg/dL 0.97   BUN mg/dL 19   SODIUM mmol/L 138   POTASSIUM mmol/L 3.6   AST (SGOT) U/L 21   ALT (SGPT) U/L 38*   ALK PHOS U/L 126*   BILIRUBIN mg/dL <0.2   ANION GAP mmol/L 10.0       XR Chest 1 View    Result Date: 2/10/2024  Impression: Persistent partial right lower lobe atelectasis with elevated right hemidiaphragm. Electronically Signed: Zain Mays MD  2/10/2024 6:29 AM EST  Workstation ID: [id number]    CT Abdomen Pelvis Without Contrast    Result Date: 2/10/2024  Impression: 1.There is mucous plugging in the bronchus to the  right lower lobe with obstruction and right lower lobe atelectasis. 2.There is new mild biliary dilatation status post cholecystectomy. Correlate for symptoms and laboratory values of biliary obstruction. This could be physiologic 3.New mild compression fractures at T12, L2 and L1 status post kyphoplasty at T12 and L2. 4.Large hiatal hernia. Electronically Signed: Zain Mays MD  2/10/2024 4:49 AM EST  Workstation ID: [id number]       I reviewed the patient's new clinical results.    Assessment/Plan:     Active and Resolved Problems  # multilevel Compression fractures involving T12,L2  #s/p Kyphoplasty T12 and L2  #Osteopenia   -recent surgery on1/7/24     - CT a/p shows new mild compression fractures at T12, L2, and L1 s/p kyphoplasty at T12 and L2    - pt/ot/CM    -  MRI lumbar spine    - neurosurgery consulted-commended TLSO and outpatient orthopedics follow-up with her original surgery was performed    - Flexeril PRN for muscle spasms      # Rt lung Mucous plugging-improving  #Post obstructive pneumonia   #Sepsis - elevated lactic acid +leukocytosis   #Hypoxia on O2    - CT a/p shows mucous plugging in the bronchus to the right lower lobe with obstruction and right lower lobe atelectasis    - supsect post obstructive pneumonia    - f/u antigens for  strep, legionella, sputum    - blood cultures    - Rocephin 2g IV daily    - Doxycycline 100mg IV BID    - Patient on 2L nc, wean as tolerate    - start muco-myst    - pulm consulted for bronchoscopy    #Mild hyponatremia  - Started on IV fluid  - Will hold HCTZ     #COPD    - does not appear to be in acute exacerbation  - home O2 2 LPM     - Symbicort BID    - Duoneb PRN     #Biliary Dilatation    - CT a/p shows new mild biliary dilatation s/p cholecystectomy that could be biliary obstruction vs physiologic    - ALT 55, AST 30, tbili 0.2    - suspect physiologic in nature    - monitor LFTs    - if trending up will consult GI      #Large hiatal hernia  #GERD     -  noted on CT a/p, monitor  - PPI      #H/O DVT and PE    - continue home warfarin, pharm to dose    - INR 2.61 and daily INr         # Essential HTN  - monitor BP and currently hypotensive willhold diovan    will resume if elevated      #Hypothyroid    - resume  synthroid       DVT prophylaxis:  Medical DVT prophylaxis orders are present.         Code status is   Code Status and Medical Interventions:   Ordered at: 02/10/24 0622     Code Status (Patient has no pulse and is not breathing):    CPR (Attempt to Resuscitate)     Medical Interventions (Patient has pulse or is breathing):    Full Support       Plan for disposition: Home PT after placement of TLSO    Time: 35 minutes    Signature: Electronically signed by Daniel Wagoner MD, 02/11/24, 15:16 EST.  [facility]ist Team

## 2024-02-11 NOTE — CONSULTS
NEUROSURGERY CONSULT      Renuka Pelayo  1955  [id number]    Referring Provider: Provider, [place] [address]  Reason for Consultation/Chief Complaint: Back pain    Patient Care Team:  Michael Gerardo MD as PCP - Kristina James APRN as Nurse Practitioner (Nurse Practitioner)    Subjective .     History of Present Illness:    Duration: 1 week  Severity: Moderate  Timing: Subacute  Associated Symptoms: Back pain, cough  Narrative: Patient has a history of COPD and was coughing.  She has had gradually worsening back pain over the past week which she describes as a 6/10.  Since last night, with pain medication her pain has improved somewhat.  She had a kyphoplasty for fracture of T12 and L2 in January 2024 with a doctor at [facility].    While in the room and during my examination of the patient I wore a mask and eye protection.  I washed my hands before and after this patient encounter.  The patient was also wearing a mask.    Review of Systems: All 14 systems are reviewed and are negative except for what is documented above in the HPI  Review of Systems    History: I have reviewed and agree with the following documented PMH, PSH, FH and there no additions or changes.  Past Medical History:   Diagnosis Date    Arthritis     Bladder cancer     Chronic back pain     Closed nondisplaced fracture of head of right radius 03/2017    Congenital deformity of right hip joint 1955    COPD (chronic obstructive pulmonary disease)     former smoker    Deep venous thrombosis     unprovoked    Depressive disorder     Disease of thyroid gland     Diverticulitis of colon     Essential hypertension     Gastroesophageal reflux disease     Insomnia     Obesity     Pulmonary embolism     provoked by surgery    and   Past Surgical History:   Procedure Laterality Date    ABDOMINAL SURGERY      BRONCHOSCOPY N/A 11/25/2020    Procedure: BRONCHOSCOPY with bronchial  washing;  Surgeon: Win Johnston MD;  Location: [facility] ENDOSCOPY;  Service: Pulmonary;  Laterality: N/A;  Post: mucous plugs    BRONCHOSCOPY N/A 4/25/2022    Procedure: BRONCHOSCOPY with bronchial washing;  Surgeon: Win Johnston MD;  Location: [facility] ENDOSCOPY;  Service: Pulmonary;  Laterality: N/A;  post op: pneumonia    BRONCHOSCOPY N/A 4/20/2023    Procedure: BRONCHOSCOPY with bilateral wash;  Surgeon: Win Johnston MD;  Location: [facility] ENDOSCOPY;  Service: Pulmonary;  Laterality: N/A;  mucous plugs    CHOLECYSTECTOMY      COLON SURGERY      COLONOSCOPY      COLOSTOMY CLOSURE N/A 2/25/2021    Procedure: COLOSTOMY TAKEDOWN OR CLOSURE, extenisve lysis of adhesions;  Surgeon: Chi Farmer MD;  Location: [facility] MAIN OR;  Service: General;  Laterality: N/A;    CYSTOSCOPY N/A 2/25/2021    Procedure: CYSTOSCOPY with bladder tumor removal and fulgeration, insertion of 3-way rizzo catheter;  Surgeon: Alfonzo Hayward MD;  Location: [facility] MAIN OR;  Service: Urology;  Laterality: N/A;    ENDOSCOPY      EXPLORATORY LAPAROTOMY N/A 7/16/2020    Procedure: LAPAROTOMY EXPLORATORY HARTMANS;  Surgeon: Chi Farmer MD;  Location: [facility] MAIN OR;  Service: General;  Laterality: N/A;    HYSTERECTOMY      TOTAL HIP ARTHROPLASTY Right     TOTAL HIP ARTHROPLASTY REVISION Right     x3    and The patient has a family history of    Objective     Physical Exam:  CON:  Appears stated age. No acute distress.  HEENT:  Atraumatic and normocephalic.  PULM:  Breathing nonlabored on room air  CARDIO:  RRR, Pulses 2+  MSK:  Limbs intact. Musculoskeletal pain is absent  PSYCH:  Appears within normal limits.  SKIN:  No noticeable skin lesions or abrasions  VITALS:  Temp:  [97.5 °F (36.4 °C)-98.4 °F (36.9 °C)] 97.8 °F (36.6 °C)  Heart Rate:  [] 98  Resp:  [14-22] 22  BP: ()/(51-74) 95/56, Body mass index is 28.34 kg/m².  NEURO: Neurologic Exam      Results Review: I reviewed the patient's new clinical  results.    CT: CT of the lumbar spine was reviewed and shows slightly worsening fractures at T12, L1, L2.  Previous kyphoplasty's at T12 and L2.  No central canal stenosis     I personally reviewed the images from the following radiographic studies.    Lab Results (last 24 hours)       Procedure Component Value Units Date/Time    Blood Culture - Blood, Wrist, Left [[id number]] Collected: 02/10/24 1346    Specimen: Blood from Wrist, Left Updated: 02/10/24 1415    Blood Culture - Blood, Hand, Right [[id number]] Collected: 02/10/24 1347    Specimen: Blood from Hand, Right Updated: 02/10/24 1415    Comprehensive Metabolic Panel [[id number]]  (Abnormal) Collected: 02/10/24 2334    Specimen: Blood Updated: 02/11/24 0038     Glucose 117 mg/dL      BUN 19 mg/dL      Creatinine 0.97 mg/dL      Sodium 138 mmol/L      Potassium 3.6 mmol/L      Chloride 102 mmol/L      CO2 26.0 mmol/L      Calcium 8.6 mg/dL      Total Protein 5.7 g/dL      Albumin 3.5 g/dL      ALT (SGPT) 38 U/L      AST (SGOT) 21 U/L      Alkaline Phosphatase 126 U/L      Total Bilirubin <0.2 mg/dL      Globulin 2.2 gm/dL      A/G Ratio 1.6 g/dL      BUN/Creatinine Ratio 19.6     Anion Gap 10.0 mmol/L      eGFR 64.2 mL/min/1.73     Narrative:      GFR Normal >60  Chronic Kidney Disease <60  Kidney Failure <15      Protime-INR [[id number]]  (Normal) Collected: 02/10/24 2334    Specimen: Blood Updated: 02/11/24 0027     Protime 24.4 Seconds      INR 2.39    CBC & Differential [[id number]]  (Abnormal) Collected: 02/10/24 2334    Specimen: Blood Updated: 02/11/24 0033    Narrative:      The following orders were created for panel order CBC & Differential.  Procedure                               Abnormality         Status                     ---------                               -----------         ------                     CBC Auto Differential[[id number]]        Abnormal            Final result                 Please view results for these tests on the individual  orders.    CBC Auto Differential [[id number]]  (Abnormal) Collected: 02/10/24 2334    Specimen: Blood Updated: 02/11/24 0033     WBC 10.60 10*3/mm3      RBC 4.04 10*6/mm3      Hemoglobin 11.8 g/dL      Hematocrit 35.9 %      MCV 88.8 fL      MCH 29.2 pg      MCHC 32.9 g/dL      RDW 17.3 %      RDW-SD 53.4 fl      MPV 8.0 fL      Platelets 278 10*3/mm3      Neutrophil % 80.9 %      Lymphocyte % 11.0 %      Monocyte % 7.0 %      Eosinophil % 0.9 %      Basophil % 0.2 %      Neutrophils, Absolute 8.60 10*3/mm3      Lymphocytes, Absolute 1.20 10*3/mm3      Monocytes, Absolute 0.70 10*3/mm3      Eosinophils, Absolute 0.10 10*3/mm3      Basophils, Absolute 0.00 10*3/mm3      nRBC 0.0 /100 WBC             Assessment & Plan :     Assessment: Renuka Pelayo is a 68 y.o. female who presents with T12, L1 1, L2 compression deformities after previous kyphoplasty  Additional workup: None  Surgery planned: None  Additional plan: I discussed with the patient about conservative management and offered her TLSO brace.  She would instead like to return outpatient to [facility] and have another kyphoplasty.  Neurosurgery will sign off at this time.  No follow-up needed.  Please call with any questions or concerns      Lumbar compression fracture, closed, initial encounter      I discussed the patient's findings and my recommendations with patient    Carlito Mejía MD  02/11/24  08:57 EST

## 2024-02-11 NOTE — PLAN OF CARE
Goal Outcome Evaluation:   Pt napping between care.  Denies any pain or discomfort and no s/s of pain noted.  Will continue current plan of care.

## 2024-02-11 NOTE — PROGRESS NOTES
"PULMONARY CRITICAL CARE PROGRESS  NOTE      PATIENT IDENTIFICATION:  Name: Renuka Pelayo  MRN: [id number]  :  1955     Age: 68 y.o.  Sex: female    DATE OF Note:  2024   Referring Physician: Juventino Hubbard DO                  Subjective:   Feeling better, coughed a lot of mucus last night and not coughing as much today, no chest or abd pain, no bowel or bladder issues       Objective:  tMax 24 hrs: Temp (24hrs), Av.8 °F (36.6 °C), Min:97.5 °F (36.4 °C), Max:98.4 °F (36.9 °C)      Vitals Ranges:   Temp:  [97.5 °F (36.4 °C)-98.4 °F (36.9 °C)] 97.8 °F (36.6 °C)  Heart Rate:  [] 98  Resp:  [14-22] 22  BP: ()/(51-74) 95/56    Intake and Output Last 3 Shifts:   I/O last 3 completed shifts:  In: 1180 [P.O.:880; IV Piggyback:300]  Out: -     Exam:  BP 95/56 (BP Location: Left arm, Patient Position: Lying)   Pulse 98   Temp 97.8 °F (36.6 °C) (Oral)   Resp 22   Ht 160 cm (63\")   Wt 72.6 kg (160 lb)   SpO2 91%   BMI 28.34 kg/m²     General Appearance:     HEENT:  Normocephalic, without obvious abnormality. Conjunctivae/corneas clear.  Normal external ear canals. Nares normal, no drainage     Neck:  Supple, symmetrical, trachea midline. No JVD.  Lungs /Chest wall:   Bilateral basal rhonchi, respirations unlabored, symmetrical wall movement.     Heart:  Regular rate and rhythm, systolic murmur PMI left sternal border  Abdomen: Soft, nontender, no masses, no organomegaly.    Extremities: Trace edema, no clubbing or cyanosis        Medications:  acetylcysteine, 3 mL, Nebulization, TID - RT  budesonide, 0.5 mg, Nebulization, BID - RT  carvedilol, 3.125 mg, Oral, BID With Meals  doxycycline, 100 mg, Intravenous, BID  valsartan, 320 mg, Oral, Q24H   And  [Held by provider] hydroCHLOROthiazide, 12.5 mg, Oral, Q24H  ipratropium-albuterol, 3 mL, Nebulization, 4x Daily - RT  Lidocaine, 1 patch, Transdermal, Q24H  pantoprazole, 40 mg, Oral, BID AC  predniSONE, 20 mg, Oral, Daily With " Breakfast  roflumilast, 500 mcg, Oral, Nightly  sodium chloride, 10 mL, Intravenous, Q12H  [Held by provider] warfarin, 3 mg, Oral, Daily        Infusion:  Pharmacy to dose warfarin,          PRN:    acetaminophen    Calcium Replacement - Follow Nurse / BPA Driven Protocol    cyclobenzaprine    HYDROcodone-acetaminophen    ipratropium-albuterol    Magnesium Standard Dose Replacement - Follow Nurse / BPA Driven Protocol    nitroglycerin    ondansetron ODT **OR** ondansetron    Pharmacy to dose warfarin    Phosphorus Replacement - Follow Nurse / BPA Driven Protocol    Potassium Replacement - Follow Nurse / BPA Driven Protocol    sodium chloride    sodium chloride  Data Review:  All labs (24hrs):   Recent Results (from the past 24 hour(s))   Comprehensive Metabolic Panel    Collection Time: 02/10/24 11:34 PM    Specimen: Blood   Result Value Ref Range    Glucose 117 (H) 65 - 99 mg/dL    BUN 19 8 - 23 mg/dL    Creatinine 0.97 0.57 - 1.00 mg/dL    Sodium 138 136 - 145 mmol/L    Potassium 3.6 3.5 - 5.2 mmol/L    Chloride 102 98 - 107 mmol/L    CO2 26.0 22.0 - 29.0 mmol/L    Calcium 8.6 8.6 - 10.5 mg/dL    Total Protein 5.7 (L) 6.0 - 8.5 g/dL    Albumin 3.5 3.5 - 5.2 g/dL    ALT (SGPT) 38 (H) 1 - 33 U/L    AST (SGOT) 21 1 - 32 U/L    Alkaline Phosphatase 126 (H) 39 - 117 U/L    Total Bilirubin <0.2 0.0 - 1.2 mg/dL    Globulin 2.2 gm/dL    A/G Ratio 1.6 g/dL    BUN/Creatinine Ratio 19.6 7.0 - 25.0    Anion Gap 10.0 5.0 - 15.0 mmol/L    eGFR 64.2 >60.0 mL/min/1.73   Protime-INR    Collection Time: 02/10/24 11:34 PM    Specimen: Blood   Result Value Ref Range    Protime 24.4 19.4 - 28.5 Seconds    INR 2.39 2.00 - 3.00   CBC Auto Differential    Collection Time: 02/10/24 11:34 PM    Specimen: Blood   Result Value Ref Range    WBC 10.60 3.40 - 10.80 10*3/mm3    RBC 4.04 3.77 - 5.28 10*6/mm3    Hemoglobin 11.8 (L) 12.0 - 15.9 g/dL    Hematocrit 35.9 34.0 - 46.6 %    MCV 88.8 79.0 - 97.0 fL    MCH 29.2 26.6 - 33.0 pg    MCHC 32.9  31.5 - 35.7 g/dL    RDW 17.3 (H) 12.3 - 15.4 %    RDW-SD 53.4 37.0 - 54.0 fl    MPV 8.0 6.0 - 12.0 fL    Platelets 278 140 - 450 10*3/mm3    Neutrophil % 80.9 (H) 42.7 - 76.0 %    Lymphocyte % 11.0 (L) 19.6 - 45.3 %    Monocyte % 7.0 5.0 - 12.0 %    Eosinophil % 0.9 0.3 - 6.2 %    Basophil % 0.2 0.0 - 1.5 %    Neutrophils, Absolute 8.60 (H) 1.70 - 7.00 10*3/mm3    Lymphocytes, Absolute 1.20 0.70 - 3.10 10*3/mm3    Monocytes, Absolute 0.70 0.10 - 0.90 10*3/mm3    Eosinophils, Absolute 0.10 0.00 - 0.40 10*3/mm3    Basophils, Absolute 0.00 0.00 - 0.20 10*3/mm3    nRBC 0.0 0.0 - 0.2 /100 WBC        Imaging:  XR Chest 1 View  Narrative: XR CHEST 1 VW    Date of Exam: 2/10/2024 6:09 AM EST    Indication: RLL mucous plug, atelect per CT abd    Comparison: None available.    Findings:  There is persistent partial right lower lobe atelectasis with elevated right hemidiaphragm. Heart size is normal. No pleural effusion or pneumothorax. No acute osseous abnormalities. There is minor left basilar atelectasis. Pulmonary vasculature is   within normal limits.  Impression: Impression:  Persistent partial right lower lobe atelectasis with elevated right hemidiaphragm.    Electronically Signed: Zain Mays MD    2/10/2024 6:29 AM EST    Workstation ID: [id number]  CT Abdomen Pelvis Without Contrast  Narrative: CT ABDOMEN PELVIS WO CONTRAST    Date of Exam: 2/10/2024 4:34 AM EST    Indication: Right flank pain history of lumbar surgery on 7 January.    Comparison: 11/17/2023 and 6/29/2023.    Technique: Axial CT images were obtained of the abdomen and pelvis without the administration of contrast. Sagittal and coronal reconstructions were performed.  Automated exposure control and iterative reconstruction methods were used.    Findings:  There is dependent bibasilar atelectasis, right greater than left. Emphysema noted in the lung bases. Emphysema on CT is an independent risk factor for lung cancer. Low dose lung cancer screening  should be considered if patient qualifies based on smoking   history or if not already enrolled in a screening program. Heart size is normal. There is a large stomach containing hiatal hernia. There is mucous plugging in the bronchus to the right lower lobe with obstruction of the airways. No acute findings in   the superficial soft tissues. Small fat-containing midline supraumbilical hernia. There is evidence of prior ventral laparotomy. There is streak artifact from a right hip prosthesis. There is minimal DJD at the left hip. Compared with 6/29/2023, there   are new mild superior plate compression fractures at T12 and L2 status post kyphoplasty. There is also new mild inferior endplate compression fracture at L1 with approximately 10% loss of height. There is moderate thoracolumbar spondylosis. There is an   old healed left lateral ninth rib fracture.    The liver is homogeneous. Patient is status post cholecystectomy. There is new mild biliary dilatation with the common bile duct measuring up to 14 mm. The distal stomach, spleen and adrenal glands appear within normal limits. There is no hydronephrosis   or visible urolithiasis. The distal ureters are obscured by streak artifact as is the majority of the urinary bladder. The appendix is not seen. There is a colonic anastomosis, likely from partial distal colon resection. Small bowel is nondistended.   There is no ascites, pneumoperitoneum or lymphadenopathy. There is mild aortoiliac atherosclerosis. No aneurysm.  Impression: Impression:  1.There is mucous plugging in the bronchus to the right lower lobe with obstruction and right lower lobe atelectasis.  2.There is new mild biliary dilatation status post cholecystectomy. Correlate for symptoms and laboratory values of biliary obstruction. This could be physiologic  3.New mild compression fractures at T12, L2 and L1 status post kyphoplasty at T12 and L2.  4.Large hiatal hernia.    Electronically Signed: Zain  MD Davon    2/10/2024 4:49 AM EST    Workstation ID: [id number]       ASSESSMENT:  COPD  Hx PE  Lumbar compression fracture  Hx bladder cancer  Arthritis  Hypothyroidism  HTN  Obesity  Anxiety/Depression/Insomnia        PLAN:  No need for bronch at this time, lungs improving  Antibiotics  Bronchodilators  Inhaled corticosteroids  Mucomyst  Electrolytes/ glycemic control  DVT and GI prophylaxis-Warfarin     Discussed with Dr Preston Zhong, APRN   2/11/2024  10:34 EST    I personally have examined  and interviewed the patient. I have reviewed the history, data, problems, assessment and plan with our NP.  Total Critical care time in direct medical management (   ) minutes, This time specifically excludes time spent performing procedures.    Win Johnston MD   2/13/2024  19:42 EST

## 2024-02-11 NOTE — THERAPY EVALUATION
Patient Name: Renuka Pelayo  : 1955    MRN: [id number]                              Today's Date: 2024       Admit Date: 2/10/2024    Visit Dx:     ICD-10-CM ICD-9-CM   1. Compression fracture of T12 vertebra, initial encounter  S22.080A 805.2   2. Closed compression fracture of body of L1 vertebra  S32.010A 805.4   3. Closed compression fracture of L2 lumbar vertebra with delayed healing, subsequent encounter  S32.020G V54.17   4. Pneumonia of right lower lobe due to infectious organism  J18.9 486   5. Atelectasis  J98.11 518.0   6. Hiatal hernia  K44.9 553.3   7. Dilation of biliary tract  K83.8 576.8     Patient Active Problem List   Diagnosis    Severe sepsis    COPD (chronic obstructive pulmonary disease)    Chronic back pain    Depressive disorder    GERD without esophagitis    Primary hypertension    Insomnia    Chronic anticoagulation    Obesity    Status post Mariya procedure    S/P bladder tumor excision with fulguration    Acquired hypothyroidism    Pneumonia of right lower lobe due to infectious organism    COPD with acute exacerbation    AAA (abdominal aortic aneurysm)    COPD exacerbation    Dyspnea, unspecified type    Lower extremity edema    Pneumonia due to infectious organism, unspecified laterality, unspecified part of lung    Bacteremia    Warfarin poisoning, accidental or unintentional, initial encounter    Huerta's esophagus    Lumbar compression fracture, closed, initial encounter     Past Medical History:   Diagnosis Date    Arthritis     Bladder cancer     Chronic back pain     Closed nondisplaced fracture of head of right radius 2017    Congenital deformity of right hip joint 1955    COPD (chronic obstructive pulmonary disease)     former smoker    Deep venous thrombosis     unprovoked    Depressive disorder     Disease of thyroid gland     Diverticulitis of colon     Essential hypertension     Gastroesophageal reflux disease     Insomnia     Obesity     Pulmonary  embolism     provoked by surgery     Past Surgical History:   Procedure Laterality Date    ABDOMINAL SURGERY      BRONCHOSCOPY N/A 11/25/2020    Procedure: BRONCHOSCOPY with bronchial washing;  Surgeon: Win Johnston MD;  Location: [facility] ENDOSCOPY;  Service: Pulmonary;  Laterality: N/A;  Post: mucous plugs    BRONCHOSCOPY N/A 4/25/2022    Procedure: BRONCHOSCOPY with bronchial washing;  Surgeon: Win Johnston MD;  Location: [facility] ENDOSCOPY;  Service: Pulmonary;  Laterality: N/A;  post op: pneumonia    BRONCHOSCOPY N/A 4/20/2023    Procedure: BRONCHOSCOPY with bilateral wash;  Surgeon: Win Johnston MD;  Location: [facility] ENDOSCOPY;  Service: Pulmonary;  Laterality: N/A;  mucous plugs    CHOLECYSTECTOMY      COLON SURGERY      COLONOSCOPY      COLOSTOMY CLOSURE N/A 2/25/2021    Procedure: COLOSTOMY TAKEDOWN OR CLOSURE, extenisve lysis of adhesions;  Surgeon: Chi Farmer MD;  Location: [facility] MAIN OR;  Service: General;  Laterality: N/A;    CYSTOSCOPY N/A 2/25/2021    Procedure: CYSTOSCOPY with bladder tumor removal and fulgeration, insertion of 3-way rizzo catheter;  Surgeon: Alfonzo Hayward MD;  Location: [facility] MAIN OR;  Service: Urology;  Laterality: N/A;    ENDOSCOPY      EXPLORATORY LAPAROTOMY N/A 7/16/2020    Procedure: LAPAROTOMY EXPLORATORY HARTMANS;  Surgeon: Chi Farmer MD;  Location: [facility] MAIN OR;  Service: General;  Laterality: N/A;    HYSTERECTOMY      TOTAL HIP ARTHROPLASTY Right     TOTAL HIP ARTHROPLASTY REVISION Right     x3      General Information       Row Name 02/11/24 1829          Physical Therapy Time and Intention    Document Type evaluation  -EL     Mode of Treatment individual therapy;physical therapy  -EL       Row Name 02/11/24 1829          General Information    Prior Level of Function independent:;all household mobility;ADL's  -EL       Row Name 02/11/24 1829          Living Environment    People in Home child(ras), adult;spouse  -EL       Row Name  02/11/24 1829          Home Main Entrance    Number of Stairs, Main Entrance none  -EL       Row Name 02/11/24 1829          Stairs Within Home, Primary    Number of Stairs, Within Home, Primary none  -EL       [facility] Name 02/11/24 1829          Cognition    Orientation Status (Cognition) oriented x 4  -EL       Row Name 02/11/24 1829          Safety Issues, Functional Mobility    Impairments Affecting Function (Mobility) pain  -EL               User Key  (r) = Recorded By, (t) = Taken By, (c) = Cosigned By      Initials Name Provider Type    Antwan Madera, PT Physical Therapist                   Mobility       Row Name 02/11/24 1831          Bed Mobility    Bed Mobility bed mobility (all) activities  -EL     All Activities, [place] (Bed Mobility) modified independence  -EL     Assistive Device (Bed Mobility) bed rails  -[facility] Name 02/11/24 1831          Sit-Stand Transfer    Sit-Stand [place] (Transfers) contact guard  -EL       Row Name 02/11/24 1831          Gait/Stairs (Locomotion)    [place] Level (Gait) standby assist  -EL     Distance in Feet (Gait) 125  -EL               User Key  (r) = Recorded By, (t) = Taken By, (c) = Cosigned By      Initials Name Provider Type    Antwan Madera, PT Physical Therapist                   Obj/Interventions       Row Name 02/11/24 1831          Range of Motion Comprehensive    General Range of Motion bilateral lower extremity ROM WFL  -EL       Row Name 02/11/24 1831          Strength Comprehensive (MMT)    General Manual Muscle Testing (MMT) Assessment lower extremity strength deficits identified  -EL     Comment, General Manual Muscle Testing (MMT) Assessment Kris hips 4-/5 gross, remainder of BLE WFL  -EL       Row Name 02/11/24 1831          Balance    Balance Assessment sitting static balance;standing static balance;standing dynamic balance  -EL     Static Sitting Balance independent  -EL     Static Standing Balance independent  -EL     Dynamic Standing  Balance independent  -[facility] Name 02/11/24 1831          Sensory Assessment (Somatosensory)    Sensory Assessment (Somatosensory) sensation intact  -               User Key  (r) = Recorded By, (t) = Taken By, (c) = Cosigned By      Initials Name Provider Type    Antwan Madera, PT Physical Therapist                   Goals/Plan    No documentation.                  Clinical Impression       [facility] Name 02/11/24 1832          Pain    Additional Documentation Pain Scale: FACES Pre/Post-Treatment (Group)  -EL       Row Name 02/11/24 1832          Pain Scale: FACES Pre/Post-Treatment    Pain: FACES Scale, Pretreatment 4-->hurts little more  -EL     Posttreatment Pain Rating 4-->hurts little more  -EL     Pain Location - back  -[facility] Name 02/11/24 1832          Plan of Care Review    Plan of Care Reviewed With patient  -EL     Outcome Evaluation Pt is a 69 YO F with T12-L2 compression frx, TLSO delivered and to be worn when OOB. Pt is planning on kyphoplasty at outside facility. Pt states she lives at home with spouse and son, [facility] is independent[facility] and has had no recent falls. Pt with pain but states it is improved from donning TLSO. Pt demonstrates excelling mobility, performing bed mobility, transfers and ambulation without physical assistance. Pt is safe to return home wiht family assist and does not need continued therapy while admitted.  -EL       Row Name 02/11/24 1832          Therapy Assessment/Plan (PT)    Criteria for Skilled Interventions Met (PT) no problems identified which require skilled intervention  -EL     Therapy Frequency (PT) evaluation only  -EL       Row Name 02/11/24 1832          Vital Signs    O2 Delivery Pre Treatment room air  -EL     O2 Delivery Intra Treatment room air  -EL     O2 Delivery Post Treatment room air  -EL     Pre Patient Position Supine  -EL     Intra Patient Position Standing  -EL     Post Patient Position Supine  -EL       Row Name 02/11/24  1832          Positioning and Restraints    Pre-Treatment Position in bed  -EL     Post Treatment Position bed  -EL     In Bed notified nsg;call light within reach;encouraged to call for assist;supine  -EL               User Key  (r) = Recorded By, (t) = Taken By, (c) = Cosigned By      Initials Name Provider Type    Antwan Madera, PT Physical Therapist                   Outcome Measures       Row Name 02/11/24 1836 02/11/24 0812       How much help from another person do you currently need...    Turning from your back to your side while in flat bed without using bedrails? 4  -EL 4  -MB    Moving from lying on back to sitting on the side of a flat bed without bedrails? 4  -EL 4  -MB    Moving to and from a bed to a chair (including a wheelchair)? 4  -EL 4  -MB    Standing up from a chair using your arms (e.g., wheelchair, bedside chair)? 4  -EL 3  -MB    Climbing 3-5 steps with a railing? 4  -EL 3  -MB    To walk in hospital room? 4  -EL 3  -MB    AM-PAC 6 Clicks Score (PT) 24  -EL 21  -MB    Highest Level of Mobility Goal 8 --> Walked 250 feet or more  -EL 6 --> Walk 10 steps or more  -MB      Row Name 02/11/24 1836          Functional Assessment    Outcome Measure Options AM-PAC 6 Clicks Basic Mobility (PT)  -EL               User Key  (r) = Recorded By, (t) = Taken By, (c) = Cosigned By      Initials Name Provider Type    Antwan Madera, PT Physical Therapist    Zoey Lemon LPN Licensed Nurse                                 Physical Therapy Education       Title: PT OT SLP Therapies (Done)       Topic: Physical Therapy (Done)       Point: Mobility training (Done)       Learning Progress Summary             Patient Acceptance, E,TB, VU by LEIGHTON at 2/11/2024 1836                         Point: Precautions (Done)       Learning Progress Summary             Patient Acceptance, E,TB, VU by LEIGHTON at 2/11/2024 1836                                         User Key       Initials Effective Dates Name Provider Type  Discipline    EL 06/23/20 -  Antwan Villalba PT Physical Therapist PT                  PT Recommendation and Plan     Plan of Care Reviewed With: patient  Outcome Evaluation: Pt is a 67 YO F with T12-L2 compression frx, TLSO delivered and to be worn when OOB. Pt is planning on kyphoplasty at outside facility. Pt states she lives at home with spouse and son, typically is independent, ambulating wihtout AD and has had no recent falls. Pt with pain but states it is improved from donning TLSO. Pt demonstrates excelling mobility, performing bed mobility, transfers and ambulation without physical assistance. Pt is safe to return home wiht family assist and does not need continued therapy while admitted.     Time Calculation:   PT Evaluation Complexity  History, PT Evaluation Complexity: 1-2 personal factors and/or comorbidities  Examination of Body Systems (PT Eval Complexity): total of 3 or more elements  Clinical Presentation (PT Evaluation Complexity): evolving  Clinical Decision Making (PT Evaluation Complexity): moderate complexity  Overall Complexity (PT Evaluation Complexity): moderate complexity     PT Charges       Row Name 02/11/24 1836             Time Calculation    Start Time 1553  -EL      Stop Time 1607  -EL      Time Calculation (min) 14 min  -EL      PT Received On 02/11/24  -EL                User Key  (r) = Recorded By, (t) = Taken By, (c) = Cosigned By      Initials Name Provider Type    Antwan Madera PT Physical Therapist                  Therapy Charges for Today       Code Description Service Date Service Provider Modifiers Qty    [id number]  PT EVAL MOD COMPLEXITY 3 2/11/2024 Antwan Villalba PT GP 1            PT G-Codes  Outcome Measure Options: AM-PAC 6 Clicks Basic Mobility (PT)  AM-PAC 6 Clicks Score (PT): 24       Antwan Villalba PT  2/11/2024

## 2024-02-11 NOTE — CASE MANAGEMENT/SOCIAL WORK
Continued Stay Note  ALFREDO Wang     Patient Name: Renuka Pelayo  MRN: [id number]  Today's Date: 2/11/2024    Admit Date: 2/10/2024        Discharge Plan       Row Name 02/11/24 1143       Plan    Plan Comments CM notified of need for TLSO brace.  Called placed to [facility] and spoke with answering service.  provided pt's height and weight to answering service.                      Expected Discharge Date and Time       Expected Discharge Date Expected Discharge Time    Feb 12, 2024               Claudia Lozano RN

## 2024-02-11 NOTE — PROGRESS NOTES
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    INR Goal: 2 - 3  Home medication?: warfarin 3 mg PO daily  Bridge Therapy Present?:  No  Interacting Medications Evaluation (New/Present/Discontinued): New-doxycycline and rocephin(may cause INR elevations), prednisone (may cause INR elevations)   Additional Contributing Factors: none      Assessment/Plan:    Warfarin held 2/10 for possible bronch. Pulmonology noting that patient will no longer require bronch since lungs are improving. Discussed with Dr. Wagoner and plan is to resume patient's warfarin tonight.     Will resume home regimen of warfarin 3mg daily tonight. Will watch for INR elevations 2/2 DDI with steroids.     Continue to monitor and adjust based on INR.         Date 2/10 2/11          INR 2.61 1.89          Dose HELD 3mg               Objective:  [Ht: 160 cm (63\"); Wt: 72.6 kg (160 lb); BMI: Body mass index is 28.34 kg/m².]    Lab Results   Component Value Date    ALBUMIN 3.5 02/10/2024     Lab Results   Component Value Date    INR 1.89 (L) 02/11/2024    INR 2.39 02/10/2024    INR 2.61 02/10/2024    PROTIME 19.7 02/11/2024    PROTIME 24.4 02/10/2024    PROTIME 26.5 02/10/2024     Lab Results   Component Value Date    HGB 11.8 (L) 02/10/2024    HGB 12.8 02/10/2024    HGB 12.5 12/09/2023     Lab Results   Component Value Date    HCT 35.9 02/10/2024    HCT 39.9 02/10/2024    HCT 38.6 12/09/2023       Blanka John, PharmD  02/11/24 14:43 EST       "

## 2024-02-11 NOTE — PLAN OF CARE
Goal Outcome Evaluation:  Plan of Care Reviewed With: patient        Progress: no change  Outcome Evaluation: Patient shows no s/s of distress and vitals are stable, patient remains on 3L NC. Patient voiced concerns of pain, see MAR. TLSO brace on. Call light within reach.

## 2024-02-11 NOTE — PLAN OF CARE
Goal Outcome Evaluation:  Plan of Care Reviewed With: patient           Outcome Evaluation: Pt is a 67 YO F with T12-L2 compression frx, TLSO delivered and to be worn when OOB. Pt is planning on kyphoplasty at outside facility. Pt states she lives at home with spouse and son, typically is independent, ambulating wihtout AD and has had no recent falls. Pt with pain but states it is improved from donning TLSO. Pt demonstrates excelling mobility, performing bed mobility, transfers and ambulation without physical assistance. Pt is safe to return home wiht family assist and does not need continued therapy while admitted.

## 2024-02-12 ENCOUNTER — READMISSION MANAGEMENT (OUTPATIENT)
Dept: CALL CENTER | Facility: HOSPITAL | Age: 69
End: 2024-02-12
Payer: MEDICARE

## 2024-02-12 VITALS
WEIGHT: 160 LBS | TEMPERATURE: 97.4 F | SYSTOLIC BLOOD PRESSURE: 125 MMHG | HEIGHT: 63 IN | HEART RATE: 95 BPM | OXYGEN SATURATION: 94 % | BODY MASS INDEX: 28.35 KG/M2 | RESPIRATION RATE: 18 BRPM | DIASTOLIC BLOOD PRESSURE: 69 MMHG

## 2024-02-12 LAB
ALBUMIN SERPL-MCNC: 3.4 G/DL (ref 3.5–5.2)
ALBUMIN/GLOB SERPL: 1.4 G/DL
ALP SERPL-CCNC: 126 U/L (ref 39–117)
ALT SERPL W P-5'-P-CCNC: 32 U/L (ref 1–33)
ANION GAP SERPL CALCULATED.3IONS-SCNC: 11 MMOL/L (ref 5–15)
AST SERPL-CCNC: 20 U/L (ref 1–32)
BASOPHILS # BLD AUTO: 0 10*3/MM3 (ref 0–0.2)
BASOPHILS NFR BLD AUTO: 0.1 % (ref 0–1.5)
BILIRUB SERPL-MCNC: <0.2 MG/DL (ref 0–1.2)
BUN SERPL-MCNC: 22 MG/DL (ref 8–23)
BUN/CREAT SERPL: 23.9 (ref 7–25)
CALCIUM SPEC-SCNC: 8.9 MG/DL (ref 8.6–10.5)
CHLORIDE SERPL-SCNC: 100 MMOL/L (ref 98–107)
CO2 SERPL-SCNC: 26 MMOL/L (ref 22–29)
CREAT SERPL-MCNC: 0.92 MG/DL (ref 0.57–1)
DEPRECATED RDW RBC AUTO: 52.1 FL (ref 37–54)
EGFRCR SERPLBLD CKD-EPI 2021: 68.4 ML/MIN/1.73
EOSINOPHIL # BLD AUTO: 0 10*3/MM3 (ref 0–0.4)
EOSINOPHIL NFR BLD AUTO: 0 % (ref 0.3–6.2)
ERYTHROCYTE [DISTWIDTH] IN BLOOD BY AUTOMATED COUNT: 17 % (ref 12.3–15.4)
GLOBULIN UR ELPH-MCNC: 2.4 GM/DL
GLUCOSE SERPL-MCNC: 129 MG/DL (ref 65–99)
HCT VFR BLD AUTO: 32.7 % (ref 34–46.6)
HGB BLD-MCNC: 10.9 G/DL (ref 12–15.9)
INR PPP: 1.54 (ref 2–3)
LYMPHOCYTES # BLD AUTO: 0.6 10*3/MM3 (ref 0.7–3.1)
LYMPHOCYTES NFR BLD AUTO: 5.3 % (ref 19.6–45.3)
MCH RBC QN AUTO: 30 PG (ref 26.6–33)
MCHC RBC AUTO-ENTMCNC: 33.3 G/DL (ref 31.5–35.7)
MCV RBC AUTO: 90.1 FL (ref 79–97)
MONOCYTES # BLD AUTO: 0.6 10*3/MM3 (ref 0.1–0.9)
MONOCYTES NFR BLD AUTO: 5.6 % (ref 5–12)
NEUTROPHILS NFR BLD AUTO: 89 % (ref 42.7–76)
NEUTROPHILS NFR BLD AUTO: 9.6 10*3/MM3 (ref 1.7–7)
NRBC BLD AUTO-RTO: 0 /100 WBC (ref 0–0.2)
PLATELET # BLD AUTO: 258 10*3/MM3 (ref 140–450)
PMV BLD AUTO: 8.1 FL (ref 6–12)
POTASSIUM SERPL-SCNC: 3.7 MMOL/L (ref 3.5–5.2)
PROT SERPL-MCNC: 5.8 G/DL (ref 6–8.5)
PROTHROMBIN TIME: 16.3 SECONDS (ref 19.4–28.5)
RBC # BLD AUTO: 3.63 10*6/MM3 (ref 3.77–5.28)
SODIUM SERPL-SCNC: 137 MMOL/L (ref 136–145)
WBC NRBC COR # BLD AUTO: 10.8 10*3/MM3 (ref 3.4–10.8)

## 2024-02-12 PROCEDURE — 94799 UNLISTED PULMONARY SVC/PX: CPT

## 2024-02-12 PROCEDURE — 94761 N-INVAS EAR/PLS OXIMETRY MLT: CPT

## 2024-02-12 PROCEDURE — 94664 DEMO&/EVAL PT USE INHALER: CPT

## 2024-02-12 PROCEDURE — 97166 OT EVAL MOD COMPLEX 45 MIN: CPT

## 2024-02-12 PROCEDURE — 63710000001 PREDNISONE PER 1 MG: Performed by: STUDENT IN AN ORGANIZED HEALTH CARE EDUCATION/TRAINING PROGRAM

## 2024-02-12 RX ORDER — AMOXICILLIN AND CLAVULANATE POTASSIUM 875; 125 MG/1; MG/1
1 TABLET, FILM COATED ORAL 2 TIMES DAILY
Qty: 6 TABLET | Refills: 0 | Status: SHIPPED | OUTPATIENT
Start: 2024-02-12 | End: 2024-02-15

## 2024-02-12 RX ADMIN — DOXYCYCLINE 100 MG: 100 INJECTION, POWDER, LYOPHILIZED, FOR SOLUTION INTRAVENOUS at 08:18

## 2024-02-12 RX ADMIN — CARVEDILOL 3.12 MG: 3.12 TABLET, FILM COATED ORAL at 08:18

## 2024-02-12 RX ADMIN — Medication 10 ML: at 08:19

## 2024-02-12 RX ADMIN — VALSARTAN 320 MG: 80 TABLET, FILM COATED ORAL at 08:18

## 2024-02-12 RX ADMIN — PANTOPRAZOLE SODIUM 40 MG: 40 TABLET, DELAYED RELEASE ORAL at 08:18

## 2024-02-12 RX ADMIN — PREDNISONE 20 MG: 20 TABLET ORAL at 08:18

## 2024-02-12 RX ADMIN — IPRATROPIUM BROMIDE AND ALBUTEROL SULFATE 3 ML: .5; 3 SOLUTION RESPIRATORY (INHALATION) at 06:44

## 2024-02-12 RX ADMIN — BUDESONIDE 0.5 MG: 0.5 INHALANT RESPIRATORY (INHALATION) at 06:48

## 2024-02-12 RX ADMIN — IPRATROPIUM BROMIDE AND ALBUTEROL SULFATE 3 ML: .5; 3 SOLUTION RESPIRATORY (INHALATION) at 11:28

## 2024-02-12 RX ADMIN — HYDROCODONE BITARTRATE AND ACETAMINOPHEN 1 TABLET: 10; 325 TABLET ORAL at 02:47

## 2024-02-12 RX ADMIN — HYDROCODONE BITARTRATE AND ACETAMINOPHEN 1 TABLET: 10; 325 TABLET ORAL at 09:33

## 2024-02-12 RX ADMIN — LIDOCAINE 1 PATCH: 4 PATCH TOPICAL at 08:18

## 2024-02-12 RX ADMIN — ACETYLCYSTEINE 3 ML: 200 SOLUTION ORAL; RESPIRATORY (INHALATION) at 06:44

## 2024-02-12 NOTE — DISCHARGE SUMMARY
Discharge Note   Renuka Pelayo 1955 [id number]     Date of Admission:2/10/2024     Date of Discharge: 02/12/24     Discharge Diagnosis:     Lumbar compression fracture, closed, initial encounter  Postobstructive pneumonia  COPD  History of DVT and PE  Hypertension  Hypothyroidism    Consults:  Neurosurgery    Hospital Course: A 68 y.o. female with PMH of hypertension, COPD, history of DVT/PE, large hiatal hernia presented to the hospital for back pain and was admitted with a principal diagnosis of Lumbar compression fracture, closed, initial encounter.  Of note, states she had a kyphoplasty on 1/7/24.  CT abdomen pelvis showed mucous plugging in the bronchus with obstruction and right lower lobe atelectasis, pulmonary was consulted and treated with antibiotics.  Also found new mild compression fractures at T12 and L2 and L1.  MRI LS spine showed new acute mild compression fracture of L1.  Neurosurgery was consulted and recommended TSLO brace and follow-up with [facility].    On the day of discharge, antibiotics switched to Augmentin to finish a 5-day course.      Vitals:    02/12/24 0734   BP: 125/69   Pulse: 94   Resp: 19   Temp: 97.4 °F (36.3 °C)   SpO2: 94%        Physical Exam  Vitals and nursing note reviewed.   Constitutional:       General: She is not in acute distress.     Appearance: Normal appearance.   HENT:      Mouth/Throat:      Mouth: Mucous membranes are moist.      Pharynx: Oropharynx is clear.   Eyes:      General: No scleral icterus.     Extraocular Movements: Extraocular movements intact.      Conjunctiva/sclera: Conjunctivae normal.   Cardiovascular:      Rate and Rhythm: Normal rate.      Heart sounds: No murmur heard.     No gallop.   Pulmonary:      Breath sounds: Normal breath sounds. No wheezing or rales.   Abdominal:      General: Bowel sounds are normal.      Palpations: Abdomen is soft.      Tenderness: There is no abdominal tenderness.   Musculoskeletal:          General: No tenderness.      Right lower leg: No edema.      Left lower leg: No edema.      Comments: TSLO brace in place around chest   Neurological:      General: No focal deficit present.      Mental Status: She is alert and oriented to person, place, and time.          Disposition: Home    Discharged Condition: fair    Activity: activity as tolerated    Diet:  Diet Order   Procedures    Diet: Regular/House Diet; Texture: Regular Texture (IDDSI 7); Fluid Consistency: Thin (IDDSI 0)        Labs:    Results from last 7 days   Lab Units 02/11/24  2305 02/10/24  2334 02/10/24  0340   WBC 10*3/mm3 10.80 10.60 11.70*   HEMOGLOBIN g/dL 10.9* 11.8* 12.8   HEMATOCRIT % 32.7* 35.9 39.9   PLATELETS 10*3/mm3 258 278 349       Results from last 7 days   Lab Units 02/11/24  2305 02/10/24  2334 02/10/24  0340   SODIUM mmol/L 137 138 135*   POTASSIUM mmol/L 3.7 3.6 3.7   CHLORIDE mmol/L 100 102 96*   CO2 mmol/L 26.0 26.0 26.0   BUN mg/dL 22 19 29*   CREATININE mg/dL 0.92 0.97 0.88                  Discharge Medications        New Medications        Instructions Start Date   amoxicillin-clavulanate 875-125 MG per tablet  Commonly known as: AUGMENTIN   1 tablet, Oral, 2 Times Daily             Continue These Medications        Instructions Start Date   albuterol (2.5 MG/3ML) 0.083% nebulizer solution  Commonly known as: PROVENTIL   2.5 mg, Nebulization, Every 6 Hours PRN      amLODIPine 5 MG tablet  Commonly known as: NORVASC   5 mg, Oral, Daily      Breztri Aerosphere 160-9-4.8 MCG/ACT aerosol inhaler  Generic drug: Budeson-Glycopyrrol-Formoterol   2 puffs, Inhalation, 2 Times Daily      carvedilol 3.125 MG tablet  Commonly known as: COREG   Take 1 tablet by mouth 2 (Two) Times a Day With Meals.      Diovan -25 MG per tablet  Generic drug: valsartan-hydrochlorothiazide   1 tablet, Oral, Daily      furosemide 20 MG tablet  Commonly known as: LASIX   20 mg, Oral, 2 Times Daily PRN      guaiFENesin 600 MG 12 hr  tablet  Commonly known as: MUCINEX   1,200 mg, Oral, Every 12 Hours Scheduled      HYDROcodone-acetaminophen  MG per tablet  Commonly known as: NORCO   1 tablet, Oral, Every 6 Hours PRN      ipratropium 0.02 % nebulizer solution  Commonly known as: ATROVENT   500 mcg, Nebulization, Every 6 Hours PRN      ipratropium-albuterol 0.5-2.5 mg/3 ml nebulizer  Commonly known as: DUO-NEB   3 mL, Nebulization, Every 6 Hours PRN      lidocaine 5 %  Commonly known as: Lidoderm   1 patch, Transdermal, Every 24 Hours, Remove & Discard patch within 12 hours or as directed by MD      methocarbamol 750 MG tablet  Commonly known as: ROBAXIN   750 mg, Oral, 3 Times Daily PRN      pantoprazole 40 MG EC tablet  Commonly known as: PROTONIX   40 mg, Oral, 2 Times Daily      potassium chloride 10 MEQ CR tablet   Take 1 tablet by mouth 2 (Two) Times a Day As Needed (take with furosemide PRN).      predniSONE 10 MG tablet  Commonly known as: DELTASONE   10 mg, Oral, Daily      roflumilast 500 MCG tablet tablet  Commonly known as: DALIRESP   500 mcg, Oral, Nightly      warfarin 3 MG tablet  Commonly known as: COUMADIN   3 mg, Oral, Nightly      zolpidem 5 MG tablet  Commonly known as: AMBIEN   5 mg, Oral, Nightly PRN                Spent at least 15 minutes in the management of patient's care including but not limited to physical exam, review of vital signs, labs, cultures and imaging studies, discussing the hospital stay along with plan of care at home, preparation and coordinating of discharge, arranging follow up care and referrals as indicated. Plan also discussed with [facility].    Dwain Partida MD  Hospitalist  02/12/24   10:58 EST

## 2024-02-12 NOTE — PROGRESS NOTES
Enter Query Response Below      Query Response: Sepsis was not supported.              If applicable, please update the problem list.       Patient: Renuka Pelayo        : 1955  Account: [id number]           Admit Date:         How to Respond to this query:       a. Click New Note     b. Answer query within the yellow box.                c. Update the Problem List, if applicable.      If you have any questions about this query contact me at: [email or web address]     Dr. Wagoner:     Patient presents to emergency room with back pain, fever/chills and cough.  On arrival, temperature 97.4, heart rate 92, respirations 18, bp 136/95, wbc 11.70. History and physical states post obstructive pneumonia, mucous plugging, compression fractures, rocephin 2gm iv qd, doxycycline 100mg iv bid, cultures, ivf's, neurosurgery consult, pulmonary consult. 2/10 Medicine progress note states sepsis, post obstructive pneumonia, mucous plugging.     After study, was sepsis clinically supported during this admission?    Sepsis was supported with additional clinical indicators:____________  Sepsis was not supported.  Other- specify_____________  Unable to determine.    By submitting this query, we are merely seeking further clarification of documentation to accurately reflect all conditions that you are monitoring, evaluating, treating or that extend the hospitalization or utilize additional resources of care. Please utilize your independent clinical judgment when addressing the question(s) above.     This query and your response, once completed, will be entered into the legal medical record.    Sincerely,  Flori PIEDRA RN BSN   Clinical Documentation Integrity Program   [email or web address]

## 2024-02-12 NOTE — CASE MANAGEMENT/SOCIAL WORK
Discharge Planning Assessment   Felipe     Patient Name: Renuka Pelayo  MRN: [id number]  Today's Date: 2/12/2024    Admit Date: 2/10/2024    Plan: Return home with family, current with [facility]. Home O2 2L w/ [employer].   Discharge Needs Assessment       Row Name 02/12/24 1116       Living Environment    People in Home spouse;child(ras), adult    Name(s) of People in Home -Adrian, Son-Gilberto    Current Living Arrangements home    Potentially Unsafe Housing Conditions none    In the past 12 months has the electric, gas, oil, or water company threatened to shut off services in your home? No    Primary Care Provided by self    Provides Primary Care For no one    Family Caregiver if Needed spouse;child(ras), adult    Family Caregiver Names -Adrian, Son-Gilberto, Daughter-Kiesha    Quality of Family Relationships helpful;involved;supportive    Able to Return to Prior Arrangements yes       Resource/Environmental Concerns    Resource/Environmental Concerns none    Transportation Concerns none       Transportation Needs    In the past 12 months, has lack of transportation kept you from medical appointments or from getting medications? no    In the past 12 months, has lack of transportation kept you from meetings, work, or from getting things needed for daily living? No       Food Insecurity    Within the past 12 months, you worried that your food would run out before you got the money to buy more. Never true    Within the past 12 months, the food you bought just didn't last and you didn't have money to get more. Never true       Transition Planning    Patient/Family Anticipates Transition to home;home with family    Patient/Family Anticipated Services at Transition none    Transportation Anticipated family or friend will provide;car, drives self       Discharge Needs Assessment    Readmission Within the Last 30 Days no previous admission in last 30 days    Current Outpatient/Agency/Support Group other (see  comments)  [facility]    Equipment Currently Used at Home oxygen;cane, straight;walker, rolling;shower chair;bp cuff;glucometer;pulse ox;other (see comments)  TLSO brace    Concerns to be Addressed denies needs/concerns at this time;no discharge needs identified    Anticipated Changes Related to Illness none    Equipment Needed After Discharge none    Provided Post Acute Provider List? N/A    Provided Post Acute Provider Quality & Resource List? N/A                   Discharge Plan       Row Name 02/12/24 1117       Plan    Plan Return home with family, current with [facility]. Home O2 2L w/ [employer].    Patient/Family in Agreement with Plan yes    Plan Comments CM met with patient at bedside in shared room. Pt lives at home with spouse and son; she drives and reports being independent with ADL's. PCP and pharmacy confirmed. DME includes cane, walker, shower chair, BP cuff, glucometer, pulse ox, and 2L O2 continuous supplied by [employer]. Pt denies current HHC but does come to [facility] 2 days/week. No issues affording medications/food.  to provide transportation home at discharge. Provided IMM letter and obtained signature.                  Continued Care and Services - Admitted Since 2/10/2024       Durable Medical Equipment       Service Provider Request Status Selected Services Address Phone Fax Patient Preferred    [facility] - Request Sent N/A [id number] [id number] [phone] --                  Expected Discharge Date and Time       Expected Discharge Date Expected Discharge Time    Feb 12, 2024            Demographic Summary       Row Name 02/12/24 1115       General Information    Admission Type inpatient    Arrived From emergency department    Required Notices Provided Important Message from Medicare    Referral Source admission list    Reason for Consult care coordination/care conference;discharge planning    Preferred  Language English       Contact Information    Permission Granted to Share Info With                    Functional Status       Row Name 02/12/24 1116       Functional Status    Usual Activity Tolerance good    Current Activity Tolerance good       Functional Status, IADL    Medications independent    Meal Preparation independent    Housekeeping independent    Laundry independent    Shopping independent              Patient Forms       Row Name 02/12/24 1050       Patient Forms    Important Message from Medicare (IMM) Delivered  IMM 2/12 per CM    Delivered to Patient    Method of delivery In person                Megan Naegele, RN     Office Phone: [phone]  Office Cell: [phone]

## 2024-02-12 NOTE — PROGRESS NOTES
Enter Query Response Below      Query Response: Hyponatremia- clinically insignificant              If applicable, please update the problem list.       Patient: Renuka Pelayo        : 1955  Account: [id number]           Admit Date:         How to Respond to this query:       a. Click New Note     b. Answer query within the yellow box.                c. Update the Problem List, if applicable.      If you have any questions about this query contact me at: [email or web address]     Dr. Wagoner:     2/10 Sodium 135. 2/10 Medicine progress note states mild hyponatremia, started on iv fluids, will hold HCTZ.  Sodium 137.      Please clarify the following:    Hyponatremia- clinically significant  Hyponatremia- clinically insignificant  Other- specify______  Unable to determine.    By submitting this query, we are merely seeking further clarification of documentation to accurately reflect all conditions that you are monitoring, evaluating, treating or that extend the hospitalization or utilize additional resources of care. Please utilize your independent clinical judgment when addressing the question(s) above.     This query and your response, once completed, will be entered into the legal medical record.    Sincerely,  Flori PIEDRA RN BSN  Clinical Documentation Integrity Program   [email or web address]

## 2024-02-12 NOTE — PLAN OF CARE
Goal Outcome Evaluation: Plan is for patient to discharge home today.

## 2024-02-12 NOTE — PLAN OF CARE
Goal Outcome Evaluation:  Plan of Care Reviewed With: patient        Progress: improving  Outcome Evaluation: Patient is to be discharged home with spouse via private vehicle. Going with TSLO brace. Education complete Home oxygen available

## 2024-02-12 NOTE — THERAPY EVALUATION
Patient Name: Renuka Pelayo  : 1955    MRN: [id number]                              Today's Date: 2024       Admit Date: 2/10/2024    Visit Dx:     ICD-10-CM ICD-9-CM   1. Compression fracture of T12 vertebra, initial encounter  S22.080A 805.2   2. Closed compression fracture of body of L1 vertebra  S32.010A 805.4   3. Closed compression fracture of L2 lumbar vertebra with delayed healing, subsequent encounter  S32.020G V54.17   4. Pneumonia of right lower lobe due to infectious organism  J18.9 486   5. Atelectasis  J98.11 518.0   6. Hiatal hernia  K44.9 553.3   7. Dilation of biliary tract  K83.8 576.8     Patient Active Problem List   Diagnosis    Severe sepsis    COPD (chronic obstructive pulmonary disease)    Chronic back pain    Depressive disorder    GERD without esophagitis    Primary hypertension    Insomnia    Chronic anticoagulation    Obesity    Status post Mariya procedure    S/P bladder tumor excision with fulguration    Acquired hypothyroidism    Pneumonia of right lower lobe due to infectious organism    COPD with acute exacerbation    AAA (abdominal aortic aneurysm)    COPD exacerbation    Dyspnea, unspecified type    Lower extremity edema    Pneumonia due to infectious organism, unspecified laterality, unspecified part of lung    Bacteremia    Warfarin poisoning, accidental or unintentional, initial encounter    Huerta's esophagus    Lumbar compression fracture, closed, initial encounter     Past Medical History:   Diagnosis Date    Arthritis     Bladder cancer     Chronic back pain     Closed nondisplaced fracture of head of right radius 2017    Congenital deformity of right hip joint 1956    COPD (chronic obstructive pulmonary disease)     former smoker    Deep venous thrombosis     unprovoked    Depressive disorder     Disease of thyroid gland     Diverticulitis of colon     Essential hypertension     Gastroesophageal reflux disease     Insomnia     Obesity     Pulmonary  embolism     provoked by surgery     Past Surgical History:   Procedure Laterality Date    ABDOMINAL SURGERY      BRONCHOSCOPY N/A 11/25/2020    Procedure: BRONCHOSCOPY with bronchial washing;  Surgeon: Win Johnston MD;  Location: [facility] ENDOSCOPY;  Service: Pulmonary;  Laterality: N/A;  Post: mucous plugs    BRONCHOSCOPY N/A 4/25/2022    Procedure: BRONCHOSCOPY with bronchial washing;  Surgeon: Win Johnston MD;  Location: [facility] ENDOSCOPY;  Service: Pulmonary;  Laterality: N/A;  post op: pneumonia    BRONCHOSCOPY N/A 4/20/2023    Procedure: BRONCHOSCOPY with bilateral wash;  Surgeon: Win Johnston MD;  Location: [facility] ENDOSCOPY;  Service: Pulmonary;  Laterality: N/A;  mucous plugs    CHOLECYSTECTOMY      COLON SURGERY      COLONOSCOPY      COLOSTOMY CLOSURE N/A 2/25/2021    Procedure: COLOSTOMY TAKEDOWN OR CLOSURE, extenisve lysis of adhesions;  Surgeon: Chi Farmer MD;  Location: [facility] MAIN OR;  Service: General;  Laterality: N/A;    CYSTOSCOPY N/A 2/25/2021    Procedure: CYSTOSCOPY with bladder tumor removal and fulgeration, insertion of 3-way rizzo catheter;  Surgeon: Alfonzo Hayward MD;  Location: [facility] MAIN OR;  Service: Urology;  Laterality: N/A;    ENDOSCOPY      EXPLORATORY LAPAROTOMY N/A 7/16/2020    Procedure: LAPAROTOMY EXPLORATORY HARTMANS;  Surgeon: Chi Farmer MD;  Location: [facility] MAIN OR;  Service: General;  Laterality: N/A;    HYSTERECTOMY      TOTAL HIP ARTHROPLASTY Right     TOTAL HIP ARTHROPLASTY REVISION Right     x3      General Information       Row Name 02/12/24 0948          OT Time and Intention    Document Type evaluation  -ES     Mode of Treatment individual therapy;physical therapy  -ES       Row Name 02/12/24 0948          General Information    Existing Precautions/Restrictions TLSO;spinal  -ES       Row Name 02/12/24 0948          Occupational Profile    Reason for Services/Referral (Occupational Profile) Pt is a 66 yo female admitted 2/10/24  with c/o compression fx at T12, L1 and L2. Pt with recent kyphoplasty on 1/7/24, with worsening low back pain. Pt now with TLSO, and will follow up with outside facility for potential kyphoplasty. Dx with post-obstructive PNA.     PMHx significant for PNA, COPD.     At baseline, she resides with spouse and utilizes 3L O2. She Is indepndent within home.  -ES       Row Name 02/12/24 0948          Living Environment    People in Home spouse  -ES       Row Name 02/12/24 0948          Home Main Entrance    Number of Stairs, Main Entrance none  -ES       Row Name 02/12/24 0948          Stairs Within Home, Primary    Number of Stairs, Within Home, Primary none  -ES       Row Name 02/12/24 0948          Cognition    Orientation Status (Cognition) oriented x 4  -ES       Row Name 02/12/24 0948          Safety Issues, Functional Mobility    Impairments Affecting Function (Mobility) pain  -ES               User Key  (r) = Recorded By, (t) = Taken By, (c) = Cosigned By      Initials Name Provider Type    ES Gwendolyn Moore OT Occupational Therapist                     Mobility/ADL's       Row Name 02/12/24 1001          Bed Mobility    Bed Mobility bed mobility (all) activities  -ES     All Activities, [place] (Bed Mobility) modified independence  -ES     Assistive Device (Bed Mobility) bed rails  -ES     Comment, (Bed Mobility) Demos good understanding of log roll technique  -ES       Row Name 02/12/24 1001          Sit-Stand Transfer    Sit-Stand [place] (Transfers) supervision  -ES       Row Name 02/12/24 1001          Activities of Daily Living    BADL Assessment/Intervention upper body dressing;lower body dressing  -ES       Row Name 02/12/24 1001          Upper Body Dressing Assessment/Training    [place] Level (Upper Body Dressing) moderate assist (50% patient effort)  -ES     Comment, (Upper Body Dressing) Difficulty with TLSo, though states spouse is home at all times to assist in donning/doffing.   -ES       Row Name 02/12/24 1001          Lower Body Dressing Assessment/Training    [place] Level (Lower Body Dressing) minimum assist (75% patient effort)  -ES               User Key  (r) = Recorded By, (t) = Taken By, (c) = Cosigned By      Initials Name Provider Type    Gwendolyn Meyers OT Occupational Therapist                   Obj/Interventions       Row Name 02/12/24 1002          Sensory Assessment (Somatosensory)    Sensory Assessment (Somatosensory) sensation intact  -ES       Row Name 02/12/24 1002          Vision Assessment/Intervention    Visual Impairment/Limitations WNL  -ES       [facility] Name 02/12/24 1002          Range of Motion Comprehensive    Comment, General Range of Motion Grossly WFL, mild limitation of TLSO and pain  -ES       [facility] Name 02/12/24 1002          Strength Comprehensive (MMT)    General Manual Muscle Testing (MMT) Assessment no strength deficits identified  -ES     Comment, General Manual Muscle Testing (MMT) Assessment BUE Grossly WFL, though formal assessment limited to spinal fx.  -ES       Row Name 02/12/24 1002          Balance    Balance Assessment sitting static balance;sitting dynamic balance;standing static balance  -ES     Static Sitting Balance independent  -ES     Dynamic Sitting Balance independent  -ES     Static Standing Balance supervision  -ES     Balance Interventions sitting;standing;dynamic;static  -ES               User Key  (r) = Recorded By, (t) = Taken By, (c) = Cosigned By      Initials Name Provider Type    Gwendolyn Meyers OT Occupational Therapist                   Goals/Plan    No documentation.                  Clinical Impression       Row Name 02/12/24 1007          Pain Scale: FACES Pre/Post-Treatment    Pain: FACES Scale, Pretreatment 2-->hurts little bit  -ES     Posttreatment Pain Rating 2-->hurts little bit  -ES     Pain Location - back  -ES       Row Name 02/12/24 1007          Plan of Care Review    Plan of Care Reviewed With  patient  -ES     Outcome Evaluation Pt is a 66 yo female admitted 2/10/24 with c/o compression fx at T12, L1 and L2. Pt with recent kyphoplasty on 1/7/24, with worsening low back pain. Pt now with TLSO, and will follow up with outside facility for potential kyphoplasty. Dx with post-obstructive PNA.  PMHx significant for PNA, COPD.  At baseline, she resides with spouse and utilizes 3L O2. She Is indepndent within home.  Patient demos good understanding of spinal precautions and log roll technique. Pt has difficulty with donning/doffing TLSO and LB dressing, but states appropriate compensations for both. No acute OT needs. D/c home with family.  -ES       Row Name 02/12/24 1007          Therapy Assessment/Plan (OT)    Therapy Frequency (OT) evaluation only  -ES       Row Name 02/12/24 1007          Vital Signs    O2 Delivery Pre Treatment supplemental O2  -ES     O2 Delivery Intra Treatment supplemental O2  -ES     O2 Delivery Post Treatment supplemental O2  -ES     Recovery Time VSS  -ES       Row Name 02/12/24 1007          Positioning and Restraints    Pre-Treatment Position in bed  -ES     Post Treatment Position bed  -ES     In Bed notified nsg;call light within reach  -ES               User Key  (r) = Recorded By, (t) = Taken By, (c) = Cosigned By      Initials Name Provider Type    Gwendolyn Meyers, OT Occupational Therapist                   Outcome Measures       Row Name 02/12/24 0800          How much help from another person do you currently need...    Turning from your back to your side while in flat bed without using bedrails? 4  -ES     Moving from lying on back to sitting on the side of a flat bed without bedrails? 4  -ES     Moving to and from a bed to a chair (including a wheelchair)? 4  -ES     Standing up from a chair using your arms (e.g., wheelchair, bedside chair)? 4  -ES     Climbing 3-5 steps with a railing? 4  -ES     To walk in hospital room? 4  -ES     AM-PAC 6 Clicks Score (PT) 24   -ES     Highest Level of Mobility Goal 8 --> Walked 250 feet or more  -ES               User Key  (r) = Recorded By, (t) = Taken By, (c) = Cosigned By      Initials Name Provider Type    Gabrielle Cline, RN Registered Nurse                      OT Recommendation and Plan  Therapy Frequency (OT): evaluation only  Plan of Care Review  Plan of Care Reviewed With: patient  Outcome Evaluation: Pt is a 66 yo female admitted 2/10/24 with c/o compression fx at T12, L1 and L2. Pt with recent kyphoplasty on 1/7/24, with worsening low back pain. Pt now with TLSO, and will follow up with outside facility for potential kyphoplasty. Dx with post-obstructive PNA.  PMHx significant for PNA, COPD.  At baseline, she resides with spouse and utilizes 3L O2. She Is indepndent within home.  Patient demos good understanding of spinal precautions and log roll technique. Pt has difficulty with donning/doffing TLSO and LB dressing, but states appropriate compensations for both. No acute OT needs. D/c home with family.     Time Calculation:         Time Calculation- OT       Row Name 02/12/24 1013             Time Calculation- OT    OT Start Time 0930  -ES      OT Stop Time 0955  -ES      OT Time Calculation (min) 25 min  -ES      Total Timed Code Minutes- OT 0 minute(s)  -ES      OT Received On 02/12/24  -ES                User Key  (r) = Recorded By, (t) = Taken By, (c) = Cosigned By      Initials Name Provider Type    Gwendolyn Meyers OT Occupational Therapist                           Gwendolyn Moore OT  2/12/2024

## 2024-02-12 NOTE — PLAN OF CARE
Goal Outcome Evaluation:  Plan of Care Reviewed With: patient           Outcome Evaluation: Pt is a 68 yo female admitted 2/10/24 with c/o compression fx at T12, L1 and L2. Pt with recent kyphoplasty on 1/7/24, with worsening low back pain. Pt now with TLSO, and will follow up with outside facility for potential kyphoplasty. Dx with post-obstructive PNA.  PMHx significant for PNA, COPD.  At baseline, she resides with spouse and utilizes 3L O2. She Is indepndent within home.  Patient demos good understanding of spinal precautions and log roll technique. Pt has difficulty with donning/doffing TLSO and LB dressing, but states appropriate compensations for both. No acute OT needs. D/c home with family.

## 2024-02-12 NOTE — CASE MANAGEMENT/SOCIAL WORK
Case Management Discharge Note      Final Note: Routine home.      Selected Continued Care - Discharged on 2/12/2024 Admission date: 2/10/2024 - Discharge disposition: Home or Self Care      Durable Medical Equipment Coordination complete.      Service Provider Selected Services Address Phone Fax Patient Preferred    [facility] [id number] [address] [phone] --             Transportation Services  Private: Car    Final Discharge Disposition Code: 01 - home or self-care

## 2024-02-15 ENCOUNTER — READMISSION MANAGEMENT (OUTPATIENT)
Dept: CALL CENTER | Facility: HOSPITAL | Age: 69
End: 2024-02-15
Payer: MEDICARE

## 2024-02-15 LAB
BACTERIA SPEC AEROBE CULT: NORMAL
BACTERIA SPEC AEROBE CULT: NORMAL

## 2024-02-27 LAB
IMP & REVIEW OF LAB RESULTS: NORMAL
METHYLMALONATE SERPL-SCNC: 311 NMOL/L (ref 0–378)
VIT B12 SERPL-MCNC: 382 PG/ML (ref 232–1245)

## 2024-03-06 ENCOUNTER — TREATMENT (OUTPATIENT)
Dept: CARDIAC REHAB | Facility: HOSPITAL | Age: 69
End: 2024-03-06
Payer: MEDICARE

## 2024-03-06 DIAGNOSIS — J43.9 PULMONARY EMPHYSEMA, UNSPECIFIED EMPHYSEMA TYPE: Primary | ICD-10-CM

## 2024-03-06 PROCEDURE — 94625 PHY/QHP OP PULM RHB W/O MNTR: CPT

## 2024-03-07 ENCOUNTER — TREATMENT (OUTPATIENT)
Dept: CARDIAC REHAB | Facility: HOSPITAL | Age: 69
End: 2024-03-07
Payer: MEDICARE

## 2024-03-07 DIAGNOSIS — J43.9 PULMONARY EMPHYSEMA, UNSPECIFIED EMPHYSEMA TYPE: Primary | ICD-10-CM

## 2024-03-07 PROCEDURE — 94625 PHY/QHP OP PULM RHB W/O MNTR: CPT

## 2024-03-14 ENCOUNTER — TREATMENT (OUTPATIENT)
Dept: CARDIAC REHAB | Facility: HOSPITAL | Age: 69
End: 2024-03-14
Payer: MEDICARE

## 2024-03-14 DIAGNOSIS — J43.9 PULMONARY EMPHYSEMA, UNSPECIFIED EMPHYSEMA TYPE: Primary | ICD-10-CM

## 2024-03-14 PROCEDURE — 94625 PHY/QHP OP PULM RHB W/O MNTR: CPT

## 2024-03-15 ENCOUNTER — APPOINTMENT (OUTPATIENT)
Dept: CARDIAC REHAB | Facility: HOSPITAL | Age: 69
End: 2024-03-15
Payer: MEDICARE

## 2024-03-16 ENCOUNTER — APPOINTMENT (OUTPATIENT)
Dept: GENERAL RADIOLOGY | Facility: HOSPITAL | Age: 69
End: 2024-03-16
Payer: MEDICARE

## 2024-03-16 ENCOUNTER — HOSPITAL ENCOUNTER (EMERGENCY)
Facility: HOSPITAL | Age: 69
Discharge: HOME | End: 2024-03-17
Attending: EMERGENCY MEDICINE
Payer: MEDICARE

## 2024-03-16 DIAGNOSIS — E86.0 DEHYDRATION: ICD-10-CM

## 2024-03-16 DIAGNOSIS — I95.9 HYPOTENSION, UNSPECIFIED HYPOTENSION TYPE: Primary | ICD-10-CM

## 2024-03-16 LAB
ALBUMIN SERPL-MCNC: 4.3 G/DL (ref 3.5–5.2)
ALBUMIN/GLOB SERPL: 1.6 G/DL
ALP SERPL-CCNC: 153 U/L (ref 39–117)
ALT SERPL W P-5'-P-CCNC: 16 U/L (ref 1–33)
ANION GAP SERPL CALCULATED.3IONS-SCNC: 16 MMOL/L (ref 5–15)
APTT PPP: 40.7 SECONDS (ref 61–76.5)
AST SERPL-CCNC: 18 U/L (ref 1–32)
BASOPHILS # BLD AUTO: 0.05 10*3/MM3 (ref 0–0.2)
BASOPHILS NFR BLD AUTO: 0.5 % (ref 0–1.5)
BILIRUB SERPL-MCNC: 0.2 MG/DL (ref 0–1.2)
BUN SERPL-MCNC: 39 MG/DL (ref 8–23)
BUN/CREAT SERPL: 23.5 (ref 7–25)
CALCIUM SPEC-SCNC: 9.1 MG/DL (ref 8.6–10.5)
CHLORIDE SERPL-SCNC: 94 MMOL/L (ref 98–107)
CO2 SERPL-SCNC: 26 MMOL/L (ref 22–29)
CREAT SERPL-MCNC: 1.66 MG/DL (ref 0.57–1)
D-LACTATE SERPL-SCNC: 1.8 MMOL/L (ref 0.3–2)
DEPRECATED RDW RBC AUTO: 51.6 FL (ref 37–54)
EGFRCR SERPLBLD CKD-EPI 2021: 33.7 ML/MIN/1.73
EOSINOPHIL # BLD AUTO: 0.15 10*3/MM3 (ref 0–0.4)
EOSINOPHIL NFR BLD AUTO: 1.5 % (ref 0.3–6.2)
ERYTHROCYTE [DISTWIDTH] IN BLOOD BY AUTOMATED COUNT: 15.3 % (ref 12.3–15.4)
GLOBULIN UR ELPH-MCNC: 2.7 GM/DL
GLUCOSE SERPL-MCNC: 120 MG/DL (ref 65–99)
HCT VFR BLD AUTO: 37.6 % (ref 34–46.6)
HGB BLD-MCNC: 11.7 G/DL (ref 12–15.9)
IMM GRANULOCYTES # BLD AUTO: 0.05 10*3/MM3 (ref 0–0.05)
IMM GRANULOCYTES NFR BLD AUTO: 0.5 % (ref 0–0.5)
INR PPP: 2.22 (ref 2–3)
LYMPHOCYTES # BLD AUTO: 1.92 10*3/MM3 (ref 0.7–3.1)
LYMPHOCYTES NFR BLD AUTO: 19.2 % (ref 19.6–45.3)
MCH RBC QN AUTO: 28.3 PG (ref 26.6–33)
MCHC RBC AUTO-ENTMCNC: 31.1 G/DL (ref 31.5–35.7)
MCV RBC AUTO: 90.8 FL (ref 79–97)
MONOCYTES # BLD AUTO: 0.74 10*3/MM3 (ref 0.1–0.9)
MONOCYTES NFR BLD AUTO: 7.4 % (ref 5–12)
NEUTROPHILS NFR BLD AUTO: 7.07 10*3/MM3 (ref 1.7–7)
NEUTROPHILS NFR BLD AUTO: 70.9 % (ref 42.7–76)
NRBC BLD AUTO-RTO: 0 /100 WBC (ref 0–0.2)
NT-PROBNP SERPL-MCNC: 81.7 PG/ML (ref 0–900)
PLATELET # BLD AUTO: 320 10*3/MM3 (ref 140–450)
PMV BLD AUTO: 9.8 FL (ref 6–12)
POTASSIUM SERPL-SCNC: 3.3 MMOL/L (ref 3.5–5.2)
PROT SERPL-MCNC: 7 G/DL (ref 6–8.5)
PROTHROMBIN TIME: 22.8 SECONDS (ref 19.4–28.5)
RBC # BLD AUTO: 4.14 10*6/MM3 (ref 3.77–5.28)
SODIUM SERPL-SCNC: 136 MMOL/L (ref 136–145)
TROPONIN T SERPL HS-MCNC: 15 NG/L
WBC NRBC COR # BLD AUTO: 9.98 10*3/MM3 (ref 3.4–10.8)

## 2024-03-16 PROCEDURE — 85025 COMPLETE CBC W/AUTO DIFF WBC: CPT | Performed by: EMERGENCY MEDICINE

## 2024-03-16 PROCEDURE — 25810000003 SODIUM CHLORIDE 0.9 % SOLUTION: Performed by: EMERGENCY MEDICINE

## 2024-03-16 PROCEDURE — 0202U NFCT DS 22 TRGT SARS-COV-2: CPT | Performed by: EMERGENCY MEDICINE

## 2024-03-16 PROCEDURE — 99284 EMERGENCY DEPT VISIT MOD MDM: CPT

## 2024-03-16 PROCEDURE — 93005 ELECTROCARDIOGRAM TRACING: CPT | Performed by: EMERGENCY MEDICINE

## 2024-03-16 PROCEDURE — 87040 BLOOD CULTURE FOR BACTERIA: CPT | Performed by: EMERGENCY MEDICINE

## 2024-03-16 PROCEDURE — 83605 ASSAY OF LACTIC ACID: CPT | Performed by: EMERGENCY MEDICINE

## 2024-03-16 PROCEDURE — 85730 THROMBOPLASTIN TIME PARTIAL: CPT | Performed by: EMERGENCY MEDICINE

## 2024-03-16 PROCEDURE — 71045 X-RAY EXAM CHEST 1 VIEW: CPT

## 2024-03-16 PROCEDURE — 83880 ASSAY OF NATRIURETIC PEPTIDE: CPT | Performed by: EMERGENCY MEDICINE

## 2024-03-16 PROCEDURE — 84484 ASSAY OF TROPONIN QUANT: CPT | Performed by: EMERGENCY MEDICINE

## 2024-03-16 PROCEDURE — 85610 PROTHROMBIN TIME: CPT | Performed by: EMERGENCY MEDICINE

## 2024-03-16 PROCEDURE — 80053 COMPREHEN METABOLIC PANEL: CPT | Performed by: EMERGENCY MEDICINE

## 2024-03-16 RX ORDER — SODIUM CHLORIDE 0.9 % (FLUSH) 0.9 %
10 SYRINGE (ML) INJECTION AS NEEDED
Status: DISCONTINUED | OUTPATIENT
Start: 2024-03-16 | End: 2024-03-17 | Stop reason: HOSPADM

## 2024-03-16 RX ADMIN — SODIUM CHLORIDE 1000 ML: 9 INJECTION, SOLUTION INTRAVENOUS at 23:23

## 2024-03-17 VITALS
OXYGEN SATURATION: 94 % | TEMPERATURE: 98.4 F | RESPIRATION RATE: 16 BRPM | HEART RATE: 87 BPM | DIASTOLIC BLOOD PRESSURE: 50 MMHG | BODY MASS INDEX: 28.59 KG/M2 | HEIGHT: 63 IN | SYSTOLIC BLOOD PRESSURE: 110 MMHG | WEIGHT: 161.38 LBS

## 2024-03-17 LAB
B PARAPERT DNA SPEC QL NAA+PROBE: NOT DETECTED
B PERT DNA SPEC QL NAA+PROBE: NOT DETECTED
C PNEUM DNA NPH QL NAA+NON-PROBE: NOT DETECTED
FLUAV SUBTYP SPEC NAA+PROBE: NOT DETECTED
FLUBV RNA ISLT QL NAA+PROBE: NOT DETECTED
HADV DNA SPEC NAA+PROBE: NOT DETECTED
HCOV 229E RNA SPEC QL NAA+PROBE: NOT DETECTED
HCOV HKU1 RNA SPEC QL NAA+PROBE: NOT DETECTED
HCOV NL63 RNA SPEC QL NAA+PROBE: NOT DETECTED
HCOV OC43 RNA SPEC QL NAA+PROBE: NOT DETECTED
HMPV RNA NPH QL NAA+NON-PROBE: NOT DETECTED
HPIV1 RNA ISLT QL NAA+PROBE: NOT DETECTED
HPIV2 RNA SPEC QL NAA+PROBE: NOT DETECTED
HPIV3 RNA NPH QL NAA+PROBE: NOT DETECTED
HPIV4 P GENE NPH QL NAA+PROBE: NOT DETECTED
M PNEUMO IGG SER IA-ACNC: NOT DETECTED
QT INTERVAL: 373 MS
QTC INTERVAL: 448 MS
RHINOVIRUS RNA SPEC NAA+PROBE: NOT DETECTED
RSV RNA NPH QL NAA+NON-PROBE: NOT DETECTED
SARS-COV-2 RNA NPH QL NAA+NON-PROBE: NOT DETECTED

## 2024-03-17 PROCEDURE — 87040 BLOOD CULTURE FOR BACTERIA: CPT | Performed by: EMERGENCY MEDICINE

## 2024-03-17 PROCEDURE — 36415 COLL VENOUS BLD VENIPUNCTURE: CPT

## 2024-03-17 NOTE — ED PROVIDER NOTES
Subjective   History of Present Illness  Chief complaint: low blood pressure    68 year old female with history of hypertension presents with low blood pressure.  Patient states she has noticed her blood pressure has been low all day.  She does state she took her regular blood pressure medicine this morning.  She states she has been somewhat congested for several days but denies any other illness.  She denies fever, vomiting, diarrhea, cough.  She states she has COPD and is always a little short of breath but she states her breathing is at baseline.  She denies any chest pain.  Patient is concerned she may be dehydrated.  She states she feels like she has not been drinking well recently.    History provided by:  Patient      Review of Systems   Constitutional:  Negative for fever.   HENT:  Positive for congestion.    Respiratory:  Positive for shortness of breath. Negative for cough.    Cardiovascular:  Negative for chest pain.   Gastrointestinal:  Negative for abdominal pain, diarrhea and vomiting.   Genitourinary:  Negative for dysuria.   Musculoskeletal:  Negative for back pain.   Neurological:  Negative for headaches.   Psychiatric/Behavioral:  Negative for confusion.        Past Medical History:   Diagnosis Date    Arthritis     Bladder cancer     Chronic back pain     Closed nondisplaced fracture of head of right radius 03/2017    Congenital deformity of right hip joint 1956    COPD (chronic obstructive pulmonary disease)     former smoker    Deep venous thrombosis     unprovoked    Depressive disorder     Disease of thyroid gland     Diverticulitis of colon     Essential hypertension     Gastroesophageal reflux disease     Insomnia     Obesity     Pulmonary embolism     provoked by surgery       No Known Allergies    Past Surgical History:   Procedure Laterality Date    ABDOMINAL SURGERY      BRONCHOSCOPY N/A 11/25/2020    Procedure: BRONCHOSCOPY with bronchial washing;  Surgeon: Win Johnston MD;   Location: [facility] ENDOSCOPY;  Service: Pulmonary;  Laterality: N/A;  Post: mucous plugs    BRONCHOSCOPY N/A 2022    Procedure: BRONCHOSCOPY with bronchial washing;  Surgeon: Win Johnston MD;  Location: [facility] ENDOSCOPY;  Service: Pulmonary;  Laterality: N/A;  post op: pneumonia    BRONCHOSCOPY N/A 2023    Procedure: BRONCHOSCOPY with bilateral wash;  Surgeon: Win Johnston MD;  Location: [facility] ENDOSCOPY;  Service: Pulmonary;  Laterality: N/A;  mucous plugs    CHOLECYSTECTOMY      COLON SURGERY      COLONOSCOPY      COLOSTOMY CLOSURE N/A 2021    Procedure: COLOSTOMY TAKEDOWN OR CLOSURE, extenisve lysis of adhesions;  Surgeon: Chi Farmer MD;  Location: [facility] MAIN OR;  Service: General;  Laterality: N/A;    CYSTOSCOPY N/A 2021    Procedure: CYSTOSCOPY with bladder tumor removal and fulgeration, insertion of 3-way rizzo catheter;  Surgeon: Alfonzo Hayward MD;  Location: [facility] MAIN OR;  Service: Urology;  Laterality: N/A;    ENDOSCOPY      EXPLORATORY LAPAROTOMY N/A 2020    Procedure: LAPAROTOMY EXPLORATORY HARTMANS;  Surgeon: Chi Farmer MD;  Location: [facility] MAIN OR;  Service: General;  Laterality: N/A;    HYSTERECTOMY      TOTAL HIP ARTHROPLASTY Right     TOTAL HIP ARTHROPLASTY REVISION Right     x3       Family History   Problem Relation Age of Onset    No Known Problems Mother     No Known Problems Father        Social History     Socioeconomic History    Marital status:    Tobacco Use    Smoking status: Former     Current packs/day: 0.00     Average packs/day: 2.0 packs/day for 40.0 years (80.0 ttl pk-yrs)     Types: Cigarettes     Start date:      Quit date: 2019     Years since quittin.2    Smokeless tobacco: Never    Tobacco comments:     ELECTRONIC   Vaping Use    Vaping status: Former    Substances: Nicotine, Flavoring   Substance and Sexual Activity    Alcohol use: Never    Drug use: Never    Sexual activity: Defer       /52    "Pulse 86   Temp 98.4 °F (36.9 °C) (Temporal)   Resp 16   Ht 160 cm (63\")   Wt 73.2 kg (161 lb 6 oz)   SpO2 95%   BMI 28.59 kg/m²       Objective   Physical Exam  Vitals and nursing note reviewed.   Constitutional:       Appearance: Normal appearance.   HENT:      Head: Normocephalic and atraumatic.      Mouth/Throat:      Mouth: Mucous membranes are moist.   Cardiovascular:      Rate and Rhythm: Normal rate and regular rhythm.      Heart sounds: Normal heart sounds.   Pulmonary:      Effort: Pulmonary effort is normal. No respiratory distress.      Breath sounds: Wheezing present.   Abdominal:      General: Bowel sounds are normal.      Palpations: Abdomen is soft.      Tenderness: There is no abdominal tenderness.   Musculoskeletal:      Right lower leg: No edema.      Left lower leg: No edema.   Skin:     General: Skin is warm and dry.   Neurological:      Mental Status: She is alert and oriented to person, place, and time.         Procedures           ED Course      Results for orders placed or performed during the hospital encounter of 03/16/24   Respiratory Panel PCR w/COVID-19(SARS-CoV-2) [facility] In-House, NP Swab in UTM/VTM, 2 HR TAT - Swab, Nasopharynx    Specimen: Nasopharynx; Swab   Result Value Ref Range    ADENOVIRUS, PCR Not Detected Not Detected    Coronavirus 229E Not Detected Not Detected    Coronavirus HKU1 Not Detected Not Detected    Coronavirus NL63 Not Detected Not Detected    Coronavirus OC43 Not Detected Not Detected    COVID19 Not Detected Not Detected - Ref. Range    Human Metapneumovirus Not Detected Not Detected    Human Rhinovirus/Enterovirus Not Detected Not Detected    Influenza A PCR Not Detected Not Detected    Influenza B PCR Not Detected Not Detected    Parainfluenza Virus 1 Not Detected Not Detected    Parainfluenza Virus 2 Not Detected Not Detected    Parainfluenza Virus 3 Not Detected Not Detected    Parainfluenza Virus 4 Not Detected Not Detected    RSV, " PCR Not Detected Not Detected    Bordetella pertussis pcr Not Detected Not Detected    Bordetella parapertussis PCR Not Detected Not Detected    Chlamydophila pneumoniae PCR Not Detected Not Detected    Mycoplasma pneumo by PCR Not Detected Not Detected   Comprehensive Metabolic Panel    Specimen: Blood   Result Value Ref Range    Glucose 120 (H) 65 - 99 mg/dL    BUN 39 (H) 8 - 23 mg/dL    Creatinine 1.66 (H) 0.57 - 1.00 mg/dL    Sodium 136 136 - 145 mmol/L    Potassium 3.3 (L) 3.5 - 5.2 mmol/L    Chloride 94 (L) 98 - 107 mmol/L    CO2 26.0 22.0 - 29.0 mmol/L    Calcium 9.1 8.6 - 10.5 mg/dL    Total Protein 7.0 6.0 - 8.5 g/dL    Albumin 4.3 3.5 - 5.2 g/dL    ALT (SGPT) 16 1 - 33 U/L    AST (SGOT) 18 1 - 32 U/L    Alkaline Phosphatase 153 (H) 39 - 117 U/L    Total Bilirubin 0.2 0.0 - 1.2 mg/dL    Globulin 2.7 gm/dL    A/G Ratio 1.6 g/dL    BUN/Creatinine Ratio 23.5 7.0 - 25.0    Anion Gap 16.0 (H) 5.0 - 15.0 mmol/L    eGFR 33.7 (L) >60.0 mL/min/1.73   Single High Sensitivity Troponin T    Specimen: Blood   Result Value Ref Range    HS Troponin T 15 (H) <14 ng/L   BNP    Specimen: Blood   Result Value Ref Range    proBNP 81.7 0.0 - 900.0 pg/mL   Protime-INR    Specimen: Blood   Result Value Ref Range    Protime 22.8 19.4 - 28.5 Seconds    INR 2.22 2.00 - 3.00   aPTT    Specimen: Blood   Result Value Ref Range    PTT 40.7 (L) 61.0 - 76.5 seconds   CBC Auto Differential    Specimen: Blood   Result Value Ref Range    WBC 9.98 3.40 - 10.80 10*3/mm3    RBC 4.14 3.77 - 5.28 10*6/mm3    Hemoglobin 11.7 (L) 12.0 - 15.9 g/dL    Hematocrit 37.6 34.0 - 46.6 %    MCV 90.8 79.0 - 97.0 fL    MCH 28.3 26.6 - 33.0 pg    MCHC 31.1 (L) 31.5 - 35.7 g/dL    RDW 15.3 12.3 - 15.4 %    RDW-SD 51.6 37.0 - 54.0 fl    MPV 9.8 6.0 - 12.0 fL    Platelets 320 140 - 450 10*3/mm3    Neutrophil % 70.9 42.7 - 76.0 %    Lymphocyte % 19.2 (L) 19.6 - 45.3 %    Monocyte % 7.4 5.0 - 12.0 %    Eosinophil % 1.5 0.3 - 6.2 %    Basophil % 0.5 0.0 - 1.5 %     Immature Grans % 0.5 0.0 - 0.5 %    Neutrophils, Absolute 7.07 (H) 1.70 - 7.00 10*3/mm3    Lymphocytes, Absolute 1.92 0.70 - 3.10 10*3/mm3    Monocytes, Absolute 0.74 0.10 - 0.90 10*3/mm3    Eosinophils, Absolute 0.15 0.00 - 0.40 10*3/mm3    Basophils, Absolute 0.05 0.00 - 0.20 10*3/mm3    Immature Grans, Absolute 0.05 0.00 - 0.05 10*3/mm3    nRBC 0.0 0.0 - 0.2 /100 WBC   POC Lactate    Specimen: Blood   Result Value Ref Range    Lactate 1.8 0.3 - 2.0 mmol/L   ECG 12 Lead Dyspnea   Result Value Ref Range    QT Interval 373 ms    QTC Interval 448 ms     XR Chest 1 View    Result Date: 3/16/2024  Impression: Cardiomegaly. Mild basilar atelectasis. Electronically Signed: Antony Velasquez MD  3/16/2024 11:37 PM EDT  Workstation ID: [id number]                              My interpretation of EKG shows sinus rhythm, rate of 87, no ST elevation            Medical Decision Making  Amount and/or Complexity of Data Reviewed  Labs: ordered.  Radiology: ordered.  ECG/medicine tests: ordered.    Risk  Prescription drug management.      Patient had the above valuation.  Results were discussed with the patient.  My interpretation of chest x-ray shows no infiltrate or effusion.  EKG shows no acute ischemia.  Troponin is essentially normal at 15.  White blood cell count is normal.  Lactic acid is normal.  Respiratory panel is negative.  CMP significant for BUN of 39 and creatinine 1.66.  This could be consistent with some mild dehydration.  Patient was given IV fluids in the emergency room.  She was initially hypotensive but blood pressure has responded well to the fluids.  Patient states she feels well.  She will be discharged to follow-up with her primary doctor.  She was encouraged to drink plenty of fluids and to check her blood pressure before taking her blood pressure medicine.  She will call her primary doctor on Monday for follow-up.      Final diagnoses:   Hypotension, unspecified hypotension type   Dehydration       ED  Disposition  ED Disposition       ED Disposition   Discharge    Condition   Stable    Comment   --               Michael Gerardo MD  [address] [place] [address] [phone]    Call in 2 days           Medication List      No changes were made to your prescriptions during this visit.            Aries Soto MD  03/17/24 0110

## 2024-03-18 ENCOUNTER — TREATMENT (OUTPATIENT)
Dept: CARDIAC REHAB | Facility: HOSPITAL | Age: 69
End: 2024-03-18
Payer: MEDICARE

## 2024-03-18 DIAGNOSIS — J43.9 PULMONARY EMPHYSEMA, UNSPECIFIED EMPHYSEMA TYPE: Primary | ICD-10-CM

## 2024-03-18 PROCEDURE — 94625 PHY/QHP OP PULM RHB W/O MNTR: CPT

## 2024-03-20 ENCOUNTER — TREATMENT (OUTPATIENT)
Dept: CARDIAC REHAB | Facility: HOSPITAL | Age: 69
End: 2024-03-20
Payer: MEDICARE

## 2024-03-20 DIAGNOSIS — J43.9 PULMONARY EMPHYSEMA, UNSPECIFIED EMPHYSEMA TYPE: Primary | ICD-10-CM

## 2024-03-20 PROCEDURE — 94625 PHY/QHP OP PULM RHB W/O MNTR: CPT

## 2024-03-21 LAB — BACTERIA SPEC AEROBE CULT: NORMAL

## 2024-03-22 ENCOUNTER — TREATMENT (OUTPATIENT)
Dept: CARDIAC REHAB | Facility: HOSPITAL | Age: 69
End: 2024-03-22
Payer: MEDICARE

## 2024-03-22 DIAGNOSIS — J43.9 PULMONARY EMPHYSEMA, UNSPECIFIED EMPHYSEMA TYPE: Primary | ICD-10-CM

## 2024-03-22 LAB — BACTERIA SPEC AEROBE CULT: NORMAL

## 2024-03-22 PROCEDURE — 94625 PHY/QHP OP PULM RHB W/O MNTR: CPT

## 2024-03-25 ENCOUNTER — HOSPITAL ENCOUNTER (OUTPATIENT)
Facility: HOSPITAL | Age: 69
Setting detail: OBSERVATION
Discharge: HOME | End: 2024-03-26
Attending: EMERGENCY MEDICINE | Admitting: EMERGENCY MEDICINE
Payer: MEDICARE

## 2024-03-25 ENCOUNTER — APPOINTMENT (OUTPATIENT)
Dept: GENERAL RADIOLOGY | Facility: HOSPITAL | Age: 69
End: 2024-03-25
Payer: MEDICARE

## 2024-03-25 ENCOUNTER — TREATMENT (OUTPATIENT)
Dept: CARDIAC REHAB | Facility: HOSPITAL | Age: 69
End: 2024-03-25
Payer: MEDICARE

## 2024-03-25 DIAGNOSIS — J43.9 PULMONARY EMPHYSEMA, UNSPECIFIED EMPHYSEMA TYPE: Primary | ICD-10-CM

## 2024-03-25 DIAGNOSIS — J44.1 CHRONIC OBSTRUCTIVE PULMONARY DISEASE WITH ACUTE EXACERBATION: ICD-10-CM

## 2024-03-25 DIAGNOSIS — R06.03 ACUTE RESPIRATORY DISTRESS: Primary | ICD-10-CM

## 2024-03-25 LAB
ALBUMIN SERPL-MCNC: 4.1 G/DL (ref 3.5–5.2)
ALBUMIN/GLOB SERPL: 1.7 G/DL
ALP SERPL-CCNC: 178 U/L (ref 39–117)
ALT SERPL W P-5'-P-CCNC: 13 U/L (ref 1–33)
ANION GAP SERPL CALCULATED.3IONS-SCNC: 11 MMOL/L (ref 5–15)
AST SERPL-CCNC: 21 U/L (ref 1–32)
BACTERIA UR QL AUTO: ABNORMAL /HPF
BASOPHILS # BLD AUTO: 0.05 10*3/MM3 (ref 0–0.2)
BASOPHILS NFR BLD AUTO: 0.6 % (ref 0–1.5)
BILIRUB SERPL-MCNC: 0.2 MG/DL (ref 0–1.2)
BILIRUB UR QL STRIP: NEGATIVE
BUN SERPL-MCNC: 14 MG/DL (ref 8–23)
BUN/CREAT SERPL: 18.2 (ref 7–25)
CALCIUM SPEC-SCNC: 9.2 MG/DL (ref 8.6–10.5)
CHLORIDE SERPL-SCNC: 99 MMOL/L (ref 98–107)
CLARITY UR: CLEAR
CO2 SERPL-SCNC: 29 MMOL/L (ref 22–29)
COLOR UR: YELLOW
CREAT SERPL-MCNC: 0.77 MG/DL (ref 0.57–1)
D-LACTATE SERPL-SCNC: 1 MMOL/L (ref 0.3–2)
DEPRECATED RDW RBC AUTO: 49.6 FL (ref 37–54)
EGFRCR SERPLBLD CKD-EPI 2021: 84.7 ML/MIN/1.73
EOSINOPHIL # BLD AUTO: 0.2 10*3/MM3 (ref 0–0.4)
EOSINOPHIL NFR BLD AUTO: 2.5 % (ref 0.3–6.2)
ERYTHROCYTE [DISTWIDTH] IN BLOOD BY AUTOMATED COUNT: 14.5 % (ref 12.3–15.4)
GLOBULIN UR ELPH-MCNC: 2.4 GM/DL
GLUCOSE SERPL-MCNC: 125 MG/DL (ref 65–99)
GLUCOSE UR STRIP-MCNC: NEGATIVE MG/DL
HCT VFR BLD AUTO: 37.1 % (ref 34–46.6)
HGB BLD-MCNC: 11.3 G/DL (ref 12–15.9)
HGB UR QL STRIP.AUTO: NEGATIVE
HYALINE CASTS UR QL AUTO: ABNORMAL /LPF
IMM GRANULOCYTES # BLD AUTO: 0.03 10*3/MM3 (ref 0–0.05)
IMM GRANULOCYTES NFR BLD AUTO: 0.4 % (ref 0–0.5)
INR PPP: 1.22 (ref 2–3)
KETONES UR QL STRIP: ABNORMAL
LEUKOCYTE ESTERASE UR QL STRIP.AUTO: ABNORMAL
LYMPHOCYTES # BLD AUTO: 1.43 10*3/MM3 (ref 0.7–3.1)
LYMPHOCYTES NFR BLD AUTO: 17.6 % (ref 19.6–45.3)
MCH RBC QN AUTO: 28 PG (ref 26.6–33)
MCHC RBC AUTO-ENTMCNC: 30.5 G/DL (ref 31.5–35.7)
MCV RBC AUTO: 92.1 FL (ref 79–97)
MONOCYTES # BLD AUTO: 0.62 10*3/MM3 (ref 0.1–0.9)
MONOCYTES NFR BLD AUTO: 7.6 % (ref 5–12)
NEUTROPHILS NFR BLD AUTO: 5.78 10*3/MM3 (ref 1.7–7)
NEUTROPHILS NFR BLD AUTO: 71.3 % (ref 42.7–76)
NITRITE UR QL STRIP: NEGATIVE
NRBC BLD AUTO-RTO: 0 /100 WBC (ref 0–0.2)
NT-PROBNP SERPL-MCNC: 119.3 PG/ML (ref 0–900)
PH UR STRIP.AUTO: 6.5 [PH] (ref 5–8)
PLATELET # BLD AUTO: 352 10*3/MM3 (ref 140–450)
PMV BLD AUTO: 9.7 FL (ref 6–12)
POTASSIUM SERPL-SCNC: 3.7 MMOL/L (ref 3.5–5.2)
PROT SERPL-MCNC: 6.5 G/DL (ref 6–8.5)
PROT UR QL STRIP: NEGATIVE
PROTHROMBIN TIME: 13.1 SECONDS (ref 19.4–28.5)
RBC # BLD AUTO: 4.03 10*6/MM3 (ref 3.77–5.28)
RBC # UR STRIP: ABNORMAL /HPF
REF LAB TEST METHOD: ABNORMAL
SODIUM SERPL-SCNC: 139 MMOL/L (ref 136–145)
SP GR UR STRIP: 1.02 (ref 1–1.03)
SQUAMOUS #/AREA URNS HPF: ABNORMAL /HPF
UROBILINOGEN UR QL STRIP: ABNORMAL
WBC # UR STRIP: ABNORMAL /HPF
WBC NRBC COR # BLD AUTO: 8.11 10*3/MM3 (ref 3.4–10.8)

## 2024-03-25 PROCEDURE — 94625 PHY/QHP OP PULM RHB W/O MNTR: CPT

## 2024-03-25 PROCEDURE — 94761 N-INVAS EAR/PLS OXIMETRY MLT: CPT

## 2024-03-25 PROCEDURE — 85610 PROTHROMBIN TIME: CPT | Performed by: EMERGENCY MEDICINE

## 2024-03-25 PROCEDURE — 80053 COMPREHEN METABOLIC PANEL: CPT | Performed by: EMERGENCY MEDICINE

## 2024-03-25 PROCEDURE — 83605 ASSAY OF LACTIC ACID: CPT

## 2024-03-25 PROCEDURE — 93005 ELECTROCARDIOGRAM TRACING: CPT | Performed by: EMERGENCY MEDICINE

## 2024-03-25 PROCEDURE — 96361 HYDRATE IV INFUSION ADD-ON: CPT

## 2024-03-25 PROCEDURE — 83880 ASSAY OF NATRIURETIC PEPTIDE: CPT | Performed by: EMERGENCY MEDICINE

## 2024-03-25 PROCEDURE — 25810000003 SODIUM CHLORIDE 0.9 % SOLUTION: Performed by: EMERGENCY MEDICINE

## 2024-03-25 PROCEDURE — 36415 COLL VENOUS BLD VENIPUNCTURE: CPT

## 2024-03-25 PROCEDURE — 81001 URINALYSIS AUTO W/SCOPE: CPT | Performed by: EMERGENCY MEDICINE

## 2024-03-25 PROCEDURE — 96374 THER/PROPH/DIAG INJ IV PUSH: CPT

## 2024-03-25 PROCEDURE — 71045 X-RAY EXAM CHEST 1 VIEW: CPT

## 2024-03-25 PROCEDURE — G0378 HOSPITAL OBSERVATION PER HR: HCPCS

## 2024-03-25 PROCEDURE — 85025 COMPLETE CBC W/AUTO DIFF WBC: CPT | Performed by: EMERGENCY MEDICINE

## 2024-03-25 PROCEDURE — 87086 URINE CULTURE/COLONY COUNT: CPT | Performed by: EMERGENCY MEDICINE

## 2024-03-25 PROCEDURE — 99285 EMERGENCY DEPT VISIT HI MDM: CPT

## 2024-03-25 PROCEDURE — 94799 UNLISTED PULMONARY SVC/PX: CPT

## 2024-03-25 PROCEDURE — 87040 BLOOD CULTURE FOR BACTERIA: CPT | Performed by: EMERGENCY MEDICINE

## 2024-03-25 PROCEDURE — 94640 AIRWAY INHALATION TREATMENT: CPT

## 2024-03-25 PROCEDURE — 25010000002 METHYLPREDNISOLONE PER 125 MG: Performed by: EMERGENCY MEDICINE

## 2024-03-25 RX ORDER — BISACODYL 10 MG
10 SUPPOSITORY, RECTAL RECTAL DAILY PRN
Status: DISCONTINUED | OUTPATIENT
Start: 2024-03-25 | End: 2024-03-26 | Stop reason: HOSPADM

## 2024-03-25 RX ORDER — ONDANSETRON 2 MG/ML
4 INJECTION INTRAMUSCULAR; INTRAVENOUS EVERY 6 HOURS PRN
Status: DISCONTINUED | OUTPATIENT
Start: 2024-03-25 | End: 2024-03-26 | Stop reason: HOSPADM

## 2024-03-25 RX ORDER — METHYLPREDNISOLONE SODIUM SUCCINATE 125 MG/2ML
125 INJECTION, POWDER, LYOPHILIZED, FOR SOLUTION INTRAMUSCULAR; INTRAVENOUS ONCE
Status: COMPLETED | OUTPATIENT
Start: 2024-03-25 | End: 2024-03-25

## 2024-03-25 RX ORDER — SODIUM CHLORIDE 9 MG/ML
40 INJECTION, SOLUTION INTRAVENOUS AS NEEDED
Status: DISCONTINUED | OUTPATIENT
Start: 2024-03-25 | End: 2024-03-26 | Stop reason: HOSPADM

## 2024-03-25 RX ORDER — IPRATROPIUM BROMIDE AND ALBUTEROL SULFATE 2.5; .5 MG/3ML; MG/3ML
3 SOLUTION RESPIRATORY (INHALATION) ONCE
Status: COMPLETED | OUTPATIENT
Start: 2024-03-25 | End: 2024-03-25

## 2024-03-25 RX ORDER — AMOXICILLIN 250 MG
2 CAPSULE ORAL 2 TIMES DAILY PRN
Status: DISCONTINUED | OUTPATIENT
Start: 2024-03-25 | End: 2024-03-26 | Stop reason: HOSPADM

## 2024-03-25 RX ORDER — BISACODYL 5 MG/1
5 TABLET, DELAYED RELEASE ORAL DAILY PRN
Status: DISCONTINUED | OUTPATIENT
Start: 2024-03-25 | End: 2024-03-26 | Stop reason: HOSPADM

## 2024-03-25 RX ORDER — SODIUM CHLORIDE 9 MG/ML
100 INJECTION, SOLUTION INTRAVENOUS CONTINUOUS
Status: DISCONTINUED | OUTPATIENT
Start: 2024-03-25 | End: 2024-03-26

## 2024-03-25 RX ORDER — LISINOPRIL 5 MG/1
5 TABLET ORAL DAILY
COMMUNITY

## 2024-03-25 RX ORDER — ACETAMINOPHEN 325 MG/1
650 TABLET ORAL EVERY 4 HOURS PRN
Status: DISCONTINUED | OUTPATIENT
Start: 2024-03-25 | End: 2024-03-26 | Stop reason: HOSPADM

## 2024-03-25 RX ORDER — IPRATROPIUM BROMIDE AND ALBUTEROL SULFATE 2.5; .5 MG/3ML; MG/3ML
3 SOLUTION RESPIRATORY (INHALATION) EVERY 6 HOURS PRN
Status: DISCONTINUED | OUTPATIENT
Start: 2024-03-25 | End: 2024-03-26 | Stop reason: HOSPADM

## 2024-03-25 RX ORDER — SODIUM CHLORIDE 0.9 % (FLUSH) 0.9 %
10 SYRINGE (ML) INJECTION EVERY 12 HOURS SCHEDULED
Status: DISCONTINUED | OUTPATIENT
Start: 2024-03-25 | End: 2024-03-26 | Stop reason: HOSPADM

## 2024-03-25 RX ORDER — WARFARIN SODIUM 5 MG/1
5 TABLET ORAL
Status: COMPLETED | OUTPATIENT
Start: 2024-03-25 | End: 2024-03-25

## 2024-03-25 RX ORDER — ALBUTEROL SULFATE 2.5 MG/3ML
2.5 SOLUTION RESPIRATORY (INHALATION) ONCE
Status: COMPLETED | OUTPATIENT
Start: 2024-03-25 | End: 2024-03-25

## 2024-03-25 RX ORDER — METHYLPREDNISOLONE SODIUM SUCCINATE 125 MG/2ML
60 INJECTION, POWDER, LYOPHILIZED, FOR SOLUTION INTRAMUSCULAR; INTRAVENOUS EVERY 12 HOURS
Status: DISCONTINUED | OUTPATIENT
Start: 2024-03-26 | End: 2024-03-26 | Stop reason: HOSPADM

## 2024-03-25 RX ORDER — SODIUM CHLORIDE 0.9 % (FLUSH) 0.9 %
10 SYRINGE (ML) INJECTION AS NEEDED
Status: DISCONTINUED | OUTPATIENT
Start: 2024-03-25 | End: 2024-03-26 | Stop reason: HOSPADM

## 2024-03-25 RX ORDER — CHOLECALCIFEROL (VITAMIN D3) 125 MCG
5 CAPSULE ORAL NIGHTLY PRN
Status: DISCONTINUED | OUTPATIENT
Start: 2024-03-25 | End: 2024-03-26 | Stop reason: HOSPADM

## 2024-03-25 RX ORDER — HYDROCODONE BITARTRATE AND ACETAMINOPHEN 10; 325 MG/1; MG/1
1 TABLET ORAL EVERY 6 HOURS PRN
Status: DISCONTINUED | OUTPATIENT
Start: 2024-03-25 | End: 2024-03-26

## 2024-03-25 RX ORDER — POLYETHYLENE GLYCOL 3350 17 G/17G
17 POWDER, FOR SOLUTION ORAL DAILY PRN
Status: DISCONTINUED | OUTPATIENT
Start: 2024-03-25 | End: 2024-03-26 | Stop reason: HOSPADM

## 2024-03-25 RX ADMIN — METHYLPREDNISOLONE SODIUM SUCCINATE 125 MG: 125 INJECTION, POWDER, FOR SOLUTION INTRAMUSCULAR; INTRAVENOUS at 18:44

## 2024-03-25 RX ADMIN — ALBUTEROL SULFATE 2.5 MG: 2.5 SOLUTION RESPIRATORY (INHALATION) at 18:40

## 2024-03-25 RX ADMIN — IPRATROPIUM BROMIDE AND ALBUTEROL SULFATE 3 ML: .5; 3 SOLUTION RESPIRATORY (INHALATION) at 18:46

## 2024-03-25 RX ADMIN — Medication 10 ML: at 22:48

## 2024-03-25 RX ADMIN — WARFARIN SODIUM 5 MG: 5 TABLET ORAL at 22:47

## 2024-03-25 RX ADMIN — SODIUM CHLORIDE 100 ML/HR: 9 INJECTION, SOLUTION INTRAVENOUS at 22:56

## 2024-03-26 ENCOUNTER — READMISSION MANAGEMENT (OUTPATIENT)
Dept: CALL CENTER | Facility: HOSPITAL | Age: 69
End: 2024-03-26
Payer: MEDICARE

## 2024-03-26 VITALS
BODY MASS INDEX: 28.4 KG/M2 | DIASTOLIC BLOOD PRESSURE: 77 MMHG | TEMPERATURE: 97.8 F | HEIGHT: 63 IN | SYSTOLIC BLOOD PRESSURE: 143 MMHG | WEIGHT: 160.27 LBS | HEART RATE: 78 BPM | OXYGEN SATURATION: 95 % | RESPIRATION RATE: 16 BRPM

## 2024-03-26 LAB
ANION GAP SERPL CALCULATED.3IONS-SCNC: 10 MMOL/L (ref 5–15)
BACTERIA SPEC AEROBE CULT: NO GROWTH
BASOPHILS # BLD AUTO: 0.02 10*3/MM3 (ref 0–0.2)
BASOPHILS NFR BLD AUTO: 0.5 % (ref 0–1.5)
BUN SERPL-MCNC: 13 MG/DL (ref 8–23)
BUN/CREAT SERPL: 20 (ref 7–25)
CALCIUM SPEC-SCNC: 9.1 MG/DL (ref 8.6–10.5)
CHLORIDE SERPL-SCNC: 101 MMOL/L (ref 98–107)
CO2 SERPL-SCNC: 27 MMOL/L (ref 22–29)
CREAT SERPL-MCNC: 0.65 MG/DL (ref 0.57–1)
DEPRECATED RDW RBC AUTO: 47.6 FL (ref 37–54)
EGFRCR SERPLBLD CKD-EPI 2021: 96.6 ML/MIN/1.73
EOSINOPHIL # BLD AUTO: 0 10*3/MM3 (ref 0–0.4)
EOSINOPHIL NFR BLD AUTO: 0 % (ref 0.3–6.2)
ERYTHROCYTE [DISTWIDTH] IN BLOOD BY AUTOMATED COUNT: 14.1 % (ref 12.3–15.4)
GLUCOSE SERPL-MCNC: 181 MG/DL (ref 65–99)
HBA1C MFR BLD: 5.8 % (ref 4.8–5.6)
HCT VFR BLD AUTO: 36.1 % (ref 34–46.6)
HGB BLD-MCNC: 10.9 G/DL (ref 12–15.9)
IMM GRANULOCYTES # BLD AUTO: 0.01 10*3/MM3 (ref 0–0.05)
IMM GRANULOCYTES NFR BLD AUTO: 0.2 % (ref 0–0.5)
LYMPHOCYTES # BLD AUTO: 0.54 10*3/MM3 (ref 0.7–3.1)
LYMPHOCYTES NFR BLD AUTO: 12.7 % (ref 19.6–45.3)
MCH RBC QN AUTO: 27.5 PG (ref 26.6–33)
MCHC RBC AUTO-ENTMCNC: 30.2 G/DL (ref 31.5–35.7)
MCV RBC AUTO: 90.9 FL (ref 79–97)
MONOCYTES # BLD AUTO: 0.04 10*3/MM3 (ref 0.1–0.9)
MONOCYTES NFR BLD AUTO: 0.9 % (ref 5–12)
NEUTROPHILS NFR BLD AUTO: 3.63 10*3/MM3 (ref 1.7–7)
NEUTROPHILS NFR BLD AUTO: 85.7 % (ref 42.7–76)
NRBC BLD AUTO-RTO: 0 /100 WBC (ref 0–0.2)
PLATELET # BLD AUTO: 316 10*3/MM3 (ref 140–450)
PMV BLD AUTO: 9.6 FL (ref 6–12)
POTASSIUM SERPL-SCNC: 3.8 MMOL/L (ref 3.5–5.2)
QT INTERVAL: 368 MS
QTC INTERVAL: 456 MS
RBC # BLD AUTO: 3.97 10*6/MM3 (ref 3.77–5.28)
SODIUM SERPL-SCNC: 138 MMOL/L (ref 136–145)
WBC NRBC COR # BLD AUTO: 4.24 10*3/MM3 (ref 3.4–10.8)

## 2024-03-26 PROCEDURE — 96376 TX/PRO/DX INJ SAME DRUG ADON: CPT

## 2024-03-26 PROCEDURE — 80048 BASIC METABOLIC PNL TOTAL CA: CPT | Performed by: EMERGENCY MEDICINE

## 2024-03-26 PROCEDURE — 94799 UNLISTED PULMONARY SVC/PX: CPT

## 2024-03-26 PROCEDURE — 94761 N-INVAS EAR/PLS OXIMETRY MLT: CPT

## 2024-03-26 PROCEDURE — 85025 COMPLETE CBC W/AUTO DIFF WBC: CPT | Performed by: EMERGENCY MEDICINE

## 2024-03-26 PROCEDURE — 83036 HEMOGLOBIN GLYCOSYLATED A1C: CPT | Performed by: NURSE PRACTITIONER

## 2024-03-26 PROCEDURE — 94664 DEMO&/EVAL PT USE INHALER: CPT

## 2024-03-26 PROCEDURE — 25010000002 METHYLPREDNISOLONE PER 125 MG: Performed by: EMERGENCY MEDICINE

## 2024-03-26 PROCEDURE — G0378 HOSPITAL OBSERVATION PER HR: HCPCS

## 2024-03-26 PROCEDURE — 96361 HYDRATE IV INFUSION ADD-ON: CPT

## 2024-03-26 RX ORDER — WARFARIN SODIUM 3 MG/1
3 TABLET ORAL NIGHTLY
Status: DISCONTINUED | OUTPATIENT
Start: 2024-03-26 | End: 2024-03-26

## 2024-03-26 RX ORDER — PREDNISONE 20 MG/1
20 TABLET ORAL 2 TIMES DAILY
Qty: 10 TABLET | Refills: 0 | Status: SHIPPED | OUTPATIENT
Start: 2024-03-26 | End: 2024-03-31

## 2024-03-26 RX ORDER — POTASSIUM CHLORIDE 750 MG/1
10 TABLET, FILM COATED, EXTENDED RELEASE ORAL 2 TIMES DAILY PRN
Status: DISCONTINUED | OUTPATIENT
Start: 2024-03-26 | End: 2024-03-26 | Stop reason: HOSPADM

## 2024-03-26 RX ORDER — BENZONATATE 100 MG/1
100 CAPSULE ORAL 3 TIMES DAILY PRN
Qty: 15 CAPSULE | Refills: 0 | Status: SHIPPED | OUTPATIENT
Start: 2024-03-26 | End: 2024-03-31

## 2024-03-26 RX ORDER — HYDROCODONE BITARTRATE AND ACETAMINOPHEN 10; 325 MG/1; MG/1
1 TABLET ORAL EVERY 6 HOURS PRN
Status: DISCONTINUED | OUTPATIENT
Start: 2024-03-26 | End: 2024-03-26 | Stop reason: HOSPADM

## 2024-03-26 RX ORDER — GUAIFENESIN 600 MG/1
1200 TABLET, EXTENDED RELEASE ORAL EVERY 12 HOURS SCHEDULED
Status: DISCONTINUED | OUTPATIENT
Start: 2024-03-26 | End: 2024-03-26 | Stop reason: HOSPADM

## 2024-03-26 RX ORDER — IPRATROPIUM BROMIDE AND ALBUTEROL SULFATE 2.5; .5 MG/3ML; MG/3ML
3 SOLUTION RESPIRATORY (INHALATION)
Status: DISCONTINUED | OUTPATIENT
Start: 2024-03-26 | End: 2024-03-26 | Stop reason: HOSPADM

## 2024-03-26 RX ORDER — BENZONATATE 100 MG/1
200 CAPSULE ORAL 3 TIMES DAILY PRN
Status: DISCONTINUED | OUTPATIENT
Start: 2024-03-26 | End: 2024-03-26 | Stop reason: HOSPADM

## 2024-03-26 RX ORDER — METHOCARBAMOL 750 MG/1
750 TABLET, FILM COATED ORAL DAILY PRN
Status: DISCONTINUED | OUTPATIENT
Start: 2024-03-26 | End: 2024-03-26 | Stop reason: HOSPADM

## 2024-03-26 RX ORDER — LISINOPRIL 5 MG/1
5 TABLET ORAL DAILY
Status: DISCONTINUED | OUTPATIENT
Start: 2024-03-27 | End: 2024-03-26 | Stop reason: HOSPADM

## 2024-03-26 RX ORDER — PANTOPRAZOLE SODIUM 40 MG/1
40 TABLET, DELAYED RELEASE ORAL 2 TIMES DAILY
Status: DISCONTINUED | OUTPATIENT
Start: 2024-03-26 | End: 2024-03-26 | Stop reason: HOSPADM

## 2024-03-26 RX ORDER — ZOLPIDEM TARTRATE 5 MG/1
5 TABLET ORAL NIGHTLY PRN
Status: DISCONTINUED | OUTPATIENT
Start: 2024-03-26 | End: 2024-03-26 | Stop reason: HOSPADM

## 2024-03-26 RX ORDER — ROFLUMILAST 500 UG/1
500 TABLET ORAL NIGHTLY
Status: DISCONTINUED | OUTPATIENT
Start: 2024-03-26 | End: 2024-03-26 | Stop reason: HOSPADM

## 2024-03-26 RX ORDER — AMLODIPINE BESYLATE 5 MG/1
5 TABLET ORAL DAILY PRN
Status: DISCONTINUED | OUTPATIENT
Start: 2024-03-26 | End: 2024-03-26 | Stop reason: HOSPADM

## 2024-03-26 RX ORDER — AZITHROMYCIN 500 MG/1
500 TABLET, FILM COATED ORAL DAILY
Qty: 3 TABLET | Refills: 0 | Status: SHIPPED | OUTPATIENT
Start: 2024-03-26 | End: 2024-03-29

## 2024-03-26 RX ADMIN — METHYLPREDNISOLONE SODIUM SUCCINATE 60 MG: 125 INJECTION, POWDER, FOR SOLUTION INTRAMUSCULAR; INTRAVENOUS at 08:27

## 2024-03-26 RX ADMIN — BENZONATATE 200 MG: 100 CAPSULE ORAL at 04:22

## 2024-03-26 RX ADMIN — Medication 10 ML: at 08:29

## 2024-03-26 RX ADMIN — IPRATROPIUM BROMIDE AND ALBUTEROL SULFATE 3 ML: .5; 3 SOLUTION RESPIRATORY (INHALATION) at 10:32

## 2024-03-26 RX ADMIN — IPRATROPIUM BROMIDE AND ALBUTEROL SULFATE 3 ML: .5; 3 SOLUTION RESPIRATORY (INHALATION) at 03:22

## 2024-03-26 RX ADMIN — BENZONATATE 200 MG: 100 CAPSULE ORAL at 09:29

## 2024-03-26 RX ADMIN — PANTOPRAZOLE SODIUM 40 MG: 40 TABLET, DELAYED RELEASE ORAL at 08:27

## 2024-03-26 RX ADMIN — GUAIFENESIN 1200 MG: 600 TABLET, EXTENDED RELEASE ORAL at 09:29

## 2024-03-26 NOTE — CASE MANAGEMENT/SOCIAL WORK
Case Management Discharge Note      Final Note: Home with family                      Transportation Services  Private: Car    Final Discharge Disposition Code: 01 - home or self-care

## 2024-03-26 NOTE — PLAN OF CARE
Problem: Adult Inpatient Plan of Care  Goal: Plan of Care Review  Outcome: Ongoing, Progressing   Goal Outcome Evaluation:     Pt alert x 4 able to voice all needs. Tessalon Pearls given for nonproductive cough. Ambulated three times to the bathroom Will continue to monitor..

## 2024-03-26 NOTE — PROGRESS NOTES
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    INR Goal: 2 - 3  Home medication?: warfarin 3 mg PO daily  Bridge Therapy Present?:  No  Interacting Medications Evaluation (New/Present/Discontinued): none  Additional Contributing Factors: none      Assessment/Plan:    Patient on warfarin 3 mg nightly with no dose today. Currently INR low, will give a onetime 5 mg dose tonight and reassess further dosing upon return of INR in AM.     Continue to monitor and adjust based on INR.         Date 3/25           INR 1.22           Dose 3 mg               Objective:  [Ht: 160 cm (62.99\"); Wt: 72.7 kg (160 lb 4.4 oz); BMI: Body mass index is 28.4 kg/m².]    Lab Results   Component Value Date    ALBUMIN 4.1 03/25/2024     Lab Results   Component Value Date    INR 1.22 (L) 03/25/2024    INR 2.22 03/16/2024    INR 1.54 (L) 02/11/2024    PROTIME 13.1 (L) 03/25/2024    PROTIME 22.8 03/16/2024    PROTIME 16.3 (L) 02/11/2024     Lab Results   Component Value Date    HGB 11.3 (L) 03/25/2024    HGB 11.7 (L) 03/16/2024    HGB 10.9 (L) 02/11/2024     Lab Results   Component Value Date    HCT 37.1 03/25/2024    HCT 37.6 03/16/2024    HCT 32.7 (L) 02/11/2024       Crispin Calabrese [facility]  03/25/24 22:36 EDT     "

## 2024-03-26 NOTE — PLAN OF CARE
Goal Outcome Evaluation:              Outcome Evaluation: Pt reports she is much better and denies need for PT. Declines eval. Pt and RN confirm she is amb in room well now. Will sign off.

## 2024-03-26 NOTE — DISCHARGE SUMMARY
[facility]    Michael Gerardo MD    CHIEF COMPLAINT:     Shortness of breath     HISTORY OF PRESENT ILLNESS:    Weakness - Generalized      ED 03/25/2024  History of present illness this is a 68-year-old female who has history of COPD and wears oxygen at home who complains of 1 week history of cough congestion increasing shortness of breath subjective fever and chills.  She denies any vomiting or diarrhea no urinary complaints or bowel complaints.  No ill exposures foreign travels recent antibiotic use or steroid use no recent long car ride plane ride immobilization foreign travels no leg pain or swelling.  Worse with exertion and better with rest.  No chest pain neck arm or jaw pain.     Observation 03/26/2024  Patient agrees with HPI noted above including increased shortness of breath, cough and sputum production for the past week.  Reports a history of COPD and states that she is on 2 L of O2 continuously at home.  She has noted significant improvement of symptoms and is very eager for discharge.  She feels that she is at her baseline and would like to go home today.    Past Medical History:   Diagnosis Date    Arthritis     Bladder cancer     Chronic back pain     Closed nondisplaced fracture of head of right radius 03/2017    Congenital deformity of right hip joint 1956    COPD (chronic obstructive pulmonary disease)     former smoker    Deep venous thrombosis     unprovoked    Depressive disorder     Disease of thyroid gland     Diverticulitis of colon     Essential hypertension     Gastroesophageal reflux disease     Insomnia     Obesity     Pulmonary embolism     provoked by surgery     Past Surgical History:   Procedure Laterality Date    ABDOMINAL SURGERY      BRONCHOSCOPY N/A 11/25/2020    Procedure: BRONCHOSCOPY with bronchial washing;  Surgeon: Wni Johnston MD;  Location: [facility] ENDOSCOPY;  Service: Pulmonary;  Laterality: N/A;  Post: mucous plugs    BRONCHOSCOPY  N/A 2022    Procedure: BRONCHOSCOPY with bronchial washing;  Surgeon: Win Johnston MD;  Location: [facility] ENDOSCOPY;  Service: Pulmonary;  Laterality: N/A;  post op: pneumonia    BRONCHOSCOPY N/A 2023    Procedure: BRONCHOSCOPY with bilateral wash;  Surgeon: Win Johnston MD;  Location: [facility] ENDOSCOPY;  Service: Pulmonary;  Laterality: N/A;  mucous plugs    CHOLECYSTECTOMY      COLON SURGERY      COLONOSCOPY      COLOSTOMY CLOSURE N/A 2021    Procedure: COLOSTOMY TAKEDOWN OR CLOSURE, extenisve lysis of adhesions;  Surgeon: Chi Farmer MD;  Location: [facility] MAIN OR;  Service: General;  Laterality: N/A;    CYSTOSCOPY N/A 2021    Procedure: CYSTOSCOPY with bladder tumor removal and fulgeration, insertion of 3-way rizzo catheter;  Surgeon: Alfonzo Hayward MD;  Location: [facility] MAIN OR;  Service: Urology;  Laterality: N/A;    ENDOSCOPY      EXPLORATORY LAPAROTOMY N/A 2020    Procedure: LAPAROTOMY EXPLORATORY HARTMANS;  Surgeon: Chi Farmer MD;  Location: [facility] MAIN OR;  Service: General;  Laterality: N/A;    HYSTERECTOMY      TOTAL HIP ARTHROPLASTY Right     TOTAL HIP ARTHROPLASTY REVISION Right     x3     Family History   Problem Relation Age of Onset    No Known Problems Mother     No Known Problems Father      Social History     Tobacco Use    Smoking status: Former     Current packs/day: 0.00     Average packs/day: 2.0 packs/day for 40.0 years (80.0 ttl pk-yrs)     Types: Cigarettes     Start date:      Quit date: 2019     Years since quittin.2    Smokeless tobacco: Never    Tobacco comments:     ELECTRONIC   Vaping Use    Vaping status: Former    Substances: Nicotine, Flavoring   Substance Use Topics    Alcohol use: Never    Drug use: Never     Medications Prior to Admission   Medication Sig Dispense Refill Last Dose    albuterol (PROVENTIL) (2.5 MG/3ML) 0.083% nebulizer solution Take 2.5 mg by nebulization Every 6 (Six) Hours As Needed for  Wheezing.   3/25/2024    amLODIPine (NORVASC) 5 MG tablet Take 1 tablet by mouth Daily.   Past Month    furosemide (LASIX) 20 MG tablet Take 1 tablet by mouth 2 (Two) Times a Day As Needed.   3/24/2024    guaiFENesin (MUCINEX) 600 MG 12 hr tablet Take 2 tablets by mouth Every 12 (Twelve) Hours. 14 tablet 0 3/25/2024    HYDROcodone-acetaminophen (NORCO)  MG per tablet Take 1 tablet by mouth Every 6 (Six) Hours As Needed for Moderate Pain.   3/25/2024    lisinopril (PRINIVIL,ZESTRIL) 5 MG tablet Take 1 tablet by mouth Daily.   3/24/2024    methocarbamol (ROBAXIN) 750 MG tablet Take 1 tablet by mouth 3 (Three) Times a Day As Needed for Muscle Spasms. 20 tablet 0 3/24/2024    pantoprazole (PROTONIX) 40 MG EC tablet Take 1 tablet by mouth 2 (Two) Times a Day.   3/25/2024    potassium chloride 10 MEQ CR tablet Take 1 tablet by mouth 2 (Two) Times a Day As Needed (take with furosemide PRN).   3/24/2024    roflumilast (DALIRESP) 500 MCG tablet tablet Take 1 tablet by mouth Every Night.   3/25/2024    warfarin (COUMADIN) 3 MG tablet Take 1 tablet by mouth Every Night. 30 tablet 0 3/24/2024    zolpidem (AMBIEN) 5 MG tablet Take 1 tablet by mouth At Night As Needed.   3/24/2024    ipratropium (ATROVENT) 0.02 % nebulizer solution Take 2.5 mL by nebulization Every 6 (Six) Hours As Needed for Wheezing or Shortness of Air.        Allergies:  Patient has no known allergies.    Immunization History   Administered Date(s) Administered    Flu Vaccine Split Quad 10/26/2021    Influenza, Unspecified 09/26/2018, 12/03/2020    flucelvax quad pfs =>4 YRS 10/26/2021           REVIEW OF SYSTEMS:    ROS  Review of Systems   Constitutional:  Positive for chills and fever.   HENT:  Positive for congestion.    Respiratory:  Positive for cough and shortness of breath. Negative for chest tightness.    Cardiovascular:  Negative for chest pain.   Gastrointestinal:  Negative for abdominal pain and vomiting.   Genitourinary:  Negative for  difficulty urinating and dysuria.   Musculoskeletal:  Negative for back pain and neck pain.   Skin:  Negative for rash.   Neurological:  Positive for weakness. Negative for dizziness and light-headedness.   Psychiatric/Behavioral:  Negative for confusion.      Vital Signs  Temp:  [97 °F (36.1 °C)-98 °F (36.7 °C)] 97.9 °F (36.6 °C)  Heart Rate:  [65-99] 68  Resp:  [16-19] 16  BP: (134-158)/(68-83) 136/73          Physical Exam:  Physical Exam  Vitals and nursing note reviewed.   Constitutional:       Appearance: Normal appearance.      Comments: Chronically ill    HENT:      Head: Normocephalic and atraumatic.      Right Ear: External ear normal.      Left Ear: External ear normal.      Nose: Nose normal.      Mouth/Throat:      Mouth: Mucous membranes are moist.      Pharynx: Oropharynx is clear.   Eyes:      Extraocular Movements: Extraocular movements intact.   Cardiovascular:      Rate and Rhythm: Normal rate and regular rhythm.      Pulses: Normal pulses.      Heart sounds: Normal heart sounds.   Pulmonary:      Effort: Pulmonary effort is normal.      Breath sounds: Wheezing present.      Comments: Mild-moderate generalized wheezing. Patient reports she feels back at her baseline. On O2 2L NC per respiratory baseline.   Abdominal:      General: Abdomen is flat. Bowel sounds are normal.      Palpations: Abdomen is soft.   Musculoskeletal:         General: Normal range of motion.      Cervical back: Normal range of motion.   Skin:     General: Skin is warm.   Neurological:      General: No focal deficit present.      Mental Status: She is alert and oriented to person, place, and time.   Psychiatric:         Mood and Affect: Mood normal.         Behavior: Behavior normal.         Emotional Behavior:    Normal    Debilities:   None  Results Review:    I reviewed the patient's new clinical results.  Lab Results (most recent)       Procedure Component Value Units Date/Time    Hemoglobin A1c [[id number]]  (Abnormal)  Collected: 03/26/24 0415    Specimen: Blood Updated: 03/26/24 0851     Hemoglobin A1C 5.80 %     Basic Metabolic Panel [[id number]]  (Abnormal) Collected: 03/26/24 0415    Specimen: Blood Updated: 03/26/24 0524     Glucose 181 mg/dL      BUN 13 mg/dL      Creatinine 0.65 mg/dL      Sodium 138 mmol/L      Potassium 3.8 mmol/L      Chloride 101 mmol/L      CO2 27.0 mmol/L      Calcium 9.1 mg/dL      BUN/Creatinine Ratio 20.0     Anion Gap 10.0 mmol/L      eGFR 96.6 mL/min/1.73     Narrative:      GFR Normal >60  Chronic Kidney Disease <60  Kidney Failure <15      CBC Auto Differential [[id number]]  (Abnormal) Collected: 03/26/24 0415    Specimen: Blood Updated: 03/26/24 0434     WBC 4.24 10*3/mm3      RBC 3.97 10*6/mm3      Hemoglobin 10.9 g/dL      Hematocrit 36.1 %      MCV 90.9 fL      MCH 27.5 pg      MCHC 30.2 g/dL      RDW 14.1 %      RDW-SD 47.6 fl      MPV 9.6 fL      Platelets 316 10*3/mm3      Neutrophil % 85.7 %      Lymphocyte % 12.7 %      Monocyte % 0.9 %      Eosinophil % 0.0 %      Basophil % 0.5 %      Immature Grans % 0.2 %      Neutrophils, Absolute 3.63 10*3/mm3      Lymphocytes, Absolute 0.54 10*3/mm3      Monocytes, Absolute 0.04 10*3/mm3      Eosinophils, Absolute 0.00 10*3/mm3      Basophils, Absolute 0.02 10*3/mm3      Immature Grans, Absolute 0.01 10*3/mm3      nRBC 0.0 /100 WBC     Urine Culture - Urine, Urine, Clean Catch [[id number]] Collected: 03/25/24 1925    Specimen: Urine, Clean Catch Updated: 03/25/24 2046    Urinalysis, Microscopic Only - Urine, Clean Catch [[id number]]  (Abnormal) Collected: 03/25/24 1925    Specimen: Urine, Clean Catch Updated: 03/25/24 1939     RBC, UA 0-2 /HPF      WBC, UA 21-50 /HPF      Bacteria, UA None Seen /HPF      Squamous Epithelial Cells, UA 3-6 /HPF      Hyaline Casts, UA 0-2 /LPF      Methodology Automated Microscopy    Urinalysis With Microscopic If Indicated (No Culture) - Urine, Clean Catch [[id number]]  (Abnormal) Collected: 03/25/24 1925    Specimen:  Urine, Clean Catch Updated: 03/25/24 1934     Color, UA Yellow     Appearance, UA Clear     pH, UA 6.5     Specific Gravity, UA 1.021     Glucose, UA Negative     Ketones, UA Trace     Bilirubin, UA Negative     Blood, UA Negative     Protein, UA Negative     Leuk Esterase, UA Moderate (2+)     Nitrite, UA Negative     Urobilinogen, UA 1.0 E.U./dL    Blood Culture - Blood, Arm, Left [[id number]] Collected: 03/25/24 1925    Specimen: Blood from Arm, Left Updated: 03/25/24 1929    Protime-INR [[id number]]  (Abnormal) Collected: 03/25/24 1836    Specimen: Blood Updated: 03/25/24 1916     Protime 13.1 Seconds      INR 1.22    Extra Tubes [[id number]] Collected: 03/25/24 1836    Specimen: Blood, Venous Line Updated: 03/25/24 1908    Narrative:      The following orders were created for panel order Extra Tubes.  Procedure                               Abnormality         Status                     ---------                               -----------         ------                     Gold Top - SST[[id number]]                                   Final result                 Please view results for these tests on the individual orders.    Gold Top - SST [[id number]] Collected: 03/25/24 1836    Specimen: Blood Updated: 03/25/24 1908    Comprehensive Metabolic Panel [[id number]]  (Abnormal) Collected: 03/25/24 1836    Specimen: Blood Updated: 03/25/24 1907     Glucose 125 mg/dL      BUN 14 mg/dL      Creatinine 0.77 mg/dL      Sodium 139 mmol/L      Potassium 3.7 mmol/L      Chloride 99 mmol/L      CO2 29.0 mmol/L      Calcium 9.2 mg/dL      Total Protein 6.5 g/dL      Albumin 4.1 g/dL      ALT (SGPT) 13 U/L      AST (SGOT) 21 U/L      Alkaline Phosphatase 178 U/L      Total Bilirubin 0.2 mg/dL      Globulin 2.4 gm/dL      A/G Ratio 1.7 g/dL      BUN/Creatinine Ratio 18.2     Anion Gap 11.0 mmol/L      eGFR 84.7 mL/min/1.73     Narrative:      GFR Normal >60  Chronic Kidney Disease <60  Kidney Failure <15      BNP [[id number]]   (Normal) Collected: 03/25/24 1836    Specimen: Blood Updated: 03/25/24 1907     proBNP 119.3 pg/mL     Narrative:      This assay is used as an aid in the diagnosis of individuals suspected of having heart failure. It can be used as an aid in the diagnosis of acute decompensated heart failure (ADHF) in patients presenting with signs and symptoms of ADHF to the emergency department (ED). In addition, NT-proBNP of <300 pg/mL indicates ADHF is not likely.    Age Range Result Interpretation  NT-proBNP Concentration (pg/mL:      <50             Positive            >450                   Gray                 300-450                    Negative             <300    50-75           Positive            >900                  Gray                300-900                  Negative            <300      >75             Positive            >1800                  Gray                300-1800                  Negative            <300    POC Lactate [[id number]]  (Normal) Collected: 03/25/24 1842    Specimen: Blood Updated: 03/25/24 1843     Lactate 1.0 mmol/L      Comment: Serial Number: [id number]:  [id number]       CBC & Differential [[id number]]  (Abnormal) Collected: 03/25/24 1836    Specimen: Blood Updated: 03/25/24 1842    Narrative:      The following orders were created for panel order CBC & Differential.  Procedure                               Abnormality         Status                     ---------                               -----------         ------                     CBC Auto Differential[[id number]]        Abnormal            Final result                 Please view results for these tests on the individual orders.    CBC Auto Differential [[id number]]  (Abnormal) Collected: 03/25/24 1836    Specimen: Blood Updated: 03/25/24 1842     WBC 8.11 10*3/mm3      RBC 4.03 10*6/mm3      Hemoglobin 11.3 g/dL      Hematocrit 37.1 %      MCV 92.1 fL      MCH 28.0 pg      MCHC 30.5 g/dL      RDW 14.5 %      RDW-SD 49.6 fl       MPV 9.7 fL      Platelets 352 10*3/mm3      Neutrophil % 71.3 %      Lymphocyte % 17.6 %      Monocyte % 7.6 %      Eosinophil % 2.5 %      Basophil % 0.6 %      Immature Grans % 0.4 %      Neutrophils, Absolute 5.78 10*3/mm3      Lymphocytes, Absolute 1.43 10*3/mm3      Monocytes, Absolute 0.62 10*3/mm3      Eosinophils, Absolute 0.20 10*3/mm3      Basophils, Absolute 0.05 10*3/mm3      Immature Grans, Absolute 0.03 10*3/mm3      nRBC 0.0 /100 WBC     Blood Culture - Blood, Arm, Right [[id number]] Collected: 03/25/24 1836    Specimen: Blood from Arm, Right Updated: 03/25/24 1840            Imaging Results (Most Recent)       Procedure Component Value Units Date/Time    XR Chest 1 View [[id number]] Collected: 03/25/24 1928     Updated: 03/25/24 1931    Narrative:      XR CHEST 1 VW    Date of Exam: 3/25/2024 7:00 PM EDT    Indication: Short of breath    Comparison: 3/16/2024    Findings:  Patient is rotated to the left. Heart size and pulmonary vasculature are stable. There is scarring or chronic atelectasis in the left costophrenic region. No acute infiltrate or edema is demonstrated.      Impression:      Impression:  Chronic changes on the left with no acute cardiopulmonary process demonstrated      Electronically Signed: Luis Sousa    3/25/2024 7:29 PM EDT    Workstation ID: [id number]          reviewed    ECG/EMG Results (most recent)       Procedure Component Value Units Date/Time    ECG 12 Lead Dyspnea [[id number]] Collected: 03/25/24 1852     Updated: 03/26/24 0716     QT Interval 368 ms      QTC Interval 456 ms     Narrative:      HEART RATE= 92  bpm  RR Interval= 652  ms  NE Interval= 173  ms  P Horizontal Axis= 23  deg  P Front Axis= 47  deg  QRSD Interval= 108  ms  QT Interval= 368  ms  QTcB= 456  ms  QRS Axis= 62  deg  T Wave Axis= 59  deg  - OTHERWISE NORMAL ECG -  Sinus rhythm  Low voltage, precordial leads  Electronically Signed By: Chi Stevens (PIPO) 26-Mar-2024 07:16:15  Date and Time of Study:  2024-03-25 18:52:49          reviewed    Results for orders placed during the hospital encounter of 06/05/23    Duplex Venous Lower Extremity - Bilateral CAR    Interpretation Summary    Normal bilateral lower extremity venous duplex scan.          Microbiology Results (last 10 days)       Procedure Component Value - Date/Time    Blood Culture - Blood, Hand, Right [[id number]]  (Normal) Collected: 03/17/24 0000    Lab Status: Final result Specimen: Blood from Hand, Right Updated: 03/22/24 0015     Blood Culture No growth at 5 days    Narrative:      Less than seven (7) mL's of blood was collected.  Insufficient quantity may yield false negative results.    Respiratory Panel PCR w/COVID-19(SARS-CoV-2) ONOFRE/GABRIELLE/PIPO/PAD/COR/STEVE In-House, NP Swab in UTM/VTM, 2 HR TAT - Swab, Nasopharynx [[id number]]  (Normal) Collected: 03/16/24 2325    Lab Status: Final result Specimen: Swab from Nasopharynx Updated: 03/17/24 0019     ADENOVIRUS, PCR Not Detected     Coronavirus 229E Not Detected     Coronavirus HKU1 Not Detected     Coronavirus NL63 Not Detected     Coronavirus OC43 Not Detected     COVID19 Not Detected     Human Metapneumovirus Not Detected     Human Rhinovirus/Enterovirus Not Detected     Influenza A PCR Not Detected     Influenza B PCR Not Detected     Parainfluenza Virus 1 Not Detected     Parainfluenza Virus 2 Not Detected     Parainfluenza Virus 3 Not Detected     Parainfluenza Virus 4 Not Detected     RSV, PCR Not Detected     Bordetella pertussis pcr Not Detected     Bordetella parapertussis PCR Not Detected     Chlamydophila pneumoniae PCR Not Detected     Mycoplasma pneumo by PCR Not Detected    Narrative:      In the setting of a positive respiratory panel with a viral infection PLUS a negative procalcitonin without other underlying concern for bacterial infection, consider observing off antibiotics or discontinuation of antibiotics and continue supportive care. If the respiratory panel is positive for  atypical bacterial infection (Bordetella pertussis, Chlamydophila pneumoniae, or Mycoplasma pneumoniae), consider antibiotic de-escalation to target atypical bacterial infection.    Blood Culture - Blood, Arm, Right [[id number]]  (Normal) Collected: 03/16/24 2322    Lab Status: Final result Specimen: Blood from Arm, Right Updated: 03/21/24 2345     Blood Culture No growth at 5 days    Narrative:      Less than seven (7) mL's of blood was collected.  Insufficient quantity may yield false negative results.            Assessment & Plan     COPD (chronic obstructive pulmonary disease)       COPD exacerbation  proBNP 119.3  -CMP and CBC generally unremarkable except hemoglobin 10.9 which appears to be close to her baseline  -Blood cultures pending results  -EKG showed sinus rhythm with heart rate of 92, WI interval 173 and   -Respiratory panel ordered  -Chest x-ray showed chronic changes on the left with no acute cardiopulmonary process  -In the ED patient received 125 mg of Solu-Medrol, DuoNebs and albuterol  -Tessalon Perles 100 mg 3 times daily as needed, prednisone 20 mg twice daily for 5 days and azithromycin 500 mg x 3 days sent to discharge  -Continue O2 2 L nasal cannula per respiratory baseline    Hypertension  -Moderately controlled   BP Readings from Last 1 Encounters:   03/26/24 136/73   - Continue Norvasc and lisinopril as prescribed  - Monitor while admitted     Dysuria  -UA negative for nitrites, blood and bacteria  -Urine culture pending results    Chronic pain  -Continue Norco and gabapentin    GERD  -Continue PPI    Long-term use of anticoagulants  -Continue warfarin as prescribed by PCP  -PCP to check patient's INR  -INR 1.22 yesterday    Overweight  -BMI 28.40  -Lifestyle modifications      I discussed the patients findings and my recommendations with patient and nursing staff.     Discharge Diagnosis:      COPD (chronic obstructive pulmonary disease)      Hospital Course  Patient is a 68 y.o.  female presented with shortness of breath, cough, and congestion as noted on HPI above.  proBNP 119.3.  CMP and CBC unremarkable.  EKG showed sinus rhythm and chest x-ray showed no acute process with chronic changes.  Patient reports a history of COPD with long-term use of O2.  She received Solu-Medrol, DuoNebs and albuterol in the ED and was kept overnight in the observation unit for monitoring.  She reported significant improvement of symptoms and feels at her baseline. At this time, patient felt to be in good condition for discharge with close follow up with PCP. Instructed to take all medications as prescribed and to return to ED if any concerning signs/symptoms.  Azithromycin, and prednisone and Tessalon Perles sent at discharge.  All test/lab results were discussed with patient. All questions were answered and patient verbalizes understanding.   .      Past Medical History:     Past Medical History:   Diagnosis Date    Arthritis     Bladder cancer     Chronic back pain     Closed nondisplaced fracture of head of right radius 03/2017    Congenital deformity of right hip joint 1956    COPD (chronic obstructive pulmonary disease)     former smoker    Deep venous thrombosis     unprovoked    Depressive disorder     Disease of thyroid gland     Diverticulitis of colon     Essential hypertension     Gastroesophageal reflux disease     Insomnia     Obesity     Pulmonary embolism     provoked by surgery       Past Surgical History:     Past Surgical History:   Procedure Laterality Date    ABDOMINAL SURGERY      BRONCHOSCOPY N/A 11/25/2020    Procedure: BRONCHOSCOPY with bronchial washing;  Surgeon: Win Johnston MD;  Location: [facility] ENDOSCOPY;  Service: Pulmonary;  Laterality: N/A;  Post: mucous plugs    BRONCHOSCOPY N/A 4/25/2022    Procedure: BRONCHOSCOPY with bronchial washing;  Surgeon: Win Johnston MD;  Location: [facility] ENDOSCOPY;  Service: Pulmonary;  Laterality: N/A;  post op: pneumonia     BRONCHOSCOPY N/A 2023    Procedure: BRONCHOSCOPY with bilateral wash;  Surgeon: Win Johnston MD;  Location: [facility] ENDOSCOPY;  Service: Pulmonary;  Laterality: N/A;  mucous plugs    CHOLECYSTECTOMY      COLON SURGERY      COLONOSCOPY      COLOSTOMY CLOSURE N/A 2021    Procedure: COLOSTOMY TAKEDOWN OR CLOSURE, extenisve lysis of adhesions;  Surgeon: Chi Farmer MD;  Location: [facility] MAIN OR;  Service: General;  Laterality: N/A;    CYSTOSCOPY N/A 2021    Procedure: CYSTOSCOPY with bladder tumor removal and fulgeration, insertion of 3-way rizzo catheter;  Surgeon: Alfonzo Hayward MD;  Location: [facility] MAIN OR;  Service: Urology;  Laterality: N/A;    ENDOSCOPY      EXPLORATORY LAPAROTOMY N/A 2020    Procedure: LAPAROTOMY EXPLORATORY HARTMANS;  Surgeon: Chi Farmer MD;  Location: [facility] MAIN OR;  Service: General;  Laterality: N/A;    HYSTERECTOMY      TOTAL HIP ARTHROPLASTY Right     TOTAL HIP ARTHROPLASTY REVISION Right     x3       Social History:   Social History     Socioeconomic History    Marital status:    Tobacco Use    Smoking status: Former     Current packs/day: 0.00     Average packs/day: 2.0 packs/day for 40.0 years (80.0 ttl pk-yrs)     Types: Cigarettes     Start date:      Quit date: 2019     Years since quittin.2    Smokeless tobacco: Never    Tobacco comments:     ELECTRONIC   Vaping Use    Vaping status: Former    Substances: Nicotine, Flavoring   Substance and Sexual Activity    Alcohol use: Never    Drug use: Never    Sexual activity: Defer       Procedures Performed         Consults:   Consults       No orders found for last 30 day(s).            Condition on Discharge:     Stable    Discharge Disposition  Home or Self Care    Discharge Medications     Discharge Medications        New Medications        Instructions Start Date   azithromycin 500 MG tablet  Commonly known as: Zithromax   500 mg, Oral, Daily      benzonatate 100 MG  capsule  Commonly known as: Tessalon Perles   100 mg, Oral, 3 Times Daily PRN      predniSONE 20 MG tablet  Commonly known as: DELTASONE   20 mg, Oral, 2 Times Daily             Continue These Medications        Instructions Start Date   albuterol (2.5 MG/3ML) 0.083% nebulizer solution  Commonly known as: PROVENTIL   2.5 mg, Nebulization, Every 6 Hours PRN      amLODIPine 5 MG tablet  Commonly known as: NORVASC   5 mg, Oral, Daily      furosemide 20 MG tablet  Commonly known as: LASIX   20 mg, Oral, 2 Times Daily PRN      guaiFENesin 600 MG 12 hr tablet  Commonly known as: MUCINEX   1,200 mg, Oral, Every 12 Hours Scheduled      HYDROcodone-acetaminophen  MG per tablet  Commonly known as: NORCO   1 tablet, Oral, Every 6 Hours PRN      ipratropium 0.02 % nebulizer solution  Commonly known as: ATROVENT   500 mcg, Nebulization, Every 6 Hours PRN      lisinopril 5 MG tablet  Commonly known as: PRINIVIL,ZESTRIL   5 mg, Oral, Daily      methocarbamol 750 MG tablet  Commonly known as: ROBAXIN   750 mg, Oral, 3 Times Daily PRN      pantoprazole 40 MG EC tablet  Commonly known as: PROTONIX   40 mg, Oral, 2 Times Daily      potassium chloride 10 MEQ CR tablet   Take 1 tablet by mouth 2 (Two) Times a Day As Needed (take with furosemide PRN).      roflumilast 500 MCG tablet tablet  Commonly known as: DALIRESP   500 mcg, Oral, Nightly      warfarin 3 MG tablet  Commonly known as: COUMADIN   3 mg, Oral, Nightly      zolpidem 5 MG tablet  Commonly known as: AMBIEN   5 mg, Oral, Nightly PRN               Discharge Diet:   Diet Instructions       Diet: Cardiac Diets; Healthy Heart (2-3 Na+); Regular (IDDSI 7); Thin (IDDSI 0)      Discharge Diet: Cardiac Diets    Cardiac Diet: Healthy Heart (2-3 Na+)    Texture: Regular (IDDSI 7)    Fluid Consistency: Thin (IDDSI 0)            Activity at Discharge:     Follow-up Appointments  Future Appointments   Date Time Provider Department Center   3/27/2024 12:00 PM PHASE II - [facility]MONARY  [facility] None   3/29/2024 12:00 PM PHASE II - P[facility]MONARY [facility] None   4/1/2024 12:00 PM PHASE II - [facility]MONARY [facility] None   4/3/2024 12:00 PM PHASE II - [facility]MON[facility] None   4/5/2024 12:00 PM PHASE II - [facility]MON[facility] None   4/15/2024 12:00 PM PHASE II - [facility]MON[facility] None   4/17/2024 12:00 PM PHASE II - [facility]MON[facility] None   4/19/2024 12:00 PM PHASE II - P[facility]MONARY [facility] None   4/22/2024 12:00 PM PHASE II - P[facility]MON[facility] None   4/24/2024 12:00 PM PHASE II - [facility]MON[facility] None   4/26/2024 12:00 PM PHASE II - [facility]MON[facility] None   4/29/2024 12:00 PM PHASE II - [facility] None   5/1/2024 12:00 PM PHASE II - [facility] None   5/3/2024 12:00 PM PHASE II - [facility] None   5/6/2024 12:00 PM PHASE II - [facility] None   5/8/2024 12:00 PM PHASE II - [facility] None   5/10/2024 12:00 PM PHASE II - [facility]MON[facility] None   5/13/2024 12:00 PM PHASE II - [facility] None   5/15/2024 12:00 PM PHASE II - [facility] None   5/17/2024 12:00 PM PHASE II - [facility] None   5/20/2024 12:00 PM PHASE II - [facility]MON[facility] None   5/22/2024 12:00 PM PHASE II - P[facility]MON[facility] None   5/24/2024 12:00 PM PHASE II - PULMONARY [facility] None     Additional Instructions for the Follow-ups that You Need to Schedule       Discharge Follow-up with PCP   As directed       Currently Documented PCP:    Michael Gerardo MD    PCP Phone Number:    [phone]     Follow Up Details: 5-7 days                Test Results Pending at Discharge  Pending Labs       Order Current Status     Blood Culture - Blood, Arm, Left In process    Blood Culture - Blood, Arm, Right In process    Urine Culture - Urine, Urine, Clean Catch In process             Risk for Readmission (LACE) Score: 8 (3/26/2024  6:00 AM)      Greater than 30 minutes spent in discharge activities for this patient    Signature:Electronically signed by SHILA Winslow, 03/26/24, 10:05 AM EDT.

## 2024-03-26 NOTE — ED PROVIDER NOTES
Subjective   History of Present Illness  Chief complaint cough congestion shortness of breath    History of present illness this is a 68-year-old female who has history of COPD and wears oxygen at home who complains of 1 week history of cough congestion increasing shortness of breath subjective fever and chills.  She denies any vomiting or diarrhea no urinary complaints or bowel complaints.  No ill exposures foreign travels recent antibiotic use or steroid use no recent long car ride plane ride immobilization foreign travels no leg pain or swelling.  Worse with exertion and better with rest.  No chest pain neck arm or jaw pain.      Review of Systems   Constitutional:  Positive for chills and fever.   HENT:  Positive for congestion.    Respiratory:  Positive for cough and shortness of breath. Negative for chest tightness.    Cardiovascular:  Negative for chest pain.   Gastrointestinal:  Negative for abdominal pain and vomiting.   Genitourinary:  Negative for difficulty urinating and dysuria.   Musculoskeletal:  Negative for back pain and neck pain.   Skin:  Negative for rash.   Neurological:  Positive for weakness. Negative for dizziness and light-headedness.   Psychiatric/Behavioral:  Negative for confusion.        Past Medical History:   Diagnosis Date    Arthritis     Bladder cancer     Chronic back pain     Closed nondisplaced fracture of head of right radius 03/2017    Congenital deformity of right hip joint 1956    COPD (chronic obstructive pulmonary disease)     former smoker    Deep venous thrombosis     unprovoked    Depressive disorder     Disease of thyroid gland     Diverticulitis of colon     Essential hypertension     Gastroesophageal reflux disease     Insomnia     Obesity     Pulmonary embolism     provoked by surgery       No Known Allergies    Past Surgical History:   Procedure Laterality Date    ABDOMINAL SURGERY      BRONCHOSCOPY N/A 11/25/2020    Procedure: BRONCHOSCOPY with bronchial  washing;  Surgeon: Win Johnston MD;  Location: [facility];  Service: Pulmonary;  Laterality: N/A;  Post: mucous plugs    BRONCHOSCOPY N/A 2022    Procedure: BRONCHOSCOPY with bronchial washing;  Surgeon: Win Johnston MD;  Location: [facility];  Service: Pulmonary;  Laterality: N/A;  post op: pneumonia    BRONCHOSCOPY N/A 2023    Procedure: BRONCHOSCOPY with bilateral wash;  Surgeon: Win Johnston MD;  Location: [facility];  Service: Pulmonary;  Laterality: N/A;  mucous plugs    CHOLECYSTECTOMY      COLON SURGERY      COLONOSCOPY      COLOSTOMY CLOSURE N/A 2021    Procedure: COLOSTOMY TAKEDOWN OR CLOSURE, extenisve lysis of adhesions;  Surgeon: Chi Farmer MD;  Location: [facility];  Service: General;  Laterality: N/A;    CYSTOSCOPY N/A 2021    Procedure: CYSTOSCOPY with bladder tumor removal and fulgeration, insertion of 3-way rizzo catheter;  Surgeon: Alfonzo Hayward MD;  Location: [facility];  Service: Urology;  Laterality: N/A;    ENDOSCOPY      EXPLORATORY LAPAROTOMY N/A 2020    Procedure: LAPAROTOMY EXPLORATORY HARTMANS;  Surgeon: Chi Farmer MD;  Location: [facility];  Service: General;  Laterality: N/A;    HYSTERECTOMY      TOTAL HIP ARTHROPLASTY Right     TOTAL HIP ARTHROPLASTY REVISION Right     x3       Family History   Problem Relation Age of Onset    No Known Problems Mother     No Known Problems Father        Social History     Socioeconomic History    Marital status:    Tobacco Use    Smoking status: Former     Current packs/day: 0.00     Average packs/day: 2.0 packs/day for 40.0 years (80.0 ttl pk-yrs)     Types: Cigarettes     Start date:      Quit date: 2019     Years since quittin.2    Smokeless tobacco: Never    Tobacco comments:     ELECTRONIC   Vaping Use    Vaping status: Former    Substances: Nicotine, Flavoring   Substance and Sexual Activity    Alcohol use: Never    Drug use: Never     Sexual activity: Defer     Prior to Admission medications    Medication Sig Start Date End Date Taking? Authorizing Provider   albuterol (PROVENTIL) (2.5 MG/3ML) 0.083% nebulizer solution Take 2.5 mg by nebulization Every 6 (Six) Hours As Needed for Wheezing.   Yes Grayson Pope MD   amLODIPine (NORVASC) 5 MG tablet Take 1 tablet by mouth Daily.   Yes Grayson Pope MD   furosemide (LASIX) 20 MG tablet Take 1 tablet by mouth 2 (Two) Times a Day As Needed.   Yes Grayson Pope MD   guaiFENesin (MUCINEX) 600 MG 12 hr tablet Take 2 tablets by mouth Every 12 (Twelve) Hours. 12/9/23  Yes Ney Lopez MD   HYDROcodone-acetaminophen (NORCO)  MG per tablet Take 1 tablet by mouth Every 6 (Six) Hours As Needed for Moderate Pain.   Yes Grayson Pope MD   lisinopril (PRINIVIL,ZESTRIL) 5 MG tablet Take 1 tablet by mouth Daily.   Yes Grayson Pope MD   methocarbamol (ROBAXIN) 750 MG tablet Take 1 tablet by mouth 3 (Three) Times a Day As Needed for Muscle Spasms. 12/31/23  Yes Aries Soto MD   pantoprazole (PROTONIX) 40 MG EC tablet Take 1 tablet by mouth 2 (Two) Times a Day.   Yes Grayson Pope MD   potassium chloride 10 MEQ CR tablet Take 1 tablet by mouth 2 (Two) Times a Day As Needed (take with furosemide PRN). 8/12/23  Yes Grayson Pope MD   roflumilast (DALIRESP) 500 MCG tablet tablet Take 1 tablet by mouth Every Night.   Yes Grayson Pope MD   warfarin (COUMADIN) 3 MG tablet Take 1 tablet by mouth Every Night. 12/9/23  Yes Ney Lopez MD   zolpidem (AMBIEN) 5 MG tablet Take 1 tablet by mouth At Night As Needed.   Yes Grayson Pope MD   ipratropium-albuterol (DUO-NEB) 0.5-2.5 mg/3 ml nebulizer Take 3 mL by nebulization Every 6 (Six) Hours As Needed for Wheezing.  3/25/24 Yes Grayson Pope MD   ipratropium (ATROVENT) 0.02 % nebulizer solution Take 2.5 mL by nebulization Every 6 (Six) Hours As Needed for Wheezing or Shortness of  Air.    ProviderGrayson MD   Budeson-Glycopyrrol-Formoterol (Breztri Aerosphere) 160-9-4.8 MCG/ACT aerosol inhaler Inhale 2 puffs 2 (Two) Times a Day.  3/25/24  Grayson Pope MD   carvedilol (COREG) 3.125 MG tablet Take 1 tablet by mouth 2 (Two) Times a Day With Meals. 12/7/23 3/25/24  Grayson Pope MD   lidocaine (Lidoderm) 5 % Place 1 patch on the skin as directed by provider Daily. Remove & Discard patch within 12 hours or as directed by MD 11/17/23 3/25/24  Nellie Oconnell APRN   predniSONE (DELTASONE) 10 MG tablet Take 1 tablet by mouth Daily.  3/25/24  Grayson Pope MD   valsartan-hydrochlorothiazide (Diovan HCT) 320-25 MG per tablet Take 1 tablet by mouth Daily.  3/25/24  Grayson Pope MD          Objective   Physical Exam  Constitutional this is a 68-year-old female awake alert audibly wheezing triage vital signs reviewed sats are good on couple liters of oxygen.  She is running in the mid 90s.  HEENT extraocular muscles are intact pupils equal round reactive sclera clear mouth clear no exudate abscess stridor drooling neck supple no adenopathy no meningeal signs no JVD no bruits lungs diffuse scattered wheezes throughout no retractions heart regular without murmur rub abdomen soft nontender good bowel sounds no peritoneal findings or pulsatile masses extremities pulses equal upper and lower extremities no edema no cords no Homans' sign no evidence of DVT skin is warm and dry without rashes or cellulitic changes neurologic awake alert and follows commands motor strength normal without focal weakness  Procedures           ED Course      Results for orders placed or performed during the hospital encounter of 03/25/24   Comprehensive Metabolic Panel    Specimen: Blood   Result Value Ref Range    Glucose 125 (H) 65 - 99 mg/dL    BUN 14 8 - 23 mg/dL    Creatinine 0.77 0.57 - 1.00 mg/dL    Sodium 139 136 - 145 mmol/L    Potassium 3.7 3.5 - 5.2 mmol/L    Chloride 99 98 - 107  mmol/L    CO2 29.0 22.0 - 29.0 mmol/L    Calcium 9.2 8.6 - 10.5 mg/dL    Total Protein 6.5 6.0 - 8.5 g/dL    Albumin 4.1 3.5 - 5.2 g/dL    ALT (SGPT) 13 1 - 33 U/L    AST (SGOT) 21 1 - 32 U/L    Alkaline Phosphatase 178 (H) 39 - 117 U/L    Total Bilirubin 0.2 0.0 - 1.2 mg/dL    Globulin 2.4 gm/dL    A/G Ratio 1.7 g/dL    BUN/Creatinine Ratio 18.2 7.0 - 25.0    Anion Gap 11.0 5.0 - 15.0 mmol/L    eGFR 84.7 >60.0 mL/min/1.73   Urinalysis With Microscopic If Indicated (No Culture) - Urine, Clean Catch    Specimen: Urine, Clean Catch   Result Value Ref Range    Color, UA Yellow Yellow, Straw    Appearance, UA Clear Clear    pH, UA 6.5 5.0 - 8.0    Specific Gravity, UA 1.021 1.005 - 1.030    Glucose, UA Negative Negative    Ketones, UA Trace (A) Negative    Bilirubin, UA Negative Negative    Blood, UA Negative Negative    Protein, UA Negative Negative    Leuk Esterase, UA Moderate (2+) (A) Negative    Nitrite, UA Negative Negative    Urobilinogen, UA 1.0 E.U./dL 0.2 - 1.0 E.U./dL   BNP    Specimen: Blood   Result Value Ref Range    proBNP 119.3 0.0 - 900.0 pg/mL   Protime-INR    Specimen: Blood   Result Value Ref Range    Protime 13.1 (L) 19.4 - 28.5 Seconds    INR 1.22 (L) 2.00 - 3.00   CBC Auto Differential    Specimen: Blood   Result Value Ref Range    WBC 8.11 3.40 - 10.80 10*3/mm3    RBC 4.03 3.77 - 5.28 10*6/mm3    Hemoglobin 11.3 (L) 12.0 - 15.9 g/dL    Hematocrit 37.1 34.0 - 46.6 %    MCV 92.1 79.0 - 97.0 fL    MCH 28.0 26.6 - 33.0 pg    MCHC 30.5 (L) 31.5 - 35.7 g/dL    RDW 14.5 12.3 - 15.4 %    RDW-SD 49.6 37.0 - 54.0 fl    MPV 9.7 6.0 - 12.0 fL    Platelets 352 140 - 450 10*3/mm3    Neutrophil % 71.3 42.7 - 76.0 %    Lymphocyte % 17.6 (L) 19.6 - 45.3 %    Monocyte % 7.6 5.0 - 12.0 %    Eosinophil % 2.5 0.3 - 6.2 %    Basophil % 0.6 0.0 - 1.5 %    Immature Grans % 0.4 0.0 - 0.5 %    Neutrophils, Absolute 5.78 1.70 - 7.00 10*3/mm3    Lymphocytes, Absolute 1.43 0.70 - 3.10 10*3/mm3    Monocytes, Absolute 0.62  0.10 - 0.90 10*3/mm3    Eosinophils, Absolute 0.20 0.00 - 0.40 10*3/mm3    Basophils, Absolute 0.05 0.00 - 0.20 10*3/mm3    Immature Grans, Absolute 0.03 0.00 - 0.05 10*3/mm3    nRBC 0.0 0.0 - 0.2 /100 WBC   Urinalysis, Microscopic Only - Urine, Clean Catch    Specimen: Urine, Clean Catch   Result Value Ref Range    RBC, UA 0-2 None Seen, 0-2 /HPF    WBC, UA 21-50 (A) None Seen, 0-2 /HPF    Bacteria, UA None Seen None Seen /HPF    Squamous Epithelial Cells, UA 3-6 (A) None Seen, 0-2 /HPF    Hyaline Casts, UA 0-2 None Seen /LPF    Methodology Automated Microscopy    POC Lactate    Specimen: Blood   Result Value Ref Range    Lactate 1.0 0.3 - 2.0 mmol/L   ECG 12 Lead Dyspnea   Result Value Ref Range    QT Interval 368 ms    QTC Interval 456 ms     XR Chest 1 View    Result Date: 3/25/2024  Impression: Chronic changes on the left with no acute cardiopulmonary process demonstrated Electronically Signed: Luis Sousa  3/25/2024 7:29 PM EDT  Workstation ID: [id number]   Medications   sodium chloride 0.9 % flush 10 mL (has no administration in time range)   sodium chloride 0.9 % flush 10 mL (10 mL Intravenous Given 3/25/24 9930)   sodium chloride 0.9 % flush 10 mL (has no administration in time range)   sodium chloride 0.9 % infusion 40 mL (has no administration in time range)   acetaminophen (TYLENOL) tablet 650 mg (has no administration in time range)   ondansetron (ZOFRAN) injection 4 mg (has no administration in time range)   melatonin tablet 5 mg (has no administration in time range)   sennosides-docusate (PERICOLACE) 8.6-50 MG per tablet 2 tablet (has no administration in time range)     And   polyethylene glycol (MIRALAX) packet 17 g (has no administration in time range)     And   bisacodyl (DULCOLAX) EC tablet 5 mg (has no administration in time range)     And   bisacodyl (DULCOLAX) suppository 10 mg (has no administration in time range)   sodium chloride 0.9 % infusion (100 mL/hr Intravenous New Bag 3/25/24 4329)    ipratropium-albuterol (DUO-NEB) nebulizer solution 3 mL (has no administration in time range)   methylPREDNISolone sodium succinate (SOLU-Medrol) injection 60 mg (has no administration in time range)   Pharmacy to dose warfarin (has no administration in time range)   HYDROcodone-acetaminophen (NORCO)  MG per tablet 1 tablet (has no administration in time range)   ipratropium-albuterol (DUO-NEB) nebulizer solution 3 mL (3 mL Nebulization Given 3/25/24 1846)   albuterol (PROVENTIL) nebulizer solution 0.083% 2.5 mg/3mL (2.5 mg Nebulization Given 3/25/24 1840)   methylPREDNISolone sodium succinate (SOLU-Medrol) injection 125 mg (125 mg Intravenous Given 3/25/24 1844)   warfarin (COUMADIN) tablet 5 mg (5 mg Oral Given 3/25/24 2247)                                    EKG my interpretation normal sinus rhythm rate of 92 normal axis hypertrophy QTc of 456 s normal EKG and unchanged from 3/16/2024          Medical Decision Making  Medical decision making.  IV established monitor placement review of sinus rhythm.  The patient EKG obtained my interpretation normal sinus rhythm rate of 92 normal axis hypertrophy QTc of 469 patient has no change from previous EKG on 3/16/2024 otherwise unremarkable.  Chest x-ray my interpretation no pneumonia pneumothorax or failure acute findings.  Patient was given a DuoNeb and albuterol 125 mg Solu-Medrol IV.  Labs obtained my independent review comprehensive metabolic profile unremarkable.  BMP unremarkable INR was only 1.22 CBC unremarkable and hemoglobin 9.3.  Urinalysis moderate leukocytes about 25-50 white cells I did culture that.  I did not treat as the patient has no dysuria frequency or urinary complaints.  Patient repeat exam still has some scattered wheezes throughout but no retractions.  I do not see any evidence of pneumonia CHF DVT pulmonary embolism aortic dissection pericarditis myocarditis pericardial effusion myocardial infarction.  Although not a complete list of  all possibilities.  Patient will be placed in observation steroids and breathing treatments oxygen therapy urine culture stable otherwise unremarkable ER course.    Problems Addressed:  Acute respiratory distress: complicated acute illness or injury  Chronic obstructive pulmonary disease with acute exacerbation: chronic illness or injury    Amount and/or Complexity of Data Reviewed  Labs: ordered. Decision-making details documented in ED Course.  Radiology: ordered and independent interpretation performed. Decision-making details documented in ED Course.  ECG/medicine tests: ordered and independent interpretation performed. Decision-making details documented in ED Course.    Risk  Decision regarding hospitalization.        Final diagnoses:   Acute respiratory distress   Chronic obstructive pulmonary disease with acute exacerbation       ED Disposition  ED Disposition       ED Disposition   Decision to Admit    Condition   --    Comment   --               No follow-up provider specified.       Medication List      No changes were made to your prescriptions during this visit.            Chi Stevens MD  03/25/24 0487

## 2024-03-26 NOTE — CASE MANAGEMENT/SOCIAL WORK
Discharge Planning Assessment  [facility]     Patient Name: Renuka Pelayo  MRN: [id number]  Today's Date: 3/26/2024    Admit Date: 3/25/2024    Plan: Anticipate routine home with spouse and son. Current with [facility] (appt 3/27). Current home O2 (2L) through [facility].   Discharge Needs Assessment       Row Name 03/26/24 1035       Living Environment    People in Home child(ras), adult;spouse    Current Living Arrangements home    Potentially Unsafe Housing Conditions none    In the past 12 months has the electric, gas, oil, or water company threatened to shut off services in your home? No    Primary Care Provided by self    Provides Primary Care For no one    Family Caregiver if Needed child(ras), adult;spouse    Quality of Family Relationships helpful    Able to Return to Prior Arrangements yes       Resource/Environmental Concerns    Resource/Environmental Concerns none    Transportation Concerns none       Transportation Needs    In the past 12 months, has lack of transportation kept you from medical appointments or from getting medications? no    In the past 12 months, has lack of transportation kept you from meetings, work, or from getting things needed for daily living? No       Food Insecurity    Within the past 12 months, you worried that your food would run out before you got the money to buy more. Never true    Within the past 12 months, the food you bought just didn't last and you didn't have money to get more. Never true       Transition Planning    Patient/Family Anticipates Transition to home with family    Patient/Family Anticipated Services at Transition none    Transportation Anticipated family or friend will provide       Discharge Needs Assessment    Readmission Within the Last 30 Days no previous admission in last 30 days    Equipment Currently Used at Home cane, quad;oxygen;shower chair;nebulizer    Concerns to be Addressed denies needs/concerns at this time    Anticipated Changes  Related to Illness none    Equipment Needed After Discharge none                   Discharge Plan       Row Name 03/26/24 1038       Plan    Final Discharge Disposition Code 01 - home or self-care    Final Note Home with family      Row Name 03/26/24 1036       Plan    Plan Anticipate routine home with spouse and son. Current with [facility] (appt 3/27). Current home O2 (2L) through [facility].    Patient/Family in Agreement with Plan yes    Plan Comments CM met with patient at bedside. Patient lives with spouse and son, is independent with ADLs and drives. PCP and pharmacy verified-denies any difficulty affording meds. Patient denies any d/c needs at this time and family will transport at d/c. Patient reports she has appt with [facility] tomorrow. Patient declines home health needs.                          Expected Discharge Date and Time       Expected Discharge Date Expected Discharge Time    Mar 26, 2024            Demographic Summary       Row Name 03/26/24 1035       General Information    Admission Type observation    Arrived From emergency department    Referral Source admission list    Reason for Consult discharge planning    Preferred Language English       Contact Information    Permission Granted to Share Info With                    Functional Status       Row Name 03/26/24 1035       Functional Status    Usual Activity Tolerance good    Current Activity Tolerance good       Functional Status, IADL    Medications independent    Meal Preparation independent    Housekeeping independent    Laundry independent    Shopping independent       Mental Status    General Appearance WDL WDL       Mental Status Summary    Recent Changes in Mental Status/Cognitive Functioning no changes                       Patient Forms       Row Name 03/26/24 0843       Patient Forms    Important Message from Medicare (IMM) Delivered  MOON by REG 3/25    Patient Observation Letter Delivered                   Case Management Discharge Note      Final Note: Home with family         Selected Continued Care - Admitted Since 3/25/2024          Transportation Services  Private: Car    Final Discharge Disposition Code: 01 - home or self-care    Met with patient in room.  Maintained distance greater than six feet and spent less than 15 minutes in the room.   NUHA DanielN, RN    [address] [address]    Office: [phone]  Fax: [phone]

## 2024-03-26 NOTE — OUTREACH NOTE
Prep Survey      Flowsheet Row Responses   [facility] facility patient discharged from? [facility]   Is LACE score < 7 ? No   Eligibility Readm Mgmt   Discharge diagnosis COPD (chronic obstructive pulmonary disease)   Does the patient have one of the following disease processes/diagnoses(primary or secondary)? COPD   Prep survey completed? Yes            Ramila ROBERTO - Registered Nurse

## 2024-03-26 NOTE — PLAN OF CARE
Goal Outcome Evaluation:  Plan of Care Reviewed With: patient        Progress: improving  Outcome Evaluation: Pt progressing well towards goals. Pt reports feeling much better and back to her normal baseline. Pt remains on 2L NC and o2 sats stay >96%. Pt continues to have productive cough. Pt anticipated to dc home today.

## 2024-03-27 ENCOUNTER — TREATMENT (OUTPATIENT)
Dept: CARDIAC REHAB | Facility: HOSPITAL | Age: 69
End: 2024-03-27
Payer: MEDICARE

## 2024-03-27 DIAGNOSIS — J43.9 PULMONARY EMPHYSEMA, UNSPECIFIED EMPHYSEMA TYPE: Primary | ICD-10-CM

## 2024-03-27 PROCEDURE — 94625 PHY/QHP OP PULM RHB W/O MNTR: CPT

## 2024-03-29 ENCOUNTER — TREATMENT (OUTPATIENT)
Dept: CARDIAC REHAB | Facility: HOSPITAL | Age: 69
End: 2024-03-29
Payer: MEDICARE

## 2024-03-29 DIAGNOSIS — J43.9 PULMONARY EMPHYSEMA, UNSPECIFIED EMPHYSEMA TYPE: Primary | ICD-10-CM

## 2024-03-30 LAB
BACTERIA SPEC AEROBE CULT: NORMAL
BACTERIA SPEC AEROBE CULT: NORMAL

## 2024-04-01 ENCOUNTER — TREATMENT (OUTPATIENT)
Dept: CARDIAC REHAB | Facility: HOSPITAL | Age: 69
End: 2024-04-01
Payer: MEDICARE

## 2024-04-01 DIAGNOSIS — J43.9 PULMONARY EMPHYSEMA, UNSPECIFIED EMPHYSEMA TYPE: Primary | ICD-10-CM

## 2024-04-01 PROCEDURE — 94625 PHY/QHP OP PULM RHB W/O MNTR: CPT

## 2024-04-03 ENCOUNTER — READMISSION MANAGEMENT (OUTPATIENT)
Dept: CALL CENTER | Facility: HOSPITAL | Age: 69
End: 2024-04-03
Payer: MEDICARE

## 2024-04-03 ENCOUNTER — TREATMENT (OUTPATIENT)
Dept: CARDIAC REHAB | Facility: HOSPITAL | Age: 69
End: 2024-04-03
Payer: MEDICARE

## 2024-04-03 DIAGNOSIS — J43.9 PULMONARY EMPHYSEMA, UNSPECIFIED EMPHYSEMA TYPE: Primary | ICD-10-CM

## 2024-04-03 PROCEDURE — 94625 PHY/QHP OP PULM RHB W/O MNTR: CPT

## 2024-04-03 NOTE — OUTREACH NOTE
COPD/PN Week 2 Survey      Flowsheet Row Responses   [facility] patient discharged from? [facility]   Does the patient have one of the following disease processes/diagnoses(primary or secondary)? COPD   Week 2 attempt successful? Yes   Call start time 1259   Call end time 1304   Discharge diagnosis COPD (chronic obstructive pulmonary disease)   Person spoke with today (if not patient) and relationship pt   Meds reviewed with patient/caregiver? Yes   Is the patient having any side effects they believe may be caused by any medication additions or changes? No   Does the patient have all medications ordered at discharge? Yes   Is the patient taking all medications as directed (includes completed medication regime)? Yes   Does the patient have a primary care provider?  Yes   Does the patient have an appointment with their PCP or specialist within 7 days of discharge? Yes   Has the patient kept scheduled appointments due by today? Yes   Pulse Ox monitoring Intermittent   Pulse Ox device source Patient   O2 Sat: education provided Sat levels, Monitoring frequency, When to seek care   Psychosocial issues? No   Did the patient receive a copy of their discharge instructions? Yes   Nursing interventions Reviewed instructions with patient   What is the patient's perception of their health status since discharge? Improving   Nursing Interventions Nurse provided patient education   If the patient is a current smoker, are they able to teach back resources for cessation? Not a smoker   Is the patient/caregiver able to teach back the hierarchy of who to call/visit for symptoms/problems? PCP, Specialist, Home health nurse, Urgent Care, ED, 911 Yes   Is the patient able to teach back COPD zones? Yes   Nursing interventions Education provided on various zones   Patient reports what zone on this call? Yellow Zone   Yellow Zone Reports having a bad day or a COPD flare, More breathless than usual   Yellow interventions Continue taking  daily medications, Use quick relief inhaler, Use oxygen as prescribed, Get plenty of rest, Call provider immediately if symptoms don't improve: they may indicate that an adjustment in medication or oxygen therapy is needed   Week 2 call completed? Yes   Graduated Yes   Is the patient interested in additional calls from an ambulatory ? No   Would this patient benefit from a Referral to [facility] Social Work? No   Wrap up additional comments doing well, trying to get aregen air tank,   Call end time 1304            PASQUALE GOTTI - Registered Nurse

## 2024-04-04 ENCOUNTER — TREATMENT (OUTPATIENT)
Dept: CARDIAC REHAB | Facility: HOSPITAL | Age: 69
End: 2024-04-04
Payer: MEDICARE

## 2024-04-04 DIAGNOSIS — J43.9 PULMONARY EMPHYSEMA, UNSPECIFIED EMPHYSEMA TYPE: Primary | ICD-10-CM

## 2024-04-04 PROCEDURE — 94625 PHY/QHP OP PULM RHB W/O MNTR: CPT

## 2024-04-05 ENCOUNTER — TREATMENT (OUTPATIENT)
Dept: CARDIAC REHAB | Facility: HOSPITAL | Age: 69
End: 2024-04-05
Payer: MEDICARE

## 2024-04-05 DIAGNOSIS — J43.9 PULMONARY EMPHYSEMA, UNSPECIFIED EMPHYSEMA TYPE: Primary | ICD-10-CM

## 2024-04-05 PROCEDURE — 94625 PHY/QHP OP PULM RHB W/O MNTR: CPT

## 2024-04-08 ENCOUNTER — APPOINTMENT (OUTPATIENT)
Dept: CARDIAC REHAB | Facility: HOSPITAL | Age: 69
End: 2024-04-08
Payer: MEDICARE

## 2024-04-10 ENCOUNTER — APPOINTMENT (OUTPATIENT)
Dept: CARDIAC REHAB | Facility: HOSPITAL | Age: 69
End: 2024-04-10
Payer: MEDICARE

## 2024-04-12 ENCOUNTER — APPOINTMENT (OUTPATIENT)
Dept: CARDIAC REHAB | Facility: HOSPITAL | Age: 69
End: 2024-04-12
Payer: MEDICARE

## 2024-04-15 ENCOUNTER — TREATMENT (OUTPATIENT)
Dept: CARDIAC REHAB | Facility: HOSPITAL | Age: 69
End: 2024-04-15
Payer: MEDICARE

## 2024-04-15 DIAGNOSIS — J43.9 PULMONARY EMPHYSEMA, UNSPECIFIED EMPHYSEMA TYPE: Primary | ICD-10-CM

## 2024-04-15 PROCEDURE — 94625 PHY/QHP OP PULM RHB W/O MNTR: CPT

## 2024-04-17 ENCOUNTER — TREATMENT (OUTPATIENT)
Dept: CARDIAC REHAB | Facility: HOSPITAL | Age: 69
End: 2024-04-17
Payer: MEDICARE

## 2024-04-17 DIAGNOSIS — J43.9 PULMONARY EMPHYSEMA, UNSPECIFIED EMPHYSEMA TYPE: Primary | ICD-10-CM

## 2024-04-17 PROCEDURE — G0237 THERAPEUTIC PROCD STRG ENDUR: HCPCS

## 2024-04-17 PROCEDURE — G0238 OTH RESP PROC, INDIV: HCPCS

## 2024-04-19 ENCOUNTER — TREATMENT (OUTPATIENT)
Dept: CARDIAC REHAB | Facility: HOSPITAL | Age: 69
End: 2024-04-19
Payer: MEDICARE

## 2024-04-19 DIAGNOSIS — J43.9 PULMONARY EMPHYSEMA, UNSPECIFIED EMPHYSEMA TYPE: Primary | ICD-10-CM

## 2024-04-19 PROCEDURE — 94625 PHY/QHP OP PULM RHB W/O MNTR: CPT

## 2024-04-22 ENCOUNTER — HOSPITAL ENCOUNTER (EMERGENCY)
Facility: HOSPITAL | Age: 69
Discharge: HOME | End: 2024-04-22
Attending: EMERGENCY MEDICINE | Admitting: EMERGENCY MEDICINE
Payer: MEDICARE

## 2024-04-22 ENCOUNTER — APPOINTMENT (OUTPATIENT)
Dept: CARDIAC REHAB | Facility: HOSPITAL | Age: 69
End: 2024-04-22
Payer: MEDICARE

## 2024-04-22 ENCOUNTER — APPOINTMENT (OUTPATIENT)
Dept: GENERAL RADIOLOGY | Facility: HOSPITAL | Age: 69
End: 2024-04-22
Payer: MEDICARE

## 2024-04-22 VITALS
DIASTOLIC BLOOD PRESSURE: 58 MMHG | OXYGEN SATURATION: 99 % | HEART RATE: 79 BPM | HEIGHT: 63 IN | WEIGHT: 159 LBS | RESPIRATION RATE: 18 BRPM | SYSTOLIC BLOOD PRESSURE: 99 MMHG | BODY MASS INDEX: 28.17 KG/M2 | TEMPERATURE: 98.4 F

## 2024-04-22 DIAGNOSIS — J44.1 COPD EXACERBATION: ICD-10-CM

## 2024-04-22 DIAGNOSIS — R06.00 DYSPNEA, UNSPECIFIED TYPE: Primary | ICD-10-CM

## 2024-04-22 LAB
ANION GAP SERPL CALCULATED.3IONS-SCNC: 11 MMOL/L (ref 5–15)
ARTERIAL PATENCY WRIST A: POSITIVE
ATMOSPHERIC PRESS: ABNORMAL MM[HG]
BASE EXCESS BLDA CALC-SCNC: 4.8 MMOL/L (ref 0–3)
BASOPHILS # BLD AUTO: 0.03 10*3/MM3 (ref 0–0.2)
BASOPHILS NFR BLD AUTO: 0.2 % (ref 0–1.5)
BDY SITE: ABNORMAL
BUN SERPL-MCNC: 26 MG/DL (ref 8–23)
BUN/CREAT SERPL: 27.1 (ref 7–25)
CALCIUM SPEC-SCNC: 8.4 MG/DL (ref 8.6–10.5)
CHLORIDE SERPL-SCNC: 96 MMOL/L (ref 98–107)
CO2 BLDA-SCNC: 30.4 MMOL/L (ref 22–29)
CO2 SERPL-SCNC: 28 MMOL/L (ref 22–29)
CREAT SERPL-MCNC: 0.96 MG/DL (ref 0.57–1)
DEPRECATED RDW RBC AUTO: 50.6 FL (ref 37–54)
EGFRCR SERPLBLD CKD-EPI 2021: 65 ML/MIN/1.73
EOSINOPHIL # BLD AUTO: 0.09 10*3/MM3 (ref 0–0.4)
EOSINOPHIL NFR BLD AUTO: 0.7 % (ref 0.3–6.2)
ERYTHROCYTE [DISTWIDTH] IN BLOOD BY AUTOMATED COUNT: 14.9 % (ref 12.3–15.4)
GLUCOSE SERPL-MCNC: 149 MG/DL (ref 65–99)
HCO3 BLDA-SCNC: 29.1 MMOL/L (ref 21–28)
HCT VFR BLD AUTO: 37.1 % (ref 34–46.6)
HEMODILUTION: NO
HGB BLD-MCNC: 11.5 G/DL (ref 12–15.9)
HOLD SPECIMEN: NORMAL
IMM GRANULOCYTES # BLD AUTO: 0.08 10*3/MM3 (ref 0–0.05)
IMM GRANULOCYTES NFR BLD AUTO: 0.6 % (ref 0–0.5)
LYMPHOCYTES # BLD AUTO: 0.39 10*3/MM3 (ref 0.7–3.1)
LYMPHOCYTES NFR BLD AUTO: 2.8 % (ref 19.6–45.3)
MCH RBC QN AUTO: 28.3 PG (ref 26.6–33)
MCHC RBC AUTO-ENTMCNC: 31 G/DL (ref 31.5–35.7)
MCV RBC AUTO: 91.4 FL (ref 79–97)
MODALITY: ABNORMAL
MONOCYTES # BLD AUTO: 0.91 10*3/MM3 (ref 0.1–0.9)
MONOCYTES NFR BLD AUTO: 6.6 % (ref 5–12)
NEUTROPHILS NFR BLD AUTO: 12.26 10*3/MM3 (ref 1.7–7)
NEUTROPHILS NFR BLD AUTO: 89.1 % (ref 42.7–76)
NRBC BLD AUTO-RTO: 0 /100 WBC (ref 0–0.2)
PCO2 BLDA: 41 MM HG (ref 35–48)
PH BLDA: 7.46 PH UNITS (ref 7.35–7.45)
PLATELET # BLD AUTO: 194 10*3/MM3 (ref 140–450)
PMV BLD AUTO: 9.5 FL (ref 6–12)
PO2 BLDA: 92.9 MM HG (ref 83–108)
POTASSIUM SERPL-SCNC: 3.4 MMOL/L (ref 3.5–5.2)
RBC # BLD AUTO: 4.06 10*6/MM3 (ref 3.77–5.28)
SAO2 % BLDCOA: 97.6 % (ref 94–98)
SODIUM SERPL-SCNC: 135 MMOL/L (ref 136–145)
WBC NRBC COR # BLD AUTO: 13.76 10*3/MM3 (ref 3.4–10.8)

## 2024-04-22 PROCEDURE — 80048 BASIC METABOLIC PNL TOTAL CA: CPT | Performed by: EMERGENCY MEDICINE

## 2024-04-22 PROCEDURE — 25010000002 METHYLPREDNISOLONE PER 125 MG: Performed by: EMERGENCY MEDICINE

## 2024-04-22 PROCEDURE — 25810000003 SODIUM CHLORIDE 0.9 % SOLUTION: Performed by: EMERGENCY MEDICINE

## 2024-04-22 PROCEDURE — 99284 EMERGENCY DEPT VISIT MOD MDM: CPT

## 2024-04-22 PROCEDURE — 71045 X-RAY EXAM CHEST 1 VIEW: CPT

## 2024-04-22 PROCEDURE — 94799 UNLISTED PULMONARY SVC/PX: CPT

## 2024-04-22 PROCEDURE — 93005 ELECTROCARDIOGRAM TRACING: CPT | Performed by: EMERGENCY MEDICINE

## 2024-04-22 PROCEDURE — 94761 N-INVAS EAR/PLS OXIMETRY MLT: CPT

## 2024-04-22 PROCEDURE — 85025 COMPLETE CBC W/AUTO DIFF WBC: CPT | Performed by: EMERGENCY MEDICINE

## 2024-04-22 PROCEDURE — 36600 WITHDRAWAL OF ARTERIAL BLOOD: CPT | Performed by: EMERGENCY MEDICINE

## 2024-04-22 PROCEDURE — 96374 THER/PROPH/DIAG INJ IV PUSH: CPT

## 2024-04-22 PROCEDURE — 94640 AIRWAY INHALATION TREATMENT: CPT

## 2024-04-22 PROCEDURE — 94664 DEMO&/EVAL PT USE INHALER: CPT

## 2024-04-22 PROCEDURE — 82803 BLOOD GASES ANY COMBINATION: CPT | Performed by: EMERGENCY MEDICINE

## 2024-04-22 PROCEDURE — 36415 COLL VENOUS BLD VENIPUNCTURE: CPT

## 2024-04-22 PROCEDURE — 93005 ELECTROCARDIOGRAM TRACING: CPT

## 2024-04-22 RX ORDER — METHYLPREDNISOLONE SODIUM SUCCINATE 125 MG/2ML
125 INJECTION, POWDER, LYOPHILIZED, FOR SOLUTION INTRAMUSCULAR; INTRAVENOUS ONCE
Status: COMPLETED | OUTPATIENT
Start: 2024-04-22 | End: 2024-04-22

## 2024-04-22 RX ORDER — IPRATROPIUM BROMIDE AND ALBUTEROL SULFATE 2.5; .5 MG/3ML; MG/3ML
3 SOLUTION RESPIRATORY (INHALATION) ONCE
Status: COMPLETED | OUTPATIENT
Start: 2024-04-22 | End: 2024-04-22

## 2024-04-22 RX ORDER — SODIUM CHLORIDE 0.9 % (FLUSH) 0.9 %
10 SYRINGE (ML) INJECTION AS NEEDED
Status: DISCONTINUED | OUTPATIENT
Start: 2024-04-22 | End: 2024-04-22 | Stop reason: HOSPADM

## 2024-04-22 RX ORDER — PREDNISONE 20 MG/1
20 TABLET ORAL DAILY
Qty: 5 TABLET | Refills: 0 | Status: SHIPPED | OUTPATIENT
Start: 2024-04-22

## 2024-04-22 RX ADMIN — Medication 10 ML: at 12:09

## 2024-04-22 RX ADMIN — METHYLPREDNISOLONE SODIUM SUCCINATE 125 MG: 125 INJECTION, POWDER, FOR SOLUTION INTRAMUSCULAR; INTRAVENOUS at 11:55

## 2024-04-22 RX ADMIN — SODIUM CHLORIDE 1000 ML: 9 INJECTION, SOLUTION INTRAVENOUS at 12:09

## 2024-04-22 RX ADMIN — IPRATROPIUM BROMIDE AND ALBUTEROL SULFATE 3 ML: .5; 3 SOLUTION RESPIRATORY (INHALATION) at 11:07

## 2024-04-22 NOTE — DISCHARGE INSTRUCTIONS
See your MD for recheck.  Stop your blood pressure medications until you are rechecked by your family physician.  Return if symptoms worsen.

## 2024-04-23 ENCOUNTER — HOSPITAL ENCOUNTER (EMERGENCY)
Facility: HOSPITAL | Age: 69
Discharge: HOME | End: 2024-04-23
Attending: EMERGENCY MEDICINE
Payer: MEDICARE

## 2024-04-23 VITALS
RESPIRATION RATE: 20 BRPM | BODY MASS INDEX: 28.09 KG/M2 | SYSTOLIC BLOOD PRESSURE: 126 MMHG | TEMPERATURE: 98 F | DIASTOLIC BLOOD PRESSURE: 68 MMHG | HEART RATE: 80 BPM | WEIGHT: 158.51 LBS | HEIGHT: 63 IN | OXYGEN SATURATION: 98 %

## 2024-04-23 DIAGNOSIS — R79.1 SUBTHERAPEUTIC INTERNATIONAL NORMALIZED RATIO (INR): Primary | ICD-10-CM

## 2024-04-23 LAB
INR PPP: 1.77 (ref 2–3)
PROTHROMBIN TIME: 18.5 SECONDS (ref 19.4–28.5)
QT INTERVAL: 331 MS
QTC INTERVAL: 470 MS

## 2024-04-23 PROCEDURE — 85610 PROTHROMBIN TIME: CPT | Performed by: EMERGENCY MEDICINE

## 2024-04-23 PROCEDURE — 36415 COLL VENOUS BLD VENIPUNCTURE: CPT

## 2024-04-23 PROCEDURE — 99283 EMERGENCY DEPT VISIT LOW MDM: CPT

## 2024-04-23 NOTE — ED PROVIDER NOTES
Subjective   History of Present Illness  Chief complaint: Elevated INR    68-year-old female presents with an elevated INR.  Patient states she went to her primary care today to have her INR checked and it just read high on their machine.  They recommended she come to the emergency room to have it checked.  Patient denies any other complaints.  She has had no bleeding.  She denies any change to her warfarin dosing recently.    History provided by:  Patient      Review of Systems   Constitutional:  Negative for fever.   HENT:  Negative for congestion.    Cardiovascular:  Negative for chest pain.   Gastrointestinal:  Negative for abdominal pain and blood in stool.   Neurological:  Negative for headaches.       Past Medical History:   Diagnosis Date    Arthritis     Bladder cancer     Chronic back pain     Closed nondisplaced fracture of head of right radius 03/2017    Congenital deformity of right hip joint 1956    COPD (chronic obstructive pulmonary disease)     former smoker    Deep venous thrombosis     unprovoked    Depressive disorder     Disease of thyroid gland     Diverticulitis of colon     Essential hypertension     Gastroesophageal reflux disease     Insomnia     Obesity     Pulmonary embolism     provoked by surgery       No Known Allergies    Past Surgical History:   Procedure Laterality Date    ABDOMINAL SURGERY      BRONCHOSCOPY N/A 11/25/2020    Procedure: BRONCHOSCOPY with bronchial washing;  Surgeon: Win Johnston MD;  Location: [facility] ENDOSCOPY;  Service: Pulmonary;  Laterality: N/A;  Post: mucous plugs    BRONCHOSCOPY N/A 4/25/2022    Procedure: BRONCHOSCOPY with bronchial washing;  Surgeon: Win Johnston MD;  Location: [facility] ENDOSCOPY;  Service: Pulmonary;  Laterality: N/A;  post op: pneumonia    BRONCHOSCOPY N/A 4/20/2023    Procedure: BRONCHOSCOPY with bilateral wash;  Surgeon: Wni Johnston MD;  Location: [facility] ENDOSCOPY;  Service: Pulmonary;  Laterality: N/A;  mucous plugs     "CHOLECYSTECTOMY      COLON SURGERY      COLONOSCOPY      COLOSTOMY CLOSURE N/A 2021    Procedure: COLOSTOMY TAKEDOWN OR CLOSURE, extenisve lysis of adhesions;  Surgeon: Chi Farmer MD;  Location: [facility] MAIN OR;  Service: General;  Laterality: N/A;    CYSTOSCOPY N/A 2021    Procedure: CYSTOSCOPY with bladder tumor removal and fulgeration, insertion of 3-way rizzo catheter;  Surgeon: Alfonzo Haywrad MD;  Location: [facility] MAIN OR;  Service: Urology;  Laterality: N/A;    ENDOSCOPY      EXPLORATORY LAPAROTOMY N/A 2020    Procedure: LAPAROTOMY EXPLORATORY HARTMANS;  Surgeon: Chi Farmer MD;  Location: [facility] MAIN OR;  Service: General;  Laterality: N/A;    HYSTERECTOMY      TOTAL HIP ARTHROPLASTY Right     TOTAL HIP ARTHROPLASTY REVISION Right     x3       Family History   Problem Relation Age of Onset    No Known Problems Mother     No Known Problems Father        Social History     Socioeconomic History    Marital status:    Tobacco Use    Smoking status: Former     Current packs/day: 0.00     Average packs/day: 2.0 packs/day for 40.0 years (80.0 ttl pk-yrs)     Types: Cigarettes     Start date:      Quit date: 2019     Years since quittin.3    Smokeless tobacco: Never    Tobacco comments:     ELECTRONIC   Vaping Use    Vaping status: Former    Substances: Nicotine, Flavoring   Substance and Sexual Activity    Alcohol use: Never    Drug use: Never    Sexual activity: Defer       /68 (BP Location: Left arm, Patient Position: Lying)   Pulse 80   Temp 98.2 °F (36.8 °C) (Oral)   Resp 20   Ht 160 cm (63\")   Wt 71.9 kg (158 lb 8.2 oz)   SpO2 98%   BMI 28.08 kg/m²       Objective   Physical Exam  Vitals and nursing note reviewed.   Constitutional:       Appearance: Normal appearance.   HENT:      Head: Normocephalic and atraumatic.      Mouth/Throat:      Mouth: Mucous membranes are moist.   Cardiovascular:      Rate and Rhythm: Normal rate and regular " rhythm.      Heart sounds: Normal heart sounds.   Pulmonary:      Effort: Pulmonary effort is normal.      Breath sounds: Normal breath sounds.      Comments: Mild wheezing  Abdominal:      Palpations: Abdomen is soft.      Tenderness: There is no abdominal tenderness.   Skin:     General: Skin is warm and dry.   Neurological:      Mental Status: She is alert and oriented to person, place, and time.         Procedures           ED Course      Results for orders placed or performed during the hospital encounter of 04/23/24   Protime-INR    Specimen: Blood   Result Value Ref Range    Protime 18.5 (L) 19.4 - 28.5 Seconds    INR 1.77 (L) 2.00 - 3.00                                            Medical Decision Making    INR is mildly low at 1.77.  Patient will be discharged and will follow-up with her primary doctor for ongoing warfarin management.      Final diagnoses:   Subtherapeutic international normalized ratio (INR)       ED Disposition  ED Disposition       ED Disposition   Discharge    Condition   Stable    Comment   --               Michael Gerardo MD  [address] [address]  [phone]    Call in 1 day           Medication List      No changes were made to your prescriptions during this visit.            Aries Soto MD  04/23/24 2072

## 2024-04-23 NOTE — DISCHARGE INSTRUCTIONS
Follow-up with your primary doctor for ongoing warfarin management.  Return to the emergency room for any new or worsening symptoms or if you have any other questions or concerns.

## 2024-04-24 ENCOUNTER — APPOINTMENT (OUTPATIENT)
Dept: CARDIAC REHAB | Facility: HOSPITAL | Age: 69
End: 2024-04-24
Payer: MEDICARE

## 2024-04-26 ENCOUNTER — APPOINTMENT (OUTPATIENT)
Dept: CARDIAC REHAB | Facility: HOSPITAL | Age: 69
End: 2024-04-26
Payer: MEDICARE

## 2024-04-29 ENCOUNTER — APPOINTMENT (OUTPATIENT)
Dept: CARDIAC REHAB | Facility: HOSPITAL | Age: 69
End: 2024-04-29
Payer: MEDICARE

## 2024-05-01 ENCOUNTER — TREATMENT (OUTPATIENT)
Dept: CARDIAC REHAB | Facility: HOSPITAL | Age: 69
End: 2024-05-01
Payer: MEDICARE

## 2024-05-01 DIAGNOSIS — J43.9 PULMONARY EMPHYSEMA, UNSPECIFIED EMPHYSEMA TYPE: Primary | ICD-10-CM

## 2024-05-01 PROCEDURE — 94625 PHY/QHP OP PULM RHB W/O MNTR: CPT

## 2024-05-03 ENCOUNTER — TREATMENT (OUTPATIENT)
Dept: CARDIAC REHAB | Facility: HOSPITAL | Age: 69
End: 2024-05-03
Payer: MEDICARE

## 2024-05-03 DIAGNOSIS — J43.9 PULMONARY EMPHYSEMA, UNSPECIFIED EMPHYSEMA TYPE: Primary | ICD-10-CM

## 2024-05-03 PROCEDURE — 94625 PHY/QHP OP PULM RHB W/O MNTR: CPT

## 2024-05-06 ENCOUNTER — TREATMENT (OUTPATIENT)
Dept: CARDIAC REHAB | Facility: HOSPITAL | Age: 69
End: 2024-05-06
Payer: MEDICARE

## 2024-05-06 DIAGNOSIS — J43.9 PULMONARY EMPHYSEMA, UNSPECIFIED EMPHYSEMA TYPE: Primary | ICD-10-CM

## 2024-05-06 PROCEDURE — 94625 PHY/QHP OP PULM RHB W/O MNTR: CPT

## 2024-05-08 ENCOUNTER — TREATMENT (OUTPATIENT)
Dept: CARDIAC REHAB | Facility: HOSPITAL | Age: 69
End: 2024-05-08
Payer: MEDICARE

## 2024-05-08 DIAGNOSIS — J43.9 PULMONARY EMPHYSEMA, UNSPECIFIED EMPHYSEMA TYPE: Primary | ICD-10-CM

## 2024-05-08 PROCEDURE — 94625 PHY/QHP OP PULM RHB W/O MNTR: CPT

## 2024-05-10 ENCOUNTER — TREATMENT (OUTPATIENT)
Dept: CARDIAC REHAB | Facility: HOSPITAL | Age: 69
End: 2024-05-10
Payer: MEDICARE

## 2024-05-10 DIAGNOSIS — J43.9 PULMONARY EMPHYSEMA, UNSPECIFIED EMPHYSEMA TYPE: Primary | ICD-10-CM

## 2024-05-10 PROCEDURE — 94625 PHY/QHP OP PULM RHB W/O MNTR: CPT

## 2024-05-13 ENCOUNTER — TREATMENT (OUTPATIENT)
Dept: CARDIAC REHAB | Facility: HOSPITAL | Age: 69
End: 2024-05-13
Payer: MEDICARE

## 2024-05-13 DIAGNOSIS — J43.9 PULMONARY EMPHYSEMA, UNSPECIFIED EMPHYSEMA TYPE: Primary | ICD-10-CM

## 2024-05-13 PROCEDURE — 94625 PHY/QHP OP PULM RHB W/O MNTR: CPT

## 2024-05-15 ENCOUNTER — APPOINTMENT (OUTPATIENT)
Dept: CARDIAC REHAB | Facility: HOSPITAL | Age: 69
End: 2024-05-15
Payer: MEDICARE

## 2024-05-17 ENCOUNTER — APPOINTMENT (OUTPATIENT)
Dept: CARDIAC REHAB | Facility: HOSPITAL | Age: 69
End: 2024-05-17
Payer: MEDICARE

## 2024-05-20 ENCOUNTER — TREATMENT (OUTPATIENT)
Dept: CARDIAC REHAB | Facility: HOSPITAL | Age: 69
End: 2024-05-20
Payer: MEDICARE

## 2024-05-20 DIAGNOSIS — J43.9 PULMONARY EMPHYSEMA, UNSPECIFIED EMPHYSEMA TYPE: Primary | ICD-10-CM

## 2024-05-20 PROCEDURE — 94625 PHY/QHP OP PULM RHB W/O MNTR: CPT

## 2024-05-22 ENCOUNTER — TREATMENT (OUTPATIENT)
Dept: CARDIAC REHAB | Facility: HOSPITAL | Age: 69
End: 2024-05-22
Payer: MEDICARE

## 2024-05-22 DIAGNOSIS — J43.9 PULMONARY EMPHYSEMA, UNSPECIFIED EMPHYSEMA TYPE: Primary | ICD-10-CM

## 2024-05-22 PROCEDURE — 94625 PHY/QHP OP PULM RHB W/O MNTR: CPT

## 2024-05-24 ENCOUNTER — TREATMENT (OUTPATIENT)
Dept: CARDIAC REHAB | Facility: HOSPITAL | Age: 69
End: 2024-05-24
Payer: MEDICARE

## 2024-05-24 DIAGNOSIS — J43.9 PULMONARY EMPHYSEMA, UNSPECIFIED EMPHYSEMA TYPE: Primary | ICD-10-CM

## 2024-05-24 PROCEDURE — 94625 PHY/QHP OP PULM RHB W/O MNTR: CPT

## 2024-05-29 ENCOUNTER — APPOINTMENT (OUTPATIENT)
Dept: CARDIAC REHAB | Facility: HOSPITAL | Age: 69
End: 2024-05-29
Payer: MEDICARE

## 2024-05-29 ENCOUNTER — TREATMENT (OUTPATIENT)
Dept: CARDIAC REHAB | Facility: HOSPITAL | Age: 69
End: 2024-05-29
Payer: MEDICARE

## 2024-05-29 DIAGNOSIS — J43.9 PULMONARY EMPHYSEMA, UNSPECIFIED EMPHYSEMA TYPE: Primary | ICD-10-CM

## 2024-05-29 PROCEDURE — 94625 PHY/QHP OP PULM RHB W/O MNTR: CPT

## 2024-05-31 ENCOUNTER — TREATMENT (OUTPATIENT)
Dept: CARDIAC REHAB | Facility: HOSPITAL | Age: 69
End: 2024-05-31
Payer: MEDICARE

## 2024-05-31 DIAGNOSIS — J43.9 PULMONARY EMPHYSEMA, UNSPECIFIED EMPHYSEMA TYPE: Primary | ICD-10-CM

## 2024-05-31 PROCEDURE — 94625 PHY/QHP OP PULM RHB W/O MNTR: CPT

## 2024-06-03 ENCOUNTER — TREATMENT (OUTPATIENT)
Dept: CARDIAC REHAB | Facility: HOSPITAL | Age: 69
End: 2024-06-03
Payer: MEDICARE

## 2024-06-03 DIAGNOSIS — J43.9 PULMONARY EMPHYSEMA, UNSPECIFIED EMPHYSEMA TYPE: Primary | ICD-10-CM

## 2024-06-03 PROCEDURE — 94625 PHY/QHP OP PULM RHB W/O MNTR: CPT

## 2024-06-05 ENCOUNTER — APPOINTMENT (OUTPATIENT)
Dept: CARDIAC REHAB | Facility: HOSPITAL | Age: 69
End: 2024-06-05
Payer: MEDICARE

## 2024-06-05 ENCOUNTER — TREATMENT (OUTPATIENT)
Dept: CARDIAC REHAB | Facility: HOSPITAL | Age: 69
End: 2024-06-05
Payer: MEDICARE

## 2024-06-05 DIAGNOSIS — J43.9 PULMONARY EMPHYSEMA, UNSPECIFIED EMPHYSEMA TYPE: Primary | ICD-10-CM

## 2024-06-05 PROCEDURE — 94625 PHY/QHP OP PULM RHB W/O MNTR: CPT

## 2024-06-06 ENCOUNTER — TREATMENT (OUTPATIENT)
Dept: CARDIAC REHAB | Facility: HOSPITAL | Age: 69
End: 2024-06-06
Payer: MEDICARE

## 2024-06-06 DIAGNOSIS — J43.9 PULMONARY EMPHYSEMA, UNSPECIFIED EMPHYSEMA TYPE: Primary | ICD-10-CM

## 2024-06-06 PROCEDURE — 94625 PHY/QHP OP PULM RHB W/O MNTR: CPT

## 2024-06-07 ENCOUNTER — APPOINTMENT (OUTPATIENT)
Dept: CARDIAC REHAB | Facility: HOSPITAL | Age: 69
End: 2024-06-07
Payer: MEDICARE

## 2024-06-10 ENCOUNTER — TREATMENT (OUTPATIENT)
Dept: CARDIAC REHAB | Facility: HOSPITAL | Age: 69
End: 2024-06-10
Payer: MEDICARE

## 2024-06-10 DIAGNOSIS — J43.9 PULMONARY EMPHYSEMA, UNSPECIFIED EMPHYSEMA TYPE: Primary | ICD-10-CM

## 2024-06-10 PROCEDURE — 94625 PHY/QHP OP PULM RHB W/O MNTR: CPT

## 2024-06-12 ENCOUNTER — TREATMENT (OUTPATIENT)
Dept: CARDIAC REHAB | Facility: HOSPITAL | Age: 69
End: 2024-06-12
Payer: MEDICARE

## 2024-06-12 DIAGNOSIS — J43.9 PULMONARY EMPHYSEMA, UNSPECIFIED EMPHYSEMA TYPE: Primary | ICD-10-CM

## 2024-06-12 PROCEDURE — 94625 PHY/QHP OP PULM RHB W/O MNTR: CPT

## 2024-06-14 ENCOUNTER — TREATMENT (OUTPATIENT)
Dept: CARDIAC REHAB | Facility: HOSPITAL | Age: 69
End: 2024-06-14
Payer: MEDICARE

## 2024-06-14 DIAGNOSIS — J43.9 PULMONARY EMPHYSEMA, UNSPECIFIED EMPHYSEMA TYPE: Primary | ICD-10-CM

## 2024-06-14 PROCEDURE — 94625 PHY/QHP OP PULM RHB W/O MNTR: CPT

## 2024-06-17 ENCOUNTER — APPOINTMENT (OUTPATIENT)
Dept: CARDIAC REHAB | Facility: HOSPITAL | Age: 69
End: 2024-06-17
Payer: MEDICARE

## 2024-06-19 ENCOUNTER — APPOINTMENT (OUTPATIENT)
Dept: CARDIAC REHAB | Facility: HOSPITAL | Age: 69
End: 2024-06-19
Payer: MEDICARE

## 2024-06-21 ENCOUNTER — APPOINTMENT (OUTPATIENT)
Dept: CARDIAC REHAB | Facility: HOSPITAL | Age: 69
End: 2024-06-21
Payer: MEDICARE

## 2024-06-24 ENCOUNTER — APPOINTMENT (OUTPATIENT)
Dept: CARDIAC REHAB | Facility: HOSPITAL | Age: 69
End: 2024-06-24
Payer: MEDICARE

## 2024-06-26 ENCOUNTER — APPOINTMENT (OUTPATIENT)
Dept: CARDIAC REHAB | Facility: HOSPITAL | Age: 69
End: 2024-06-26
Payer: MEDICARE

## 2024-06-28 ENCOUNTER — APPOINTMENT (OUTPATIENT)
Dept: CARDIAC REHAB | Facility: HOSPITAL | Age: 69
End: 2024-06-28
Payer: MEDICARE

## 2024-07-27 ENCOUNTER — HOSPITAL ENCOUNTER (OUTPATIENT)
Facility: HOSPITAL | Age: 69
Setting detail: OBSERVATION
Discharge: HOME | End: 2024-07-28
Attending: EMERGENCY MEDICINE | Admitting: EMERGENCY MEDICINE
Payer: MEDICARE

## 2024-07-27 ENCOUNTER — APPOINTMENT (OUTPATIENT)
Dept: GENERAL RADIOLOGY | Facility: HOSPITAL | Age: 69
End: 2024-07-27
Payer: MEDICARE

## 2024-07-27 DIAGNOSIS — J44.1 ACUTE EXACERBATION OF CHRONIC OBSTRUCTIVE PULMONARY DISEASE (COPD): Primary | ICD-10-CM

## 2024-07-27 DIAGNOSIS — B34.8 PARAINFLUENZA VIRUS INFECTION: ICD-10-CM

## 2024-07-27 LAB
ALBUMIN SERPL-MCNC: 4.7 G/DL (ref 3.5–5.2)
ALBUMIN/GLOB SERPL: 1.9 G/DL
ALP SERPL-CCNC: 94 U/L (ref 39–117)
ALT SERPL W P-5'-P-CCNC: 15 U/L (ref 1–33)
ANION GAP SERPL CALCULATED.3IONS-SCNC: 13.7 MMOL/L (ref 5–15)
APTT PPP: 29.4 SECONDS (ref 61–76.5)
AST SERPL-CCNC: 31 U/L (ref 1–32)
B PARAPERT DNA SPEC QL NAA+PROBE: NOT DETECTED
B PERT DNA SPEC QL NAA+PROBE: NOT DETECTED
BASOPHILS # BLD AUTO: 0.03 10*3/MM3 (ref 0–0.2)
BASOPHILS NFR BLD AUTO: 0.2 % (ref 0–1.5)
BILIRUB SERPL-MCNC: 0.4 MG/DL (ref 0–1.2)
BUN SERPL-MCNC: 12 MG/DL (ref 8–23)
BUN/CREAT SERPL: 10.6 (ref 7–25)
C PNEUM DNA NPH QL NAA+NON-PROBE: NOT DETECTED
CALCIUM SPEC-SCNC: 9.7 MG/DL (ref 8.6–10.5)
CHLORIDE SERPL-SCNC: 99 MMOL/L (ref 98–107)
CO2 SERPL-SCNC: 28.3 MMOL/L (ref 22–29)
CREAT SERPL-MCNC: 1.13 MG/DL (ref 0.57–1)
D DIMER PPP FEU-MCNC: 0.3 MG/L (FEU) (ref 0–0.67)
DEPRECATED RDW RBC AUTO: 49.4 FL (ref 37–54)
EGFRCR SERPLBLD CKD-EPI 2021: 53.4 ML/MIN/1.73
EOSINOPHIL # BLD AUTO: 0 10*3/MM3 (ref 0–0.4)
EOSINOPHIL NFR BLD AUTO: 0 % (ref 0.3–6.2)
ERYTHROCYTE [DISTWIDTH] IN BLOOD BY AUTOMATED COUNT: 14.6 % (ref 12.3–15.4)
FLUAV SUBTYP SPEC NAA+PROBE: NOT DETECTED
FLUBV RNA ISLT QL NAA+PROBE: NOT DETECTED
GEN 5 2HR TROPONIN T REFLEX: 10 NG/L
GLOBULIN UR ELPH-MCNC: 2.5 GM/DL
GLUCOSE SERPL-MCNC: 114 MG/DL (ref 65–99)
HADV DNA SPEC NAA+PROBE: NOT DETECTED
HCOV 229E RNA SPEC QL NAA+PROBE: NOT DETECTED
HCOV HKU1 RNA SPEC QL NAA+PROBE: NOT DETECTED
HCOV NL63 RNA SPEC QL NAA+PROBE: NOT DETECTED
HCOV OC43 RNA SPEC QL NAA+PROBE: NOT DETECTED
HCT VFR BLD AUTO: 39.9 % (ref 34–46.6)
HGB BLD-MCNC: 12.4 G/DL (ref 12–15.9)
HMPV RNA NPH QL NAA+NON-PROBE: NOT DETECTED
HOLD SPECIMEN: NORMAL
HOLD SPECIMEN: NORMAL
HPIV1 RNA ISLT QL NAA+PROBE: NOT DETECTED
HPIV2 RNA SPEC QL NAA+PROBE: NOT DETECTED
HPIV3 RNA NPH QL NAA+PROBE: NOT DETECTED
HPIV4 P GENE NPH QL NAA+PROBE: DETECTED
IMM GRANULOCYTES # BLD AUTO: 0.1 10*3/MM3 (ref 0–0.05)
IMM GRANULOCYTES NFR BLD AUTO: 0.8 % (ref 0–0.5)
INR PPP: 1.11 (ref 0.93–1.1)
LYMPHOCYTES # BLD AUTO: 1.66 10*3/MM3 (ref 0.7–3.1)
LYMPHOCYTES NFR BLD AUTO: 13.3 % (ref 19.6–45.3)
M PNEUMO IGG SER IA-ACNC: NOT DETECTED
MCH RBC QN AUTO: 28.6 PG (ref 26.6–33)
MCHC RBC AUTO-ENTMCNC: 31.1 G/DL (ref 31.5–35.7)
MCV RBC AUTO: 92.1 FL (ref 79–97)
MONOCYTES # BLD AUTO: 0.67 10*3/MM3 (ref 0.1–0.9)
MONOCYTES NFR BLD AUTO: 5.4 % (ref 5–12)
NEUTROPHILS NFR BLD AUTO: 10.06 10*3/MM3 (ref 1.7–7)
NEUTROPHILS NFR BLD AUTO: 80.3 % (ref 42.7–76)
NRBC BLD AUTO-RTO: 0 /100 WBC (ref 0–0.2)
NT-PROBNP SERPL-MCNC: 348 PG/ML (ref 0–900)
PLATELET # BLD AUTO: 407 10*3/MM3 (ref 140–450)
PMV BLD AUTO: 9.9 FL (ref 6–12)
POTASSIUM SERPL-SCNC: 4.4 MMOL/L (ref 3.5–5.2)
PROT SERPL-MCNC: 7.2 G/DL (ref 6–8.5)
PROTHROMBIN TIME: 12 SECONDS (ref 9.6–11.7)
RBC # BLD AUTO: 4.33 10*6/MM3 (ref 3.77–5.28)
RHINOVIRUS RNA SPEC NAA+PROBE: NOT DETECTED
RSV RNA NPH QL NAA+NON-PROBE: NOT DETECTED
SARS-COV-2 RNA NPH QL NAA+NON-PROBE: NOT DETECTED
SODIUM SERPL-SCNC: 141 MMOL/L (ref 136–145)
TROPONIN T DELTA: 1 NG/L
TROPONIN T SERPL HS-MCNC: 9 NG/L
WBC NRBC COR # BLD AUTO: 12.52 10*3/MM3 (ref 3.4–10.8)
WHOLE BLOOD HOLD COAG: NORMAL
WHOLE BLOOD HOLD SPECIMEN: NORMAL

## 2024-07-27 PROCEDURE — 94640 AIRWAY INHALATION TREATMENT: CPT

## 2024-07-27 PROCEDURE — 93005 ELECTROCARDIOGRAM TRACING: CPT

## 2024-07-27 PROCEDURE — 85610 PROTHROMBIN TIME: CPT | Performed by: EMERGENCY MEDICINE

## 2024-07-27 PROCEDURE — 96374 THER/PROPH/DIAG INJ IV PUSH: CPT

## 2024-07-27 PROCEDURE — 0202U NFCT DS 22 TRGT SARS-COV-2: CPT | Performed by: EMERGENCY MEDICINE

## 2024-07-27 PROCEDURE — 99285 EMERGENCY DEPT VISIT HI MDM: CPT

## 2024-07-27 PROCEDURE — 94799 UNLISTED PULMONARY SVC/PX: CPT

## 2024-07-27 PROCEDURE — 93005 ELECTROCARDIOGRAM TRACING: CPT | Performed by: EMERGENCY MEDICINE

## 2024-07-27 PROCEDURE — 71045 X-RAY EXAM CHEST 1 VIEW: CPT

## 2024-07-27 PROCEDURE — 36415 COLL VENOUS BLD VENIPUNCTURE: CPT

## 2024-07-27 PROCEDURE — 85379 FIBRIN DEGRADATION QUANT: CPT | Performed by: EMERGENCY MEDICINE

## 2024-07-27 PROCEDURE — 85730 THROMBOPLASTIN TIME PARTIAL: CPT | Performed by: EMERGENCY MEDICINE

## 2024-07-27 PROCEDURE — 84484 ASSAY OF TROPONIN QUANT: CPT | Performed by: EMERGENCY MEDICINE

## 2024-07-27 PROCEDURE — 25010000002 METHYLPREDNISOLONE PER 125 MG: Performed by: EMERGENCY MEDICINE

## 2024-07-27 PROCEDURE — 80053 COMPREHEN METABOLIC PANEL: CPT | Performed by: EMERGENCY MEDICINE

## 2024-07-27 PROCEDURE — 85025 COMPLETE CBC W/AUTO DIFF WBC: CPT | Performed by: EMERGENCY MEDICINE

## 2024-07-27 PROCEDURE — 83880 ASSAY OF NATRIURETIC PEPTIDE: CPT | Performed by: EMERGENCY MEDICINE

## 2024-07-27 RX ORDER — SODIUM CHLORIDE 0.9 % (FLUSH) 0.9 %
10 SYRINGE (ML) INJECTION AS NEEDED
Status: DISCONTINUED | OUTPATIENT
Start: 2024-07-27 | End: 2024-07-28 | Stop reason: HOSPADM

## 2024-07-27 RX ORDER — IPRATROPIUM BROMIDE AND ALBUTEROL SULFATE 2.5; .5 MG/3ML; MG/3ML
3 SOLUTION RESPIRATORY (INHALATION) ONCE
Status: COMPLETED | OUTPATIENT
Start: 2024-07-27 | End: 2024-07-27

## 2024-07-27 RX ORDER — METHYLPREDNISOLONE SODIUM SUCCINATE 125 MG/2ML
125 INJECTION, POWDER, LYOPHILIZED, FOR SOLUTION INTRAMUSCULAR; INTRAVENOUS ONCE
Status: COMPLETED | OUTPATIENT
Start: 2024-07-27 | End: 2024-07-27

## 2024-07-27 RX ADMIN — IPRATROPIUM BROMIDE AND ALBUTEROL SULFATE 3 ML: .5; 3 SOLUTION RESPIRATORY (INHALATION) at 22:18

## 2024-07-27 RX ADMIN — METHYLPREDNISOLONE SODIUM SUCCINATE 125 MG: 125 INJECTION, POWDER, FOR SOLUTION INTRAMUSCULAR; INTRAVENOUS at 22:28

## 2024-07-28 ENCOUNTER — READMISSION MANAGEMENT (OUTPATIENT)
Dept: CALL CENTER | Facility: HOSPITAL | Age: 69
End: 2024-07-28
Payer: MEDICARE

## 2024-07-28 VITALS
BODY MASS INDEX: 26.56 KG/M2 | OXYGEN SATURATION: 95 % | HEART RATE: 106 BPM | SYSTOLIC BLOOD PRESSURE: 140 MMHG | HEIGHT: 63 IN | RESPIRATION RATE: 29 BRPM | WEIGHT: 149.91 LBS | TEMPERATURE: 97.8 F | DIASTOLIC BLOOD PRESSURE: 78 MMHG

## 2024-07-28 LAB
ANION GAP SERPL CALCULATED.3IONS-SCNC: 12.9 MMOL/L (ref 5–15)
BUN SERPL-MCNC: 14 MG/DL (ref 8–23)
BUN/CREAT SERPL: 14.4 (ref 7–25)
CALCIUM SPEC-SCNC: 9.2 MG/DL (ref 8.6–10.5)
CHLORIDE SERPL-SCNC: 102 MMOL/L (ref 98–107)
CO2 SERPL-SCNC: 25.1 MMOL/L (ref 22–29)
CREAT SERPL-MCNC: 0.97 MG/DL (ref 0.57–1)
DEPRECATED RDW RBC AUTO: 49.4 FL (ref 37–54)
EGFRCR SERPLBLD CKD-EPI 2021: 64.2 ML/MIN/1.73
ERYTHROCYTE [DISTWIDTH] IN BLOOD BY AUTOMATED COUNT: 14.6 % (ref 12.3–15.4)
GLUCOSE SERPL-MCNC: 172 MG/DL (ref 65–99)
HCT VFR BLD AUTO: 37.4 % (ref 34–46.6)
HGB BLD-MCNC: 11.5 G/DL (ref 12–15.9)
MCH RBC QN AUTO: 28.3 PG (ref 26.6–33)
MCHC RBC AUTO-ENTMCNC: 30.7 G/DL (ref 31.5–35.7)
MCV RBC AUTO: 92.1 FL (ref 79–97)
PLATELET # BLD AUTO: 328 10*3/MM3 (ref 140–450)
PMV BLD AUTO: 10.1 FL (ref 6–12)
POTASSIUM SERPL-SCNC: 3.5 MMOL/L (ref 3.5–5.2)
QT INTERVAL: 401 MS
QTC INTERVAL: 492 MS
RBC # BLD AUTO: 4.06 10*6/MM3 (ref 3.77–5.28)
SODIUM SERPL-SCNC: 140 MMOL/L (ref 136–145)
TROPONIN T SERPL HS-MCNC: 8 NG/L
WBC NRBC COR # BLD AUTO: 9.73 10*3/MM3 (ref 3.4–10.8)

## 2024-07-28 PROCEDURE — G0378 HOSPITAL OBSERVATION PER HR: HCPCS

## 2024-07-28 PROCEDURE — 80048 BASIC METABOLIC PNL TOTAL CA: CPT | Performed by: EMERGENCY MEDICINE

## 2024-07-28 PROCEDURE — 94664 DEMO&/EVAL PT USE INHALER: CPT

## 2024-07-28 PROCEDURE — 94761 N-INVAS EAR/PLS OXIMETRY MLT: CPT

## 2024-07-28 PROCEDURE — 25010000002 METHYLPREDNISOLONE PER 40 MG: Performed by: EMERGENCY MEDICINE

## 2024-07-28 PROCEDURE — 85027 COMPLETE CBC AUTOMATED: CPT | Performed by: EMERGENCY MEDICINE

## 2024-07-28 PROCEDURE — 84484 ASSAY OF TROPONIN QUANT: CPT | Performed by: EMERGENCY MEDICINE

## 2024-07-28 PROCEDURE — 94799 UNLISTED PULMONARY SVC/PX: CPT

## 2024-07-28 PROCEDURE — 96376 TX/PRO/DX INJ SAME DRUG ADON: CPT

## 2024-07-28 PROCEDURE — 25010000002 CEFTRIAXONE PER 250 MG: Performed by: EMERGENCY MEDICINE

## 2024-07-28 RX ORDER — METHYLPREDNISOLONE SODIUM SUCCINATE 40 MG/ML
0.5 INJECTION, POWDER, LYOPHILIZED, FOR SOLUTION INTRAMUSCULAR; INTRAVENOUS EVERY 12 HOURS
Status: DISCONTINUED | OUTPATIENT
Start: 2024-07-28 | End: 2024-07-28 | Stop reason: HOSPADM

## 2024-07-28 RX ORDER — METHOCARBAMOL 500 MG/1
500 TABLET, FILM COATED ORAL DAILY PRN
COMMUNITY

## 2024-07-28 RX ORDER — PANTOPRAZOLE SODIUM 40 MG/1
40 TABLET, DELAYED RELEASE ORAL 2 TIMES DAILY
Status: DISCONTINUED | OUTPATIENT
Start: 2024-07-28 | End: 2024-07-28 | Stop reason: HOSPADM

## 2024-07-28 RX ORDER — BISACODYL 10 MG
10 SUPPOSITORY, RECTAL RECTAL DAILY PRN
Status: DISCONTINUED | OUTPATIENT
Start: 2024-07-28 | End: 2024-07-28 | Stop reason: HOSPADM

## 2024-07-28 RX ORDER — BUDESONIDE, GLYCOPYRROLATE, AND FORMOTEROL FUMARATE 160; 9; 4.8 UG/1; UG/1; UG/1
2 AEROSOL, METERED RESPIRATORY (INHALATION) 2 TIMES DAILY
COMMUNITY

## 2024-07-28 RX ORDER — LISINOPRIL 5 MG/1
5 TABLET ORAL DAILY
Status: DISCONTINUED | OUTPATIENT
Start: 2024-07-28 | End: 2024-07-28 | Stop reason: HOSPADM

## 2024-07-28 RX ORDER — IPRATROPIUM BROMIDE AND ALBUTEROL SULFATE 2.5; .5 MG/3ML; MG/3ML
3 SOLUTION RESPIRATORY (INHALATION) EVERY 4 HOURS PRN
COMMUNITY

## 2024-07-28 RX ORDER — AMOXICILLIN 250 MG
2 CAPSULE ORAL 2 TIMES DAILY PRN
Status: DISCONTINUED | OUTPATIENT
Start: 2024-07-28 | End: 2024-07-28 | Stop reason: HOSPADM

## 2024-07-28 RX ORDER — PREDNISONE 10 MG/1
10 TABLET ORAL DAILY
Qty: 21 TABLET | Refills: 0 | Status: SHIPPED | OUTPATIENT
Start: 2024-07-28

## 2024-07-28 RX ORDER — ROFLUMILAST 500 UG/1
500 TABLET ORAL NIGHTLY
Status: DISCONTINUED | OUTPATIENT
Start: 2024-07-28 | End: 2024-07-28 | Stop reason: HOSPADM

## 2024-07-28 RX ORDER — SODIUM CHLORIDE 0.9 % (FLUSH) 0.9 %
10 SYRINGE (ML) INJECTION EVERY 12 HOURS SCHEDULED
Status: DISCONTINUED | OUTPATIENT
Start: 2024-07-28 | End: 2024-07-28 | Stop reason: HOSPADM

## 2024-07-28 RX ORDER — IPRATROPIUM BROMIDE AND ALBUTEROL SULFATE 2.5; .5 MG/3ML; MG/3ML
3 SOLUTION RESPIRATORY (INHALATION)
Status: DISCONTINUED | OUTPATIENT
Start: 2024-07-28 | End: 2024-07-28 | Stop reason: HOSPADM

## 2024-07-28 RX ORDER — PREDNISONE 10 MG/1
10 TABLET ORAL DAILY
COMMUNITY

## 2024-07-28 RX ORDER — ZOLPIDEM TARTRATE 5 MG/1
5 TABLET ORAL NIGHTLY PRN
Status: DISCONTINUED | OUTPATIENT
Start: 2024-07-28 | End: 2024-07-28 | Stop reason: HOSPADM

## 2024-07-28 RX ORDER — METHOCARBAMOL 500 MG/1
500 TABLET, FILM COATED ORAL DAILY PRN
Status: DISCONTINUED | OUTPATIENT
Start: 2024-07-28 | End: 2024-07-28 | Stop reason: HOSPADM

## 2024-07-28 RX ORDER — ALUMINA, MAGNESIA, AND SIMETHICONE 2400; 2400; 240 MG/30ML; MG/30ML; MG/30ML
15 SUSPENSION ORAL EVERY 6 HOURS PRN
Status: DISCONTINUED | OUTPATIENT
Start: 2024-07-28 | End: 2024-07-28 | Stop reason: HOSPADM

## 2024-07-28 RX ORDER — SODIUM CHLORIDE 9 MG/ML
40 INJECTION, SOLUTION INTRAVENOUS AS NEEDED
Status: DISCONTINUED | OUTPATIENT
Start: 2024-07-28 | End: 2024-07-28 | Stop reason: HOSPADM

## 2024-07-28 RX ORDER — POLYETHYLENE GLYCOL 3350 17 G/17G
17 POWDER, FOR SOLUTION ORAL DAILY PRN
Status: DISCONTINUED | OUTPATIENT
Start: 2024-07-28 | End: 2024-07-28 | Stop reason: HOSPADM

## 2024-07-28 RX ORDER — ONDANSETRON 4 MG/1
4 TABLET, ORALLY DISINTEGRATING ORAL EVERY 6 HOURS PRN
Status: DISCONTINUED | OUTPATIENT
Start: 2024-07-28 | End: 2024-07-28 | Stop reason: HOSPADM

## 2024-07-28 RX ORDER — GUAIFENESIN 600 MG/1
1200 TABLET, EXTENDED RELEASE ORAL 2 TIMES DAILY
Qty: 15 TABLET | Refills: 0 | Status: SHIPPED | OUTPATIENT
Start: 2024-07-28

## 2024-07-28 RX ORDER — AZITHROMYCIN 250 MG/1
250 TABLET, FILM COATED ORAL 3 TIMES WEEKLY
COMMUNITY

## 2024-07-28 RX ORDER — CARVEDILOL 3.12 MG/1
3.12 TABLET ORAL 2 TIMES DAILY PRN
COMMUNITY

## 2024-07-28 RX ORDER — BISACODYL 5 MG/1
5 TABLET, DELAYED RELEASE ORAL DAILY PRN
Status: DISCONTINUED | OUTPATIENT
Start: 2024-07-28 | End: 2024-07-28 | Stop reason: HOSPADM

## 2024-07-28 RX ORDER — AMOXICILLIN AND CLAVULANATE POTASSIUM 875; 125 MG/1; MG/1
1 TABLET, FILM COATED ORAL 2 TIMES DAILY
COMMUNITY
Start: 2024-07-26 | End: 2024-08-02

## 2024-07-28 RX ORDER — ONDANSETRON 2 MG/ML
4 INJECTION INTRAMUSCULAR; INTRAVENOUS EVERY 6 HOURS PRN
Status: DISCONTINUED | OUTPATIENT
Start: 2024-07-28 | End: 2024-07-28 | Stop reason: HOSPADM

## 2024-07-28 RX ORDER — SODIUM CHLORIDE 0.9 % (FLUSH) 0.9 %
10 SYRINGE (ML) INJECTION AS NEEDED
Status: DISCONTINUED | OUTPATIENT
Start: 2024-07-28 | End: 2024-07-28 | Stop reason: HOSPADM

## 2024-07-28 RX ADMIN — IPRATROPIUM BROMIDE AND ALBUTEROL SULFATE 3 ML: .5; 3 SOLUTION RESPIRATORY (INHALATION) at 11:30

## 2024-07-28 RX ADMIN — APIXABAN 5 MG: 5 TABLET, FILM COATED ORAL at 08:35

## 2024-07-28 RX ADMIN — Medication 10 ML: at 08:35

## 2024-07-28 RX ADMIN — METHYLPREDNISOLONE SODIUM SUCCINATE 34.8 MG: 40 INJECTION, POWDER, FOR SOLUTION INTRAMUSCULAR; INTRAVENOUS at 09:49

## 2024-07-28 RX ADMIN — CEFTRIAXONE 2000 MG: 2 INJECTION, POWDER, FOR SOLUTION INTRAMUSCULAR; INTRAVENOUS at 01:11

## 2024-07-28 RX ADMIN — LISINOPRIL 5 MG: 5 TABLET ORAL at 08:35

## 2024-07-28 RX ADMIN — PANTOPRAZOLE SODIUM 40 MG: 40 TABLET, DELAYED RELEASE ORAL at 08:35

## 2024-07-28 NOTE — CASE MANAGEMENT/SOCIAL WORK
Case Management Discharge Note      Final Note: Routine home.       Selected Continued Care - Discharged on 7/28/2024 Admission date: 7/27/2024 - Discharge disposition: Home or Self Care       Transportation Services  Private: Car    Final Discharge Disposition Code: 01 - home or self-care

## 2024-07-28 NOTE — ED PROVIDER NOTES
Subjective   History of Present Illness  Chief complaint: Patient is a 68-year-old who presents to the emergency department short of breath.  She states she has had increasing cough and shortness of breath.  She is really bad with her shortness of breath with any exertion or movement.  Her heart rate increases and she gets palpitations.  No chest pain.  No fever.  She is coughing up some mucus.  She has chronic edema of her lower extremities nothing new.    Context:    Duration:    Timing:    Severity:    Associated Symptoms:        PCP:  LMP:      Review of Systems   Constitutional:  Positive for fatigue. Negative for fever.   HENT:  Positive for congestion.    Respiratory:  Positive for cough and shortness of breath.    Cardiovascular:  Positive for leg swelling. Negative for chest pain.   Gastrointestinal:  Negative for abdominal pain.   Genitourinary: Negative.    Musculoskeletal: Negative.        Past Medical History:   Diagnosis Date   • Arthritis    • Bladder cancer    • Chronic back pain    • Closed nondisplaced fracture of head of right radius 03/2017   • Congenital deformity of right hip joint 1956   • COPD (chronic obstructive pulmonary disease)     former smoker   • Deep venous thrombosis     unprovoked   • Depressive disorder    • Disease of thyroid gland    • Diverticulitis of colon    • Essential hypertension    • Gastroesophageal reflux disease    • Insomnia    • Obesity    • Pulmonary embolism     provoked by surgery       No Known Allergies    Past Surgical History:   Procedure Laterality Date   • ABDOMINAL SURGERY     • BRONCHOSCOPY N/A 11/25/2020    Procedure: BRONCHOSCOPY with bronchial washing;  Surgeon: Win Johnston MD;  Location: [facility] ENDOSCOPY;  Service: Pulmonary;  Laterality: N/A;  Post: mucous plugs   • BRONCHOSCOPY N/A 4/25/2022    Procedure: BRONCHOSCOPY with bronchial washing;  Surgeon: Win Johnston MD;  Location: [facility] ENDOSCOPY;  Service: Pulmonary;  Laterality: N/A;   post op: pneumonia   • BRONCHOSCOPY N/A 2023    Procedure: BRONCHOSCOPY with bilateral wash;  Surgeon: Win Johnston MD;  Location: [facility] ENDOSCOPY;  Service: Pulmonary;  Laterality: N/A;  mucous plugs   • CHOLECYSTECTOMY     • COLON SURGERY     • COLONOSCOPY     • COLOSTOMY CLOSURE N/A 2021    Procedure: COLOSTOMY TAKEDOWN OR CLOSURE, extenisve lysis of adhesions;  Surgeon: Chi Farmer MD;  Location: [facility] MAIN OR;  Service: General;  Laterality: N/A;   • CYSTOSCOPY N/A 2021    Procedure: CYSTOSCOPY with bladder tumor removal and fulgeration, insertion of 3-way rizzo catheter;  Surgeon: Alfonzo Hayward MD;  Location: [facility] MAIN OR;  Service: Urology;  Laterality: N/A;   • ENDOSCOPY     • EXPLORATORY LAPAROTOMY N/A 2020    Procedure: LAPAROTOMY EXPLORATORY HARTMANS;  Surgeon: Chi Farmer MD;  Location: [facility] MAIN OR;  Service: General;  Laterality: N/A;   • HYSTERECTOMY     • TOTAL HIP ARTHROPLASTY Right    • TOTAL HIP ARTHROPLASTY REVISION Right     x3       Family History   Problem Relation Age of Onset   • No Known Problems Mother    • No Known Problems Father        Social History     Socioeconomic History   • Marital status:    Tobacco Use   • Smoking status: Former     Current packs/day: 0.00     Average packs/day: 2.0 packs/day for 40.0 years (80.0 ttl pk-yrs)     Types: Cigarettes     Start date:      Quit date: 2019     Years since quittin.5   • Smokeless tobacco: Never   • Tobacco comments:     ELECTRONIC   Vaping Use   • Vaping status: Former   • Substances: Nicotine, Flavoring   Substance and Sexual Activity   • Alcohol use: Never   • Drug use: Never   • Sexual activity: Defer           Objective   Physical Exam  Vitals and nursing note reviewed.   Constitutional:       Appearance: She is ill-appearing.   HENT:      Head: Normocephalic and atraumatic.   Cardiovascular:      Rate and Rhythm: Normal rate and regular rhythm.   Pulmonary:       Breath sounds: Decreased breath sounds, wheezing and rhonchi present.   Musculoskeletal:      Right lower leg: No tenderness.      Left lower leg: No tenderness.   Neurological:      General: No focal deficit present.      Mental Status: She is alert and oriented to person, place, and time.   Psychiatric:         Mood and Affect: Mood normal.         Behavior: Behavior normal.         Procedures           ED Course  ED Course as of 07/28/24 0027   Sun Jul 28, 2024   0027 Patient refuses heart healthy diet.  She wants full regular diet. [LH]      ED Course User Index  [LH] Clement Griffith,                                    Results for orders placed or performed during the hospital encounter of 07/27/24   Respiratory Panel PCR w/COVID-19(SARS-CoV-2) ONOFRE/GABRIELLE/PIPO/PAD/COR/STEVE In-House, NP Swab in UTM/VTM, 2 HR TAT - Swab, Nasopharynx    Specimen: Nasopharynx; Swab   Result Value Ref Range    ADENOVIRUS, PCR Not Detected Not Detected    Coronavirus 229E Not Detected Not Detected    Coronavirus HKU1 Not Detected Not Detected    Coronavirus NL63 Not Detected Not Detected    Coronavirus OC43 Not Detected Not Detected    COVID19 Not Detected Not Detected - Ref. Range    Human Metapneumovirus Not Detected Not Detected    Human Rhinovirus/Enterovirus Not Detected Not Detected    Influenza A PCR Not Detected Not Detected    Influenza B PCR Not Detected Not Detected    Parainfluenza Virus 1 Not Detected Not Detected    Parainfluenza Virus 2 Not Detected Not Detected    Parainfluenza Virus 3 Not Detected Not Detected    Parainfluenza Virus 4 Detected (A) Not Detected    RSV, PCR Not Detected Not Detected    Bordetella pertussis pcr Not Detected Not Detected    Bordetella parapertussis PCR Not Detected Not Detected    Chlamydophila pneumoniae PCR Not Detected Not Detected    Mycoplasma pneumo by PCR Not Detected Not Detected   Comprehensive Metabolic Panel    Specimen: Blood   Result Value Ref Range    Glucose 114 (H)  65 - 99 mg/dL    BUN 12 8 - 23 mg/dL    Creatinine 1.13 (H) 0.57 - 1.00 mg/dL    Sodium 141 136 - 145 mmol/L    Potassium 4.4 3.5 - 5.2 mmol/L    Chloride 99 98 - 107 mmol/L    CO2 28.3 22.0 - 29.0 mmol/L    Calcium 9.7 8.6 - 10.5 mg/dL    Total Protein 7.2 6.0 - 8.5 g/dL    Albumin 4.7 3.5 - 5.2 g/dL    ALT (SGPT) 15 1 - 33 U/L    AST (SGOT) 31 1 - 32 U/L    Alkaline Phosphatase 94 39 - 117 U/L    Total Bilirubin 0.4 0.0 - 1.2 mg/dL    Globulin 2.5 gm/dL    A/G Ratio 1.9 g/dL    BUN/Creatinine Ratio 10.6 7.0 - 25.0    Anion Gap 13.7 5.0 - 15.0 mmol/L    eGFR 53.4 (L) >60.0 mL/min/1.73   Protime-INR    Specimen: Blood   Result Value Ref Range    Protime 12.0 (H) 9.6 - 11.7 Seconds    INR 1.11 (H) 0.93 - 1.10   aPTT    Specimen: Blood   Result Value Ref Range    PTT 29.4 (L) 61.0 - 76.5 seconds   BNP    Specimen: Blood   Result Value Ref Range    proBNP 348.0 0.0 - 900.0 pg/mL   High Sensitivity Troponin T    Specimen: Blood   Result Value Ref Range    HS Troponin T 9 <14 ng/L   CBC Auto Differential    Specimen: Blood   Result Value Ref Range    WBC 12.52 (H) 3.40 - 10.80 10*3/mm3    RBC 4.33 3.77 - 5.28 10*6/mm3    Hemoglobin 12.4 12.0 - 15.9 g/dL    Hematocrit 39.9 34.0 - 46.6 %    MCV 92.1 79.0 - 97.0 fL    MCH 28.6 26.6 - 33.0 pg    MCHC 31.1 (L) 31.5 - 35.7 g/dL    RDW 14.6 12.3 - 15.4 %    RDW-SD 49.4 37.0 - 54.0 fl    MPV 9.9 6.0 - 12.0 fL    Platelets 407 140 - 450 10*3/mm3    Neutrophil % 80.3 (H) 42.7 - 76.0 %    Lymphocyte % 13.3 (L) 19.6 - 45.3 %    Monocyte % 5.4 5.0 - 12.0 %    Eosinophil % 0.0 (L) 0.3 - 6.2 %    Basophil % 0.2 0.0 - 1.5 %    Immature Grans % 0.8 (H) 0.0 - 0.5 %    Neutrophils, Absolute 10.06 (H) 1.70 - 7.00 10*3/mm3    Lymphocytes, Absolute 1.66 0.70 - 3.10 10*3/mm3    Monocytes, Absolute 0.67 0.10 - 0.90 10*3/mm3    Eosinophils, Absolute 0.00 0.00 - 0.40 10*3/mm3    Basophils, Absolute 0.03 0.00 - 0.20 10*3/mm3    Immature Grans, Absolute 0.10 (H) 0.00 - 0.05 10*3/mm3    nRBC 0.0 0.0  - 0.2 /100 WBC   High Sensitivity Troponin T 2Hr    Specimen: Blood   Result Value Ref Range    HS Troponin T 10 <14 ng/L    Troponin T Delta 1 >=-4 - <+4 ng/L   D-dimer, Quantitative    Specimen: Blood   Result Value Ref Range    D-Dimer, Quantitative 0.30 0.00 - 0.67 mg/L (FEU)   ECG 12 Lead Dyspnea   Result Value Ref Range    QT Interval 401 ms    QTC Interval 492 ms   Green Top (Gel)   Result Value Ref Range    Extra Tube Hold for add-ons.    Lavender Top   Result Value Ref Range    Extra Tube hold for add-on    Gold Top - SST   Result Value Ref Range    Extra Tube Hold for add-ons.    Light Blue Top   Result Value Ref Range    Extra Tube Hold for add-ons.      XR Chest 1 View    Result Date: 7/27/2024  Impression: 1.No acute cardiopulmonary abnormality. 2.Emphysema and bibasilar scarring. Electronically Signed: Zain Mays MD  7/27/2024 10:52 PM EDT  Workstation ID: [id number]             Medical Decision Making  Patient was seen and evaluated for the above problem    Differential diagnosis includes was not limited to COPD exacerbation, pneumonia, COVID, pneumothorax, PE, ACS    Patient's initial EKG normal sinus rhythm rate of 90 nonspecific T wave changes.  Troponin is normal.  D-dimer is normal.  Chest x-ray shows no acute changes.  There is emphysema and bibasilar scarring.  She is less likely to have PE I think.  She has rhonchi and has other reasons with COPD to have the symptoms that she is having.  She tested positive for parainfluenza virus as well.  Patient's oxygen has been weaned down through her stay.  However she still remains significantly tight on reexam.  I discussed observation stay and pulmonary consultation.  She verbalizes understanding is okay with that.    Amount and/or Complexity of Data Reviewed  Labs: ordered. Decision-making details documented in ED Course.     Details: Labs reviewed by myself  Radiology: ordered and independent interpretation performed.     Details: X-ray reviewed  by myself as above  ECG/medicine tests: ordered and independent interpretation performed.     Details: EKG sinus rhythm nonspecific T wave changes anterior lateral leads.    Risk  Prescription drug management.  Decision regarding hospitalization.        Final diagnoses:   None   Acute COPD exacerbation  Parainfluenza virus infection  ED Disposition  ED Disposition       None            No follow-up provider specified.       Medication List      No changes were made to your prescriptions during this visit.            Clement Griffith,   07/28/24 0005       Clement Griffith,   07/28/24 0027

## 2024-07-28 NOTE — DISCHARGE SUMMARY
[facility]    Michael Gerardo MD    CHIEF COMPLAINT:     Shortness of breath    HISTORY OF PRESENT ILLNESS:    Shortness of Breath  Pertinent negatives include no chest pain or fever.       ED  68-year-old who presents to the emergency department short of breath. She states she has had increasing cough and shortness of breath. She is really bad with her shortness of breath with any exertion or movement. Her heart rate increases and she gets palpitations. No chest pain. No fever. She is coughing up some mucus. She has chronic edema of her lower extremities nothing new.       Past Medical History:   Diagnosis Date    Arthritis     Bladder cancer     Chronic back pain     Closed nondisplaced fracture of head of right radius 03/2017    Congenital deformity of right hip joint 1956    COPD (chronic obstructive pulmonary disease)     former smoker    Deep venous thrombosis     unprovoked    Depressive disorder     Disease of thyroid gland     Diverticulitis of colon     Essential hypertension     Gastroesophageal reflux disease     Insomnia     Obesity     Pulmonary embolism     provoked by surgery     Past Surgical History:   Procedure Laterality Date    ABDOMINAL SURGERY      BRONCHOSCOPY N/A 11/25/2020    Procedure: BRONCHOSCOPY with bronchial washing;  Surgeon: Win Johnston MD;  Location: [facility] ENDOSCOPY;  Service: Pulmonary;  Laterality: N/A;  Post: mucous plugs    BRONCHOSCOPY N/A 4/25/2022    Procedure: BRONCHOSCOPY with bronchial washing;  Surgeon: Win Johnston MD;  Location: [facility] ENDOSCOPY;  Service: Pulmonary;  Laterality: N/A;  post op: pneumonia    BRONCHOSCOPY N/A 4/20/2023    Procedure: BRONCHOSCOPY with bilateral wash;  Surgeon: Win Johnston MD;  Location: [facility] ENDOSCOPY;  Service: Pulmonary;  Laterality: N/A;  mucous plugs    CHOLECYSTECTOMY      COLON SURGERY      COLONOSCOPY      COLOSTOMY CLOSURE N/A 2/25/2021    Procedure: COLOSTOMY TAKEDOWN OR CLOSURE,  extenisve lysis of adhesions;  Surgeon: Chi Farmer MD;  Location: [facility] MAIN OR;  Service: General;  Laterality: N/A;    CYSTOSCOPY N/A 2021    Procedure: CYSTOSCOPY with bladder tumor removal and fulgeration, insertion of 3-way rizzo catheter;  Surgeon: Alfonzo Hayward MD;  Location: [facility] MAIN OR;  Service: Urology;  Laterality: N/A;    ENDOSCOPY      EXPLORATORY LAPAROTOMY N/A 2020    Procedure: LAPAROTOMY EXPLORATORY HARTMANS;  Surgeon: Chi Farmer MD;  Location: [facility] MAIN OR;  Service: General;  Laterality: N/A;    HYSTERECTOMY      TOTAL HIP ARTHROPLASTY Right     TOTAL HIP ARTHROPLASTY REVISION Right     x3     Family History   Problem Relation Age of Onset    No Known Problems Mother     No Known Problems Father      Social History     Tobacco Use    Smoking status: Former     Current packs/day: 0.00     Average packs/day: 2.0 packs/day for 40.0 years (80.0 ttl pk-yrs)     Types: Cigarettes     Start date:      Quit date:      Years since quittin.5    Smokeless tobacco: Never    Tobacco comments:     ELECTRONIC   Vaping Use    Vaping status: Former    Substances: Nicotine, Flavoring   Substance Use Topics    Alcohol use: Never    Drug use: Never     Medications Prior to Admission   Medication Sig Dispense Refill Last Dose    apixaban (ELIQUIS) 5 MG tablet tablet Take 1 tablet by mouth 2 (Two) Times a Day.   2024    Budeson-Glycopyrrol-Formoterol (Breztri Aerosphere) 160-9-4.8 MCG/ACT aerosol inhaler Inhale 2 puffs 2 (Two) Times a Day.   2024    HYDROcodone-acetaminophen (NORCO)  MG per tablet Take 1 tablet by mouth 5 (Five) Times a Day As Needed for Moderate Pain.   2024    lisinopril (PRINIVIL,ZESTRIL) 5 MG tablet Take 1 tablet by mouth Daily.   2024    methocarbamol (ROBAXIN) 500 MG tablet Take 1 tablet by mouth Daily As Needed for Muscle Spasms.   2024    pantoprazole (PROTONIX) 40 MG EC tablet Take 1 tablet by mouth 2  (Two) Times a Day.   7/27/2024    predniSONE (DELTASONE) 10 MG tablet Take 1 tablet by mouth Daily.   Past Week    roflumilast (DALIRESP) 500 MCG tablet tablet Take 1 tablet by mouth Every Night.   7/27/2024    albuterol (PROVENTIL) (2.5 MG/3ML) 0.083% nebulizer solution Take 2.5 mg by nebulization Every 6 (Six) Hours As Needed for Wheezing.       amoxicillin-clavulanate (AUGMENTIN) 875-125 MG per tablet Take 1 tablet by mouth 2 (Two) Times a Day.       azithromycin (ZITHROMAX) 250 MG tablet Take 1 tablet by mouth 3 (Three) Times a Week. Monday, Wednesday, Friday 7/26/2024    carvedilol (COREG) 3.125 MG tablet Take 1 tablet by mouth 2 (Two) Times a Day As Needed.       furosemide (LASIX) 20 MG tablet Take 1 tablet by mouth 2 (Two) Times a Day As Needed.       ipratropium (ATROVENT) 0.02 % nebulizer solution Take 2.5 mL by nebulization Every 6 (Six) Hours As Needed for Wheezing or Shortness of Air.       ipratropium-albuterol (DUO-NEB) 0.5-2.5 mg/3 ml nebulizer Take 3 mL by nebulization Every 4 (Four) Hours As Needed for Wheezing.       potassium chloride 10 MEQ CR tablet Take 1 tablet by mouth 2 (Two) Times a Day As Needed (take with furosemide PRN).       zolpidem (AMBIEN) 5 MG tablet Take 1 tablet by mouth At Night As Needed.        Allergies:  Patient has no known allergies.    Immunization History   Administered Date(s) Administered    COVID-19 (MYRIAM) 03/12/2021    COVID-19 (MODERNA) 1st,2nd,3rd Dose Monovalent 08/09/2022    COVID-19 (MODERNA) Monovalent Original Booster 01/26/2022    COVID-19 F23 (PFIZER) 12YRS+ (COMIRNATY) 12/13/2023    Flu Vaccine Split Quad 10/26/2021    Influenza, Unspecified 09/26/2018, 12/03/2020    flucelvax quad pfs =>4 YRS 10/26/2021           REVIEW OF SYSTEMS:    Review of Systems   Constitutional: Negative for fever.   Cardiovascular:  Negative for chest pain.   Respiratory:  Positive for cough and shortness of breath.            Vital Signs  Temp:  [97.3 °F (36.3 °C)-97.9 °F  (36.6 °C)] 97.8 °F (36.6 °C)  Heart Rate:  [] 99  Resp:  [17-22] 18  BP: (140-168)/(78-95) 140/78          Physical Exam:  Physical Exam  Constitutional:       Appearance: Normal appearance.   Cardiovascular:      Rate and Rhythm: Normal rate and regular rhythm.   Pulmonary:      Effort: Pulmonary effort is normal.      Comments: Diminished bs  Neurological:      General: No focal deficit present.      Mental Status: She is alert and oriented to person, place, and time.   Psychiatric:         Mood and Affect: Mood normal.         Behavior: Behavior normal.       Emotional Behavior:    wnl   Debilities:   None    Results Review:    I reviewed the patient's new clinical results.  Lab Results (most recent)       Procedure Component Value Units Date/Time    Basic Metabolic Panel [[id number]]  (Abnormal) Collected: 07/28/24 0316    Specimen: Blood from Arm, Left Updated: 07/28/24 0404     Glucose 172 mg/dL      BUN 14 mg/dL      Creatinine 0.97 mg/dL      Sodium 140 mmol/L      Potassium 3.5 mmol/L      Comment: Specimen hemolyzed.  Result may be falsely elevated.        Chloride 102 mmol/L      CO2 25.1 mmol/L      Calcium 9.2 mg/dL      BUN/Creatinine Ratio 14.4     Anion Gap 12.9 mmol/L      eGFR 64.2 mL/min/1.73     Narrative:      GFR Normal >60  Chronic Kidney Disease <60  Kidney Failure <15      High Sensitivity Troponin T [[id number]]  (Normal) Collected: 07/28/24 0316    Specimen: Blood from Arm, Left Updated: 07/28/24 0358     HS Troponin T 8 ng/L     Narrative:      High Sensitive Troponin T Reference Range:  <14.0 ng/L- Negative Female for AMI  <22.0 ng/L- Negative Male for AMI  >=14 - Abnormal Female indicating possible myocardial injury.  >=22 - Abnormal Male indicating possible myocardial injury.   Clinicians would have to utilize clinical acumen, EKG, Troponin, and serial changes to determine if it is an Acute Myocardial Infarction or myocardial injury due to an underlying chronic condition.          CBC (No Diff) [[id number]]  (Abnormal) Collected: 07/28/24 0316    Specimen: Blood from Arm, Left Updated: 07/28/24 0337     WBC 9.73 10*3/mm3      RBC 4.06 10*6/mm3      Hemoglobin 11.5 g/dL      Hematocrit 37.4 %      MCV 92.1 fL      MCH 28.3 pg      MCHC 30.7 g/dL      RDW 14.6 %      RDW-SD 49.4 fl      MPV 10.1 fL      Platelets 328 10*3/mm3     Respiratory Panel PCR w/COVID-19(SARS-CoV-2) ONOFRE/GABRIELLE/PIPO/PAD/COR/STEVE In-House, NP Swab in UTM/VTM, 2 HR TAT - Swab, Nasopharynx [[id number]]  (Abnormal) Collected: 07/27/24 2229    Specimen: Swab from Nasopharynx Updated: 07/27/24 2342     ADENOVIRUS, PCR Not Detected     Coronavirus 229E Not Detected     Coronavirus HKU1 Not Detected     Coronavirus NL63 Not Detected     Coronavirus OC43 Not Detected     COVID19 Not Detected     Human Metapneumovirus Not Detected     Human Rhinovirus/Enterovirus Not Detected     Influenza A PCR Not Detected     Influenza B PCR Not Detected     Parainfluenza Virus 1 Not Detected     Parainfluenza Virus 2 Not Detected     Parainfluenza Virus 3 Not Detected     Parainfluenza Virus 4 Detected     RSV, PCR Not Detected     Bordetella pertussis pcr Not Detected     Bordetella parapertussis PCR Not Detected     Chlamydophila pneumoniae PCR Not Detected     Mycoplasma pneumo by PCR Not Detected    Narrative:      In the setting of a positive respiratory panel with a viral infection PLUS a negative procalcitonin without other underlying concern for bacterial infection, consider observing off antibiotics or discontinuation of antibiotics and continue supportive care. If the respiratory panel is positive for atypical bacterial infection (Bordetella pertussis, Chlamydophila pneumoniae, or Mycoplasma pneumoniae), consider antibiotic de-escalation to target atypical bacterial infection.    High Sensitivity Troponin T 2Hr [[id number]]  (Normal) Collected: 07/27/24 2312    Specimen: Blood Updated: 07/27/24 2338     HS Troponin T 10 ng/L      Troponin T  "Delta 1 ng/L     Narrative:      High Sensitive Troponin T Reference Range:  <14.0 ng/L- Negative Female for AMI  <22.0 ng/L- Negative Male for AMI  >=14 - Abnormal Female indicating possible myocardial injury.  >=22 - Abnormal Male indicating possible myocardial injury.   Clinicians would have to utilize clinical acumen, EKG, Troponin, and serial changes to determine if it is an Acute Myocardial Infarction or myocardial injury due to an underlying chronic condition.         D-dimer, Quantitative [[id number]]  (Normal) Collected: 07/27/24 2101    Specimen: Blood Updated: 07/27/24 2313     D-Dimer, Quantitative 0.30 mg/L (FEU)     Narrative:      According to the assay 's published package insert, a normal (<0.50 mg/L (FEU)) D-dimer result in conjunction with a non-high clinical probability assessment, excludes deep vein thrombosis (DVT) and pulmonary embolism (PE) with high sensitivity.    D-dimer values increase with age and this can make VTE exclusion of an older population difficult. To address this, the [employer], based on best available evidence and recent guidelines, recommends that clinicians use age-adjusted D-dimer thresholds in patients greater than 50 years of age with: a) a low probability of PE who do not meet all Pulmonary Embolism Rule Out Criteria, or b) in those with intermediate probability of PE.   The formula for an age-adjusted D-dimer cut-off is \"age/100\".  For example, a 60 year old patient would have an age-adjusted cut-off of 0.60 mg/L (FEU) and an 80 year old 0.80 mg/L (FEU).    Comprehensive Metabolic Panel [[id number]]  (Abnormal) Collected: 07/27/24 2101    Specimen: Blood Updated: 07/27/24 2235     Glucose 114 mg/dL      BUN 12 mg/dL      Creatinine 1.13 mg/dL      Sodium 141 mmol/L      Potassium 4.4 mmol/L      Comment: Slight hemolysis detected by analyzer. Result may be falsely elevated.        Chloride 99 mmol/L      CO2 28.3 mmol/L      Calcium 9.7 " mg/dL      Total Protein 7.2 g/dL      Albumin 4.7 g/dL      ALT (SGPT) 15 U/L      AST (SGOT) 31 U/L      Comment: Slight hemolysis detected by analyzer. Result may be falsely elevated.        Alkaline Phosphatase 94 U/L      Total Bilirubin 0.4 mg/dL      Globulin 2.5 gm/dL      A/G Ratio 1.9 g/dL      BUN/Creatinine Ratio 10.6     Anion Gap 13.7 mmol/L      eGFR 53.4 mL/min/1.73     Narrative:      GFR Normal >60  Chronic Kidney Disease <60  Kidney Failure <15      BNP [[id number]]  (Normal) Collected: 07/27/24 2101    Specimen: Blood Updated: 07/27/24 2227     proBNP 348.0 pg/mL     Narrative:      This assay is used as an aid in the diagnosis of individuals suspected of having heart failure. It can be used as an aid in the diagnosis of acute decompensated heart failure (ADHF) in patients presenting with signs and symptoms of ADHF to the emergency department (ED). In addition, NT-proBNP of <300 pg/mL indicates ADHF is not likely.    Age Range Result Interpretation  NT-proBNP Concentration (pg/mL:      <50             Positive            >450                   Gray                 300-450                    Negative             <300    50-75           Positive            >900                  Gray                300-900                  Negative            <300      >75             Positive            >1800                  Gray                300-1800                  Negative            <300    High Sensitivity Troponin T [[id number]]  (Normal) Collected: 07/27/24 2101    Specimen: Blood Updated: 07/27/24 2227     HS Troponin T 9 ng/L     Narrative:      High Sensitive Troponin T Reference Range:  <14.0 ng/L- Negative Female for AMI  <22.0 ng/L- Negative Male for AMI  >=14 - Abnormal Female indicating possible myocardial injury.  >=22 - Abnormal Male indicating possible myocardial injury.   Clinicians would have to utilize clinical acumen, EKG, Troponin, and serial changes to determine if it is an Acute  Myocardial Infarction or myocardial injury due to an underlying chronic condition.         Protime-INR [[id number]]  (Abnormal) Collected: 07/27/24 2101    Specimen: Blood Updated: 07/27/24 2223     Protime 12.0 Seconds      INR 1.11    aPTT [[id number]]  (Abnormal) Collected: 07/27/24 2101    Specimen: Blood Updated: 07/27/24 2223     PTT 29.4 seconds     CBC & Differential [[id number]]  (Abnormal) Collected: 07/27/24 2101    Specimen: Blood Updated: 07/27/24 2212    Narrative:      The following orders were created for panel order CBC & Differential.  Procedure                               Abnormality         Status                     ---------                               -----------         ------                     CBC Auto Differential[[id number]]        Abnormal            Final result                 Please view results for these tests on the individual orders.    CBC Auto Differential [[id number]]  (Abnormal) Collected: 07/27/24 2101    Specimen: Blood Updated: 07/27/24 2212     WBC 12.52 10*3/mm3      RBC 4.33 10*6/mm3      Hemoglobin 12.4 g/dL      Hematocrit 39.9 %      MCV 92.1 fL      MCH 28.6 pg      MCHC 31.1 g/dL      RDW 14.6 %      RDW-SD 49.4 fl      MPV 9.9 fL      Platelets 407 10*3/mm3      Neutrophil % 80.3 %      Lymphocyte % 13.3 %      Monocyte % 5.4 %      Eosinophil % 0.0 %      Basophil % 0.2 %      Immature Grans % 0.8 %      Neutrophils, Absolute 10.06 10*3/mm3      Lymphocytes, Absolute 1.66 10*3/mm3      Monocytes, Absolute 0.67 10*3/mm3      Eosinophils, Absolute 0.00 10*3/mm3      Basophils, Absolute 0.03 10*3/mm3      Immature Grans, Absolute 0.10 10*3/mm3      nRBC 0.0 /100 WBC     [facility] Draw [[id number]] Collected: 07/27/24 2101    Specimen: Blood Updated: 07/27/24 2117    Narrative:      The following orders were created for panel order [facility] Draw.  Procedure                               Abnormality         Status                     ---------                                -----------         ------                     Green Top (Gel)[[id number]]                                  Final result               Lavender Top[[id number]]                                     Final result               Gold Top - SST[[id number]]                                   Final result               Light Blue Top[[id number]]                                   Final result                 Please view results for these tests on the individual orders.    Green Top (Gel) [[id number]] Collected: 07/27/24 2101    Specimen: Blood Updated: 07/27/24 2117     Extra Tube Hold for add-ons.     Comment: Auto resulted.       Gold Top - SST [[id number]] Collected: 07/27/24 2101    Specimen: Blood Updated: 07/27/24 2117     Extra Tube Hold for add-ons.     Comment: Auto resulted.       Light Blue Top [[id number]] Collected: 07/27/24 2101    Specimen: Blood Updated: 07/27/24 2117     Extra Tube Hold for add-ons.     Comment: Auto resulted       Lavender Top [[id number]] Collected: 07/27/24 2101    Specimen: Blood Updated: 07/27/24 2117     Extra Tube hold for add-on     Comment: Auto resulted               Imaging Results (Most Recent)       Procedure Component Value Units Date/Time    XR Chest 1 View [[id number]] Collected: 07/27/24 2251     Updated: 07/27/24 2254    Narrative:      XR CHEST 1 VW    Date of Exam: 7/27/2024 10:20 PM EDT    Indication: soa    Comparison: 4/26/2024 and chest CT 2 days prior.    Findings:  Stable scarring in the lung bases. No pneumothorax, pleural effusion or focal airspace consolidation. Stable apical lucency and basilar interstitial prominence compatible with known emphysema. No acute osseous abnormality. Heart size and pulmonary   vasculature appear within normal limits.      Impression:      Impression:  1.No acute cardiopulmonary abnormality.  2.Emphysema and bibasilar scarring.        Electronically Signed: Zain Mays MD    7/27/2024 10:52 PM EDT    Workstation ID: [id number]           reviewed    ECG/EMG Results (most recent)       Procedure Component Value Units Date/Time    Telemetry Scan [[id number]] Resulted: 07/27/24     Updated: 07/28/24 0003    ECG 12 Lead Dyspnea [[id number]] Collected: 07/27/24 2100     Updated: 07/28/24 0956     QT Interval 401 ms      QTC Interval 492 ms     Narrative:      HEART RATE=90  bpm  RR Asffseut=468  ms  LA Gqeqxdmc=219  ms  P Horizontal Axis=0  deg  P Front Axis=55  deg  QRSD Uuaxrhkn=989  ms  QT Llfrmrjp=935  ms  BIcQ=027  ms  QRS Axis=67  deg  T Wave Axis=71  deg  - ABNORMAL ECG -  Sinus rhythm  Nonspecific  T abnrm, anterolateral leads  Electronically Signed By: Clement Griffith (PIPO) 2024-07-28 09:56:11  Date and Time of Study:2024-07-27 21:00:52          reviewed    Results for orders placed during the hospital encounter of 06/05/23    Duplex Venous Lower Extremity - Bilateral CAR    Interpretation Summary    Normal bilateral lower extremity venous duplex scan.          Microbiology Results (last 10 days)       Procedure Component Value - Date/Time    Respiratory Panel PCR w/COVID-19(SARS-CoV-2) ONOFRE/GABRIELLE/PIPO/PAD/[facility]/STEVE In-House, NP Swab in UTM/VTM, 2 HR TAT - Swab, Nasopharynx [[id number]]  (Abnormal) Collected: 07/27/24 2229    Lab Status: Final result Specimen: Swab from Nasopharynx Updated: 07/27/24 2342     ADENOVIRUS, PCR Not Detected     Coronavirus 229E Not Detected     Coronavirus HKU1 Not Detected     Coronavirus NL63 Not Detected     Coronavirus OC43 Not Detected     COVID19 Not Detected     Human Metapneumovirus Not Detected     Human Rhinovirus/Enterovirus Not Detected     Influenza A PCR Not Detected     Influenza B PCR Not Detected     Parainfluenza Virus 1 Not Detected     Parainfluenza Virus 2 Not Detected     Parainfluenza Virus 3 Not Detected     Parainfluenza Virus 4 Detected     RSV, PCR Not Detected     Bordetella pertussis pcr Not Detected     Bordetella parapertussis PCR Not Detected     Chlamydophila pneumoniae PCR Not Detected      Mycoplasma pneumo by PCR Not Detected    Narrative:      In the setting of a positive respiratory panel with a viral infection PLUS a negative procalcitonin without other underlying concern for bacterial infection, consider observing off antibiotics or discontinuation of antibiotics and continue supportive care. If the respiratory panel is positive for atypical bacterial infection (Bordetella pertussis, Chlamydophila pneumoniae, or Mycoplasma pneumoniae), consider antibiotic de-escalation to target atypical bacterial infection.            Assessment & Plan     COPD exacerbation     Copd exacerbation  - wbc 12  - creatinine is near baseline  - bnp 348  - ddimer .3   - rpp +for parainfluenza virus  - chest xray no cardiopulmonary abnormality. Emphysema and bibasilar scarring  - EKG sinus 90   - steroids, bronchodilators, mucinex  - will discharge with same and follow up with pulm      I discussed the patients findings and my recommendations with patient and nursing staff.     Discharge Diagnosis:      COPD exacerbation      Hospital Course  Patient is a 68 y.o. female presented with cough and dyspnea. Er evaluated and admitted to observation. Rpp was + for parafluenza. Wbc slightly elevated but pt receiving steroids.  Bnp, ddimer unremarkable, creatinine is near baseline. Chest xray showing chronic issues. EKG sinus rate 90. Will discharge with sterids, duonebs, mucinex and follow up with pulm as outpt. Pt feels much better and requesting to go home. Discharge discussed with pt and she is agreeable to plan. Instructed pt to return to er if symptoms reoccur or worsen.      Past Medical History:     Past Medical History:   Diagnosis Date    Arthritis     Bladder cancer     Chronic back pain     Closed nondisplaced fracture of head of right radius 03/2017    Congenital deformity of right hip joint 1956    COPD (chronic obstructive pulmonary disease)     former smoker    Deep venous thrombosis     unprovoked     Depressive disorder     Disease of thyroid gland     Diverticulitis of colon     Essential hypertension     Gastroesophageal reflux disease     Insomnia     Obesity     Pulmonary embolism     provoked by surgery       Past Surgical History:     Past Surgical History:   Procedure Laterality Date    ABDOMINAL SURGERY      BRONCHOSCOPY N/A 11/25/2020    Procedure: BRONCHOSCOPY with bronchial washing;  Surgeon: Win Johnston MD;  Location: [facility] ENDOSCOPY;  Service: Pulmonary;  Laterality: N/A;  Post: mucous plugs    BRONCHOSCOPY N/A 4/25/2022    Procedure: BRONCHOSCOPY with bronchial washing;  Surgeon: Win Johnston MD;  Location: [facility] ENDOSCOPY;  Service: Pulmonary;  Laterality: N/A;  post op: pneumonia    BRONCHOSCOPY N/A 4/20/2023    Procedure: BRONCHOSCOPY with bilateral wash;  Surgeon: Win Johnston MD;  Location: [facility] ENDOSCOPY;  Service: Pulmonary;  Laterality: N/A;  mucous plugs    CHOLECYSTECTOMY      COLON SURGERY      COLONOSCOPY      COLOSTOMY CLOSURE N/A 2/25/2021    Procedure: COLOSTOMY TAKEDOWN OR CLOSURE, extenisve lysis of adhesions;  Surgeon: Chi Farmer MD;  Location: [facility] MAIN OR;  Service: General;  Laterality: N/A;    CYSTOSCOPY N/A 2/25/2021    Procedure: CYSTOSCOPY with bladder tumor removal and fulgeration, insertion of 3-way rizzo catheter;  Surgeon: Alfonzo Hayward MD;  Location: [facility] MAIN OR;  Service: Urology;  Laterality: N/A;    ENDOSCOPY      EXPLORATORY LAPAROTOMY N/A 7/16/2020    Procedure: LAPAROTOMY EXPLORATORY HARTMANS;  Surgeon: Chi Farmer MD;  Location: [facility] MAIN OR;  Service: General;  Laterality: N/A;    HYSTERECTOMY      TOTAL HIP ARTHROPLASTY Right     TOTAL HIP ARTHROPLASTY REVISION Right     x3       Social History:   Social History     Socioeconomic History    Marital status:    Tobacco Use    Smoking status: Former     Current packs/day: 0.00     Average packs/day: 2.0 packs/day for 40.0 years (80.0 ttl pk-yrs)      Types: Cigarettes     Start date:      Quit date: 2019     Years since quittin.5    Smokeless tobacco: Never    Tobacco comments:     ELECTRONIC   Vaping Use    Vaping status: Former    Substances: Nicotine, Flavoring   Substance and Sexual Activity    Alcohol use: Never    Drug use: Never    Sexual activity: Defer       Procedures Performed         Consults:   Consults       No orders found for last 30 day(s).            Condition on Discharge:     Stable    Discharge Disposition      Discharge Medications     Discharge Medications        ASK your doctor about these medications        Instructions Start Date   albuterol (2.5 MG/3ML) 0.083% nebulizer solution  Commonly known as: PROVENTIL   2.5 mg, Nebulization, Every 6 Hours PRN      amoxicillin-clavulanate 875-125 MG per tablet  Commonly known as: AUGMENTIN   1 tablet, Oral, 2 Times Daily      apixaban 5 MG tablet tablet  Commonly known as: ELIQUIS   5 mg, Oral, 2 Times Daily      azithromycin 250 MG tablet  Commonly known as: ZITHROMAX   250 mg, Oral, 3 Times Weekly, Monday, Wednesday, Friday       Breztri Aerosphere 160-9-4.8 MCG/ACT aerosol inhaler  Generic drug: Budeson-Glycopyrrol-Formoterol   2 puffs, Inhalation, 2 Times Daily      carvedilol 3.125 MG tablet  Commonly known as: COREG   3.125 mg, Oral, 2 Times Daily PRN      furosemide 20 MG tablet  Commonly known as: LASIX   20 mg, Oral, 2 Times Daily PRN      HYDROcodone-acetaminophen  MG per tablet  Commonly known as: NORCO   1 tablet, Oral, 5 Times Daily PRN      ipratropium 0.02 % nebulizer solution  Commonly known as: ATROVENT   500 mcg, Nebulization, Every 6 Hours PRN      ipratropium-albuterol 0.5-2.5 mg/3 ml nebulizer  Commonly known as: DUO-NEB   3 mL, Nebulization, Every 4 Hours PRN      lisinopril 5 MG tablet  Commonly known as: PRINIVIL,ZESTRIL   5 mg, Oral, Daily      methocarbamol 500 MG tablet  Commonly known as: ROBAXIN  Ask about: Which instructions should I use?   500 mg,  Oral, Daily PRN      pantoprazole 40 MG EC tablet  Commonly known as: PROTONIX   40 mg, Oral, 2 Times Daily      potassium chloride 10 MEQ CR tablet   Take 1 tablet by mouth 2 (Two) Times a Day As Needed (take with furosemide PRN).      predniSONE 10 MG tablet  Commonly known as: DELTASONE  Ask about: Which instructions should I use?   10 mg, Oral, Daily      roflumilast 500 MCG tablet tablet  Commonly known as: DALIRESP   500 mcg, Oral, Nightly      zolpidem 5 MG tablet  Commonly known as: AMBIEN   5 mg, Oral, Nightly PRN               Discharge Diet:     Activity at Discharge:     Follow-up Appointments  No future appointments.      Test Results Pending at Discharge       Risk for Readmission (LACE) Score: 8 (7/28/2024  6:00 AM)      Less Than 30 minutes spent in discharge activities for this patient    Signature:Electronically signed by Christen Barahona PA-C, 07/28/24, 11:02 AM EDT.

## 2024-07-28 NOTE — OUTREACH NOTE
Prep Survey      Flowsheet Row Responses   [facility] patient discharged [facility]? [facility]   Is LACE score < 7 ? No   Eligibility Readm Mgmt   Discharge diagnosis COPD exacerbation   Does the patient have one of the following disease processes/diagnoses(primary or secondary)? COPD   Does the patient have Home health ordered? No   Is there a DME ordered? No   Prep survey completed? Yes            Marleen PIEDRA - Registered Nurse

## 2024-07-28 NOTE — ED NOTES
Nursing report ED to floor  Renuka Pelayo  68 y.o.  female    HPI:   Chief Complaint   Patient presents with    Shortness of Breath     Pt reports increased SOA a few days ago.  Pt reports home O2 of 2L normally.  Has had to increase to 5L.         Admitting doctor:   Clement Griffith DO    Admitting diagnosis:   The primary encounter diagnosis was Acute exacerbation of chronic obstructive pulmonary disease (COPD). A diagnosis of Parainfluenza virus infection was also pertinent to this visit.    Code status:   Current Code Status       Date Active Code Status Order ID Comments User Context       7/28/2024 0023 CPR (Attempt to Resuscitate) [id number]  Clement Griffith DO ED        Question Answer    Code Status (Patient has no pulse and is not breathing) CPR (Attempt to Resuscitate)    Medical Interventions (Patient has pulse or is breathing) Full Support    Level Of Support Discussed With Patient                    Allergies:   Patient has no known allergies.    Isolation:  No active isolations     Fall Risk:  Fall Risk Assessment was completed, and patient is at low risk for falls.   Predictive Model Details         8 (Low) Factor Value    Calculated 7/28/2024 00:41 Age 68    Risk of Fall Model Active Peripheral IV Present     Imaging order in this encounter Present     Respiratory Rate 20     Magnesium not on file     Jerald Scale not on file     Number of Distinct Medication Classes administered 2     Chloride 99 mmol/L     Creatinine 1.13 mg/dL     Diastolic BP 95     Days after Admission 0.161     Potassium 4.4 mmol/L     Total Bilirubin 0.4 mg/dL     Tobacco Use Quit     ALT 15 U/L     Calcium 9.7 mg/dL     Albumin 4.7 g/dL         Weight:       07/27/24 2047   Weight: 69.3 kg (152 lb 12.5 oz)       Intake and Output  No intake or output data in the 24 hours ending 07/28/24 0042    Diet:   Dietary Orders (From admission, onward)       Start     Ordered    07/28/24 0027  Diet: Regular/House; Fluid  "Consistency: Thin (IDDSI 0)  Diet Effective Now        References:    Diet Order [facility]   Question Answer Comment   Diets: Regular/House    Fluid Consistency: Thin (IDDSI 0)        07/28/24 0027                     Most recent vitals:   Vitals:    07/27/24 2047 07/27/24 2049 07/27/24 2218 07/27/24 2222   BP: 168/95      BP Location: Left arm      Patient Position: Sitting      Pulse: 103  90 86   Resp: 18  22 20   Temp: 97.9 °F (36.6 °C)      TempSrc: Oral      SpO2: 94% 98% 98% 98%   Weight: 69.3 kg (152 lb 12.5 oz)      Height: 160 cm (63\")          Active LDAs/IV Access:   Lines, Drains & Airways       Active LDAs       Name Placement date Placement time Site Days    Peripheral IV 07/27/24 2113 Right Antecubital 07/27/24 2113  Antecubital  less than 1                    Skin Condition:   Skin Assessments (last day)       None             Labs (abnormal labs have a star):   Labs Reviewed   RESPIRATORY PANEL PCR W/ COVID-19 (SARS-COV-2), NP SWAB IN UTM/VTP, 2 HR TAT - Abnormal; Notable for the following components:       Result Value    Parainfluenza Virus 4 Detected (*)     All other components within normal limits    Narrative:     In the setting of a positive respiratory panel with a viral infection PLUS a negative procalcitonin without other underlying concern for bacterial infection, consider observing off antibiotics or discontinuation of antibiotics and continue supportive care. If the respiratory panel is positive for atypical bacterial infection (Bordetella pertussis, Chlamydophila pneumoniae, or Mycoplasma pneumoniae), consider antibiotic de-escalation to target atypical bacterial infection.   COMPREHENSIVE METABOLIC PANEL - Abnormal; Notable for the following components:    Glucose 114 (*)     Creatinine 1.13 (*)     eGFR 53.4 (*)     All other components within normal limits    Narrative:     GFR Normal >60  Chronic Kidney Disease <60  Kidney Failure <15     PROTIME-INR - Abnormal; Notable for the " following components:    Protime 12.0 (*)     INR 1.11 (*)     All other components within normal limits   APTT - Abnormal; Notable for the following components:    PTT 29.4 (*)     All other components within normal limits   CBC WITH AUTO DIFFERENTIAL - Abnormal; Notable for the following components:    WBC 12.52 (*)     MCHC 31.1 (*)     Neutrophil % 80.3 (*)     Lymphocyte % 13.3 (*)     Eosinophil % 0.0 (*)     Immature Grans % 0.8 (*)     Neutrophils, Absolute 10.06 (*)     Immature Grans, Absolute 0.10 (*)     All other components within normal limits   BNP (IN-HOUSE) - Normal    Narrative:     This assay is used as an aid in the diagnosis of individuals suspected of having heart failure. It can be used as an aid in the diagnosis of acute decompensated heart failure (ADHF) in patients presenting with signs and symptoms of ADHF to the emergency department (ED). In addition, NT-proBNP of <300 pg/mL indicates ADHF is not likely.    Age Range Result Interpretation  NT-proBNP Concentration (pg/mL:      <50             Positive            >450                   Gray                 300-450                    Negative             <300    50-75           Positive            >900                  Gray                300-900                  Negative            <300      >75             Positive            >1800                  Gray                300-1800                  Negative            <300   TROPONIN - Normal    Narrative:     High Sensitive Troponin T Reference Range:  <14.0 ng/L- Negative Female for AMI  <22.0 ng/L- Negative Male for AMI  >=14 - Abnormal Female indicating possible myocardial injury.  >=22 - Abnormal Male indicating possible myocardial injury.   Clinicians would have to utilize clinical acumen, EKG, Troponin, and serial changes to determine if it is an Acute Myocardial Infarction or myocardial injury due to an underlying chronic condition.        HIGH SENSITIVITIY TROPONIN T 2HR - Normal     "Narrative:     High Sensitive Troponin T Reference Range:  <14.0 ng/L- Negative Female for AMI  <22.0 ng/L- Negative Male for AMI  >=14 - Abnormal Female indicating possible myocardial injury.  >=22 - Abnormal Male indicating possible myocardial injury.   Clinicians would have to utilize clinical acumen, EKG, Troponin, and serial changes to determine if it is an Acute Myocardial Infarction or myocardial injury due to an underlying chronic condition.        D-DIMER, QUANTITATIVE - Normal    Narrative:     According to the assay 's published package insert, a normal (<0.50 mg/L (FEU)) D-dimer result in conjunction with a non-high clinical probability assessment, excludes deep vein thrombosis (DVT) and pulmonary embolism (PE) with high sensitivity.    D-dimer values increase with age and this can make VTE exclusion of an older population difficult. To address this, the [employer], based on best available evidence and recent guidelines, recommends that clinicians use age-adjusted D-dimer thresholds in patients greater than 50 years of age with: a) a low probability of PE who do not meet all Pulmonary Embolism Rule Out Criteria, or b) in those with intermediate probability of PE.   The formula for an age-adjusted D-dimer cut-off is \"age/100\".  For example, a 60 year old patient would have an age-adjusted cut-off of 0.60 mg/L (FEU) and an 80 year old 0.80 mg/L (FEU).   RAINBOW DRAW    Narrative:     The following orders were created for panel order [place] Draw.  Procedure                               Abnormality         Status                     ---------                               -----------         ------                     Green Top (Gel)[[id number]]                                  Final result               Lavender Top[[id number]]                                     Final result               Gold Top - SST[[id number]]                                   Final result             "   Light Blue Top[[id number]]                                   Final result                 Please view results for these tests on the individual orders.   GREEN TOP   LAVENDER TOP   GOLD TOP - SST   LIGHT BLUE TOP   CBC AND DIFFERENTIAL    Narrative:     The following orders were created for panel order CBC & Differential.  Procedure                               Abnormality         Status                     ---------                               -----------         ------                     CBC Auto Differential[[id number]]        Abnormal            Final result                 Please view results for these tests on the individual orders.       LOC: Person, Place, Time, and Situation    Telemetry:  Observation Unit    Cardiac Monitoring Ordered: yes    EKG:   ECG 12 Lead Dyspnea   Preliminary Result   HEART RATE=90  bpm   RR Lbedtlbj=033  ms   LA Mllrfyqy=146  ms   P Horizontal Axis=0  deg   P Front Axis=55  deg   QRSD Yrabnbho=036  ms   QT Lxkhaaee=242  ms   CPhW=920  ms   QRS Axis=67  deg   T Wave Axis=71  deg   - ABNORMAL ECG -   Sinus rhythm   Nonspecific T abnrm, anterolateral leads   Date and Time of Study:2024-07-27 21:00:52      Telemetry Scan   Final Result          Medications Given in the ED:   Medications   sodium chloride 0.9 % flush 10 mL (has no administration in time range)   cefTRIAXone (ROCEPHIN) 2,000 mg in sodium chloride 0.9 % 100 mL MBP (has no administration in time range)   methylPREDNISolone sodium succinate (SOLU-Medrol) injection 125 mg (125 mg Intravenous Given 7/27/24 2228)   ipratropium-albuterol (DUO-NEB) nebulizer solution 3 mL (3 mL Nebulization Given 7/27/24 2218)       Imaging results:  XR Chest 1 View    Result Date: 7/27/2024  Impression: 1.No acute cardiopulmonary abnormality. 2.Emphysema and bibasilar scarring. Electronically Signed: Zain Mays MD  7/27/2024 10:52 PM EDT  Workstation ID: [id number]     Social issues:   Social History     Socioeconomic History    Marital  status:    Tobacco Use    Smoking status: Former     Current packs/day: 0.00     Average packs/day: 2.0 packs/day for 40.0 years (80.0 ttl pk-yrs)     Types: Cigarettes     Start date:      Quit date: 2019     Years since quittin.5    Smokeless tobacco: Never    Tobacco comments:     ELECTRONIC   Vaping Use    Vaping status: Former    Substances: Nicotine, Flavoring   Substance and Sexual Activity    Alcohol use: Never    Drug use: Never    Sexual activity: Defer       NIH Stroke Scale:  Interval: (not recorded)  1a. Level of Consciousness: (not recorded)  1b. LOC Questions: (not recorded)  1c. LOC Commands: (not recorded)  2. Best Gaze: (not recorded)  3. Visual: (not recorded)  4. Facial Palsy: (not recorded)  5a. Motor Arm, Left: (not recorded)  5b. Motor Arm, Right: (not recorded)  6a. Motor Leg, Left: (not recorded)  6b. Motor Leg, Right: (not recorded)  7. Limb Ataxia: (not recorded)  8. Sensory: (not recorded)  9. Best Language: (not recorded)  10. Dysarthria: (not recorded)  11. Extinction and Inattention (formerly Neglect): (not recorded)    Total (NIH Stroke Scale): (not recorded)     Additional notable assessment information:    Nursing report ED to floor:  Irwin Cooley RN   24 00:42 EDT

## 2024-07-28 NOTE — PLAN OF CARE
Goal Outcome Evaluation:  Plan of Care Reviewed With: patient           Outcome Evaluation: New admit pt alert x 4 no compliants of soa while resting in bed. No c/o of pain will continue to montior.

## 2024-07-31 ENCOUNTER — READMISSION MANAGEMENT (OUTPATIENT)
Dept: CALL CENTER | Facility: HOSPITAL | Age: 69
End: 2024-07-31
Payer: MEDICARE

## 2024-07-31 NOTE — OUTREACH NOTE
COPD/PN Week 1 Survey      Flowsheet Row Responses   [facility] patient discharged from? [facility]   Does the patient have one of the following disease processes/diagnoses(primary or secondary)? COPD   Week 1 attempt successful? Yes   Call start time 1306   Call end time 1322   Discharge diagnosis COPD exacerbation   Meds reviewed with patient/caregiver? Yes   Is the patient having any side effects they believe may be caused by any medication additions or changes? No   Does the patient have all medications ordered at discharge? Yes   Is the patient taking all medications as directed (includes completed medication regime)? Yes   Does the patient have a primary care provider?  Yes   Does the patient have an appointment with their PCP or specialist within 7 days of discharge? Yes   Comments regarding PCP will f/u on 8/1 pt reports   Has the patient kept scheduled appointments due by today? N/A   Pulse Ox monitoring Intermittent   Pulse Ox device source Patient   Comments Pt reports continued SOA and cough since being home. Her SOA and cough increase with exertion and improve with rest. Pt is tolerating po intake and is able to rest at this time per her report.   Did the patient receive a copy of their discharge instructions? Yes   Nursing interventions Reviewed instructions with patient   What is the patient's perception of their health status since discharge? Improving   Nursing Interventions Nurse provided patient education   If the patient is a current smoker, are they able to teach back resources for cessation? Not a smoker   Is the patient/caregiver able to teach back the hierarchy of who to call/visit for symptoms/problems? PCP, Specialist, Home health nurse, Urgent Care, ED, 911 Yes   Patient reports what zone on this call? Yellow Zone   Yellow Zone Reports having a bad day or a COPD flare, More breathless than usual, I have less energy for my daily activities, My medicine is not helping me, Increased or  thicker phlegm/mucus, Poor sleep and my symptoms woke me up   Yellow interventions Continue taking daily medications, Use oxygen as prescribed, Start an oral corticosteroid if ordered, Get plenty of rest   Week 1 call completed? Yes   Call end time 1322            Jenise GUSMAN - Registered Nurse

## 2024-08-09 ENCOUNTER — READMISSION MANAGEMENT (OUTPATIENT)
Dept: CALL CENTER | Facility: HOSPITAL | Age: 69
End: 2024-08-09
Payer: MEDICARE

## 2024-08-09 NOTE — OUTREACH NOTE
COPD/PN Week 2 Survey      Flowsheet Row Responses   [facility] patient discharged from? [facility]   Does the patient have one of the following disease processes/diagnoses(primary or secondary)? COPD   Week 2 attempt successful? Yes   Call start time 1109   Call end time 1111   Discharge diagnosis COPD exacerbation   Meds reviewed with patient/caregiver? Yes   Is the patient having any side effects they believe may be caused by any medication additions or changes? No   Does the patient have all medications ordered at discharge? Yes   Is the patient taking all medications as directed (includes completed medication regime)? Yes   Does the patient have a primary care provider?  Yes   Does the patient have an appointment with their PCP or specialist within 7 days of discharge? Yes   Has the patient kept scheduled appointments due by today? Yes   Has home health visited the patient within 72 hours of discharge? N/A   Pulse Ox monitoring Intermittent   Pulse Ox device source Patient   O2 Sat comments O2 sats are good per patient on 2L   O2 Sat: education provided Sat levels, Monitoring frequency, When to seek care   Psychosocial issues? No   Did the patient receive a copy of their discharge instructions? Yes   Nursing interventions Reviewed instructions with patient   What is the patient's perception of their health status since discharge? Improving   Nursing Interventions Nurse provided patient education   Are the patient's immunizations up to date?  Yes   Is the patient able to teach back COPD zones? Yes   Nursing interventions Education provided on various zones   Patient reports what zone on this call? Green Zone   Green Zone Reports doing well, Breathing without shortness of breath   Green Zone interventions: Take daily medications, Use oxygen as prescribed   Week 2 call completed? Yes   Is the patient interested in additional calls from an ambulatory ? No   Would this patient benefit from a Referral to  Amb Social Work? No   Call end time 0498            Renata T - Registered Nurse

## 2024-10-24 ENCOUNTER — TELEPHONE (OUTPATIENT)
Dept: CARDIAC REHAB | Facility: HOSPITAL | Age: 69
End: 2024-10-24
Payer: MEDICARE

## 2024-11-11 ENCOUNTER — TELEPHONE (OUTPATIENT)
Dept: CARDIAC REHAB | Facility: HOSPITAL | Age: 69
End: 2024-11-11
Payer: MEDICARE

## 2024-11-11 NOTE — TELEPHONE ENCOUNTER
Patient called back. Pt is getting zephyr valves on 11/12. Pt will call and f/u w/us on pulmonary rehab ~11/18    Sowmya Mansfield, RRT

## 2024-11-11 NOTE — TELEPHONE ENCOUNTER
Called to f/u w/patient to see if they want to get scheduled for pulmonary rehab. Pt's daughter answered phone and said pt was currently on phone w/MD office and that she we will call us back when she is available    Sowmya Mansfield, RRT

## 2024-11-18 ENCOUNTER — TELEPHONE (OUTPATIENT)
Dept: CARDIAC REHAB | Facility: HOSPITAL | Age: 69
End: 2024-11-18
Payer: MEDICARE

## 2024-11-18 NOTE — TELEPHONE ENCOUNTER
Called and f/u'd w/patient as she had her zephyr valve procedure on 11/12/24. Patient says procedure went well and she is feeling better. Asked pt if MD Kaila had told her if she was okay to start pulmonary rehab. Pt says she is f/u w/MD on 11/25/24 and should find out then. Encouraged pt to contact us when they find out so we can get her scheduled    Sowmya Mansfield RRT

## 2024-11-26 ENCOUNTER — TELEPHONE (OUTPATIENT)
Dept: CARDIAC REHAB | Facility: HOSPITAL | Age: 69
End: 2024-11-26
Payer: MEDICARE

## 2024-11-27 ENCOUNTER — TELEPHONE (OUTPATIENT)
Dept: CARDIAC REHAB | Facility: HOSPITAL | Age: 69
End: 2024-11-27
Payer: MEDICARE

## 2024-11-27 NOTE — TELEPHONE ENCOUNTER
Attempted to contact patient to schedule for pulmonary rehab. Called home & mobile contact and no answer/voicemail set up    Sowmya Mansfield RRT

## 2024-12-03 ENCOUNTER — TRANSCRIBE ORDERS (OUTPATIENT)
Dept: ADMINISTRATIVE | Facility: HOSPITAL | Age: 69
End: 2024-12-03
Payer: MEDICARE

## 2024-12-03 DIAGNOSIS — J43.1 PANLOBULAR EMPHYSEMA: Primary | ICD-10-CM

## 2025-01-15 ENCOUNTER — HOSPITAL ENCOUNTER (OUTPATIENT)
Dept: PET IMAGING | Facility: HOSPITAL | Age: 70
Discharge: HOME | End: 2025-01-15
Admitting: INTERNAL MEDICINE
Payer: MEDICARE

## 2025-01-15 DIAGNOSIS — J43.1 PANLOBULAR EMPHYSEMA: ICD-10-CM

## 2025-01-15 PROCEDURE — 71250 CT THORAX DX C-: CPT

## 2025-02-10 ENCOUNTER — TRANSCRIBE ORDERS (OUTPATIENT)
Dept: ADMINISTRATIVE | Facility: HOSPITAL | Age: 70
End: 2025-02-10
Payer: MEDICARE

## 2025-02-10 DIAGNOSIS — I27.20 PORTOPULMONARY HYPERTENSION: ICD-10-CM

## 2025-02-10 DIAGNOSIS — K76.6 PORTOPULMONARY HYPERTENSION: ICD-10-CM

## 2025-02-10 DIAGNOSIS — J41.0 SIMPLE CHRONIC BRONCHITIS: ICD-10-CM

## 2025-08-19 ENCOUNTER — OFFICE VISIT (OUTPATIENT)
Dept: SURGERY | Facility: CLINIC | Age: 70
End: 2025-08-19
Payer: MEDICARE

## 2025-08-19 VITALS
HEART RATE: 82 BPM | HEIGHT: 63 IN | TEMPERATURE: 97.8 F | BODY MASS INDEX: 28.88 KG/M2 | DIASTOLIC BLOOD PRESSURE: 97 MMHG | WEIGHT: 163 LBS | OXYGEN SATURATION: 98 % | SYSTOLIC BLOOD PRESSURE: 139 MMHG

## 2025-08-19 DIAGNOSIS — L72.0 EPIDERMAL CYST: Primary | ICD-10-CM

## 2025-08-19 PROCEDURE — 99204 OFFICE O/P NEW MOD 45 MIN: CPT | Performed by: SURGERY

## 2025-08-19 PROCEDURE — 1159F MED LIST DOCD IN RCRD: CPT | Performed by: SURGERY

## 2025-08-19 PROCEDURE — 1160F RVW MEDS BY RX/DR IN RCRD: CPT | Performed by: SURGERY

## 2025-08-19 PROCEDURE — 3080F DIAST BP >= 90 MM HG: CPT | Performed by: SURGERY

## 2025-08-19 PROCEDURE — 3075F SYST BP GE 130 - 139MM HG: CPT | Performed by: SURGERY

## 2025-08-19 RX ORDER — METOLAZONE 2.5 MG/1
2.5 TABLET ORAL 3 TIMES WEEKLY
COMMUNITY
Start: 2025-04-02

## 2025-08-19 RX ORDER — DOXYCYCLINE 100 MG/1
100 CAPSULE ORAL 2 TIMES DAILY
COMMUNITY
Start: 2025-08-14 | End: 2025-08-24

## 2025-08-19 RX ORDER — PREDNISONE 20 MG/1
20 TABLET ORAL
COMMUNITY
Start: 2025-08-14

## 2025-08-21 ENCOUNTER — TELEPHONE (OUTPATIENT)
Dept: SURGERY | Facility: CLINIC | Age: 70
End: 2025-08-21
Payer: MEDICARE

## (undated) DEVICE — PENCL HND ROCKRSWTCH HOLSTR EZ CLEAN TP CRD 10FT

## (undated) DEVICE — BAG,DRAINAGE,4L,A/R TOWER,LL,SLIDE TAP: Brand: MEDLINE

## (undated) DEVICE — SUT VICYL 2/0 CR8 18IN DYED J726D

## (undated) DEVICE — SUT SILK 2/0 30IN A305H

## (undated) DEVICE — DECANTER: Brand: UNBRANDED

## (undated) DEVICE — PK MAJ LAPAROTOMY 50

## (undated) DEVICE — WET SKIN PREP TRAY: Brand: MEDLINE INDUSTRIES, INC.

## (undated) DEVICE — TP SXN YANKR BULB STRL

## (undated) DEVICE — GOWN,REINFRCE,POLY,SIRUS,BREATH SLV,XXLG: Brand: MEDLINE

## (undated) DEVICE — SUT PDS2 CTX 60IN

## (undated) DEVICE — SPNG LAP PREWSH SFTPK 18X18IN STRL PK/5

## (undated) DEVICE — TUBING, SUCTION, 1/4" X 12', STRAIGHT: Brand: MEDLINE

## (undated) DEVICE — PAD,ABDOMINAL,5"X9",STERILE,LF,1/PK: Brand: MEDLINE INDUSTRIES, INC.

## (undated) DEVICE — TOWEL,OR,DSP,ST,WHITE,DLX,4/PK,20PK/CS: Brand: MEDLINE

## (undated) DEVICE — DUAL LUMEN STOMACH TUBE: Brand: SALEM SUMP

## (undated) DEVICE — BAPTIST FLOYD BRONCHOSCOPY: Brand: MEDLINE INDUSTRIES, INC.

## (undated) DEVICE — CATH URETRL FLXITP POLLACK STD 4F 70CM

## (undated) DEVICE — DRSNG TELFA PAD NONADH STR 1S 3X8IN

## (undated) DEVICE — 450 ML BOTTLE OF 0.05% CHLORHEXIDINE GLUCONATE IN 99.95% STERILE WATER FOR IRRIGATION, USP AND APPLICATOR.: Brand: IRRISEPT ANTIMICROBIAL WOUND LAVAGE

## (undated) DEVICE — ELECTRD BLD EZ CLN STD 6.5IN

## (undated) DEVICE — APPL CHLORAPREP HI/LITE 26ML ORNG

## (undated) DEVICE — 9165 UNIVERSAL PATIENT PLATE: Brand: 3M™

## (undated) DEVICE — COVER,MAYO STAND,STERILE: Brand: MEDLINE

## (undated) DEVICE — 2-PIECE OSTOMY SKIN BARRIER, FORMAFLEX: Brand: NEW IMAGE

## (undated) DEVICE — 2-PIECE DRAINABLE OSTOMY POUCH: Brand: NEW IMAGE

## (undated) DEVICE — ENSEAL 20 CM SHAFT, LARGE JAW: Brand: ENSEAL X1

## (undated) DEVICE — ELECTRD BLD EZ CLN MOD XLNG 2.75IN

## (undated) DEVICE — PENCL EVAC ULTRAVAC SMOKE W/BLD

## (undated) DEVICE — 3M™ PATIENT PLATE, CORDED, SPLIT, LARGE, 40 PER CASE, 1179: Brand: 3M™

## (undated) DEVICE — GLV SURG SENSICARE SLT PF LF 8 STRL

## (undated) DEVICE — Device

## (undated) DEVICE — VIOLET BRAIDED (POLYGLACTIN 910), SYNTHETIC ABSORBABLE SUTURE: Brand: COATED VICRYL

## (undated) DEVICE — DRAPE, LAVH, STERILE: Brand: MEDLINE

## (undated) DEVICE — PROXIMATE RH ROTATING HEAD SKIN STAPLERS (35 WIDE) CONTAINS 35 STAINLESS STEEL STAPLES: Brand: PROXIMATE

## (undated) DEVICE — BLAKE SILICONE DRAIN, 19 FR ROUND, HUBLESS WITH 1/4" BENDABLE TROCAR: Brand: BLAKE

## (undated) DEVICE — SUT PROLN 3/0 SH 48IN BLU 8534H

## (undated) DEVICE — 3M™ WARMING BLANKET, UPPER BODY, 10 PER CASE, 42268: Brand: BAIR HUGGER™

## (undated) DEVICE — SUT VIC 3/0 SH CR8 18IN J864D

## (undated) DEVICE — SOL IRRIG H2O 1000ML STRL

## (undated) DEVICE — CVR HNDL LT CAM LB54

## (undated) DEVICE — SOL IRRIG NACL 1000ML

## (undated) DEVICE — SUT PDS 1 TP1 48IN Z880G BX/12

## (undated) DEVICE — UNDERGLV SURG BIOGEL/PI PF SYNTH SURG SZ8.5 BLU 50/BX

## (undated) DEVICE — CVR HNDL LT SURG ACCSSRY BLU STRL

## (undated) DEVICE — SUT SILK 3/0 SH CR8 18IN C013D

## (undated) DEVICE — PK PROC TURNOVER

## (undated) DEVICE — TOTAL TRAY, DB, 100% SILI FOLEY, 16FR 10: Brand: MEDLINE

## (undated) DEVICE — SOL IRRIG NACL 9PCT 1000ML BTL

## (undated) DEVICE — BANDAGE,GAUZE,BULKEE II,4.5"X4.1YD,STRL: Brand: MEDLINE

## (undated) DEVICE — KT SURG TURNOVER 050

## (undated) DEVICE — DRN WND EVAC BULB 100CC

## (undated) DEVICE — CONTAINER,SPECIMEN,OR STERILE,4OZ: Brand: MEDLINE

## (undated) DEVICE — GLV SURG DERMASSURE GRN LF PF 8.5